# Patient Record
Sex: FEMALE | Race: WHITE
[De-identification: names, ages, dates, MRNs, and addresses within clinical notes are randomized per-mention and may not be internally consistent; named-entity substitution may affect disease eponyms.]

---

## 2017-01-01 ENCOUNTER — SURGERY (OUTPATIENT)
Age: 67
End: 2017-01-01

## 2017-01-01 ENCOUNTER — APPOINTMENT (OUTPATIENT)
Dept: GENERAL RADIOLOGY | Facility: CLINIC | Age: 67
DRG: 280 | End: 2017-01-01
Attending: SURGERY
Payer: COMMERCIAL

## 2017-01-01 ENCOUNTER — MYC MEDICAL ADVICE (OUTPATIENT)
Dept: FAMILY MEDICINE | Facility: OTHER | Age: 67
End: 2017-01-01

## 2017-01-01 ENCOUNTER — TELEPHONE (OUTPATIENT)
Dept: SURGERY | Facility: CLINIC | Age: 67
End: 2017-01-01

## 2017-01-01 ENCOUNTER — OFFICE VISIT (OUTPATIENT)
Dept: FAMILY MEDICINE | Facility: OTHER | Age: 67
End: 2017-01-01
Payer: COMMERCIAL

## 2017-01-01 ENCOUNTER — TELEPHONE (OUTPATIENT)
Dept: ONCOLOGY | Facility: CLINIC | Age: 67
End: 2017-01-01

## 2017-01-01 ENCOUNTER — DOCUMENTATION ONLY (OUTPATIENT)
Dept: SPIRITUAL SERVICES | Facility: CLINIC | Age: 67
End: 2017-01-01

## 2017-01-01 ENCOUNTER — NURSE TRIAGE (OUTPATIENT)
Dept: NURSING | Facility: CLINIC | Age: 67
End: 2017-01-01

## 2017-01-01 ENCOUNTER — INFUSION THERAPY VISIT (OUTPATIENT)
Dept: INFUSION THERAPY | Facility: CLINIC | Age: 67
End: 2017-01-01
Payer: COMMERCIAL

## 2017-01-01 ENCOUNTER — APPOINTMENT (OUTPATIENT)
Dept: PHYSICAL THERAPY | Facility: CLINIC | Age: 67
DRG: 280 | End: 2017-01-01
Attending: SURGERY
Payer: COMMERCIAL

## 2017-01-01 ENCOUNTER — APPOINTMENT (OUTPATIENT)
Dept: ULTRASOUND IMAGING | Facility: CLINIC | Age: 67
End: 2017-01-01
Attending: INTERNAL MEDICINE
Payer: COMMERCIAL

## 2017-01-01 ENCOUNTER — NURSING HOME VISIT (OUTPATIENT)
Dept: GERIATRICS | Facility: CLINIC | Age: 67
End: 2017-01-01
Payer: COMMERCIAL

## 2017-01-01 ENCOUNTER — TELEPHONE (OUTPATIENT)
Dept: FAMILY MEDICINE | Facility: OTHER | Age: 67
End: 2017-01-01

## 2017-01-01 ENCOUNTER — APPOINTMENT (OUTPATIENT)
Dept: SPEECH THERAPY | Facility: CLINIC | Age: 67
DRG: 280 | End: 2017-01-01
Attending: SURGERY
Payer: COMMERCIAL

## 2017-01-01 ENCOUNTER — HOSPITAL ENCOUNTER (OUTPATIENT)
Facility: CLINIC | Age: 67
Discharge: HOME | End: 2017-08-28
Attending: INTERNAL MEDICINE | Admitting: INTERNAL MEDICINE
Payer: COMMERCIAL

## 2017-01-01 ENCOUNTER — DOCUMENTATION ONLY (OUTPATIENT)
Dept: INFUSION THERAPY | Facility: CLINIC | Age: 67
End: 2017-01-01

## 2017-01-01 ENCOUNTER — ONCOLOGY VISIT (OUTPATIENT)
Dept: ONCOLOGY | Facility: CLINIC | Age: 67
End: 2017-01-01
Payer: COMMERCIAL

## 2017-01-01 ENCOUNTER — HOSPITAL ENCOUNTER (OUTPATIENT)
Dept: CT IMAGING | Facility: CLINIC | Age: 67
Discharge: HOME | End: 2017-12-07
Attending: INTERNAL MEDICINE | Admitting: INTERNAL MEDICINE
Payer: COMMERCIAL

## 2017-01-01 ENCOUNTER — ANESTHESIA EVENT (OUTPATIENT)
Dept: SURGERY | Facility: CLINIC | Age: 67
End: 2017-01-01
Payer: COMMERCIAL

## 2017-01-01 ENCOUNTER — APPOINTMENT (OUTPATIENT)
Dept: OCCUPATIONAL THERAPY | Facility: CLINIC | Age: 67
DRG: 280 | End: 2017-01-01
Attending: SURGERY
Payer: COMMERCIAL

## 2017-01-01 ENCOUNTER — ALLIED HEALTH/NURSE VISIT (OUTPATIENT)
Dept: FAMILY MEDICINE | Facility: CLINIC | Age: 67
End: 2017-01-01
Payer: COMMERCIAL

## 2017-01-01 ENCOUNTER — APPOINTMENT (OUTPATIENT)
Dept: GENERAL RADIOLOGY | Facility: CLINIC | Age: 67
End: 2017-01-01
Attending: SURGERY
Payer: COMMERCIAL

## 2017-01-01 ENCOUNTER — ONCOLOGY VISIT (OUTPATIENT)
Dept: ONCOLOGY | Facility: CLINIC | Age: 67
End: 2017-01-01
Attending: INTERNAL MEDICINE
Payer: COMMERCIAL

## 2017-01-01 ENCOUNTER — RADIANT APPOINTMENT (OUTPATIENT)
Dept: GENERAL RADIOLOGY | Facility: OTHER | Age: 67
End: 2017-01-01
Attending: PHYSICIAN ASSISTANT
Payer: COMMERCIAL

## 2017-01-01 ENCOUNTER — HOSPITAL ENCOUNTER (OUTPATIENT)
Facility: AMBULATORY SURGERY CENTER | Age: 67
Discharge: HOME | End: 2017-08-24
Attending: INTERNAL MEDICINE | Admitting: INTERNAL MEDICINE
Payer: COMMERCIAL

## 2017-01-01 ENCOUNTER — TELEPHONE (OUTPATIENT)
Dept: GENERAL RADIOLOGY | Facility: CLINIC | Age: 67
End: 2017-01-01

## 2017-01-01 ENCOUNTER — TRANSFERRED RECORDS (OUTPATIENT)
Dept: HEALTH INFORMATION MANAGEMENT | Facility: CLINIC | Age: 67
End: 2017-01-01

## 2017-01-01 ENCOUNTER — APPOINTMENT (OUTPATIENT)
Dept: CT IMAGING | Facility: CLINIC | Age: 67
End: 2017-01-01
Attending: EMERGENCY MEDICINE
Payer: COMMERCIAL

## 2017-01-01 ENCOUNTER — CARE COORDINATION (OUTPATIENT)
Dept: CARE COORDINATION | Facility: CLINIC | Age: 67
End: 2017-01-01

## 2017-01-01 ENCOUNTER — TELEPHONE (OUTPATIENT)
Dept: GERIATRICS | Facility: CLINIC | Age: 67
End: 2017-01-01

## 2017-01-01 ENCOUNTER — HOSPITAL ENCOUNTER (OUTPATIENT)
Dept: CARDIOLOGY | Facility: CLINIC | Age: 67
Discharge: HOME | End: 2017-08-09
Attending: PHYSICIAN ASSISTANT | Admitting: PHYSICIAN ASSISTANT
Payer: COMMERCIAL

## 2017-01-01 ENCOUNTER — HOSPITAL ENCOUNTER (OUTPATIENT)
Dept: CT IMAGING | Facility: CLINIC | Age: 67
Discharge: HOME | End: 2017-08-18
Attending: PHYSICIAN ASSISTANT | Admitting: PHYSICIAN ASSISTANT
Payer: COMMERCIAL

## 2017-01-01 ENCOUNTER — INFUSION THERAPY VISIT (OUTPATIENT)
Dept: INFUSION THERAPY | Facility: CLINIC | Age: 67
End: 2017-01-01
Attending: INTERNAL MEDICINE
Payer: COMMERCIAL

## 2017-01-01 ENCOUNTER — ALLIED HEALTH/NURSE VISIT (OUTPATIENT)
Dept: FAMILY MEDICINE | Facility: OTHER | Age: 67
End: 2017-01-01
Payer: COMMERCIAL

## 2017-01-01 ENCOUNTER — APPOINTMENT (OUTPATIENT)
Dept: GENERAL RADIOLOGY | Facility: CLINIC | Age: 67
DRG: 280 | End: 2017-01-01
Attending: INTERNAL MEDICINE
Payer: COMMERCIAL

## 2017-01-01 ENCOUNTER — OFFICE VISIT (OUTPATIENT)
Dept: SURGERY | Facility: CLINIC | Age: 67
End: 2017-01-01
Payer: COMMERCIAL

## 2017-01-01 ENCOUNTER — HOSPITAL ENCOUNTER (OUTPATIENT)
Dept: CARDIOLOGY | Facility: CLINIC | Age: 67
Discharge: HOME | End: 2017-09-27
Attending: INTERNAL MEDICINE | Admitting: INTERNAL MEDICINE
Payer: COMMERCIAL

## 2017-01-01 ENCOUNTER — APPOINTMENT (OUTPATIENT)
Dept: ULTRASOUND IMAGING | Facility: CLINIC | Age: 67
DRG: 280 | End: 2017-01-01
Attending: SURGERY
Payer: COMMERCIAL

## 2017-01-01 ENCOUNTER — APPOINTMENT (OUTPATIENT)
Dept: CARDIOLOGY | Facility: CLINIC | Age: 67
DRG: 280 | End: 2017-01-01
Attending: SURGERY
Payer: COMMERCIAL

## 2017-01-01 ENCOUNTER — APPOINTMENT (OUTPATIENT)
Dept: GENERAL RADIOLOGY | Facility: CLINIC | Age: 67
DRG: 280 | End: 2017-01-01
Attending: PHYSICIAN ASSISTANT
Payer: COMMERCIAL

## 2017-01-01 ENCOUNTER — OFFICE VISIT (OUTPATIENT)
Dept: CARDIOLOGY | Facility: CLINIC | Age: 67
End: 2017-01-01
Payer: COMMERCIAL

## 2017-01-01 ENCOUNTER — HOSPITAL ENCOUNTER (EMERGENCY)
Facility: CLINIC | Age: 67
Discharge: HOME | End: 2017-07-14
Attending: EMERGENCY MEDICINE | Admitting: EMERGENCY MEDICINE
Payer: COMMERCIAL

## 2017-01-01 ENCOUNTER — HOSPITAL ENCOUNTER (EMERGENCY)
Facility: CLINIC | Age: 67
Discharge: HOME | End: 2017-10-25
Attending: FAMILY MEDICINE | Admitting: FAMILY MEDICINE
Payer: COMMERCIAL

## 2017-01-01 ENCOUNTER — APPOINTMENT (OUTPATIENT)
Dept: GENERAL RADIOLOGY | Facility: CLINIC | Age: 67
End: 2017-01-01
Attending: EMERGENCY MEDICINE
Payer: COMMERCIAL

## 2017-01-01 ENCOUNTER — HOSPITAL ENCOUNTER (EMERGENCY)
Facility: CLINIC | Age: 67
Discharge: ANOTHER HOSPITAL | End: 2017-09-09
Attending: EMERGENCY MEDICINE | Admitting: EMERGENCY MEDICINE
Payer: COMMERCIAL

## 2017-01-01 ENCOUNTER — HOSPITAL ENCOUNTER (OUTPATIENT)
Dept: CARDIOLOGY | Facility: CLINIC | Age: 67
Discharge: HOME | End: 2017-10-13
Attending: INTERNAL MEDICINE | Admitting: INTERNAL MEDICINE
Payer: COMMERCIAL

## 2017-01-01 ENCOUNTER — HOME INFUSION (PRE-WILLOW HOME INFUSION) (OUTPATIENT)
Dept: PHARMACY | Facility: CLINIC | Age: 67
End: 2017-01-01

## 2017-01-01 ENCOUNTER — TELEPHONE (OUTPATIENT)
Dept: FAMILY MEDICINE | Facility: CLINIC | Age: 67
End: 2017-01-01

## 2017-01-01 ENCOUNTER — ANESTHESIA (OUTPATIENT)
Dept: SURGERY | Facility: CLINIC | Age: 67
End: 2017-01-01
Payer: COMMERCIAL

## 2017-01-01 ENCOUNTER — HOSPITAL ENCOUNTER (EMERGENCY)
Facility: CLINIC | Age: 67
Discharge: HOME | End: 2017-10-07
Attending: FAMILY MEDICINE | Admitting: FAMILY MEDICINE
Payer: COMMERCIAL

## 2017-01-01 ENCOUNTER — APPOINTMENT (OUTPATIENT)
Dept: CT IMAGING | Facility: CLINIC | Age: 67
DRG: 280 | End: 2017-01-01
Attending: INTERNAL MEDICINE
Payer: COMMERCIAL

## 2017-01-01 ENCOUNTER — NURSE TRIAGE (OUTPATIENT)
Dept: CARE COORDINATION | Facility: CLINIC | Age: 67
End: 2017-01-01

## 2017-01-01 ENCOUNTER — APPOINTMENT (OUTPATIENT)
Dept: GENERAL RADIOLOGY | Facility: CLINIC | Age: 67
End: 2017-01-01
Attending: FAMILY MEDICINE
Payer: COMMERCIAL

## 2017-01-01 ENCOUNTER — HOSPITAL ENCOUNTER (INPATIENT)
Facility: CLINIC | Age: 67
LOS: 10 days | Discharge: NURSING, REHAB OR LONG-TERM CARE | DRG: 280 | End: 2017-09-19
Attending: SURGERY | Admitting: INTERNAL MEDICINE
Payer: COMMERCIAL

## 2017-01-01 ENCOUNTER — HOSPITAL ENCOUNTER (OUTPATIENT)
Dept: PET IMAGING | Facility: CLINIC | Age: 67
Discharge: HOME | End: 2017-08-25
Attending: INTERNAL MEDICINE | Admitting: INTERNAL MEDICINE
Payer: COMMERCIAL

## 2017-01-01 ENCOUNTER — TELEPHONE (OUTPATIENT)
Dept: CARDIOLOGY | Facility: CLINIC | Age: 67
End: 2017-01-01

## 2017-01-01 ENCOUNTER — APPOINTMENT (OUTPATIENT)
Dept: CT IMAGING | Facility: CLINIC | Age: 67
End: 2017-01-01
Attending: FAMILY MEDICINE
Payer: COMMERCIAL

## 2017-01-01 ENCOUNTER — RADIANT APPOINTMENT (OUTPATIENT)
Dept: ULTRASOUND IMAGING | Facility: OTHER | Age: 67
End: 2017-01-01
Attending: PHYSICIAN ASSISTANT
Payer: COMMERCIAL

## 2017-01-01 ENCOUNTER — HOSPITAL ENCOUNTER (OUTPATIENT)
Facility: CLINIC | Age: 67
Discharge: HOME | End: 2017-09-01
Attending: SURGERY | Admitting: SURGERY
Payer: COMMERCIAL

## 2017-01-01 VITALS
SYSTOLIC BLOOD PRESSURE: 151 MMHG | HEART RATE: 97 BPM | BODY MASS INDEX: 20.07 KG/M2 | RESPIRATION RATE: 16 BRPM | WEIGHT: 127.9 LBS | HEIGHT: 67 IN | DIASTOLIC BLOOD PRESSURE: 77 MMHG | OXYGEN SATURATION: 100 % | TEMPERATURE: 97.3 F

## 2017-01-01 VITALS
SYSTOLIC BLOOD PRESSURE: 129 MMHG | BODY MASS INDEX: 23.58 KG/M2 | WEIGHT: 148.3 LBS | HEART RATE: 85 BPM | DIASTOLIC BLOOD PRESSURE: 57 MMHG | RESPIRATION RATE: 18 BRPM | OXYGEN SATURATION: 100 % | TEMPERATURE: 98.5 F

## 2017-01-01 VITALS
DIASTOLIC BLOOD PRESSURE: 80 MMHG | HEIGHT: 66 IN | HEART RATE: 136 BPM | TEMPERATURE: 98 F | OXYGEN SATURATION: 98 % | WEIGHT: 150 LBS | SYSTOLIC BLOOD PRESSURE: 172 MMHG | BODY MASS INDEX: 24.11 KG/M2 | RESPIRATION RATE: 16 BRPM

## 2017-01-01 VITALS
HEIGHT: 67 IN | WEIGHT: 127.9 LBS | TEMPERATURE: 98.4 F | DIASTOLIC BLOOD PRESSURE: 80 MMHG | HEART RATE: 92 BPM | OXYGEN SATURATION: 100 % | BODY MASS INDEX: 20.07 KG/M2 | RESPIRATION RATE: 16 BRPM | SYSTOLIC BLOOD PRESSURE: 158 MMHG

## 2017-01-01 VITALS
OXYGEN SATURATION: 97 % | RESPIRATION RATE: 16 BRPM | HEIGHT: 67 IN | WEIGHT: 139.6 LBS | DIASTOLIC BLOOD PRESSURE: 86 MMHG | SYSTOLIC BLOOD PRESSURE: 153 MMHG | HEART RATE: 97 BPM | BODY MASS INDEX: 21.91 KG/M2 | TEMPERATURE: 98.4 F

## 2017-01-01 VITALS
TEMPERATURE: 98.8 F | SYSTOLIC BLOOD PRESSURE: 118 MMHG | OXYGEN SATURATION: 99 % | BODY MASS INDEX: 22.05 KG/M2 | HEART RATE: 110 BPM | DIASTOLIC BLOOD PRESSURE: 67 MMHG | WEIGHT: 140.5 LBS | HEIGHT: 67 IN | RESPIRATION RATE: 16 BRPM

## 2017-01-01 VITALS
TEMPERATURE: 98.6 F | HEIGHT: 67 IN | SYSTOLIC BLOOD PRESSURE: 136 MMHG | RESPIRATION RATE: 18 BRPM | DIASTOLIC BLOOD PRESSURE: 65 MMHG | WEIGHT: 146 LBS | OXYGEN SATURATION: 99 % | BODY MASS INDEX: 22.91 KG/M2 | HEART RATE: 79 BPM

## 2017-01-01 VITALS
SYSTOLIC BLOOD PRESSURE: 132 MMHG | OXYGEN SATURATION: 97 % | DIASTOLIC BLOOD PRESSURE: 66 MMHG | TEMPERATURE: 98.6 F | RESPIRATION RATE: 16 BRPM | HEART RATE: 96 BPM

## 2017-01-01 VITALS
WEIGHT: 158 LBS | OXYGEN SATURATION: 96 % | SYSTOLIC BLOOD PRESSURE: 173 MMHG | BODY MASS INDEX: 25.39 KG/M2 | DIASTOLIC BLOOD PRESSURE: 96 MMHG | RESPIRATION RATE: 16 BRPM | HEART RATE: 120 BPM | TEMPERATURE: 97.5 F | HEIGHT: 66 IN

## 2017-01-01 VITALS
HEART RATE: 132 BPM | DIASTOLIC BLOOD PRESSURE: 66 MMHG | BODY MASS INDEX: 25.23 KG/M2 | SYSTOLIC BLOOD PRESSURE: 180 MMHG | RESPIRATION RATE: 12 BRPM | HEIGHT: 66 IN | WEIGHT: 157 LBS | OXYGEN SATURATION: 99 % | TEMPERATURE: 98.5 F

## 2017-01-01 VITALS
HEIGHT: 67 IN | SYSTOLIC BLOOD PRESSURE: 170 MMHG | HEART RATE: 110 BPM | DIASTOLIC BLOOD PRESSURE: 70 MMHG | BODY MASS INDEX: 21.88 KG/M2 | WEIGHT: 139.4 LBS | OXYGEN SATURATION: 98 % | TEMPERATURE: 98.4 F

## 2017-01-01 VITALS
WEIGHT: 148 LBS | SYSTOLIC BLOOD PRESSURE: 142 MMHG | DIASTOLIC BLOOD PRESSURE: 86 MMHG | TEMPERATURE: 99.5 F | RESPIRATION RATE: 39 BRPM | BODY MASS INDEX: 23.5 KG/M2 | OXYGEN SATURATION: 92 % | HEART RATE: 147 BPM

## 2017-01-01 VITALS
HEART RATE: 95 BPM | TEMPERATURE: 97.8 F | OXYGEN SATURATION: 100 % | SYSTOLIC BLOOD PRESSURE: 163 MMHG | BODY MASS INDEX: 20.78 KG/M2 | RESPIRATION RATE: 16 BRPM | DIASTOLIC BLOOD PRESSURE: 81 MMHG | HEIGHT: 67 IN | WEIGHT: 132.4 LBS

## 2017-01-01 VITALS
SYSTOLIC BLOOD PRESSURE: 119 MMHG | OXYGEN SATURATION: 98 % | DIASTOLIC BLOOD PRESSURE: 58 MMHG | TEMPERATURE: 98.1 F | HEART RATE: 90 BPM | RESPIRATION RATE: 16 BRPM

## 2017-01-01 VITALS
BODY MASS INDEX: 23.59 KG/M2 | RESPIRATION RATE: 18 BRPM | BODY MASS INDEX: 22.88 KG/M2 | HEART RATE: 72 BPM | DIASTOLIC BLOOD PRESSURE: 72 MMHG | TEMPERATURE: 99.7 F | WEIGHT: 144.1 LBS | SYSTOLIC BLOOD PRESSURE: 156 MMHG | WEIGHT: 150.3 LBS | HEIGHT: 67 IN | OXYGEN SATURATION: 97 % | TEMPERATURE: 98.6 F | RESPIRATION RATE: 16 BRPM | HEART RATE: 93 BPM | SYSTOLIC BLOOD PRESSURE: 130 MMHG | DIASTOLIC BLOOD PRESSURE: 63 MMHG

## 2017-01-01 VITALS
RESPIRATION RATE: 18 BRPM | BODY MASS INDEX: 22.1 KG/M2 | DIASTOLIC BLOOD PRESSURE: 85 MMHG | SYSTOLIC BLOOD PRESSURE: 163 MMHG | TEMPERATURE: 101.7 F | HEART RATE: 101 BPM | WEIGHT: 139 LBS | OXYGEN SATURATION: 93 %

## 2017-01-01 VITALS
BODY MASS INDEX: 23.53 KG/M2 | DIASTOLIC BLOOD PRESSURE: 84 MMHG | HEART RATE: 86 BPM | TEMPERATURE: 97.5 F | OXYGEN SATURATION: 98 % | RESPIRATION RATE: 16 BRPM | SYSTOLIC BLOOD PRESSURE: 160 MMHG | WEIGHT: 148.2 LBS

## 2017-01-01 VITALS
HEART RATE: 103 BPM | HEIGHT: 67 IN | SYSTOLIC BLOOD PRESSURE: 121 MMHG | DIASTOLIC BLOOD PRESSURE: 65 MMHG | TEMPERATURE: 99 F | OXYGEN SATURATION: 98 % | RESPIRATION RATE: 16 BRPM | BODY MASS INDEX: 22.65 KG/M2 | WEIGHT: 144.3 LBS

## 2017-01-01 VITALS
DIASTOLIC BLOOD PRESSURE: 58 MMHG | SYSTOLIC BLOOD PRESSURE: 124 MMHG | BODY MASS INDEX: 22.32 KG/M2 | WEIGHT: 142.2 LBS | OXYGEN SATURATION: 97 % | HEIGHT: 67 IN | TEMPERATURE: 97.5 F | HEART RATE: 90 BPM | RESPIRATION RATE: 16 BRPM

## 2017-01-01 VITALS
TEMPERATURE: 97.7 F | WEIGHT: 137.3 LBS | DIASTOLIC BLOOD PRESSURE: 73 MMHG | HEIGHT: 67 IN | BODY MASS INDEX: 21.55 KG/M2 | SYSTOLIC BLOOD PRESSURE: 140 MMHG | HEART RATE: 94 BPM | OXYGEN SATURATION: 99 % | RESPIRATION RATE: 16 BRPM

## 2017-01-01 VITALS
BODY MASS INDEX: 24.32 KG/M2 | SYSTOLIC BLOOD PRESSURE: 178 MMHG | WEIGHT: 151.6 LBS | HEART RATE: 120 BPM | DIASTOLIC BLOOD PRESSURE: 70 MMHG | TEMPERATURE: 98.6 F

## 2017-01-01 VITALS
OXYGEN SATURATION: 97 % | HEART RATE: 125 BPM | BODY MASS INDEX: 25.41 KG/M2 | WEIGHT: 158.1 LBS | DIASTOLIC BLOOD PRESSURE: 78 MMHG | SYSTOLIC BLOOD PRESSURE: 162 MMHG | HEIGHT: 66 IN | TEMPERATURE: 98.7 F | RESPIRATION RATE: 16 BRPM

## 2017-01-01 VITALS
TEMPERATURE: 97 F | HEART RATE: 77 BPM | HEIGHT: 67 IN | DIASTOLIC BLOOD PRESSURE: 62 MMHG | OXYGEN SATURATION: 97 % | SYSTOLIC BLOOD PRESSURE: 122 MMHG | BODY MASS INDEX: 22.34 KG/M2 | WEIGHT: 142.3 LBS | RESPIRATION RATE: 16 BRPM

## 2017-01-01 VITALS
DIASTOLIC BLOOD PRESSURE: 79 MMHG | OXYGEN SATURATION: 97 % | TEMPERATURE: 97.7 F | HEIGHT: 67 IN | BODY MASS INDEX: 21.56 KG/M2 | RESPIRATION RATE: 16 BRPM | WEIGHT: 137.4 LBS | HEART RATE: 85 BPM | SYSTOLIC BLOOD PRESSURE: 157 MMHG

## 2017-01-01 VITALS
OXYGEN SATURATION: 98 % | DIASTOLIC BLOOD PRESSURE: 79 MMHG | HEART RATE: 134 BPM | BODY MASS INDEX: 23.25 KG/M2 | HEIGHT: 67 IN | WEIGHT: 148.1 LBS | SYSTOLIC BLOOD PRESSURE: 154 MMHG | TEMPERATURE: 100.7 F | RESPIRATION RATE: 16 BRPM

## 2017-01-01 VITALS
TEMPERATURE: 97.3 F | OXYGEN SATURATION: 99 % | SYSTOLIC BLOOD PRESSURE: 160 MMHG | DIASTOLIC BLOOD PRESSURE: 76 MMHG | HEART RATE: 94 BPM | RESPIRATION RATE: 18 BRPM

## 2017-01-01 VITALS
RESPIRATION RATE: 16 BRPM | OXYGEN SATURATION: 93 % | DIASTOLIC BLOOD PRESSURE: 86 MMHG | HEART RATE: 92 BPM | SYSTOLIC BLOOD PRESSURE: 181 MMHG | BODY MASS INDEX: 21.92 KG/M2 | TEMPERATURE: 101.9 F | WEIGHT: 138 LBS

## 2017-01-01 VITALS
SYSTOLIC BLOOD PRESSURE: 146 MMHG | BODY MASS INDEX: 20.04 KG/M2 | HEIGHT: 67 IN | RESPIRATION RATE: 18 BRPM | OXYGEN SATURATION: 99 % | TEMPERATURE: 97.6 F | DIASTOLIC BLOOD PRESSURE: 72 MMHG | WEIGHT: 127.7 LBS | HEART RATE: 85 BPM

## 2017-01-01 VITALS
RESPIRATION RATE: 18 BRPM | HEART RATE: 86 BPM | SYSTOLIC BLOOD PRESSURE: 169 MMHG | DIASTOLIC BLOOD PRESSURE: 81 MMHG | TEMPERATURE: 98.7 F

## 2017-01-01 VITALS
BODY MASS INDEX: 21.62 KG/M2 | OXYGEN SATURATION: 96 % | WEIGHT: 136 LBS | RESPIRATION RATE: 16 BRPM | SYSTOLIC BLOOD PRESSURE: 166 MMHG | DIASTOLIC BLOOD PRESSURE: 77 MMHG | TEMPERATURE: 100 F | HEART RATE: 102 BPM

## 2017-01-01 VITALS
HEIGHT: 67 IN | RESPIRATION RATE: 14 BRPM | WEIGHT: 148.1 LBS | TEMPERATURE: 100.7 F | OXYGEN SATURATION: 95 % | SYSTOLIC BLOOD PRESSURE: 157 MMHG | BODY MASS INDEX: 23.25 KG/M2 | DIASTOLIC BLOOD PRESSURE: 73 MMHG

## 2017-01-01 VITALS
OXYGEN SATURATION: 98 % | RESPIRATION RATE: 18 BRPM | TEMPERATURE: 98.2 F | WEIGHT: 144 LBS | HEART RATE: 103 BPM | SYSTOLIC BLOOD PRESSURE: 167 MMHG | DIASTOLIC BLOOD PRESSURE: 82 MMHG | BODY MASS INDEX: 22.89 KG/M2

## 2017-01-01 VITALS
HEART RATE: 64 BPM | TEMPERATURE: 97.8 F | SYSTOLIC BLOOD PRESSURE: 161 MMHG | OXYGEN SATURATION: 100 % | DIASTOLIC BLOOD PRESSURE: 67 MMHG | RESPIRATION RATE: 16 BRPM

## 2017-01-01 VITALS
DIASTOLIC BLOOD PRESSURE: 49 MMHG | WEIGHT: 144.4 LBS | OXYGEN SATURATION: 98 % | HEART RATE: 92 BPM | RESPIRATION RATE: 16 BRPM | SYSTOLIC BLOOD PRESSURE: 122 MMHG | BODY MASS INDEX: 22.66 KG/M2 | HEIGHT: 67 IN | TEMPERATURE: 99.5 F

## 2017-01-01 VITALS
SYSTOLIC BLOOD PRESSURE: 161 MMHG | OXYGEN SATURATION: 97 % | WEIGHT: 153.9 LBS | RESPIRATION RATE: 16 BRPM | TEMPERATURE: 104.3 F | HEART RATE: 127 BPM | DIASTOLIC BLOOD PRESSURE: 83 MMHG | BODY MASS INDEX: 24.73 KG/M2 | HEIGHT: 66 IN

## 2017-01-01 VITALS
TEMPERATURE: 98 F | SYSTOLIC BLOOD PRESSURE: 172 MMHG | DIASTOLIC BLOOD PRESSURE: 80 MMHG | OXYGEN SATURATION: 98 % | WEIGHT: 150 LBS | HEART RATE: 136 BPM | BODY MASS INDEX: 24.06 KG/M2 | RESPIRATION RATE: 16 BRPM

## 2017-01-01 VITALS
WEIGHT: 129.1 LBS | SYSTOLIC BLOOD PRESSURE: 131 MMHG | TEMPERATURE: 96.7 F | BODY MASS INDEX: 20.26 KG/M2 | HEIGHT: 67 IN | DIASTOLIC BLOOD PRESSURE: 76 MMHG | RESPIRATION RATE: 16 BRPM | OXYGEN SATURATION: 100 % | HEART RATE: 98 BPM

## 2017-01-01 VITALS
HEIGHT: 66 IN | RESPIRATION RATE: 99 BRPM | WEIGHT: 142.1 LBS | SYSTOLIC BLOOD PRESSURE: 124 MMHG | DIASTOLIC BLOOD PRESSURE: 58 MMHG | BODY MASS INDEX: 22.84 KG/M2 | HEART RATE: 90 BPM

## 2017-01-01 VITALS
OXYGEN SATURATION: 100 % | WEIGHT: 129.19 LBS | HEIGHT: 67 IN | SYSTOLIC BLOOD PRESSURE: 140 MMHG | DIASTOLIC BLOOD PRESSURE: 61 MMHG | BODY MASS INDEX: 20.28 KG/M2 | TEMPERATURE: 98.5 F | RESPIRATION RATE: 16 BRPM | HEART RATE: 94 BPM

## 2017-01-01 VITALS
BODY MASS INDEX: 23.06 KG/M2 | RESPIRATION RATE: 22 BRPM | WEIGHT: 145.2 LBS | HEART RATE: 78 BPM | DIASTOLIC BLOOD PRESSURE: 72 MMHG | TEMPERATURE: 99.5 F | SYSTOLIC BLOOD PRESSURE: 158 MMHG

## 2017-01-01 VITALS
RESPIRATION RATE: 16 BRPM | HEART RATE: 120 BPM | OXYGEN SATURATION: 98 % | TEMPERATURE: 98.3 F | SYSTOLIC BLOOD PRESSURE: 172 MMHG | DIASTOLIC BLOOD PRESSURE: 81 MMHG

## 2017-01-01 VITALS
HEIGHT: 66 IN | WEIGHT: 153 LBS | TEMPERATURE: 98.4 F | DIASTOLIC BLOOD PRESSURE: 75 MMHG | RESPIRATION RATE: 18 BRPM | BODY MASS INDEX: 24.59 KG/M2 | SYSTOLIC BLOOD PRESSURE: 164 MMHG | OXYGEN SATURATION: 97 %

## 2017-01-01 VITALS — DIASTOLIC BLOOD PRESSURE: 80 MMHG | SYSTOLIC BLOOD PRESSURE: 154 MMHG

## 2017-01-01 DIAGNOSIS — C18.9 METASTATIC COLON CANCER TO LIVER (H): Primary | ICD-10-CM

## 2017-01-01 DIAGNOSIS — R68.83 CHILLS: ICD-10-CM

## 2017-01-01 DIAGNOSIS — D69.6 THROMBOCYTOPENIA (H): ICD-10-CM

## 2017-01-01 DIAGNOSIS — D72.829 LEUKOCYTOSIS, UNSPECIFIED TYPE: Primary | ICD-10-CM

## 2017-01-01 DIAGNOSIS — C78.7 LIVER METASTASIS: ICD-10-CM

## 2017-01-01 DIAGNOSIS — C78.7 METASTATIC COLON CANCER TO LIVER (H): ICD-10-CM

## 2017-01-01 DIAGNOSIS — Z12.31 ENCOUNTER FOR SCREENING MAMMOGRAM FOR BREAST CANCER: ICD-10-CM

## 2017-01-01 DIAGNOSIS — T45.1X5A CHEMOTHERAPY INDUCED DIARRHEA: ICD-10-CM

## 2017-01-01 DIAGNOSIS — R05.9 COUGH: ICD-10-CM

## 2017-01-01 DIAGNOSIS — I10 BENIGN ESSENTIAL HYPERTENSION: ICD-10-CM

## 2017-01-01 DIAGNOSIS — D64.9 ANEMIA, UNSPECIFIED TYPE: ICD-10-CM

## 2017-01-01 DIAGNOSIS — R11.2 CHEMOTHERAPY INDUCED NAUSEA AND VOMITING: ICD-10-CM

## 2017-01-01 DIAGNOSIS — R50.9 FEVER, UNSPECIFIED: ICD-10-CM

## 2017-01-01 DIAGNOSIS — E87.6 HYPOKALEMIA: ICD-10-CM

## 2017-01-01 DIAGNOSIS — G89.3 CANCER ASSOCIATED PAIN: ICD-10-CM

## 2017-01-01 DIAGNOSIS — R03.0 ELEVATED BLOOD PRESSURE READING WITHOUT DIAGNOSIS OF HYPERTENSION: ICD-10-CM

## 2017-01-01 DIAGNOSIS — C78.7 METASTATIC COLON CANCER TO LIVER (H): Primary | ICD-10-CM

## 2017-01-01 DIAGNOSIS — T45.1X5A CHEMOTHERAPY INDUCED NAUSEA AND VOMITING: ICD-10-CM

## 2017-01-01 DIAGNOSIS — K76.9 HEPATIC LESION: ICD-10-CM

## 2017-01-01 DIAGNOSIS — I50.22 CHRONIC SYSTOLIC CONGESTIVE HEART FAILURE (H): ICD-10-CM

## 2017-01-01 DIAGNOSIS — C18.9 METASTATIC COLON CANCER TO LIVER (H): ICD-10-CM

## 2017-01-01 DIAGNOSIS — Z13.6 CARDIOVASCULAR SCREENING; LDL GOAL LESS THAN 160: ICD-10-CM

## 2017-01-01 DIAGNOSIS — Z12.11 SPECIAL SCREENING FOR MALIGNANT NEOPLASMS, COLON: ICD-10-CM

## 2017-01-01 DIAGNOSIS — Z12.11 SCREEN FOR COLON CANCER: ICD-10-CM

## 2017-01-01 DIAGNOSIS — R93.5 ABNORMAL ULTRASOUND OF ABDOMEN: Primary | ICD-10-CM

## 2017-01-01 DIAGNOSIS — I42.9 SECONDARY CARDIOMYOPATHY (H): ICD-10-CM

## 2017-01-01 DIAGNOSIS — R05.3 PERSISTENT COUGH: ICD-10-CM

## 2017-01-01 DIAGNOSIS — C19 COLORECTAL CANCER (H): ICD-10-CM

## 2017-01-01 DIAGNOSIS — R93.5 ABNORMAL ULTRASOUND OF ABDOMEN: ICD-10-CM

## 2017-01-01 DIAGNOSIS — Z23 NEED FOR VACCINATION: ICD-10-CM

## 2017-01-01 DIAGNOSIS — C18.9 METASTATIC COLON CANCER IN FEMALE: ICD-10-CM

## 2017-01-01 DIAGNOSIS — D70.1 CHEMOTHERAPY-INDUCED NEUTROPENIA (H): ICD-10-CM

## 2017-01-01 DIAGNOSIS — R00.0 TACHYCARDIA: ICD-10-CM

## 2017-01-01 DIAGNOSIS — D70.9 NEUTROPENIC FEVER (H): Primary | ICD-10-CM

## 2017-01-01 DIAGNOSIS — R63.4 LOSS OF WEIGHT: ICD-10-CM

## 2017-01-01 DIAGNOSIS — R91.1 LUNG NODULE: ICD-10-CM

## 2017-01-01 DIAGNOSIS — R19.5 POSITIVE FIT (FECAL IMMUNOCHEMICAL TEST): Primary | ICD-10-CM

## 2017-01-01 DIAGNOSIS — K59.01 SLOW TRANSIT CONSTIPATION: ICD-10-CM

## 2017-01-01 DIAGNOSIS — D70.9 NEUTROPENIC FEVER (H): ICD-10-CM

## 2017-01-01 DIAGNOSIS — C78.7 LIVER METASTASIS: Primary | ICD-10-CM

## 2017-01-01 DIAGNOSIS — Z11.59 NEED FOR HEPATITIS C SCREENING TEST: ICD-10-CM

## 2017-01-01 DIAGNOSIS — R79.89 ELEVATED LFTS: ICD-10-CM

## 2017-01-01 DIAGNOSIS — R50.81 NEUTROPENIC FEVER (H): ICD-10-CM

## 2017-01-01 DIAGNOSIS — Z13.1 SCREENING FOR DIABETES MELLITUS: Primary | ICD-10-CM

## 2017-01-01 DIAGNOSIS — R06.02 SHORTNESS OF BREATH: ICD-10-CM

## 2017-01-01 DIAGNOSIS — C78.7 SECONDARY MALIGNANT NEOPLASM OF LIVER (H): ICD-10-CM

## 2017-01-01 DIAGNOSIS — Z13.1 SCREENING FOR DIABETES MELLITUS: ICD-10-CM

## 2017-01-01 DIAGNOSIS — R05.9 COUGH: Primary | ICD-10-CM

## 2017-01-01 DIAGNOSIS — R50.81 NEUTROPENIC FEVER (H): Primary | ICD-10-CM

## 2017-01-01 DIAGNOSIS — Z01.818 PREOP GENERAL PHYSICAL EXAM: ICD-10-CM

## 2017-01-01 DIAGNOSIS — J96.01 ACUTE RESPIRATORY FAILURE WITH HYPOXIA (H): ICD-10-CM

## 2017-01-01 DIAGNOSIS — I50.21 ACUTE SYSTOLIC CONGESTIVE HEART FAILURE (H): ICD-10-CM

## 2017-01-01 DIAGNOSIS — D72.829 LEUKOCYTOSIS, UNSPECIFIED TYPE: ICD-10-CM

## 2017-01-01 DIAGNOSIS — R53.81 PHYSICAL DECONDITIONING: ICD-10-CM

## 2017-01-01 DIAGNOSIS — I10 HYPERTENSION, GOAL BELOW 140/90: ICD-10-CM

## 2017-01-01 DIAGNOSIS — R05.3 PERSISTENT COUGH: Primary | ICD-10-CM

## 2017-01-01 DIAGNOSIS — K63.89 MASS OF COLON: Primary | ICD-10-CM

## 2017-01-01 DIAGNOSIS — K52.1 CHEMOTHERAPY INDUCED DIARRHEA: ICD-10-CM

## 2017-01-01 DIAGNOSIS — D64.9 ANEMIA: Primary | ICD-10-CM

## 2017-01-01 DIAGNOSIS — Z23 NEED FOR PROPHYLACTIC VACCINATION WITH TETANUS-DIPHTHERIA (TD): ICD-10-CM

## 2017-01-01 DIAGNOSIS — R07.89 ATYPICAL CHEST PAIN: ICD-10-CM

## 2017-01-01 DIAGNOSIS — R94.31 NONSPECIFIC ABNORMAL ELECTROCARDIOGRAM (ECG) (EKG): ICD-10-CM

## 2017-01-01 DIAGNOSIS — K63.89 COLONIC MASS: Primary | ICD-10-CM

## 2017-01-01 DIAGNOSIS — Z00.01 ENCOUNTER FOR ROUTINE ADULT HEALTH EXAMINATION WITH ABNORMAL FINDINGS: ICD-10-CM

## 2017-01-01 DIAGNOSIS — C18.2 MALIGNANT NEOPLASM OF ASCENDING COLON (H): ICD-10-CM

## 2017-01-01 DIAGNOSIS — R10.10 UPPER ABDOMINAL PAIN: ICD-10-CM

## 2017-01-01 DIAGNOSIS — R11.2 NAUSEA AND VOMITING, INTRACTABILITY OF VOMITING NOT SPECIFIED, UNSPECIFIED VOMITING TYPE: ICD-10-CM

## 2017-01-01 DIAGNOSIS — R00.2 PALPITATIONS: ICD-10-CM

## 2017-01-01 DIAGNOSIS — Z23 NEED FOR PROPHYLACTIC VACCINATION AGAINST STREPTOCOCCUS PNEUMONIAE (PNEUMOCOCCUS): ICD-10-CM

## 2017-01-01 DIAGNOSIS — R79.82 ELEVATED C-REACTIVE PROTEIN (CRP): ICD-10-CM

## 2017-01-01 DIAGNOSIS — C78.7 LIVER METASTASES: ICD-10-CM

## 2017-01-01 DIAGNOSIS — R03.0 ELEVATED BLOOD PRESSURE READING WITHOUT DIAGNOSIS OF HYPERTENSION: Primary | ICD-10-CM

## 2017-01-01 DIAGNOSIS — J45.30 ASTHMA IN ADULT, MILD PERSISTENT, UNCOMPLICATED: ICD-10-CM

## 2017-01-01 DIAGNOSIS — R50.9 FEVER, UNSPECIFIED: Primary | ICD-10-CM

## 2017-01-01 DIAGNOSIS — R79.89 ELEVATED D-DIMER: ICD-10-CM

## 2017-01-01 DIAGNOSIS — R10.10 UPPER ABDOMINAL PAIN: Primary | ICD-10-CM

## 2017-01-01 DIAGNOSIS — R10.12 ABDOMINAL PAIN, LEFT UPPER QUADRANT: ICD-10-CM

## 2017-01-01 DIAGNOSIS — D64.9 ANEMIA: ICD-10-CM

## 2017-01-01 DIAGNOSIS — I50.9 ACUTE CONGESTIVE HEART FAILURE, UNSPECIFIED CONGESTIVE HEART FAILURE TYPE: ICD-10-CM

## 2017-01-01 DIAGNOSIS — T45.1X5A CHEMOTHERAPY-INDUCED NEUTROPENIA (H): ICD-10-CM

## 2017-01-01 DIAGNOSIS — Z71.81 SPIRITUAL OR RELIGIOUS COUNSELING: Primary | ICD-10-CM

## 2017-01-01 DIAGNOSIS — D64.9 ANEMIA, UNSPECIFIED TYPE: Primary | ICD-10-CM

## 2017-01-01 DIAGNOSIS — R11.2 NAUSEA AND VOMITING, INTRACTABILITY OF VOMITING NOT SPECIFIED, UNSPECIFIED VOMITING TYPE: Primary | ICD-10-CM

## 2017-01-01 DIAGNOSIS — Z01.818 PREOP GENERAL PHYSICAL EXAM: Primary | ICD-10-CM

## 2017-01-01 DIAGNOSIS — R00.0 TACHYCARDIA: Primary | ICD-10-CM

## 2017-01-01 DIAGNOSIS — Z78.0 ASYMPTOMATIC POSTMENOPAUSAL STATUS: ICD-10-CM

## 2017-01-01 LAB
ABO + RH BLD: NORMAL
ALBUMIN SERPL-MCNC: 1.3 G/DL (ref 3.4–5)
ALBUMIN SERPL-MCNC: 1.3 G/DL (ref 3.4–5)
ALBUMIN SERPL-MCNC: 1.4 G/DL (ref 3.4–5)
ALBUMIN SERPL-MCNC: 1.5 G/DL (ref 3.4–5)
ALBUMIN SERPL-MCNC: 1.6 G/DL (ref 3.4–5)
ALBUMIN SERPL-MCNC: 1.7 G/DL (ref 3.4–5)
ALBUMIN SERPL-MCNC: 1.7 G/DL (ref 3.4–5)
ALBUMIN SERPL-MCNC: 1.8 G/DL (ref 3.4–5)
ALBUMIN SERPL-MCNC: 1.8 G/DL (ref 3.4–5)
ALBUMIN SERPL-MCNC: 1.9 G/DL (ref 3.4–5)
ALBUMIN SERPL-MCNC: 2.1 G/DL (ref 3.4–5)
ALBUMIN SERPL-MCNC: 2.4 G/DL (ref 3.4–5)
ALBUMIN SERPL-MCNC: 2.5 G/DL (ref 3.4–5)
ALBUMIN SERPL-MCNC: 3.1 G/DL (ref 3.4–5)
ALBUMIN UR-MCNC: 100 MG/DL
ALBUMIN UR-MCNC: 30 MG/DL
ALBUMIN UR-MCNC: NEGATIVE MG/DL
ALP SERPL-CCNC: 163 U/L (ref 40–150)
ALP SERPL-CCNC: 221 U/L (ref 40–150)
ALP SERPL-CCNC: 245 U/L (ref 40–150)
ALP SERPL-CCNC: 256 U/L (ref 40–150)
ALP SERPL-CCNC: 265 U/L (ref 40–150)
ALP SERPL-CCNC: 273 U/L (ref 40–150)
ALP SERPL-CCNC: 291 U/L (ref 40–150)
ALP SERPL-CCNC: 293 U/L (ref 40–150)
ALP SERPL-CCNC: 306 U/L (ref 40–150)
ALP SERPL-CCNC: 315 U/L (ref 40–150)
ALP SERPL-CCNC: 318 U/L (ref 40–150)
ALP SERPL-CCNC: 358 U/L (ref 40–150)
ALP SERPL-CCNC: 421 U/L (ref 40–150)
ALP SERPL-CCNC: 423 U/L (ref 40–150)
ALP SERPL-CCNC: 432 U/L (ref 40–150)
ALP SERPL-CCNC: 433 U/L (ref 40–150)
ALP SERPL-CCNC: 440 U/L (ref 40–150)
ALP SERPL-CCNC: 477 U/L (ref 40–150)
ALP SERPL-CCNC: 492 U/L (ref 40–150)
ALP SERPL-CCNC: 539 U/L (ref 40–150)
ALP SERPL-CCNC: 548 U/L (ref 40–150)
ALP SERPL-CCNC: 550 U/L (ref 40–150)
ALP SERPL-CCNC: 563 U/L (ref 40–150)
ALP SERPL-CCNC: 587 U/L (ref 40–150)
ALP SERPL-CCNC: 598 U/L (ref 40–150)
ALP SERPL-CCNC: 705 U/L (ref 40–150)
ALP SERPL-CCNC: 708 U/L (ref 40–150)
ALP SERPL-CCNC: 900 U/L (ref 40–150)
ALT SERPL W P-5'-P-CCNC: 12 U/L (ref 0–50)
ALT SERPL W P-5'-P-CCNC: 15 U/L (ref 0–50)
ALT SERPL W P-5'-P-CCNC: 17 U/L (ref 0–50)
ALT SERPL W P-5'-P-CCNC: 18 U/L (ref 0–50)
ALT SERPL W P-5'-P-CCNC: 20 U/L (ref 0–50)
ALT SERPL W P-5'-P-CCNC: 23 U/L (ref 0–50)
ALT SERPL W P-5'-P-CCNC: 24 U/L (ref 0–50)
ALT SERPL W P-5'-P-CCNC: 25 U/L (ref 0–50)
ALT SERPL W P-5'-P-CCNC: 26 U/L (ref 0–50)
ALT SERPL W P-5'-P-CCNC: 27 U/L (ref 0–50)
ALT SERPL W P-5'-P-CCNC: 29 U/L (ref 0–50)
ALT SERPL W P-5'-P-CCNC: 33 U/L (ref 0–50)
ALT SERPL W P-5'-P-CCNC: 39 U/L (ref 0–50)
ALT SERPL W P-5'-P-CCNC: 40 U/L (ref 0–50)
ALT SERPL W P-5'-P-CCNC: 40 U/L (ref 0–50)
ALT SERPL W P-5'-P-CCNC: 43 U/L (ref 0–50)
ALT SERPL W P-5'-P-CCNC: 45 U/L (ref 0–50)
ALT SERPL W P-5'-P-CCNC: 46 U/L (ref 0–50)
ALT SERPL W P-5'-P-CCNC: 51 U/L (ref 0–50)
ALT SERPL W P-5'-P-CCNC: 51 U/L (ref 0–50)
ALT SERPL W P-5'-P-CCNC: 53 U/L (ref 0–50)
ALT SERPL W P-5'-P-CCNC: 56 U/L (ref 0–50)
ALT SERPL W P-5'-P-CCNC: 58 U/L (ref 0–50)
AMMONIA PLAS-SCNC: 35 UMOL/L (ref 10–50)
ANION GAP SERPL CALCULATED.3IONS-SCNC: 10 MMOL/L (ref 3–14)
ANION GAP SERPL CALCULATED.3IONS-SCNC: 10 MMOL/L (ref 5–18)
ANION GAP SERPL CALCULATED.3IONS-SCNC: 11 MMOL/L (ref 3–14)
ANION GAP SERPL CALCULATED.3IONS-SCNC: 13 MMOL/L (ref 3–14)
ANION GAP SERPL CALCULATED.3IONS-SCNC: 15 MMOL/L (ref 3–14)
ANION GAP SERPL CALCULATED.3IONS-SCNC: 16 MMOL/L (ref 3–14)
ANION GAP SERPL CALCULATED.3IONS-SCNC: 21 MMOL/L (ref 3–14)
ANION GAP SERPL CALCULATED.3IONS-SCNC: 7 MMOL/L (ref 3–14)
ANION GAP SERPL CALCULATED.3IONS-SCNC: 7 MMOL/L (ref 3–14)
ANION GAP SERPL CALCULATED.3IONS-SCNC: 8 MMOL/L (ref 3–14)
ANION GAP SERPL CALCULATED.3IONS-SCNC: 9 MMOL/L (ref 3–14)
ANISOCYTOSIS BLD QL SMEAR: SLIGHT
ANISOCYTOSIS BLD QL SMEAR: SLIGHT
APPEARANCE UR: ABNORMAL
APPEARANCE UR: CLEAR
APTT PPP: 31 SEC (ref 22–37)
APTT PPP: 39 SEC (ref 22–37)
APTT PPP: 43 SEC (ref 22–37)
APTT PPP: 45 SEC (ref 22–37)
AST SERPL W P-5'-P-CCNC: 107 U/L (ref 0–45)
AST SERPL W P-5'-P-CCNC: 169 U/L (ref 0–45)
AST SERPL W P-5'-P-CCNC: 26 U/L (ref 0–45)
AST SERPL W P-5'-P-CCNC: 33 U/L (ref 0–45)
AST SERPL W P-5'-P-CCNC: 35 U/L (ref 0–45)
AST SERPL W P-5'-P-CCNC: 36 U/L (ref 0–45)
AST SERPL W P-5'-P-CCNC: 37 U/L (ref 0–45)
AST SERPL W P-5'-P-CCNC: 38 U/L (ref 0–45)
AST SERPL W P-5'-P-CCNC: 38 U/L (ref 0–45)
AST SERPL W P-5'-P-CCNC: 41 U/L (ref 0–45)
AST SERPL W P-5'-P-CCNC: 42 U/L (ref 0–45)
AST SERPL W P-5'-P-CCNC: 47 U/L (ref 0–45)
AST SERPL W P-5'-P-CCNC: 50 U/L (ref 0–45)
AST SERPL W P-5'-P-CCNC: 50 U/L (ref 0–45)
AST SERPL W P-5'-P-CCNC: 52 U/L (ref 0–45)
AST SERPL W P-5'-P-CCNC: 56 U/L (ref 0–45)
AST SERPL W P-5'-P-CCNC: 57 U/L (ref 0–45)
AST SERPL W P-5'-P-CCNC: 60 U/L (ref 0–45)
AST SERPL W P-5'-P-CCNC: 62 U/L (ref 0–45)
AST SERPL W P-5'-P-CCNC: 64 U/L (ref 0–45)
AST SERPL W P-5'-P-CCNC: 67 U/L (ref 0–45)
AST SERPL W P-5'-P-CCNC: 68 U/L (ref 0–45)
AST SERPL W P-5'-P-CCNC: 70 U/L (ref 0–45)
AST SERPL W P-5'-P-CCNC: 72 U/L (ref 0–45)
AST SERPL W P-5'-P-CCNC: 74 U/L (ref 0–45)
AST SERPL W P-5'-P-CCNC: 77 U/L (ref 0–45)
AST SERPL W P-5'-P-CCNC: 78 U/L (ref 0–45)
AST SERPL W P-5'-P-CCNC: 97 U/L (ref 0–45)
B BURGDOR IGG+IGM SER QL: 0.03 (ref 0–0.89)
B MICROTI IGG TITR SER: NORMAL {TITER}
B MICROTI IGM TITR SER: NORMAL {TITER}
BACTERIA #/AREA URNS HPF: ABNORMAL /HPF
BACTERIA SPEC CULT: NO GROWTH
BACTERIA SPEC CULT: NORMAL
BASE DEFICIT BLDA-SCNC: 2 MMOL/L
BASE DEFICIT BLDA-SCNC: 6.6 MMOL/L
BASE DEFICIT BLDV-SCNC: 0.1 MMOL/L
BASE DEFICIT BLDV-SCNC: 0.2 MMOL/L
BASE DEFICIT BLDV-SCNC: 0.5 MMOL/L
BASE DEFICIT BLDV-SCNC: 0.7 MMOL/L
BASE DEFICIT BLDV-SCNC: 1.6 MMOL/L
BASE DEFICIT BLDV-SCNC: 1.6 MMOL/L
BASE DEFICIT BLDV-SCNC: 4.4 MMOL/L
BASE DEFICIT BLDV-SCNC: 5.6 MMOL/L
BASE DEFICIT BLDV-SCNC: 6.2 MMOL/L
BASE EXCESS BLDA CALC-SCNC: 0.4 MMOL/L
BASE EXCESS BLDA CALC-SCNC: 1.9 MMOL/L
BASE EXCESS BLDA CALC-SCNC: 6.3 MMOL/L
BASE EXCESS BLDV CALC-SCNC: 0.3 MMOL/L
BASE EXCESS BLDV CALC-SCNC: 0.4 MMOL/L
BASE EXCESS BLDV CALC-SCNC: 0.8 MMOL/L
BASE EXCESS BLDV CALC-SCNC: 1.1 MMOL/L
BASE EXCESS BLDV CALC-SCNC: 1.2 MMOL/L
BASE EXCESS BLDV CALC-SCNC: 1.4 MMOL/L
BASE EXCESS BLDV CALC-SCNC: 2.8 MMOL/L
BASE EXCESS BLDV CALC-SCNC: 3.4 MMOL/L
BASE EXCESS BLDV CALC-SCNC: 3.5 MMOL/L
BASE EXCESS BLDV CALC-SCNC: 3.5 MMOL/L
BASOPHILS # BLD AUTO: 0 10E9/L (ref 0–0.2)
BASOPHILS NFR BLD AUTO: 0 %
BASOPHILS NFR BLD AUTO: 0.1 %
BASOPHILS NFR BLD AUTO: 0.2 %
BASOPHILS NFR BLD AUTO: 0.3 %
BASOPHILS NFR BLD AUTO: 0.4 %
BASOPHILS NFR BLD AUTO: 0.4 %
BILIRUB DIRECT SERPL-MCNC: 0.2 MG/DL (ref 0–0.2)
BILIRUB DIRECT SERPL-MCNC: 0.3 MG/DL (ref 0–0.2)
BILIRUB DIRECT SERPL-MCNC: 0.5 MG/DL (ref 0–0.2)
BILIRUB DIRECT SERPL-MCNC: 0.6 MG/DL (ref 0–0.2)
BILIRUB DIRECT SERPL-MCNC: 0.8 MG/DL (ref 0–0.2)
BILIRUB DIRECT SERPL-MCNC: 0.8 MG/DL (ref 0–0.2)
BILIRUB SERPL-MCNC: 0.3 MG/DL (ref 0.2–1.3)
BILIRUB SERPL-MCNC: 0.4 MG/DL (ref 0.2–1.3)
BILIRUB SERPL-MCNC: 0.5 MG/DL (ref 0.2–1.3)
BILIRUB SERPL-MCNC: 0.5 MG/DL (ref 0.2–1.3)
BILIRUB SERPL-MCNC: 0.6 MG/DL (ref 0.2–1.3)
BILIRUB SERPL-MCNC: 0.8 MG/DL (ref 0.2–1.3)
BILIRUB SERPL-MCNC: 0.8 MG/DL (ref 0.2–1.3)
BILIRUB SERPL-MCNC: 0.9 MG/DL (ref 0.2–1.3)
BILIRUB SERPL-MCNC: 1 MG/DL (ref 0.2–1.3)
BILIRUB SERPL-MCNC: 1.1 MG/DL (ref 0.2–1.3)
BILIRUB SERPL-MCNC: 1.1 MG/DL (ref 0.2–1.3)
BILIRUB SERPL-MCNC: 1.2 MG/DL (ref 0.2–1.3)
BILIRUB SERPL-MCNC: 1.2 MG/DL (ref 0.2–1.3)
BILIRUB SERPL-MCNC: 1.4 MG/DL (ref 0.2–1.3)
BILIRUB SERPL-MCNC: 1.4 MG/DL (ref 0.2–1.3)
BILIRUB UR QL STRIP: NEGATIVE
BLD GP AB SCN SERPL QL: NORMAL
BLD PROD TYP BPU: NORMAL
BLD UNIT ID BPU: 0
BLOOD BANK CMNT PATIENT-IMP: NORMAL
BLOOD PRODUCT CODE: NORMAL
BPU ID: NORMAL
BUN SERPL-MCNC: 10 MG/DL (ref 7–30)
BUN SERPL-MCNC: 11 MG/DL (ref 8–22)
BUN SERPL-MCNC: 14 MG/DL (ref 7–30)
BUN SERPL-MCNC: 17 MG/DL (ref 7–30)
BUN SERPL-MCNC: 17 MG/DL (ref 7–30)
BUN SERPL-MCNC: 20 MG/DL (ref 7–30)
BUN SERPL-MCNC: 22 MG/DL (ref 7–30)
BUN SERPL-MCNC: 23 MG/DL (ref 7–30)
BUN SERPL-MCNC: 28 MG/DL (ref 7–30)
BUN SERPL-MCNC: 31 MG/DL (ref 7–30)
BUN SERPL-MCNC: 31 MG/DL (ref 7–30)
BUN SERPL-MCNC: 33 MG/DL (ref 7–30)
BUN SERPL-MCNC: 36 MG/DL (ref 7–30)
BUN SERPL-MCNC: 40 MG/DL (ref 7–30)
BUN SERPL-MCNC: 40 MG/DL (ref 7–30)
BUN SERPL-MCNC: 42 MG/DL (ref 7–30)
BUN SERPL-MCNC: 43 MG/DL (ref 7–30)
BUN SERPL-MCNC: 44 MG/DL (ref 7–30)
BUN SERPL-MCNC: 44 MG/DL (ref 7–30)
BUN SERPL-MCNC: 45 MG/DL (ref 7–30)
BUN SERPL-MCNC: 46 MG/DL (ref 7–30)
BUN SERPL-MCNC: 47 MG/DL (ref 7–30)
BUN SERPL-MCNC: 48 MG/DL (ref 7–30)
BUN SERPL-MCNC: 48 MG/DL (ref 7–30)
BUN SERPL-MCNC: 51 MG/DL (ref 7–30)
BUN SERPL-MCNC: 52 MG/DL (ref 7–30)
BUN SERPL-MCNC: 55 MG/DL (ref 7–30)
BUN SERPL-MCNC: 6 MG/DL (ref 7–30)
BUN SERPL-MCNC: 7 MG/DL (ref 7–30)
BUN SERPL-MCNC: 7 MG/DL (ref 7–30)
BUN SERPL-MCNC: 8 MG/DL (ref 7–30)
BUN SERPL-MCNC: 9 MG/DL (ref 7–30)
C DIFF TOX B STL QL: NEGATIVE
CA-I BLD-MCNC: 4.4 MG/DL (ref 4.4–5.2)
CALCIUM SERPL-MCNC: 7.6 MG/DL (ref 8.5–10.1)
CALCIUM SERPL-MCNC: 7.7 MG/DL (ref 8.5–10.1)
CALCIUM SERPL-MCNC: 7.8 MG/DL (ref 8.5–10.1)
CALCIUM SERPL-MCNC: 7.8 MG/DL (ref 8.5–10.1)
CALCIUM SERPL-MCNC: 7.9 MG/DL (ref 8.5–10.1)
CALCIUM SERPL-MCNC: 8 MG/DL (ref 8.5–10.1)
CALCIUM SERPL-MCNC: 8.1 MG/DL (ref 8.5–10.1)
CALCIUM SERPL-MCNC: 8.1 MG/DL (ref 8.5–10.1)
CALCIUM SERPL-MCNC: 8.2 MG/DL (ref 8.5–10.1)
CALCIUM SERPL-MCNC: 8.4 MG/DL (ref 8.5–10.1)
CALCIUM SERPL-MCNC: 8.5 MG/DL (ref 8.5–10.1)
CALCIUM SERPL-MCNC: 8.5 MG/DL (ref 8.5–10.1)
CALCIUM SERPL-MCNC: 8.6 MG/DL (ref 8.5–10.1)
CALCIUM SERPL-MCNC: 8.7 MG/DL (ref 8.5–10.1)
CALCIUM SERPL-MCNC: 8.7 MG/DL (ref 8.5–10.1)
CALCIUM SERPL-MCNC: 8.8 MG/DL (ref 8.5–10.1)
CALCIUM SERPL-MCNC: 8.8 MG/DL (ref 8.5–10.1)
CALCIUM SERPL-MCNC: 8.9 MG/DL (ref 8.5–10.1)
CALCIUM SERPL-MCNC: 8.9 MG/DL (ref 8.5–10.1)
CALCIUM SERPL-MCNC: 9 MG/DL (ref 8.5–10.5)
CEA SERPL-MCNC: 255 UG/L (ref 0–2.5)
CEA SERPL-MCNC: 299.9 UG/L (ref 0–2.5)
CEA SERPL-MCNC: 306.7 UG/L (ref 0–2.5)
CEA SERPL-MCNC: 451.9 UG/L (ref 0–2.5)
CEA SERPL-MCNC: 703.1 UG/L (ref 0–2.5)
CEA SERPL-MCNC: 845.3 UG/L (ref 0–2.5)
CHLORIDE SERPL-SCNC: 100 MMOL/L (ref 94–109)
CHLORIDE SERPL-SCNC: 100 MMOL/L (ref 94–109)
CHLORIDE SERPL-SCNC: 101 MMOL/L (ref 94–109)
CHLORIDE SERPL-SCNC: 102 MMOL/L (ref 94–109)
CHLORIDE SERPL-SCNC: 103 MMOL/L (ref 94–109)
CHLORIDE SERPL-SCNC: 104 MMOL/L (ref 94–109)
CHLORIDE SERPL-SCNC: 105 MMOL/L (ref 94–109)
CHLORIDE SERPL-SCNC: 106 MMOL/L (ref 94–109)
CHLORIDE SERPL-SCNC: 108 MMOL/L (ref 94–109)
CHLORIDE SERPL-SCNC: 93 MMOL/L (ref 94–109)
CHLORIDE SERPL-SCNC: 94 MMOL/L (ref 94–109)
CHLORIDE SERPL-SCNC: 95 MMOL/L (ref 94–109)
CHLORIDE SERPL-SCNC: 96 MMOL/L (ref 94–109)
CHLORIDE SERPL-SCNC: 98 MMOL/L (ref 94–109)
CHLORIDE SERPL-SCNC: 99 MMOL/L (ref 94–109)
CHLORIDE SERPLBLD-SCNC: 100 MMOL/L (ref 98–107)
CHOLEST SERPL-MCNC: 153 MG/DL
CO2 SERPL-SCNC: 21 MMOL/L (ref 20–32)
CO2 SERPL-SCNC: 22 MMOL/L (ref 20–32)
CO2 SERPL-SCNC: 23 MMOL/L (ref 20–32)
CO2 SERPL-SCNC: 24 MMOL/L (ref 20–32)
CO2 SERPL-SCNC: 25 MMOL/L (ref 20–32)
CO2 SERPL-SCNC: 26 MMOL/L (ref 20–32)
CO2 SERPL-SCNC: 27 MMOL/L (ref 20–32)
CO2 SERPL-SCNC: 28 MMOL/L (ref 20–32)
CO2 SERPL-SCNC: 28 MMOL/L (ref 20–32)
CO2 SERPL-SCNC: 29 MMOL/L (ref 22–31)
CO2 SERPL-SCNC: 30 MMOL/L (ref 20–32)
COLLECT DURATION TIME UR: 24 H
COLONOSCOPY: NORMAL
COLOR UR AUTO: CLEAR
COLOR UR AUTO: YELLOW
COPATH REPORT: NORMAL
CREAT 24H UR-MRATE: 0.55 G/(24.H) (ref 0.8–1.8)
CREAT SERPL-MCNC: 0.54 MG/DL (ref 0.52–1.04)
CREAT SERPL-MCNC: 0.6 MG/DL (ref 0.52–1.04)
CREAT SERPL-MCNC: 0.61 MG/DL (ref 0.52–1.04)
CREAT SERPL-MCNC: 0.61 MG/DL (ref 0.52–1.04)
CREAT SERPL-MCNC: 0.62 MG/DL (ref 0.52–1.04)
CREAT SERPL-MCNC: 0.62 MG/DL (ref 0.52–1.04)
CREAT SERPL-MCNC: 0.63 MG/DL (ref 0.52–1.04)
CREAT SERPL-MCNC: 0.64 MG/DL (ref 0.52–1.04)
CREAT SERPL-MCNC: 0.67 MG/DL (ref 0.52–1.04)
CREAT SERPL-MCNC: 0.7 MG/DL (ref 0.52–1.04)
CREAT SERPL-MCNC: 0.72 MG/DL (ref 0.52–1.04)
CREAT SERPL-MCNC: 0.75 MG/DL (ref 0.6–1.1)
CREAT SERPL-MCNC: 0.76 MG/DL (ref 0.52–1.04)
CREAT SERPL-MCNC: 0.85 MG/DL (ref 0.52–1.04)
CREAT SERPL-MCNC: 0.85 MG/DL (ref 0.52–1.04)
CREAT SERPL-MCNC: 0.86 MG/DL (ref 0.52–1.04)
CREAT SERPL-MCNC: 0.86 MG/DL (ref 0.52–1.04)
CREAT SERPL-MCNC: 0.9 MG/DL (ref 0.52–1.04)
CREAT SERPL-MCNC: 0.9 MG/DL (ref 0.52–1.04)
CREAT SERPL-MCNC: 0.91 MG/DL (ref 0.52–1.04)
CREAT SERPL-MCNC: 0.95 MG/DL (ref 0.52–1.04)
CREAT SERPL-MCNC: 0.96 MG/DL (ref 0.52–1.04)
CREAT SERPL-MCNC: 1.07 MG/DL (ref 0.52–1.04)
CREAT SERPL-MCNC: 1.22 MG/DL (ref 0.52–1.04)
CREAT SERPL-MCNC: 1.27 MG/DL (ref 0.52–1.04)
CREAT SERPL-MCNC: 1.28 MG/DL (ref 0.52–1.04)
CREAT SERPL-MCNC: 1.31 MG/DL (ref 0.52–1.04)
CREAT SERPL-MCNC: 1.35 MG/DL (ref 0.52–1.04)
CREAT SERPL-MCNC: 1.38 MG/DL (ref 0.52–1.04)
CREAT SERPL-MCNC: 1.45 MG/DL (ref 0.52–1.04)
CREAT SERPL-MCNC: 1.5 MG/DL (ref 0.52–1.04)
CREAT SERPL-MCNC: 1.51 MG/DL (ref 0.52–1.04)
CREAT SERPL-MCNC: 1.56 MG/DL (ref 0.52–1.04)
CREAT SERPL-MCNC: 1.63 MG/DL (ref 0.52–1.04)
CREAT SERPL-MCNC: 1.63 MG/DL (ref 0.52–1.04)
CREAT SERPL-MCNC: 1.72 MG/DL (ref 0.52–1.04)
CREAT SERPL-MCNC: 1.72 MG/DL (ref 0.52–1.04)
CREAT UR-MCNC: 40 MG/DL
CRP SERPL-MCNC: 159 MG/L (ref 0–8)
CRP SERPL-MCNC: 227 MG/L (ref 0–8)
CRP SERPL-MCNC: 260 MG/L (ref 0–8)
D DIMER PPP FEU-MCNC: 1.7 UG/ML FEU (ref 0–0.5)
DIFFERENTIAL METHOD BLD: ABNORMAL
DIFFERENTIAL METHOD BLD: NORMAL
E CHAFFEENSIS IGG TITR SER: NORMAL {TITER}
E CHAFFEENSIS IGM TITR SER: NORMAL {TITER}
EOSINOPHIL # BLD AUTO: 0 10E9/L (ref 0–0.7)
EOSINOPHIL # BLD AUTO: 0.1 10E9/L (ref 0–0.7)
EOSINOPHIL # BLD AUTO: 0.2 10E9/L (ref 0–0.7)
EOSINOPHIL # BLD AUTO: 0.3 10E9/L (ref 0–0.7)
EOSINOPHIL NFR BLD AUTO: 0 %
EOSINOPHIL NFR BLD AUTO: 0.1 %
EOSINOPHIL NFR BLD AUTO: 0.2 %
EOSINOPHIL NFR BLD AUTO: 0.2 %
EOSINOPHIL NFR BLD AUTO: 0.3 %
EOSINOPHIL NFR BLD AUTO: 0.4 %
EOSINOPHIL NFR BLD AUTO: 0.4 %
EOSINOPHIL NFR BLD AUTO: 0.5 %
EOSINOPHIL NFR BLD AUTO: 0.6 %
EOSINOPHIL NFR BLD AUTO: 1 %
EOSINOPHIL NFR BLD AUTO: 1 %
EOSINOPHIL NFR BLD AUTO: 1.7 %
EOSINOPHIL NFR BLD AUTO: 2.4 %
ERYTHROCYTE [DISTWIDTH] IN BLOOD BY AUTOMATED COUNT: 13.3 % (ref 10–15)
ERYTHROCYTE [DISTWIDTH] IN BLOOD BY AUTOMATED COUNT: 14.6 % (ref 10–15)
ERYTHROCYTE [DISTWIDTH] IN BLOOD BY AUTOMATED COUNT: 15.1 % (ref 10–15)
ERYTHROCYTE [DISTWIDTH] IN BLOOD BY AUTOMATED COUNT: 15.2 % (ref 10–15)
ERYTHROCYTE [DISTWIDTH] IN BLOOD BY AUTOMATED COUNT: 15.9 % (ref 10–15)
ERYTHROCYTE [DISTWIDTH] IN BLOOD BY AUTOMATED COUNT: 16 % (ref 10–15)
ERYTHROCYTE [DISTWIDTH] IN BLOOD BY AUTOMATED COUNT: 16 % (ref 10–15)
ERYTHROCYTE [DISTWIDTH] IN BLOOD BY AUTOMATED COUNT: 16.5 % (ref 10–15)
ERYTHROCYTE [DISTWIDTH] IN BLOOD BY AUTOMATED COUNT: 16.6 % (ref 10–15)
ERYTHROCYTE [DISTWIDTH] IN BLOOD BY AUTOMATED COUNT: 16.7 % (ref 10–15)
ERYTHROCYTE [DISTWIDTH] IN BLOOD BY AUTOMATED COUNT: 16.7 % (ref 10–15)
ERYTHROCYTE [DISTWIDTH] IN BLOOD BY AUTOMATED COUNT: 17 % (ref 10–15)
ERYTHROCYTE [DISTWIDTH] IN BLOOD BY AUTOMATED COUNT: 17.2 % (ref 10–15)
ERYTHROCYTE [DISTWIDTH] IN BLOOD BY AUTOMATED COUNT: 17.5 % (ref 10–15)
ERYTHROCYTE [DISTWIDTH] IN BLOOD BY AUTOMATED COUNT: 17.5 % (ref 10–15)
ERYTHROCYTE [DISTWIDTH] IN BLOOD BY AUTOMATED COUNT: 17.7 % (ref 10–15)
ERYTHROCYTE [DISTWIDTH] IN BLOOD BY AUTOMATED COUNT: 17.8 % (ref 10–15)
ERYTHROCYTE [DISTWIDTH] IN BLOOD BY AUTOMATED COUNT: 18 % (ref 10–15)
ERYTHROCYTE [DISTWIDTH] IN BLOOD BY AUTOMATED COUNT: 18 % (ref 10–15)
ERYTHROCYTE [DISTWIDTH] IN BLOOD BY AUTOMATED COUNT: 18.1 % (ref 10–15)
ERYTHROCYTE [DISTWIDTH] IN BLOOD BY AUTOMATED COUNT: 18.2 % (ref 10–15)
ERYTHROCYTE [DISTWIDTH] IN BLOOD BY AUTOMATED COUNT: 18.2 % (ref 10–15)
ERYTHROCYTE [DISTWIDTH] IN BLOOD BY AUTOMATED COUNT: 18.5 % (ref 10–15)
ERYTHROCYTE [DISTWIDTH] IN BLOOD BY AUTOMATED COUNT: 18.6 % (ref 10–15)
ERYTHROCYTE [DISTWIDTH] IN BLOOD BY AUTOMATED COUNT: 18.7 % (ref 10–15)
ERYTHROCYTE [DISTWIDTH] IN BLOOD BY AUTOMATED COUNT: 18.8 % (ref 10–15)
ERYTHROCYTE [DISTWIDTH] IN BLOOD BY AUTOMATED COUNT: 18.9 % (ref 10–15)
ERYTHROCYTE [DISTWIDTH] IN BLOOD BY AUTOMATED COUNT: 20.2 % (ref 10–15)
ERYTHROCYTE [DISTWIDTH] IN BLOOD BY AUTOMATED COUNT: 20.8 % (ref 10–15)
ERYTHROCYTE [DISTWIDTH] IN BLOOD BY AUTOMATED COUNT: 20.9 % (ref 10–15)
ERYTHROCYTE [DISTWIDTH] IN BLOOD BY AUTOMATED COUNT: 21 % (ref 10–15)
FEF 25/75: NORMAL
FERRITIN SERPL-MCNC: 162 NG/ML (ref 8–252)
FEV-1: NORMAL
FEV1/FVC: NORMAL
FLUAV H1 2009 PAND RNA SPEC QL NAA+PROBE: NEGATIVE
FLUAV H1 RNA SPEC QL NAA+PROBE: NEGATIVE
FLUAV H3 RNA SPEC QL NAA+PROBE: NEGATIVE
FLUAV RNA SPEC QL NAA+PROBE: NEGATIVE
FLUAV+FLUBV RNA SPEC QL NAA+PROBE: NEGATIVE
FLUAV+FLUBV RNA SPEC QL NAA+PROBE: NEGATIVE
FLUBV RNA SPEC QL NAA+PROBE: NEGATIVE
FVC: NORMAL
GFR SERPL CREATININE-BSD FRML MDRD: 30 ML/MIN/1.7M2
GFR SERPL CREATININE-BSD FRML MDRD: 30 ML/MIN/1.7M2
GFR SERPL CREATININE-BSD FRML MDRD: 32 ML/MIN/1.7M2
GFR SERPL CREATININE-BSD FRML MDRD: 32 ML/MIN/1.7M2
GFR SERPL CREATININE-BSD FRML MDRD: 33 ML/MIN/1.7M2
GFR SERPL CREATININE-BSD FRML MDRD: 35 ML/MIN/1.7M2
GFR SERPL CREATININE-BSD FRML MDRD: 35 ML/MIN/1.7M2
GFR SERPL CREATININE-BSD FRML MDRD: 36 ML/MIN/1.7M2
GFR SERPL CREATININE-BSD FRML MDRD: 38 ML/MIN/1.7M2
GFR SERPL CREATININE-BSD FRML MDRD: 39 ML/MIN/1.7M2
GFR SERPL CREATININE-BSD FRML MDRD: 41 ML/MIN/1.7M2
GFR SERPL CREATININE-BSD FRML MDRD: 42 ML/MIN/1.7M2
GFR SERPL CREATININE-BSD FRML MDRD: 42 ML/MIN/1.7M2
GFR SERPL CREATININE-BSD FRML MDRD: 44 ML/MIN/1.7M2
GFR SERPL CREATININE-BSD FRML MDRD: 51 ML/MIN/1.7M2
GFR SERPL CREATININE-BSD FRML MDRD: 58 ML/MIN/1.7M2
GFR SERPL CREATININE-BSD FRML MDRD: 59 ML/MIN/1.7M2
GFR SERPL CREATININE-BSD FRML MDRD: 62 ML/MIN/1.7M2
GFR SERPL CREATININE-BSD FRML MDRD: 63 ML/MIN/1.7M2
GFR SERPL CREATININE-BSD FRML MDRD: 63 ML/MIN/1.7M2
GFR SERPL CREATININE-BSD FRML MDRD: 66 ML/MIN/1.7M2
GFR SERPL CREATININE-BSD FRML MDRD: 66 ML/MIN/1.7M2
GFR SERPL CREATININE-BSD FRML MDRD: 67 ML/MIN/1.7M2
GFR SERPL CREATININE-BSD FRML MDRD: 67 ML/MIN/1.7M2
GFR SERPL CREATININE-BSD FRML MDRD: 76 ML/MIN/1.7M2
GFR SERPL CREATININE-BSD FRML MDRD: 81 ML/MIN/1.7M2
GFR SERPL CREATININE-BSD FRML MDRD: 84 ML/MIN/1.7M2
GFR SERPL CREATININE-BSD FRML MDRD: 88 ML/MIN/1.7M2
GFR SERPL CREATININE-BSD FRML MDRD: >60 ML/MIN/1.73M2
GFR SERPL CREATININE-BSD FRML MDRD: >90 ML/MIN/1.7M2
GLUCOSE BLDC GLUCOMTR-MCNC: 100 MG/DL (ref 70–99)
GLUCOSE BLDC GLUCOMTR-MCNC: 108 MG/DL (ref 70–99)
GLUCOSE BLDC GLUCOMTR-MCNC: 109 MG/DL (ref 70–99)
GLUCOSE BLDC GLUCOMTR-MCNC: 148 MG/DL (ref 70–99)
GLUCOSE SERPL-MCNC: 100 MG/DL (ref 70–99)
GLUCOSE SERPL-MCNC: 102 MG/DL (ref 70–99)
GLUCOSE SERPL-MCNC: 104 MG/DL (ref 70–99)
GLUCOSE SERPL-MCNC: 104 MG/DL (ref 70–99)
GLUCOSE SERPL-MCNC: 108 MG/DL (ref 70–99)
GLUCOSE SERPL-MCNC: 108 MG/DL (ref 70–99)
GLUCOSE SERPL-MCNC: 109 MG/DL (ref 70–125)
GLUCOSE SERPL-MCNC: 109 MG/DL (ref 70–99)
GLUCOSE SERPL-MCNC: 110 MG/DL (ref 70–99)
GLUCOSE SERPL-MCNC: 111 MG/DL (ref 70–99)
GLUCOSE SERPL-MCNC: 115 MG/DL (ref 70–99)
GLUCOSE SERPL-MCNC: 117 MG/DL (ref 70–99)
GLUCOSE SERPL-MCNC: 119 MG/DL (ref 70–99)
GLUCOSE SERPL-MCNC: 120 MG/DL (ref 70–99)
GLUCOSE SERPL-MCNC: 122 MG/DL (ref 70–99)
GLUCOSE SERPL-MCNC: 124 MG/DL (ref 70–99)
GLUCOSE SERPL-MCNC: 127 MG/DL (ref 70–99)
GLUCOSE SERPL-MCNC: 128 MG/DL (ref 70–99)
GLUCOSE SERPL-MCNC: 128 MG/DL (ref 70–99)
GLUCOSE SERPL-MCNC: 134 MG/DL (ref 70–99)
GLUCOSE SERPL-MCNC: 138 MG/DL (ref 70–99)
GLUCOSE SERPL-MCNC: 138 MG/DL (ref 70–99)
GLUCOSE SERPL-MCNC: 139 MG/DL (ref 70–99)
GLUCOSE SERPL-MCNC: 139 MG/DL (ref 70–99)
GLUCOSE SERPL-MCNC: 140 MG/DL (ref 70–99)
GLUCOSE SERPL-MCNC: 141 MG/DL (ref 70–99)
GLUCOSE SERPL-MCNC: 144 MG/DL (ref 70–99)
GLUCOSE SERPL-MCNC: 146 MG/DL (ref 70–99)
GLUCOSE SERPL-MCNC: 89 MG/DL (ref 70–99)
GLUCOSE SERPL-MCNC: 91 MG/DL (ref 70–99)
GLUCOSE SERPL-MCNC: 94 MG/DL (ref 70–99)
GLUCOSE SERPL-MCNC: 97 MG/DL (ref 70–99)
GLUCOSE SERPL-MCNC: 97 MG/DL (ref 70–99)
GLUCOSE SERPL-MCNC: 98 MG/DL (ref 70–99)
GLUCOSE UR STRIP-MCNC: NEGATIVE MG/DL
HADV DNA SPEC QL NAA+PROBE: NEGATIVE
HADV DNA SPEC QL NAA+PROBE: NEGATIVE
HCO3 BLD-SCNC: 20 MMOL/L (ref 21–28)
HCO3 BLD-SCNC: 22 MMOL/L (ref 21–28)
HCO3 BLD-SCNC: 23 MMOL/L (ref 21–28)
HCO3 BLD-SCNC: 24 MMOL/L (ref 21–28)
HCO3 BLD-SCNC: 29 MMOL/L (ref 21–28)
HCO3 BLDV-SCNC: 20 MMOL/L (ref 21–28)
HCO3 BLDV-SCNC: 21 MMOL/L (ref 21–28)
HCO3 BLDV-SCNC: 22 MMOL/L (ref 21–28)
HCO3 BLDV-SCNC: 23 MMOL/L (ref 21–28)
HCO3 BLDV-SCNC: 23 MMOL/L (ref 21–28)
HCO3 BLDV-SCNC: 24 MMOL/L (ref 21–28)
HCO3 BLDV-SCNC: 24 MMOL/L (ref 21–28)
HCO3 BLDV-SCNC: 25 MMOL/L (ref 21–28)
HCO3 BLDV-SCNC: 26 MMOL/L (ref 21–28)
HCO3 BLDV-SCNC: 27 MMOL/L (ref 21–28)
HCO3 BLDV-SCNC: 28 MMOL/L (ref 21–28)
HCO3 BLDV-SCNC: 28 MMOL/L (ref 21–28)
HCT VFR BLD AUTO: 21.4 % (ref 35–47)
HCT VFR BLD AUTO: 21.9 % (ref 35–47)
HCT VFR BLD AUTO: 22.2 % (ref 35–47)
HCT VFR BLD AUTO: 22.4 % (ref 35–47)
HCT VFR BLD AUTO: 22.7 % (ref 35–47)
HCT VFR BLD AUTO: 22.9 % (ref 35–47)
HCT VFR BLD AUTO: 23.2 % (ref 35–47)
HCT VFR BLD AUTO: 23.9 % (ref 35–47)
HCT VFR BLD AUTO: 24.4 % (ref 35–47)
HCT VFR BLD AUTO: 24.5 % (ref 35–47)
HCT VFR BLD AUTO: 24.9 % (ref 35–47)
HCT VFR BLD AUTO: 25 % (ref 35–47)
HCT VFR BLD AUTO: 25.3 % (ref 35–47)
HCT VFR BLD AUTO: 25.5 % (ref 35–47)
HCT VFR BLD AUTO: 25.5 % (ref 35–47)
HCT VFR BLD AUTO: 26 % (ref 35–47)
HCT VFR BLD AUTO: 26.1 % (ref 35–47)
HCT VFR BLD AUTO: 26.2 % (ref 35–47)
HCT VFR BLD AUTO: 26.4 % (ref 35–47)
HCT VFR BLD AUTO: 26.6 % (ref 35–47)
HCT VFR BLD AUTO: 26.8 % (ref 35–47)
HCT VFR BLD AUTO: 27.2 % (ref 35–47)
HCT VFR BLD AUTO: 27.4 % (ref 35–47)
HCT VFR BLD AUTO: 27.6 % (ref 35–47)
HCT VFR BLD AUTO: 27.8 % (ref 35–47)
HCT VFR BLD AUTO: 29 % (ref 35–47)
HCT VFR BLD AUTO: 29.1 % (ref 35–47)
HCT VFR BLD AUTO: 29.7 % (ref 35–47)
HCT VFR BLD AUTO: 31.2 % (ref 35–47)
HCT VFR BLD AUTO: 31.2 % (ref 35–47)
HCT VFR BLD AUTO: 32 % (ref 35–47)
HCT VFR BLD AUTO: 32.5 % (ref 35–47)
HCT VFR BLD AUTO: 36 % (ref 35–47)
HCV AB SERPL QL IA: NORMAL
HDLC SERPL-MCNC: 43 MG/DL
HEMOCCULT STL QL IA: POSITIVE
HEMOGLOBIN: 8.9 G/DL (ref 12–16)
HGB BLD-MCNC: 10.1 G/DL (ref 11.7–15.7)
HGB BLD-MCNC: 10.2 G/DL (ref 11.7–15.7)
HGB BLD-MCNC: 11.4 G/DL (ref 11.7–15.7)
HGB BLD-MCNC: 6.6 G/DL (ref 11.7–15.7)
HGB BLD-MCNC: 6.7 G/DL (ref 11.7–15.7)
HGB BLD-MCNC: 6.7 G/DL (ref 11.7–15.7)
HGB BLD-MCNC: 7.1 G/DL (ref 11.7–15.7)
HGB BLD-MCNC: 7.3 G/DL (ref 11.7–15.7)
HGB BLD-MCNC: 7.3 G/DL (ref 11.7–15.7)
HGB BLD-MCNC: 7.5 G/DL (ref 11.7–15.7)
HGB BLD-MCNC: 7.6 G/DL (ref 11.7–15.7)
HGB BLD-MCNC: 7.7 G/DL (ref 11.7–15.7)
HGB BLD-MCNC: 7.7 G/DL (ref 11.7–15.7)
HGB BLD-MCNC: 7.9 G/DL (ref 11.7–15.7)
HGB BLD-MCNC: 8.1 G/DL (ref 11.7–15.7)
HGB BLD-MCNC: 8.2 G/DL (ref 11.7–15.7)
HGB BLD-MCNC: 8.3 G/DL (ref 11.7–15.7)
HGB BLD-MCNC: 8.4 G/DL (ref 11.7–15.7)
HGB BLD-MCNC: 8.4 G/DL (ref 11.7–15.7)
HGB BLD-MCNC: 8.5 G/DL (ref 11.7–15.7)
HGB BLD-MCNC: 8.6 G/DL (ref 11.7–15.7)
HGB BLD-MCNC: 8.9 G/DL (ref 11.7–15.7)
HGB BLD-MCNC: 8.9 G/DL (ref 11.7–15.7)
HGB BLD-MCNC: 9 G/DL (ref 11.7–15.7)
HGB BLD-MCNC: 9.3 G/DL (ref 11.7–15.7)
HGB BLD-MCNC: 9.7 G/DL (ref 11.7–15.7)
HGB BLD-MCNC: 9.7 G/DL (ref 11.7–15.7)
HGB UR QL STRIP: ABNORMAL
HGB UR QL STRIP: NEGATIVE
HGB UR QL STRIP: NEGATIVE
HMPV RNA SPEC QL NAA+PROBE: NEGATIVE
HPIV1 RNA SPEC QL NAA+PROBE: NEGATIVE
HPIV2 RNA SPEC QL NAA+PROBE: NEGATIVE
HPIV3 RNA SPEC QL NAA+PROBE: NEGATIVE
HYPOCHROMIA BLD QL: ABNORMAL
HYPOCHROMIA BLD QL: PRESENT
IMM GRANULOCYTES # BLD: 0 10E9/L (ref 0–0.4)
IMM GRANULOCYTES # BLD: 0.1 10E9/L (ref 0–0.4)
IMM GRANULOCYTES # BLD: 0.2 10E9/L (ref 0–0.4)
IMM GRANULOCYTES # BLD: 0.2 10E9/L (ref 0–0.4)
IMM GRANULOCYTES NFR BLD: 0 %
IMM GRANULOCYTES NFR BLD: 0.1 %
IMM GRANULOCYTES NFR BLD: 0.1 %
IMM GRANULOCYTES NFR BLD: 0.2 %
IMM GRANULOCYTES NFR BLD: 0.3 %
IMM GRANULOCYTES NFR BLD: 0.4 %
IMM GRANULOCYTES NFR BLD: 0.6 %
IMM GRANULOCYTES NFR BLD: 0.7 %
IMM GRANULOCYTES NFR BLD: 0.9 %
IMM GRANULOCYTES NFR BLD: 1.3 %
IMM GRANULOCYTES NFR BLD: 1.5 %
INR PPP: 1.03 (ref 0.86–1.14)
INR PPP: 1.04 (ref 0.86–1.14)
INR PPP: 1.1 (ref 0.86–1.14)
INR PPP: 1.1 (ref 0.86–1.14)
INR PPP: 1.28 (ref 0.86–1.14)
INTERPRETATION ECG - MUSE: NORMAL
IRON SATN MFR SERPL: 11 % (ref 15–46)
IRON SERPL-MCNC: 18 UG/DL (ref 35–180)
KETONES UR STRIP-MCNC: NEGATIVE MG/DL
LACTATE BLD-SCNC: 1.7 MMOL/L (ref 0.7–2)
LACTATE BLD-SCNC: 1.8 MMOL/L (ref 0.7–2)
LACTATE BLD-SCNC: 1.9 MMOL/L (ref 0.7–2)
LACTATE BLD-SCNC: 1.9 MMOL/L (ref 0.7–2)
LACTATE BLD-SCNC: 2.1 MMOL/L (ref 0.7–2)
LACTATE BLD-SCNC: 4.1 MMOL/L (ref 0.7–2)
LACTATE BLD-SCNC: 5 MMOL/L (ref 0.7–2)
LACTATE BLD-SCNC: 5.6 MMOL/L (ref 0.7–2)
LACTATE BLD-SCNC: 6 MMOL/L (ref 0.7–2)
LDLC SERPL CALC-MCNC: 92 MG/DL
LEUKOCYTE ESTERASE UR QL STRIP: NEGATIVE
LIPASE SERPL-CCNC: 108 U/L (ref 73–393)
LIPASE SERPL-CCNC: 374 U/L (ref 73–393)
LYMPHOCYTES # BLD AUTO: 1 10E9/L (ref 0.8–5.3)
LYMPHOCYTES # BLD AUTO: 1 10E9/L (ref 0.8–5.3)
LYMPHOCYTES # BLD AUTO: 1.1 10E9/L (ref 0.8–5.3)
LYMPHOCYTES # BLD AUTO: 1.1 10E9/L (ref 0.8–5.3)
LYMPHOCYTES # BLD AUTO: 1.2 10E9/L (ref 0.8–5.3)
LYMPHOCYTES # BLD AUTO: 1.3 10E9/L (ref 0.8–5.3)
LYMPHOCYTES # BLD AUTO: 1.4 10E9/L (ref 0.8–5.3)
LYMPHOCYTES # BLD AUTO: 1.4 10E9/L (ref 0.8–5.3)
LYMPHOCYTES # BLD AUTO: 1.5 10E9/L (ref 0.8–5.3)
LYMPHOCYTES # BLD AUTO: 1.5 10E9/L (ref 0.8–5.3)
LYMPHOCYTES # BLD AUTO: 1.6 10E9/L (ref 0.8–5.3)
LYMPHOCYTES # BLD AUTO: 1.7 10E9/L (ref 0.8–5.3)
LYMPHOCYTES # BLD AUTO: 1.8 10E9/L (ref 0.8–5.3)
LYMPHOCYTES # BLD AUTO: 1.9 10E9/L (ref 0.8–5.3)
LYMPHOCYTES # BLD AUTO: 2 10E9/L (ref 0.8–5.3)
LYMPHOCYTES # BLD AUTO: 2.2 10E9/L (ref 0.8–5.3)
LYMPHOCYTES # BLD AUTO: 2.6 10E9/L (ref 0.8–5.3)
LYMPHOCYTES # BLD AUTO: 3 10E9/L (ref 0.8–5.3)
LYMPHOCYTES # BLD AUTO: 3.3 10E9/L (ref 0.8–5.3)
LYMPHOCYTES # BLD AUTO: 5.3 10E9/L (ref 0.8–5.3)
LYMPHOCYTES NFR BLD AUTO: 11.3 %
LYMPHOCYTES NFR BLD AUTO: 12.5 %
LYMPHOCYTES NFR BLD AUTO: 13 %
LYMPHOCYTES NFR BLD AUTO: 14.6 %
LYMPHOCYTES NFR BLD AUTO: 15 %
LYMPHOCYTES NFR BLD AUTO: 15.3 %
LYMPHOCYTES NFR BLD AUTO: 15.8 %
LYMPHOCYTES NFR BLD AUTO: 15.8 %
LYMPHOCYTES NFR BLD AUTO: 16.2 %
LYMPHOCYTES NFR BLD AUTO: 18 %
LYMPHOCYTES NFR BLD AUTO: 18.2 %
LYMPHOCYTES NFR BLD AUTO: 24.1 %
LYMPHOCYTES NFR BLD AUTO: 24.8 %
LYMPHOCYTES NFR BLD AUTO: 26.9 %
LYMPHOCYTES NFR BLD AUTO: 35 %
LYMPHOCYTES NFR BLD AUTO: 36.6 %
LYMPHOCYTES NFR BLD AUTO: 37.4 %
LYMPHOCYTES NFR BLD AUTO: 37.9 %
LYMPHOCYTES NFR BLD AUTO: 40.9 %
LYMPHOCYTES NFR BLD AUTO: 41.5 %
LYMPHOCYTES NFR BLD AUTO: 42 %
LYMPHOCYTES NFR BLD AUTO: 43.3 %
LYMPHOCYTES NFR BLD AUTO: 46 %
LYMPHOCYTES NFR BLD AUTO: 61 %
LYMPHOCYTES NFR BLD AUTO: 8.7 %
LYMPHOCYTES NFR BLD AUTO: 9 %
Lab: NORMAL
Lab: NORMAL
M TB TUBERC IFN-G BLD QL: NEGATIVE
M TB TUBERC IFN-G/MITOGEN IGNF BLD: 0.03 IU/ML
MACROCYTES BLD QL SMEAR: PRESENT
MAGNESIUM SERPL-MCNC: 1.8 MG/DL (ref 1.6–2.3)
MAGNESIUM SERPL-MCNC: 1.9 MG/DL (ref 1.6–2.3)
MAGNESIUM SERPL-MCNC: 2 MG/DL (ref 1.6–2.3)
MAGNESIUM SERPL-MCNC: 2.3 MG/DL (ref 1.6–2.3)
MAGNESIUM SERPL-MCNC: 2.4 MG/DL (ref 1.6–2.3)
MAGNESIUM SERPL-MCNC: 2.5 MG/DL (ref 1.6–2.3)
MAGNESIUM SERPL-MCNC: 2.6 MG/DL (ref 1.6–2.3)
MAGNESIUM SERPL-MCNC: 2.6 MG/DL (ref 1.6–2.3)
MCH RBC QN AUTO: 24 PG (ref 26.5–33)
MCH RBC QN AUTO: 24.8 PG (ref 26.5–33)
MCH RBC QN AUTO: 24.9 PG (ref 26.5–33)
MCH RBC QN AUTO: 25.2 PG (ref 26.5–33)
MCH RBC QN AUTO: 25.3 PG (ref 26.5–33)
MCH RBC QN AUTO: 25.4 PG (ref 26.5–33)
MCH RBC QN AUTO: 25.5 PG (ref 26.5–33)
MCH RBC QN AUTO: 25.5 PG (ref 26.5–33)
MCH RBC QN AUTO: 25.6 PG (ref 26.5–33)
MCH RBC QN AUTO: 25.8 PG (ref 26.5–33)
MCH RBC QN AUTO: 25.9 PG (ref 26.5–33)
MCH RBC QN AUTO: 26 PG (ref 26.5–33)
MCH RBC QN AUTO: 26.2 PG (ref 26.5–33)
MCH RBC QN AUTO: 26.3 PG (ref 26.5–33)
MCH RBC QN AUTO: 26.3 PG (ref 26.5–33)
MCH RBC QN AUTO: 26.5 PG (ref 26.5–33)
MCH RBC QN AUTO: 27 PG (ref 26.5–33)
MCH RBC QN AUTO: 27 PG (ref 26.5–33)
MCH RBC QN AUTO: 27.1 PG (ref 26.5–33)
MCH RBC QN AUTO: 27.3 PG (ref 26.5–33)
MCH RBC QN AUTO: 27.4 PG (ref 26.5–33)
MCH RBC QN AUTO: 27.8 PG (ref 26.5–33)
MCH RBC QN AUTO: 27.9 PG (ref 26.5–33)
MCH RBC QN AUTO: 28 PG (ref 26.5–33)
MCH RBC QN AUTO: 28.2 PG (ref 26.5–33)
MCH RBC QN AUTO: 28.2 PG (ref 26.5–33)
MCH RBC QN AUTO: 28.3 PG (ref 26.5–33)
MCH RBC QN AUTO: 28.4 PG (ref 26.5–33)
MCH RBC QN AUTO: 30.2 PG (ref 26.5–33)
MCH RBC QN AUTO: 30.5 PG (ref 26.5–33)
MCH RBC QN AUTO: 30.5 PG (ref 26.5–33)
MCHC RBC AUTO-ENTMCNC: 29.9 G/DL (ref 31.5–36.5)
MCHC RBC AUTO-ENTMCNC: 30.4 G/DL (ref 31.5–36.5)
MCHC RBC AUTO-ENTMCNC: 30.5 G/DL (ref 31.5–36.5)
MCHC RBC AUTO-ENTMCNC: 30.6 G/DL (ref 31.5–36.5)
MCHC RBC AUTO-ENTMCNC: 30.6 G/DL (ref 31.5–36.5)
MCHC RBC AUTO-ENTMCNC: 30.7 G/DL (ref 31.5–36.5)
MCHC RBC AUTO-ENTMCNC: 30.7 G/DL (ref 31.5–36.5)
MCHC RBC AUTO-ENTMCNC: 30.8 G/DL (ref 31.5–36.5)
MCHC RBC AUTO-ENTMCNC: 30.9 G/DL (ref 31.5–36.5)
MCHC RBC AUTO-ENTMCNC: 31 G/DL (ref 31.5–36.5)
MCHC RBC AUTO-ENTMCNC: 31.1 G/DL (ref 31.5–36.5)
MCHC RBC AUTO-ENTMCNC: 31.1 G/DL (ref 31.5–36.5)
MCHC RBC AUTO-ENTMCNC: 31.2 G/DL (ref 31.5–36.5)
MCHC RBC AUTO-ENTMCNC: 31.3 G/DL (ref 31.5–36.5)
MCHC RBC AUTO-ENTMCNC: 31.4 G/DL (ref 31.5–36.5)
MCHC RBC AUTO-ENTMCNC: 31.4 G/DL (ref 31.5–36.5)
MCHC RBC AUTO-ENTMCNC: 31.6 G/DL (ref 31.5–36.5)
MCHC RBC AUTO-ENTMCNC: 31.6 G/DL (ref 31.5–36.5)
MCHC RBC AUTO-ENTMCNC: 31.7 G/DL (ref 31.5–36.5)
MCHC RBC AUTO-ENTMCNC: 31.7 G/DL (ref 31.5–36.5)
MCHC RBC AUTO-ENTMCNC: 31.8 G/DL (ref 31.5–36.5)
MCHC RBC AUTO-ENTMCNC: 32 G/DL (ref 31.5–36.5)
MCHC RBC AUTO-ENTMCNC: 32.2 G/DL (ref 31.5–36.5)
MCHC RBC AUTO-ENTMCNC: 32.5 G/DL (ref 31.5–36.5)
MCHC RBC AUTO-ENTMCNC: 32.9 G/DL (ref 31.5–36.5)
MCHC RBC AUTO-ENTMCNC: 33.1 G/DL (ref 31.5–36.5)
MCHC RBC AUTO-ENTMCNC: 34.1 G/DL (ref 31.5–36.5)
MCV RBC AUTO: 80 FL (ref 78–100)
MCV RBC AUTO: 80 FL (ref 78–100)
MCV RBC AUTO: 81 FL (ref 78–100)
MCV RBC AUTO: 82 FL (ref 78–100)
MCV RBC AUTO: 83 FL (ref 78–100)
MCV RBC AUTO: 84 FL (ref 78–100)
MCV RBC AUTO: 85 FL (ref 78–100)
MCV RBC AUTO: 85 FL (ref 78–100)
MCV RBC AUTO: 86 FL (ref 78–100)
MCV RBC AUTO: 86 FL (ref 78–100)
MCV RBC AUTO: 87 FL (ref 78–100)
MCV RBC AUTO: 88 FL (ref 78–100)
MCV RBC AUTO: 89 FL (ref 78–100)
MCV RBC AUTO: 89 FL (ref 78–100)
MCV RBC AUTO: 90 FL (ref 78–100)
MCV RBC AUTO: 90 FL (ref 78–100)
MCV RBC AUTO: 92 FL (ref 78–100)
MCV RBC AUTO: 93 FL (ref 78–100)
METAMYELOCYTES # BLD: 0.1 10E9/L
METAMYELOCYTES # BLD: 0.3 10E9/L
METAMYELOCYTES # BLD: 0.4 10E9/L
METAMYELOCYTES NFR BLD MANUAL: 1 %
METAMYELOCYTES NFR BLD MANUAL: 2 %
METAMYELOCYTES NFR BLD MANUAL: 3 %
MICRO REPORT STATUS: NORMAL
MICROBIOLOGIST REVIEW: NORMAL
MONOCYTES # BLD AUTO: 0 10E9/L (ref 0–1.3)
MONOCYTES # BLD AUTO: 0.2 10E9/L (ref 0–1.3)
MONOCYTES # BLD AUTO: 0.3 10E9/L (ref 0–1.3)
MONOCYTES # BLD AUTO: 0.3 10E9/L (ref 0–1.3)
MONOCYTES # BLD AUTO: 0.4 10E9/L (ref 0–1.3)
MONOCYTES # BLD AUTO: 0.4 10E9/L (ref 0–1.3)
MONOCYTES # BLD AUTO: 0.5 10E9/L (ref 0–1.3)
MONOCYTES # BLD AUTO: 0.6 10E9/L (ref 0–1.3)
MONOCYTES # BLD AUTO: 0.9 10E9/L (ref 0–1.3)
MONOCYTES # BLD AUTO: 0.9 10E9/L (ref 0–1.3)
MONOCYTES # BLD AUTO: 1 10E9/L (ref 0–1.3)
MONOCYTES # BLD AUTO: 1 10E9/L (ref 0–1.3)
MONOCYTES # BLD AUTO: 1.1 10E9/L (ref 0–1.3)
MONOCYTES # BLD AUTO: 1.2 10E9/L (ref 0–1.3)
MONOCYTES # BLD AUTO: 1.3 10E9/L (ref 0–1.3)
MONOCYTES # BLD AUTO: 1.3 10E9/L (ref 0–1.3)
MONOCYTES # BLD AUTO: 2.1 10E9/L (ref 0–1.3)
MONOCYTES # BLD AUTO: 2.3 10E9/L (ref 0–1.3)
MONOCYTES # BLD AUTO: 2.4 10E9/L (ref 0–1.3)
MONOCYTES # BLD AUTO: 2.5 10E9/L (ref 0–1.3)
MONOCYTES NFR BLD AUTO: 0.4 %
MONOCYTES NFR BLD AUTO: 1.6 %
MONOCYTES NFR BLD AUTO: 10 %
MONOCYTES NFR BLD AUTO: 10.3 %
MONOCYTES NFR BLD AUTO: 10.9 %
MONOCYTES NFR BLD AUTO: 14.2 %
MONOCYTES NFR BLD AUTO: 15 %
MONOCYTES NFR BLD AUTO: 15 %
MONOCYTES NFR BLD AUTO: 15.5 %
MONOCYTES NFR BLD AUTO: 15.9 %
MONOCYTES NFR BLD AUTO: 18.6 %
MONOCYTES NFR BLD AUTO: 20.6 %
MONOCYTES NFR BLD AUTO: 23.3 %
MONOCYTES NFR BLD AUTO: 23.8 %
MONOCYTES NFR BLD AUTO: 25.1 %
MONOCYTES NFR BLD AUTO: 3 %
MONOCYTES NFR BLD AUTO: 32 %
MONOCYTES NFR BLD AUTO: 35 %
MONOCYTES NFR BLD AUTO: 4 %
MONOCYTES NFR BLD AUTO: 4.9 %
MONOCYTES NFR BLD AUTO: 8 %
MONOCYTES NFR BLD AUTO: 8.5 %
MONOCYTES NFR BLD AUTO: 9.5 %
MONOCYTES NFR BLD AUTO: 9.6 %
MONOCYTES NFR BLD AUTO: 9.7 %
MONOCYTES NFR BLD AUTO: 9.9 %
MRSA DNA SPEC QL NAA+PROBE: NEGATIVE
MUCOUS THREADS #/AREA URNS LPF: PRESENT /LPF
MUCOUS THREADS #/AREA URNS LPF: PRESENT /LPF
MYELOCYTES # BLD: 0.1 10E9/L
MYELOCYTES # BLD: 0.3 10E9/L
MYELOCYTES NFR BLD MANUAL: 1 %
MYELOCYTES NFR BLD MANUAL: 2 %
NEUTROPHILS # BLD AUTO: 0.1 10E9/L (ref 1.6–8.3)
NEUTROPHILS # BLD AUTO: 1.1 10E9/L (ref 1.6–8.3)
NEUTROPHILS # BLD AUTO: 1.3 10E9/L (ref 1.6–8.3)
NEUTROPHILS # BLD AUTO: 1.4 10E9/L (ref 1.6–8.3)
NEUTROPHILS # BLD AUTO: 1.6 10E9/L (ref 1.6–8.3)
NEUTROPHILS # BLD AUTO: 1.7 10E9/L (ref 1.6–8.3)
NEUTROPHILS # BLD AUTO: 1.9 10E9/L (ref 1.6–8.3)
NEUTROPHILS # BLD AUTO: 10.3 10E9/L (ref 1.6–8.3)
NEUTROPHILS # BLD AUTO: 10.7 10E9/L (ref 1.6–8.3)
NEUTROPHILS # BLD AUTO: 10.8 10E9/L (ref 1.6–8.3)
NEUTROPHILS # BLD AUTO: 15.7 10E9/L (ref 1.6–8.3)
NEUTROPHILS # BLD AUTO: 16.6 10E9/L (ref 1.6–8.3)
NEUTROPHILS # BLD AUTO: 19.4 10E9/L (ref 1.6–8.3)
NEUTROPHILS # BLD AUTO: 2 10E9/L (ref 1.6–8.3)
NEUTROPHILS # BLD AUTO: 2.5 10E9/L (ref 1.6–8.3)
NEUTROPHILS # BLD AUTO: 2.6 10E9/L (ref 1.6–8.3)
NEUTROPHILS # BLD AUTO: 3.5 10E9/L (ref 1.6–8.3)
NEUTROPHILS # BLD AUTO: 4 10E9/L (ref 1.6–8.3)
NEUTROPHILS # BLD AUTO: 5.4 10E9/L (ref 1.6–8.3)
NEUTROPHILS # BLD AUTO: 5.8 10E9/L (ref 1.6–8.3)
NEUTROPHILS # BLD AUTO: 6.5 10E9/L (ref 1.6–8.3)
NEUTROPHILS # BLD AUTO: 8 10E9/L (ref 1.6–8.3)
NEUTROPHILS # BLD AUTO: 8.3 10E9/L (ref 1.6–8.3)
NEUTROPHILS # BLD AUTO: 9.4 10E9/L (ref 1.6–8.3)
NEUTROPHILS NFR BLD AUTO: 29 %
NEUTROPHILS NFR BLD AUTO: 32.1 %
NEUTROPHILS NFR BLD AUTO: 36 %
NEUTROPHILS NFR BLD AUTO: 36.4 %
NEUTROPHILS NFR BLD AUTO: 36.5 %
NEUTROPHILS NFR BLD AUTO: 39.3 %
NEUTROPHILS NFR BLD AUTO: 47 %
NEUTROPHILS NFR BLD AUTO: 50.4 %
NEUTROPHILS NFR BLD AUTO: 52 %
NEUTROPHILS NFR BLD AUTO: 52.1 %
NEUTROPHILS NFR BLD AUTO: 58.2 %
NEUTROPHILS NFR BLD AUTO: 59.3 %
NEUTROPHILS NFR BLD AUTO: 63 %
NEUTROPHILS NFR BLD AUTO: 7 %
NEUTROPHILS NFR BLD AUTO: 71.2 %
NEUTROPHILS NFR BLD AUTO: 73.2 %
NEUTROPHILS NFR BLD AUTO: 75.1 %
NEUTROPHILS NFR BLD AUTO: 75.5 %
NEUTROPHILS NFR BLD AUTO: 76.4 %
NEUTROPHILS NFR BLD AUTO: 78.4 %
NEUTROPHILS NFR BLD AUTO: 79.7 %
NEUTROPHILS NFR BLD AUTO: 80 %
NEUTROPHILS NFR BLD AUTO: 80.5 %
NEUTROPHILS NFR BLD AUTO: 80.6 %
NEUTROPHILS NFR BLD AUTO: 81 %
NEUTROPHILS NFR BLD AUTO: 83.2 %
NITRATE UR QL: NEGATIVE
NON-SQ EPI CELLS #/AREA URNS LPF: NORMAL /LPF
NONHDLC SERPL-MCNC: 110 MG/DL
NRBC # BLD AUTO: 0 10*3/UL
NRBC BLD AUTO-RTO: 0 /100
NT-PROBNP SERPL-MCNC: 5191 PG/ML (ref 0–900)
NT-PROBNP SERPL-MCNC: 6725 PG/ML (ref 0–900)
NT-PROBNP SERPL-MCNC: ABNORMAL PG/ML (ref 0–900)
NUM BPU REQUESTED: 1
NUM BPU REQUESTED: 2
O2/TOTAL GAS SETTING VFR VENT: 21 %
O2/TOTAL GAS SETTING VFR VENT: 35 %
O2/TOTAL GAS SETTING VFR VENT: 40 %
O2/TOTAL GAS SETTING VFR VENT: 50 %
O2/TOTAL GAS SETTING VFR VENT: 50 %
O2/TOTAL GAS SETTING VFR VENT: 60 %
O2/TOTAL GAS SETTING VFR VENT: 80 %
O2/TOTAL GAS SETTING VFR VENT: 80 %
O2/TOTAL GAS SETTING VFR VENT: ABNORMAL %
OXYHGB MFR BLD: 96 % (ref 92–100)
OXYHGB MFR BLDV: 49 %
OXYHGB MFR BLDV: 51 %
OXYHGB MFR BLDV: 53 %
OXYHGB MFR BLDV: 55 %
OXYHGB MFR BLDV: 56 %
OXYHGB MFR BLDV: 57 %
OXYHGB MFR BLDV: 61 %
OXYHGB MFR BLDV: 62 %
OXYHGB MFR BLDV: 63 %
OXYHGB MFR BLDV: 63 %
OXYHGB MFR BLDV: 67 %
OXYHGB MFR BLDV: 67 %
OXYHGB MFR BLDV: 68 %
OXYHGB MFR BLDV: 69 %
OXYHGB MFR BLDV: 70 %
OXYHGB MFR BLDV: 70 %
OXYHGB MFR BLDV: 73 %
OXYHGB MFR BLDV: 74 %
PCO2 BLD: 26 MM HG (ref 35–45)
PCO2 BLD: 34 MM HG (ref 35–45)
PCO2 BLD: 34 MM HG (ref 35–45)
PCO2 BLD: 35 MM HG (ref 35–45)
PCO2 BLD: 43 MM HG (ref 35–45)
PCO2 BLDV: 37 MM HG (ref 40–50)
PCO2 BLDV: 38 MM HG (ref 40–50)
PCO2 BLDV: 39 MM HG (ref 40–50)
PCO2 BLDV: 40 MM HG (ref 40–50)
PCO2 BLDV: 41 MM HG (ref 40–50)
PCO2 BLDV: 42 MM HG (ref 40–50)
PCO2 BLDV: 43 MM HG (ref 40–50)
PCO2 BLDV: 45 MM HG (ref 40–50)
PCO2 BLDV: 45 MM HG (ref 40–50)
PCO2 BLDV: 55 MM HG (ref 40–50)
PH BLD: 7.28 PH (ref 7.35–7.45)
PH BLD: 7.42 PH (ref 7.35–7.45)
PH BLD: 7.45 PH (ref 7.35–7.45)
PH BLD: 7.54 PH (ref 7.35–7.45)
PH BLD: 7.57 PH (ref 7.35–7.45)
PH BLDV: 7.21 PH (ref 7.32–7.43)
PH BLDV: 7.31 PH (ref 7.32–7.43)
PH BLDV: 7.32 PH (ref 7.32–7.43)
PH BLDV: 7.38 PH (ref 7.32–7.43)
PH BLDV: 7.39 PH (ref 7.32–7.43)
PH BLDV: 7.39 PH (ref 7.32–7.43)
PH BLDV: 7.4 PH (ref 7.32–7.43)
PH BLDV: 7.41 PH (ref 7.32–7.43)
PH BLDV: 7.41 PH (ref 7.32–7.43)
PH BLDV: 7.42 PH (ref 7.32–7.43)
PH BLDV: 7.42 PH (ref 7.32–7.43)
PH BLDV: 7.47 PH (ref 7.32–7.43)
PH BLDV: 7.47 PH (ref 7.32–7.43)
PH UR STRIP: 6 PH (ref 5–7)
PH UR STRIP: 7 PH (ref 5–7)
PH UR STRIP: 7 PH (ref 5–7)
PHOSPHATE SERPL-MCNC: 1.5 MG/DL (ref 2.5–4.5)
PHOSPHATE SERPL-MCNC: 1.6 MG/DL (ref 2.5–4.5)
PHOSPHATE SERPL-MCNC: 2 MG/DL (ref 2.5–4.5)
PHOSPHATE SERPL-MCNC: 2.2 MG/DL (ref 2.5–4.5)
PHOSPHATE SERPL-MCNC: 2.4 MG/DL (ref 2.5–4.5)
PHOSPHATE SERPL-MCNC: 2.4 MG/DL (ref 2.5–4.5)
PHOSPHATE SERPL-MCNC: 2.5 MG/DL (ref 2.5–4.5)
PLATELET # BLD AUTO: 110 10E9/L (ref 150–450)
PLATELET # BLD AUTO: 129 10E9/L (ref 150–450)
PLATELET # BLD AUTO: 133 10E9/L (ref 150–450)
PLATELET # BLD AUTO: 137 10E9/L (ref 150–450)
PLATELET # BLD AUTO: 151 10E9/L (ref 150–450)
PLATELET # BLD AUTO: 162 10E9/L (ref 150–450)
PLATELET # BLD AUTO: 169 10E9/L (ref 150–450)
PLATELET # BLD AUTO: 171 10E9/L (ref 150–450)
PLATELET # BLD AUTO: 173 10E9/L (ref 150–450)
PLATELET # BLD AUTO: 183 10E9/L (ref 150–450)
PLATELET # BLD AUTO: 185 10E9/L (ref 150–450)
PLATELET # BLD AUTO: 187 10E9/L (ref 150–450)
PLATELET # BLD AUTO: 193 10E9/L (ref 150–450)
PLATELET # BLD AUTO: 200 10E9/L (ref 150–450)
PLATELET # BLD AUTO: 226 10E9/L (ref 150–450)
PLATELET # BLD AUTO: 227 10E9/L (ref 150–450)
PLATELET # BLD AUTO: 228 10E9/L (ref 150–450)
PLATELET # BLD AUTO: 231 10E9/L (ref 150–450)
PLATELET # BLD AUTO: 377 10E9/L (ref 150–450)
PLATELET # BLD AUTO: 383 10E9/L (ref 150–450)
PLATELET # BLD AUTO: 396 10E9/L (ref 150–450)
PLATELET # BLD AUTO: 405 10E9/L (ref 150–450)
PLATELET # BLD AUTO: 419 10E9/L (ref 150–450)
PLATELET # BLD AUTO: 424 10E9/L (ref 150–450)
PLATELET # BLD AUTO: 448 10E9/L (ref 150–450)
PLATELET # BLD AUTO: 479 10E9/L (ref 150–450)
PLATELET # BLD AUTO: 491 10E9/L (ref 150–450)
PLATELET # BLD AUTO: 498 10E9/L (ref 150–450)
PLATELET # BLD AUTO: 527 10E9/L (ref 150–450)
PLATELET # BLD AUTO: 565 10E9/L (ref 150–450)
PLATELET # BLD AUTO: 566 10E9/L (ref 150–450)
PLATELET # BLD AUTO: 575 10E9/L (ref 150–450)
PLATELET # BLD AUTO: 584 10E9/L (ref 150–450)
PLATELET # BLD AUTO: 76 10E9/L (ref 150–450)
PLATELET # BLD EST: ABNORMAL 10*3/UL
PLATELET # BLD EST: NORMAL 10*3/UL
PLATELET # BLD EST: NORMAL 10*3/UL
PO2 BLD: 118 MM HG (ref 80–105)
PO2 BLD: 125 MM HG (ref 80–105)
PO2 BLD: 135 MM HG (ref 80–105)
PO2 BLD: 136 MM HG (ref 80–105)
PO2 BLD: 78 MM HG (ref 80–105)
PO2 BLDV: 29 MM HG (ref 25–47)
PO2 BLDV: 31 MM HG (ref 25–47)
PO2 BLDV: 31 MM HG (ref 25–47)
PO2 BLDV: 32 MM HG (ref 25–47)
PO2 BLDV: 32 MM HG (ref 25–47)
PO2 BLDV: 34 MM HG (ref 25–47)
PO2 BLDV: 35 MM HG (ref 25–47)
PO2 BLDV: 35 MM HG (ref 25–47)
PO2 BLDV: 36 MM HG (ref 25–47)
PO2 BLDV: 36 MM HG (ref 25–47)
PO2 BLDV: 38 MM HG (ref 25–47)
PO2 BLDV: 40 MM HG (ref 25–47)
PO2 BLDV: 40 MM HG (ref 25–47)
PO2 BLDV: 42 MM HG (ref 25–47)
PO2 BLDV: 43 MM HG (ref 25–47)
PO2 BLDV: 46 MM HG (ref 25–47)
PO2 BLDV: 70 MM HG (ref 25–47)
POLYCHROMASIA BLD QL SMEAR: ABNORMAL
POLYCHROMASIA BLD QL SMEAR: SLIGHT
POTASSIUM SERPL-SCNC: 2.6 MMOL/L (ref 3.4–5.3)
POTASSIUM SERPL-SCNC: 3.1 MMOL/L (ref 3.4–5.3)
POTASSIUM SERPL-SCNC: 3.2 MMOL/L (ref 3.4–5.3)
POTASSIUM SERPL-SCNC: 3.3 MMOL/L (ref 3.4–5.3)
POTASSIUM SERPL-SCNC: 3.3 MMOL/L (ref 3.5–5)
POTASSIUM SERPL-SCNC: 3.4 MMOL/L (ref 3.4–5.3)
POTASSIUM SERPL-SCNC: 3.4 MMOL/L (ref 3.5–5)
POTASSIUM SERPL-SCNC: 3.5 MMOL/L (ref 3.4–5.3)
POTASSIUM SERPL-SCNC: 3.6 MMOL/L (ref 3.4–5.3)
POTASSIUM SERPL-SCNC: 3.7 MMOL/L (ref 3.4–5.3)
POTASSIUM SERPL-SCNC: 3.8 MMOL/L (ref 3.4–5.3)
POTASSIUM SERPL-SCNC: 3.9 MMOL/L (ref 3.4–5.3)
POTASSIUM SERPL-SCNC: 4 MMOL/L (ref 3.4–5.3)
POTASSIUM SERPL-SCNC: 4 MMOL/L (ref 3.4–5.3)
POTASSIUM SERPL-SCNC: 4.1 MMOL/L (ref 3.4–5.3)
POTASSIUM SERPL-SCNC: 4.3 MMOL/L (ref 3.4–5.3)
POTASSIUM SERPL-SCNC: 4.4 MMOL/L (ref 3.4–5.3)
POTASSIUM SERPL-SCNC: 4.5 MMOL/L (ref 3.4–5.3)
POTASSIUM SERPL-SCNC: 4.6 MMOL/L (ref 3.4–5.3)
POTASSIUM SERPL-SCNC: 4.6 MMOL/L (ref 3.4–5.3)
POTASSIUM SERPL-SCNC: 5.2 MMOL/L (ref 3.4–5.3)
PROCALCITONIN SERPL-MCNC: 9.92 NG/ML
PROT 24H UR-MRATE: 0.19 G/(24.H) (ref 0.04–0.23)
PROT SERPL-MCNC: 5.6 G/DL (ref 6.8–8.8)
PROT SERPL-MCNC: 5.7 G/DL (ref 6.8–8.8)
PROT SERPL-MCNC: 5.8 G/DL (ref 6.8–8.8)
PROT SERPL-MCNC: 5.9 G/DL (ref 6.8–8.8)
PROT SERPL-MCNC: 6 G/DL (ref 6.8–8.8)
PROT SERPL-MCNC: 6.2 G/DL (ref 6.8–8.8)
PROT SERPL-MCNC: 6.3 G/DL (ref 6.8–8.8)
PROT SERPL-MCNC: 6.5 G/DL (ref 6.8–8.8)
PROT SERPL-MCNC: 6.6 G/DL (ref 6.8–8.8)
PROT SERPL-MCNC: 6.6 G/DL (ref 6.8–8.8)
PROT SERPL-MCNC: 6.7 G/DL (ref 6.8–8.8)
PROT SERPL-MCNC: 6.8 G/DL (ref 6.8–8.8)
PROT SERPL-MCNC: 6.9 G/DL (ref 6.8–8.8)
PROT SERPL-MCNC: 7 G/DL (ref 6.8–8.8)
PROT SERPL-MCNC: 7.8 G/DL (ref 6.8–8.8)
PROT SERPL-MCNC: 8 G/DL (ref 6.8–8.8)
PROT SERPL-MCNC: 8 G/DL (ref 6.8–8.8)
PROT UR-MCNC: 0.13 G/L
PROT/CREAT 24H UR: 0.34 G/G CR (ref 0–0.2)
RBC # BLD AUTO: 2.39 10E12/L (ref 3.8–5.2)
RBC # BLD AUTO: 2.45 10E12/L (ref 3.8–5.2)
RBC # BLD AUTO: 2.51 10E12/L (ref 3.8–5.2)
RBC # BLD AUTO: 2.57 10E12/L (ref 3.8–5.2)
RBC # BLD AUTO: 2.59 10E12/L (ref 3.8–5.2)
RBC # BLD AUTO: 2.62 10E12/L (ref 3.8–5.2)
RBC # BLD AUTO: 2.63 10E12/L (ref 3.8–5.2)
RBC # BLD AUTO: 2.73 10E12/L (ref 3.8–5.2)
RBC # BLD AUTO: 2.79 10E12/L (ref 3.8–5.2)
RBC # BLD AUTO: 2.83 10E12/L (ref 3.8–5.2)
RBC # BLD AUTO: 2.93 10E12/L (ref 3.8–5.2)
RBC # BLD AUTO: 2.96 10E12/L (ref 3.8–5.2)
RBC # BLD AUTO: 3.01 10E12/L (ref 3.8–5.2)
RBC # BLD AUTO: 3.01 10E12/L (ref 3.8–5.2)
RBC # BLD AUTO: 3.02 10E12/L (ref 3.8–5.2)
RBC # BLD AUTO: 3.09 10E12/L (ref 3.8–5.2)
RBC # BLD AUTO: 3.09 10E12/L (ref 3.8–5.2)
RBC # BLD AUTO: 3.11 10E12/L (ref 3.8–5.2)
RBC # BLD AUTO: 3.15 10E12/L (ref 3.8–5.2)
RBC # BLD AUTO: 3.22 10E12/L (ref 3.8–5.2)
RBC # BLD AUTO: 3.23 10E12/L (ref 3.8–5.2)
RBC # BLD AUTO: 3.26 10E12/L (ref 3.8–5.2)
RBC # BLD AUTO: 3.31 10E12/L (ref 3.8–5.2)
RBC # BLD AUTO: 3.32 10E12/L (ref 3.8–5.2)
RBC # BLD AUTO: 3.34 10E12/L (ref 3.8–5.2)
RBC # BLD AUTO: 3.43 10E12/L (ref 3.8–5.2)
RBC # BLD AUTO: 3.43 10E12/L (ref 3.8–5.2)
RBC # BLD AUTO: 3.51 10E12/L (ref 3.8–5.2)
RBC # BLD AUTO: 3.68 10E12/L (ref 3.8–5.2)
RBC # BLD AUTO: 3.69 10E12/L (ref 3.8–5.2)
RBC # BLD AUTO: 3.78 10E12/L (ref 3.8–5.2)
RBC # BLD AUTO: 3.8 10E12/L (ref 3.8–5.2)
RBC # BLD AUTO: 4.35 10E12/L (ref 3.8–5.2)
RBC #/AREA URNS AUTO: 0 /HPF (ref 0–2)
RBC #/AREA URNS AUTO: 1 /HPF (ref 0–2)
RBC #/AREA URNS AUTO: <1 /HPF (ref 0–2)
RBC #/AREA URNS AUTO: <1 /HPF (ref 0–2)
RBC #/AREA URNS AUTO: NORMAL /HPF (ref 0–2)
RHINOVIRUS RNA SPEC QL NAA+PROBE: NEGATIVE
RSV RNA SPEC NAA+PROBE: NEGATIVE
RSV RNA SPEC QL NAA+PROBE: NEGATIVE
RSV RNA SPEC QL NAA+PROBE: NEGATIVE
SODIUM SERPL-SCNC: 128 MMOL/L (ref 133–144)
SODIUM SERPL-SCNC: 131 MMOL/L (ref 133–144)
SODIUM SERPL-SCNC: 132 MMOL/L (ref 133–144)
SODIUM SERPL-SCNC: 133 MMOL/L (ref 133–144)
SODIUM SERPL-SCNC: 134 MMOL/L (ref 133–144)
SODIUM SERPL-SCNC: 135 MMOL/L (ref 133–144)
SODIUM SERPL-SCNC: 136 MMOL/L (ref 133–144)
SODIUM SERPL-SCNC: 137 MMOL/L (ref 133–144)
SODIUM SERPL-SCNC: 138 MMOL/L (ref 133–144)
SODIUM SERPL-SCNC: 139 MMOL/L (ref 133–144)
SODIUM SERPL-SCNC: 139 MMOL/L (ref 133–144)
SODIUM SERPL-SCNC: 139 MMOL/L (ref 136–145)
SODIUM SERPL-SCNC: 140 MMOL/L (ref 133–144)
SODIUM SERPL-SCNC: 142 MMOL/L (ref 133–144)
SODIUM SERPL-SCNC: 146 MMOL/L (ref 133–144)
SOURCE: ABNORMAL
SOURCE: NORMAL
SP GR UR STRIP: 1.01 (ref 1–1.03)
SP GR UR STRIP: 1.02 (ref 1–1.03)
SPECIMEN EXP DATE BLD: NORMAL
SPECIMEN SOURCE: NORMAL
SPECIMEN VOL UR: 1400 ML
SQUAMOUS #/AREA URNS AUTO: 1 /HPF (ref 0–1)
SQUAMOUS #/AREA URNS AUTO: 1 /HPF (ref 0–1)
SQUAMOUS #/AREA URNS AUTO: <1 /HPF (ref 0–1)
SQUAMOUS #/AREA URNS AUTO: <1 /HPF (ref 0–1)
TIBC SERPL-MCNC: 170 UG/DL (ref 240–430)
TRANSFUSION STATUS PATIENT QL: NORMAL
TRIGL SERPL-MCNC: 90 MG/DL
TROPONIN I SERPL-MCNC: 2.99 UG/L (ref 0–0.04)
TROPONIN I SERPL-MCNC: 3.5 UG/L (ref 0–0.04)
TROPONIN I SERPL-MCNC: 3.59 UG/L (ref 0–0.04)
TROPONIN I SERPL-MCNC: <0.015 UG/L (ref 0–0.04)
TROPONIN I SERPL-MCNC: NORMAL UG/L (ref 0–0.04)
TSH SERPL DL<=0.005 MIU/L-ACNC: 1.92 MU/L (ref 0.4–4)
TSH SERPL DL<=0.005 MIU/L-ACNC: 1.94 MU/L (ref 0.4–4)
URN SPEC COLLECT METH UR: ABNORMAL
UROBILINOGEN UR STRIP-ACNC: 0.2 EU/DL (ref 0.2–1)
UROBILINOGEN UR STRIP-MCNC: 0 MG/DL (ref 0–2)
UROBILINOGEN UR STRIP-MCNC: 0.2 MG/DL (ref 0–2)
VANCOMYCIN SERPL-MCNC: 14.1 MG/L
WBC # BLD AUTO: 1.6 10E9/L (ref 4–11)
WBC # BLD AUTO: 10 10E9/L (ref 4–11)
WBC # BLD AUTO: 11.2 10E9/L (ref 4–11)
WBC # BLD AUTO: 12.5 10E9/L (ref 4–11)
WBC # BLD AUTO: 12.5 10E9/L (ref 4–11)
WBC # BLD AUTO: 12.8 10E9/L (ref 4–11)
WBC # BLD AUTO: 13.8 10E9/L (ref 4–11)
WBC # BLD AUTO: 14 10E9/L (ref 4–11)
WBC # BLD AUTO: 14 10E9/L (ref 4–11)
WBC # BLD AUTO: 16.4 10E9/L (ref 4–11)
WBC # BLD AUTO: 19.1 10E9/L (ref 4–11)
WBC # BLD AUTO: 19.9 10E9/L (ref 4–11)
WBC # BLD AUTO: 20.6 10E9/L (ref 4–11)
WBC # BLD AUTO: 20.8 10E9/L (ref 4–11)
WBC # BLD AUTO: 24 10E9/L (ref 4–11)
WBC # BLD AUTO: 3.2 10E9/L (ref 4–11)
WBC # BLD AUTO: 3.7 10E9/L (ref 4–11)
WBC # BLD AUTO: 3.9 10E9/L (ref 4–11)
WBC # BLD AUTO: 3.9 10E9/L (ref 4–11)
WBC # BLD AUTO: 4.4 10E9/L (ref 4–11)
WBC # BLD AUTO: 4.5 10E9/L (ref 4–11)
WBC # BLD AUTO: 4.6 10E9/L (ref 4–11)
WBC # BLD AUTO: 4.9 10E9/L (ref 4–11)
WBC # BLD AUTO: 5.9 10E9/L (ref 4–11)
WBC # BLD AUTO: 6.7 10E9/L (ref 4–11)
WBC # BLD AUTO: 6.8 10E9/L (ref 4–11)
WBC # BLD AUTO: 6.9 10E9/L (ref 4–11)
WBC # BLD AUTO: 7.2 10E9/L (ref 4–11)
WBC # BLD AUTO: 7.2 10E9/L (ref 4–11)
WBC # BLD AUTO: 7.3 10E9/L (ref 4–11)
WBC # BLD AUTO: 8.6 10E9/L (ref 4–11)
WBC # BLD AUTO: 8.9 10E9/L (ref 4–11)
WBC # BLD AUTO: 9.8 10E9/L (ref 4–11)
WBC #/AREA URNS AUTO: 1 /HPF (ref 0–2)
WBC #/AREA URNS AUTO: 1 /HPF (ref 0–2)
WBC #/AREA URNS AUTO: 2 /HPF (ref 0–2)
WBC #/AREA URNS AUTO: <1 /HPF (ref 0–2)
WBC #/AREA URNS AUTO: NORMAL /HPF (ref 0–2)

## 2017-01-01 PROCEDURE — 93325 DOPPLER ECHO COLOR FLOW MAPG: CPT | Mod: 26 | Performed by: INTERNAL MEDICINE

## 2017-01-01 PROCEDURE — G8907 PT DOC NO EVENTS ON DISCHARG: HCPCS

## 2017-01-01 PROCEDURE — 94003 VENT MGMT INPAT SUBQ DAY: CPT

## 2017-01-01 PROCEDURE — 25000128 H RX IP 250 OP 636

## 2017-01-01 PROCEDURE — 25000132 ZZH RX MED GY IP 250 OP 250 PS 637: Performed by: FAMILY MEDICINE

## 2017-01-01 PROCEDURE — 71010 XR CHEST PORT 1 VW: CPT

## 2017-01-01 PROCEDURE — 99291 CRITICAL CARE FIRST HOUR: CPT | Mod: 25 | Performed by: EMERGENCY MEDICINE

## 2017-01-01 PROCEDURE — 96367 TX/PROPH/DG ADDL SEQ IV INF: CPT

## 2017-01-01 PROCEDURE — 40000986 XR CHEST PORT 1 VW

## 2017-01-01 PROCEDURE — 71020 XR CHEST 2 VW: CPT

## 2017-01-01 PROCEDURE — 88341 IMHCHEM/IMCYTCHM EA ADD ANTB: CPT | Performed by: INTERNAL MEDICINE

## 2017-01-01 PROCEDURE — 40000305 ZZH STATISTIC PRE PROC ASSESS I

## 2017-01-01 PROCEDURE — 87631 RESP VIRUS 3-5 TARGETS: CPT | Performed by: INTERNAL MEDICINE

## 2017-01-01 PROCEDURE — 82803 BLOOD GASES ANY COMBINATION: CPT | Performed by: HOSPITALIST

## 2017-01-01 PROCEDURE — 86666 EHRLICHIA ANTIBODY: CPT | Mod: 90 | Performed by: PHYSICIAN ASSISTANT

## 2017-01-01 PROCEDURE — 85027 COMPLETE CBC AUTOMATED: CPT | Performed by: SURGERY

## 2017-01-01 PROCEDURE — 84100 ASSAY OF PHOSPHORUS: CPT | Performed by: PHYSICIAN ASSISTANT

## 2017-01-01 PROCEDURE — 36592 COLLECT BLOOD FROM PICC: CPT | Performed by: PHYSICIAN ASSISTANT

## 2017-01-01 PROCEDURE — 25000132 ZZH RX MED GY IP 250 OP 250 PS 637: Performed by: INTERNAL MEDICINE

## 2017-01-01 PROCEDURE — 36430 TRANSFUSION BLD/BLD COMPNT: CPT

## 2017-01-01 PROCEDURE — 86923 COMPATIBILITY TEST ELECTRIC: CPT | Performed by: INTERNAL MEDICINE

## 2017-01-01 PROCEDURE — 88305 TISSUE EXAM BY PATHOLOGIST: CPT | Performed by: INTERNAL MEDICINE

## 2017-01-01 PROCEDURE — 86900 BLOOD TYPING SEROLOGIC ABO: CPT | Performed by: INTERNAL MEDICINE

## 2017-01-01 PROCEDURE — 25000128 H RX IP 250 OP 636: Performed by: SURGERY

## 2017-01-01 PROCEDURE — 93000 ELECTROCARDIOGRAM COMPLETE: CPT | Performed by: PHYSICIAN ASSISTANT

## 2017-01-01 PROCEDURE — 96366 THER/PROPH/DIAG IV INF ADDON: CPT

## 2017-01-01 PROCEDURE — 25000128 H RX IP 250 OP 636: Performed by: EMERGENCY MEDICINE

## 2017-01-01 PROCEDURE — P9016 RBC LEUKOCYTES REDUCED: HCPCS | Performed by: INTERNAL MEDICINE

## 2017-01-01 PROCEDURE — 83550 IRON BINDING TEST: CPT | Performed by: PHYSICIAN ASSISTANT

## 2017-01-01 PROCEDURE — 86850 RBC ANTIBODY SCREEN: CPT | Performed by: INTERNAL MEDICINE

## 2017-01-01 PROCEDURE — 82805 BLOOD GASES W/O2 SATURATION: CPT | Performed by: SURGERY

## 2017-01-01 PROCEDURE — 99291 CRITICAL CARE FIRST HOUR: CPT | Mod: GC | Performed by: INTERNAL MEDICINE

## 2017-01-01 PROCEDURE — 92526 ORAL FUNCTION THERAPY: CPT | Mod: GN | Performed by: SPEECH-LANGUAGE PATHOLOGIST

## 2017-01-01 PROCEDURE — S0028 INJECTION, FAMOTIDINE, 20 MG: HCPCS | Performed by: INTERNAL MEDICINE

## 2017-01-01 PROCEDURE — 74000 XR ABDOMEN 1 VW: CPT

## 2017-01-01 PROCEDURE — 96549 UNLISTED CHEMOTHERAPY PX: CPT

## 2017-01-01 PROCEDURE — 87040 BLOOD CULTURE FOR BACTERIA: CPT | Performed by: EMERGENCY MEDICINE

## 2017-01-01 PROCEDURE — 86901 BLOOD TYPING SEROLOGIC RH(D): CPT | Performed by: INTERNAL MEDICINE

## 2017-01-01 PROCEDURE — 40000193 ZZH STATISTIC PT WARD VISIT: Performed by: REHABILITATION PRACTITIONER

## 2017-01-01 PROCEDURE — 37000009 ZZH ANESTHESIA TECHNICAL FEE, EACH ADDTL 15 MIN: Performed by: SURGERY

## 2017-01-01 PROCEDURE — 25000125 ZZHC RX 250: Performed by: PHYSICIAN ASSISTANT

## 2017-01-01 PROCEDURE — 82274 ASSAY TEST FOR BLOOD FECAL: CPT | Performed by: PHYSICIAN ASSISTANT

## 2017-01-01 PROCEDURE — 25000125 ZZHC RX 250: Performed by: HOSPITALIST

## 2017-01-01 PROCEDURE — 85025 COMPLETE CBC W/AUTO DIFF WBC: CPT | Performed by: INTERNAL MEDICINE

## 2017-01-01 PROCEDURE — 25000132 ZZH RX MED GY IP 250 OP 250 PS 637: Performed by: PHYSICIAN ASSISTANT

## 2017-01-01 PROCEDURE — 82565 ASSAY OF CREATININE: CPT | Performed by: SURGERY

## 2017-01-01 PROCEDURE — 93503 INSERT/PLACE HEART CATHETER: CPT

## 2017-01-01 PROCEDURE — 97116 GAIT TRAINING THERAPY: CPT | Mod: GP

## 2017-01-01 PROCEDURE — 25000128 H RX IP 250 OP 636: Performed by: NURSE ANESTHETIST, CERTIFIED REGISTERED

## 2017-01-01 PROCEDURE — 96372 THER/PROPH/DIAG INJ SC/IM: CPT

## 2017-01-01 PROCEDURE — 85610 PROTHROMBIN TIME: CPT | Performed by: INTERNAL MEDICINE

## 2017-01-01 PROCEDURE — 31500 INSERT EMERGENCY AIRWAY: CPT | Mod: 59 | Performed by: EMERGENCY MEDICINE

## 2017-01-01 PROCEDURE — 36415 COLL VENOUS BLD VENIPUNCTURE: CPT | Performed by: INTERNAL MEDICINE

## 2017-01-01 PROCEDURE — 37000009 ZZH ANESTHESIA TECHNICAL FEE, EACH ADDTL 15 MIN

## 2017-01-01 PROCEDURE — 99285 EMERGENCY DEPT VISIT HI MDM: CPT | Mod: 25 | Performed by: FAMILY MEDICINE

## 2017-01-01 PROCEDURE — 80076 HEPATIC FUNCTION PANEL: CPT | Performed by: HOSPITALIST

## 2017-01-01 PROCEDURE — 97110 THERAPEUTIC EXERCISES: CPT | Mod: GO

## 2017-01-01 PROCEDURE — 93306 TTE W/DOPPLER COMPLETE: CPT | Mod: 26 | Performed by: INTERNAL MEDICINE

## 2017-01-01 PROCEDURE — 80053 COMPREHEN METABOLIC PANEL: CPT | Performed by: PHYSICIAN ASSISTANT

## 2017-01-01 PROCEDURE — 81001 URINALYSIS AUTO W/SCOPE: CPT | Performed by: EMERGENCY MEDICINE

## 2017-01-01 PROCEDURE — 96417 CHEMO IV INFUS EACH ADDL SEQ: CPT

## 2017-01-01 PROCEDURE — 25000128 H RX IP 250 OP 636: Performed by: HOSPITALIST

## 2017-01-01 PROCEDURE — 36415 COLL VENOUS BLD VENIPUNCTURE: CPT | Performed by: SURGERY

## 2017-01-01 PROCEDURE — 74177 CT ABD & PELVIS W/CONTRAST: CPT

## 2017-01-01 PROCEDURE — 99292 CRITICAL CARE ADDL 30 MIN: CPT | Mod: 25 | Performed by: EMERGENCY MEDICINE

## 2017-01-01 PROCEDURE — 85610 PROTHROMBIN TIME: CPT | Performed by: RADIOLOGY

## 2017-01-01 PROCEDURE — 97110 THERAPEUTIC EXERCISES: CPT | Mod: GP

## 2017-01-01 PROCEDURE — 99214 OFFICE O/P EST MOD 30 MIN: CPT | Performed by: INTERNAL MEDICINE

## 2017-01-01 PROCEDURE — 80053 COMPREHEN METABOLIC PANEL: CPT | Performed by: EMERGENCY MEDICINE

## 2017-01-01 PROCEDURE — 99310 SBSQ NF CARE HIGH MDM 45: CPT | Performed by: NURSE PRACTITIONER

## 2017-01-01 PROCEDURE — 96413 CHEMO IV INFUSION 1 HR: CPT

## 2017-01-01 PROCEDURE — 81003 URINALYSIS AUTO W/O SCOPE: CPT | Performed by: INTERNAL MEDICINE

## 2017-01-01 PROCEDURE — 93010 ELECTROCARDIOGRAM REPORT: CPT | Performed by: INTERNAL MEDICINE

## 2017-01-01 PROCEDURE — 87641 MR-STAPH DNA AMP PROBE: CPT | Performed by: SURGERY

## 2017-01-01 PROCEDURE — 25000128 H RX IP 250 OP 636: Performed by: INTERNAL MEDICINE

## 2017-01-01 PROCEDURE — 25500064 ZZH RX 255 OP 636: Performed by: INTERNAL MEDICINE

## 2017-01-01 PROCEDURE — 40000133 ZZH STATISTIC OT WARD VISIT: Performed by: OCCUPATIONAL THERAPIST

## 2017-01-01 PROCEDURE — 83605 ASSAY OF LACTIC ACID: CPT | Performed by: HOSPITALIST

## 2017-01-01 PROCEDURE — 85025 COMPLETE CBC W/AUTO DIFF WBC: CPT | Performed by: EMERGENCY MEDICINE

## 2017-01-01 PROCEDURE — 99291 CRITICAL CARE FIRST HOUR: CPT | Mod: 25 | Performed by: INTERNAL MEDICINE

## 2017-01-01 PROCEDURE — 82248 BILIRUBIN DIRECT: CPT | Performed by: PHYSICIAN ASSISTANT

## 2017-01-01 PROCEDURE — C1788 PORT, INDWELLING, IMP: HCPCS | Performed by: SURGERY

## 2017-01-01 PROCEDURE — 96365 THER/PROPH/DIAG IV INF INIT: CPT | Mod: 59 | Performed by: EMERGENCY MEDICINE

## 2017-01-01 PROCEDURE — 76705 ECHO EXAM OF ABDOMEN: CPT

## 2017-01-01 PROCEDURE — 40000264 ECHO LIMITED WITH OPTISON

## 2017-01-01 PROCEDURE — 70450 CT HEAD/BRAIN W/O DYE: CPT

## 2017-01-01 PROCEDURE — 97535 SELF CARE MNGMENT TRAINING: CPT | Mod: GO

## 2017-01-01 PROCEDURE — 92610 EVALUATE SWALLOWING FUNCTION: CPT | Mod: GN | Performed by: SPEECH-LANGUAGE PATHOLOGIST

## 2017-01-01 PROCEDURE — 40000048 ZZH STATISTIC DAILY SWAN MONITORING

## 2017-01-01 PROCEDURE — 97530 THERAPEUTIC ACTIVITIES: CPT | Mod: GO

## 2017-01-01 PROCEDURE — 85730 THROMBOPLASTIN TIME PARTIAL: CPT | Performed by: RADIOLOGY

## 2017-01-01 PROCEDURE — 40000193 ZZH STATISTIC PT WARD VISIT

## 2017-01-01 PROCEDURE — 12000008 ZZH R&B INTERMEDIATE UMMC

## 2017-01-01 PROCEDURE — 97530 THERAPEUTIC ACTIVITIES: CPT | Mod: GP

## 2017-01-01 PROCEDURE — 36600 WITHDRAWAL OF ARTERIAL BLOOD: CPT

## 2017-01-01 PROCEDURE — 86140 C-REACTIVE PROTEIN: CPT | Performed by: PHYSICIAN ASSISTANT

## 2017-01-01 PROCEDURE — 25000128 H RX IP 250 OP 636: Performed by: PHYSICIAN ASSISTANT

## 2017-01-01 PROCEDURE — 93005 ELECTROCARDIOGRAM TRACING: CPT | Performed by: EMERGENCY MEDICINE

## 2017-01-01 PROCEDURE — 40000275 ZZH STATISTIC RCP TIME EA 10 MIN

## 2017-01-01 PROCEDURE — 99215 OFFICE O/P EST HI 40 MIN: CPT | Performed by: INTERNAL MEDICINE

## 2017-01-01 PROCEDURE — 99291 CRITICAL CARE FIRST HOUR: CPT | Mod: GC | Performed by: SURGERY

## 2017-01-01 PROCEDURE — 82728 ASSAY OF FERRITIN: CPT | Performed by: PHYSICIAN ASSISTANT

## 2017-01-01 PROCEDURE — 80053 COMPREHEN METABOLIC PANEL: CPT | Performed by: FAMILY MEDICINE

## 2017-01-01 PROCEDURE — 25000125 ZZHC RX 250: Performed by: EMERGENCY MEDICINE

## 2017-01-01 PROCEDURE — 71010 XR CHEST PORT 1 VW: CPT | Mod: TC

## 2017-01-01 PROCEDURE — 82330 ASSAY OF CALCIUM: CPT | Performed by: PHYSICIAN ASSISTANT

## 2017-01-01 PROCEDURE — 99233 SBSQ HOSP IP/OBS HIGH 50: CPT | Mod: GC | Performed by: INTERNAL MEDICINE

## 2017-01-01 PROCEDURE — 5A1945Z RESPIRATORY VENTILATION, 24-96 CONSECUTIVE HOURS: ICD-10-PCS | Performed by: INTERNAL MEDICINE

## 2017-01-01 PROCEDURE — 85004 AUTOMATED DIFF WBC COUNT: CPT | Performed by: SURGERY

## 2017-01-01 PROCEDURE — 82803 BLOOD GASES ANY COMBINATION: CPT | Performed by: INTERNAL MEDICINE

## 2017-01-01 PROCEDURE — 84484 ASSAY OF TROPONIN QUANT: CPT | Performed by: HOSPITALIST

## 2017-01-01 PROCEDURE — 85027 COMPLETE CBC AUTOMATED: CPT | Performed by: INTERNAL MEDICINE

## 2017-01-01 PROCEDURE — 25000125 ZZHC RX 250: Performed by: NURSE ANESTHETIST, CERTIFIED REGISTERED

## 2017-01-01 PROCEDURE — 25000125 ZZHC RX 250: Performed by: RADIOLOGY

## 2017-01-01 PROCEDURE — 40000014 ZZH STATISTIC ARTERIAL MONITORING DAILY

## 2017-01-01 PROCEDURE — 87040 BLOOD CULTURE FOR BACTERIA: CPT | Performed by: SURGERY

## 2017-01-01 PROCEDURE — 96375 TX/PRO/DX INJ NEW DRUG ADDON: CPT

## 2017-01-01 PROCEDURE — 99213 OFFICE O/P EST LOW 20 MIN: CPT | Performed by: INTERNAL MEDICINE

## 2017-01-01 PROCEDURE — 00000146 ZZHCL STATISTIC GLUCOSE BY METER IP

## 2017-01-01 PROCEDURE — 25000128 H RX IP 250 OP 636: Performed by: FAMILY MEDICINE

## 2017-01-01 PROCEDURE — 84145 PROCALCITONIN (PCT): CPT | Performed by: SURGERY

## 2017-01-01 PROCEDURE — 12000003 ZZH R&B CRITICAL UMMC

## 2017-01-01 PROCEDURE — 88307 TISSUE EXAM BY PATHOLOGIST: CPT | Performed by: INTERNAL MEDICINE

## 2017-01-01 PROCEDURE — 34300033 ZZH RX 343: Performed by: INTERNAL MEDICINE

## 2017-01-01 PROCEDURE — 80061 LIPID PANEL: CPT | Performed by: PHYSICIAN ASSISTANT

## 2017-01-01 PROCEDURE — 93010 ELECTROCARDIOGRAM REPORT: CPT | Mod: Z6 | Performed by: FAMILY MEDICINE

## 2017-01-01 PROCEDURE — 93308 TTE F-UP OR LMTD: CPT

## 2017-01-01 PROCEDURE — 80053 COMPREHEN METABOLIC PANEL: CPT | Performed by: INTERNAL MEDICINE

## 2017-01-01 PROCEDURE — 25000128 H RX IP 250 OP 636: Mod: JW | Performed by: INTERNAL MEDICINE

## 2017-01-01 PROCEDURE — 84484 ASSAY OF TROPONIN QUANT: CPT | Performed by: EMERGENCY MEDICINE

## 2017-01-01 PROCEDURE — 83605 ASSAY OF LACTIC ACID: CPT | Performed by: SURGERY

## 2017-01-01 PROCEDURE — 40000225 ZZH STATISTIC SLP WARD VISIT: Performed by: SPEECH-LANGUAGE PATHOLOGIST

## 2017-01-01 PROCEDURE — 80048 BASIC METABOLIC PNL TOTAL CA: CPT | Performed by: HOSPITALIST

## 2017-01-01 PROCEDURE — 36415 COLL VENOUS BLD VENIPUNCTURE: CPT | Performed by: PHYSICIAN ASSISTANT

## 2017-01-01 PROCEDURE — 86140 C-REACTIVE PROTEIN: CPT | Performed by: SURGERY

## 2017-01-01 PROCEDURE — 99238 HOSP IP/OBS DSCHRG MGMT 30/<: CPT | Performed by: INTERNAL MEDICINE

## 2017-01-01 PROCEDURE — 96368 THER/DIAG CONCURRENT INF: CPT

## 2017-01-01 PROCEDURE — 45380 COLONOSCOPY AND BIOPSY: CPT | Mod: XS

## 2017-01-01 PROCEDURE — 99207 ZZC NO CHARGE NURSE ONLY: CPT

## 2017-01-01 PROCEDURE — 99292 CRITICAL CARE ADDL 30 MIN: CPT | Mod: GC | Performed by: INTERNAL MEDICINE

## 2017-01-01 PROCEDURE — 85610 PROTHROMBIN TIME: CPT | Performed by: FAMILY MEDICINE

## 2017-01-01 PROCEDURE — 84100 ASSAY OF PHOSPHORUS: CPT | Performed by: SURGERY

## 2017-01-01 PROCEDURE — 93010 ELECTROCARDIOGRAM REPORT: CPT | Mod: Z6 | Performed by: EMERGENCY MEDICINE

## 2017-01-01 PROCEDURE — 25000125 ZZHC RX 250: Performed by: INTERNAL MEDICINE

## 2017-01-01 PROCEDURE — 96374 THER/PROPH/DIAG INJ IV PUSH: CPT

## 2017-01-01 PROCEDURE — 25500064 ZZH RX 255 OP 636: Performed by: SURGERY

## 2017-01-01 PROCEDURE — 71000027 ZZH RECOVERY PHASE 2 EACH 15 MINS: Performed by: SURGERY

## 2017-01-01 PROCEDURE — 99215 OFFICE O/P EST HI 40 MIN: CPT | Mod: 25 | Performed by: PHYSICIAN ASSISTANT

## 2017-01-01 PROCEDURE — 99285 EMERGENCY DEPT VISIT HI MDM: CPT | Mod: Z6 | Performed by: FAMILY MEDICINE

## 2017-01-01 PROCEDURE — 96415 CHEMO IV INFUSION ADDL HR: CPT

## 2017-01-01 PROCEDURE — 83735 ASSAY OF MAGNESIUM: CPT | Performed by: PHYSICIAN ASSISTANT

## 2017-01-01 PROCEDURE — 25000125 ZZHC RX 250

## 2017-01-01 PROCEDURE — 27210794 ZZH OR GENERAL SUPPLY STERILE: Performed by: SURGERY

## 2017-01-01 PROCEDURE — 81001 URINALYSIS AUTO W/SCOPE: CPT | Performed by: INTERNAL MEDICINE

## 2017-01-01 PROCEDURE — 99223 1ST HOSP IP/OBS HIGH 75: CPT | Mod: GC | Performed by: INTERNAL MEDICINE

## 2017-01-01 PROCEDURE — G8918 PT W/O PREOP ORDER IV AB PRO: HCPCS

## 2017-01-01 PROCEDURE — 97535 SELF CARE MNGMENT TRAINING: CPT | Mod: GO | Performed by: OCCUPATIONAL THERAPIST

## 2017-01-01 PROCEDURE — 85379 FIBRIN DEGRADATION QUANT: CPT | Performed by: EMERGENCY MEDICINE

## 2017-01-01 PROCEDURE — 94002 VENT MGMT INPAT INIT DAY: CPT

## 2017-01-01 PROCEDURE — 81050 URINALYSIS VOLUME MEASURE: CPT | Performed by: INTERNAL MEDICINE

## 2017-01-01 PROCEDURE — 93308 TTE F-UP OR LMTD: CPT | Mod: 26 | Performed by: INTERNAL MEDICINE

## 2017-01-01 PROCEDURE — 27110028 ZZH OR GENERAL SUPPLY NON-STERILE: Performed by: SURGERY

## 2017-01-01 PROCEDURE — 99244 OFF/OP CNSLTJ NEW/EST MOD 40: CPT | Performed by: SURGERY

## 2017-01-01 PROCEDURE — 40000277 XR SURGERY CARM FLUORO LESS THAN 5 MIN W STILLS

## 2017-01-01 PROCEDURE — 93321 DOPPLER ECHO F-UP/LMTD STD: CPT | Mod: 26 | Performed by: INTERNAL MEDICINE

## 2017-01-01 PROCEDURE — 93010 ELECTROCARDIOGRAM REPORT: CPT | Mod: 59 | Performed by: EMERGENCY MEDICINE

## 2017-01-01 PROCEDURE — 88342 IMHCHEM/IMCYTCHM 1ST ANTB: CPT | Mod: 59 | Performed by: INTERNAL MEDICINE

## 2017-01-01 PROCEDURE — 80076 HEPATIC FUNCTION PANEL: CPT | Performed by: INTERNAL MEDICINE

## 2017-01-01 PROCEDURE — 83735 ASSAY OF MAGNESIUM: CPT | Performed by: HOSPITALIST

## 2017-01-01 PROCEDURE — 00000159 ZZHCL STATISTIC H-SEND OUTS PREP: Performed by: INTERNAL MEDICINE

## 2017-01-01 PROCEDURE — 99232 SBSQ HOSP IP/OBS MODERATE 35: CPT | Mod: GC | Performed by: INTERNAL MEDICINE

## 2017-01-01 PROCEDURE — 20000004 ZZH R&B ICU UMMC

## 2017-01-01 PROCEDURE — 86753 PROTOZOA ANTIBODY NOS: CPT | Mod: 90 | Performed by: PHYSICIAN ASSISTANT

## 2017-01-01 PROCEDURE — 81001 URINALYSIS AUTO W/SCOPE: CPT | Performed by: FAMILY MEDICINE

## 2017-01-01 PROCEDURE — 84156 ASSAY OF PROTEIN URINE: CPT | Performed by: INTERNAL MEDICINE

## 2017-01-01 PROCEDURE — 88342 IMHCHEM/IMCYTCHM 1ST ANTB: CPT | Performed by: INTERNAL MEDICINE

## 2017-01-01 PROCEDURE — 25000125 ZZHC RX 250: Performed by: SURGERY

## 2017-01-01 PROCEDURE — 83605 ASSAY OF LACTIC ACID: CPT | Performed by: FAMILY MEDICINE

## 2017-01-01 PROCEDURE — 93005 ELECTROCARDIOGRAM TRACING: CPT | Performed by: FAMILY MEDICINE

## 2017-01-01 PROCEDURE — 93005 ELECTROCARDIOGRAM TRACING: CPT

## 2017-01-01 PROCEDURE — 82803 BLOOD GASES ANY COMBINATION: CPT | Performed by: SURGERY

## 2017-01-01 PROCEDURE — 90471 IMMUNIZATION ADMIN: CPT | Performed by: PHYSICIAN ASSISTANT

## 2017-01-01 PROCEDURE — 40000196 ZZH STATISTIC RAPCV CVP MONITORING

## 2017-01-01 PROCEDURE — 88341 IMHCHEM/IMCYTCHM EA ADD ANTB: CPT | Mod: 26 | Performed by: INTERNAL MEDICINE

## 2017-01-01 PROCEDURE — 37000008 ZZH ANESTHESIA TECHNICAL FEE, 1ST 30 MIN: Performed by: SURGERY

## 2017-01-01 PROCEDURE — 93503 INSERT/PLACE HEART CATHETER: CPT | Performed by: INTERNAL MEDICINE

## 2017-01-01 PROCEDURE — 80050 GENERAL HEALTH PANEL: CPT | Performed by: PHYSICIAN ASSISTANT

## 2017-01-01 PROCEDURE — 83690 ASSAY OF LIPASE: CPT | Performed by: PHYSICIAN ASSISTANT

## 2017-01-01 PROCEDURE — 99214 OFFICE O/P EST MOD 30 MIN: CPT | Performed by: PHYSICIAN ASSISTANT

## 2017-01-01 PROCEDURE — 36415 COLL VENOUS BLD VENIPUNCTURE: CPT | Performed by: RADIOLOGY

## 2017-01-01 PROCEDURE — 87640 STAPH A DNA AMP PROBE: CPT | Performed by: SURGERY

## 2017-01-01 PROCEDURE — 83605 ASSAY OF LACTIC ACID: CPT | Performed by: EMERGENCY MEDICINE

## 2017-01-01 PROCEDURE — 85730 THROMBOPLASTIN TIME PARTIAL: CPT | Performed by: EMERGENCY MEDICINE

## 2017-01-01 PROCEDURE — 85027 COMPLETE CBC AUTOMATED: CPT | Performed by: PHYSICIAN ASSISTANT

## 2017-01-01 PROCEDURE — 85049 AUTOMATED PLATELET COUNT: CPT | Performed by: RADIOLOGY

## 2017-01-01 PROCEDURE — 99215 OFFICE O/P EST HI 40 MIN: CPT | Performed by: NURSE PRACTITIONER

## 2017-01-01 PROCEDURE — 25000128 H RX IP 250 OP 636: Performed by: RADIOLOGY

## 2017-01-01 PROCEDURE — 71260 CT THORAX DX C+: CPT

## 2017-01-01 PROCEDURE — 85027 COMPLETE CBC AUTOMATED: CPT | Performed by: HOSPITALIST

## 2017-01-01 PROCEDURE — 45385 COLONOSCOPY W/LESION REMOVAL: CPT

## 2017-01-01 PROCEDURE — 83880 ASSAY OF NATRIURETIC PEPTIDE: CPT | Performed by: EMERGENCY MEDICINE

## 2017-01-01 PROCEDURE — 36620 INSERTION CATHETER ARTERY: CPT

## 2017-01-01 PROCEDURE — 97165 OT EVAL LOW COMPLEX 30 MIN: CPT | Mod: GO

## 2017-01-01 PROCEDURE — 86618 LYME DISEASE ANTIBODY: CPT | Performed by: PHYSICIAN ASSISTANT

## 2017-01-01 PROCEDURE — A9552 F18 FDG: HCPCS | Performed by: INTERNAL MEDICINE

## 2017-01-01 PROCEDURE — 87493 C DIFF AMPLIFIED PROBE: CPT | Performed by: INTERNAL MEDICINE

## 2017-01-01 PROCEDURE — 36592 COLLECT BLOOD FROM PICC: CPT | Performed by: SURGERY

## 2017-01-01 PROCEDURE — 36000054 ZZH SURGERY LEVEL 2 W FLUORO 1ST 30 MIN: Performed by: SURGERY

## 2017-01-01 PROCEDURE — 85025 COMPLETE CBC W/AUTO DIFF WBC: CPT | Performed by: PHYSICIAN ASSISTANT

## 2017-01-01 PROCEDURE — 78815 PET IMAGE W/CT SKULL-THIGH: CPT | Mod: PI

## 2017-01-01 PROCEDURE — 90472 IMMUNIZATION ADMIN EACH ADD: CPT | Performed by: PHYSICIAN ASSISTANT

## 2017-01-01 PROCEDURE — 83880 ASSAY OF NATRIURETIC PEPTIDE: CPT | Performed by: FAMILY MEDICINE

## 2017-01-01 PROCEDURE — 82378 CARCINOEMBRYONIC ANTIGEN: CPT | Performed by: INTERNAL MEDICINE

## 2017-01-01 PROCEDURE — 85730 THROMBOPLASTIN TIME PARTIAL: CPT | Performed by: FAMILY MEDICINE

## 2017-01-01 PROCEDURE — 25000128 H RX IP 250 OP 636: Performed by: STUDENT IN AN ORGANIZED HEALTH CARE EDUCATION/TRAINING PROGRAM

## 2017-01-01 PROCEDURE — 25000132 ZZH RX MED GY IP 250 OP 250 PS 637: Performed by: HOSPITALIST

## 2017-01-01 PROCEDURE — 82805 BLOOD GASES W/O2 SATURATION: CPT | Performed by: HOSPITALIST

## 2017-01-01 PROCEDURE — 99397 PER PM REEVAL EST PAT 65+ YR: CPT | Mod: 25 | Performed by: PHYSICIAN ASSISTANT

## 2017-01-01 PROCEDURE — 40000264 ECHO COMPLETE WITH OPTISON

## 2017-01-01 PROCEDURE — 96416 CHEMO PROLONG INFUSE W/PUMP: CPT

## 2017-01-01 PROCEDURE — 94010 BREATHING CAPACITY TEST: CPT | Performed by: PHYSICIAN ASSISTANT

## 2017-01-01 PROCEDURE — 86480 TB TEST CELL IMMUN MEASURE: CPT | Performed by: PHYSICIAN ASSISTANT

## 2017-01-01 PROCEDURE — 85610 PROTHROMBIN TIME: CPT | Performed by: SURGERY

## 2017-01-01 PROCEDURE — 82947 ASSAY GLUCOSE BLOOD QUANT: CPT | Performed by: PHYSICIAN ASSISTANT

## 2017-01-01 PROCEDURE — 99233 SBSQ HOSP IP/OBS HIGH 50: CPT | Performed by: INTERNAL MEDICINE

## 2017-01-01 PROCEDURE — 71000027 ZZH RECOVERY PHASE 2 EACH 15 MINS

## 2017-01-01 PROCEDURE — 86140 C-REACTIVE PROTEIN: CPT | Performed by: EMERGENCY MEDICINE

## 2017-01-01 PROCEDURE — 97530 THERAPEUTIC ACTIVITIES: CPT | Mod: GP | Performed by: REHABILITATION PRACTITIONER

## 2017-01-01 PROCEDURE — 99205 OFFICE O/P NEW HI 60 MIN: CPT | Performed by: INTERNAL MEDICINE

## 2017-01-01 PROCEDURE — 96523 IRRIG DRUG DELIVERY DEVICE: CPT

## 2017-01-01 PROCEDURE — 97110 THERAPEUTIC EXERCISES: CPT | Mod: GP | Performed by: REHABILITATION PRACTITIONER

## 2017-01-01 PROCEDURE — 3E043XZ INTRODUCTION OF VASOPRESSOR INTO CENTRAL VEIN, PERCUTANEOUS APPROACH: ICD-10-PCS | Performed by: INTERNAL MEDICINE

## 2017-01-01 PROCEDURE — 90670 PCV13 VACCINE IM: CPT | Performed by: PHYSICIAN ASSISTANT

## 2017-01-01 PROCEDURE — 84443 ASSAY THYROID STIM HORMONE: CPT | Performed by: PHYSICIAN ASSISTANT

## 2017-01-01 PROCEDURE — 99211 OFF/OP EST MAY X REQ PHY/QHP: CPT

## 2017-01-01 PROCEDURE — 40000306 ZZH STATISTIC PRE PROC ASSESS II: Performed by: SURGERY

## 2017-01-01 PROCEDURE — 96361 HYDRATE IV INFUSION ADD-ON: CPT | Performed by: FAMILY MEDICINE

## 2017-01-01 PROCEDURE — 77001 FLUOROGUIDE FOR VEIN DEVICE: CPT | Mod: 26 | Performed by: SURGERY

## 2017-01-01 PROCEDURE — 81001 URINALYSIS AUTO W/SCOPE: CPT | Performed by: PHYSICIAN ASSISTANT

## 2017-01-01 PROCEDURE — 82803 BLOOD GASES ANY COMBINATION: CPT | Performed by: PHYSICIAN ASSISTANT

## 2017-01-01 PROCEDURE — 40000133 ZZH STATISTIC OT WARD VISIT

## 2017-01-01 PROCEDURE — 87040 BLOOD CULTURE FOR BACTERIA: CPT | Performed by: PHYSICIAN ASSISTANT

## 2017-01-01 PROCEDURE — 99000 SPECIMEN HANDLING OFFICE-LAB: CPT | Performed by: PHYSICIAN ASSISTANT

## 2017-01-01 PROCEDURE — 36000052 ZZH SURGERY LEVEL 2 EA 15 ADDTL MIN: Performed by: SURGERY

## 2017-01-01 PROCEDURE — 81445 SO NEO GSAP 5-50DNA/DNA&RNA: CPT | Performed by: INTERNAL MEDICINE

## 2017-01-01 PROCEDURE — 88342 IMHCHEM/IMCYTCHM 1ST ANTB: CPT | Mod: 26 | Performed by: INTERNAL MEDICINE

## 2017-01-01 PROCEDURE — 87040 BLOOD CULTURE FOR BACTERIA: CPT | Mod: 91 | Performed by: INTERNAL MEDICINE

## 2017-01-01 PROCEDURE — 90715 TDAP VACCINE 7 YRS/> IM: CPT | Performed by: PHYSICIAN ASSISTANT

## 2017-01-01 PROCEDURE — 80202 ASSAY OF VANCOMYCIN: CPT | Performed by: HOSPITALIST

## 2017-01-01 PROCEDURE — 96374 THER/PROPH/DIAG INJ IV PUSH: CPT | Mod: 59 | Performed by: FAMILY MEDICINE

## 2017-01-01 PROCEDURE — 85025 COMPLETE CBC W/AUTO DIFF WBC: CPT | Performed by: FAMILY MEDICINE

## 2017-01-01 PROCEDURE — 80076 HEPATIC FUNCTION PANEL: CPT | Performed by: PHYSICIAN ASSISTANT

## 2017-01-01 PROCEDURE — 84484 ASSAY OF TROPONIN QUANT: CPT | Performed by: FAMILY MEDICINE

## 2017-01-01 PROCEDURE — 80048 BASIC METABOLIC PNL TOTAL CA: CPT | Performed by: SURGERY

## 2017-01-01 PROCEDURE — 25000132 ZZH RX MED GY IP 250 OP 250 PS 637: Performed by: EMERGENCY MEDICINE

## 2017-01-01 PROCEDURE — 83880 ASSAY OF NATRIURETIC PEPTIDE: CPT | Performed by: PHYSICIAN ASSISTANT

## 2017-01-01 PROCEDURE — 97530 THERAPEUTIC ACTIVITIES: CPT | Mod: GO | Performed by: OCCUPATIONAL THERAPIST

## 2017-01-01 PROCEDURE — 76942 ECHO GUIDE FOR BIOPSY: CPT

## 2017-01-01 PROCEDURE — 83690 ASSAY OF LIPASE: CPT | Performed by: FAMILY MEDICINE

## 2017-01-01 PROCEDURE — 96376 TX/PRO/DX INJ SAME DRUG ADON: CPT | Performed by: FAMILY MEDICINE

## 2017-01-01 PROCEDURE — 85610 PROTHROMBIN TIME: CPT | Performed by: EMERGENCY MEDICINE

## 2017-01-01 PROCEDURE — 97162 PT EVAL MOD COMPLEX 30 MIN: CPT | Mod: GP | Performed by: REHABILITATION PRACTITIONER

## 2017-01-01 PROCEDURE — 27210140 ZZH KIT CATH SWAN VIP SUPPLY

## 2017-01-01 PROCEDURE — 96366 THER/PROPH/DIAG IV INF ADDON: CPT | Performed by: EMERGENCY MEDICINE

## 2017-01-01 PROCEDURE — 84484 ASSAY OF TROPONIN QUANT: CPT | Performed by: SURGERY

## 2017-01-01 PROCEDURE — 99285 EMERGENCY DEPT VISIT HI MDM: CPT | Mod: Z6 | Performed by: EMERGENCY MEDICINE

## 2017-01-01 PROCEDURE — 36415 COLL VENOUS BLD VENIPUNCTURE: CPT | Performed by: EMERGENCY MEDICINE

## 2017-01-01 PROCEDURE — 83540 ASSAY OF IRON: CPT | Performed by: PHYSICIAN ASSISTANT

## 2017-01-01 PROCEDURE — 37000008 ZZH ANESTHESIA TECHNICAL FEE, 1ST 30 MIN

## 2017-01-01 PROCEDURE — 40000556 ZZH STATISTIC PERIPHERAL IV START W US GUIDANCE

## 2017-01-01 PROCEDURE — 85730 THROMBOPLASTIN TIME PARTIAL: CPT | Performed by: INTERNAL MEDICINE

## 2017-01-01 PROCEDURE — 36561 INSERT TUNNELED CV CATH: CPT | Performed by: SURGERY

## 2017-01-01 PROCEDURE — 99285 EMERGENCY DEPT VISIT HI MDM: CPT | Mod: 25 | Performed by: EMERGENCY MEDICINE

## 2017-01-01 PROCEDURE — 87633 RESP VIRUS 12-25 TARGETS: CPT | Performed by: INTERNAL MEDICINE

## 2017-01-01 PROCEDURE — 76700 US EXAM ABDOM COMPLETE: CPT

## 2017-01-01 PROCEDURE — 25000125 ZZHC RX 250: Performed by: FAMILY MEDICINE

## 2017-01-01 PROCEDURE — 70490 CT SOFT TISSUE NECK W/O DYE: CPT

## 2017-01-01 PROCEDURE — 96375 TX/PRO/DX INJ NEW DRUG ADDON: CPT | Performed by: EMERGENCY MEDICINE

## 2017-01-01 PROCEDURE — 82140 ASSAY OF AMMONIA: CPT | Performed by: PHYSICIAN ASSISTANT

## 2017-01-01 PROCEDURE — 99214 OFFICE O/P EST MOD 30 MIN: CPT | Performed by: NURSE PRACTITIONER

## 2017-01-01 PROCEDURE — 88307 TISSUE EXAM BY PATHOLOGIST: CPT | Mod: 26 | Performed by: INTERNAL MEDICINE

## 2017-01-01 PROCEDURE — 99204 OFFICE O/P NEW MOD 45 MIN: CPT | Performed by: INTERNAL MEDICINE

## 2017-01-01 PROCEDURE — 86803 HEPATITIS C AB TEST: CPT | Performed by: PHYSICIAN ASSISTANT

## 2017-01-01 DEVICE — CATH PORT POWERPORT 8FR SL W/SUTURE PLUGS 1708000: Type: IMPLANTABLE DEVICE | Site: CHEST  WALL | Status: FUNCTIONAL

## 2017-01-01 RX ORDER — POTASSIUM CHLORIDE 1.5 G/1.58G
20 POWDER, FOR SOLUTION ORAL ONCE
Status: COMPLETED | OUTPATIENT
Start: 2017-01-01 | End: 2017-01-01

## 2017-01-01 RX ORDER — ALBUTEROL SULFATE 90 UG/1
1-2 AEROSOL, METERED RESPIRATORY (INHALATION)
Status: CANCELLED
Start: 2017-01-01

## 2017-01-01 RX ORDER — NITROGLYCERIN 0.4 MG/1
TABLET SUBLINGUAL
Status: DISCONTINUED
Start: 2017-01-01 | End: 2017-01-01 | Stop reason: HOSPADM

## 2017-01-01 RX ORDER — NALOXONE HYDROCHLORIDE 0.4 MG/ML
.1-.4 INJECTION, SOLUTION INTRAMUSCULAR; INTRAVENOUS; SUBCUTANEOUS
Status: DISCONTINUED | OUTPATIENT
Start: 2017-01-01 | End: 2017-01-01 | Stop reason: HOSPADM

## 2017-01-01 RX ORDER — HYDRALAZINE HYDROCHLORIDE 20 MG/ML
10 INJECTION INTRAMUSCULAR; INTRAVENOUS EVERY 6 HOURS PRN
Status: DISCONTINUED | OUTPATIENT
Start: 2017-01-01 | End: 2017-01-01 | Stop reason: HOSPADM

## 2017-01-01 RX ORDER — AZITHROMYCIN 250 MG/1
TABLET, FILM COATED ORAL
Qty: 6 TABLET | Refills: 0 | Status: SHIPPED | OUTPATIENT
Start: 2017-01-01 | End: 2017-01-01

## 2017-01-01 RX ORDER — LOSARTAN POTASSIUM 50 MG/1
50 TABLET ORAL DAILY
Qty: 30 TABLET | DISCHARGE
Start: 2017-01-01 | End: 2017-01-01

## 2017-01-01 RX ORDER — HEPARIN SODIUM (PORCINE) LOCK FLUSH IV SOLN 100 UNIT/ML 100 UNIT/ML
500 SOLUTION INTRAVENOUS EVERY 8 HOURS
Status: DISCONTINUED | OUTPATIENT
Start: 2017-01-01 | End: 2017-01-01 | Stop reason: HOSPADM

## 2017-01-01 RX ORDER — OMEGA-3 FATTY ACIDS/FISH OIL 300-1000MG
400 CAPSULE ORAL ONCE
Qty: 2 CAPSULE | Refills: 0
Start: 2017-01-01 | End: 2017-01-01

## 2017-01-01 RX ORDER — DEXAMETHASONE SODIUM PHOSPHATE 10 MG/ML
12 INJECTION INTRAMUSCULAR; INTRAVENOUS ONCE
Status: COMPLETED | OUTPATIENT
Start: 2017-01-01 | End: 2017-01-01

## 2017-01-01 RX ORDER — PROPOFOL 10 MG/ML
10-20 INJECTION, EMULSION INTRAVENOUS EVERY 30 MIN PRN
Status: DISCONTINUED | OUTPATIENT
Start: 2017-01-01 | End: 2017-01-01

## 2017-01-01 RX ORDER — EPINEPHRINE 1 MG/ML
0.3 INJECTION INTRAMUSCULAR; INTRAVENOUS; SUBCUTANEOUS EVERY 5 MIN PRN
Status: CANCELLED | OUTPATIENT
Start: 2017-01-01

## 2017-01-01 RX ORDER — LORAZEPAM 2 MG/ML
0.5 INJECTION INTRAMUSCULAR EVERY 4 HOURS PRN
Status: CANCELLED
Start: 2017-01-01

## 2017-01-01 RX ORDER — METHYLPREDNISOLONE SODIUM SUCCINATE 125 MG/2ML
125 INJECTION, POWDER, LYOPHILIZED, FOR SOLUTION INTRAMUSCULAR; INTRAVENOUS
Status: CANCELLED
Start: 2017-01-01

## 2017-01-01 RX ORDER — MEPERIDINE HYDROCHLORIDE 25 MG/ML
25 INJECTION INTRAMUSCULAR; INTRAVENOUS; SUBCUTANEOUS EVERY 30 MIN PRN
Status: CANCELLED | OUTPATIENT
Start: 2017-01-01

## 2017-01-01 RX ORDER — OMEGA-3 FATTY ACIDS/FISH OIL 300-1000MG
600 CAPSULE ORAL ONCE
Qty: 3 CAPSULE | Refills: 0 | Status: SHIPPED | OUTPATIENT
Start: 2017-01-01 | End: 2017-01-01

## 2017-01-01 RX ORDER — DEXAMETHASONE SODIUM PHOSPHATE 10 MG/ML
12 INJECTION INTRAMUSCULAR; INTRAVENOUS ONCE
Status: CANCELLED
Start: 2017-01-01 | End: 2017-01-01

## 2017-01-01 RX ORDER — FUROSEMIDE 10 MG/ML
40 INJECTION INTRAMUSCULAR; INTRAVENOUS ONCE
Status: COMPLETED | OUTPATIENT
Start: 2017-01-01 | End: 2017-01-01

## 2017-01-01 RX ORDER — POTASSIUM CL/LIDO/0.9 % NACL 10MEQ/0.1L
10 INTRAVENOUS SOLUTION, PIGGYBACK (ML) INTRAVENOUS
Status: DISCONTINUED | OUTPATIENT
Start: 2017-01-01 | End: 2017-01-01 | Stop reason: HOSPADM

## 2017-01-01 RX ORDER — LOSARTAN POTASSIUM 25 MG/1
25 TABLET ORAL DAILY
Qty: 30 TABLET | Refills: 11 | Status: SHIPPED | OUTPATIENT
Start: 2017-01-01

## 2017-01-01 RX ORDER — IOPAMIDOL 755 MG/ML
100 INJECTION, SOLUTION INTRAVASCULAR ONCE
Status: COMPLETED | OUTPATIENT
Start: 2017-01-01 | End: 2017-01-01

## 2017-01-01 RX ORDER — EPINEPHRINE 1 MG/ML
0.3 INJECTION, SOLUTION, CONCENTRATE INTRAVENOUS EVERY 5 MIN PRN
Status: CANCELLED | OUTPATIENT
Start: 2017-01-01

## 2017-01-01 RX ORDER — ONDANSETRON 2 MG/ML
4 INJECTION INTRAMUSCULAR; INTRAVENOUS EVERY 30 MIN PRN
Status: DISCONTINUED | OUTPATIENT
Start: 2017-01-01 | End: 2017-01-01 | Stop reason: HOSPADM

## 2017-01-01 RX ORDER — METOPROLOL SUCCINATE 25 MG/1
25 TABLET, EXTENDED RELEASE ORAL DAILY
Qty: 30 TABLET | Refills: 0 | Status: SHIPPED | OUTPATIENT
Start: 2017-01-01 | End: 2017-01-01

## 2017-01-01 RX ORDER — CODEINE PHOSPHATE AND GUAIFENESIN 10; 100 MG/5ML; MG/5ML
1 SOLUTION ORAL EVERY 4 HOURS PRN
Qty: 120 ML | Refills: 0 | Status: SHIPPED | OUTPATIENT
Start: 2017-01-01

## 2017-01-01 RX ORDER — CARVEDILOL 12.5 MG/1
18.75 TABLET ORAL 2 TIMES DAILY WITH MEALS
Qty: 90 TABLET | Refills: 0 | OUTPATIENT
Start: 2017-01-01

## 2017-01-01 RX ORDER — PALONOSETRON 0.05 MG/ML
0.25 INJECTION, SOLUTION INTRAVENOUS ONCE
Status: COMPLETED | OUTPATIENT
Start: 2017-01-01 | End: 2017-01-01

## 2017-01-01 RX ORDER — CARVEDILOL 6.25 MG/1
6.25 TABLET ORAL 2 TIMES DAILY WITH MEALS
Status: DISCONTINUED | OUTPATIENT
Start: 2017-01-01 | End: 2017-01-01

## 2017-01-01 RX ORDER — IOPAMIDOL 755 MG/ML
500 INJECTION, SOLUTION INTRAVASCULAR ONCE
Status: COMPLETED | OUTPATIENT
Start: 2017-01-01 | End: 2017-01-01

## 2017-01-01 RX ORDER — FENTANYL CITRATE 50 UG/ML
50-100 INJECTION, SOLUTION INTRAMUSCULAR; INTRAVENOUS
Status: DISCONTINUED | OUTPATIENT
Start: 2017-01-01 | End: 2017-01-01

## 2017-01-01 RX ORDER — POTASSIUM CHLORIDE 14.9 MG/ML
20 INJECTION INTRAVENOUS
Status: COMPLETED | OUTPATIENT
Start: 2017-01-01 | End: 2017-01-01

## 2017-01-01 RX ORDER — HEPARIN SODIUM (PORCINE) LOCK FLUSH IV SOLN 100 UNIT/ML 100 UNIT/ML
100 SOLUTION INTRAVENOUS EVERY 8 HOURS
Status: DISCONTINUED | OUTPATIENT
Start: 2017-01-01 | End: 2017-01-01 | Stop reason: HOSPADM

## 2017-01-01 RX ORDER — ONDANSETRON 4 MG/1
4 TABLET, ORALLY DISINTEGRATING ORAL EVERY 30 MIN PRN
Status: DISCONTINUED | OUTPATIENT
Start: 2017-01-01 | End: 2017-01-01 | Stop reason: HOSPADM

## 2017-01-01 RX ORDER — SENNOSIDES 8.6 MG
8.6 TABLET ORAL 2 TIMES DAILY PRN
Status: DISCONTINUED | OUTPATIENT
Start: 2017-01-01 | End: 2017-01-01 | Stop reason: HOSPADM

## 2017-01-01 RX ORDER — SODIUM CHLORIDE 9 MG/ML
INJECTION, SOLUTION INTRAVENOUS CONTINUOUS
Status: DISCONTINUED | OUTPATIENT
Start: 2017-01-01 | End: 2017-01-01 | Stop reason: HOSPADM

## 2017-01-01 RX ORDER — FUROSEMIDE 10 MG/ML
20 INJECTION INTRAMUSCULAR; INTRAVENOUS ONCE
Status: COMPLETED | OUTPATIENT
Start: 2017-01-01 | End: 2017-01-01

## 2017-01-01 RX ORDER — CODEINE PHOSPHATE AND GUAIFENESIN 10; 100 MG/5ML; MG/5ML
5 SOLUTION ORAL EVERY 4 HOURS PRN
Status: DISCONTINUED | OUTPATIENT
Start: 2017-01-01 | End: 2017-01-01 | Stop reason: HOSPADM

## 2017-01-01 RX ORDER — SODIUM CHLORIDE 9 MG/ML
1000 INJECTION, SOLUTION INTRAVENOUS CONTINUOUS PRN
Status: CANCELLED
Start: 2017-01-01

## 2017-01-01 RX ORDER — CEFAZOLIN SODIUM 2 G/100ML
2 INJECTION, SOLUTION INTRAVENOUS
Status: CANCELLED | OUTPATIENT
Start: 2017-01-01

## 2017-01-01 RX ORDER — CARVEDILOL 6.25 MG/1
6.25 TABLET ORAL 2 TIMES DAILY WITH MEALS
Qty: 60 TABLET | Refills: 3 | DISCHARGE
Start: 2017-01-01 | End: 2017-01-01

## 2017-01-01 RX ORDER — POTASSIUM CHLORIDE 1.5 G/1.58G
40 POWDER, FOR SOLUTION ORAL ONCE
Status: DISCONTINUED | OUTPATIENT
Start: 2017-01-01 | End: 2017-01-01

## 2017-01-01 RX ORDER — POTASSIUM CHLORIDE 7.45 MG/ML
10 INJECTION INTRAVENOUS
Status: DISCONTINUED | OUTPATIENT
Start: 2017-01-01 | End: 2017-01-01 | Stop reason: HOSPADM

## 2017-01-01 RX ORDER — SODIUM CHLORIDE, SODIUM LACTATE, POTASSIUM CHLORIDE, CALCIUM CHLORIDE 600; 310; 30; 20 MG/100ML; MG/100ML; MG/100ML; MG/100ML
INJECTION, SOLUTION INTRAVENOUS CONTINUOUS
Status: DISCONTINUED | OUTPATIENT
Start: 2017-01-01 | End: 2017-01-01 | Stop reason: HOSPADM

## 2017-01-01 RX ORDER — BENZONATATE 100 MG/1
200 CAPSULE ORAL 3 TIMES DAILY PRN
Qty: 42 CAPSULE | Refills: 1 | Status: SHIPPED | OUTPATIENT
Start: 2017-01-01 | End: 2017-01-01

## 2017-01-01 RX ORDER — LIDOCAINE HYDROCHLORIDE 10 MG/ML
5 INJECTION, SOLUTION EPIDURAL; INFILTRATION; INTRACAUDAL; PERINEURAL ONCE
Status: DISCONTINUED | OUTPATIENT
Start: 2017-01-01 | End: 2017-01-01 | Stop reason: HOSPADM

## 2017-01-01 RX ORDER — CLONIDINE HYDROCHLORIDE 0.1 MG/1
0.1 TABLET ORAL ONCE
Status: DISCONTINUED | OUTPATIENT
Start: 2017-01-01 | End: 2017-01-01

## 2017-01-01 RX ORDER — POTASSIUM CHLORIDE 14.9 MG/ML
20 INJECTION INTRAVENOUS
Status: DISCONTINUED | OUTPATIENT
Start: 2017-01-01 | End: 2017-01-01 | Stop reason: HOSPADM

## 2017-01-01 RX ORDER — METOPROLOL SUCCINATE 25 MG/1
25 TABLET, EXTENDED RELEASE ORAL DAILY
Qty: 30 TABLET | Refills: 0 | Status: ON HOLD | OUTPATIENT
Start: 2017-01-01 | End: 2017-01-01

## 2017-01-01 RX ORDER — ALBUTEROL SULFATE 90 UG/1
2 AEROSOL, METERED RESPIRATORY (INHALATION) EVERY 4 HOURS PRN
Qty: 1 INHALER | Refills: 0 | Status: SHIPPED | OUTPATIENT
Start: 2017-01-01 | End: 2017-01-01

## 2017-01-01 RX ORDER — POTASSIUM CHLORIDE 1500 MG/1
20 TABLET, EXTENDED RELEASE ORAL ONCE
Status: COMPLETED | OUTPATIENT
Start: 2017-01-01 | End: 2017-01-01

## 2017-01-01 RX ORDER — DEXAMETHASONE 4 MG/1
8 TABLET ORAL
Qty: 6 TABLET | Refills: 11 | Status: SHIPPED | OUTPATIENT
Start: 2017-01-01

## 2017-01-01 RX ORDER — HEPARIN SODIUM (PORCINE) LOCK FLUSH IV SOLN 100 UNIT/ML 100 UNIT/ML
500 SOLUTION INTRAVENOUS EVERY 8 HOURS
Status: CANCELLED
Start: 2017-01-01

## 2017-01-01 RX ORDER — LORAZEPAM 2 MG/ML
0.5 INJECTION INTRAMUSCULAR ONCE
Status: COMPLETED | OUTPATIENT
Start: 2017-01-01 | End: 2017-01-01

## 2017-01-01 RX ORDER — MAGNESIUM SULFATE HEPTAHYDRATE 40 MG/ML
4 INJECTION, SOLUTION INTRAVENOUS EVERY 4 HOURS PRN
Status: DISCONTINUED | OUTPATIENT
Start: 2017-01-01 | End: 2017-01-01 | Stop reason: HOSPADM

## 2017-01-01 RX ORDER — NITROGLYCERIN 0.4 MG/1
0.8 TABLET SUBLINGUAL ONCE
Status: COMPLETED | OUTPATIENT
Start: 2017-01-01 | End: 2017-01-01

## 2017-01-01 RX ORDER — POTASSIUM CHLORIDE 29.8 MG/ML
20 INJECTION INTRAVENOUS ONCE
Status: DISCONTINUED | OUTPATIENT
Start: 2017-01-01 | End: 2017-01-01

## 2017-01-01 RX ORDER — DEXAMETHASONE 4 MG/1
8 TABLET ORAL
Qty: 6 TABLET | Refills: 11 | Status: SHIPPED | OUTPATIENT
Start: 2017-01-01 | End: 2017-01-01

## 2017-01-01 RX ORDER — CODEINE PHOSPHATE AND GUAIFENESIN 10; 100 MG/5ML; MG/5ML
1 SOLUTION ORAL EVERY 4 HOURS PRN
Qty: 120 ML | Refills: 0 | Status: SHIPPED | OUTPATIENT
Start: 2017-01-01 | End: 2017-01-01

## 2017-01-01 RX ORDER — POTASSIUM CHLORIDE 750 MG/1
20-40 TABLET, EXTENDED RELEASE ORAL
Status: DISCONTINUED | OUTPATIENT
Start: 2017-01-01 | End: 2017-01-01 | Stop reason: HOSPADM

## 2017-01-01 RX ORDER — DOPAMINE HYDROCHLORIDE 160 MG/100ML
INJECTION, SOLUTION INTRAVENOUS
Status: DISCONTINUED
Start: 2017-01-01 | End: 2017-01-01 | Stop reason: HOSPADM

## 2017-01-01 RX ORDER — LORAZEPAM 2 MG/ML
INJECTION INTRAMUSCULAR
Status: DISCONTINUED
Start: 2017-01-01 | End: 2017-01-01 | Stop reason: HOSPADM

## 2017-01-01 RX ORDER — PROPOFOL 10 MG/ML
INJECTION, EMULSION INTRAVENOUS
Status: COMPLETED
Start: 2017-01-01 | End: 2017-01-01

## 2017-01-01 RX ORDER — ONDANSETRON 2 MG/ML
INJECTION INTRAMUSCULAR; INTRAVENOUS PRN
Status: DISCONTINUED | OUTPATIENT
Start: 2017-01-01 | End: 2017-01-01

## 2017-01-01 RX ORDER — HEPARIN SODIUM (PORCINE) LOCK FLUSH IV SOLN 100 UNIT/ML 100 UNIT/ML
5 SOLUTION INTRAVENOUS
Status: DISCONTINUED | OUTPATIENT
Start: 2017-01-01 | End: 2017-01-01 | Stop reason: HOSPADM

## 2017-01-01 RX ORDER — ALBUTEROL SULFATE 0.83 MG/ML
2.5 SOLUTION RESPIRATORY (INHALATION)
Status: CANCELLED | OUTPATIENT
Start: 2017-01-01

## 2017-01-01 RX ORDER — BENZONATATE 200 MG/1
CAPSULE ORAL
Status: ON HOLD | COMMUNITY
Start: 2017-01-01 | End: 2017-01-01

## 2017-01-01 RX ORDER — FUROSEMIDE 10 MG/ML
20 INJECTION INTRAMUSCULAR; INTRAVENOUS ONCE
Status: CANCELLED
Start: 2017-01-01 | End: 2017-01-01

## 2017-01-01 RX ORDER — SENNOSIDES 8.6 MG
1-2 TABLET ORAL 2 TIMES DAILY PRN
Qty: 120 EACH | DISCHARGE
Start: 2017-01-01

## 2017-01-01 RX ORDER — HYDROMORPHONE HYDROCHLORIDE 1 MG/ML
0.5 INJECTION, SOLUTION INTRAMUSCULAR; INTRAVENOUS; SUBCUTANEOUS
Status: DISCONTINUED | OUTPATIENT
Start: 2017-01-01 | End: 2017-01-01 | Stop reason: HOSPADM

## 2017-01-01 RX ORDER — POTASSIUM CHLORIDE 1.5 G/1.58G
20-40 POWDER, FOR SOLUTION ORAL
Status: DISCONTINUED | OUTPATIENT
Start: 2017-01-01 | End: 2017-01-01 | Stop reason: HOSPADM

## 2017-01-01 RX ORDER — LIDOCAINE 50 MG/G
1-3 PATCH TOPICAL EVERY 24 HOURS
Qty: 30 PATCH | Refills: 1 | Status: SHIPPED | OUTPATIENT
Start: 2017-01-01

## 2017-01-01 RX ORDER — SODIUM CHLORIDE 9 MG/ML
INJECTION, SOLUTION INTRAVENOUS
Status: COMPLETED
Start: 2017-01-01 | End: 2017-01-01

## 2017-01-01 RX ORDER — ACETAMINOPHEN 325 MG/1
650 TABLET ORAL ONCE
Status: COMPLETED | OUTPATIENT
Start: 2017-01-01 | End: 2017-01-01

## 2017-01-01 RX ORDER — LOSARTAN POTASSIUM 25 MG/1
50 TABLET ORAL DAILY
Status: DISCONTINUED | OUTPATIENT
Start: 2017-01-01 | End: 2017-01-01 | Stop reason: HOSPADM

## 2017-01-01 RX ORDER — DOPAMINE HYDROCHLORIDE 160 MG/100ML
0-20 INJECTION, SOLUTION INTRAVENOUS CONTINUOUS
Status: DISCONTINUED | OUTPATIENT
Start: 2017-01-01 | End: 2017-01-01

## 2017-01-01 RX ORDER — DIPHENHYDRAMINE HYDROCHLORIDE 50 MG/ML
50 INJECTION INTRAMUSCULAR; INTRAVENOUS
Status: CANCELLED
Start: 2017-01-01

## 2017-01-01 RX ORDER — OXYCODONE HYDROCHLORIDE 5 MG/1
5 TABLET ORAL EVERY 8 HOURS PRN
Qty: 20 TABLET | Refills: 0 | Status: SHIPPED | OUTPATIENT
Start: 2017-01-01 | End: 2017-01-01

## 2017-01-01 RX ORDER — PROCHLORPERAZINE MALEATE 10 MG
10 TABLET ORAL EVERY 6 HOURS PRN
Qty: 30 TABLET | Refills: 2 | Status: SHIPPED | OUTPATIENT
Start: 2017-01-01 | End: 2017-01-01

## 2017-01-01 RX ORDER — LABETALOL HYDROCHLORIDE 5 MG/ML
10 INJECTION, SOLUTION INTRAVENOUS EVERY 6 HOURS PRN
Status: DISCONTINUED | OUTPATIENT
Start: 2017-01-01 | End: 2017-01-01

## 2017-01-01 RX ORDER — LIDOCAINE/PRILOCAINE 2.5 %-2.5%
CREAM (GRAM) TOPICAL
Qty: 30 G | Refills: 3 | Status: SHIPPED | OUTPATIENT
Start: 2017-01-01 | End: 2017-01-01

## 2017-01-01 RX ORDER — ALBUTEROL SULFATE 90 UG/1
AEROSOL, METERED RESPIRATORY (INHALATION)
Status: ON HOLD | COMMUNITY
Start: 2017-01-01 | End: 2017-01-01

## 2017-01-01 RX ORDER — POTASSIUM CHLORIDE 29.8 MG/ML
20 INJECTION INTRAVENOUS
Status: COMPLETED | OUTPATIENT
Start: 2017-01-01 | End: 2017-01-01

## 2017-01-01 RX ORDER — LOPERAMIDE HCL 2 MG
CAPSULE ORAL
Qty: 30 CAPSULE | Refills: 0 | Status: SHIPPED | OUTPATIENT
Start: 2017-01-01 | End: 2017-01-01

## 2017-01-01 RX ORDER — FENTANYL CITRATE 50 UG/ML
25-50 INJECTION, SOLUTION INTRAMUSCULAR; INTRAVENOUS
Status: DISCONTINUED | OUTPATIENT
Start: 2017-01-01 | End: 2017-01-01 | Stop reason: HOSPADM

## 2017-01-01 RX ORDER — POTASSIUM CHLORIDE 29.8 MG/ML
20 INJECTION INTRAVENOUS
Status: DISCONTINUED | OUTPATIENT
Start: 2017-01-01 | End: 2017-01-01 | Stop reason: RX

## 2017-01-01 RX ORDER — HEPARIN SODIUM,PORCINE 10 UNIT/ML
5-10 VIAL (ML) INTRAVENOUS EVERY 24 HOURS
Status: DISCONTINUED | OUTPATIENT
Start: 2017-01-01 | End: 2017-01-01 | Stop reason: HOSPADM

## 2017-01-01 RX ORDER — FERROUS SULFATE 325(65) MG
325 TABLET ORAL 2 TIMES DAILY
COMMUNITY
End: 2018-01-01

## 2017-01-01 RX ORDER — METOPROLOL TARTRATE 1 MG/ML
7.5 INJECTION, SOLUTION INTRAVENOUS EVERY 6 HOURS
Status: DISCONTINUED | OUTPATIENT
Start: 2017-01-01 | End: 2017-01-01

## 2017-01-01 RX ORDER — FLUOROURACIL 50 MG/ML
400 INJECTION, SOLUTION INTRAVENOUS ONCE
Status: COMPLETED | OUTPATIENT
Start: 2017-01-01 | End: 2017-01-01

## 2017-01-01 RX ORDER — PROPOFOL 10 MG/ML
INJECTION, EMULSION INTRAVENOUS CONTINUOUS PRN
Status: DISCONTINUED | OUTPATIENT
Start: 2017-01-01 | End: 2017-01-01

## 2017-01-01 RX ORDER — HYDROMORPHONE HYDROCHLORIDE 1 MG/ML
0.5 INJECTION, SOLUTION INTRAMUSCULAR; INTRAVENOUS; SUBCUTANEOUS ONCE
Status: COMPLETED | OUTPATIENT
Start: 2017-01-01 | End: 2017-01-01

## 2017-01-01 RX ORDER — BENZONATATE 100 MG/1
200 CAPSULE ORAL 3 TIMES DAILY PRN
Qty: 42 CAPSULE | Refills: 1 | Status: SHIPPED | OUTPATIENT
Start: 2017-01-01

## 2017-01-01 RX ORDER — EPINEPHRINE 0.3 MG/.3ML
0.3 INJECTION SUBCUTANEOUS EVERY 5 MIN PRN
Status: CANCELLED | OUTPATIENT
Start: 2017-01-01

## 2017-01-01 RX ORDER — LIDOCAINE 40 MG/G
CREAM TOPICAL
Status: DISCONTINUED | OUTPATIENT
Start: 2017-01-01 | End: 2017-01-01 | Stop reason: HOSPADM

## 2017-01-01 RX ORDER — DOBUTAMINE HYDROCHLORIDE 200 MG/100ML
1 INJECTION INTRAVENOUS CONTINUOUS
Status: DISCONTINUED | OUTPATIENT
Start: 2017-01-01 | End: 2017-01-01

## 2017-01-01 RX ORDER — LORAZEPAM 2 MG/ML
0.5 INJECTION INTRAMUSCULAR EVERY 4 HOURS PRN
Status: DISCONTINUED | OUTPATIENT
Start: 2017-01-01 | End: 2017-01-01 | Stop reason: HOSPADM

## 2017-01-01 RX ORDER — CARVEDILOL 12.5 MG/1
18.75 TABLET ORAL 2 TIMES DAILY WITH MEALS
Qty: 90 TABLET | Refills: 1 | Status: CANCELLED | OUTPATIENT
Start: 2017-01-01

## 2017-01-01 RX ORDER — MIDAZOLAM (PF) 1 MG/ML IN 0.9 % SODIUM CHLORIDE INTRAVENOUS SOLUTION
1-8 CONTINUOUS
Status: DISCONTINUED | OUTPATIENT
Start: 2017-01-01 | End: 2017-01-01

## 2017-01-01 RX ORDER — HEPARIN SODIUM (PORCINE) LOCK FLUSH IV SOLN 100 UNIT/ML 100 UNIT/ML
SOLUTION INTRAVENOUS PRN
Status: DISCONTINUED | OUTPATIENT
Start: 2017-01-01 | End: 2017-01-01 | Stop reason: HOSPADM

## 2017-01-01 RX ORDER — OXYCODONE HYDROCHLORIDE 5 MG/1
5 TABLET ORAL EVERY 8 HOURS PRN
Qty: 20 TABLET | Refills: 0 | Status: SHIPPED | OUTPATIENT
Start: 2017-01-01

## 2017-01-01 RX ORDER — MEPERIDINE HYDROCHLORIDE 25 MG/ML
12.5 INJECTION INTRAMUSCULAR; INTRAVENOUS; SUBCUTANEOUS
Status: DISCONTINUED | OUTPATIENT
Start: 2017-01-01 | End: 2017-01-01 | Stop reason: HOSPADM

## 2017-01-01 RX ORDER — CEFAZOLIN SODIUM 2 G/100ML
2 INJECTION, SOLUTION INTRAVENOUS
Status: COMPLETED | OUTPATIENT
Start: 2017-01-01 | End: 2017-01-01

## 2017-01-01 RX ORDER — LIDOCAINE/PRILOCAINE 2.5 %-2.5%
CREAM (GRAM) TOPICAL PRN
Qty: 30 G | Refills: 3 | Status: SHIPPED | OUTPATIENT
Start: 2017-01-01 | End: 2017-01-01

## 2017-01-01 RX ORDER — METOPROLOL TARTRATE 1 MG/ML
5 INJECTION, SOLUTION INTRAVENOUS 3 TIMES DAILY PRN
Status: DISCONTINUED | OUTPATIENT
Start: 2017-01-01 | End: 2017-01-01 | Stop reason: HOSPADM

## 2017-01-01 RX ORDER — METRONIDAZOLE 500 MG/1
500 TABLET ORAL 3 TIMES DAILY
Qty: 21 TABLET | Refills: 0 | Status: SHIPPED | OUTPATIENT
Start: 2017-01-01 | End: 2017-01-01

## 2017-01-01 RX ORDER — ACETAMINOPHEN 325 MG/1
325 TABLET ORAL EVERY 4 HOURS PRN
Status: DISCONTINUED | OUTPATIENT
Start: 2017-01-01 | End: 2017-01-01 | Stop reason: HOSPADM

## 2017-01-01 RX ORDER — FENTANYL CITRATE 50 UG/ML
INJECTION, SOLUTION INTRAMUSCULAR; INTRAVENOUS PRN
Status: DISCONTINUED | OUTPATIENT
Start: 2017-01-01 | End: 2017-01-01 | Stop reason: HOSPADM

## 2017-01-01 RX ORDER — HEPARIN SODIUM 5000 [USP'U]/.5ML
5000 INJECTION, SOLUTION INTRAVENOUS; SUBCUTANEOUS EVERY 8 HOURS
Status: DISCONTINUED | OUTPATIENT
Start: 2017-01-01 | End: 2017-01-01

## 2017-01-01 RX ORDER — CARVEDILOL 6.25 MG/1
12.5 TABLET ORAL 2 TIMES DAILY WITH MEALS
Status: DISCONTINUED | OUTPATIENT
Start: 2017-01-01 | End: 2017-01-01

## 2017-01-01 RX ORDER — NITROGLYCERIN 20 MG/100ML
0.07-2 INJECTION INTRAVENOUS CONTINUOUS
Status: DISCONTINUED | OUTPATIENT
Start: 2017-01-01 | End: 2017-01-01

## 2017-01-01 RX ORDER — PROPOFOL 10 MG/ML
INJECTION, EMULSION INTRAVENOUS PRN
Status: DISCONTINUED | OUTPATIENT
Start: 2017-01-01 | End: 2017-01-01

## 2017-01-01 RX ORDER — POTASSIUM CHLORIDE 1500 MG/1
20 TABLET, EXTENDED RELEASE ORAL DAILY
Qty: 30 TABLET | Refills: 0 | Status: SHIPPED | OUTPATIENT
Start: 2017-01-01 | End: 2017-01-01

## 2017-01-01 RX ORDER — PROCHLORPERAZINE MALEATE 10 MG
10 TABLET ORAL EVERY 6 HOURS PRN
Qty: 30 TABLET | Refills: 5 | COMMUNITY
Start: 2017-01-01 | End: 2018-01-01

## 2017-01-01 RX ORDER — LORAZEPAM 0.5 MG/1
0.5 TABLET ORAL EVERY 4 HOURS PRN
Qty: 30 TABLET | Refills: 5 | COMMUNITY
Start: 2017-01-01 | End: 2018-01-01

## 2017-01-01 RX ORDER — ACETAMINOPHEN 325 MG/1
650 TABLET ORAL
Status: DISCONTINUED | OUTPATIENT
Start: 2017-01-01 | End: 2017-01-01 | Stop reason: HOSPADM

## 2017-01-01 RX ORDER — CARVEDILOL 12.5 MG/1
18.75 TABLET ORAL 2 TIMES DAILY WITH MEALS
Qty: 90 TABLET | Refills: 1 | Status: SHIPPED | OUTPATIENT
Start: 2017-01-01 | End: 2018-01-01

## 2017-01-01 RX ORDER — NITROGLYCERIN 0.4 MG/1
0.8 TABLET SUBLINGUAL EVERY 5 MIN PRN
Status: DISCONTINUED | OUTPATIENT
Start: 2017-01-01 | End: 2017-01-01 | Stop reason: HOSPADM

## 2017-01-01 RX ORDER — CEFAZOLIN SODIUM 1 G/3ML
1 INJECTION, POWDER, FOR SOLUTION INTRAMUSCULAR; INTRAVENOUS SEE ADMIN INSTRUCTIONS
Status: CANCELLED | OUTPATIENT
Start: 2017-01-01

## 2017-01-01 RX ORDER — NITROGLYCERIN 0.4 MG/1
0.4 TABLET SUBLINGUAL EVERY 5 MIN PRN
Status: DISCONTINUED | OUTPATIENT
Start: 2017-01-01 | End: 2017-01-01 | Stop reason: HOSPADM

## 2017-01-01 RX ORDER — PROPOFOL 10 MG/ML
5-75 INJECTION, EMULSION INTRAVENOUS CONTINUOUS
Status: DISCONTINUED | OUTPATIENT
Start: 2017-01-01 | End: 2017-01-01

## 2017-01-01 RX ORDER — BUPIVACAINE HYDROCHLORIDE AND EPINEPHRINE 5; 5 MG/ML; UG/ML
INJECTION, SOLUTION EPIDURAL; INTRACAUDAL; PERINEURAL PRN
Status: DISCONTINUED | OUTPATIENT
Start: 2017-01-01 | End: 2017-01-01 | Stop reason: HOSPADM

## 2017-01-01 RX ORDER — MONTELUKAST SODIUM 10 MG/1
10 TABLET ORAL AT BEDTIME
Qty: 30 TABLET | Refills: 0 | Status: SHIPPED | OUTPATIENT
Start: 2017-01-01 | End: 2017-01-01

## 2017-01-01 RX ORDER — AMLODIPINE BESYLATE 5 MG/1
5 TABLET ORAL DAILY
Qty: 30 TABLET | Refills: 0 | Status: SHIPPED | OUTPATIENT
Start: 2017-01-01 | End: 2017-01-01

## 2017-01-01 RX ORDER — CIPROFLOXACIN 500 MG/1
500 TABLET, FILM COATED ORAL 2 TIMES DAILY
Qty: 20 TABLET | Refills: 0 | Status: SHIPPED | OUTPATIENT
Start: 2017-01-01 | End: 2017-01-01

## 2017-01-01 RX ORDER — POTASSIUM CHLORIDE 1500 MG/1
40 TABLET, EXTENDED RELEASE ORAL DAILY
Qty: 30 TABLET | Refills: 0 | Status: SHIPPED | OUTPATIENT
Start: 2017-01-01 | End: 2018-01-01

## 2017-01-01 RX ORDER — CARVEDILOL 6.25 MG/1
6.25 TABLET ORAL 2 TIMES DAILY WITH MEALS
Status: DISCONTINUED | OUTPATIENT
Start: 2017-01-01 | End: 2017-01-01 | Stop reason: HOSPADM

## 2017-01-01 RX ORDER — METOPROLOL TARTRATE 1 MG/ML
10 INJECTION, SOLUTION INTRAVENOUS EVERY 6 HOURS
Status: DISCONTINUED | OUTPATIENT
Start: 2017-01-01 | End: 2017-01-01

## 2017-01-01 RX ORDER — PALONOSETRON 0.05 MG/ML
0.25 INJECTION, SOLUTION INTRAVENOUS ONCE
Status: CANCELLED
Start: 2017-01-01

## 2017-01-01 RX ORDER — METOPROLOL TARTRATE 1 MG/ML
2.5 INJECTION, SOLUTION INTRAVENOUS ONCE
Status: COMPLETED | OUTPATIENT
Start: 2017-01-01 | End: 2017-01-01

## 2017-01-01 RX ORDER — ONDANSETRON 8 MG/1
8 TABLET, FILM COATED ORAL EVERY 8 HOURS PRN
Qty: 10 TABLET | Refills: 2 | Status: SHIPPED | OUTPATIENT
Start: 2017-01-01 | End: 2017-01-01

## 2017-01-01 RX ORDER — LIDOCAINE 40 MG/G
CREAM TOPICAL
Status: CANCELLED | OUTPATIENT
Start: 2017-01-01

## 2017-01-01 RX ORDER — FLUOROURACIL 50 MG/ML
400 INJECTION, SOLUTION INTRAVENOUS ONCE
Status: CANCELLED | OUTPATIENT
Start: 2017-01-01

## 2017-01-01 RX ORDER — IOPAMIDOL 755 MG/ML
500 INJECTION, SOLUTION INTRAVASCULAR ONCE
Status: DISCONTINUED | OUTPATIENT
Start: 2017-01-01 | End: 2017-01-01 | Stop reason: HOSPADM

## 2017-01-01 RX ORDER — FUROSEMIDE 10 MG/ML
40 INJECTION INTRAMUSCULAR; INTRAVENOUS ONCE
Status: DISCONTINUED | OUTPATIENT
Start: 2017-01-01 | End: 2017-01-01

## 2017-01-01 RX ORDER — FUROSEMIDE 10 MG/ML
40 INJECTION INTRAMUSCULAR; INTRAVENOUS ONCE
Status: CANCELLED
Start: 2017-01-01 | End: 2017-01-01

## 2017-01-01 RX ORDER — CARVEDILOL 12.5 MG/1
18.75 TABLET ORAL 2 TIMES DAILY WITH MEALS
Qty: 90 TABLET | Refills: 0 | Status: SHIPPED | OUTPATIENT
Start: 2017-01-01 | End: 2017-01-01

## 2017-01-01 RX ORDER — LISINOPRIL 5 MG/1
5 TABLET ORAL DAILY
Status: DISCONTINUED | OUTPATIENT
Start: 2017-01-01 | End: 2017-01-01

## 2017-01-01 RX ORDER — ONDANSETRON 2 MG/ML
4 INJECTION INTRAMUSCULAR; INTRAVENOUS
Status: CANCELLED | OUTPATIENT
Start: 2017-01-01

## 2017-01-01 RX ORDER — LORAZEPAM 0.5 MG/1
0.5 TABLET ORAL EVERY 4 HOURS PRN
Qty: 30 TABLET | Refills: 2 | Status: SHIPPED | OUTPATIENT
Start: 2017-01-01 | End: 2017-01-01

## 2017-01-01 RX ORDER — LOPERAMIDE HCL 2 MG
CAPSULE ORAL
Qty: 30 CAPSULE | Refills: 0 | Status: SHIPPED | OUTPATIENT
Start: 2017-01-01 | End: 2018-01-01

## 2017-01-01 RX ORDER — LIDOCAINE HYDROCHLORIDE 20 MG/ML
INJECTION, SOLUTION INFILTRATION; PERINEURAL PRN
Status: DISCONTINUED | OUTPATIENT
Start: 2017-01-01 | End: 2017-01-01

## 2017-01-01 RX ORDER — CEFAZOLIN SODIUM 1 G/3ML
1 INJECTION, POWDER, FOR SOLUTION INTRAMUSCULAR; INTRAVENOUS SEE ADMIN INSTRUCTIONS
Status: DISCONTINUED | OUTPATIENT
Start: 2017-01-01 | End: 2017-01-01 | Stop reason: HOSPADM

## 2017-01-01 RX ORDER — FERROUS SULFATE 325(65) MG
325 TABLET ORAL
Qty: 90 TABLET | Refills: 1 | Status: ON HOLD | OUTPATIENT
Start: 2017-01-01 | End: 2017-01-01

## 2017-01-01 RX ORDER — ETOMIDATE 2 MG/ML
20 INJECTION INTRAVENOUS ONCE
Status: DISCONTINUED | OUTPATIENT
Start: 2017-01-01 | End: 2017-01-01 | Stop reason: HOSPADM

## 2017-01-01 RX ORDER — LIDOCAINE 50 MG/G
1-3 PATCH TOPICAL EVERY 24 HOURS
Qty: 30 PATCH | Status: SHIPPED | DISCHARGE
Start: 2017-01-01 | End: 2017-01-01

## 2017-01-01 RX ORDER — NALOXONE HYDROCHLORIDE 0.4 MG/ML
.1-.4 INJECTION, SOLUTION INTRAMUSCULAR; INTRAVENOUS; SUBCUTANEOUS
Status: DISCONTINUED | OUTPATIENT
Start: 2017-01-01 | End: 2017-01-01

## 2017-01-01 RX ORDER — CARVEDILOL 6.25 MG/1
6.25 TABLET ORAL ONCE
Status: COMPLETED | OUTPATIENT
Start: 2017-01-01 | End: 2017-01-01

## 2017-01-01 RX ORDER — POTASSIUM CHLORIDE 750 MG/1
10 TABLET, EXTENDED RELEASE ORAL DAILY
Qty: 30 TABLET | Refills: 1 | Status: ON HOLD | OUTPATIENT
Start: 2017-01-01 | End: 2017-01-01

## 2017-01-01 RX ORDER — LOPERAMIDE HCL 2 MG
CAPSULE ORAL
Qty: 30 CAPSULE | Refills: 0 | COMMUNITY
Start: 2017-01-01 | End: 2017-01-01

## 2017-01-01 RX ORDER — DIMENHYDRINATE 50 MG/ML
25 INJECTION, SOLUTION INTRAMUSCULAR; INTRAVENOUS
Status: DISCONTINUED | OUTPATIENT
Start: 2017-01-01 | End: 2017-01-01 | Stop reason: HOSPADM

## 2017-01-01 RX ORDER — ALBUTEROL SULFATE 0.83 MG/ML
2.5 SOLUTION RESPIRATORY (INHALATION) EVERY 4 HOURS PRN
Status: DISCONTINUED | OUTPATIENT
Start: 2017-01-01 | End: 2017-01-01 | Stop reason: HOSPADM

## 2017-01-01 RX ORDER — BENZONATATE 200 MG/1
200 CAPSULE ORAL 3 TIMES DAILY PRN
Qty: 60 CAPSULE | Refills: 0 | Status: SHIPPED | OUTPATIENT
Start: 2017-01-01 | End: 2017-01-01

## 2017-01-01 RX ORDER — NITROGLYCERIN 20 MG/100ML
0.15 INJECTION INTRAVENOUS CONTINUOUS
Status: DISCONTINUED | OUTPATIENT
Start: 2017-01-01 | End: 2017-01-01 | Stop reason: HOSPADM

## 2017-01-01 RX ORDER — LIDOCAINE 50 MG/G
1 PATCH TOPICAL
Status: DISCONTINUED | OUTPATIENT
Start: 2017-01-01 | End: 2017-01-01 | Stop reason: HOSPADM

## 2017-01-01 RX ORDER — BENZONATATE 100 MG/1
100 CAPSULE ORAL 3 TIMES DAILY PRN
Status: DISCONTINUED | OUTPATIENT
Start: 2017-01-01 | End: 2017-01-01 | Stop reason: HOSPADM

## 2017-01-01 RX ORDER — METOPROLOL TARTRATE 1 MG/ML
5 INJECTION, SOLUTION INTRAVENOUS EVERY 6 HOURS
Status: DISCONTINUED | OUTPATIENT
Start: 2017-01-01 | End: 2017-01-01

## 2017-01-01 RX ORDER — HEPARIN SODIUM,PORCINE 10 UNIT/ML
5-10 VIAL (ML) INTRAVENOUS
Status: DISCONTINUED | OUTPATIENT
Start: 2017-01-01 | End: 2017-01-01 | Stop reason: HOSPADM

## 2017-01-01 RX ADMIN — POTASSIUM PHOSPHATE, MONOBASIC AND POTASSIUM PHOSPHATE, DIBASIC 15 MMOL: 224; 236 INJECTION, SOLUTION INTRAVENOUS at 23:17

## 2017-01-01 RX ADMIN — FAMOTIDINE 20 MG: 10 INJECTION, SOLUTION INTRAVENOUS at 09:07

## 2017-01-01 RX ADMIN — PROPOFOL 20 MG: 10 INJECTION, EMULSION INTRAVENOUS at 10:10

## 2017-01-01 RX ADMIN — ACETAMINOPHEN 325 MG: 325 TABLET ORAL at 19:03

## 2017-01-01 RX ADMIN — BENZONATATE 100 MG: 100 CAPSULE, LIQUID FILLED ORAL at 09:55

## 2017-01-01 RX ADMIN — SODIUM CHLORIDE, PRESERVATIVE FREE 4 ML: 5 INJECTION INTRAVENOUS at 13:16

## 2017-01-01 RX ADMIN — GUAIFENESIN AND CODEINE PHOSPHATE 5 ML: 100; 10 SOLUTION ORAL at 08:22

## 2017-01-01 RX ADMIN — RANITIDINE HYDROCHLORIDE 50 MG: 25 INJECTION INTRAMUSCULAR; INTRAVENOUS at 08:23

## 2017-01-01 RX ADMIN — CEFEPIME HYDROCHLORIDE 2 G: 2 INJECTION, POWDER, FOR SOLUTION INTRAVENOUS at 03:25

## 2017-01-01 RX ADMIN — RANITIDINE HYDROCHLORIDE 50 MG: 25 INJECTION INTRAMUSCULAR; INTRAVENOUS at 00:19

## 2017-01-01 RX ADMIN — OXALIPLATIN 148 MG: 5 INJECTION, SOLUTION, CONCENTRATE INTRAVENOUS at 12:55

## 2017-01-01 RX ADMIN — POTASSIUM PHOSPHATE, MONOBASIC AND POTASSIUM PHOSPHATE, DIBASIC 15 MMOL: 224; 236 INJECTION, SOLUTION INTRAVENOUS at 13:48

## 2017-01-01 RX ADMIN — DICLOFENAC SODIUM 2 G: 10 GEL TOPICAL at 16:45

## 2017-01-01 RX ADMIN — SODIUM CHLORIDE 1000 ML: 9 INJECTION, SOLUTION INTRAVENOUS at 17:35

## 2017-01-01 RX ADMIN — BEVACIZUMAB 325 MG: 400 INJECTION, SOLUTION INTRAVENOUS at 10:42

## 2017-01-01 RX ADMIN — BEVACIZUMAB 325 MG: 400 INJECTION, SOLUTION INTRAVENOUS at 10:10

## 2017-01-01 RX ADMIN — METOPROLOL TARTRATE 10 MG: 5 INJECTION INTRAVENOUS at 16:17

## 2017-01-01 RX ADMIN — FUROSEMIDE 20 MG: 10 INJECTION, SOLUTION INTRAVENOUS at 15:03

## 2017-01-01 RX ADMIN — POTASSIUM CHLORIDE 10 MEQ: 7.46 INJECTION, SOLUTION INTRAVENOUS at 11:16

## 2017-01-01 RX ADMIN — ATROPINE SULFATE 0.4 MG: 0.4 INJECTION, SOLUTION INTRAMUSCULAR; INTRAVENOUS; SUBCUTANEOUS at 11:22

## 2017-01-01 RX ADMIN — CARVEDILOL 6.25 MG: 6.25 TABLET, FILM COATED ORAL at 08:22

## 2017-01-01 RX ADMIN — SODIUM CHLORIDE 1000 ML: 9 INJECTION, SOLUTION INTRAVENOUS at 04:19

## 2017-01-01 RX ADMIN — BENZONATATE 100 MG: 100 CAPSULE, LIQUID FILLED ORAL at 11:20

## 2017-01-01 RX ADMIN — BENZONATATE 100 MG: 100 CAPSULE, LIQUID FILLED ORAL at 06:09

## 2017-01-01 RX ADMIN — SODIUM CHLORIDE, PRESERVATIVE FREE 5 ML: 5 INJECTION INTRAVENOUS at 08:23

## 2017-01-01 RX ADMIN — Medication 10 MG: at 04:06

## 2017-01-01 RX ADMIN — FUROSEMIDE 40 MG: 10 INJECTION, SOLUTION INTRAVENOUS at 08:08

## 2017-01-01 RX ADMIN — GUAIFENESIN AND CODEINE PHOSPHATE 5 ML: 100; 10 SOLUTION ORAL at 17:00

## 2017-01-01 RX ADMIN — HEPARIN SODIUM 5000 UNITS: 5000 INJECTION, SOLUTION INTRAVENOUS; SUBCUTANEOUS at 04:52

## 2017-01-01 RX ADMIN — SODIUM CHLORIDE, PRESERVATIVE FREE 5 ML: 5 INJECTION INTRAVENOUS at 11:14

## 2017-01-01 RX ADMIN — Medication 8 MG/HR: at 14:00

## 2017-01-01 RX ADMIN — DEXAMETHASONE SODIUM PHOSPHATE: 10 INJECTION, SOLUTION INTRAMUSCULAR; INTRAVENOUS at 09:53

## 2017-01-01 RX ADMIN — SODIUM CHLORIDE 1000 ML: 9 INJECTION, SOLUTION INTRAVENOUS at 19:29

## 2017-01-01 RX ADMIN — RANITIDINE HYDROCHLORIDE 50 MG: 25 INJECTION INTRAMUSCULAR; INTRAVENOUS at 20:16

## 2017-01-01 RX ADMIN — MIDAZOLAM HYDROCHLORIDE 2 MG: 1 INJECTION, SOLUTION INTRAMUSCULAR; INTRAVENOUS at 05:11

## 2017-01-01 RX ADMIN — SODIUM CHLORIDE, PRESERVATIVE FREE 500 UNITS: 5 INJECTION INTRAVENOUS at 09:38

## 2017-01-01 RX ADMIN — PROPOFOL 20 MG: 10 INJECTION, EMULSION INTRAVENOUS at 10:05

## 2017-01-01 RX ADMIN — PROPOFOL 40 MG: 10 INJECTION, EMULSION INTRAVENOUS at 12:27

## 2017-01-01 RX ADMIN — SODIUM CHLORIDE, PRESERVATIVE FREE 500 UNITS: 5 INJECTION INTRAVENOUS at 12:33

## 2017-01-01 RX ADMIN — GUAIFENESIN AND CODEINE PHOSPHATE 5 ML: 100; 10 SOLUTION ORAL at 03:12

## 2017-01-01 RX ADMIN — ONDANSETRON 4 MG: 2 INJECTION INTRAMUSCULAR; INTRAVENOUS at 13:03

## 2017-01-01 RX ADMIN — FUROSEMIDE 40 MG: 10 INJECTION, SOLUTION INTRAVENOUS at 20:10

## 2017-01-01 RX ADMIN — SODIUM CHLORIDE 250 ML: 9 INJECTION, SOLUTION INTRAVENOUS at 09:32

## 2017-01-01 RX ADMIN — HYDRALAZINE HYDROCHLORIDE 10 MG: 20 INJECTION INTRAMUSCULAR; INTRAVENOUS at 06:54

## 2017-01-01 RX ADMIN — CARVEDILOL 12.5 MG: 6.25 TABLET, FILM COATED ORAL at 08:37

## 2017-01-01 RX ADMIN — FENTANYL CITRATE 100 MCG: 50 INJECTION, SOLUTION INTRAMUSCULAR; INTRAVENOUS at 07:45

## 2017-01-01 RX ADMIN — ATROPINE SULFATE 0.4 MG: 0.4 INJECTION, SOLUTION INTRAMUSCULAR; INTRAVENOUS; SUBCUTANEOUS at 11:36

## 2017-01-01 RX ADMIN — HEPARIN SODIUM 5000 UNITS: 5000 INJECTION, SOLUTION INTRAVENOUS; SUBCUTANEOUS at 12:14

## 2017-01-01 RX ADMIN — FENTANYL CITRATE 50 MCG: 50 INJECTION, SOLUTION INTRAMUSCULAR; INTRAVENOUS at 05:32

## 2017-01-01 RX ADMIN — RANITIDINE HYDROCHLORIDE 50 MG: 25 INJECTION INTRAMUSCULAR; INTRAVENOUS at 08:51

## 2017-01-01 RX ADMIN — PROPOFOL 10 MG: 10 INJECTION, EMULSION INTRAVENOUS at 12:45

## 2017-01-01 RX ADMIN — POTASSIUM CHLORIDE 10 MEQ: 7.46 INJECTION, SOLUTION INTRAVENOUS at 14:12

## 2017-01-01 RX ADMIN — SODIUM CHLORIDE 60 ML: 9 INJECTION, SOLUTION INTRAVENOUS at 18:54

## 2017-01-01 RX ADMIN — PROPOFOL 5 MCG/KG/MIN: 10 INJECTION, EMULSION INTRAVENOUS at 00:14

## 2017-01-01 RX ADMIN — FAMOTIDINE 20 MG: 10 INJECTION, SOLUTION INTRAVENOUS at 10:01

## 2017-01-01 RX ADMIN — PALONOSETRON HYDROCHLORIDE 0.25 MG: 0.25 INJECTION INTRAVENOUS at 11:36

## 2017-01-01 RX ADMIN — HEPARIN SODIUM 5000 UNITS: 5000 INJECTION, SOLUTION INTRAVENOUS; SUBCUTANEOUS at 04:05

## 2017-01-01 RX ADMIN — RANITIDINE HYDROCHLORIDE 50 MG: 25 INJECTION INTRAMUSCULAR; INTRAVENOUS at 16:14

## 2017-01-01 RX ADMIN — RANITIDINE HYDROCHLORIDE 50 MG: 25 INJECTION INTRAMUSCULAR; INTRAVENOUS at 08:08

## 2017-01-01 RX ADMIN — HEPARIN SODIUM 5000 UNITS: 5000 INJECTION, SOLUTION INTRAVENOUS; SUBCUTANEOUS at 14:07

## 2017-01-01 RX ADMIN — SODIUM CHLORIDE, PRESERVATIVE FREE 500 UNITS: 5 INJECTION INTRAVENOUS at 12:22

## 2017-01-01 RX ADMIN — SODIUM CHLORIDE, PRESERVATIVE FREE 500 UNITS: 5 INJECTION INTRAVENOUS at 13:07

## 2017-01-01 RX ADMIN — CEFAZOLIN SODIUM 2 G: 2 INJECTION, SOLUTION INTRAVENOUS at 12:27

## 2017-01-01 RX ADMIN — HEPARIN SODIUM 5000 UNITS: 5000 INJECTION, SOLUTION INTRAVENOUS; SUBCUTANEOUS at 13:21

## 2017-01-01 RX ADMIN — GUAIFENESIN AND CODEINE PHOSPHATE 5 ML: 100; 10 SOLUTION ORAL at 17:54

## 2017-01-01 RX ADMIN — SODIUM CHLORIDE 60 ML: 9 INJECTION, SOLUTION INTRAVENOUS at 09:24

## 2017-01-01 RX ADMIN — IOPAMIDOL 70 ML: 755 INJECTION, SOLUTION INTRAVENOUS at 18:54

## 2017-01-01 RX ADMIN — SODIUM CHLORIDE 150 MG: 9 INJECTION, SOLUTION INTRAVENOUS at 10:29

## 2017-01-01 RX ADMIN — LIDOCAINE 1 PATCH: 50 PATCH CUTANEOUS at 20:11

## 2017-01-01 RX ADMIN — POTASSIUM CHLORIDE 10 MEQ: 7.46 INJECTION, SOLUTION INTRAVENOUS at 16:54

## 2017-01-01 RX ADMIN — OXYBUTYNIN 1 PATCH: 3.9 PATCH TRANSDERMAL at 21:16

## 2017-01-01 RX ADMIN — HYDROMORPHONE HYDROCHLORIDE 0.5 MG: 1 INJECTION, SOLUTION INTRAMUSCULAR; INTRAVENOUS; SUBCUTANEOUS at 20:47

## 2017-01-01 RX ADMIN — Medication 8 MG/HR: at 01:40

## 2017-01-01 RX ADMIN — ENOXAPARIN SODIUM 40 MG: 40 INJECTION SUBCUTANEOUS at 01:18

## 2017-01-01 RX ADMIN — SODIUM CHLORIDE 250 ML: 9 INJECTION, SOLUTION INTRAVENOUS at 11:08

## 2017-01-01 RX ADMIN — GUAIFENESIN AND CODEINE PHOSPHATE 5 ML: 100; 10 SOLUTION ORAL at 09:27

## 2017-01-01 RX ADMIN — SODIUM CHLORIDE 250 ML: 9 INJECTION, SOLUTION INTRAVENOUS at 09:23

## 2017-01-01 RX ADMIN — LIDOCAINE HYDROCHLORIDE 0.8 ML: 10 INJECTION, SOLUTION INFILTRATION; PERINEURAL at 10:07

## 2017-01-01 RX ADMIN — DEXTROSE MONOHYDRATE 250 ML: 50 INJECTION, SOLUTION INTRAVENOUS at 13:06

## 2017-01-01 RX ADMIN — DEXTROSE MONOHYDRATE 285 MG: 50 INJECTION, SOLUTION INTRAVENOUS at 12:58

## 2017-01-01 RX ADMIN — SODIUM CHLORIDE 150 MG: 0.9 INJECTION, SOLUTION INTRAVENOUS at 10:00

## 2017-01-01 RX ADMIN — Medication 2 G: at 11:49

## 2017-01-01 RX ADMIN — MIDAZOLAM HYDROCHLORIDE 2 MG: 1 INJECTION, SOLUTION INTRAMUSCULAR; INTRAVENOUS at 05:32

## 2017-01-01 RX ADMIN — PROPOFOL 10 MG: 10 INJECTION, EMULSION INTRAVENOUS at 10:14

## 2017-01-01 RX ADMIN — DEXAMETHASONE SODIUM PHOSPHATE 12 MG: 10 INJECTION, SOLUTION INTRAMUSCULAR; INTRAVENOUS at 10:10

## 2017-01-01 RX ADMIN — IOPAMIDOL 75 ML: 755 INJECTION, SOLUTION INTRAVENOUS at 14:33

## 2017-01-01 RX ADMIN — HYDRALAZINE HYDROCHLORIDE 10 MG: 20 INJECTION INTRAMUSCULAR; INTRAVENOUS at 09:32

## 2017-01-01 RX ADMIN — LISINOPRIL 5 MG: 5 TABLET ORAL at 08:22

## 2017-01-01 RX ADMIN — FUROSEMIDE 20 MG: 10 INJECTION, SOLUTION INTRAVENOUS at 15:28

## 2017-01-01 RX ADMIN — SODIUM CHLORIDE 250 ML: 9 INJECTION, SOLUTION INTRAVENOUS at 08:58

## 2017-01-01 RX ADMIN — NITROGLYCERIN 0.8 MG: 0.4 TABLET SUBLINGUAL at 20:17

## 2017-01-01 RX ADMIN — METOPROLOL TARTRATE 2.5 MG: 5 INJECTION INTRAVENOUS at 18:42

## 2017-01-01 RX ADMIN — POTASSIUM CHLORIDE 10 MEQ: 7.46 INJECTION, SOLUTION INTRAVENOUS at 15:15

## 2017-01-01 RX ADMIN — CARVEDILOL 6.25 MG: 6.25 TABLET, FILM COATED ORAL at 17:49

## 2017-01-01 RX ADMIN — FUROSEMIDE 40 MG: 10 INJECTION, SOLUTION INTRAVENOUS at 16:05

## 2017-01-01 RX ADMIN — PROPOFOL 30 MG: 10 INJECTION, EMULSION INTRAVENOUS at 10:00

## 2017-01-01 RX ADMIN — LIDOCAINE HYDROCHLORIDE 1 ML: 10 INJECTION, SOLUTION EPIDURAL; INFILTRATION; INTRACAUDAL; PERINEURAL at 09:33

## 2017-01-01 RX ADMIN — PROPOFOL 150 MCG/KG/MIN: 10 INJECTION, EMULSION INTRAVENOUS at 12:27

## 2017-01-01 RX ADMIN — OXALIPLATIN 113 MG: 5 INJECTION, SOLUTION, CONCENTRATE INTRAVENOUS at 14:11

## 2017-01-01 RX ADMIN — BEVACIZUMAB 325 MG: 400 INJECTION, SOLUTION INTRAVENOUS at 10:55

## 2017-01-01 RX ADMIN — Medication 8 MG/HR: at 01:37

## 2017-01-01 RX ADMIN — SODIUM CHLORIDE, PRESERVATIVE FREE 500 UNITS: 5 INJECTION INTRAVENOUS at 10:43

## 2017-01-01 RX ADMIN — FAMOTIDINE 20 MG: 10 INJECTION, SOLUTION INTRAVENOUS at 10:17

## 2017-01-01 RX ADMIN — MIDAZOLAM HYDROCHLORIDE 1 MG: 1 INJECTION, SOLUTION INTRAMUSCULAR; INTRAVENOUS at 12:26

## 2017-01-01 RX ADMIN — HEPARIN SODIUM 5000 UNITS: 5000 INJECTION, SOLUTION INTRAVENOUS; SUBCUTANEOUS at 20:48

## 2017-01-01 RX ADMIN — SODIUM CHLORIDE 150 MG: 9 INJECTION, SOLUTION INTRAVENOUS at 10:43

## 2017-01-01 RX ADMIN — POTASSIUM CHLORIDE 10 MEQ: 7.46 INJECTION, SOLUTION INTRAVENOUS at 11:54

## 2017-01-01 RX ADMIN — FLUDEOXYGLUCOSE F-18 13.48 MCI.: 500 INJECTION, SOLUTION INTRAVENOUS at 14:41

## 2017-01-01 RX ADMIN — HUMAN ALBUMIN MICROSPHERES AND PERFLUTREN 3 ML: 10; .22 INJECTION, SOLUTION INTRAVENOUS at 08:45

## 2017-01-01 RX ADMIN — HEPARIN SODIUM 20 ML: 1000 INJECTION, SOLUTION INTRAVENOUS; SUBCUTANEOUS at 13:14

## 2017-01-01 RX ADMIN — Medication 1 MG/HR: at 02:43

## 2017-01-01 RX ADMIN — DEXTROSE MONOHYDRATE 250 ML: 50 INJECTION, SOLUTION INTRAVENOUS at 12:20

## 2017-01-01 RX ADMIN — SODIUM CHLORIDE, PRESERVATIVE FREE 5 ML: 5 INJECTION INTRAVENOUS at 08:21

## 2017-01-01 RX ADMIN — SODIUM CHLORIDE, POTASSIUM CHLORIDE, SODIUM LACTATE AND CALCIUM CHLORIDE: 600; 310; 30; 20 INJECTION, SOLUTION INTRAVENOUS at 12:09

## 2017-01-01 RX ADMIN — LORAZEPAM 0.5 MG: 2 INJECTION INTRAMUSCULAR; INTRAVENOUS at 09:40

## 2017-01-01 RX ADMIN — FENTANYL CITRATE 50 MCG: 50 INJECTION, SOLUTION INTRAMUSCULAR; INTRAVENOUS at 05:11

## 2017-01-01 RX ADMIN — FUROSEMIDE 40 MG: 10 INJECTION, SOLUTION INTRAVENOUS at 03:28

## 2017-01-01 RX ADMIN — CEFEPIME HYDROCHLORIDE 2 G: 2 INJECTION, POWDER, FOR SOLUTION INTRAVENOUS at 01:33

## 2017-01-01 RX ADMIN — RANITIDINE HYDROCHLORIDE 50 MG: 25 INJECTION INTRAMUSCULAR; INTRAVENOUS at 20:48

## 2017-01-01 RX ADMIN — POTASSIUM CHLORIDE 20 MEQ: 1.5 POWDER, FOR SOLUTION ORAL at 22:24

## 2017-01-01 RX ADMIN — HEPARIN SODIUM 5000 UNITS: 5000 INJECTION, SOLUTION INTRAVENOUS; SUBCUTANEOUS at 20:31

## 2017-01-01 RX ADMIN — GUAIFENESIN AND CODEINE PHOSPHATE 5 ML: 100; 10 SOLUTION ORAL at 22:02

## 2017-01-01 RX ADMIN — SODIUM CHLORIDE 1000 ML: 9 INJECTION, SOLUTION INTRAVENOUS at 19:00

## 2017-01-01 RX ADMIN — SODIUM CHLORIDE, PRESERVATIVE FREE 500 UNITS: 5 INJECTION INTRAVENOUS at 17:08

## 2017-01-01 RX ADMIN — SODIUM CHLORIDE, POTASSIUM CHLORIDE, SODIUM LACTATE AND CALCIUM CHLORIDE: 600; 310; 30; 20 INJECTION, SOLUTION INTRAVENOUS at 09:33

## 2017-01-01 RX ADMIN — RANITIDINE HYDROCHLORIDE 50 MG: 25 INJECTION INTRAMUSCULAR; INTRAVENOUS at 08:53

## 2017-01-01 RX ADMIN — SODIUM CHLORIDE 150 MG: 9 INJECTION, SOLUTION INTRAVENOUS at 09:38

## 2017-01-01 RX ADMIN — FENTANYL CITRATE 100 MCG: 50 INJECTION, SOLUTION INTRAMUSCULAR; INTRAVENOUS at 18:29

## 2017-01-01 RX ADMIN — DEXAMETHASONE SODIUM PHOSPHATE 12 MG: 10 INJECTION, SOLUTION INTRAMUSCULAR; INTRAVENOUS at 10:21

## 2017-01-01 RX ADMIN — HEPARIN SODIUM 5000 UNITS: 5000 INJECTION, SOLUTION INTRAVENOUS; SUBCUTANEOUS at 03:40

## 2017-01-01 RX ADMIN — DEXTROSE MONOHYDRATE 250 ML: 50 INJECTION, SOLUTION INTRAVENOUS at 14:11

## 2017-01-01 RX ADMIN — DEXTROSE MONOHYDRATE 285 MG: 50 INJECTION, SOLUTION INTRAVENOUS at 12:26

## 2017-01-01 RX ADMIN — IRINOTECAN HYDROCHLORIDE 285 MG: 20 INJECTION, SOLUTION INTRAVENOUS at 11:41

## 2017-01-01 RX ADMIN — CARVEDILOL 6.25 MG: 6.25 TABLET, FILM COATED ORAL at 11:04

## 2017-01-01 RX ADMIN — METOPROLOL TARTRATE 2.5 MG: 5 INJECTION INTRAVENOUS at 22:44

## 2017-01-01 RX ADMIN — GUAIFENESIN AND CODEINE PHOSPHATE 5 ML: 100; 10 SOLUTION ORAL at 02:56

## 2017-01-01 RX ADMIN — RANITIDINE HYDROCHLORIDE 50 MG: 25 INJECTION INTRAMUSCULAR; INTRAVENOUS at 00:04

## 2017-01-01 RX ADMIN — BEVACIZUMAB 325 MG: 400 INJECTION, SOLUTION INTRAVENOUS at 11:12

## 2017-01-01 RX ADMIN — BENZONATATE 100 MG: 100 CAPSULE, LIQUID FILLED ORAL at 13:47

## 2017-01-01 RX ADMIN — RANITIDINE HYDROCHLORIDE 50 MG: 25 INJECTION INTRAMUSCULAR; INTRAVENOUS at 16:31

## 2017-01-01 RX ADMIN — SENNOSIDES 8.6 MG: 8.6 TABLET, FILM COATED ORAL at 20:22

## 2017-01-01 RX ADMIN — HEPARIN SODIUM 5000 UNITS: 5000 INJECTION, SOLUTION INTRAVENOUS; SUBCUTANEOUS at 20:22

## 2017-01-01 RX ADMIN — HEPARIN SODIUM 5000 UNITS: 5000 INJECTION, SOLUTION INTRAVENOUS; SUBCUTANEOUS at 20:04

## 2017-01-01 RX ADMIN — RANITIDINE HYDROCHLORIDE 150 MG: 150 TABLET, FILM COATED ORAL at 09:50

## 2017-01-01 RX ADMIN — POTASSIUM CHLORIDE 20 MEQ: 29.8 INJECTION, SOLUTION INTRAVENOUS at 14:49

## 2017-01-01 RX ADMIN — CARVEDILOL 6.25 MG: 6.25 TABLET, FILM COATED ORAL at 09:28

## 2017-01-01 RX ADMIN — LISINOPRIL 5 MG: 5 TABLET ORAL at 09:50

## 2017-01-01 RX ADMIN — SODIUM CHLORIDE, PRESERVATIVE FREE 500 UNITS: 5 INJECTION INTRAVENOUS at 16:25

## 2017-01-01 RX ADMIN — PALONOSETRON HYDROCHLORIDE 0.25 MG: 0.25 INJECTION INTRAVENOUS at 10:25

## 2017-01-01 RX ADMIN — DEXAMETHASONE SODIUM PHOSPHATE 12 MG: 10 INJECTION, SOLUTION INTRAMUSCULAR; INTRAVENOUS at 09:42

## 2017-01-01 RX ADMIN — FILGRASTIM 480 MCG: 480 INJECTION, SOLUTION INTRAVENOUS; SUBCUTANEOUS at 11:13

## 2017-01-01 RX ADMIN — GUAIFENESIN AND CODEINE PHOSPHATE 5 ML: 100; 10 SOLUTION ORAL at 23:23

## 2017-01-01 RX ADMIN — Medication 6 MG/HR: at 16:42

## 2017-01-01 RX ADMIN — VANCOMYCIN HYDROCHLORIDE 1500 MG: 10 INJECTION, POWDER, LYOPHILIZED, FOR SOLUTION INTRAVENOUS at 04:27

## 2017-01-01 RX ADMIN — FUROSEMIDE 40 MG: 10 INJECTION, SOLUTION INTRAVENOUS at 17:03

## 2017-01-01 RX ADMIN — PALONOSETRON HYDROCHLORIDE 0.25 MG: 0.25 INJECTION INTRAVENOUS at 09:57

## 2017-01-01 RX ADMIN — FENTANYL CITRATE 50 MCG/HR: 50 INJECTION, SOLUTION INTRAMUSCULAR; INTRAVENOUS at 01:03

## 2017-01-01 RX ADMIN — SODIUM CHLORIDE, PRESERVATIVE FREE 5 ML: 5 INJECTION INTRAVENOUS at 22:13

## 2017-01-01 RX ADMIN — POTASSIUM CHLORIDE 10 MEQ: 7.46 INJECTION, SOLUTION INTRAVENOUS at 09:47

## 2017-01-01 RX ADMIN — SODIUM CHLORIDE, PRESERVATIVE FREE 500 UNITS: 5 INJECTION INTRAVENOUS at 15:39

## 2017-01-01 RX ADMIN — POTASSIUM CHLORIDE 20 MEQ: 1500 TABLET, EXTENDED RELEASE ORAL at 10:26

## 2017-01-01 RX ADMIN — BEVACIZUMAB 340 MG: 400 INJECTION, SOLUTION INTRAVENOUS at 09:48

## 2017-01-01 RX ADMIN — CEFEPIME HYDROCHLORIDE 2 G: 2 INJECTION, POWDER, FOR SOLUTION INTRAVENOUS at 13:45

## 2017-01-01 RX ADMIN — MIDAZOLAM HYDROCHLORIDE 3 MG: 1 INJECTION, SOLUTION INTRAMUSCULAR; INTRAVENOUS at 18:29

## 2017-01-01 RX ADMIN — ATROPINE SULFATE 0.4 MG: 0.4 INJECTION, SOLUTION INTRAMUSCULAR; INTRAVENOUS; SUBCUTANEOUS at 10:16

## 2017-01-01 RX ADMIN — LIDOCAINE HYDROCHLORIDE 40 MG: 20 INJECTION, SOLUTION INFILTRATION; PERINEURAL at 12:27

## 2017-01-01 RX ADMIN — BENZONATATE 100 MG: 100 CAPSULE, LIQUID FILLED ORAL at 05:39

## 2017-01-01 RX ADMIN — RANITIDINE HYDROCHLORIDE 150 MG: 150 TABLET, FILM COATED ORAL at 10:17

## 2017-01-01 RX ADMIN — SODIUM CHLORIDE, PRESERVATIVE FREE 500 UNITS: 5 INJECTION INTRAVENOUS at 15:07

## 2017-01-01 RX ADMIN — RANITIDINE HYDROCHLORIDE 50 MG: 25 INJECTION INTRAMUSCULAR; INTRAVENOUS at 20:21

## 2017-01-01 RX ADMIN — FENTANYL CITRATE 50 MCG: 50 INJECTION, SOLUTION INTRAMUSCULAR; INTRAVENOUS at 07:52

## 2017-01-01 RX ADMIN — OXALIPLATIN 113 MG: 5 INJECTION, SOLUTION, CONCENTRATE INTRAVENOUS at 13:08

## 2017-01-01 RX ADMIN — SODIUM CHLORIDE, PRESERVATIVE FREE 5 ML: 5 INJECTION INTRAVENOUS at 07:01

## 2017-01-01 RX ADMIN — METOPROLOL TARTRATE 10 MG: 5 INJECTION INTRAVENOUS at 04:44

## 2017-01-01 RX ADMIN — SODIUM CHLORIDE 250 ML: 9 INJECTION, SOLUTION INTRAVENOUS at 09:53

## 2017-01-01 RX ADMIN — FUROSEMIDE 40 MG: 10 INJECTION, SOLUTION INTRAVENOUS at 15:30

## 2017-01-01 RX ADMIN — DEXAMETHASONE SODIUM PHOSPHATE 12 MG: 10 INJECTION, SOLUTION INTRAMUSCULAR; INTRAVENOUS at 11:44

## 2017-01-01 RX ADMIN — SODIUM CHLORIDE, PRESERVATIVE FREE 500 UNITS: 5 INJECTION INTRAVENOUS at 18:44

## 2017-01-01 RX ADMIN — HEPARIN SODIUM 5000 UNITS: 5000 INJECTION, SOLUTION INTRAVENOUS; SUBCUTANEOUS at 03:22

## 2017-01-01 RX ADMIN — SODIUM CHLORIDE, PRESERVATIVE FREE 5 ML: 5 INJECTION INTRAVENOUS at 20:49

## 2017-01-01 RX ADMIN — DOPAMINE HYDROCHLORIDE IN DEXTROSE 5 MCG/KG/MIN: 1.6 INJECTION, SOLUTION INTRAVENOUS at 03:49

## 2017-01-01 RX ADMIN — HEPARIN SODIUM 5000 UNITS: 5000 INJECTION, SOLUTION INTRAVENOUS; SUBCUTANEOUS at 20:16

## 2017-01-01 RX ADMIN — CEFEPIME HYDROCHLORIDE 2 G: 2 INJECTION, POWDER, FOR SOLUTION INTRAVENOUS at 01:40

## 2017-01-01 RX ADMIN — SODIUM CHLORIDE 250 ML: 9 INJECTION, SOLUTION INTRAVENOUS at 09:52

## 2017-01-01 RX ADMIN — SODIUM CHLORIDE 1000 ML: 9 INJECTION, SOLUTION INTRAVENOUS at 18:17

## 2017-01-01 RX ADMIN — DEXTROSE MONOHYDRATE 285 MG: 50 INJECTION, SOLUTION INTRAVENOUS at 11:03

## 2017-01-01 RX ADMIN — METOPROLOL TARTRATE 10 MG: 5 INJECTION INTRAVENOUS at 11:16

## 2017-01-01 RX ADMIN — Medication 2 G: at 20:03

## 2017-01-01 RX ADMIN — HEPARIN SODIUM 5000 UNITS: 5000 INJECTION, SOLUTION INTRAVENOUS; SUBCUTANEOUS at 19:35

## 2017-01-01 RX ADMIN — TAZOBACTAM SODIUM AND PIPERACILLIN SODIUM 4.5 G: 500; 4 INJECTION, SOLUTION INTRAVENOUS at 21:08

## 2017-01-01 RX ADMIN — LISINOPRIL 5 MG: 5 TABLET ORAL at 09:29

## 2017-01-01 RX ADMIN — DOBUTAMINE HYDROCHLORIDE 5 MCG/KG/MIN: 200 INJECTION INTRAVENOUS at 16:34

## 2017-01-01 RX ADMIN — FAMOTIDINE 20 MG: 10 INJECTION, SOLUTION INTRAVENOUS at 11:42

## 2017-01-01 RX ADMIN — HYDROMORPHONE HYDROCHLORIDE 0.5 MG: 1 INJECTION, SOLUTION INTRAMUSCULAR; INTRAVENOUS; SUBCUTANEOUS at 18:20

## 2017-01-01 RX ADMIN — RANITIDINE HYDROCHLORIDE 150 MG: 150 TABLET, FILM COATED ORAL at 20:12

## 2017-01-01 RX ADMIN — CARVEDILOL 6.25 MG: 6.25 TABLET, FILM COATED ORAL at 09:51

## 2017-01-01 RX ADMIN — BUPIVACAINE HYDROCHLORIDE AND EPINEPHRINE 10 ML: 5; 5 INJECTION, SOLUTION EPIDURAL; INTRACAUDAL; PERINEURAL at 13:15

## 2017-01-01 RX ADMIN — SODIUM CHLORIDE, PRESERVATIVE FREE 5 ML: 5 INJECTION INTRAVENOUS at 09:48

## 2017-01-01 RX ADMIN — SODIUM CHLORIDE 250 ML: 9 INJECTION, SOLUTION INTRAVENOUS at 10:23

## 2017-01-01 RX ADMIN — SODIUM CHLORIDE 60 ML: 9 INJECTION, SOLUTION INTRAVENOUS at 14:39

## 2017-01-01 RX ADMIN — POTASSIUM PHOSPHATE, MONOBASIC AND POTASSIUM PHOSPHATE, DIBASIC 20 MMOL: 224; 236 INJECTION, SOLUTION INTRAVENOUS at 21:27

## 2017-01-01 RX ADMIN — HEPARIN SODIUM 5000 UNITS: 5000 INJECTION, SOLUTION INTRAVENOUS; SUBCUTANEOUS at 04:16

## 2017-01-01 RX ADMIN — FENTANYL CITRATE 100 MCG/HR: 50 INJECTION, SOLUTION INTRAMUSCULAR; INTRAVENOUS at 14:51

## 2017-01-01 RX ADMIN — FENTANYL CITRATE 25 MCG: 50 INJECTION, SOLUTION INTRAMUSCULAR; INTRAVENOUS at 04:57

## 2017-01-01 RX ADMIN — SODIUM CHLORIDE 250 ML: 9 INJECTION, SOLUTION INTRAVENOUS at 08:54

## 2017-01-01 RX ADMIN — OMEPRAZOLE 20 MG: 20 CAPSULE, DELAYED RELEASE ORAL at 11:49

## 2017-01-01 RX ADMIN — BENZONATATE 100 MG: 100 CAPSULE, LIQUID FILLED ORAL at 19:35

## 2017-01-01 RX ADMIN — SODIUM CHLORIDE, POTASSIUM CHLORIDE, SODIUM LACTATE AND CALCIUM CHLORIDE: 600; 310; 30; 20 INJECTION, SOLUTION INTRAVENOUS at 09:39

## 2017-01-01 RX ADMIN — DEXTROSE MONOHYDRATE 250 ML: 50 INJECTION, SOLUTION INTRAVENOUS at 13:46

## 2017-01-01 RX ADMIN — MIDAZOLAM HYDROCHLORIDE 2 MG: 1 INJECTION, SOLUTION INTRAMUSCULAR; INTRAVENOUS at 14:50

## 2017-01-01 RX ADMIN — ACETAMINOPHEN 325 MG: 325 TABLET ORAL at 06:09

## 2017-01-01 RX ADMIN — HEPARIN SODIUM 5000 UNITS: 5000 INJECTION, SOLUTION INTRAVENOUS; SUBCUTANEOUS at 12:01

## 2017-01-01 RX ADMIN — HEPARIN SODIUM 5000 UNITS: 5000 INJECTION, SOLUTION INTRAVENOUS; SUBCUTANEOUS at 12:16

## 2017-01-01 RX ADMIN — VANCOMYCIN HYDROCHLORIDE 1500 MG: 10 INJECTION, POWDER, LYOPHILIZED, FOR SOLUTION INTRAVENOUS at 04:19

## 2017-01-01 RX ADMIN — Medication 2 G: at 10:17

## 2017-01-01 RX ADMIN — METOPROLOL TARTRATE 7.5 MG: 5 INJECTION INTRAVENOUS at 21:08

## 2017-01-01 RX ADMIN — VANCOMYCIN HYDROCHLORIDE 1500 MG: 10 INJECTION, POWDER, LYOPHILIZED, FOR SOLUTION INTRAVENOUS at 10:17

## 2017-01-01 RX ADMIN — GUAIFENESIN AND CODEINE PHOSPHATE 5 ML: 100; 10 SOLUTION ORAL at 18:05

## 2017-01-01 RX ADMIN — CEFEPIME HYDROCHLORIDE 2 G: 2 INJECTION, POWDER, FOR SOLUTION INTRAVENOUS at 16:44

## 2017-01-01 RX ADMIN — HYDRALAZINE HYDROCHLORIDE 10 MG: 20 INJECTION INTRAMUSCULAR; INTRAVENOUS at 17:13

## 2017-01-01 RX ADMIN — FILGRASTIM 480 MCG: 480 INJECTION, SOLUTION INTRAVENOUS; SUBCUTANEOUS at 11:48

## 2017-01-01 RX ADMIN — PROPOFOL 20 MG: 10 INJECTION, EMULSION INTRAVENOUS at 12:50

## 2017-01-01 RX ADMIN — GUAIFENESIN AND CODEINE PHOSPHATE 5 ML: 100; 10 SOLUTION ORAL at 23:58

## 2017-01-01 RX ADMIN — POTASSIUM CHLORIDE 10 MEQ: 7.46 INJECTION, SOLUTION INTRAVENOUS at 16:15

## 2017-01-01 RX ADMIN — FENTANYL CITRATE 100 MCG/HR: 50 INJECTION, SOLUTION INTRAMUSCULAR; INTRAVENOUS at 01:02

## 2017-01-01 RX ADMIN — HEPARIN SODIUM 5000 UNITS: 5000 INJECTION, SOLUTION INTRAVENOUS; SUBCUTANEOUS at 03:57

## 2017-01-01 RX ADMIN — LOSARTAN POTASSIUM 50 MG: 25 TABLET ORAL at 08:23

## 2017-01-01 RX ADMIN — DEXAMETHASONE SODIUM PHOSPHATE: 10 INJECTION, SOLUTION INTRAMUSCULAR; INTRAVENOUS at 09:10

## 2017-01-01 RX ADMIN — POTASSIUM CHLORIDE 10 MEQ: 7.46 INJECTION, SOLUTION INTRAVENOUS at 14:07

## 2017-01-01 RX ADMIN — POTASSIUM CHLORIDE 20 MEQ: 200 INJECTION, SOLUTION INTRAVENOUS at 17:15

## 2017-01-01 RX ADMIN — SODIUM CHLORIDE, PRESERVATIVE FREE 5 ML: 5 INJECTION INTRAVENOUS at 08:26

## 2017-01-01 RX ADMIN — SODIUM CHLORIDE, PRESERVATIVE FREE 500 UNITS: 5 INJECTION INTRAVENOUS at 15:06

## 2017-01-01 RX ADMIN — SODIUM CHLORIDE 150 MG: 9 INJECTION, SOLUTION INTRAVENOUS at 11:11

## 2017-01-01 RX ADMIN — HYDROMORPHONE HYDROCHLORIDE 0.3 MG: 1 INJECTION, SOLUTION INTRAMUSCULAR; INTRAVENOUS; SUBCUTANEOUS at 19:21

## 2017-01-01 RX ADMIN — SODIUM CHLORIDE, PRESERVATIVE FREE 500 UNITS: 5 INJECTION INTRAVENOUS at 15:40

## 2017-01-01 RX ADMIN — NITROGLYCERIN 0.15 MCG/KG/MIN: 20 INJECTION INTRAVENOUS at 20:19

## 2017-01-01 RX ADMIN — HUMAN ALBUMIN MICROSPHERES AND PERFLUTREN 2 ML: 10; .22 INJECTION, SOLUTION INTRAVENOUS at 13:44

## 2017-01-01 RX ADMIN — FUROSEMIDE 20 MG: 10 INJECTION, SOLUTION INTRAVENOUS at 15:16

## 2017-01-01 RX ADMIN — FUROSEMIDE 40 MG: 10 INJECTION, SOLUTION INTRAVENOUS at 15:02

## 2017-01-01 RX ADMIN — FENTANYL CITRATE 50 MCG: 50 INJECTION, SOLUTION INTRAMUSCULAR; INTRAVENOUS at 20:44

## 2017-01-01 RX ADMIN — LIDOCAINE HYDROCHLORIDE 1 ML: 10 INJECTION, SOLUTION EPIDURAL; INFILTRATION; INTRACAUDAL; PERINEURAL at 12:09

## 2017-01-01 RX ADMIN — HEPARIN SODIUM 5000 UNITS: 5000 INJECTION, SOLUTION INTRAVENOUS; SUBCUTANEOUS at 13:51

## 2017-01-01 RX ADMIN — OXALIPLATIN 145 MG: 5 INJECTION, SOLUTION, CONCENTRATE INTRAVENOUS at 13:57

## 2017-01-01 RX ADMIN — ATROPINE SULFATE 0.4 MG: 0.4 INJECTION, SOLUTION INTRAMUSCULAR; INTRAVENOUS; SUBCUTANEOUS at 10:57

## 2017-01-01 RX ADMIN — LIDOCAINE 1 PATCH: 50 PATCH CUTANEOUS at 20:12

## 2017-01-01 RX ADMIN — METOPROLOL TARTRATE 5 MG: 5 INJECTION INTRAVENOUS at 16:22

## 2017-01-01 RX ADMIN — PROPOFOL 10 MG: 10 INJECTION, EMULSION INTRAVENOUS at 13:14

## 2017-01-01 RX ADMIN — NITROGLYCERIN 0.8 MG: 0.4 TABLET SUBLINGUAL at 20:06

## 2017-01-01 RX ADMIN — POTASSIUM CHLORIDE 20 MEQ: 14.9 INJECTION, SOLUTION INTRAVENOUS at 09:10

## 2017-01-01 RX ADMIN — POTASSIUM CHLORIDE 20 MEQ: 14.9 INJECTION, SOLUTION INTRAVENOUS at 10:14

## 2017-01-01 RX ADMIN — IOPAMIDOL 75 ML: 755 INJECTION, SOLUTION INTRAVENOUS at 16:43

## 2017-01-01 RX ADMIN — SODIUM CHLORIDE, PRESERVATIVE FREE 500 UNITS: 5 INJECTION INTRAVENOUS at 16:11

## 2017-01-01 RX ADMIN — BENZONATATE 100 MG: 100 CAPSULE, LIQUID FILLED ORAL at 00:27

## 2017-01-01 RX ADMIN — FUROSEMIDE 20 MG: 10 INJECTION, SOLUTION INTRAVENOUS at 10:01

## 2017-01-01 RX ADMIN — FLUOROURACIL 710 MG: 50 INJECTION, SOLUTION INTRAVENOUS at 13:19

## 2017-01-01 RX ADMIN — METOPROLOL TARTRATE 5 MG: 5 INJECTION INTRAVENOUS at 09:44

## 2017-01-01 RX ADMIN — HYDRALAZINE HYDROCHLORIDE 10 MG: 20 INJECTION INTRAMUSCULAR; INTRAVENOUS at 03:53

## 2017-01-01 RX ADMIN — ACETAMINOPHEN 650 MG: 325 TABLET ORAL at 14:22

## 2017-01-01 RX ADMIN — Medication 10 MG: at 20:58

## 2017-01-01 RX ADMIN — IRINOTECAN HYDROCHLORIDE 285 MG: 20 INJECTION, SOLUTION INTRAVENOUS at 11:25

## 2017-01-01 RX ADMIN — RANITIDINE HYDROCHLORIDE 50 MG: 25 INJECTION INTRAMUSCULAR; INTRAVENOUS at 20:46

## 2017-01-01 RX ADMIN — LIDOCAINE 1 PATCH: 50 PATCH CUTANEOUS at 19:35

## 2017-01-01 RX ADMIN — FENTANYL CITRATE 100 MCG/HR: 50 INJECTION, SOLUTION INTRAMUSCULAR; INTRAVENOUS at 05:10

## 2017-01-01 RX ADMIN — FAMOTIDINE 20 MG: 10 INJECTION, SOLUTION INTRAVENOUS at 09:40

## 2017-01-01 RX ADMIN — HYDRALAZINE HYDROCHLORIDE 10 MG: 20 INJECTION INTRAMUSCULAR; INTRAVENOUS at 08:21

## 2017-01-01 RX ADMIN — DEXTROSE MONOHYDRATE 600 MG: 50 INJECTION, SOLUTION INTRAVENOUS at 10:22

## 2017-01-01 RX ADMIN — SODIUM CHLORIDE 250 ML: 9 INJECTION, SOLUTION INTRAVENOUS at 10:10

## 2017-01-01 RX ADMIN — RANITIDINE HYDROCHLORIDE 50 MG: 25 INJECTION INTRAMUSCULAR; INTRAVENOUS at 07:58

## 2017-01-01 RX ADMIN — SODIUM CHLORIDE, PRESERVATIVE FREE 5 ML: 5 INJECTION INTRAVENOUS at 05:58

## 2017-01-01 RX ADMIN — MIDAZOLAM HYDROCHLORIDE 1 MG: 1 INJECTION, SOLUTION INTRAMUSCULAR; INTRAVENOUS at 12:20

## 2017-01-01 RX ADMIN — BEVACIZUMAB 325 MG: 400 INJECTION, SOLUTION INTRAVENOUS at 12:20

## 2017-01-01 RX ADMIN — ATROPINE SULFATE 0.4 MG: 0.4 INJECTION, SOLUTION INTRAMUSCULAR; INTRAVENOUS; SUBCUTANEOUS at 12:56

## 2017-01-01 RX ADMIN — Medication 2 G: at 11:18

## 2017-01-01 RX ADMIN — GUAIFENESIN AND CODEINE PHOSPHATE 5 ML: 100; 10 SOLUTION ORAL at 11:04

## 2017-01-01 RX ADMIN — POTASSIUM PHOSPHATE, MONOBASIC AND POTASSIUM PHOSPHATE, DIBASIC 20 MMOL: 224; 236 INJECTION, SOLUTION INTRAVENOUS at 12:08

## 2017-01-01 RX ADMIN — DEXTROSE MONOHYDRATE 250 ML: 50 INJECTION, SOLUTION INTRAVENOUS at 10:46

## 2017-01-01 RX ADMIN — SODIUM CHLORIDE, PRESERVATIVE FREE 5 ML: 5 INJECTION INTRAVENOUS at 13:53

## 2017-01-01 RX ADMIN — HYDRALAZINE HYDROCHLORIDE 10 MG: 20 INJECTION INTRAMUSCULAR; INTRAVENOUS at 11:10

## 2017-01-01 RX ADMIN — GUAIFENESIN AND CODEINE PHOSPHATE 5 ML: 100; 10 SOLUTION ORAL at 13:52

## 2017-01-01 RX ADMIN — HUMAN ALBUMIN MICROSPHERES AND PERFLUTREN 4 ML: 10; .22 INJECTION, SOLUTION INTRAVENOUS at 07:30

## 2017-01-01 RX ADMIN — OXALIPLATIN 148 MG: 5 INJECTION, SOLUTION, CONCENTRATE INTRAVENOUS at 12:25

## 2017-01-01 RX ADMIN — RANITIDINE HYDROCHLORIDE 50 MG: 25 INJECTION INTRAMUSCULAR; INTRAVENOUS at 08:21

## 2017-01-01 RX ADMIN — POTASSIUM PHOSPHATE, MONOBASIC AND POTASSIUM PHOSPHATE, DIBASIC 15 MMOL: 224; 236 INJECTION, SOLUTION INTRAVENOUS at 16:19

## 2017-01-01 RX ADMIN — PALONOSETRON HYDROCHLORIDE 0.25 MG: 0.25 INJECTION INTRAVENOUS at 09:35

## 2017-01-01 RX ADMIN — ATROPINE SULFATE 0.4 MG: 0.4 INJECTION, SOLUTION INTRAMUSCULAR; INTRAVENOUS; SUBCUTANEOUS at 10:29

## 2017-01-01 RX ADMIN — LORAZEPAM 0.5 MG: 2 INJECTION INTRAMUSCULAR; INTRAVENOUS at 19:49

## 2017-01-01 RX ADMIN — LEUCOVORIN CALCIUM 623 MG: 350 INJECTION, POWDER, LYOPHILIZED, FOR SOLUTION INTRAMUSCULAR; INTRAVENOUS at 10:44

## 2017-01-01 RX ADMIN — NITROGLYCERIN 0.25 MCG/KG/MIN: 20 INJECTION INTRAVENOUS at 20:43

## 2017-01-01 RX ADMIN — SODIUM CHLORIDE, PRESERVATIVE FREE 500 UNITS: 5 INJECTION INTRAVENOUS at 17:25

## 2017-01-01 RX ADMIN — SODIUM CHLORIDE 70 ML: 9 INJECTION, SOLUTION INTRAVENOUS at 16:42

## 2017-01-01 RX ADMIN — CARVEDILOL 6.25 MG: 6.25 TABLET, FILM COATED ORAL at 18:50

## 2017-01-01 RX ADMIN — PALONOSETRON HYDROCHLORIDE 0.25 MG: 0.25 INJECTION INTRAVENOUS at 10:06

## 2017-01-01 RX ADMIN — DEXAMETHASONE SODIUM PHOSPHATE 12 MG: 10 INJECTION, SOLUTION INTRAMUSCULAR; INTRAVENOUS at 09:12

## 2017-01-01 RX ADMIN — OMEPRAZOLE 20 MG: 20 CAPSULE, DELAYED RELEASE ORAL at 08:22

## 2017-01-01 RX ADMIN — HEPARIN SODIUM 5000 UNITS: 5000 INJECTION, SOLUTION INTRAVENOUS; SUBCUTANEOUS at 04:28

## 2017-01-01 RX ADMIN — OXALIPLATIN 150 MG: 5 INJECTION, SOLUTION, CONCENTRATE INTRAVENOUS at 10:45

## 2017-01-01 RX ADMIN — BENZONATATE 100 MG: 100 CAPSULE, LIQUID FILLED ORAL at 20:12

## 2017-01-01 RX ADMIN — POTASSIUM CHLORIDE 20 MEQ: 29.8 INJECTION, SOLUTION INTRAVENOUS at 14:01

## 2017-01-01 RX ADMIN — DEXTROSE MONOHYDRATE 250 ML: 50 INJECTION, SOLUTION INTRAVENOUS at 12:54

## 2017-01-01 RX ADMIN — IOPAMIDOL 65 ML: 755 INJECTION, SOLUTION INTRAVENOUS at 09:23

## 2017-01-01 ASSESSMENT — PAIN DESCRIPTION - DESCRIPTORS
DESCRIPTORS: DISCOMFORT
DESCRIPTORS: ACHING
DESCRIPTORS: BURNING;ACHING
DESCRIPTORS: ACHING
DESCRIPTORS: PATIENT UNABLE TO DESCRIBE
DESCRIPTORS: ACHING;SHARP

## 2017-01-01 ASSESSMENT — PAIN SCALES - GENERAL
PAINLEVEL: NO PAIN (0)
PAINLEVEL: MODERATE PAIN (5)
PAINLEVEL: NO PAIN (0)
PAINLEVEL: EXTREME PAIN (8)
PAINLEVEL: NO PAIN (0)
PAINLEVEL: NO PAIN (0)
PAINLEVEL: MODERATE PAIN (5)
PAINLEVEL: MILD PAIN (2)
PAINLEVEL: EXTREME PAIN (8)
PAINLEVEL: NO PAIN (0)
PAINLEVEL: EXTREME PAIN (8)
PAINLEVEL: SEVERE PAIN (6)
PAINLEVEL: NO PAIN (0)
PAINLEVEL: NO PAIN (0)

## 2017-01-01 ASSESSMENT — ACTIVITIES OF DAILY LIVING (ADL)
TOILETING: 0-->INDEPENDENT
DRESS: 0-->INDEPENDENT
AMBULATION: 0-->INDEPENDENT
TRANSFERRING: 0-->INDEPENDENT
SWALLOWING: 0-->SWALLOWS FOODS/LIQUIDS WITHOUT DIFFICULTY
PREVIOUS_RESPONSIBILITIES: MEAL PREP;HOUSEKEEPING;LAUNDRY;SHOPPING;MEDICATION MANAGEMENT;DRIVING
RETIRED_COMMUNICATION: 0-->UNDERSTANDS/COMMUNICATES WITHOUT DIFFICULTY
RETIRED_EATING: 0-->INDEPENDENT
BATHING: 0-->INDEPENDENT
COGNITION: 0 - NO COGNITION ISSUES REPORTED
FALL_HISTORY_WITHIN_LAST_SIX_MONTHS: NO

## 2017-01-01 ASSESSMENT — ENCOUNTER SYMPTOMS
FATIGUE: 1
VOMITING: 0
ABDOMINAL PAIN: 0
SHORTNESS OF BREATH: 1
VOMITING: 0
FEVER: 1
UNEXPECTED WEIGHT CHANGE: 0
APPETITE CHANGE: 1
COUGH: 1
NAUSEA: 0
NAUSEA: 0
DYSURIA: 0
SLEEP DISTURBANCE: 0
HEADACHES: 0
FATIGUE: 1
ABDOMINAL PAIN: 1
CHILLS: 0
SHORTNESS OF BREATH: 1
SORE THROAT: 0
FEVER: 0
COUGH: 1
ACTIVITY CHANGE: 1
RHINORRHEA: 0
VOMITING: 0
DIZZINESS: 1
NAUSEA: 1
CHILLS: 1
APPETITE CHANGE: 1
WEAKNESS: 1
FEVER: 0

## 2017-07-14 NOTE — ED AVS SNAPSHOT
[facility]    [address] [place] [address]    Phone:  [phone]    Fax:  [phone]                                       Charlene Douglas   MRN: [id number]    Department:  [facility]   Date of Visit:  7/14/2017           Patient Information     Date Of Birth          1950        Your diagnoses for this visit were:     Cough     Asthma in adult, mild persistent, uncomplicated        You were seen by Italo Dutton MD.      Follow-up Information     Follow up with Donna Shirley PA-C In 2 weeks.    Specialty:  Family Practice    Contact information:    [facility]  [address] [place] [id number] [phone]          Discharge Instructions         Asthma Trigger Checklist  Allergens, irritants, and other things may trigger your asthma. Check the box next to each of your triggers. After each trigger is a list of ways to avoid it.     Dust mites. Dust mites live in mattresses, bedding, carpets, curtains, and indoor dust.    To kill dust mites, wash bedding in hot water (130 F) each week.    Cover mattress and pillows with special dust-mite-proof cases.    Don t use upholstered furniture like sofas or chairs in the bedroom.    Use allergy-proof filters for air conditioners and furnaces. Replace or clean them as instructed.    If you can, replace carpeting with wood or tile jaki, especially in the bedroom.    Animals. Animals with fur or feathers shed dander (allergens).    It s best to choose a pet that doesn t have fur or feathers, such as a fish or a reptile.    If you have pets, keep them off of your bed and out of your bedroom.    Wash your hands and clothes after handling pets.      Mold. Mold grows in damp places, such as bathrooms, basements, and closets.    Ask someone to clean damp areas in your home every week. Or try wearing a face mask while you clean.    Run an exhaust fan while bathing.  Or leave a window open in the bathroom.    Repair water leaks in or around your home.    Have someone else cut grass or rake leaves, if possible.    Don t use vaporizers or humidifiers. They encourage mold growth.      Pollen. Pollen from trees, grasses, and weeds is a common allergen. (Flower pollens are generally not a problem).    Try to learn what types of pollen affect you most. Pollen levels vary depending on the plant, the season, and the time of day.    If possible, use air conditioning instead of opening the windows in your home or car.    Have someone else do yard work, if possible.      Cockroaches. Roaches are found in many homes and produce allergens.    Keep your kitchen clean and dry. A leaky faucet or drain can attract roaches.    Remove garbage from your home daily.    Store food in tightly sealed containers. Wash dishes as soon as they are used.    Use bait stations or traps to control roaches. Avoid using chemical sprays.      Smoke. Smoke may be from cigarettes, cigars, pipes, incense or candles, barbecues or grills, and fireplaces.    Don t smoke. And don t let people smoke in your home or car.    When you travel, ask for nonsmoking rental cars and hotel rooms.    Avoid fireplaces and wood stoves. If you can t, sit away from them. Make sure the smoke is directed outside.    Don t burn incense or use candles.    Move away from smoky outdoor cooking grills.      Smog.  Smog is from car exhaust and other pollution.    Read or listen to local air-quality reports. These let you know when air quality is poor.    Stay indoors as much as you can on smoggy days. If possible, use air conditioning instead of opening the windows.    In your car, set air conditioning to recirculate air, so less pollution gets in.      Strong odors. These include air fresheners, deodorizers, and cleaning products; perfume, deodorant, and other beauty products; incense and candles; and insect sprays and other sprays.    Use  scent-free products like deodorant or body lotion.    Avoid using cleaning products with bleach and ammonia. Make your own cleaning solution with white vinegar, baking soda, or mild dish soap.    Use exhaust fans while cooking. Or open a window, if possible.     Avoid perfumes, air fresheners, potpourri, and other scented products.      Other irritants. These include dust, aerosol sprays, and powders.    Wear a face mask while doing tasks like sanding, dusting, sweeping, and yard work. Open doors and windows if working indoors.    Use pump spray bottles instead of aerosols.    Pour liquid  onto a rag or cloth instead of spraying them.      Weather. Weather conditions can trigger symptoms or make them worse.    Watch for very high or low temperatures, very humid conditions, or a lot of wind, as these conditions can make symptoms worse.    Limit outdoor activity during the type of weather that affects you.    Wear a scarf over your mouth and nose in cold weather.      Colds, flu, and sinus infections. Upper respiratory infections can trigger asthma.    Wash your hands often with soap and warm water or use a hand  containing alcohol.    Get a yearly flu shot. And ask if you should get a pneumonia vaccine.    Take care of your general health. Get plenty of sleep. And eat a variety of healthy foods.      Food additives. Food additives can trigger asthma flare-ups in some people.    Check food labels for sulfites or other similar ingredients. These are often found in foods such as wine, beer, and dried fruits.    Avoid foods that contain these additives.      Medicine. Aspirin, NSAIDS like ibuprofen and naproxen, and heart medicines like beta-blockers may be triggers.    Tell your health care provider if you think certain medicines trigger symptoms.     Be sure to read the labels on over-the-counter medicines. They may have ingredients that cause symptoms for you.     , Emotions. Laughing, crying, or  feeling excited are triggers for some people.     To help you stay calm: Try breathing in slowly through your nose for a count of 2 seconds. Close your lips and breathe out for 4 seconds. Repeat.    Try to focus on a soothing image in your mind. This will help relax you and calm your breathing.    Remember to take your daily controller medicines. When you re upset or under stress, it s easy to forget.      Exercise. For some people, exercise can trigger symptoms. Don t let this keep you from being active.     If you have not been exercising regularly, start slow and work up gradually.    Take all of your medicines as prescribed.    If you use quick-relief medicine, make sure you have it with you when you exercise.    Stop if you have any symptoms. Make sure you talk with your provider about these symptoms.  Date Last Reviewed: 1/1/2017 2000-2017 [employer]. [address] [place] [address]. All rights reserved. This information is not intended as a substitute for professional medical care. Always follow your healthcare professional's instructions.          Discharge References/Attachments     ASTHMA, UNDERSTANDING (ENGLISH)    ASTHMA: INHALER, CARING FOR (ENGLISH)      Future Appointments        Provider Department Dept Phone Center    7/19/2017 10:30 AM Donna Shirley PA-C [facility] [phone] [facility]      24 Hour Appointment Hotline       To make an appointment at any [facility], call [phone] ([phone]). If you don't have a family doctor or clinic, we will help you find one. [facility] are conveniently located to serve the needs of you and your family.             Review of your medicines      START taking        Dose / Directions Last dose taken    albuterol 108 (90 BASE) MCG/ACT Inhaler   Commonly known as:  albuterol   Dose:  2 puff   Quantity:  1 Inhaler        Inhale 2 puffs into the lungs every 4 hours as needed for shortness of  breath / dyspnea   Refills:  0          Our records show that you are taking the medicines listed below. If these are incorrect, please call your family doctor or clinic.        Dose / Directions Last dose taken    ADVIL 200 MG capsule   Quantity:  120   Generic drug:  ibuprofen        1 CAPSULE EVERY 4 TO 6 HOURS AS NEEDED   Refills:  0        ANTI-OXIDANT PO        Take  by mouth. Take one tablet by mouth daily   Refills:  0        CALCIUM + D PO        1 TABLET DAILY   Refills:  0        NEW MED        Phytomega takes 2 soft gel caps daily   Refills:  0                Prescriptions were sent or printed at these locations (1 Prescription)                   [facility] [place], [place] - [address]    [address] , [facility] [address]    Telephone:  [phone]   Fax:  [phone]   Hours:                  E-Prescribed (1 of 1)         albuterol (ALBUTEROL) 108 (90 BASE) MCG/ACT Inhaler                Procedures and tests performed during your visit     CBC with platelets differential    CRP inflammation    CT Chest Pulmonary Embolism w Contrast    Cardiac Continuous Monitoring    Comprehensive metabolic panel    D dimer quantitative    EKG 12-lead, tracing only    INR    Partial thromboplastin time    Peripheral IV catheter    Pulse oximetry nursing    Troponin I      Orders Needing Specimen Collection     None      Pending Results     Date and Time Order Name Status Description    7/14/2017 1210 EKG 12-LEAD COMPLETE W/READ - CLINICS Preliminary             Pending Culture Results     No orders found from 7/12/2017 to 7/15/2017.            Pending Results Instructions     If you had any lab results that were not finalized at the time of your Discharge, you can call the ED Lab Result RN at [phone]. You will be contacted by this team for any positive Lab results or changes in treatment. The nurses are available 7 days a week from 10A to 6:30P.  You can leave a message 24 hours per day  "and they will return your call.        Thank you for choosing [facility]       Thank you for choosing [facility] for your care. Our goal is always to provide you with excellent care. Hearing back from our patients is one way we can continue to improve our services. Please take a few minutes to complete the written survey that you may receive in the mail after you visit with us. Thank you!        [employer] Information     [employer] lets you send messages to your doctor, view your test results, renew your prescriptions, schedule appointments and more. To sign up, go to [email or web address] . Click on \"Log in\" on the left side of the screen, which will take you to the Welcome page. Then click on \"Sign up Now\" on the right side of the page.     You will be asked to enter the access code listed below, as well as some personal information. Please follow the directions to create your username and password.     Your access code is: [email or web address]  Expires: 10/12/2017 12:27 PM     Your access code will  in 90 days. If you need help or a new code, please call your [facility] clinic or [phone].        Care EveryWhere ID     This is your Care EveryWhere ID. This could be used by other organizations to access your [facility] medical records  [id number]        Equal Access to Services     RON PERALTA : Nic kimo Socara, waaxda luqadaha, qaybta kaalmada adeegyada, debra reese. So [facility] [phone].    ATENCIÓN: Si habla español, tiene a hunt disposición servicios gratuitos de asistencia lingüística. Llame al [phone].    We comply with applicable federal civil rights laws and [place] laws. We do not discriminate on the basis of race, color, national origin, age, disability sex, sexual orientation or gender identity.            After Visit Summary       This is your record. Keep this with you and show to your community pharmacist(s) and doctor(s) at your next visit.                "

## 2017-07-14 NOTE — PROGRESS NOTES
SUBJECTIVE:                                                    Charlene Douglas is a 67 year old female who presents to clinic today for the following health issues:    Acute Illness   Acute illness concerns: Cough, not during the night but in the evenings. When her heart rate goes up she'll get a tickle in her throat and begin coughing.  Onset: x a little over 1 month    Fever: no    Chills/Sweats: YES- sweating this morning    Headache (location?): no    Sinus Pressure:no    Conjunctivitis:  no    Ear Pain: no    Rhinorrhea: no    Congestion: no    Sore Throat: no     Cough: YES- tickle that won't go away.    Wheeze: no    Decreased Appetite: YES    Nausea: no    Vomiting: no    Diarrhea:  no    Dysuria/Freq.: no    Fatigue/Achiness: YES- abdominal aches from coughing and fatigue.    Sick/Strep Exposure: no     Therapies Tried and outcome: mucinex, ibuprofen      Patient is here in clinic today with her  with concern about multiple symptoms. For about 1 month she reports that in the evenings she has been having a non productive cough that seems to come in fits. She states that she often has a fluttering feeling to her chest and then racing heart and notes that the cough seems to come after that. She has had some nausea and some abdominal aching which she contributes to coughing fits. She has not had any headaches or vision changes. She denies any chest pain, wheezing or SOB but does not that symptoms have been worse with exertion. She has not had fevers but did have sweating this morning.     -------------------------------------    Problem list and histories reviewed & adjusted, as indicated.  Additional history: as documented    BP Readings from Last 3 Encounters:   07/14/17 (!) 197/115   07/14/17 162/78   06/18/13 136/78    Wt Readings from Last 3 Encounters:   07/14/17 158 lb (71.7 kg)   07/14/17 158 lb 1.6 oz (71.7 kg)   06/18/13 158 lb (71.7 kg)           Reviewed and updated as needed this visit by  "clinical staff       Reviewed and updated as needed this visit by Provider         ROS:  Constitutional, HEENT, cardiovascular, pulmonary, gi and gu systems are negative, except as otherwise noted.    OBJECTIVE:     /78  Pulse 125  Temp 98.7  F (37.1  C) (Temporal)  Resp 16  Ht 5' 6.25\" (1.683 m)  Wt 158 lb 1.6 oz (71.7 kg)  LMP 06/07/2007  SpO2 97%  Breastfeeding? No  BMI 25.33 kg/m2  Body mass index is 25.33 kg/(m^2).  GENERAL: alert and no distress  NECK: no adenopathy, no asymmetry, masses, or scars and thyroid normal to palpation  RESP: lungs clear to auscultation - no rales, rhonchi or wheezes  CV: tachycardia, peripheral pulses strong and no peripheral edema  MS: no gross musculoskeletal defects noted, no edema  SKIN: no suspicious lesions or rashes  LYMPH: normal ant/post cervical, supraclavicular nodes    Diagnostic Test Results:  EKG: abnormal  CXR: pending    ASSESSMENT/PLAN:       ICD-10-CM    1. Persistent cough R05 XR Chest 2 Views     EKG 12-lead complete w/read - Clinics     CANCELED: CBC with platelets and differential     CANCELED: TSH with free T4 reflex   2. Palpitations R00.2 XR Chest 2 Views     EKG 12-lead complete w/read - Clinics     CANCELED: CBC with platelets and differential     CANCELED: TSH with free T4 reflex       I reviewed EKG results with ER MD and based on symptoms, blood pressure and EKG findings I will have her go to the ER for further cardiopulmonary evaluation.   See Patient Instructions    Vael Yu PA-C  [facility]    "

## 2017-07-14 NOTE — NURSING NOTE
"Chief Complaint   Patient presents with     Cough       Initial /76  Pulse 125  Temp 98.7  F (37.1  C) (Temporal)  Resp 16  Ht 5' 6.25\" (1.683 m)  Wt 158 lb 1.6 oz (71.7 kg)  LMP 06/07/2007  SpO2 97%  Breastfeeding? No  BMI 25.33 kg/m2 Estimated body mass index is 25.33 kg/(m^2) as calculated from the following:    Height as of this encounter: 5' 6.25\" (1.683 m).    Weight as of this encounter: 158 lb 1.6 oz (71.7 kg).  Medication Reconciliation: complete    "

## 2017-07-14 NOTE — MR AVS SNAPSHOT
"              After Visit Summary   7/14/2017    Charlene Douglas    MRN: [id number]           Patient Information     Date Of Birth          1950        Visit Information        Provider Department      7/14/2017 11:40 AM Vale Yu PA-C [facility]        Today's Diagnoses     Persistent cough    -  1    Palpitations          Care Instructions    Your EKG is abnormal as well as your pulse and blood pressure. I would like you to go to the ER in [place] for further evaluation of the heart and lungs.           Follow-ups after your visit        Your next 10 appointments already scheduled     Jul 19, 2017 10:30 AM CDT   PHYSICAL with Donna Shirley PA-C   [facility] ([facility]    [address]  [facility] [address] [phone]              Who to contact     If you have questions or need follow up information about today's clinic visit or your schedule please contact [facility] directly at [phone].  Normal or non-critical lab and imaging results will be communicated to you by [employer], letter or phone within 4 business days after the clinic has received the results. If you do not hear from us within 7 days, please contact the clinic through [employer] or phone. If you have a critical or abnormal lab result, we will notify you by phone as soon as possible.  Submit refill requests through [employer] or call your pharmacy and they will forward the refill request to us. Please allow 3 business days for your refill to be completed.          Additional Information About Your Visit        [employer] Information     [employer] lets you send messages to your doctor, view your test results, renew your prescriptions, schedule appointments and more. To sign up, go to [email or web address] . Click on \"Log in\" on the left side of the screen, which will take you to the Welcome page. Then click on \"Sign up Now\" on the right side of the page. " "    You will be asked to enter the access code listed below, as well as some personal information. Please follow the directions to create your username and password.     Your access code is: [email or web address]  Expires: 10/12/2017 12:27 PM     Your access code will  in 90 days. If you need help or a new code, please call your [facility] or [phone].        Care EveryWhere ID     This is your Care EveryWhere ID. This could be used by other organizations to access your [facility] medical records  [id number]        Your Vitals Were     Pulse Temperature Respirations Height Last Period Pulse Oximetry    125 98.7  F (37.1  C) (Temporal) 16 5' 6.25\" (1.683 m) 2007 97%    Breastfeeding? BMI (Body Mass Index)                No 25.33 kg/m2           Blood Pressure from Last 3 Encounters:   17 162/78   13 136/78   06/21/10 138/78    Weight from Last 3 Encounters:   17 158 lb 1.6 oz (71.7 kg)   13 158 lb (71.7 kg)   06/21/10 163 lb (73.9 kg)              We Performed the Following     EKG 12-lead complete w/read - Clinics        Primary Care Provider Office Phone # Fax #    Donna Shirley PA-C [phone]       [facility] [address] [place] [address]        Equal Access to Services     [facility]: Hadii aad ku hadasho Soomaali, waaxda luqadaha, qaybta kaalmada adeegyada, debra rowe . So [facility] [phone].    ATENCIÓN: Si habla español, tiene a hunt disposición servicios gratuitos de asistencia lingüística. Llame al [phone].    We comply with applicable federal civil rights laws and [place] laws. We do not discriminate on the basis of race, color, national origin, age, disability sex, sexual orientation or gender identity.            Thank you!     Thank you for choosing [facility]  for your care. Our goal is always to provide you with excellent care. Hearing back from our patients is one way we can " continue to improve our services. Please take a few minutes to complete the written survey that you may receive in the mail after your visit with us. Thank you!             Your Updated Medication List - Protect others around you: Learn how to safely use, store and throw away your medicines at www.disposemymeds.org.          This list is accurate as of: 7/14/17 12:27 PM.  Always use your most recent med list.                   Brand Name Dispense Instructions for use Diagnosis    ADVIL 200 MG capsule   Generic drug:  ibuprofen     120    1 CAPSULE EVERY 4 TO 6 HOURS AS NEEDED        ANTI-OXIDANT PO      Take  by mouth. Take one tablet by mouth daily        CALCIUM + D PO      1 TABLET DAILY        NEW MED      Phytomega takes 2 soft gel caps daily

## 2017-07-14 NOTE — ED NOTES
"Has had a cough for 5 weeks. Gradually getting worse. Went to [facility] and had EKG and CXR. Sent here as EKG was abnormal ? Blood clot, acid reflux, heart problem. States after eating, walking upstairs, talking she can feel a tickle in her throat and then she coughs to the point where \" I am going to turn inside out\"  "

## 2017-07-14 NOTE — ED PROVIDER NOTES
"  History     Chief Complaint   Patient presents with     Cough     The history is provided by the patient and the spouse.     Charlene Douglas is a 67 year old female who presents to the ED with a cough. Patients arrives from the [facility] and had EKG and CXR. Patient was sent here as the provider in [place] thought her EKG was abnormal. Patient has been experiencing this dry cough for the past month and it has been getting worse. She also has a \"tickle\" that goes through her chest. Patient states that the cough is nonexistent at nights, while lying down, and in the mornings but it starts in the evenings. She will go on a walk without coughing but will soon afterwards start coughing when she gets back to the house. Last night was having severe coughing but on Wednesday she felt fine. Her cough gets so severe that she starts to gag. She experiences shortness of breath when she is coughing. Charlene states she has experienced increased fatigue the last few weeks, coughing has \"worn her out\". Patient's  states she sometimes has intermittent noisy breathing at night when sleeping. Her mouth is dry typically at night. Patient hasn't noticed any symptoms of acid reflux. Patient thought the cough was coming from pollen. Mucinex hasn't helped her cough. Patient hasn't coughed since being in the ED. Patient denies fever, chills, chest pain, abdominal pain, history of tobacco use, cold symptoms, bitter taste in her mouth in the mornings, and recent weight change. She has monitored an increase in her heart rate recently. 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 Patient has otherwise been healthy.    I have reviewed the Medications, Allergies, Past Medical and Surgical History, and Social History in the Epic system.    Allergies:   Allergies   Allergen Reactions     No Known Allergies          No current facility-administered medications on file prior to encounter.   Current Outpatient " "Prescriptions on File Prior to Encounter:  NEW MED Phytomega takes 2 soft gel caps daily   Multiple Vitamin (ANTI-OXIDANT PO) Take  by mouth. Take one tablet by mouth daily   CALCIUM + D OR 1 TABLET DAILY   ADVIL 200 MG OR CAPS 1 CAPSULE EVERY 4 TO 6 HOURS AS NEEDED (Patient not taking: Reported on 2017)       Patient Active Problem List   Diagnosis     UTI (urinary tract infection)     Keloid scar     Family history of other cardiovascular diseases     Advanced directives, counseling/discussion     Congenital hip dysplasia     CARDIOVASCULAR SCREENING; LDL GOAL LESS THAN 160       Past Surgical History:   Procedure Laterality Date     C APPENDECTOMY       C  DELIVERY ONLY       C NONSPECIFIC PROCEDURE  1964    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       Social History   Substance Use Topics     Smoking status: Never Smoker     Smokeless tobacco: Never Used     Alcohol use 1.0 oz/week      Comment: 1 per week       Most Recent Immunizations   Administered Date(s) Administered     Influenza (IIV3) 01/10/2013     TD (ADULT, 7+) 1997     TDAP Vaccine (Adacel) 2007       BMI: Estimated body mass index is 25.5 kg/(m^2) as calculated from the following:    Height as of this encounter: 1.676 m (5' 6\").    Weight as of this encounter: 71.7 kg (158 lb).      Review of Systems   Constitutional: Positive for appetite change (decreased) and fatigue (2 weeks). Negative for chills, fever and unexpected weight change.        No body aches.   HENT: Negative for congestion, rhinorrhea and sore throat.    Respiratory: Positive for cough (dry) and shortness of breath (when coughing).         \"Tickle\" sensations in her chest.   Cardiovascular: Negative for leg swelling.   Gastrointestinal: Negative for abdominal pain.   Musculoskeletal:        No leg pain.   Neurological: Negative for headaches.   Psychiatric/Behavioral: Negative for sleep disturbance.   All other systems reviewed and " "are negative.      Physical Exam   BP: (!) 197/115  Pulse: 129  Temp: 98.9  F (37.2  C)  Resp: 16  Height: 167.6 cm (5' 6\")  Weight: 71.7 kg (158 lb)  SpO2: 97 %  Physical Exam   Constitutional: No distress.   HENT:   Head: Atraumatic.   Mouth/Throat: Oropharynx is clear and moist. No oropharyngeal exudate.   Eyes: Pupils are equal, round, and reactive to light. No scleral icterus.   Cardiovascular: Normal heart sounds and intact distal pulses.    Pulmonary/Chest: Breath sounds normal. No respiratory distress.   Abdominal: Soft. Bowel sounds are normal. There is no tenderness.   Musculoskeletal: She exhibits no edema or tenderness.   Skin: Skin is warm. No rash noted. She is not diaphoretic.       ED Course     ED Course     Procedures        EK2017 1218 This EKG was reviewed and interpreted by Italo Dutton M.D.  Sinus tachycardia with a rate of 119 beats per.  Normal-appearing liver.  Normal QRS morphology.  Q waves are seen in leads III and aVF.  Diffuse mild ST depression consistent with LV strain. LVH by voltage criteria.          Results for orders placed or performed during the hospital encounter of 17 (from the past 24 hour(s))   CBC with platelets differential   Result Value Ref Range    WBC 11.2 (H) 4.0 - 11.0 10e9/L    RBC Count 3.78 (L) 3.8 - 5.2 10e12/L    Hemoglobin 10.2 (L) 11.7 - 15.7 g/dL    Hematocrit 32.5 (L) 35.0 - 47.0 %    MCV 86 78 - 100 fl    MCH 27.0 26.5 - 33.0 pg    MCHC 31.4 (L) 31.5 - 36.5 g/dL    RDW 13.3 10.0 - 15.0 %    Platelet Count 424 150 - 450 10e9/L    Diff Method Automated Method     % Neutrophils 71.2 %    % Lymphocytes 16.2 %    % Monocytes 10.3 %    % Eosinophils 1.7 %    % Basophils 0.3 %    % Immature Granulocytes 0.3 %    Absolute Neutrophil 8.0 1.6 - 8.3 10e9/L    Absolute Lymphocytes 1.8 0.8 - 5.3 10e9/L    Absolute Monocytes 1.2 0.0 - 1.3 10e9/L    Absolute Eosinophils 0.2 0.0 - 0.7 10e9/L    Absolute Basophils 0.0 0.0 - 0.2 10e9/L    Abs " Immature Granulocytes 0.0 0 - 0.4 10e9/L   D dimer quantitative   Result Value Ref Range    D Dimer 1.7 (H) 0.0 - 0.50 ug/ml FEU   INR   Result Value Ref Range    INR 1.04 0.86 - 1.14   Partial thromboplastin time   Result Value Ref Range    PTT 31 22 - 37 sec   Comprehensive metabolic panel   Result Value Ref Range    Sodium 139 133 - 144 mmol/L    Potassium 3.9 3.4 - 5.3 mmol/L    Chloride 104 94 - 109 mmol/L    Carbon Dioxide 27 20 - 32 mmol/L    Anion Gap 8 3 - 14 mmol/L    Glucose 108 (H) 70 - 99 mg/dL    Urea Nitrogen 14 7 - 30 mg/dL    Creatinine 0.86 0.52 - 1.04 mg/dL    GFR Estimate 66 >60 mL/min/1.7m2    GFR Estimate If Black 80 >60 mL/min/1.7m2    Calcium 8.5 8.5 - 10.1 mg/dL    Bilirubin Total 0.3 0.2 - 1.3 mg/dL    Albumin 3.1 (L) 3.4 - 5.0 g/dL    Protein Total 8.0 6.8 - 8.8 g/dL    Alkaline Phosphatase 163 (H) 40 - 150 U/L    ALT 39 0 - 50 U/L    AST 36 0 - 45 U/L   Troponin I   Result Value Ref Range    Troponin I ES  0.000 - 0.045 ug/L     <0.015  The 99th percentile for upper reference range is 0.045 ug/L.  Troponin values in   the range of 0.045 - 0.120 ug/L may be associated with risks of adverse   clinical events.     CRP inflammation   Result Value Ref Range    CRP Inflammation 159.0 (H) 0.0 - 8.0 mg/L   CT Chest Pulmonary Embolism w Contrast    Narrative    CT CHEST WITH CONTRAST  7/14/2017 4:54 PM    HISTORY: Cough and elevated D-dimer. Evaluate for pulmonary embolism.    COMPARISON: Radiographs earlier today.    TECHNIQUE: Routine transverse CT imaging of the chest was performed  following the uneventful intravenous administration of 75 mL,  Isovue-370 contrast. A pulmonary embolism protocol was utilized.  Radiation dose for this scan was reduced using automated exposure  control, adjustment of the mA and/or kV according to patient size, or  iterative reconstruction technique.    FINDINGS: The heart size is normal. No enlarged lymph node or other  abnormal mediastinal mass is seen. The  thoracic aorta is unremarkable.  There is very good opacification of the pulmonary arteries with  contrast. No pulmonary embolism is seen. There is a 0.3 cm  noncalcified nodular density in the right middle lobe on series 7  image 30. The lungs are otherwise clear. No pneumothorax is  demonstrated. No pleural effusion is identified. The osseous  structures are unremarkable. No chest wall pathology is seen. The  visualized upper abdomen is unremarkable.      Impression    IMPRESSION:   1. No acute abnormality is seen. Specifically, no pulmonary embolism  is identified.  2. There is a 0.3 cm noncalcified nodule in the right middle lobe.     Recommendations for one or multiple incidental lung nodules < 6mm :    Low risk patients: No routine follow-up.    High risk patients: Optional follow-up CT at 12 months; if  unchanged, no further follow-up.    *Low Risk: Minimal or absent history of smoking or other known risk  factors.  *Nonsolid (ground glass) or partly solid nodules may require longer  follow-up to exclude indolent adenocarcinoma.  *Recommendations based on Guidelines for the Management of Incidental  Pulmonary Nodules Detected at CT: From the Fleischner Society 2017,  Radiology 2017.    REGINO CANTU MD       Medications   lidocaine 1 % 1 mL (not administered)   lidocaine (LMX4) kit (not administered)   sodium chloride (PF) 0.9% PF flush 3 mL (not administered)   sodium chloride (PF) 0.9% PF flush 3 mL (3 mLs Intracatheter Not Given 7/14/17 1445)   sodium chloride (PF) 0.9% PF flush 3 mL (3 mLs Intravenous Given 7/14/17 1642)   iopamidol (ISOVUE-370) solution 500 mL (75 mLs Intravenous Given 7/14/17 1643)   sodium chloride 0.9 % for CT scan flush dose 1,000 mL (70 mLs As instructed Given 7/14/17 1642)       Assessments & Plan (with Medical Decision Making)  Charlene is a 67-year-old female presents to the ED from the [facility] for evaluation of a persistent cough that she's had over the last 5  "weeks.  Patient reports that the cough is more active when she is up and active than when she is lying down at night.  She also reports that just prior to having the cough she gets a \"tickling sensation in her chest\" and then begins to cough incessantly.  She reports that the cough is been nonproductive and is associated again with activity but not shortness of breath, fever, chills, rhinorrhea or nasal congestion, postnasal drip, or stridor.  She denies any reflux symptoms and reports that she has no coughing when she is lying supine at night.  She denies any allergy type symptoms like watery eyes, burning or itching, or wheezing.  Her exam is unremarkable for any abnormalities including wheezing, stridor, poor air entry, or egophony.  There is no evidence for congestive heart failure.  Labs were obtained and show a slight leukocytosis at 12.2 with a normal differential except that her eosinophils are mildly elevated at 1.7.  Her d-dimer is elevated at 1.7 and her C-reactive protein is markedly elevated at 159.  Her comprehensive metabolic panel, INR, PTT, and troponin are all negative.  Because of the elevated d-dimer and the deep Q waves in her EKG, we elected to do a CT of the chest for PE protocol which showed a 0.3 cm nodule in the right middle lobe but otherwise was completely unremarkable for any pulmonary abnormalities including PE, pneumonia, pleural effusions, or other masses.  Given the patient's history and her exam, that this is gastroesophageal reflux, pneumonitis, or chronic bronchitis.  There does seem to be an aspect of the cough consistent with asthma.  Because of this and the lack of any other plausible reasons for her cough, we will do a trial of an albuterol inhaler to see if this resolves the cough.  Patient is in agreement with this plan and is suitable for discharge in satisfactory condition A did review with her and her  indications return immediately for reevaluation, otherwise she " is scheduled to see her primary care provider next week for her yearly physical.      The patient will need a follow-up CT in 6 months to reimage this right middle lobe pulmonary nodule based on radiology's recommendation.       I have reviewed the nursing notes.    I have reviewed the findings, diagnosis, plan and need for follow up with the patient.       Discharge Medication List as of 7/14/2017  5:51 PM      START taking these medications    Details   albuterol (ALBUTEROL) 108 (90 BASE) MCG/ACT Inhaler Inhale 2 puffs into the lungs every 4 hours as needed for shortness of breath / dyspnea, Disp-1 Inhaler, R-0, E-Prescribe             Final diagnoses:   Cough   Asthma in adult, mild persistent, uncomplicated     This document serves as a record of services personally performed by Italo Dutton MD. It was created on their behalf by Renan Lopez, a trained medical scribe. The creation of this record is based on the provider's personal observations and the statements of the patient. This document has been checked and approved by the attending provider.    Note: Chart documentation done in part with Dragon Voice Recognition software. Although reviewed after completion, some word and grammatical errors may remain.    7/14/2017   [facility] EMERGENCY DEPARTMENT     Italo Dutton MD  07/14/17 1902       Italo Dutton MD  07/14/17 7098

## 2017-07-14 NOTE — PATIENT INSTRUCTIONS
Your EKG is abnormal as well as your pulse and blood pressure. I would like you to go to the ER in [place] for further evaluation of the heart and lungs.

## 2017-07-14 NOTE — DISCHARGE INSTRUCTIONS
Asthma Trigger Checklist  Allergens, irritants, and other things may trigger your asthma. Check the box next to each of your triggers. After each trigger is a list of ways to avoid it.     Dust mites. Dust mites live in mattresses, bedding, carpets, curtains, and indoor dust.    To kill dust mites, wash bedding in hot water (130 F) each week.    Cover mattress and pillows with special dust-mite-proof cases.    Don t use upholstered furniture like sofas or chairs in the bedroom.    Use allergy-proof filters for air conditioners and furnaces. Replace or clean them as instructed.    If you can, replace carpeting with wood or tile jaki, especially in the bedroom.    Animals. Animals with fur or feathers shed dander (allergens).    It s best to choose a pet that doesn t have fur or feathers, such as a fish or a reptile.    If you have pets, keep them off of your bed and out of your bedroom.    Wash your hands and clothes after handling pets.      Mold. Mold grows in damp places, such as bathrooms, basements, and closets.    Ask someone to clean damp areas in your home every week. Or try wearing a face mask while you clean.    Run an exhaust fan while bathing. Or leave a window open in the bathroom.    Repair water leaks in or around your home.    Have someone else cut grass or rake leaves, if possible.    Don t use vaporizers or humidifiers. They encourage mold growth.      Pollen. Pollen from trees, grasses, and weeds is a common allergen. (Flower pollens are generally not a problem).    Try to learn what types of pollen affect you most. Pollen levels vary depending on the plant, the season, and the time of day.    If possible, use air conditioning instead of opening the windows in your home or car.    Have someone else do yard work, if possible.      Cockroaches. Roaches are found in many homes and produce allergens.    Keep your kitchen clean and dry. A leaky faucet or drain can attract roaches.    Remove  garbage from your home daily.    Store food in tightly sealed containers. Wash dishes as soon as they are used.    Use bait stations or traps to control roaches. Avoid using chemical sprays.      Smoke. Smoke may be from cigarettes, cigars, pipes, incense or candles, barbecues or grills, and fireplaces.    Don t smoke. And don t let people smoke in your home or car.    When you travel, ask for nonsmoking rental cars and hotel rooms.    Avoid fireplaces and wood stoves. If you can t, sit away from them. Make sure the smoke is directed outside.    Don t burn incense or use candles.    Move away from smoky outdoor cooking grills.      Smog.  Smog is from car exhaust and other pollution.    Read or listen to local air-quality reports. These let you know when air quality is poor.    Stay indoors as much as you can on smoggy days. If possible, use air conditioning instead of opening the windows.    In your car, set air conditioning to recirculate air, so less pollution gets in.      Strong odors. These include air fresheners, deodorizers, and cleaning products; perfume, deodorant, and other beauty products; incense and candles; and insect sprays and other sprays.    Use scent-free products like deodorant or body lotion.    Avoid using cleaning products with bleach and ammonia. Make your own cleaning solution with white vinegar, baking soda, or mild dish soap.    Use exhaust fans while cooking. Or open a window, if possible.     Avoid perfumes, air fresheners, potpourri, and other scented products.      Other irritants. These include dust, aerosol sprays, and powders.    Wear a face mask while doing tasks like sanding, dusting, sweeping, and yard work. Open doors and windows if working indoors.    Use pump spray bottles instead of aerosols.    Pour liquid  onto a rag or cloth instead of spraying them.      Weather. Weather conditions can trigger symptoms or make them worse.    Watch for very high or low  temperatures, very humid conditions, or a lot of wind, as these conditions can make symptoms worse.    Limit outdoor activity during the type of weather that affects you.    Wear a scarf over your mouth and nose in cold weather.      Colds, flu, and sinus infections. Upper respiratory infections can trigger asthma.    Wash your hands often with soap and warm water or use a hand  containing alcohol.    Get a yearly flu shot. And ask if you should get a pneumonia vaccine.    Take care of your general health. Get plenty of sleep. And eat a variety of healthy foods.      Food additives. Food additives can trigger asthma flare-ups in some people.    Check food labels for sulfites or other similar ingredients. These are often found in foods such as wine, beer, and dried fruits.    Avoid foods that contain these additives.      Medicine. Aspirin, NSAIDS like ibuprofen and naproxen, and heart medicines like beta-blockers may be triggers.    Tell your health care provider if you think certain medicines trigger symptoms.     Be sure to read the labels on over-the-counter medicines. They may have ingredients that cause symptoms for you.     , Emotions. Laughing, crying, or feeling excited are triggers for some people.     To help you stay calm: Try breathing in slowly through your nose for a count of 2 seconds. Close your lips and breathe out for 4 seconds. Repeat.    Try to focus on a soothing image in your mind. This will help relax you and calm your breathing.    Remember to take your daily controller medicines. When you re upset or under stress, it s easy to forget.      Exercise. For some people, exercise can trigger symptoms. Don t let this keep you from being active.     If you have not been exercising regularly, start slow and work up gradually.    Take all of your medicines as prescribed.    If you use quick-relief medicine, make sure you have it with you when you exercise.    Stop if you have any  symptoms. Make sure you talk with your provider about these symptoms.  Date Last Reviewed: 1/1/2017 2000-2017 [employer]. [address] [place] [address], [place] [address]. All rights reserved. This information is not intended as a substitute for professional medical care. Always follow your healthcare professional's instructions.

## 2017-07-14 NOTE — ED AVS SNAPSHOT
[facility] Emergency Department    [address] [place] [address]    Phone:  [phone]    Fax:  [phone]                                       Charlene Douglas   MRN: [id number]    Department:  [facility] Emergency Department   Date of Visit:  7/14/2017           After Visit Summary Signature Page     I have received my discharge instructions, and my questions have been answered. I have discussed any challenges I see with this plan with the nurse or doctor.    ..........................................................................................................................................  Patient/Patient Representative Signature      ..........................................................................................................................................  Patient Representative Print Name and Relationship to Patient    ..................................................               ................................................  Date                                            Time    ..........................................................................................................................................  Reviewed by Signature/Title    ...................................................              ..............................................  Date                                                            Time

## 2017-07-19 PROBLEM — R91.1 LUNG NODULE: Status: ACTIVE | Noted: 2017-01-01

## 2017-07-19 PROBLEM — R03.0 ELEVATED BLOOD PRESSURE READING WITHOUT DIAGNOSIS OF HYPERTENSION: Status: ACTIVE | Noted: 2017-01-01

## 2017-07-19 NOTE — PROGRESS NOTES
SUBJECTIVE:   Charlene Douglas is a 67 year old female who presents for Preventive Visit.    Are you in the first 12 months of your Medicare Part B coverage?  Does not have medicare right now just medica    Healthy Habits:    Do you get at least three servings of calcium containing foods daily (dairy, green leafy vegetables, etc.)? Yes plus a calcium supplement    Amount of exercise or daily activities, outside of work: tries to go for walk every day but has done less with her cough    Problems taking medications regularly not applicable    Medication side effects: No    Have you had an eye exam in the past two years? yes    Do you see a dentist twice per year? yes    Do you have sleep apnea, excessive snoring or daytime drowsiness?no    COGNITIVE SCREEN  1) Repeat 3 items (Banana, Sunrise, Chair)    2) Clock draw: NORMAL  3) 3 item recall: Recalls 3 objects  Results: NORMAL clock, 3 items recalled: COGNITIVE IMPAIRMENT LESS LIKELY    Mini-CogTM Copyright S Jin. Licensed by the author for use in [facility]; reprinted with permission (bhavya@[facility]). All rights reserved.      Bowel movements have changed-has been using a generic mucinex prn and wondering if this could be a side effect.  There are soft or liquid and occur 2-3 times in the morning. Typically she has one formed stool in the morning she denies any blood in her stool. She is never allowed colonoscopy though she is willing to do a fit card her.   She is very anxious and does not like  To come to the doctor's office    Reviewed and updated as needed this visit by clinical staffTobacco  Allergies  Med Hx  Surg Hx  Fam Hx  Soc Hx        Reviewed and updated as needed this visit by Provider        Social History   Substance Use Topics     Smoking status: Never Smoker     Smokeless tobacco: Never Used     Alcohol use 1.0 oz/week      Comment: 1 per week       The patient does not drink >3 drinks per day nor >7 drinks per week.    Today's  PHQ-2 Score:   PHQ-2 ( 1999 Pfizer) 7/19/2017 7/14/2017   Q1: Little interest or pleasure in doing things 0 0   Q2: Feeling down, depressed or hopeless 0 0   PHQ-2 Score 0 0       Do you feel safe in your environment - Yes    Do you have a Health Care Directive?: Yes: Patient states has Advance Directive and will bring in a copy to clinic.    Current providers sharing in care for this patient include:   Patient Care Team:  Donna Shirley PA-C as PCP - General      Hearing impairment: No    Ability to successfully perform activities of daily living: Yes, no assistance needed     Fall risk:  Fallen 2 or more times in the past year?: No  Any fall with injury in the past year?: No      Home safety:  none identified      The following health maintenance items are reviewed in Epic and correct as of today:Health Maintenance   Topic Date Due     HEPATITIS C SCREENING  05/06/1970     FIT Q1 YR  06/24/2014     PAP Q3 YR  06/18/2016     FALL RISK ASSESSMENT  05/06/2017     DEXA SCAN SCREENING (SYSTEM ASSIGNED)  05/06/2017     PNEUMOCOCCAL (1 of 2 - PCV13) 05/06/2017     TETANUS IMMUNIZATION (SYSTEM ASSIGNED)  07/02/2017     INFLUENZA VACCINE (SYSTEM ASSIGNED)  09/01/2017     LIPID SCREEN Q5 YR FEMALE (SYSTEM ASSIGNED)  06/18/2018     ADVANCE DIRECTIVE PLANNING Q5 YRS  06/18/2018     MAMMO SCREEN Q2 YR (SYSTEM ASSIGNED)  10/17/2018     Labs reviewed in EPIC  BP Readings from Last 3 Encounters:   07/19/17 180/66   07/14/17 (!) 173/96   07/14/17 162/78    Wt Readings from Last 3 Encounters:   07/19/17 157 lb (71.2 kg)   07/14/17 158 lb (71.7 kg)   07/14/17 158 lb 1.6 oz (71.7 kg)                  Patient Active Problem List   Diagnosis     UTI (urinary tract infection)     Keloid scar     Family history of other cardiovascular diseases     Advanced directives, counseling/discussion     Congenital hip dysplasia     CARDIOVASCULAR SCREENING; LDL GOAL LESS THAN 160     Elevated blood pressure reading without diagnosis of  hypertension     Past Surgical History:   Procedure Laterality Date     C APPENDECTOMY       C  DELIVERY ONLY       C NONSPECIFIC PROCEDURE  1964    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       Social History   Substance Use Topics     Smoking status: Never Smoker     Smokeless tobacco: Never Used     Alcohol use 1.0 oz/week      Comment: 1 per week     Family History   Problem Relation Age of Onset     C.A.D. Mother      quad- bypass at age 78     CEREBROVASCULAR DISEASE Mother      in her 70's     Lipids Mother      Hypertension Mother      Other - See Comments Mother      Triple Bi-pass     Circulatory Father      abd aneurysm     GASTROINTESTINAL DISEASE Father      diverticulitis     Hypertension Father                Pneumonia Vaccine:Adults age 65+ who have not received previous Pneumovax (PPSV23) or PCV13 as an adult: Should first be given PCV13 AND then should be given PPSV23 6-12 months after PCV13  Mammogram Screening: Patient over age 50, mutual decision to screen reflected in health maintenance.    History of abnormal Pap smear:   Last 3 Pap Results:   PAP (no units)   Date Value   2013 NIL   2010 NIL   2007 NIL    Status post benign hysterectomy. Health Maintenance and Surgical History updated.  ROS:C: NEGATIVE for fever, chills, change in weight  INTEGUMENTARY/SKIN: NEGATIVE for worrisome rashes, moles or lesions  E: NEGATIVE for vision changes or irritation  ENT/MOUTH: as above  RESP:cough as above some mild SOB at times related to cough and assoc chest wall discomfort with coughing  B: NEGATIVE for masses, tenderness or discharge  CV: NEGATIVE for chest pain, palpitations or peripheral edema  GI: bowel changes as prescribed above perhaps related to Mucinex perhaps not  : NEGATIVE for frequency, dysuria, or hematuria  M: NEGATIVE for significant arthralgias or myalgia  N: NEGATIVE for weakness, dizziness or paresthesias  P: NEGATIVE for changes in  "mood or affect    OBJECTIVE:   /70  Pulse 132  Temp 98.5  F (36.9  C) (Oral)  Resp 12  Ht 5' 6.25\" (1.683 m)  Wt 157 lb (71.2 kg)  LMP 06/07/2007  SpO2 99%  BMI 25.15 kg/m2 Estimated body mass index is 25.15 kg/(m^2) as calculated from the following:    Height as of this encounter: 5' 6.25\" (1.683 m).    Weight as of this encounter: 157 lb (71.2 kg).  EXAM:   GENERAL: healthy, alert and no distress  EYES: Eyes grossly normal to inspection, PERRL and conjunctivae and sclerae normal  HENT: ear canals and TM's normal, nose and mouth without ulcers or lesions  NECK: no adenopathy, no asymmetry, masses, or scars and thyroid normal to palpation  RESP: lungs clear to auscultation - no rales, rhonchi or wheezes  BREAST: normal without masses, tenderness or nipple discharge and no palpable axillary masses or adenopathy  CV: regular rate and rhythm, normal S1 S2, no S3 or S4, no murmur, click or rub, no peripheral edema and peripheral pulses strong  CV: occasional premature beats, normal S1 S2, no S3 or S4, no murmur, click or rub, peripheral pulses strong and no peripheral edema  ABDOMEN: soft, nontender, no hepatosplenomegaly, no masses and bowel sounds normal  MS: no gross musculoskeletal defects noted, no edema  SKIN: no suspicious lesions or rashes  NEURO: Normal strength and tone, mentation intact and speech normal  PSYCH: mentation appears normal, affect normal/bright    ASSESSMENT / PLAN:   1. Encounter for routine adult health examination with abnormal findings      2. Screen for colon cancer  Would consider colonoscopy in the future if her bowel changes do not improve when she has stopped Mucinex  - Fecal colorectal cancer screen (FIT); Future    3. Asymptomatic postmenopausal status  Had a normal DEXA scan several years ago does not want to repeat this year    4. Need for hepatitis C screening test    - Hepatitis C Screen Reflex to HCV RNA Quant and Genotype; Future    5. Need for prophylactic " "vaccination against Streptococcus pneumoniae (pneumococcus)  given    6. Need for prophylactic vaccination with tetanus-diphtheria (TD)  given    7. Screening for diabetes mellitus    - Glucose; Future      9. CARDIOVASCULAR SCREENING; LDL GOAL LESS THAN 160    - **Lipid panel reflex to direct LDL FUTURE anytime; Future    10. Encounter for screening mammogram for breast cancer    - *MA Screening Digital Bilateral; Future    11. Need for vaccination    - TDAP VACCINE (ADACEL) [48314.002]  - Pneumococcal vaccine 13 valent PCV13 IM (Prevnar) [99925]  - ADMIN: Vaccine, Initial (92614)    12. Elevated blood pressure reading without diagnosis of hypertension  Patient states it is because she is very nervous, her heart rate is also elevated, she will see the nurse for a recheck of blood pressure and heart rate we could consider Holter to look at her heart rate  - **TSH with free T4 reflex FUTURE anytime; Future    13. Lung nodule  She will need repeated chest CT in 6-12 months      End of Life Planning:  Patient currently has an advanced directive: Yes.  Practitioner is supportive of decision.  She will bring a copy    COUNSELING:  Reviewed preventive health counseling, as reflected in patient instructions    BP Screening:   Last 3 BP Readings:    BP Readings from Last 3 Encounters:   07/19/17 180/66   07/14/17 (!) 173/96   07/14/17 162/78       The following was recommended to the patient:  Recommend lifestyle modifications  Flow nurse blood pressure recheck in 1-2 weeks    Estimated body mass index is 25.15 kg/(m^2) as calculated from the following:    Height as of this encounter: 5' 6.25\" (1.683 m).    Weight as of this encounter: 157 lb (71.2 kg).     reports that she has never smoked. She has never used smokeless tobacco.        Appropriate preventive services were discussed with this patient, including applicable screening as appropriate for cardiovascular disease, diabetes, osteopenia/osteoporosis, and glaucoma.  " As appropriate for age/gender, discussed screening for colorectal cancer, prostate cancer, breast cancer, and cervical cancer. Checklist reviewing preventive services available has been given to the patient.    Reviewed patients plan of care and provided an AVS. The Basic Care Plan (routine screening as documented in Health Maintenance) for Charlene meets the Care Plan requirement. This Care Plan has been established and reviewed with the Patient.    Counseling Resources:  ATP IV Guidelines  Pooled Cohorts Equation Calculator  Breast Cancer Risk Calculator  FRAX Risk Assessment  ICSI Preventive Guidelines  Dietary Guidelines for Americans, 2010  USDA's MyPlate  ASA Prophylaxis  Lung CA ScreeningSubjective-   Recheck cough from when she was in the ED on 7/14/17-not better and inhaler did not go well.  She did not likely made her feel her feel awful, her stomach was upset she was lightheaded and she had diarrhea. It did not seem to help her cough anyway. She is hopeful that her cough is resolving as has not been as bothersome over the last couple days. It is a nonproductive cough that she has had since June. Initially she thought it began because of Roberto and she was exposed to over-the-counter allergy meds did not help. She does not feel any postnasal drip congestion rhinorrhea or reflux type symptoms. Her cough does not bother her at all at night as she is sleeping.  Seems to get worse if her heart rate increases sometimes when she is speaking. She is able to speak well today without triggering a cough.    Objective- see above-     Assessment/plan    8. Cough  Unknown Etiology will do a trial of Singulair as albuterol was not helpful, consider omeprazole trial as well. Not improving we could have her see pulmonology  - montelukast (SINGULAIR) 10 MG tablet; Take 1 tablet (10 mg) by mouth At Bedtime  Dispense: 30 tablet; Refill: 0          Donna Shirley PA-C  [facility]  Electronically signed by Donna  Casper PETERSEN

## 2017-07-19 NOTE — MR AVS SNAPSHOT
After Visit Summary   7/19/2017    Charlene Douglas    MRN: [id number]           Patient Information     Date Of Birth          1950        Visit Information        Provider Department      7/19/2017 10:30 AM Donna Shirley PA-C [facility]        Today's Diagnoses     Screening for diabetes mellitus    -  1    Encounter for routine adult health examination with abnormal findings        Screen for colon cancer        Asymptomatic postmenopausal status        Need for hepatitis C screening test        Need for prophylactic vaccination against Streptococcus pneumoniae (pneumococcus)        Need for prophylactic vaccination with tetanus-diphtheria (TD)        Cough        CARDIOVASCULAR SCREENING; LDL GOAL LESS THAN 160        Encounter for screening mammogram for breast cancer          Care Instructions      Preventive Health Recommendations  Female Ages 65 +    Yearly exam:     See your health care provider every year in order to  o Review health changes.   o Discuss preventive care.    o Review your medicines if your doctor has prescribed any.      You no longer need a yearly Pap test unless you've had an abnormal Pap test in the past 10 years. If you have vaginal symptoms, such as bleeding or discharge, be sure to talk with your provider about a Pap test.      Every 1 to 2 years, have a mammogram.  If you are over 69, talk with your health care provider about whether or not you want to continue having screening mammograms.      Every 10 years, have a colonoscopy. Or, have a yearly FIT test (stool test). These exams will check for colon cancer.       Have a cholesterol test every 5 years, or more often if your doctor advises it.       Have a diabetes test (fasting glucose) every three years. If you are at risk for diabetes, you should have this test more often.       At age 65, have a bone density scan (DEXA) to check for osteoporosis (brittle bone disease).    Shots:    Get a flu  shot each year.    Get a tetanus shot every 10 years.    Talk to your doctor about your pneumonia vaccines. There are now two you should receive - Pneumovax (PPSV 23) and Prevnar (PCV 13).    Talk to your doctor about the shingles vaccine.    Talk to your doctor about the hepatitis B vaccine.    Nutrition:     Eat at least 5 servings of fruits and vegetables each day.      Eat whole-grain bread, whole-wheat pasta and brown rice instead of white grains and rice.      Talk to your provider about Calcium and Vitamin D.     Lifestyle    Exercise at least 150 minutes a week (30 minutes a day, 5 days a week). This will help you control your weight and prevent disease.      Limit alcohol to one drink per day.      No smoking.       Wear sunscreen to prevent skin cancer.       See your dentist twice a year for an exam and cleaning.      See your eye doctor every 1 to 2 years to screen for conditions such as glaucoma, macular degeneration, cataracts, etc           Follow-ups after your visit        Future tests that were ordered for you today     Open Future Orders        Priority Expected Expires Ordered    *MA Screening Digital Bilateral Routine  7/19/2018 7/19/2017    Hepatitis C Screen Reflex to HCV RNA Quant and Genotype Routine  7/19/2018 7/19/2017    Fecal colorectal cancer screen (FIT) Routine 8/9/2017 10/11/2017 7/19/2017    **Lipid panel reflex to direct LDL FUTURE anytime Routine 7/19/2017 7/19/2018 7/19/2017    Glucose Routine  7/19/2018 7/19/2017            Who to contact     If you have questions or need follow up information about today's clinic visit or your schedule please contact [facility] directly at [phone].  Normal or non-critical lab and imaging results will be communicated to you by MyChart, letter or phone within 4 business days after the clinic has received the results. If you do not hear from us within 7 days, please contact the clinic through MyChart or phone. If you have a  "critical or abnormal lab result, we will notify you by phone as soon as possible.  Submit refill requests through [employer] or call your pharmacy and they will forward the refill request to us. Please allow 3 business days for your refill to be completed.          Additional Information About Your Visit        [employer] Information     [employer] lets you send messages to your doctor, view your test results, renew your prescriptions, schedule appointments and more. To sign up, go to [email or web address] . Click on \"Log in\" on the left side of the screen, which will take you to the Welcome page. Then click on \"Sign up Now\" on the right side of the page.     You will be asked to enter the access code listed below, as well as some personal information. Please follow the directions to create your username and password.     Your access code is: [email or web address]  Expires: 10/12/2017 12:27 PM     Your access code will  in 90 days. If you need help or a new code, please call your [facility] clinic or [phone].        Care EveryWhere ID     This is your Care EveryWhere ID. This could be used by other organizations to access your [facility] medical records  [id number]        Your Vitals Were     Pulse Temperature Respirations Height Last Period Pulse Oximetry    132 98.5  F (36.9  C) (Oral) 12 5' 6.25\" (1.683 m) 2007 99%    BMI (Body Mass Index)                   25.15 kg/m2            Blood Pressure from Last 3 Encounters:   17 176/70   17 (!) 173/96   17 162/78    Weight from Last 3 Encounters:   17 157 lb (71.2 kg)   17 158 lb (71.7 kg)   17 158 lb 1.6 oz (71.7 kg)                 Today's Medication Changes          These changes are accurate as of: 17 11:18 AM.  If you have any questions, ask your nurse or doctor.               Start taking these medicines.        Dose/Directions    montelukast 10 MG tablet   Commonly known as:  SINGULAIR   Used for:  Cough   Started by:  " Donna Shirley PA-C        Dose:  10 mg   Take 1 tablet (10 mg) by mouth At Bedtime   Quantity:  30 tablet   Refills:  0            Where to get your medicines      These medications were sent to [facility] - [address]   [address] [place] [address]     Phone:  [phone]     montelukast 10 MG tablet                Primary Care Provider Office Phone # Fax #    Donna Shirley PA-C [phone]       [facility] [address] [place] [address]        Equal Access to Services     [facility]: Hadii aad ku hadasho Soomaali, waaxda luqadaha, qaybta kaalmada adeegyada, waxay idiin hayaan adeeg khcece rowe . So [facility] [phone].    ATENCIÓN: Si habla español, tiene a hunt disposición servicios gratuitos de asistencia lingüística. Llame al [phone].    We comply with applicable federal civil rights laws and [place] laws. We do not discriminate on the basis of race, color, national origin, age, disability sex, sexual orientation or gender identity.            Thank you!     Thank you for choosing [facility]  for your care. Our goal is always to provide you with excellent care. Hearing back from our patients is one way we can continue to improve our services. Please take a few minutes to complete the written survey that you may receive in the mail after your visit with us. Thank you!             Your Updated Medication List - Protect others around you: Learn how to safely use, store and throw away your medicines at www.disposemymeds.org.          This list is accurate as of: 7/19/17 11:18 AM.  Always use your most recent med list.                   Brand Name Dispense Instructions for use Diagnosis    ADVIL 200 MG capsule   Generic drug:  ibuprofen     120    1 CAPSULE EVERY 4 TO 6 HOURS AS NEEDED        albuterol 108 (90 BASE) MCG/ACT Inhaler    albuterol    1 Inhaler    Inhale 2 puffs into the lungs every 4  hours as needed for shortness of breath / dyspnea        ANTI-OXIDANT PO      Take  by mouth. Take one tablet by mouth daily        CALCIUM + D PO      1 TABLET DAILY        montelukast 10 MG tablet    SINGULAIR    30 tablet    Take 1 tablet (10 mg) by mouth At Bedtime    Cough       NEW MED      Phytomega takes 2 soft gel caps daily

## 2017-07-19 NOTE — NURSING NOTE
"Chief Complaint   Patient presents with     Wellness Visit     URI       Initial /70  Pulse 132  Temp 98.5  F (36.9  C) (Oral)  Resp 12  Ht 5' 6.25\" (1.683 m)  Wt 157 lb (71.2 kg)  LMP 06/07/2007  SpO2 99%  BMI 25.15 kg/m2 Estimated body mass index is 25.15 kg/(m^2) as calculated from the following:    Height as of this encounter: 5' 6.25\" (1.683 m).    Weight as of this encounter: 157 lb (71.2 kg).  Medication Reconciliation: complete     Violeta Kramer CMA (AAMA)      "

## 2017-07-19 NOTE — TELEPHONE ENCOUNTER
Please call pt- she is due for her 6 month ct scan recheck of her pulmonary nodule that is non calcified.  NP place orders and then help pt set up appt   Donna Shirley PA-C

## 2017-07-19 NOTE — NURSING NOTE
Prior to injection verified patient identity using patient's name and date of birth.  Screening Questionnaire for Adult Immunization    Are you sick today?   No   Do you have allergies to medications, food, a vaccine component or latex?   No   Have you ever had a serious reaction after receiving a vaccination?   No   Do you have a long-term health problem with heart disease, lung disease, asthma, kidney disease, metabolic disease (e.g. diabetes), anemia, or other blood disorder?   No   Do you have cancer, leukemia, HIV/AIDS, or any other immune system problem?   No   In the past 3 months, have you taken medications that affect  your immune system, such as prednisone, other steroids, or anticancer drugs; drugs for the treatment of rheumatoid arthritis, Crohn s disease, or psoriasis; or have you had radiation treatments?   No   Have you had a seizure, or a brain or other nervous system problem?   No   During the past year, have you received a transfusion of blood or blood     products, or been given immune (gamma) globulin or antiviral drug?   No   For women: Are you pregnant or is there a chance you could become        pregnant during the next month?   No   Have you received any vaccinations in the past 4 weeks?   No     Immunization questionnaire answers were all negative.      MNVFC doesn't apply on this patient    Per orders of Donna Shirley, injection of tdap and prevnar given by Violeta Kramer. Patient instructed to remain in clinic for 15 minutes afterwards, and to report any adverse reaction to me immediately.       Screening performed by Violeta Kramer on 7/19/2017 at 12:08 PM.

## 2017-07-27 NOTE — TELEPHONE ENCOUNTER
Spoke with pt and gave information below. Pt agrees to do colonoscopy. Order placed and pt informed they will call her to schedule.    Violeta Kramer CMA (AAMA)

## 2017-07-27 NOTE — TELEPHONE ENCOUNTER
Please call pt- her screening test for colon cancer is showing blood in her stool.  I highly recommend that we do a colonoscopy.  Donna Shirley PA-C

## 2017-07-28 NOTE — PROGRESS NOTES
Charlene Douglas is a 67 year old female who comes in today for a Blood Pressure check because of ongoing blood pressure monitoring.    *Document pulse and BP  *Use new set of vitals button for multiple readings.  *Use extended vitals for orthostatic    Vitals as recorded, a regular cuff was used.    Patient is not taking medication as prescribed  Patient is not tolerating medications well.  Patient is not monitoring Blood Pressure at home.  Average readings if yes are n/a    Current complaints: none    Disposition: advised patient to schedule appointment with Donna Shirley. Patient states her BP is elevated due to recent FIT testing results. She is very anxious about having to do COlonoscopy. Have Colonoscopy is scheduled she will schedule appointment to follow up about BP.    Julianne Terrell MA

## 2017-07-28 NOTE — MR AVS SNAPSHOT
After Visit Summary   7/28/2017    Charlene Douglas    MRN: [id number]           Patient Information     Date Of Birth          1950        Visit Information        Provider Department      7/28/2017 11:00 AM NL FLOAT NURSE [facility]        Today's Diagnoses     Elevated blood pressure reading without diagnosis of hypertension    -  1       Follow-ups after your visit        Your next 10 appointments already scheduled     Aug 24, 2017   Procedure with William Charles Duane, MD   [facility] (--)    [address] [place] [address] [phone]              Who to contact     If you have questions or need follow up information about today's clinic visit or your schedule please contact [facility] directly at [phone].  Normal or non-critical lab and imaging results will be communicated to you by MyChart, letter or phone within 4 business days after the clinic has received the results. If you do not hear from us within 7 days, please contact the clinic through MyChart or phone. If you have a critical or abnormal lab result, we will notify you by phone as soon as possible.  Submit refill requests through [email or web address] or call your pharmacy and they will forward the refill request to us. Please allow 3 business days for your refill to be completed.          Additional Information About Your Visit        MyChart Information     [email or web address] gives you secure access to your electronic health record. If you see a primary care provider, you can also send messages to your care team and make appointments. If you have questions, please call your primary care clinic.  If you do not have a primary care provider, please call [phone] and they will assist you.        Care EveryWhere ID     This is your Care EveryWhere ID. This could be used by other organizations to access your [place] medical records  [id number]        Your Vitals Were     Last  Period Breastfeeding?                06/07/2007 No           Blood Pressure from Last 3 Encounters:   07/28/17 154/80   07/19/17 180/66   07/14/17 (!) 173/96    Weight from Last 3 Encounters:   07/19/17 157 lb (71.2 kg)   07/14/17 158 lb (71.7 kg)   07/14/17 158 lb 1.6 oz (71.7 kg)              Today, you had the following     No orders found for display       Primary Care Provider Office Phone # Fax #    Donna Shirley PA-C [phone]       [facility] [address] [place] [address]        Equal Access to Services     [facility] HospitalLYNNETTE : Hadii nat solorzano Socara, waaxda luqadaha, qaybta kaalmada adelisandrayada, debra rowe . So [facility] [phone].    ATENCIÓN: Si habla español, tiene a hunt disposición servicios gratuitos de asistencia lingüística. Llame al [phone].    We comply with applicable federal civil rights laws and [place] laws. We do not discriminate on the basis of race, color, national origin, age, disability sex, sexual orientation or gender identity.            Thank you!     Thank you for choosing [facility]  for your care. Our goal is always to provide you with excellent care. Hearing back from our patients is one way we can continue to improve our services. Please take a few minutes to complete the written survey that you may receive in the mail after your visit with us. Thank you!             Your Updated Medication List - Protect others around you: Learn how to safely use, store and throw away your medicines at www.disposemymeds.org.          This list is accurate as of: 7/28/17  4:19 PM.  Always use your most recent med list.                   Brand Name Dispense Instructions for use Diagnosis    ADVIL 200 MG capsule   Generic drug:  ibuprofen     120    1 CAPSULE EVERY 4 TO 6 HOURS AS NEEDED        albuterol 108 (90 BASE) MCG/ACT Inhaler    albuterol    1 Inhaler    Inhale 2 puffs into the lungs every 4 hours as  needed for shortness of breath / dyspnea        ANTI-OXIDANT PO      Take  by mouth. Take one tablet by mouth daily        CALCIUM + D PO      1 TABLET DAILY        montelukast 10 MG tablet    SINGULAIR    30 tablet    Take 1 tablet (10 mg) by mouth At Bedtime    Cough       NEW MED      Phytomega takes 2 soft gel caps daily

## 2017-07-28 NOTE — TELEPHONE ENCOUNTER
Called patient to schedule colonoscopy. She would like [place] due to insurance and this being diagnostic.  Call transferred to  .

## 2017-08-07 PROBLEM — D64.9 ANEMIA, UNSPECIFIED TYPE: Status: ACTIVE | Noted: 2017-01-01

## 2017-08-07 PROBLEM — R50.9 FEVER, UNSPECIFIED: Status: ACTIVE | Noted: 2017-01-01

## 2017-08-07 NOTE — TELEPHONE ENCOUNTER
Charlene Douglas is a 67 year old female who calls with cough and new fever.    NURSING ASSESSMENT:  Description:  Has been having an ongoing cough for over a month. Fever since Thursday, and on Saturday and Sunday highest was 102.5F and has been taking ibuprofen. No fever on Friday and no fever today. Having shortness of breath, increased heart rate with activity. Feeling more weak to do activities. At her last blood pressure check was advised follow up with PCP. Has been checking her BP at home stated it has been around 170-180s over 80-90s. Denies chest pain, severe shortness of breath/difficulty breathing.  Onset/duration:  Over a month, new symptoms of fever since Thursday   Last exam/Treatment:  07/19/2017  Allergies:   Allergies   Allergen Reactions     No Known Allergies      NURSING PLAN: Huddle with provider, plan includes and okay to schedule today at 2pm    RECOMMENDED DISPOSITION:  See in 24 hours - for ongoing cough with new fever  Will comply with recommendation: Yes  If further questions/concerns or if symptoms do not improve, worsen or new symptoms develop, call your PCP or [facility] as soon as possible.    NOTES:  Disposition was determined by the first positive assessment question, therefore all previous assessment questions were negative    Guideline used:  Telephone Triage Protocols for Nurses, Fifth Edition, Elma Saunders  Cough  Electronic communication with NP - OKAY to schedule at 2pm today  Nursing Judgment    Milka Ch RN, BSN

## 2017-08-07 NOTE — NURSING NOTE
"Chief Complaint   Patient presents with     Cough     Hypertension       Initial /84  Pulse 144  Temp 104.3  F (40.2  C) (Temporal)  Resp 16  Ht 5' 6.25\" (1.683 m)  Wt 153 lb 14.4 oz (69.8 kg)  LMP 06/07/2007  SpO2 99%  Breastfeeding? No  BMI 24.65 kg/m2 Estimated body mass index is 24.65 kg/(m^2) as calculated from the following:    Height as of this encounter: 5' 6.25\" (1.683 m).    Weight as of this encounter: 153 lb 14.4 oz (69.8 kg).  Medication Reconciliation: complete    "

## 2017-08-07 NOTE — MR AVS SNAPSHOT
After Visit Summary   8/7/2017    Charlene Douglas    MRN: [id number]           Patient Information     Date Of Birth          1950        Visit Information        Provider Department      8/7/2017 1:45 PM Donna Shirley PA-C [facility]        Today's Diagnoses     Cough    -  1    Tachycardia        Hypertension, goal below 140/90        Fever, unspecified        Anemia, unspecified type           Follow-ups after your visit        Your next 10 appointments already scheduled     Aug 09, 2017  1:00 PM CDT   [facility]   [facility] ([facility])    [address] [place] [address] Dr Rosa [place] [id number] [phone]           1. Please bring or wear a comfortable two-piece outfit. 2. You may eat, drink and take your normal medicines. 3. For any questions that cannot be answered, please contact the ordering physician            Aug 21, 2017 10:30 AM CDT   Office Visit with Donna Shirley PA-C   [facility] ([facility])    [address]  [facility] [address] [phone]           Bring a current list of meds and any records pertaining to this visit. For Physicals, please bring immunization records and any forms needing to be filled out. Please arrive 10 minutes early to complete paperwork.            Aug 24, 2017   Procedure with William Charles Duane, MD   [facility]    [address] [place] [address] [phone]              Future tests that were ordered for you today     Open Future Orders        Priority Expected Expires Ordered    Echocardiogram Complete Routine  8/7/2018 8/7/2017            Who to contact     If you have questions or need follow up information about today's clinic visit or your schedule please contact [facility] directly at [phone].  Normal or non-critical lab and imaging results will be communicated to you by  "MyChart, letter or phone within 4 business days after the clinic has received the results. If you do not hear from us within 7 days, please contact the clinic through Intent Mediahart or phone. If you have a critical or abnormal lab result, we will notify you by phone as soon as possible.  Submit refill requests through MomentFeed or call your pharmacy and they will forward the refill request to us. Please allow 3 business days for your refill to be completed.          Additional Information About Your Visit        Intent Mediahart Information     MomentFeed gives you secure access to your electronic health record. If you see a primary care provider, you can also send messages to your care team and make appointments. If you have questions, please call your primary care clinic.  If you do not have a primary care provider, please call [phone] and they will assist you.        Care EveryWhere ID     This is your Care EveryWhere ID. This could be used by other organizations to access your [place] medical records  [id number]        Your Vitals Were     Pulse Temperature Respirations Height Last Period Pulse Oximetry    127 104.3  F (40.2  C) (Temporal) 16 5' 6.25\" (1.683 m) 06/07/2007 97%    Breastfeeding? BMI (Body Mass Index)                No 24.65 kg/m2           Blood Pressure from Last 3 Encounters:   08/07/17 161/83   07/28/17 154/80   07/19/17 180/66    Weight from Last 3 Encounters:   08/07/17 153 lb 14.4 oz (69.8 kg)   07/19/17 157 lb (71.2 kg)   07/14/17 158 lb (71.7 kg)              We Performed the Following     *UA reflex to Microscopic and Culture ([place] [facility] [place] (except [place] and [place])     Babesia antibody IgG IgM     Blood culture     CBC with platelets and differential     Comprehensive metabolic panel (BMP + Alb, Alk Phos, ALT, AST, Total. Bili, TP)     CRP, inflammation     Ehrlichia chaffeenis Abys IgG and IgM     EKG 12-lead complete w/read - Clinics     Ferritin     Iron and iron binding " capacity     Lyme Disease Julia with reflex to WB Serum     Spirometry, Breathing Capacity: Normal Order, Clinic Performed     TSH with free T4 reflex     Urine Microscopic          Today's Medication Changes          These changes are accurate as of: 8/7/17  3:58 PM.  If you have any questions, ask your nurse or doctor.               Start taking these medicines.        Dose/Directions    azithromycin 250 MG tablet   Commonly known as:  ZITHROMAX   Used for:  Cough, Fever, unspecified   Started by:  Donna Shirley PA-C        Two tablets first day, then one tablet daily for four days.   Quantity:  6 tablet   Refills:  0       ferrous sulfate 325 (65 FE) MG tablet   Commonly known as:  IRON   Used for:  Anemia, unspecified type   Started by:  Donna Shirley PA-C        Dose:  325 mg   Take 1 tablet (325 mg) by mouth 3 times daily (with meals)   Quantity:  90 tablet   Refills:  1       metoprolol 25 MG 24 hr tablet   Commonly known as:  TOPROL-XL   Used for:  Hypertension, goal below 140/90, Tachycardia   Started by:  Donna Shirley PA-C        Dose:  25 mg   Take 1 tablet (25 mg) by mouth daily   Quantity:  30 tablet   Refills:  0         These medicines have changed or have updated prescriptions.        Dose/Directions    * ADVIL 200 MG capsule   This may have changed:  Another medication with the same name was added. Make sure you understand how and when to take each.   Generic drug:  ibuprofen   Changed by:  Nithin Ureña MD        1 CAPSULE EVERY 4 TO 6 HOURS AS NEEDED   Quantity:  120   Refills:  0       * ibuprofen 200 MG capsule   This may have changed:  You were already taking a medication with the same name, and this prescription was added. Make sure you understand how and when to take each.   Used for:  Fever, unspecified   Changed by:  Donna Shirley PA-C        Dose:  400 mg   Take 400 mg by mouth once for 1 dose   Quantity:  2 capsule   Refills:  0       * Notice:  This list has 2 medication(s)  that are the same as other medications prescribed for you. Read the directions carefully, and ask your doctor or other care provider to review them with you.         Where to get your medicines      These medications were sent to [facility] - [address]  [address] [place] [address]     Phone:  [phone]     azithromycin 250 MG tablet    ferrous sulfate 325 (65 FE) MG tablet    metoprolol 25 MG 24 hr tablet         Some of these will need a paper prescription and others can be bought over the counter.  Ask your nurse if you have questions.     You don't need a prescription for these medications     ibuprofen 200 MG capsule                Primary Care Provider Office Phone # Fax #    Donna Shirley PA-C [phone]       [facility] [address] [place] [address]        Equal Access to Services     RON PERALTA : Hadii aad ku hadasho Socara, waaxda luqadaha, qaybta kaalmada shahla, debra rowe . So [facility] [phone].    ATENCIÓN: Si habla español, tiene a hunt disposición servicios gratuitos de asistencia lingüística. Tameka al [phone].    We comply with applicable federal civil rights laws and [place] laws. We do not discriminate on the basis of race, color, national origin, age, disability sex, sexual orientation or gender identity.            Thank you!     Thank you for choosing [facility]  for your care. Our goal is always to provide you with excellent care. Hearing back from our patients is one way we can continue to improve our services. Please take a few minutes to complete the written survey that you may receive in the mail after your visit with us. Thank you!             Your Updated Medication List - Protect others around you: Learn how to safely use, store and throw away your medicines at www.disposemymeds.org.          This list is accurate as of: 8/7/17  3:58  PM.  Always use your most recent med list.                   Brand Name Dispense Instructions for use Diagnosis    * ADVIL 200 MG capsule   Generic drug:  ibuprofen     120    1 CAPSULE EVERY 4 TO 6 HOURS AS NEEDED        * ibuprofen 200 MG capsule     2 capsule    Take 400 mg by mouth once for 1 dose    Fever, unspecified       albuterol 108 (90 BASE) MCG/ACT Inhaler    albuterol    1 Inhaler    Inhale 2 puffs into the lungs every 4 hours as needed for shortness of breath / dyspnea        ANTI-OXIDANT PO      Take  by mouth. Take one tablet by mouth daily        azithromycin 250 MG tablet    ZITHROMAX    6 tablet    Two tablets first day, then one tablet daily for four days.    Cough, Fever, unspecified       CALCIUM + D PO      1 TABLET DAILY        ferrous sulfate 325 (65 FE) MG tablet    IRON    90 tablet    Take 1 tablet (325 mg) by mouth 3 times daily (with meals)    Anemia, unspecified type       metoprolol 25 MG 24 hr tablet    TOPROL-XL    30 tablet    Take 1 tablet (25 mg) by mouth daily    Hypertension, goal below 140/90, Tachycardia       montelukast 10 MG tablet    SINGULAIR    30 tablet    Take 1 tablet (10 mg) by mouth At Bedtime    Cough       NEW MED      Phytomega takes 2 soft gel caps daily        * Notice:  This list has 2 medication(s) that are the same as other medications prescribed for you. Read the directions carefully, and ask your doctor or other care provider to review them with you.

## 2017-08-07 NOTE — PROGRESS NOTES
SUBJECTIVE:                                                    Charlene Douglas is a 67 year old female who presents to clinic today for the following health issues:    Acute Illness   Acute illness concerns: Cough, fatigue, and fever. Patient was in for this about 3 weeks ago she went to ER. She had a significant cough that worsened with activity. She was seen in the clinic by Vale initially though was sent to ED because her EKG was abnormal.  Her EKG was ok per ER doctor noting it was LV strain.  Her troponin was normal.  Her CT scan of chest was normal except she did have a few nodules that will be re-evaluated with repeat ct scan in 6 months.   Please review er notes for full documentation.  .  They gave her albuterol inhaler in the ER to try but it was not effective at all and she stopped using it.  I saw her for her wellness exam and her BP and heart rate were elevated, she said she was nervous this is not unusual for at the doctors office.  She was to follow up for BP and pulse rechecks with float nurse, she did come for float nurse rechecks though her pulse was not checked. When I saw her for wellness exam, We did a trial of singulair as well though that was not effective for her cough.  Most recently, we stopped singular as it was not helpful and started omeprazole for cough thinking it may be gerd related, this has not helped either.  Anything that makes her heart rate rise makes her cough.  She has stopped exercising as it seems to make her cough.  She has felt her heart beating fast for the past several months per pt.  No chest pains.  In the past 4 days she has started with a fever.  This is new.  She denies any recent travel with the exception of traveling to [facility].  No hemoptysis.  They did purchase a blood pressure cuff after her  had a stroke last week they've been monitoring her heart rate and blood pressure at home both have been elevated. Blood pressures range from 150-170  "over 70s to 90s pulse is between 120 and 130  Onset: x 2 months    Fever: YES- off and on since Thursday, 4 days, was 100-102    Chills/Sweats: YES    Headache (location?): YES- When she starts coughing hard, only hurts with cough    Sinus Pressure:no    Conjunctivitis:  no    Ear Pain: no    Rhinorrhea: no    Congestion: no    Sore Throat: no     Cough: YES- \"very violent cough\" non productive in general   Eating and walking trigger her cough    Wheeze: no    Decreased Appetite: YES, has lost weight , she has lost 4 pounds in approximately 2 weeks    Nausea: no    Vomiting: no    Diarrhea:  Yes- sometimes but when happens it's very bad, she has had positive fit test, imodium is helpful lasts 2 days has not taken the mucinex that she though may be causing this and diarrhea has not helped      Dysuria/Freq.: no    Fatigue/Achiness: YES- both, much ache just under ribs from coughing , fatigue has gradually increased all summer     Sick/Strep Exposure: no     Therapies Tried and outcome: Inhaler, Singulair, Prilosec- nothing seems to work. Hard to tell.    Concern - Blood pressure has been high  Onset: x 3 weeks ago    Description:   Patient was in before for this. Blood pressure has still been pretty high. She has thought is was related to being nervous. Unlikely now that  The pressures at home have been consistently elevated    Progression of Symptoms:  worsening    Accompanying Signs & Symptoms:  High heart rate, cough, fatigue, fever    Previous history of similar problem:   yes    Precipitating factors:   Worsened by: exercise    Alleviating factors:  Improved by: nothing    Therapies Tried and outcome: nothing      Problem list and histories reviewed & adjusted, as indicated.  Additional history: as documented    BP Readings from Last 3 Encounters:   08/07/17 161/83   07/28/17 154/80   07/19/17 180/66    Wt Readings from Last 3 Encounters:   08/07/17 153 lb 14.4 oz (69.8 kg)   07/19/17 157 lb (71.2 kg)   07/14/17 " "158 lb (71.7 kg)                  Labs reviewed in EPIC      Reviewed and updated as needed this visit by clinical staff     Reviewed and updated as needed this visit by Provider         ROS:  CONSTITUTIONAL:positive for fevers, chills, and weight loss  INTEGUMENTARY/SKIN: no rashes  E/M: NEGATIVE for ear, mouth and throat problems  RESP: cough, nonproductive  CV: tachycardia no chest pain  GI: positive fit test scheduled for colonoscopy  MUSCULOSKELETAL: body aches  NEURO: feels generally weak    OBJECTIVE:     /83  Pulse 127  Temp 104.3  F (40.2  C) (Temporal)  Resp 16  Ht 5' 6.25\" (1.683 m)  Wt 153 lb 14.4 oz (69.8 kg)  LMP 06/07/2007  SpO2 97%  Breastfeeding? No  BMI 24.65 kg/m2  Body mass index is 24.65 kg/(m^2).  GENERAL: fatigued, alert and no distress  HENT: ear canals and TM's normal, nose and mouth without ulcers or lesions  NECK: no adenopathy, no asymmetry, masses, or scars and thyroid normal to palpation  RESP: lungs clear to auscultation - no rales, rhonchi or wheezes  CV: regular rate and rhythm, normal S1 S2, no S3 or S4, no murmur, click or rub, no peripheral edema and peripheral pulses strong  ABDOMEN: soft,mildly tender epigastrically otherwise nontender, no hepatosplenomegaly, no masses and bowel sounds normal  MS: no gross musculoskeletal defects noted, no edema  SKIN: no suspicious lesions or rashes  NEURO: Normal tone, mentation intact and speech normal  PSYCH: mentation appears normal, affect normal    Diagnostic Test Results: spirometry is normal  Results for orders placed or performed in visit on 08/07/17 (from the past 24 hour(s))   Spirometry, Breathing Capacity: Normal Order, Clinic Performed   Result Value Ref Range    FEV-1      FVC      FEV1/FVC      FEF 25/75     CBC with platelets and differential   Result Value Ref Range    WBC 13.8 (H) 4.0 - 11.0 10e9/L    RBC Count 3.43 (L) 3.8 - 5.2 10e12/L    Hemoglobin 8.9 (L) 11.7 - 15.7 g/dL    Hematocrit 27.8 (L) 35.0 - 47.0 " %    MCV 81 78 - 100 fl    MCH 25.9 (L) 26.5 - 33.0 pg    MCHC 32.0 31.5 - 36.5 g/dL    RDW 14.6 10.0 - 15.0 %    Platelet Count 479 (H) 150 - 450 10e9/L    Diff Method Automated Method     % Neutrophils 78.4 %    % Lymphocytes 11.3 %    % Monocytes 9.6 %    % Eosinophils 0.5 %    % Basophils 0.2 %    Absolute Neutrophil 10.8 (H) 1.6 - 8.3 10e9/L    Absolute Lymphocytes 1.6 0.8 - 5.3 10e9/L    Absolute Monocytes 1.3 0.0 - 1.3 10e9/L    Absolute Eosinophils 0.1 0.0 - 0.7 10e9/L    Absolute Basophils 0.0 0.0 - 0.2 10e9/L   TSH with free T4 reflex   Result Value Ref Range    TSH 1.92 0.40 - 4.00 mU/L   CRP, inflammation   Result Value Ref Range    CRP Inflammation 227.0 (H) 0.0 - 8.0 mg/L   Comprehensive metabolic panel (BMP + Alb, Alk Phos, ALT, AST, Total. Bili, TP)   Result Value Ref Range    Sodium 135 133 - 144 mmol/L    Potassium 4.4 3.4 - 5.3 mmol/L    Chloride 99 94 - 109 mmol/L    Carbon Dioxide 28 20 - 32 mmol/L    Anion Gap 8 3 - 14 mmol/L    Glucose 128 (H) 70 - 99 mg/dL    Urea Nitrogen 14 7 - 30 mg/dL    Creatinine 0.90 0.52 - 1.04 mg/dL    GFR Estimate 63 >60 mL/min/1.7m2    GFR Estimate If Black 76 >60 mL/min/1.7m2    Calcium 8.7 8.5 - 10.1 mg/dL    Bilirubin Total 0.4 0.2 - 1.3 mg/dL    Albumin 2.5 (L) 3.4 - 5.0 g/dL    Protein Total 8.0 6.8 - 8.8 g/dL    Alkaline Phosphatase 273 (H) 40 - 150 U/L    ALT 58 (H) 0 - 50 U/L    AST 74 (H) 0 - 45 U/L   *UA reflex to Microscopic and Culture ([place] [facility] (except [place] and [place])   Result Value Ref Range    Color Urine Yellow     Appearance Urine Clear     Glucose Urine Negative NEG mg/dL    Bilirubin Urine Negative NEG    Ketones Urine Negative NEG mg/dL    Specific Gravity Urine 1.015 1.003 - 1.035    Blood Urine Trace (A) NEG    pH Urine 7.0 5.0 - 7.0 pH    Protein Albumin Urine 100 (A) NEG mg/dL    Urobilinogen Urine 0.2 0.2 - 1.0 EU/dL    Nitrite Urine Negative NEG    Leukocyte Esterase Urine Negative NEG    Source Unspecified Urine     Urine Microscopic   Result Value Ref Range    WBC Urine O - 2 0 - 2 /HPF    RBC Urine O - 2 0 - 2 /HPF    Squamous Epithelial /LPF Urine Few FEW /LPF   many other labs pending    ASSESSMENT/PLAN:   I called to speak to Dr. Sims regarding this patient we developed the following plan together      1. Tachycardia  As she is symptomatic, we will start her on low-dose metoprolol, tachycardia could be explained by anemia and fever  Echocardiogram was scheduled later this week  - TSH with free T4 reflex  - EKG 12-lead complete w/read - Clinics  - XR Chest 2 Views; Future  - Echocardiogram Complete; Future  - metoprolol (TOPROL-XL) 25 MG 24 hr tablet; Take 1 tablet (25 mg) by mouth daily  Dispense: 30 tablet; Refill: 0    2. Cough  Unclear etiology at this point, we will treat empirically  With Zithromax.. She will continue her omeprazole  - CRP, inflammation  - XR Chest 2 Views; Future  - Urine Microscopic  - azithromycin (ZITHROMAX) 250 MG tablet; Two tablets first day, then one tablet daily for four days.  Dispense: 6 tablet; Refill: 0  - Spirometry, Breathing Capacity: Normal Order, Clinic Performed    3. Hypertension, goal below 140/90  We'll start on metoprolol due to being symptomatic of elevated blood pressure could be related to anemia  - Comprehensive metabolic panel (BMP + Alb, Alk Phos, ALT, AST, Total. Bili, TP)  - Echocardiogram Complete; Future  - metoprolol (TOPROL-XL) 25 MG 24 hr tablet; Take 1 tablet (25 mg) by mouth daily  Dispense: 30 tablet; Refill: 0    4. Fever, unspecified  We'll treat empirically with Zithromax though initially we will do blood culture awaiting tick borne illness labs CRP has further increased  - CBC with platelets and differential  - CRP, inflammation  - *UA reflex to Microscopic and Culture ([place] [facility] [place] (except [place] and [place])  - ibuprofen 200 MG capsule; Take 400 mg by mouth once for 1 dose  Dispense: 2 capsule; Refill: 0  - Lyme Disease Julia with  reflex to WB Serum  - Ehrlichia chaffeenis Abys IgG and IgM  - Babesia antibody IgG IgM  - Blood culture  - azithromycin (ZITHROMAX) 250 MG tablet; Two tablets first day, then one tablet daily for four days.  Dispense: 6 tablet; Refill: 0    5. Anemia, unspecified type  She will start ferrous sulfate to try to improve her anemia, this could be related to her positive fit test certainly. She will take her ferrous sulfate with vitamin C  - Ferritin  - Iron and iron binding capacity  - ferrous sulfate (IRON) 325 (65 FE) MG tablet; Take 1 tablet (325 mg) by mouth 3 times daily (with meals)  Dispense: 90 tablet; Refill: 1    1.5 hours were spent with patient, 50 % directly with patient and her  in the room the remainder of the time was with coordination of care    Donna Shirley PA-C  [facility]  This chart documentation was completed in part with Dragon voice recognition software.  Documentation is reviewed after completion, however, some words and grammatical errors may remain.  Donna Shirley PA-C    Electronically signed by Donna Shirley PA-C

## 2017-08-07 NOTE — NURSING NOTE
Prior to injection verified patient identity using patient's name and date of birth.  The following medication was given:     MEDICATION: Ibuprofen 200mg  ROUTE: PO  SITE: mouth  DOSE: 400mg  LOT #: [id number]  :  [employer]  EXPIRATION DATE:  8/2018  NDC#: 1300-9599-17  Zaina Askew MA

## 2017-08-10 NOTE — TELEPHONE ENCOUNTER
Please call Charlene and triage her symptoms, specifically see if she is still having fevers.  If she continues to have fevers we should check her wbc again.    Her echocardiogram showed normal motion of her heart and good function.    Donna Shirley PA-C

## 2017-08-10 NOTE — TELEPHONE ENCOUNTER
Pt states she has had no fever today.  She said if she had a fever yesterday, it wasn't bad.  She never checked yesterday because she was feeling better.  She continues to have a non-productive cough only with activity.  Also, continues to be weak.  Her BP last night at 5 pm was 158/79 and 162/82 this morning.  Pt has one more antibiotic pill to take.  Advised pt to call back if her fever returns or if she has any other questions or concerns.    Mamta Garduno RN

## 2017-08-14 NOTE — TELEPHONE ENCOUNTER
Routing patient update to NP to review. Symptoms are the same as OV 08/07/2017, has had a low grade fever of 100.5F for the past 3 days. Please review and advise if there is anything different you would like her to do at this time.     Charlene Douglas is a 67 year old female who calls with fever of 100.5F.    NURSING ASSESSMENT:  Description:  Patient has finished her antibiotic Azithromycin. Patient had a low grade fever of 100.5F for 3 days. Stated her other symptoms are the same for weakness, cough, and has only coughed up mucous 3 times. Still taking her iron pills and she is still waiting for the colonoscopy results. Has had diarrhea several times that is very black from her iron pills. Back is feeling tired. Denies shortness of breath, worsening symptoms. Has been taking ibuprofen as needed and resting.   Onset/duration:  Ongoing since June 2017  Pain scale (0-10)   Back is feeling tired  LMP/preg/breast feeding:  Patient's last menstrual period was 06/07/2007.  Last exam/Treatment:  08/07/2017  Allergies:   Allergies   Allergen Reactions     No Known Allergies      NURSING PLAN: Routed to provider Yes    RECOMMENDED DISPOSITION:  TBD  Will comply with recommendation: Yes  If further questions/concerns or if symptoms do not improve, worsen or new symptoms develop, call your PCP or [facility] as soon as possible.    NOTES:  Disposition was determined by the first positive assessment question, therefore all previous assessment questions were negative    Guideline used:  Telephone Triage Protocols for Nurses, Fifth Edition, Elma Saunders  Cough  Fever, Adult  Nursing Judgment   Routing to NP to review     Milka Ch RN, BSN

## 2017-08-14 NOTE — TELEPHONE ENCOUNTER
Please call pt- lets have her come for a lab recheck, I would like to repeat her cbc with diff, liver tests and also add a tuberculosis test.  Orders in  Donna Shirley PA-C

## 2017-08-14 NOTE — TELEPHONE ENCOUNTER
Spoke to patient informed of message below and scheduled tomorrow with lab.  Ciera Butler CMA ([facility])

## 2017-08-15 NOTE — TELEPHONE ENCOUNTER
Called patient to obtain update since LOV 08/07/2017. Patient still has the cough and it is a little less. Temperature is still around 100F, taking ibuprofen for it. Taking the iron pills. Blood pressure this morning was 153/90 with a pulse of 113. Has an appointment with NP on Monday. Denies wheezing, lightheaded, dizziness, weakness, shortness of breath, worsening symptoms.   Allergies:   Allergies   Allergen Reactions     No Known Allergies      NURSING PLAN: Routed to provider Yes    Milka Ch, RN, BSN

## 2017-08-15 NOTE — TELEPHONE ENCOUNTER
Please call and triage her symptoms- is she still coughing the same?  Still having fevers?  Any improvements with BP or pulse?    Her white blood count is still elevated, though stable.  Her hemoglobin is a bit lower yet, it is now 8.3.    I have sent a message to Dr. Sims regarding what our next step will be, will get back to her when I hear from her.    If she is feeling acutely worse , I would recommend going to ED  Donna Shirley PA-C

## 2017-08-16 NOTE — PROGRESS NOTES
SUBJECTIVE:                                                    Charlene Douglas is a 67 year old female who presents to clinic today for the following health issues:  Not currently taking any supplements     HPI    Hypertension Follow-up      Outpatient blood pressures are being checked at home.  Results are readings have been running high. Her pulse has been elevated as well, this has been somewhat improved by starting the metoprolol    Low Salt Diet: no added salt    Patient presented to the emergency room on July 14 for a cough she had a complete workup, please see documentation.. I saw her July 19 for her physical. We tried Singulair which was not helpful and also added omeprazole at a later date which was also not helpful. The albuterol was not helpful for her cough. In order to do further workup I asked the patient return to clinic at a later date for more testing which she did.   I saw her on August 7. At that time she had hypertension, tachycardia, coughing in spasms which she noted coughing to be triggered by increased heart rate.  Blood pressures have been in the range of 150-70 over 70-90 and pulse between 120 and 130.   she also began to have temperatures between 100-102 without any other symptoms other than coughing, increased heart rate and blood pressure.   Eating also seemed to increase her cough.  At her physical we ordered a FIT test for colon cancer screening, this returned positive for blood and a colonoscopy was ordered .  at this visit her hemoglobin was low, white blood count elevated,  CRP elevated, and liver function tests were also mildly elevated.  In my discussion with Dr. Sims regarding further workup and treatment, we felt her tachycardia and elevated blood pressure were related to her anemia however patient was  Symptomatic and wanted to try a medication to lower her heart rate and blood pressure. Metoprolol was started.  We had plans to recheck hemoglobin and white count and liver  tests in 1 week as we waited for other tickborne illness laboratory studies to be completed. The repeated tests were not any better. Abdominal ultrasound was ordered showing large number of lesions in her liver. CT scan was ordered next by our covering provider , Vale uY,, in my absence.  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  Vale arranged for an oncology appointment this week.    Patient is scared and apprehensive. She is questioning whether or not she needs to do the colonoscopy scheduled for the 24th.  her cough perhaps seems somewhat better she still has coughing fits . Her appetite is reduced  She is trying to increase her fluids. She does not have any shortness of breath except for dyspnea on exertion with walking up stairs. She denies any chest pains.  She has not been vomiting.          Problem list and histories reviewed & adjusted, as indicated.  Additional history: none        BP Readings from Last 3 Encounters:   08/21/17 178/70   08/07/17 161/83   07/28/17 154/80    Wt Readings from Last 3 Encounters:   08/21/17 151 lb 9.6 oz (68.8 kg)   08/07/17 153 lb 14.4 oz (69.8 kg)   08/17/17 153 lb (69.4 kg)                  Labs reviewed in EPIC      ROS:  CONSTITUTIONAL:Positive for fevers, weight loss  E/M: NEGATIVE for ear, mouth and throat problems  RESP:Coughing in fits  CV: elevated heart rate and blood pressure metoprolol has helped to some extent per patient this is much more likely to herlow hemoglobin   GI: liver lesions and colon mass on CT and ultrasound  PSYCHIATRIC: positive for anxiety and guilt over not doing her screening colonoscopy as recommended    OBJECTIVE:     /70 (BP Location: Right arm, Patient Position: Chair, Cuff Size: Adult Regular)  Pulse 120  Temp 98.6  F (37  C) (Oral)  Wt 151 lb 9.6 oz (68.8 kg)  LMP 06/07/2007  BMI 24.32 kg/m2  Body mass index is 24.32 kg/(m^2).  GENERAL: alert, no distress and fatigued  NECK:  no adenopathy, no asymmetry, masses, or scars and thyroid normal to palpation  RESP: lungs clear to auscultation - no rales, rhonchi or wheezes  CV: regular rate and rhythm, normal S1 S2, no S3 or S4, no murmur, click or rub, no peripheral edema and peripheral pulses strong  ABDOMEN: soft, nontender, no hepatosplenomegaly, no masses and bowel sounds normal  MS: no gross musculoskeletal defects noted, no edema  SKIN: no suspicious lesions or rashes  PSYCH: tearful at times, makes good eye contact and speaks with normal rate and tone she is here with her  today . They are good support for each other    Diagnostic Test Results:  Results for orders placed or performed in visit on 08/18/17   CBC with platelets   Result Value Ref Range    WBC 19.9 (H) 4.0 - 11.0 10e9/L    RBC Count 3.26 (L) 3.8 - 5.2 10e12/L    Hemoglobin 8.1 (L) 11.7 - 15.7 g/dL    Hematocrit 26.6 (L) 35.0 - 47.0 %    MCV 82 78 - 100 fl    MCH 24.8 (L) 26.5 - 33.0 pg    MCHC 30.5 (L) 31.5 - 36.5 g/dL    RDW 15.2 (H) 10.0 - 15.0 %    Platelet Count 498 (H) 150 - 450 10e9/L       ASSESSMENT/PLAN:         I  updated Dr. Sims regarding this patient and her current CT findings, she recommended a surgical consult as well, we have arranged for this.    1. Mass of colon  She also has an appointment with oncology tomorrow as well this is at 2:30-- I will defer the decision of whether or not she needs colonoscopy on the 24th to surgery and oncology,, this is patient's biggest concern  today  - GENERAL SURG ADULT REFERRAL appointment is at 1:00    2. Anemia, unspecified type  Await appointment with oncology tomorrow suspected is related to  Colon tumor    3. Hepatic lesion  Also suspect this is related to colon tumor    4. Fever, unspecified  Fever of unknown origin likely to be related to colon tumor as well      Follow-up with general surgery and oncology    This chart documentation was completed in part with Dragon voice recognition software.   Documentation is reviewed after completion, however, some words and grammatical errors may remain.  NICOLA Santizo PA-C  [facility]  Electronically signed by Donna Shirley PA-C

## 2017-08-16 NOTE — TELEPHONE ENCOUNTER
Spoke with patient today, she has never had bleeding from her ear, she had a temp of 102 last night, back to low grade now, the upper aspect of her stomach is painful especially with coughing.  Her heart rate is a bit lower.  Her cough is also a bit better with her lower heart rate.  BP down to 167/87 and 153/90 recently.  She thinks her cough is not related to her lungs or throat, seems to come from abdomen and with increased heart rate.    We have added ab ultrasound and lipase.      Please call pt and help her set up ab ultrasound she prefers [facility] but will go anywhere.  Please set up in the next few days   Donna Shirley PA-C

## 2017-08-18 NOTE — TELEPHONE ENCOUNTER
Charlene Douglas is a 67 year old female who calls with fever. Called pt back to triage.  Pt was driving but gave verbal order to talk to her , Tony.    NURSING ASSESSMENT:  Description:  Pt was seen on 8/7/17 for cough and fever.  She was started on Zithromax.  She has completed the course of antibiotics.  She has had a daily fever since.  Yesterday her fever was 102.  Tylenol had brought it down for a little bit but it went back up.  She woke up at 3 am with an upset stomach and dry heaving.  Her stomach is empty due to being NPO for her abdominal ultrasound today.  No fever reported today.  Pt and her  would like her to be put on another antibiotic.  Onset/duration:  ongoing  Precip. factors:  unknown  Associated symptoms:  Fever, upset stomach.  Denies urinary symptoms, confusion.    Allergies:   Allergies   Allergen Reactions     No Known Allergies      RECOMMENDED DISPOSITION:  To ED/UC for evaluation, another person to drive - Due to fever not improving since being on antibiotics and there are no clinic openings today pt needs to be evaluated.  Will comply with recommendation: No- Barriers to comply with plan of care pt is driving to an ultrasound right now..  They will monitor symptoms.  Advised to go to the UC or ER over the weekend if fever returns today.  If further questions/concerns or if symptoms do not improve, worsen or new symptoms develop, call your PCP or [facility] as soon as possible.      Guideline used: fever, adult  Telephone Triage Protocols for Nurses, Fifth Edition, Elma Garduno RN

## 2017-08-18 NOTE — TELEPHONE ENCOUNTER
I discussed ultrasound results and need for further imaging with patient. I will order the CT of her liver/abdomen and pelvis to further evaluate liver lesions and she will follow up with Donna on Monday as well as follow up with a colonoscopy to further evaluate positive FIT test next week. I did instruct her to go to the ER if she is having increased weakness, fevers or other acute worsening of symptoms. She voices understanding to plan and will follow up as directed.

## 2017-08-18 NOTE — TELEPHONE ENCOUNTER
Patient did follow up with CT abdomen which does show liver Mets and a a colon mass which radiology believes to be the primary source of cancer. I discussed findings with patient and will have her scheduled with oncology and have her follow up as scheduled with her PCP on Monday.

## 2017-08-18 NOTE — TELEPHONE ENCOUNTER
Reason for call:  Patient reporting a symptom    Symptom or request: fever / upset stomach    Duration (how long have symptoms been present): ?    Have you been treated for this before? No    Additional comments: patient states her fever is now not going down with tylenol and last night she got an upset stomach     Phone Number patient can be reached at:  Other phone number:  [phone]    Best Time:  Anytime     Can we leave a detailed message on this number:  YES    Call taken on 8/18/2017 at 8:27 AM by Aviva Mayer

## 2017-08-21 NOTE — MR AVS SNAPSHOT
After Visit Summary   8/21/2017    Charlene Douglas    MRN: [id number]           Patient Information     Date Of Birth          1950        Visit Information        Provider Department      8/21/2017 10:30 AM Donna Shirley PA-C [facility]        Today's Diagnoses     Malignant neoplasm of colon, unspecified part of colon (H)    -  1       Follow-ups after your visit        Additional Services     GENERAL SURG ADULT REFERRAL       Your provider has referred you to: FMG: [facility] [phone]   [email or web address]  FMG: [facility] [phone]   [email or web address]  FM[facility]: [facility] [phone]   [email or web address]  FMG: [facility] [phone]   [email or web address]    Please be aware that coverage of these services is subject to the terms and limitations of your health insurance plan.  Call member services at your health plan with any benefit or coverage questions.      Please bring the following with you to your appointment:    (1) Any X-Rays, CTs or MRIs which have been performed.  Contact the facility where they were done to arrange for  prior to your scheduled appointment.   (2) List of current medications   (3) This referral request   (4) Any documents/labs given to you for this referral                  Your next 10 appointments already scheduled     Aug 22, 2017  1:00 PM CDT   New Visit with Vinny Peterson DO   [facility] ([facility])    [address] [place] [address] [phone]            Aug 22, 2017  2:30 PM CDT   New Visit with Duy Dove MD   [facility] ([facility])    [address] [place] [id number]    [phone]            Aug 24, 2017   Procedure with William Charles Duane, MD   [facility] [place] (--)    [address] [place] [address] [phone]              Who to contact     If you have questions or need follow up information about today's clinic visit or your schedule please contact [facility] [place] directly at [phone].  Normal or non-critical lab and imaging results will be communicated to you by PharmAbcinehart, letter or phone within 4 business days after the clinic has received the results. If you do not hear from us within 7 days, please contact the clinic through SpendCrowdt or phone. If you have a critical or abnormal lab result, we will notify you by phone as soon as possible.  Submit refill requests through [employer] or call your pharmacy and they will forward the refill request to us. Please allow 3 business days for your refill to be completed.          Additional Information About Your Visit        CartMomo Information     [employer] gives you secure access to your electronic health record. If you see a primary care provider, you can also send messages to your care team and make appointments. If you have questions, please call your primary care clinic.  If you do not have a primary care provider, please call [phone] and they will assist you.        Care EveryWhere ID     This is your Care EveryWhere ID. This could be used by other organizations to access your [place] medical records  [id number]        Your Vitals Were     Pulse Temperature Last Period BMI (Body Mass Index)          120 98.6  F (37  C) (Oral) 06/07/2007 24.32 kg/m2         Blood Pressure from Last 3 Encounters:   08/21/17 178/70   08/07/17 161/83   07/28/17 154/80    Weight from Last 3 Encounters:   08/21/17 151 lb 9.6 oz (68.8 kg)   08/07/17 153 lb 14.4 oz (69.8 kg)   08/17/17 153 lb (69.4 kg)              We Performed the Following     GENERAL SURG ADULT REFERRAL        Primary Care Provider  Office Phone # Fax #    Donna Shirley PA-C [phone] [address] [place] [address]        Equal Access to Services     RON PERALTA : Hadii aad ku hadjosetony Socara, walayoda luqadaha, qaybta kaalmada shahla, debra murrell brennanlisandra morales lajuliannslim reese. So [facility] [phone].    ATENCIÓN: Si habla español, tiene a hunt disposición servicios gratuitos de asistencia lingüística. Llame al [phone].    We comply with applicable federal civil rights laws and [place] laws. We do not discriminate on the basis of race, color, national origin, age, disability sex, sexual orientation or gender identity.            Thank you!     Thank you for choosing [facility]  for your care. Our goal is always to provide you with excellent care. Hearing back from our patients is one way we can continue to improve our services. Please take a few minutes to complete the written survey that you may receive in the mail after your visit with us. Thank you!             Your Updated Medication List - Protect others around you: Learn how to safely use, store and throw away your medicines at www.disposemymeds.org.          This list is accurate as of: 8/21/17 10:46 AM.  Always use your most recent med list.                   Brand Name Dispense Instructions for use Diagnosis    ADVIL 200 MG capsule   Generic drug:  ibuprofen     120    1 CAPSULE EVERY 4 TO 6 HOURS AS NEEDED        ANTI-OXIDANT PO      Take  by mouth. Take one tablet by mouth daily        CALCIUM + D PO      1 TABLET DAILY        ferrous sulfate 325 (65 FE) MG tablet    IRON    90 tablet    Take 1 tablet (325 mg) by mouth 3 times daily (with meals)    Anemia, unspecified type       metoprolol 25 MG 24 hr tablet    TOPROL-XL    30 tablet    Take 1 tablet (25 mg) by mouth daily    Hypertension, goal below 140/90, Tachycardia       NEW MED      Phytomega takes 2 soft gel caps daily

## 2017-08-22 PROBLEM — C18.9 METASTATIC COLON CANCER TO LIVER (H): Status: ACTIVE | Noted: 2017-01-01

## 2017-08-22 PROBLEM — C78.7 METASTATIC COLON CANCER TO LIVER (H): Status: ACTIVE | Noted: 2017-01-01

## 2017-08-22 NOTE — MR AVS SNAPSHOT
After Visit Summary   8/22/2017    Charlene Douglas    MRN: [id number]           Patient Information     Date Of Birth          1950        Visit Information        Provider Department      8/22/2017 2:30 PM Duy Dove MD [facility]        Today's Diagnoses     Elevated blood pressure reading without diagnosis of hypertension    -  1    Anemia, unspecified type          Care Instructions      Please follow up with Dr. Dove with results and plan of care from workup.  Stan, Oncology RN will coordinate and call you with all appointments    Liver ultrasound guided biopsy Date/Time:    PET/CT Scan Date/time:      Follow Up Date/Time:     Possible Port-a-cath Placement Date/Time:    If you have any questions or concerns please feel free to call.    Stan Balderas, RN, BSN   Oncology Care Coordinator RN  [facility]  [phone]              Follow-ups after your visit        Your next 10 appointments already scheduled     Aug 24, 2017   Procedure with William Charles Duane, MD   [facility] [place])    [address] [place]  [facility] [address] [phone]              Who to contact     If you have questions or need follow up information about today's clinic visit or your schedule please contact [facility] directly at [phone].  Normal or non-critical lab and imaging results will be communicated to you by MyChart, letter or phone within 4 business days after the clinic has received the results. If you do not hear from us within 7 days, please contact the clinic through MyChart or phone. If you have a critical or abnormal lab result, we will notify you by phone as soon as possible.  Submit refill requests through [employer] or call your pharmacy and they will forward the refill request to us. Please allow 3 business days for your refill to be completed.          Additional Information About Your Visit        MyChart Information      "[employer] gives you secure access to your electronic health record. If you see a primary care provider, you can also send messages to your care team and make appointments. If you have questions, please call your primary care clinic.  If you do not have a primary care provider, please call [phone] and they will assist you.        Care EveryWhere ID     This is your Care EveryWhere ID. This could be used by other organizations to access your [place] medical records  [id number]        Your Vitals Were     Pulse Temperature Respirations Height Last Period Pulse Oximetry    136 98  F (36.7  C) (Temporal) 16 1.683 m (5' 6.25\") 06/07/2007 98%    BMI (Body Mass Index)                   24.03 kg/m2            Blood Pressure from Last 3 Encounters:   08/22/17 172/80   08/22/17 172/80   08/21/17 178/70    Weight from Last 3 Encounters:   08/22/17 68 kg (150 lb)   08/22/17 68 kg (150 lb)   08/21/17 68.8 kg (151 lb 9.6 oz)              Today, you had the following     No orders found for display       Primary Care Provider Office Phone # Fax #    Donna Shirley PA-C [phone] [address] [place] [address]        Equal Access to Services     RON PERALTA : Hadii nat ku hadasho Soomaali, waaxda luqadaha, qaybta kaalmada adeegyada, waxay rerein haykatrinn beau rowe . So [facility] [phone].    ATENCIÓN: Si habla español, tiene a hunt disposición servicios gratuitos de asistencia lingüística. Llame al [phone].    We comply with applicable federal civil rights laws and [place] laws. We do not discriminate on the basis of race, color, national origin, age, disability sex, sexual orientation or gender identity.            Thank you!     Thank you for choosing [facility]  for your care. Our goal is always to provide you with excellent care. Hearing back from our patients is one way we can continue to improve our services. Please take a few minutes to complete the written " survey that you may receive in the mail after your visit with us. Thank you!             Your Updated Medication List - Protect others around you: Learn how to safely use, store and throw away your medicines at www.disposemymeds.org.          This list is accurate as of: 8/22/17  3:14 PM.  Always use your most recent med list.                   Brand Name Dispense Instructions for use Diagnosis    ADVIL 200 MG capsule   Generic drug:  ibuprofen     120    1 CAPSULE EVERY 4 TO 6 HOURS AS NEEDED        ANTI-OXIDANT PO      Take  by mouth. Take one tablet by mouth daily        CALCIUM + D PO      1 TABLET DAILY        ferrous sulfate 325 (65 FE) MG tablet    IRON    90 tablet    Take 1 tablet (325 mg) by mouth 3 times daily (with meals)    Anemia, unspecified type       metoprolol 25 MG 24 hr tablet    TOPROL-XL    30 tablet    Take 1 tablet (25 mg) by mouth daily    Hypertension, goal below 140/90, Tachycardia       NEW MED      Phytomega takes 2 soft gel caps daily

## 2017-08-22 NOTE — PATIENT INSTRUCTIONS
Please follow up with Dr. Dove with results and plan of care from workup.  Stan, Oncology RN will coordinate and call you with all appointments.    Pre-op Date/time:  8/23/17    Liver ultrasound guided biopsy Date/Time:  8/28/17 - arrive at Same Day Surgery Check in at 8:15 for 8:30 check in.  Nothing to eat or drink for 4 hours prior to scan.      PET/CT Scan Date/time:  8/25/17 at 1:30    Follow Up Date/Time:     Possible Port-a-cath Placement Date/Time:    If you have any questions or concerns please feel free to call.    Stan Balderas, RN, BSN   Oncology Care Coordinator RN  [facility]  [phone]

## 2017-08-22 NOTE — PROGRESS NOTES
Patient seen in consultation for colon mass by Donna Shirley    HPI:  Patient is a 67 year old female  with complaints of colon mass with liver lesions found on recent CT scan  Pt has has a complicated course over the last couple of month with multiple visits and tests which ultimately revealed likely colon CA with liver metastases. She states that only in  did she start to feel as though something was wrong with her. She says that all this started with a cough and subsequent imaging and testing showed her mass. She has had decrease in appetite, without significant weight loss. She is anemic with associated fatigue and weakness without SOB. She denies blood in her stool but does report a change in her bowel pattern now with multiple trips in the AM and the stools range in caliber from formed to loose. She does have a BM every day, but again, denies blood. She also complains of fever at times. Spicy food bothers her and she does have some mild non specific abdominal pain in the epigastric area.    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     HIP SURGERY      14 years old       Family History   Problem Relation Age of Onset     C.A.D. Mother      quad- bypass at age 78     CEREBROVASCULAR DISEASE Mother      in her 70's     Lipids Mother      Hypertension Mother      Other - See Comments Mother      Triple Bi-pass     Circulatory Father       abd aneurysm     GASTROINTESTINAL DISEASE Father      diverticulitis     Hypertension Father        Social History     Social History     Marital status:      Spouse name: Praveen Nobles)     Number of children: 2     Years of education: 14     Occupational History           [employer]     Social History Main Topics     Smoking status: Never Smoker     Smokeless tobacco: Never Used     Alcohol use 1.0 oz/week      Comment: 1 per week     Drug use: No     Sexual activity: Yes     Partners: Male     Birth control/ protection: Surgical, Male Surgical      Comment:  had a vasectomy     Other Topics Concern      Service No     Blood Transfusions Yes     1962     Caffeine Concern No     Occupational Exposure No     Hobby Hazards No     Sleep Concern No     Stress Concern No     Weight Concern Yes     gradual weight gain     Special Diet No     Back Care Yes     low back pain occasionally     Exercise No     Bike Helmet No     n/a     Seat Belt Yes     uses seatbelts 100%     Self-Exams Yes     does monthly breast exams     Social History Narrative       Current Outpatient Prescriptions   Medication Sig Dispense Refill     metoprolol (TOPROL-XL) 25 MG 24 hr tablet Take 1 tablet (25 mg) by mouth daily 30 tablet 0     ferrous sulfate (IRON) 325 (65 FE) MG tablet Take 1 tablet (325 mg) by mouth 3 times daily (with meals) (Patient not taking: Reported on 8/21/2017) 90 tablet 1     NEW MED Phytomega takes 2 soft gel caps daily       Multiple Vitamin (ANTI-OXIDANT PO) Take  by mouth. Take one tablet by mouth daily       CALCIUM + D OR 1 TABLET DAILY       ADVIL 200 MG OR CAPS 1 CAPSULE EVERY 4 TO 6 HOURS AS NEEDED 120 0       Medications and history reviewed    Physical exam:  Vitals: /80 (BP Location: Right arm, Patient Position: Sitting, Cuff Size: Adult Regular)  Pulse 136  Temp 98  F (36.7  C) (Oral)  Resp 16  Wt 150 lb (68 kg)  LMP 06/07/2007  SpO2 98%   BMI 24.06 kg/m2  BMI= Body mass index is 24.06 kg/(m^2).    Constitutional: alert, mild distress and pale  Head: Normocephalic. No masses, lesions, tenderness or abnormalities  Cardiovascular: Reg rhythm with tachycardia, no murmurs, rubs or gallops  Respiratory: negative, Percussion normal. Good diaphragmatic excursion. Lungs clear  Gastrointestinal: soft, minimal epigastric ttp, fullness without tenderness on the R side of the abd, no rebound, non distended  : Deferred  Musculoskeletal: extremities normal- no gross deformities noted, gait normal and normal muscle tone  Skin: no suspicious lesions or rashes  Psychiatric: affect normal/bright, anxious and worried  Hematologic/Lymphatic/Immunologic: Normal cervical lymph nodes  Patient able to get up on table without difficulty.    Labs show:  Recent Results (from the past 168 hour(s))   CBC with platelets   Result Value Ref Range Status    WBC 19.9 (H) 4.0 - 11.0 10e9/L Final    RBC Count 3.26 (L) 3.8 - 5.2 10e12/L Final    Hemoglobin 8.1 (L) 11.7 - 15.7 g/dL Final    Hematocrit 26.6 (L) 35.0 - 47.0 % Final    MCV 82 78 - 100 fl Final    MCH 24.8 (L) 26.5 - 33.0 pg Final    MCHC 30.5 (L) 31.5 - 36.5 g/dL Final    RDW 15.2 (H) 10.0 - 15.0 % Final    Platelet Count 498 (H) 150 - 450 10e9/L Final       Imaging shows:  Recent Results (from the past 744 hour(s))   XR Chest 2 Views    Narrative    CHEST TWO VIEWS   8/7/2017 2:41 PM     HISTORY: Tachycardia, unspecified. Cough.    COMPARISON: Chest CT dated 7/14/2017, chest x-rays dated 7/14/2017.    FINDINGS:  The lungs are clear. No pleural effusions or pneumothorax.  Heart size and pulmonary vascularity are within normal limits. No  acute fracture.       Impression    IMPRESSION: No evidence of acute cardiopulmonary disease is seen.    I discussed the negative findings of this chest x-ray with Donna Shirley on 8/7/2017 at 2:47 PM.    GENA CAMPBELL MD   US Abdomen Complete    Narrative    ABDOMINAL ULTRASOUND August  18, 2017 9:57 AM        Impression    IMPRESSION: Multiple large heterogeneously echoic lesions in the liver  are mostly hyperechoic with possibly hypoechoic halos. These are  concerning for malignancy such as metastasis. Multiple hemangiomata  are also possible. I recommend further evaluation with CT  abdomen/pelvis with hemangioma protocol for the liver.    I discussed these findings with Vale Yu on 8/18/2017 at 10:13  AM, in the absence of the Donna Casper.    GENA CAMPBELL MD   CT Abdomen Pelvis w Contrast            Impression    IMPRESSION:  1. Large ascending colon mass with associated mesenteric/peritoneal  drop metastases, possible lymph node metastases and probable hepatic  metastases as described above.  2. Nonaneurysmal atherosclerosis.  3. Trace free fluid in pelvis.    I discussed these findings with Vale Yu on 8/18/2017 at  approximately 2:40 PM.    GENA CAMPBELL MD         Assessment:     ICD-10-CM    1. Colonic mass K63.9     - Likely colon cancer, with probable metastases  2. Anemia, symptomatic  3. Cough  4. Abdominal pain    Plan  1. C-scope this week for tissue diagnosis along with liver biopsy to confirm stage  2. Return for VAP as needed per oncology    Narrative  I had a long discussion with Charlene and her family about the CT results and the presumed diagnosis of metastatic colon cancer. We talked at length about what to expect from all the specialists who will be involved in her care. I told her specifically from my perspective I could help her with VAP placement as well as if she were to need any kind of palliative surgery I would be able to do that as well. We talked about how the primary treatment for non-resectable metastatic colon cancer is chemotherapy. She knows to pay close attention to her bowel pattern and abdominal girth as well as color and consistency of her stool so we can know if she were to obstruct or bleed. We discussed, and she seemed to understand, that  if this is indeed stage IV colon cancer that the treatment goals would be to extend life and improve quality of life, not intended to cure.        Vinny Peterson DO     Please schedule for surgery, pre op H&P, and post ops.    Surgery:  Patient Name:  Charlene Douglas ([id number])  Procedure: venous access port placement  Diagnosis:    Colon cancer  Assistant: None  Surgeon:  Vinny Peterson DO  Anesthesia:  MAC  PT type:  Same Day Surgery  Time needed: 75 minutes  Patient position:  lying supine  Mini fluoro:  No  C-arm:  Yes  Equipment:  Will need ultrasound and flouroscopy  Anticoagulation:  No  Vendor:  no  Surgeon Notes:     Post op appts:    prn    Expected work restrictions:  none    [facility] Discussed:  Not Applicable

## 2017-08-22 NOTE — PROGRESS NOTES
DATE OF VISIT: Aug 22, 2017    REASON FOR REFERRAL: Colon mass and multiple metastatic liver lesions.    CHIEF COMPLAINT:   Chief Complaint   Patient presents with     Oncology Clinic Visit     Colon mass     Consult     Ref: Donna Shirley PA-C       HISTORY OF PRESENT ILLNESS:   57-year-old female patient who presented with 2 months history of cough, shortness of breath, decreased appetite and abdominal pain. Patient has black stool and occult blood which was positive. Subsequently the patient went on to have a CT angiogram of the chest because of shortness of breath. Lungs were clear. Multiple liver lesions were seen. Subsequently the patient went on to have an ultrasound of the liver done on 2017 which confirmed the presence of multiple liver metastases. Abdominal CT was done on 2017 showing large ascending colon mass with associated mesenteric/peritoneal drop metastasis and lymphadenopathy. There are multiple liver metastasis seen. Patient is here today to discuss these findings.    REVIEW OF SYSTEMS:   Constitutional: Negative for fever, chills, and night sweats. Weight loss of about 5 pounds since last month.  Skin: negative.  Eyes: negative.  Ears/Nose/Throat: negative.  Respiratory: Dry cough. Usually associated with palpitation.  Cardiovascular: negative.  Gastrointestinal: Epigastric pain. The patient described 2/10 epigastric pain specially after eating  Genitourinary: negative.  Musculoskeletal: negative.  Neurologic: negative.  Psychiatric: negative.  Hematologic/Lymphatic/Immunologic: negative.  Endocrine: negative.    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     HIP SURGERY      14  years old       ALLERGIES:   Allergies as of 08/22/2017 - Miguel as Reviewed 08/22/2017   Allergen Reaction Noted     No known allergies  08/11/2003       MEDICATIONS:   Current Outpatient Prescriptions   Medication Sig Dispense Refill     benzonatate (TESSALON) 200 MG capsule Take 1 capsule (200 mg) by mouth 3 times daily as needed for cough 60 capsule 0     metoprolol (TOPROL-XL) 25 MG 24 hr tablet Take 1 tablet (25 mg) by mouth daily 30 tablet 0     ferrous sulfate (IRON) 325 (65 FE) MG tablet Take 1 tablet (325 mg) by mouth 3 times daily (with meals) (Patient not taking: Reported on 8/21/2017) 90 tablet 1     NEW MED Phytomega takes 2 soft gel caps daily       Multiple Vitamin (ANTI-OXIDANT PO) Take  by mouth. Take one tablet by mouth daily       CALCIUM + D OR 1 TABLET DAILY       ADVIL 200 MG OR CAPS 1 CAPSULE EVERY 4 TO 6 HOURS AS NEEDED (Patient not taking: Reported on 8/22/2017) 120 0        FAMILY HISTORY:   Family History   Problem Relation Age of Onset     C.A.D. Mother      quad- bypass at age 78     CEREBROVASCULAR DISEASE Mother      in her 70's     Lipids Mother      Hypertension Mother      Other - See Comments Mother      Triple Bi-pass     Circulatory Father      abd aneurysm     GASTROINTESTINAL DISEASE Father      diverticulitis     Hypertension Father        SOCIAL HISTORY:   Social History     Social History     Marital status:      Spouse name: Praveen Nobles)     Number of children: 2     Years of education: 14     Occupational History           [employer]     Social History Main Topics     Smoking status: Never Smoker     Smokeless tobacco: Never Used     Alcohol use 1.0 oz/week      Comment: 1 per week     Drug use: No     Sexual activity: Yes     Partners: Male     Birth control/ protection: Surgical, Male Surgical      Comment:  had a vasectomy     Other Topics Concern      Service No     Blood Transfusions Yes     1962     Caffeine  "Concern No     Occupational Exposure No     Hobby Hazards No     Sleep Concern No     Stress Concern No     Weight Concern Yes     gradual weight gain     Special Diet No     Back Care Yes     low back pain occasionally     Exercise No     Bike Helmet No     n/a     Seat Belt Yes     uses seatbelts 100%     Self-Exams Yes     does monthly breast exams     Social History Narrative       PHYSICAL EXAMINATION:   /80 (BP Location: Right arm, Patient Position: Chair, Cuff Size: Adult Regular)  Pulse 136  Temp 98  F (36.7  C) (Temporal)  Resp 16  Ht 1.683 m (5' 6.25\")  Wt 68 kg (150 lb)  LMP 06/07/2007  SpO2 98%  BMI 24.03 kg/m2  Wt Readings from Last 10 Encounters:   08/22/17 68 kg (150 lb)   08/22/17 68 kg (150 lb)   08/21/17 68.8 kg (151 lb 9.6 oz)   08/07/17 69.8 kg (153 lb 14.4 oz)   08/17/17 69.4 kg (153 lb)   07/19/17 71.2 kg (157 lb)   07/14/17 71.7 kg (158 lb)   07/14/17 71.7 kg (158 lb 1.6 oz)   06/18/13 71.7 kg (158 lb)   06/21/10 73.9 kg (163 lb)      ECOG performance status: 1  GENERAL APPEARANCE: Healthy, alert and in no acute distress.  HEENT: Sclerae anicteric. PERRLA. Oropharynx without ulcers, lesions, or thrush.  NECK: Supple. No asymmetry or masses.  LYMPHATICS: No palpable cervical, supraclavicular, axillary, or inguinal lymphadenopathy.  RESP: Lungs clear to auscultation bilaterally without rales, rhonchi or wheezes.  CARDIOVASCULAR: Regular rate and rhythm. Normal S1, S2; no S3 or S4. No murmur, gallop, or rub.  ABDOMEN: Soft, nontender. Bowel sounds normal. No palpable organomegaly or masses.  MUSCULOSKELETAL: Extremities without gross deformities noted. No edema of bilateral lower extremities.  SKIN: No suspicious lesions or rashes.  NEURO: Alert and oriented x 3. Cranial nerves II-XII grossly intact.  PSYCHIATRIC: Mentation and affect appear normal.    LABORATORY RESULTS:  Orders Only on 08/18/2017   Component Date Value Ref Range Status     WBC 08/18/2017 19.9* 4.0 - 11.0 10e9/L " Final     RBC Count 08/18/2017 3.26* 3.8 - 5.2 10e12/L Final     Hemoglobin 08/18/2017 8.1* 11.7 - 15.7 g/dL Final     Hematocrit 08/18/2017 26.6* 35.0 - 47.0 % Final     MCV 08/18/2017 82  78 - 100 fl Final     MCH 08/18/2017 24.8* 26.5 - 33.0 pg Final     MCHC 08/18/2017 30.5* 31.5 - 36.5 g/dL Final     RDW 08/18/2017 15.2* 10.0 - 15.0 % Final     Platelet Count 08/18/2017 498* 150 - 450 10e9/L Final       IMAGING RESULTS:  Recent Results (from the past 744 hour(s))   XR Chest 2 Views    Narrative    CHEST TWO VIEWS   8/7/2017 2:41 PM     HISTORY: Tachycardia, unspecified. Cough.    COMPARISON: Chest CT dated 7/14/2017, chest x-rays dated 7/14/2017.    FINDINGS:  The lungs are clear. No pleural effusions or pneumothorax.  Heart size and pulmonary vascularity are within normal limits. No  acute fracture.       Impression    IMPRESSION: No evidence of acute cardiopulmonary disease is seen.    I discussed the negative findings of this chest x-ray with Donna Shirley on 8/7/2017 at 2:47 PM.    GENA CAMPBELL MD   US Abdomen Complete    Narrative    ABDOMINAL ULTRASOUND August 18, 2017 9:57 AM    HISTORY: Upper abdominal pain, unspecified.    COMPARISON: CT chest dated 7/14/2017.    FINDINGS:    Gallbladder: Normal with no cholelithiasis, wall thickening or focal  tenderness.      Bile ducts: CHD is normal diameter. No intrahepatic biliary  dilatation.    Liver: The liver is diffusely abnormal with multiple hypoechoic  lesions scattered throughout liver measuring up to maximum of 7.8 cm.  Some these may have some hypoechoic halo. These findings are  concerning for malignancy such as metastasis, cholangiocarcinoma or  other. Multiple hemangiomata are also possible and further evaluation  with CT abdomen and pelvis with hemangioma protocol is recommended.    Pancreas: Partially obscured but grossly unremarkable, where seen.     Spleen: Normal.     Right kidney: Normal.     Left kidney: Normal.    Aorta and Proximal IVC:  Normal.      No abnormal fluid collections are seen in the scanned portions of the  abdomen.  The appendix was not seen.      Impression    IMPRESSION: Multiple large heterogeneously echoic lesions in the liver  are mostly hyperechoic with possibly hypoechoic halos. These are  concerning for malignancy such as metastasis. Multiple hemangiomata  are also possible. I recommend further evaluation with CT  abdomen/pelvis with hemangioma protocol for the liver.    I discussed these findings with Vale Yu on 8/18/2017 at 10:13  AM, in the absence of the Donna Casper.    GENA CAMPBELL MD   CT Abdomen Pelvis w Contrast    Narrative    CT ABDOMEN AND PELVIS WITH CONTRAST  8/18/2017 2:41 PM    HISTORY: Liver lesions seen on ultrasound. Abnormal findings on  diagnostic imaging of other abdominal regions, including  retroperitoneum.    TECHNIQUE: Scans obtained from the diaphragm through the pelvis with  oral and IV contrast, Isovue-370, 75 mL.   Radiation dose for this scan was reduced using automated exposure  control, adjustment of the mA and/or kV according to patient size, or  iterative reconstruction technique.    COMPARISON:  Ultrasound abdomen dated 8/18/2017.    FINDINGS: Visualized portions of the lung bases and mediastinal  contents are unremarkable.  There are no aggressive osseous lesions.      There are innumerable hypodense lesions scattered throughout the liver  with the largest in the right lobe of liver measuring up to 10 cm and  largest in left lobe of the liver measuring up to 5.6 cm. These have  an appearance concerning for metastases. The gallbladder is mostly  decompressed but otherwise unremarkable. The pancreas, spleen,  bilateral adrenal glands and bilateral kidneys enhance grossly  normally. No hydronephrosis, nephrolithiasis, hydroureter or ureteral  calculus is identified. Urinary bladder is normal in appearance.    The uterus and ovaries are unremarkable. There is  nonaneurysmal  atherosclerosis.    There is a large mass extending from the ascending colon (image 63  series 2 and image 18 series 3) measuring up to 3.2 x 3.9 x 3.2 cm.  There is soft tissue extension of this mass outside of the colon wall  into the adjacent mesenteric adipose tissues with associated  nodularity. Nodularity appears to be confluent to the mid abdomen.  There is another nodule mid abdomen just anterior and inferior to the  aortic bifurcation measuring up to 1.6 x 2.3 x 2.3 cm (image 48 series  2 and image 24 series 3). These could represent either mesenteric  metastasis or lymphadenopathy. There are soft tissue nodules/confluent  mass in the posterior right side of the pelvis posterior and to the  right of the uterus (image 78 series 2) measuring up to 2.9 x 2.0 x  1.5 cm enteric metastases. Trace free fluid is seen in pelvis.    No other evidence for lymphadenopathy is identified. No free air is  seen in the peritoneal cavity.    The colon is otherwise grossly of normal caliber without evidence for  obstruction. Small bowel is grossly of normal caliber and stomach is  unremarkable.      Impression    IMPRESSION:  1. Large ascending colon mass with associated mesenteric/peritoneal  drop metastases, possible lymph node metastases and probable hepatic  metastases as described above.  2. Nonaneurysmal atherosclerosis.  3. Trace free fluid in pelvis.    I discussed these findings with Vale Yu on 8/18/2017 at  approximately 2:40 PM.    GENA CAMPBELL MD       ASSESSMENT AND PLAN:    This is a 67-year-old female patient who is a new diagnosis of a colon mass was multiple mesenteric adenopathy and multiple hepatic metastases. Patient is scheduled to have colonoscopy on Thursday. My plan is to arrange for ultrasound-guided biopsy from tumor lesions. Today and will check CEA level. I reviewed with the patient today the natural history of metastatic colon cancer. We reviewed the biology, staging,  management and prognosis. We talked about different modalities and treatment of colon cancer. We talked about systemic chemotherapy, biologic agents and immunotherapy in details. We will continue to confirm the diagnosis with biopsy and staging workup with a PET scan. I will meet with the patient again next week to discuss the management plan in details.    (R03.0) Elevated blood pressure reading without diagnosis of hypertension     Patient currently on metoprolol 25 mg orally daily.    (D64.9) Anemia, unspecified type  We will continue to monitor hemoglobin and offer  blood transfusion if hemoglobin is less than 8 or becoming more symptomatic.    The patient is ready to learn, no apparent learning barriers were identified, Diagnosis and treatment plans were explained to the patient. The patient expressed understanding of the content. The patient questions were answered to her satisfaction.    Duy Dove MD    Chart documentation with Dragon Voice recognition Software. Although reviewed after completion, some words and grammatical errors may remain.  Time spent 60 minutes with 50% in counseling and coordination of care including discussion of natural history of metastatic colon cancer, biology, staging, management and prognosis

## 2017-08-22 NOTE — PROGRESS NOTES
Pt is brand new. Does not have a port yet. Will be deciding if she wants one from the education about the port from the RN.    Rosanna Jack, CMA

## 2017-08-22 NOTE — MR AVS SNAPSHOT
After Visit Summary   8/22/2017    Charlene Douglas    MRN: [id number]           Patient Information     Date Of Birth          1950        Visit Information        Provider Department      8/22/2017 1:00 PM Vinny Peterson,  [facility]        Today's Diagnoses     Colonic mass    -  1       Follow-ups after your visit        Follow-up notes from your care team     Return and if needs a VAP.      Your next 10 appointments already scheduled     Aug 22, 2017  2:30 PM CDT   New Visit with Duy Dove MD   [facility] ([facility])    [address] [facility] [id number] [phone]            Aug 24, 2017   Procedure with William Charles Duane, MD   [facility] (--)    [address]  [facility] [address] [phone]              Who to contact     If you have questions or need follow up information about today's clinic visit or your schedule please contact [facility] directly at [phone].  Normal or non-critical lab and imaging results will be communicated to you by Sightlyhart, letter or phone within 4 business days after the clinic has received the results. If you do not hear from us within 7 days, please contact the clinic through [employer] or phone. If you have a critical or abnormal lab result, we will notify you by phone as soon as possible.  Submit refill requests through [employer] or call your pharmacy and they will forward the refill request to us. Please allow 3 business days for your refill to be completed.          Additional Information About Your Visit        MyChart Information     [employer] gives you secure access to your electronic health record. If you see a primary care provider, you can also send messages to your care team and make appointments. If you have questions, please call your primary care clinic.  If you do not have a primary care provider, please call  [phone] and they will assist you.        Care EveryWhere ID     This is your Care EveryWhere ID. This could be used by other organizations to access your [place] medical records  [id number]        Your Vitals Were     Pulse Temperature Respirations Last Period Pulse Oximetry BMI (Body Mass Index)    136 98  F (36.7  C) (Oral) 16 06/07/2007 98% 24.06 kg/m2       Blood Pressure from Last 3 Encounters:   08/22/17 172/80   08/21/17 178/70   08/07/17 161/83    Weight from Last 3 Encounters:   08/22/17 150 lb (68 kg)   08/21/17 151 lb 9.6 oz (68.8 kg)   08/07/17 153 lb 14.4 oz (69.8 kg)              Today, you had the following     No orders found for display       Primary Care Provider Office Phone # Fax #    Donna Shirley PA-C [phone] [address] [place] [address]        Equal Access to Services     [facility]: Hadii aad ku hadasho Soomaali, waaxda luqadaha, qaybta kaalmada adeegyada, waxay rerein hayfred rowe . So [facility] [phone].    ATENCIÓN: Si habla español, tiene a hunt disposición servicios gratuitos de asistencia lingüística. Llame al [phone].    We comply with applicable federal civil rights laws and [place] laws. We do not discriminate on the basis of race, color, national origin, age, disability sex, sexual orientation or gender identity.            Thank you!     Thank you for choosing [facility]  for your care. Our goal is always to provide you with excellent care. Hearing back from our patients is one way we can continue to improve our services. Please take a few minutes to complete the written survey that you may receive in the mail after your visit with us. Thank you!             Your Updated Medication List - Protect others around you: Learn how to safely use, store and throw away your medicines at www.disposemymeds.org.          This list is accurate as of: 8/22/17  2:15 PM.  Always use your most recent med list.                    Brand Name Dispense Instructions for use Diagnosis    ADVIL 200 MG capsule   Generic drug:  ibuprofen     120    1 CAPSULE EVERY 4 TO 6 HOURS AS NEEDED        ANTI-OXIDANT PO      Take  by mouth. Take one tablet by mouth daily        CALCIUM + D PO      1 TABLET DAILY        ferrous sulfate 325 (65 FE) MG tablet    IRON    90 tablet    Take 1 tablet (325 mg) by mouth 3 times daily (with meals)    Anemia, unspecified type       metoprolol 25 MG 24 hr tablet    TOPROL-XL    30 tablet    Take 1 tablet (25 mg) by mouth daily    Hypertension, goal below 140/90, Tachycardia       NEW MED      Phytomega takes 2 soft gel caps daily

## 2017-08-22 NOTE — NURSING NOTE
"Oncology Rooming Note    August 22, 2017 1:56 PM   Charlene Douglas is a 67 year old female who presents for:    Chief Complaint   Patient presents with     Oncology Clinic Visit     Colon mass     Consult     Ref: Donna Shirley PA-C     Initial Vitals: /80 (BP Location: Right arm, Patient Position: Chair, Cuff Size: Adult Regular)  Pulse 136  Temp 98  F (36.7  C) (Temporal)  Resp 16  Ht 1.683 m (5' 6.25\")  Wt 68 kg (150 lb)  LMP 06/07/2007  SpO2 98%  BMI 24.03 kg/m2 Estimated body mass index is 24.03 kg/(m^2) as calculated from the following:    Height as of this encounter: 1.683 m (5' 6.25\").    Weight as of this encounter: 68 kg (150 lb). Body surface area is 1.78 meters squared.  No Pain (0) Comment: Data Unavailable   Patient's last menstrual period was 06/07/2007.   Vitals were taken at Dr. Peterson's appointment, right before oncology appointment.  Allergies reviewed: Yes  Medications reviewed: Yes    Medications: Medication refills not needed today.  Pharmacy name entered into EPIC: Data Unavailable    Clinical concerns: none Dr. Dove was NOT notified.    5 minutes for nursing intake (face to face time)     Rosanna Jack CMA               "

## 2017-08-22 NOTE — NURSING NOTE
"Chief Complaint   Patient presents with     Consult       Initial /80 (BP Location: Right arm, Patient Position: Sitting, Cuff Size: Adult Regular)  Pulse 136  Temp 98  F (36.7  C) (Oral)  Resp 16  Wt 150 lb (68 kg)  LMP 06/07/2007  SpO2 98%  BMI 24.06 kg/m2 Estimated body mass index is 24.06 kg/(m^2) as calculated from the following:    Height as of 8/17/17: 5' 6.2\" (1.681 m).    Weight as of this encounter: 150 lb (68 kg).  Medication Reconciliation: complete   Pt here for consult regarding colon mass. ARIANA Reyes       "

## 2017-08-23 NOTE — PROGRESS NOTES
This patient was a no show for Lab on 8/22/17. I have canceled and put the orders in for 1 week. Please contact the patient. Le MCLAUGHLIN

## 2017-08-23 NOTE — PATIENT INSTRUCTIONS
Try lemonade crystal light for the sour taste-  Recheck blood on Friday- make that appointment on your way out.  Watch for blood in stool or worsening shortness of breath or fatigue  You can take the metoprolol the morning before your procedures with a small sip of water if your blood pressure is greater than 110 over 55 and your heart rate is greater than 60.      Before Your Surgery      Call your surgeon if there is any change in your health. This includes signs of a cold or flu (such as a sore throat, runny nose, cough, rash or fever).    Do not smoke, drink alcohol or take over the counter medicine (unless your surgeon or primary care doctor tells you to) for the 24 hours before and after surgery.    If you take prescribed drugs: Follow your doctor s orders about which medicines to take and which to stop until after surgery.    Eating and drinking prior to surgery: follow the instructions from your surgeon    Take a shower or bath the night before surgery. Use the soap your surgeon gave you to gently clean your skin. If you do not have soap from your surgeon, use your regular soap. Do not shave or scrub the surgery site.  Wear clean pajamas and have clean sheets on your bed.

## 2017-08-23 NOTE — MR AVS SNAPSHOT
After Visit Summary   8/23/2017    Charlene Douglas    MRN: [id number]           Patient Information     Date Of Birth          1950        Visit Information        Provider Department      8/23/2017 11:00 AM Lesly Edwards NP [facility]        Today's Diagnoses     Preop general physical exam    -  1      Care Instructions    Try lemonade crystal light for the sour taste-  Recheck blood on Friday- make that appointment on your way out.  Watch for blood in stool or worsening shortness of breath or fatigue      Before Your Surgery      Call your surgeon if there is any change in your health. This includes signs of a cold or flu (such as a sore throat, runny nose, cough, rash or fever).    Do not smoke, drink alcohol or take over the counter medicine (unless your surgeon or primary care doctor tells you to) for the 24 hours before and after surgery.    If you take prescribed drugs: Follow your doctor s orders about which medicines to take and which to stop until after surgery.    Eating and drinking prior to surgery: follow the instructions from your surgeon    Take a shower or bath the night before surgery. Use the soap your surgeon gave you to gently clean your skin. If you do not have soap from your surgeon, use your regular soap. Do not shave or scrub the surgery site.  Wear clean pajamas and have clean sheets on your bed.           Follow-ups after your visit        Your next 10 appointments already scheduled     Aug 24, 2017   Procedure with William Charles Duane, MD   [facility] [place] [facility]--)    [address] [place] [address]   [phone]            Aug 25, 2017  2:00 PM CDT   PE NPET ONCOLOGY (EYES TO THIGHS) with PHPET1   [facility] PET CT Scan ([facility])    [address] [place] [address]   [phone]           Tell your doctor:   If there is any chance you may be pregnant or if you are breastfeeding.   If you have  problems lying in small spaces (claustrophobia). If you do, your doctor may give you medicine to help you relax. If you have diabetes:   Have your exam early in the morning. Your blood glucose will go up as the day goes by.   Your glucose level must be 180 or less at the start of the exam. Please take any medicines you need to ensure this blood glucose level. 24 hours before your scan: Don t do any heavy exercise. (No jogging, aerobics or other workouts.) Exercise will make your pictures less accurate. 6 hours before your scan:   Stop all food and liquids (except water).   Do not chew gum or suck on mints.   If you need to take medicine with food, you may take it with a few crackers.  Please call your Imaging Department at your exam site with any questions.            Aug 28, 2017  9:30 AM CDT   US BIOPSY LIVER with PHUS1, PH RAD   [facility] Ultrasound ([facility])    [address] [address] [phone]           Tell us in advance if there s any chance you may be pregnant.  Bring a list of your medicines to the exam. Include vitamins, minerals and over-the-counter drugs.  No eating or drinking for 4 hours prior to exam.  If you take blood thinners, you may need to stop taking them a few days before treatment. Talk to your doctor before stopping these medicines. You will need a blood test the morning of your exam.   Stop taking Coumadin (warfarin) 3 days before your exam. Restart the day after your exam.   If you take aspirin, you may need to stop taking it 3 days before your scan.   If you take Plavix, Ticlid, Pletal or Persantine, you may need to stop taking them 5 days before your scan. Please talk to your doctor before stopping these medicines.  If you will receive sedation for this test (medicine to help you relax):   See your family doctor for an exam within 30 days of treatment.   Plan for an adult to drive you home and stay with you for at least 6 hours.    Follow the eating and drinking guidelines checked below:   No eating or drinking for 4 hours before your test. You may take medicine with small sips of water.   If you have diabetes:If you take insulin, call your diabetes care team. Do not take diabetes pills on the morning of your test. If you take metformin (Avandamet, Glucophage, Glucovance, Metaglip) and received contrast, wait 48 hours before re-starting this medicine.  Please call the Imaging Department at your exam site with any questions.            Aug 28, 2017   Procedure with GENERIC ANESTHESIA PROVIDER   [facility] Periop Services ([facility])    [address] [place] [address]   [phone]           From [address]: Exit at [address] on south side of [place]. Turn right on [address]. Turn left at stoplight on [address]. [facility] will be in view two blocks ahead              Future tests that were ordered for you today     Open Future Orders        Priority Expected Expires Ordered    US Biopsy Liver Routine  8/23/2018 8/23/2017    CBC with platelets Routine  8/23/2018 8/23/2017    CEA Routine  8/29/2017 8/23/2017    Partial thromboplastin time Routine  8/29/2017 8/23/2017    INR Routine  8/29/2017 8/23/2017    PET Oncology (Eyes to Thighs) Routine  8/23/2018 8/22/2017            Who to contact     If you have questions or need follow up information about today's clinic visit or your schedule please contact [facility] directly at [phone].  Normal or non-critical lab and imaging results will be communicated to you by MyChart, letter or phone within 4 business days after the clinic has received the results. If you do not hear from us within 7 days, please contact the clinic through MyChart or phone. If you have a critical or abnormal lab result, we will notify you by phone as soon as possible.  Submit refill requests through 800APP or call your pharmacy and they will  "forward the refill request to us. Please allow 3 business days for your refill to be completed.          Additional Information About Your Visit        Dis[employer] Information     [employer] gives you secure access to your electronic health record. If you see a primary care provider, you can also send messages to your care team and make appointments. If you have questions, please call your primary care clinic.  If you do not have a primary care provider, please call [phone] and they will assist you.        Care EveryWhere ID     This is your Care EveryWhere ID. This could be used by other organizations to access your [place] medical records  [id number]        Your Vitals Were     Pulse Temperature Respirations Height Last Period Breastfeeding?    72 99.7  F (37.6  C) (Temporal) 16 5' 6.53\" (1.69 m) 06/07/2007 No    BMI (Body Mass Index)                   23.87 kg/m2            Blood Pressure from Last 3 Encounters:   08/23/17 156/72   08/22/17 172/80   08/22/17 172/80    Weight from Last 3 Encounters:   08/23/17 150 lb 4.8 oz (68.2 kg)   08/22/17 150 lb (68 kg)   08/22/17 150 lb (68 kg)               Primary Care Provider Office Phone # Fax #    Donna Shirley PA-C [phone] [address] [place] [address]        Equal Access to Services     [facility]: Hadii aad ku hadasho Soomaali, waaxda luqadaha, qaybta kaalmada adeegyada, debra rowe . So [facility] [phone].    ATENCIÓN: Si habla español, tiene a hunt disposición servicios gratuitos de asistencia lingüística. Llrufino al [phone].    We comply with applicable federal civil rights laws and [place] laws. We do not discriminate on the basis of race, color, national origin, age, disability sex, sexual orientation or gender identity.            Thank you!     Thank you for choosing [facility]  for your care. Our goal is always to provide you with excellent care. Hearing back from our " patients is one way we can continue to improve our services. Please take a few minutes to complete the written survey that you may receive in the mail after your visit with us. Thank you!             Your Updated Medication List - Protect others around you: Learn how to safely use, store and throw away your medicines at www.disposemymeds.org.          This list is accurate as of: 8/23/17 12:41 PM.  Always use your most recent med list.                   Brand Name Dispense Instructions for use Diagnosis    ADVIL 200 MG capsule   Generic drug:  ibuprofen     120    1 CAPSULE EVERY 4 TO 6 HOURS AS NEEDED        ANTI-OXIDANT PO      Take  by mouth. Take one tablet by mouth daily        benzonatate 200 MG capsule    TESSALON    60 capsule    Take 1 capsule (200 mg) by mouth 3 times daily as needed for cough    Elevated blood pressure reading without diagnosis of hypertension       CALCIUM + D PO      1 TABLET DAILY        ferrous sulfate 325 (65 FE) MG tablet    IRON    90 tablet    Take 1 tablet (325 mg) by mouth 3 times daily (with meals)    Anemia, unspecified type       metoprolol 25 MG 24 hr tablet    TOPROL-XL    30 tablet    Take 1 tablet (25 mg) by mouth daily    Hypertension, goal below 140/90, Tachycardia       NEW MED      Phytomega takes 2 soft gel caps daily

## 2017-08-23 NOTE — NURSING NOTE
DISCHARGE PLAN:  Next appointments: See patient instruction section  Departure Mode: Ambulatory  Accompanied by:  and daughter  30 minutes for nursing discharge (face to face time)     Charlene Douglas is here today for Oncology Consult for possible Colon Ca with mets to liver.  Writing nurse seen patient after Medical Oncology appointment to address questions/concerns/coordinate care. Introduced self, role, and philosophy of care. Reviewed plan of care and what to expect.  Colonoscopy will be on Thursday.  PET Scan Friday, Biopsy Monday and follow up with results.  Will coordinate accordingly.  Patient did see surgeon and is on board with a Port-a-cath but would like to wait until results are in.  Patient provided information on PET/CT with instructions, Liver biopsy, and Port-a-cath.  Patient will be called with all appointments scheduled.  Emotional support provided.  See patient instructions and Oncologist's Progress note for further details. Questions and concerns addressed to patient's satisfaction. Patient verbalized and demonstrated understanding of plan.  Contact information provided and patient is encouraged to call with any that arise in the interim of care.    Called appointments to patient and  with review of plan.  Will review with Dr. Dove after some workup complete to see if we should schedule port and when she should have follow up.    Stan Balderas, RN, BSN, OCN   Oncology Care Coordinator RN  [facility]  [phone]  8/23/2017, 12:52 PM

## 2017-08-23 NOTE — NURSING NOTE
"Chief Complaint   Patient presents with     Pre-Op Exam       Initial /72  Pulse 72  Temp 99.7  F (37.6  C) (Temporal)  Resp 16  Ht 5' 6.53\" (1.69 m)  Wt 150 lb 4.8 oz (68.2 kg)  LMP 06/07/2007  Breastfeeding? No  BMI 23.87 kg/m2 Estimated body mass index is 23.87 kg/(m^2) as calculated from the following:    Height as of this encounter: 5' 6.53\" (1.69 m).    Weight as of this encounter: 150 lb 4.8 oz (68.2 kg).  Medication Reconciliation: complete      "

## 2017-08-24 NOTE — H&P (VIEW-ONLY)
[facility]  [address] [address] [phone]  Dept: [phone]    PRE-OP EVALUATION:  Today's date: 2017    Charlene Douglas (: 1950) presents for pre-operative evaluation assessment as requested by Radiologist, Dr. Dove order procedure .  She requires evaluation and anesthesia risk assessment prior to undergoing surgery/procedure for treatment of Liver .  Proposed procedure: Needle biopsy    Date of Surgery/ Procedure: 17 colonoscopy with tissue biopsy, 2017needle biopsy of liver with possible port a cath placement  Time of Surgery/ Procedure: 8:15am  Hospital/Surgical Facility: [facility]  Primary Physician: Donna Shirley  Type of Anesthesia Anticipated: MAC she thinks.      Patient has a Health Care Directive or Living Will:  YES but not scanned in through [facility].    Preop Questions 2017   1.  Do you have a history of heart attack, stroke, stent, bypass or surgery on an artery in the head, neck, heart or legs? No   2.  Do you ever have any pain or discomfort in your chest? No   3.  Do you have a history of  Heart Failure? No   4.   Are you troubled by shortness of breath when:  walking on a level surface, or up a slight hill, or at night? YES - has had shortness of breath that has been increasing recently with lower recently.  Fatigue has been gradual over last month.  If climbs stairs to go to bedroom has to sit down on bed to catch breath before she can do what she went upstairs to do.  She was last seen  with Hgb 8.1 she does not feel it is worse since then.     5.  Do you currently have a cold, bronchitis or other respiratory infection? No   6.  Do you have a cough, shortness of breath, or wheezing? YES - see above   7.  Do you sometimes get pains in the calves of your legs when you walk? No   8. Do you or anyone in your family have previous history of blood clots? No   9.  Do you or does anyone in your family have a serious  bleeding problem such as prolonged bleeding following surgeries or cuts? No   10. Have you ever had problems with anemia or been told to take iron pills? YES - early in July it was 10.   11. Have you had any abnormal blood loss such as black, tarry or bloody stools, or abnormal vaginal bleeding? YES - did not see.  Did have unusual in past. Was on iron for awhile.    12. Have you ever had a blood transfusion? YES - was 14 and had surgery for CHD   13. Have you or any of your relatives ever had problems with anesthesia? With appendectomy at age 22's had laryngeal stridor.  At cesarian section age 31 no issues.  No surgeries after.   14. Do you have sleep apnea, excessive snoring or daytime drowsiness? No   15. Do you have any prosthetic heart valves? No   16. Do you have prosthetic joints? No   17. Is there any chance that you may be pregnant? No           HPI:                                                      Brief HPI related to upcoming procedure: Patient seen 8/7/17 for follow up of cough and positive FIT and found to have large ascending colon mass with a large number of lesions to liver. She is anemic with some tachycardia and hypertension, shortness of breath.   Oncology and Surgical consults were completed.  Will have tissue diagnosis tomorrow along with liver biopsy to confirm staging on 8/28/17.      Elevated bp without previous diagnosis of HTN- has been taking 25mg metoprolol daily- no side effects.  bp is now 150.  HR 72 today-  She reports HR seems to mostly be around 100- she checks at home.      MEDICAL HISTORY:                                                    Patient Active Problem List    Diagnosis Date Noted     Liver metastasis (H) 08/22/2017     Priority: Medium     Metastatic colon cancer to liver (H) 08/22/2017     Priority: Medium     Fever, unspecified 08/07/2017     Priority: Medium     Anemia, unspecified type 08/07/2017     Priority: Medium     Elevated blood pressure reading without  diagnosis of hypertension 2017     Priority: Medium     Lung nodule 2017     Priority: Medium     Advanced directives, counseling/discussion 2013     Priority: Medium     Congenital hip dysplasia 2013     Priority: Medium     CARDIOVASCULAR SCREENING; LDL GOAL LESS THAN 160 2013     Priority: Medium     Family history of other cardiovascular diseases 10/17/2005     Priority: Medium     Problem list name updated by automated process. Provider to review and confirm  Problem list name updated by automated process. Provider to review       UTI (urinary tract infection) 2004     Priority: Medium     never had them until twice in   Problem list name updated by automated process. Provider to review       Keloid scar 2004     Priority: Medium     dorsum right wrist        Past Medical History:   Diagnosis Date     Hypertension      NO ACTIVE PROBLEMS      Past Surgical History:   Procedure Laterality Date     C APPENDECTOMY       C  DELIVERY ONLY       C NONSPECIFIC PROCEDURE  1964    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     HIP SURGERY      14 years old     Current Outpatient Prescriptions   Medication Sig Dispense Refill     benzonatate (TESSALON) 200 MG capsule Take 1 capsule (200 mg) by mouth 3 times daily as needed for cough 60 capsule 0     metoprolol (TOPROL-XL) 25 MG 24 hr tablet Take 1 tablet (25 mg) by mouth daily (Patient not taking: Reported on 2017) 30 tablet 0     ferrous sulfate (IRON) 325 (65 FE) MG tablet Take 1 tablet (325 mg) by mouth 3 times daily (with meals) (Patient not taking: Reported on 2017) 90 tablet 1     NEW MED Phytomega takes 2 soft gel caps daily       Multiple Vitamin (ANTI-OXIDANT PO) Take  by mouth. Take one tablet by mouth daily       CALCIUM + D OR 1 TABLET DAILY       ADVIL 200 MG OR CAPS 1 CAPSULE EVERY 4 TO 6 HOURS AS NEEDED (Patient not taking: Reported on 2017) 120 0     OTC products:  "None, except as noted above    Allergies   Allergen Reactions     No Known Allergies       Latex Allergy: NO    Social History   Substance Use Topics     Smoking status: Never Smoker     Smokeless tobacco: Never Used     Alcohol use 1.0 oz/week      Comment: 1 per week     History   Drug Use No       REVIEW OF SYSTEMS:                                                    C: NEGATIVE for fever, chills, change in weight  I: NEGATIVE for worrisome rashes, moles or lesions  E: NEGATIVE for vision changes or irritation  E/M: NEGATIVE for ear, mouth and throat problems  RESP:as above  B: NEGATIVE for masses, tenderness or discharge  CV: NEGATIVE for chest pain, palpitations or peripheral edema  GI: hematochezia  : NEGATIVE for frequency, dysuria, or hematuria  M: NEGATIVE for significant arthralgias or myalgia  N: NEGATIVE for weakness, dizziness or paresthesias  E: NEGATIVE for temperature intolerance, skin/hair changes  H: NEGATIVE for bleeding problems  P: NEGATIVE for changes in mood or affect    EXAM:                                                    /72  Pulse 72  Temp 99.7  F (37.6  C) (Temporal)  Resp 16  Ht 5' 6.53\" (1.69 m)  Wt 150 lb 4.8 oz (68.2 kg)  LMP 06/07/2007  Breastfeeding? No  BMI 23.87 kg/m2    GENERAL APPEARANCE: alert and pale     EYES: EOMI, PERRL     HENT: ear canals and TM's normal and nose and mouth without ulcers or lesions     NECK: no adenopathy, no asymmetry, masses, or scars and thyroid normal to palpation     RESP: lungs clear to auscultation - no rales, rhonchi or wheezes     CV: regular rates and rhythm, normal S1 S2, no S3 or S4 and no murmur, click or rub     ABDOMEN: soft, mild tenderness to RLQ, good bowel sounds     MS: extremities normal- no gross deformities noted, no evidence of inflammation in joints, FROM in all extremities.     SKIN: no suspicious lesions or rashes     NEURO: Normal strength and tone, sensory exam grossly normal, mentation intact and speech " normal     PSYCH: affect flat     LYMPHATICS: No axillary, cervical, or supraclavicular nodes    DIAGNOSTICS:                                                    EKG: done 8/7/17 with some sinus tachycardia.  Her Hgb has been stable.  Will recheck before liver biopsy on 8/25/17.      Recent Labs   Lab Test  08/18/17   1517  08/15/17   0907  08/07/17   1442  07/14/17   1428   HGB  8.1*  8.3*  8.9*  10.2*   PLT  498*  491*  479*  424   INR   --    --    --   1.04   NA   --    --   135  139   POTASSIUM   --    --   4.4  3.9   CR   --    --   0.90  0.86        IMPRESSION:                                                    Reason for surgery/procedure: formal diagnosis and staging of suspected colon cancer    The proposed surgical procedure is considered INTERMEDIATE risk.    REVISED CARDIAC RISK INDEX  The patient has the following serious cardiovascular risks for perioperative complications such as (MI, PE, VFib and 3  AV Block):  No serious cardiac risks  INTERPRETATION: 1 risks: Class II (low risk - 0.9% complication rate)    The patient has the following additional risks for perioperative complications:  History of laryngeal stridor post op with appendectomy in her 20's  Anemia- will recheck 2 days preop for liver biopsy        ICD-10-CM    1. Preop general physical exam Z01.818 CBC with platelets   2. Malignant neoplasm of ascending colon (H) C18.2 Oncology following   3. Liver metastases (H) C78.7 Surgery following   4. Anemia, unspecified type D64.9 Recheck prior to liver biopsy or sooner if has gross bleeding with bowel prep   5. Tachycardia R00.0 Improved with metoprolol- tolerating well   6. Cough R05 Suspect related to increased intra-abdominal pressure   7. Shortness of breath R06.02 Due to anemia- continue to monitor       RECOMMENDATIONS:                                                      --Consult hospital rounder / IM to assist post-op medical management    Anemia  Anemia and does not require treatment  prior to surgery.  Monitor Hemoglobin postoperatively.    Had laryngeal stridor post op with appendectomy- no intubations since.  No current plan for intubation with this procedure but will note this.      --Patient is to take all scheduled medications on the day of surgery EXCEPT for modifications listed below.    APPROVAL GIVEN to proceed with proposed procedure, without further diagnostic evaluation- did discuss patient's care in detail with Dr. Angie Sims who has been following.         Signed Electronically by: Lesly Edwards NP    Copy of this evaluation report is provided to requesting physician.    [facility] Preop Guidelines

## 2017-08-28 NOTE — ANESTHESIA CARE TRANSFER NOTE
Patient: Charlene Douglas    Procedure(s):  Ultrasound Liver Biopsy - Wound Class: II-Clean Contaminated    Diagnosis: tbd  Diagnosis Additional Information: No value filed.    Anesthesia Type:   MAC     Note:  Airway :Room Air  Patient transferred to:PACU        Vitals: (Last set prior to Anesthesia Care Transfer)    CRNA VITALS  8/28/2017 0958 - 8/28/2017 1039      8/28/2017             NIBP: 145/77    NIBP Mean: 96    Ht Rate: 96                Electronically Signed By: DARLENE Brice CRNA  August 28, 2017  10:39 AM

## 2017-08-28 NOTE — ANESTHESIA PREPROCEDURE EVALUATION
Anesthesia Evaluation     . Pt has had prior anesthetic. Type: General and Regional           ROS/MED HX    ENT/Pulmonary:     (+), . Other pulmonary disease pt with increasing shortness of breath.    Neurologic:  - neg neurologic ROS     Cardiovascular:     (+) Dyslipidemia, hypertension----. : . . . :. . Previous cardiac testing Echodate:8/9/17results:EF 60-65%date: results:ECG reviewed date:8/7/17 results:Sinus tachycardia date: results:          METS/Exercise Tolerance:     Hematologic:     (+) Anemia, -      Musculoskeletal:  - neg musculoskeletal ROS       GI/Hepatic:     (+) appendicitis, liver disease,       Renal/Genitourinary:  - ROS Renal section negative       Endo:  - neg endo ROS       Psychiatric:  - neg psychiatric ROS       Infectious Disease:  - neg infectious disease ROS       Malignancy:   (+) Malignancy History of GI  GI CA Active status post,         Other:    - neg other ROS                 Physical Exam  Normal systems: cardiovascular, pulmonary and dental    Airway   Mallampati: II  TM distance: >3 FB  Neck ROM: full    Dental     Cardiovascular   Rhythm and rate: regular and normal      Pulmonary    breath sounds clear to auscultation                    Anesthesia Plan      History & Physical Review  History and physical reviewed and following examination; no interval change.    ASA Status:  3 .    NPO Status:  > 8 hours    Plan for MAC with Propofol induction. Maintenance will be TIVA.  Reason for MAC:  Deep or markedly invasive procedure (G8)  PONV prophylaxis:  Ondansetron (or other 5HT-3)  Patient verbalizes understanding of MAC anesthesia; denies further questions.        Postoperative Care  Postoperative pain management:  IV analgesics and Oral pain medications.      Consents  Anesthetic plan, risks, benefits and alternatives discussed with:  Patient.  Use of blood products discussed: No .   .                          .

## 2017-08-28 NOTE — ANESTHESIA POSTPROCEDURE EVALUATION
Patient: Charlene Douglas    Procedure(s):  Ultrasound Liver Biopsy - Wound Class: II-Clean Contaminated    Diagnosis:tbd  Diagnosis Additional Information: No value filed.    Anesthesia Type:  MAC    Note:  Anesthesia Post Evaluation    Patient location during evaluation: PACU  Patient participation: Able to fully participate in evaluation  Level of consciousness: awake  Pain management: adequate  Cardiovascular status: acceptable and blood pressure returned to baseline  Respiratory status: acceptable and room air  Hydration status: acceptable  PONV: none     Anesthetic complications: None          Last vitals:  Vitals:    08/28/17 1040 08/28/17 1055 08/28/17 1145   BP: 152/75 166/76    Pulse:      Resp: 20  18   Temp:      SpO2: 97%  97%         Electronically Signed By: DARLENE Brice CRNA  August 28, 2017  11:56 AM

## 2017-08-30 NOTE — TELEPHONE ENCOUNTER
Dr. Dove returned call.  Review of chart and pathology reports.  He would like to see patient tomorrow in [place] for review of path, plan to start chemotherapy, and to address cough, N/V, and fever.  Patient scheduled at 1:00 in [place].  Coordination of care for start of treatment.  Port Placement is planned for 2/1/17 with Dr. Peterson.  Surgery will be calling patient with details.  Chemotherapy to start on 9/7/17 with disconnect on 9/9/17 with Home care.  Writing nurse called [facility] to confirm that they could disconnect on a Saturday for this patient.  Chemotherapy teaching will be Tuesday 9/5/17 early afternoon.  Will schedule follow up accordingly after patient is seen tomorrow.  Patient is updated and agrees with plan.  Reviewed what to expect over the next week.  All questions and concerns addressed to her satisfaction.  Did encourage patient to bring someone with her for appointment tomorrow and for teaching.    Stan Balderas RN, BSN, OCN  8/30/2017, 5:21 PM

## 2017-08-30 NOTE — TELEPHONE ENCOUNTER
Patient calling for follow up plan of care and to see if there is anything else that she can do for her cough.  She reports that it does cause emesis after coughing.  Patient was prescribed Tessalon during consult.  Patient is eating small meals at this time to help with bloating feeling.  She also reports that she has been having a fever on and off for the past week or so, resolved with tylenol.  Highest noted was 102.0 and nothing today.  Message to Dr. Dove.    Stan Balderas, RN, BSN, OCN  Oncology Care Coordinator RN  [facility]  [phone]  8/30/2017, 5:00 PM

## 2017-08-30 NOTE — PROGRESS NOTES
Therapy: 5FU  Insurance: [employer]   Ded: $3250  Met: $3250  Co-Insurance: 80%  Max Out of Pocket: $5950  Met: $3973    Please contact Intake with any questions, [phone] or In Basket pool, [facility] ([phone]).    [facility]: in reference to referral made on 08/30/2017 to check 5fu and home disconnect coverage.

## 2017-08-30 NOTE — TELEPHONE ENCOUNTER
Surgery Scheduled    Date of Surgery 9/1   Procedure: [facility]/Surgical Facility: [facility]  Surgeon: Nicholas  Type of Anesthesia : General  Pre-op done per RN  2 week post op:NA         . Patients instructed to arrive 1 hours prior to surgery. Patient understood and agrees to plan.    Joy Geller  Surgical Scheduler

## 2017-08-31 NOTE — NURSING NOTE
"Oncology Rooming Note    August 31, 2017 1:07 PM   Charlene Douglas is a 67 year old female who presents for:    Chief Complaint   Patient presents with     Oncology Clinic Visit     Recheck Metastatic colon cancer to liver, Cough & Bloating     Initial Vitals: /79 (BP Location: Right arm, Patient Position: Sitting, Cuff Size: Adult Regular)  Pulse 134  Temp 100.7  F (38.2  C) (Tympanic)  Resp 16  Ht 1.69 m (5' 6.53\")  Wt 67.2 kg (148 lb 1.6 oz)  LMP 06/07/2007  SpO2 98%  Breastfeeding? No  BMI 23.52 kg/m2 Estimated body mass index is 23.52 kg/(m^2) as calculated from the following:    Height as of this encounter: 1.69 m (5' 6.53\").    Weight as of this encounter: 67.2 kg (148 lb 1.6 oz). Body surface area is 1.78 meters squared.  Extreme Pain (8) Comment: Ribs    Patient's last menstrual period was 06/07/2007.  Allergies reviewed: Yes  Medications reviewed: Yes    Medications: Medication refills not needed today.  Pharmacy name entered into [facility]: [facility]  [place] - [id number]     Clinical concerns:  Recheck Metastatic colon cancer to liver, Cough & Bloating  1. Patient reports a acid reflux when coughing.   2  Leg weakness.  3. Bilateral rib pain that can get up to a 8/10.  4. Loss of appetite. .       10  minutes for nursing intake (face to face time)     Laxmi Burton, TRENT              "

## 2017-08-31 NOTE — PATIENT INSTRUCTIONS
We would like to see you back in clinic with Dr. Dove 1 week after starting chemotherapy. You will receive information today on [employer] and education next week (Tuesday) .  Copy of appointments, and after visit summary (AVS) given to patient.  If you have any questions during business hours (M-F 8 AM- 4PM), please call Stan Balderas RN Oncology Hematology  at [facility] [phone].   For questions after business hours, or on holidays/weekends, please call our after hours Nurse Triage line [phone]. Thank you.

## 2017-08-31 NOTE — H&P (VIEW-ONLY)
[employer]  [id number] [address] [address] [phone]  Dept: [phone]    PRE-OP EVALUATION:  Today's date: 2017    Charlene Douglas (: 1950) presents for pre-operative evaluation assessment as requested by Radiologist, Dr. Dove order procedure .  She requires evaluation and anesthesia risk assessment prior to undergoing surgery/procedure for treatment of Liver .  Proposed procedure: Needle biopsy    Date of Surgery/ Procedure: 17 colonoscopy with tissue biopsy, 2017needle biopsy of liver with possible port a cath placement  Time of Surgery/ Procedure: 8:15am  Hospital/Surgical Facility: [facility]  Primary Physician: Donna Shirley  Type of Anesthesia Anticipated: MAC she thinks.      Patient has a Health Care Directive or Living Will:  YES but not scanned in through [place].    Preop Questions 2017   1.  Do you have a history of heart attack, stroke, stent, bypass or surgery on an artery in the head, neck, heart or legs? No   2.  Do you ever have any pain or discomfort in your chest? No   3.  Do you have a history of  Heart Failure? No   4.   Are you troubled by shortness of breath when:  walking on a level surface, or up a slight hill, or at night? YES - has had shortness of breath that has been increasing recently with lower recently.  Fatigue has been gradual over last month.  If climbs stairs to go to bedroom has to sit down on bed to catch breath before she can do what she went upstairs to do.  She was last seen  with Hgb 8.1 she does not feel it is worse since then.     5.  Do you currently have a cold, bronchitis or other respiratory infection? No   6.  Do you have a cough, shortness of breath, or wheezing? YES - see above   7.  Do you sometimes get pains in the calves of your legs when you walk? No   8. Do you or anyone in your family have previous history of blood clots? No   9.  Do you or does anyone in your family have a serious  bleeding problem such as prolonged bleeding following surgeries or cuts? No   10. Have you ever had problems with anemia or been told to take iron pills? YES - early in July it was 10.   11. Have you had any abnormal blood loss such as black, tarry or bloody stools, or abnormal vaginal bleeding? YES - did not see.  Did have unusual in past. Was on iron for awhile.    12. Have you ever had a blood transfusion? YES - was 14 and had surgery for CHD   13. Have you or any of your relatives ever had problems with anesthesia? With appendectomy at age 22's had laryngeal stridor.  At cesarian section age 31 no issues.  No surgeries after.   14. Do you have sleep apnea, excessive snoring or daytime drowsiness? No   15. Do you have any prosthetic heart valves? No   16. Do you have prosthetic joints? No   17. Is there any chance that you may be pregnant? No           HPI:                                                      Brief HPI related to upcoming procedure: Patient seen 8/7/17 for follow up of cough and positive FIT and found to have large ascending colon mass with a large number of lesions to liver. She is anemic with some tachycardia and hypertension, shortness of breath.   Oncology and Surgical consults were completed.  Will have tissue diagnosis tomorrow along with liver biopsy to confirm staging on 8/28/17.      Elevated bp without previous diagnosis of HTN- has been taking 25mg metoprolol daily- no side effects.  bp is now 150.  HR 72 today-  She reports HR seems to mostly be around 100- she checks at home.      MEDICAL HISTORY:                                                    Patient Active Problem List    Diagnosis Date Noted     Liver metastasis (H) 08/22/2017     Priority: Medium     Metastatic colon cancer to liver (H) 08/22/2017     Priority: Medium     Fever, unspecified 08/07/2017     Priority: Medium     Anemia, unspecified type 08/07/2017     Priority: Medium     Elevated blood pressure reading without  diagnosis of hypertension 2017     Priority: Medium     Lung nodule 2017     Priority: Medium     Advanced directives, counseling/discussion 2013     Priority: Medium     Congenital hip dysplasia 2013     Priority: Medium     CARDIOVASCULAR SCREENING; LDL GOAL LESS THAN 160 2013     Priority: Medium     Family history of other cardiovascular diseases 10/17/2005     Priority: Medium     Problem list name updated by automated process. Provider to review and confirm  Problem list name updated by automated process. Provider to review       UTI (urinary tract infection) 2004     Priority: Medium     never had them until twice in   Problem list name updated by automated process. Provider to review       Keloid scar 2004     Priority: Medium     dorsum right wrist        Past Medical History:   Diagnosis Date     Hypertension      NO ACTIVE PROBLEMS      Past Surgical History:   Procedure Laterality Date     C APPENDECTOMY       C  DELIVERY ONLY       C NONSPECIFIC PROCEDURE  1964    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     HIP SURGERY      14 years old     Current Outpatient Prescriptions   Medication Sig Dispense Refill     benzonatate (TESSALON) 200 MG capsule Take 1 capsule (200 mg) by mouth 3 times daily as needed for cough 60 capsule 0     metoprolol (TOPROL-XL) 25 MG 24 hr tablet Take 1 tablet (25 mg) by mouth daily (Patient not taking: Reported on 2017) 30 tablet 0     ferrous sulfate (IRON) 325 (65 FE) MG tablet Take 1 tablet (325 mg) by mouth 3 times daily (with meals) (Patient not taking: Reported on 2017) 90 tablet 1     NEW MED Phytomega takes 2 soft gel caps daily       Multiple Vitamin (ANTI-OXIDANT PO) Take  by mouth. Take one tablet by mouth daily       CALCIUM + D OR 1 TABLET DAILY       ADVIL 200 MG OR CAPS 1 CAPSULE EVERY 4 TO 6 HOURS AS NEEDED (Patient not taking: Reported on 2017) 120 0     OTC products:  "None, except as noted above    Allergies   Allergen Reactions     No Known Allergies       Latex Allergy: NO    Social History   Substance Use Topics     Smoking status: Never Smoker     Smokeless tobacco: Never Used     Alcohol use 1.0 oz/week      Comment: 1 per week     History   Drug Use No       REVIEW OF SYSTEMS:                                                    C: NEGATIVE for fever, chills, change in weight  I: NEGATIVE for worrisome rashes, moles or lesions  E: NEGATIVE for vision changes or irritation  E/M: NEGATIVE for ear, mouth and throat problems  RESP:as above  B: NEGATIVE for masses, tenderness or discharge  CV: NEGATIVE for chest pain, palpitations or peripheral edema  GI: hematochezia  : NEGATIVE for frequency, dysuria, or hematuria  M: NEGATIVE for significant arthralgias or myalgia  N: NEGATIVE for weakness, dizziness or paresthesias  E: NEGATIVE for temperature intolerance, skin/hair changes  H: NEGATIVE for bleeding problems  P: NEGATIVE for changes in mood or affect    EXAM:                                                    /72  Pulse 72  Temp 99.7  F (37.6  C) (Temporal)  Resp 16  Ht 5' 6.53\" (1.69 m)  Wt 150 lb 4.8 oz (68.2 kg)  LMP 06/07/2007  Breastfeeding? No  BMI 23.87 kg/m2    GENERAL APPEARANCE: alert and pale     EYES: EOMI, PERRL     HENT: ear canals and TM's normal and nose and mouth without ulcers or lesions     NECK: no adenopathy, no asymmetry, masses, or scars and thyroid normal to palpation     RESP: lungs clear to auscultation - no rales, rhonchi or wheezes     CV: regular rates and rhythm, normal S1 S2, no S3 or S4 and no murmur, click or rub     ABDOMEN: soft, mild tenderness to RLQ, good bowel sounds     MS: extremities normal- no gross deformities noted, no evidence of inflammation in joints, FROM in all extremities.     SKIN: no suspicious lesions or rashes     NEURO: Normal strength and tone, sensory exam grossly normal, mentation intact and speech " normal     PSYCH: affect flat     LYMPHATICS: No axillary, cervical, or supraclavicular nodes    DIAGNOSTICS:                                                    EKG: done 8/7/17 with some sinus tachycardia.  Her Hgb has been stable.  Will recheck before liver biopsy on 8/25/17.      Recent Labs   Lab Test  08/18/17   1517  08/15/17   0907  08/07/17   1442  07/14/17   1428   HGB  8.1*  8.3*  8.9*  10.2*   PLT  498*  491*  479*  424   INR   --    --    --   1.04   NA   --    --   135  139   POTASSIUM   --    --   4.4  3.9   CR   --    --   0.90  0.86        IMPRESSION:                                                    Reason for surgery/procedure: formal diagnosis and staging of suspected colon cancer    The proposed surgical procedure is considered INTERMEDIATE risk.    REVISED CARDIAC RISK INDEX  The patient has the following serious cardiovascular risks for perioperative complications such as (MI, PE, VFib and 3  AV Block):  No serious cardiac risks  INTERPRETATION: 1 risks: Class II (low risk - 0.9% complication rate)    The patient has the following additional risks for perioperative complications:  History of laryngeal stridor post op with appendectomy in her 20's  Anemia- will recheck 2 days preop for liver biopsy        ICD-10-CM    1. Preop general physical exam Z01.818 CBC with platelets   2. Malignant neoplasm of ascending colon (H) C18.2 Oncology following   3. Liver metastases (H) C78.7 Surgery following   4. Anemia, unspecified type D64.9 Recheck prior to liver biopsy or sooner if has gross bleeding with bowel prep   5. Tachycardia R00.0 Improved with metoprolol- tolerating well   6. Cough R05 Suspect related to increased intra-abdominal pressure   7. Shortness of breath R06.02 Due to anemia- continue to monitor       RECOMMENDATIONS:                                                      --Consult hospital rounder / IM to assist post-op medical management    Anemia  Anemia and does not require treatment  prior to surgery.  Monitor Hemoglobin postoperatively.    Had laryngeal stridor post op with appendectomy- no intubations since.  No current plan for intubation with this procedure but will note this.      --Patient is to take all scheduled medications on the day of surgery EXCEPT for modifications listed below.    APPROVAL GIVEN to proceed with proposed procedure, without further diagnostic evaluation- did discuss patient's care in detail with Dr. Angie Sims who has been following.         Signed Electronically by: Lesly Edwards NP    Copy of this evaluation report is provided to requesting physician.    [place] Preop Guidelines

## 2017-08-31 NOTE — MR AVS SNAPSHOT
After Visit Summary   8/31/2017    Charlene Douglas    MRN: [id number]           Patient Information     Date Of Birth          1950        Visit Information        Provider Department      8/31/2017 1:00 PM Duy Dove MD [facility]        Today's Diagnoses     Liver metastasis (H)    -  1      Care Instructions    We would like to see you back in clinic with Dr. Dove 1 week after starting chemotherapy. You will receive information today on Folfox and education next week (Tuesday) .  Copy of appointments, and after visit summary (AVS) given to patient.  If you have any questions during business hours (M-F 8 AM- 4PM), please call Stan Balderas RN Oncology Hematology  at [facility] [phone].   For questions after business hours, or on holidays/weekends, please call our after hours Nurse Triage line [phone]. Thank you.            Follow-ups after your visit        Your next 10 appointments already scheduled     Sep 01, 2017   Procedure with Vinny Peterson DO   [facility] ([facility])    [address] [place] [address] Dr [place] [address]   [phone]           From [facility] [address]: Exit at [address] on south side of [place]. Turn right on [address]. Turn left at stoplight on [address] [place] [address]. [facility] will be in view two blocks ahead            Sep 05, 2017  1:00 PM CDT   Nurse Only with PH SPECIALTY RN   [facility] ([facility]    [address] [place] [address]   [phone]            Sep 07, 2017  8:00 AM CDT   Level 6 with NL INFUSION CHAIR 5   [facility] ([facility])    [address] [place] [address] Dr [place] [address]   [phone]              Who to contact     If you have questions or need follow up information about today's clinic visit or your schedule please contact [facility]  "directly at [phone].  Normal or non-critical lab and imaging results will be communicated to you by Bricsnethart, letter or phone within 4 business days after the clinic has received the results. If you do not hear from us within 7 days, please contact the clinic through Maiyett or phone. If you have a critical or abnormal lab result, we will notify you by phone as soon as possible.  Submit refill requests through KitNipBox or call your pharmacy and they will forward the refill request to us. Please allow 3 business days for your refill to be completed.          Additional Information About Your Visit        BricsnetharT1 Visions Information     KitNipBox gives you secure access to your electronic health record. If you see a primary care provider, you can also send messages to your care team and make appointments. If you have questions, please call your primary care clinic.  If you do not have a primary care provider, please call [phone] and they will assist you.        Care EveryWhere ID     This is your Care EveryWhere ID. This could be used by other organizations to access your [place] medical records  [id number]        Your Vitals Were     Pulse Temperature Respirations Height Last Period Pulse Oximetry    134 100.7  F (38.2  C) (Tympanic) 16 1.69 m (5' 6.53\") 06/07/2007 98%    Breastfeeding? BMI (Body Mass Index)                No 23.52 kg/m2           Blood Pressure from Last 3 Encounters:   08/31/17 154/79   08/28/17 172/81   08/24/17 164/75    Weight from Last 3 Encounters:   08/31/17 67.2 kg (148 lb 1.6 oz)   08/17/17 69.4 kg (153 lb)   08/23/17 68.2 kg (150 lb 4.8 oz)              Today, you had the following     No orders found for display         Today's Medication Changes          These changes are accurate as of: 8/31/17  2:13 PM.  If you have any questions, ask your nurse or doctor.               Start taking these medicines.        Dose/Directions    guaiFENesin-codeine 100-10 MG/5ML Soln solution   Commonly " known as:  ROBITUSSIN AC   Used for:  Liver metastasis (H)   Started by:  Duy Dove MD        Dose:  1 tsp.   Take 5 mLs by mouth every 4 hours as needed for cough   Quantity:  120 mL   Refills:  0       omeprazole 20 MG CR capsule   Commonly known as:  priLOSEC   Used for:  Liver metastasis (H)   Started by:  Duy Dove MD        Dose:  20 mg   Take 1 capsule (20 mg) by mouth daily   Quantity:  30 capsule   Refills:  1            Where to get your medicines      These medications were sent to [facility] - [address]   [address] [place] [address]     Phone:  [phone]     omeprazole 20 MG CR capsule         Some of these will need a paper prescription and others can be bought over the counter.  Ask your nurse if you have questions.     Bring a paper prescription for each of these medications     guaiFENesin-codeine 100-10 MG/5ML Soln solution                Primary Care Provider Office Phone # Fax #    Donna Shirley PA-C [phone] [address] [place] [address]        Equal Access to Services     [facility]: Hadii nat ku hadasho Soomaali, waaxda luqadaha, qaybta kaalmada adelisandrayada, debra rowe . So [facility] [phone].    ATENCIÓN: Si habla español, tiene a hunt disposición servicios gratuitos de asistencia lingüística. [facility] [phone].    We comply with applicable federal civil rights laws and [place] laws. We do not discriminate on the basis of race, color, national origin, age, disability sex, sexual orientation or gender identity.            Thank you!     Thank you for choosing [facility]  for your care. Our goal is always to provide you with excellent care. Hearing back from our patients is one way we can continue to improve our services. Please take a few minutes to complete the written survey that you may receive in the mail after your visit with us. Thank  you!             Your Updated Medication List - Protect others around you: Learn how to safely use, store and throw away your medicines at www.disposemymeds.org.          This list is accurate as of: 8/31/17  2:13 PM.  Always use your most recent med list.                   Brand Name Dispense Instructions for use Diagnosis    ACETAMINOPHEN PO      Take by mouth every 4 hours as needed for pain        ADVIL 200 MG capsule   Generic drug:  ibuprofen     120    1 CAPSULE EVERY 4 TO 6 HOURS AS NEEDED        ferrous sulfate 325 (65 FE) MG tablet    IRON    90 tablet    Take 1 tablet (325 mg) by mouth 3 times daily (with meals)    Anemia, unspecified type       guaiFENesin-codeine 100-10 MG/5ML Soln solution    ROBITUSSIN AC    120 mL    Take 5 mLs by mouth every 4 hours as needed for cough    Liver metastasis (H)       metoprolol 25 MG 24 hr tablet    TOPROL-XL    30 tablet    Take 1 tablet (25 mg) by mouth daily    Hypertension, goal below 140/90, Tachycardia       omeprazole 20 MG CR capsule    priLOSEC    30 capsule    Take 1 capsule (20 mg) by mouth daily    Liver metastasis (H)       PROAIR  (90 BASE) MCG/ACT Inhaler   Generic drug:  albuterol           * TESSALON PERLES PO      Take 200 mg by mouth 3 times daily as needed for cough        * benzonatate 200 MG capsule    TESSALON          * Notice:  This list has 2 medication(s) that are the same as other medications prescribed for you. Read the directions carefully, and ask your doctor or other care provider to review them with you.

## 2017-09-01 NOTE — BRIEF OP NOTE
[facility] Brief Operative Note    Pre-operative diagnosis: colon cancer   Post-operative diagnosis metastatic colon CA   Procedure: Procedure(s):  Port placement - Wound Class: I-Clean   Surgeon: Vinny Peterson DO   Assistants(s): none   Estimated blood loss: Less than 10 ml    Specimens: None   Findings: flouroscopic images look like the VAP is in good position

## 2017-09-01 NOTE — PROGRESS NOTES
DATE OF VISIT: Aug 31, 2017    Charlene Douglas is a 67 year old female is seen today for   Chief Complaint   Patient presents with     Oncology Clinic Visit     Recheck Metastatic colon cancer to liver, Cough & Bloating   .     (C18.9,  C78.7) Metastatic colon cancer to liver (H)  (C78.7) Liver metastasis (H)    I reviewed with the patient today the results are from colonoscopy findings. We talked about the pathology both from tumor mass in the colon as well as liver metastases which came back confirming a diagnosis of colorectal cancer. I also reviewed with the patient the result from most recent PET scan showing large hypermetabolic mass in the ascending colon near the ileocecal valve consistent with colonic malignancy. There is also extensive hypermetabolic hepatic metastatic disease. There is hypermetabolic mesenteric lymphadenopathy as well. There are 3 subcentimeter pulmonary nodules as well. I reviewed with the patient and her  today the natural history of metastatic colon cancer. We talked about biology, staging, management and prognosis. I discussed with the patient the palliative nature of the systemic chemotherapy. The patient understands that the disease is not curable. Patient is not a candidate for immunotherapy.  We discussed systemic chemotherapy with modified FOLFOX 6 and bevacizumab. Patient will be receiving Avastin 5 mg/kg on day 1 with oxaliplatin 85 mg/m  on day 1. And fluorouracil 400 mg meter square to be given intravenously on day 1. Fluorouracil will be also given as continuous infusion for 46 hours at a dose of 2400 mg/m  to start on day 1 every 14 days. We will repeat imaging studies after 4 cycles to assess response to treatment. CEA level will be monitored as well. I went on to review the potential risks, side-effects and toxicities of this chemotherapy including but not limited to nausea, vomiting, diarrhea, stomatitis, redness and peeling of hands and feet, neuropathy which can  effect daily life, cold-induced paresthesias, bone marrow suppression, increased risk for bleeding, infection, fatigue, allergic reactions, secondary malignancies and death. The patient instructed to call with signs or symptoms of infection including, but not limited to temperature greater than or equal to 100.5 degrees Fahrenheit, chills, severe sore throat, ear or sinus pain, mouth sores, cough, painful urination, and/or non-healing wound. We also discussed the risk of clots and excessive bleeding. And bowel perforation related to Avastin. Blood pressure will be monitored.  The patient was given written information about the medications. He agrees to proceed with treatment, and denies any further questions at this time.    (R03.0) Elevated blood pressure reading without diagnosis of hypertension  Patient currently on metoprolol 25 mg daily.    The patient is ready to learn, no apparent learning barriers were identified.  Diagnosis and treatment plans were explained to the patient. The patient expressed understanding of the content. The patient asked appropriate questions. The patient questions were answered to her satisfaction.  Time spent 45 minutes more than 50% of the timing counseling and coordination of care including discussion of stage IV colon cancer management and prognosis including discussion of potential side effects of chemotherapy.   Chart documentation with Dragon Voice recognition Software. Although reviewed after completion, some words and grammatical errors may remain.

## 2017-09-01 NOTE — ANESTHESIA CARE TRANSFER NOTE
Patient: Charlene Douglas    Procedure(s):  Port placement - Wound Class: I-Clean    Diagnosis: colon cancer  Diagnosis Additional Information: No value filed.    Anesthesia Type:   MAC     Note:  Airway :Face Mask  Patient transferred to:Phase II        Vitals: (Last set prior to Anesthesia Care Transfer)    CRNA VITALS  9/1/2017 1252 - 9/1/2017 1327      9/1/2017             Pulse: 95    SpO2: 98 %                Electronically Signed By: DARLENE Brice CRNA  September 1, 2017  1:27 PM

## 2017-09-01 NOTE — DISCHARGE INSTRUCTIONS
Please observe for hematoma or significant swelling.  Call office if this does develop.    Ice to operative site as needed.      No shower for 24 hours after procedure. May shower post-op day 1.    [facility]   Adult Discharge Orders & Instructions     For 24 hours after surgery    1. Get plenty of rest.  A responsible adult must stay with you for at least 24 hours after you leave the hospital.   2. Do not drive or use heavy equipment.  If you have weakness or tingling, don't drive or use heavy equipment until this feeling goes away.  3. Do not drink alcohol.  4. Avoid strenuous or risky activities.  Ask for help when climbing stairs.   5. You may feel lightheaded.  IF so, sit for a few minutes before standing.  Have someone help you get up.   6. If you have nausea (feel sick to your stomach): Drink only clear liquids such as apple juice, ginger ale, broth or 7-Up.  Rest may also help.  Be sure to drink enough fluids.  Move to a regular diet as you feel able.  7. You may have a slight fever. Call the doctor if your fever is over 100 F (37.7 C) (taken under the tongue) or lasts longer than 24 hours.  8. You may have a dry mouth, a sore throat, muscle aches or trouble sleeping.  These should go away after 24 hours.  9. Do not make important or legal decisions.   Call your doctor for any of the followin.  Signs of infection (fever, growing tenderness at the surgery site, a large amount of drainage or bleeding, severe pain, foul-smelling drainage, redness, swelling).    2. It has been over 8 to 10 hours since surgery and you are still not able to urinate (pass water).    3.  Headache for over 24 hours.

## 2017-09-01 NOTE — ANESTHESIA POSTPROCEDURE EVALUATION
Patient: Charlene Douglas    Procedure(s):  Port placement - Wound Class: I-Clean    Diagnosis:colon cancer  Diagnosis Additional Information: No value filed.    Anesthesia Type:  MAC    Note:  Anesthesia Post Evaluation    Patient location during evaluation: PACU  Patient participation: Able to fully participate in evaluation  Level of consciousness: awake  Pain management: adequate  Cardiovascular status: acceptable and blood pressure returned to baseline  Respiratory status: acceptable and room air  Hydration status: acceptable  PONV: none     Anesthetic complications: None          Last vitals:  Vitals:    09/01/17 1141 09/01/17 1324   BP: 180/85 148/69   Resp: 14 14   Temp: 100.7  F (38.2  C)    SpO2: 97% 95%         Electronically Signed By: DARLENE Brice CRNA  September 1, 2017  1:51 PM

## 2017-09-01 NOTE — IP AVS SNAPSHOT
MRN:[id number]                      After Visit Summary   9/1/2017    Charlene Douglas    MRN: [id number]           Thank you!     Thank you for choosing [facility] for your care. Our goal is always to provide you with excellent care. Hearing back from our patients is one way we can continue to improve our services. Please take a few minutes to complete the written survey that you may receive in the mail after you visit with us. Thank you!        Patient Information     Date Of Birth          1950        About your hospital stay     You were admitted on:  September 1, 2017 You last received care in the:  [facility] Phase II    You were discharged on:  September 1, 2017       Who to Call     For medical emergencies, please call 911.  For non-urgent questions about your medical care, please call your primary care provider or clinic, [phone]  For questions related to your surgery, please call your surgery clinic        Attending Provider     Provider Specialty    Vinny Peterson, DO Surgery       Primary Care Provider Office Phone # Fax #    Donna Shirley PA-C [phone]      After Care Instructions     Ice to affected area       Ice to operative site PRN            No driving or operating machinery        until the day after procedure                  Your next 10 appointments already scheduled     Sep 05, 2017  1:00 PM CDT   Nurse Only with PH SPECIALTY RN   [facility]    [address] [facility] [id number] [phone]            Sep 07, 2017  8:00 AM CDT   Level 6 with NL INFUSION CHAIR 5   [facility] ([facility])    [address] [place], [place] [address] [place] [address] [phone]              Further instructions from your care team       Please observe for hematoma or significant swelling.  Call office if this does develop.    Ice to operative site as needed.      No  shower for 24 hours after procedure. May shower post-op day 1.    [facility]   Adult Discharge Orders & Instructions     For 24 hours after surgery    1. Get plenty of rest.  A responsible adult must stay with you for at least 24 hours after you leave the hospital.   2. Do not drive or use heavy equipment.  If you have weakness or tingling, don't drive or use heavy equipment until this feeling goes away.  3. Do not drink alcohol.  4. Avoid strenuous or risky activities.  Ask for help when climbing stairs.   5. You may feel lightheaded.  IF so, sit for a few minutes before standing.  Have someone help you get up.   6. If you have nausea (feel sick to your stomach): Drink only clear liquids such as apple juice, ginger ale, broth or 7-Up.  Rest may also help.  Be sure to drink enough fluids.  Move to a regular diet as you feel able.  7. You may have a slight fever. Call the doctor if your fever is over 100 F (37.7 C) (taken under the tongue) or lasts longer than 24 hours.  8. You may have a dry mouth, a sore throat, muscle aches or trouble sleeping.  These should go away after 24 hours.  9. Do not make important or legal decisions.   Call your doctor for any of the followin.  Signs of infection (fever, growing tenderness at the surgery site, a large amount of drainage or bleeding, severe pain, foul-smelling drainage, redness, swelling).    2. It has been over 8 to 10 hours since surgery and you are still not able to urinate (pass water).    3.  Headache for over 24 hours.            Pending Results     Date and Time Order Name Status Description    2017 1404 XR Chest Port 1 View Preliminary     2017 1206 XR Surgery DANIELLE L/T 5 Min Fluoro w Stills In process             Admission Information     Date & Time Provider Department Dept. Phone    2017 Vinny Peterson DO [facility] Phase [phone]      Your Vitals Were     Blood Pressure Temperature Respirations Height  "Weight Last Period    153/78 100.7  F (38.2  C) 14 1.69 m (5' 6.54\") 67.2 kg (148 lb 1.6 oz) 06/07/2007    Pulse Oximetry BMI (Body Mass Index)                95% 23.52 kg/m2          [email or web address] Information     MyLuvs gives you secure access to your electronic health record. If you see a primary care provider, you can also send messages to your care team and make appointments. If you have questions, please call your primary care clinic.  If you do not have a primary care provider, please call [phone] and they will assist you.        Care EveryWhere ID     This is your Care EveryWhere ID. This could be used by other organizations to access your [place] medical records  [id number]        Equal Access to Services     RON PERALTA : Nic Blandon, roxy beltran, yudith gale, debra rowe . So [facility] [phone].    ATENCIÓN: Si habla español, tiene a hunt disposición servicios gratuitos de asistencia lingüística. Llame al [phone].    We comply with applicable federal civil rights laws and [place] laws. We do not discriminate on the basis of race, color, national origin, age, disability sex, sexual orientation or gender identity.               Review of your medicines      CONTINUE these medicines which have NOT CHANGED        Dose / Directions    ACETAMINOPHEN PO        Take by mouth every 4 hours as needed for pain   Refills:  0       ferrous sulfate 325 (65 FE) MG tablet   Commonly known as:  IRON   Used for:  Anemia, unspecified type        Dose:  325 mg   Take 1 tablet (325 mg) by mouth 3 times daily (with meals)   Quantity:  90 tablet   Refills:  1       guaiFENesin-codeine 100-10 MG/5ML Soln solution   Commonly known as:  ROBITUSSIN AC   Used for:  Liver metastasis (H)        Dose:  1 tsp.   Take 5 mLs by mouth every 4 hours as needed for cough   Quantity:  120 mL   Refills:  0       metoprolol 25 MG 24 hr tablet   Commonly known as:  TOPROL-XL "   Used for:  Hypertension, goal below 140/90, Tachycardia        Dose:  25 mg   Take 1 tablet (25 mg) by mouth daily   Quantity:  30 tablet   Refills:  0       omeprazole 20 MG CR capsule   Commonly known as:  priLOSEC   Used for:  Liver metastasis (H)        Dose:  20 mg   Take 1 capsule (20 mg) by mouth daily   Quantity:  30 capsule   Refills:  1       PROAIR  (90 BASE) MCG/ACT Inhaler   Generic drug:  albuterol        Refills:  0       * TESSALON PERLES PO   Indication:  Cough        Dose:  200 mg   Take 200 mg by mouth 3 times daily as needed for cough   Refills:  0       * benzonatate 200 MG capsule   Commonly known as:  TESSALON        Refills:  0       * Notice:  This list has 2 medication(s) that are the same as other medications prescribed for you. Read the directions carefully, and ask your doctor or other care provider to review them with you.             Protect others around you: Learn how to safely use, store and throw away your medicines at www.disposemymeds.org.             Medication List: This is a list of all your medications and when to take them. Check marks below indicate your daily home schedule. Keep this list as a reference.      Medications           Morning Afternoon Evening Bedtime As Needed    ACETAMINOPHEN PO   Take by mouth every 4 hours as needed for pain                                ferrous sulfate 325 (65 FE) MG tablet   Commonly known as:  IRON   Take 1 tablet (325 mg) by mouth 3 times daily (with meals)                                guaiFENesin-codeine 100-10 MG/5ML Soln solution   Commonly known as:  ROBITUSSIN AC   Take 5 mLs by mouth every 4 hours as needed for cough                                metoprolol 25 MG 24 hr tablet   Commonly known as:  TOPROL-XL   Take 1 tablet (25 mg) by mouth daily                                omeprazole 20 MG CR capsule   Commonly known as:  priLOSEC   Take 1 capsule (20 mg) by mouth daily                                PROAIR HFA  108 (90 BASE) MCG/ACT Inhaler   Generic drug:  albuterol                                * TESSALON PERLES PO   Take 200 mg by mouth 3 times daily as needed for cough                                * benzonatate 200 MG capsule   Commonly known as:  TESSALON                                * Notice:  This list has 2 medication(s) that are the same as other medications prescribed for you. Read the directions carefully, and ask your doctor or other care provider to review them with you.

## 2017-09-01 NOTE — TELEPHONE ENCOUNTER
Caller (; patient on speaker phone) state that she has a fever o 102 and shaking chills; Recent cancer diagnosis; had a port placed earlier today; has not had chemo yet  Has had intermittent fevers for  4 weeks; Is not on antibiotics  Advised to proceed to   ED at  [place] now  Dayana Gray RN  FNA     Reason for Disposition    [1] Fever > 101 F (38.3 C) AND [2] age > 60    Protocols used: CANCER - FEVER-ADULT-  Dayana Gray RN  FNA

## 2017-09-01 NOTE — OP NOTE
PROCEDURE PERFORMED:  Ultrasound-guided right internal jugular venous access port placement.      DATE OF PROCEDURE:  09/01/2017      PREOPERATIVE DIAGNOSIS:  Metastatic colon cancer.      POSTOPERATIVE DIAGNOSIS:  Metastatic colon cancer.      SURGEON:  Vinny Petersno DO      ASSISTANT:  None.      ANESTHESIA:  MAC.      ESTIMATED BLOOD LOSS:  10 mL.      COMPLICATIONS:  None apparent.      INDICATIONS:  Ms. Charlene Douglas is a pleasant 67-year-old female who was seen in my clinic with a new diagnosis of colon mass with likely metastatic lesions in her liver.  This was confirmed in the subsequent days leading up to today's procedure with biopsy proven colon adenocarcinoma with metastases to the liver.  Plan is for chemotherapy.  Therefore, she returned and schedule port placement with me today.      DESCRIPTION OF PROCEDURE:  After informed consent, the patient was brought from the preoperative holding area to the operating room.  She was laid in the supine position and anesthesia was induced.  She was prepped and draped in normal sterile fashion.  We did a timeout and after correct patient and correct procedure were confirmed I began by injecting her right neck area with local anesthesia.  A small nick in the skin was made using 11 blade scalpel so that the needle would more easily breakthrough her skin.  Using ultrasound, I accessed her right internal jugular vein and a guidewire was placed.  Confirmation of venous placement was done with fluoroscopy at the time.  The wire was found to traverse the SVC and all the way down through the heart into the IVC.  Next, I anesthetized the area on her anterior chest wall, which will be the future place of the port.  Using a #15 blade scalpel I made a transverse incision on the anterior chest wall.  Dissection was done using electrocautery down to almost the chest wall and a pocket was made using blunt dissection and electrocautery.  We then tunneled the catheter  from the chest wall up into the neck.  Under fluoroscopic guidance I then placed the dilator and sheath over the guidewire using the Seldinger technique.  The guidewire and obturator were removed from the sheath.  The catheter was then threaded into the sheath.  Under fluoroscopic guidance I then retracted the catheter until the tip of the catheter was approximately at the abraham.  Next, I attached the port to the catheter after I cut it at approximately 18 cm.  This port was inserted into the pocket that was previously made and secured in place using three 2-0 Vicryl sutures.  I then checked the catheter by drawing and flushing hep saline solution and then confirmed placement one last time with fluoroscopy, it appeared as though there was a gentle curve up in the neck and the tip of the catheter was in the SVC.  Next, I closed the chest incision with inverted interrupted 3-0 Vicryl sutures and closed the skin with a running 4-0 Monocryl subcuticular suture and I closed the neck stab incision with inverted interrupted 4-0 Monocryl suture.  Then I injected a Hep-Lock flush into the port through the skin 4 mL and I covered the incisions with Dermabond dressing.        At the completion of the procedure, all sponges, needles and instrument counts were correct.  The patient was awoken from anesthesia and returned to the recovery unit in stable condition.         JESSICA GERMAN MD             D: 2017 13:32   T: 2017 17:06   MT: BRITTANY#[id number]      Name:     LUDMILA SAINI   MRN:      [id number]        Account:        [id number]   :      1950           Procedure Date: 2017      Document: [id number]

## 2017-09-01 NOTE — ANESTHESIA PREPROCEDURE EVALUATION
Anesthesia Evaluation     . Pt has had prior anesthetic. Type: General, Regional and MAC           ROS/MED HX    ENT/Pulmonary:     (+), . Other pulmonary disease pt with increasing shortness of breath.    Neurologic:  - neg neurologic ROS     Cardiovascular:     (+) Dyslipidemia, hypertension----. : . . . :. . Previous cardiac testing Echodate:8/9/17results:EF 60-65%date: results:ECG reviewed date:8/7/17 results:Sinus tachycardia date: results:          METS/Exercise Tolerance:     Hematologic:     (+) Anemia, -      Musculoskeletal:  - neg musculoskeletal ROS       GI/Hepatic:     (+) appendicitis, liver disease,       Renal/Genitourinary:  - ROS Renal section negative       Endo:  - neg endo ROS       Psychiatric:  - neg psychiatric ROS       Infectious Disease:  - neg infectious disease ROS       Malignancy:   (+) Malignancy History of GI  GI CA Active status post,         Other:    - neg other ROS                 Physical Exam  Normal systems: cardiovascular, pulmonary and dental    Airway   Mallampati: I  TM distance: >3 FB  Neck ROM: full    Dental     Cardiovascular   Rhythm and rate: regular and normal      Pulmonary    breath sounds clear to auscultation                    Anesthesia Plan      History & Physical Review  History and physical reviewed and following examination; no interval change.    ASA Status:  3 .    NPO Status:  > 8 hours    Plan for MAC with Propofol induction. Maintenance will be Balanced.  Reason for MAC:  Deep or markedly invasive procedure (G8)  PONV prophylaxis:  Ondansetron (or other 5HT-3)       Postoperative Care  Postoperative pain management:  IV analgesics and Oral pain medications.      Consents  Anesthetic plan, risks, benefits and alternatives discussed with:  Patient.  Use of blood products discussed: No .   .                          .

## 2017-09-05 NOTE — TELEPHONE ENCOUNTER
toprol       Last Written Prescription Date: 8/7/17  Last Fill Quantity: 30, # refills: 0  Last Office Visit with G, P or [facility] prescribing provider: 8/23/17    Next 5 appointments (look out 90 days)     Sep 12, 2017  1:00 PM CDT   [facility]    [address] [place] [address] [phone]                   Potassium   Date Value Ref Range Status   08/07/2017 4.4 3.4 - 5.3 mmol/L Final     Creatinine   Date Value Ref Range Status   08/07/2017 0.90 0.52 - 1.04 mg/dL Final     BP Readings from Last 3 Encounters:   09/01/17 157/73   08/31/17 154/79   08/28/17 172/81

## 2017-09-05 NOTE — NURSING NOTE
"Chemotherapy Education    Patient is a 67 year old female here today for chemotherapy education, accompanied by her , Tony.  Pt has a cancer diagnosis of Colon Cancer and their main concern is dealing with side effect, N/V.  Their Oncologist is Dr. Dove, and PCP is Donna Shirley.  Reviewed the following with the patient and their support person:  Treatment goal: Palliative  Treatment regimen & duration:  FOLFOX/Avastin.  Rationale for strict adherence to this schedule, including specific medication names including pre-treatment medications and at home scheduled or as needed medications, delivery methods,.  Potential side effects:  Side effects and management; including nausea/vominting, skin changes/hand-foot syndrome, anemia, neutropenia, thrombocytopenia, diarrhea/constipation, hair loss syndrome, memory changes/ \"chemobrain\", mouth sores, taste changes, neuropathy, fatigue, myelosuppression, sexuality/infertility, and risk of extravasation or infiltration.  Reviewed detailed potential side effects of each chemotherapy/immunotherapy/hormonal therapy and take home as needed medications per patient information handouts from [employer] and/or [employer].  Infection prevention, and monitoring of lab values, what lab tests and what changes of these values meant, along with the possibility of hydration or blood product transfusion, or the need to defer or hold treatment.    Chemotherapy information, including ways it is excreted from the body and cleaning and containment of vomitus or other bodily fluid, use of the bathroom, sexual health and intimacy, what to do if needing to miss a treatment, when to call a provider and the need for staff to wear protective equipment.  Reviewed and encouraged patient to read in detail \"Getting Ready for Chemotherapy\".  Importance of Central line care (port) or IV site care.  Written information:  \"My Cancer Guidebook - [facility]\" with written information including the \"Getting " "Ready for Chemotherapy: What to Expect, Before, During, and After your Treatment\" booklet, specific drug information guides printed from [employer], [employer] booklets \"Eating Hints: Before, During, and After Cancer Treatment\" and \"Chemotherapy and You\", support services, resources and local resources.  Introduction letter to infusion with contact information.  Provided patient calendars, contact information, and who to call and when to call Oncology support.    General orientation to the Medical Oncology department, Infusion Services department, Huc/scheduling, bathrooms and usual flow of the treatment day provided as well as introduction to the Infusion nurses.  No barriers to learning identified. Patient and family verbalized understanding of all written and verbal information. All questions answered to patients satisfaction.   Other concerns: Reviewed pump and what to expect.  Provided information for [facility].  Discussed resources and available referrals as part of Oncology team.  Pt instructed to call with further questions or concerns.  Patient states understanding and is in agreement with this plan.  Copy of appointments, and after visit summary (AVS) given to patient. Patient discharged home.    Face to Face time with patient: 90 minutes      Stan Balderas RN, BSN, OCN  Oncology Hematology Care Coordinator  [facility]  [email or web address]  Phone [phone]        "

## 2017-09-05 NOTE — TELEPHONE ENCOUNTER
Reason for Call:  Medication or medication refill:    Do you use a [facility]?  Name of the pharmacy and phone number for the current request:  [facility] - [phone]    Name of the medication requested: metoprolol (TOPROL-XL) 25 MG 24 hr tablet    Other request: patient is almost out    Can we leave a detailed message on this number? YES    Phone number patient can be reached at: Home number on file [phone] (home)    Best Time: any    Call taken on 9/5/2017 at 2:51 PM by Zohra Nettles

## 2017-09-05 NOTE — TELEPHONE ENCOUNTER
Medication is being filled for 1 time refill only due to:  BP check     Sintia Valenzuela, RN, BSN

## 2017-09-05 NOTE — MR AVS SNAPSHOT
After Visit Summary   9/5/2017    Charlene Douglas    MRN: [id number]           Patient Information     Date Of Birth          1950        Visit Information        Provider Department      9/5/2017 1:00 PM PH SPECIALTY RN [facility]        Today's Diagnoses     Metastatic colon cancer to liver (H)    -  1       Follow-ups after your visit        Your next 10 appointments already scheduled     Sep 07, 2017  8:00 AM CDT   Level 6 with NL INFUSION CHAIR 5   [facility] ([facility])    [address] [place] [address] [address] [place] [address] [phone]            Sep 12, 2017  1:00 PM CDT   Return Visit with Duy Dove MD   [facility] ([facility])    [address] [facility] [address] [phone]              Who to contact     If you have questions or need follow up information about today's clinic visit or your schedule please contact [facility] directly at [phone].  Normal or non-critical lab and imaging results will be communicated to you by [employer], letter or phone within 4 business days after the clinic has received the results. If you do not hear from us within 7 days, please contact the clinic through [employer] or phone. If you have a critical or abnormal lab result, we will notify you by phone as soon as possible.  Submit refill requests through [employer] or call your pharmacy and they will forward the refill request to us. Please allow 3 business days for your refill to be completed.          Additional Information About Your Visit        [employer] Information     [employer] gives you secure access to your electronic health record. If you see a primary care provider, you can also send messages to your care team and make appointments. If you have questions, please call your primary care clinic.  If you do not have a primary care provider, please call [phone] and they will assist  you.        Care EveryWhere ID     This is your Care EveryWhere ID. This could be used by other organizations to access your [facility] medical records  [id number]        Your Vitals Were     Last Period                   06/07/2007            Blood Pressure from Last 3 Encounters:   09/01/17 157/73   08/31/17 154/79   08/28/17 172/81    Weight from Last 3 Encounters:   09/01/17 67.2 kg (148 lb 1.6 oz)   08/31/17 67.2 kg (148 lb 1.6 oz)   08/17/17 69.4 kg (153 lb)              Today, you had the following     No orders found for display         Today's Medication Changes          These changes are accurate as of: 9/5/17  3:49 PM.  If you have any questions, ask your nurse or doctor.               Start taking these medicines.        Dose/Directions    lidocaine-prilocaine cream   Commonly known as:  EMLA   Used for:  Metastatic colon cancer to liver (H)        Apply thick amount to port area 30-60 minutes prior to port access.  Do not rub in.  Cover with plastic wrap/tegaderm.   Quantity:  30 g   Refills:  3       LORazepam 0.5 MG tablet   Commonly known as:  ATIVAN   Used for:  Metastatic colon cancer to liver (H)        Dose:  0.5 mg   Take 1 tablet (0.5 mg) by mouth every 4 hours as needed (Anxiety, Nausea/Vomiting or Sleep)   Quantity:  30 tablet   Refills:  2       ondansetron 8 MG tablet   Commonly known as:  ZOFRAN   Used for:  Metastatic colon cancer to liver (H)        Dose:  8 mg   Take 1 tablet (8 mg) by mouth every 8 hours as needed (Nausea/Vomiting)   Quantity:  10 tablet   Refills:  2       prochlorperazine 10 MG tablet   Commonly known as:  COMPAZINE   Used for:  Metastatic colon cancer to liver (H)        Dose:  10 mg   Take 1 tablet (10 mg) by mouth every 6 hours as needed (Nausea/Vomiting)   Quantity:  30 tablet   Refills:  2            Where to get your medicines      These medications were sent to [facility] [place]  [place], [place] - [address]  [address], [place] [address]      Phone:  [phone]     lidocaine-prilocaine cream    ondansetron 8 MG tablet    prochlorperazine 10 MG tablet         Some of these will need a paper prescription and others can be bought over the counter.  Ask your nurse if you have questions.     Bring a paper prescription for each of these medications     LORazepam 0.5 MG tablet                Primary Care Provider Office Phone # Fax #    Donna Shirley PA-C [phone] [address] [place] [address]        Equal Access to Services     [facility] Hospital[facility]NNETTE : Hadii aad ku hadasho Soomaali, waaxda luqadaha, qaybta kaalmada adeegyada, waxay idiin hayaan beau rowe . So [facility] [phone].    ATENCIÓN: Si habla español, tiene a hunt disposición servicios gratuitos de asistencia lingüística. [facility] [phone].    We comply with applicable federal civil rights laws and [place] laws. We do not discriminate on the basis of race, color, national origin, age, disability sex, sexual orientation or gender identity.            Thank you!     Thank you for choosing [facility]  for your care. Our goal is always to provide you with excellent care. Hearing back from our patients is one way we can continue to improve our services. Please take a few minutes to complete the written survey that you may receive in the mail after your visit with us. Thank you!             Your Updated Medication List - Protect others around you: Learn how to safely use, store and throw away your medicines at www.disposemymeds.org.          This list is accurate as of: 9/5/17  3:49 PM.  Always use your most recent med list.                   Brand Name Dispense Instructions for use Diagnosis    ACETAMINOPHEN PO      Take by mouth every 4 hours as needed for pain        ferrous sulfate 325 (65 FE) MG tablet    IRON    90 tablet    Take 1 tablet (325 mg) by mouth 3 times daily (with meals)    Anemia, unspecified type       guaiFENesin-codeine 100-10  MG/5ML Soln solution    ROBITUSSIN AC    120 mL    Take 5 mLs by mouth every 4 hours as needed for cough    Liver metastasis (H)       lidocaine-prilocaine cream    EMLA    30 g    Apply thick amount to port area 30-60 minutes prior to port access.  Do not rub in.  Cover with plastic wrap/tegaderm.    Metastatic colon cancer to liver (H)       LORazepam 0.5 MG tablet    ATIVAN    30 tablet    Take 1 tablet (0.5 mg) by mouth every 4 hours as needed (Anxiety, Nausea/Vomiting or Sleep)    Metastatic colon cancer to liver (H)       metoprolol 25 MG 24 hr tablet    TOPROL-XL    30 tablet    Take 1 tablet (25 mg) by mouth daily    Hypertension, goal below 140/90, Tachycardia       omeprazole 20 MG CR capsule    priLOSEC    30 capsule    Take 1 capsule (20 mg) by mouth daily    Liver metastasis (H)       ondansetron 8 MG tablet    ZOFRAN    10 tablet    Take 1 tablet (8 mg) by mouth every 8 hours as needed (Nausea/Vomiting)    Metastatic colon cancer to liver (H)       PROAIR  (90 BASE) MCG/ACT Inhaler   Generic drug:  albuterol           prochlorperazine 10 MG tablet    COMPAZINE    30 tablet    Take 1 tablet (10 mg) by mouth every 6 hours as needed (Nausea/Vomiting)    Metastatic colon cancer to liver (H)       * TESSALON PERLES PO      Take 200 mg by mouth 3 times daily as needed for cough        * benzonatate 200 MG capsule    TESSALON          * Notice:  This list has 2 medication(s) that are the same as other medications prescribed for you. Read the directions carefully, and ask your doctor or other care provider to review them with you.

## 2017-09-07 NOTE — PATIENT INSTRUCTIONS
Pt to return on 9/20 for C2D1 chemotherapy. Copies of medication list and upcoming appointments given prior to discharge.   Pt to resume iron tablets 1 daily.  Also - Pt is to  Rx @ [facility] for KCL 10meq, take one tablet daily.   RN from [facility] will disconnect chemo pump on Saturday at approx 2:00 pm.

## 2017-09-07 NOTE — MR AVS SNAPSHOT
After Visit Summary   9/7/2017    Charlene Douglas    MRN: [id number]           Patient Information     Date Of Birth          1950        Visit Information        Provider Department      9/7/2017 8:00 AM NL INFUSION CHAIR 5 [facility]        Today's Diagnoses     Metastatic colon cancer to liver (H)    -  1    Anemia, unspecified type          Care Instructions    Pt to return on 9/20 for C2D1 chemotherapy. Copies of medication list and upcoming appointments given prior to discharge.   Pt to resume iron tablets 1 daily.  Also - Pt is to  Rx @ [facility] for KCL 10meq, take one tablet daily.   RN from [facility] will disconnect chemo pump on Saturday at approx 2:00 pm.           Follow-ups after your visit        Follow-up notes from your care team     Return in 2 weeks (on 9/20/2017).      Your next 10 appointments already scheduled     Sep 12, 2017  1:00 PM CDT   [facility] Visit with Duy [facility]ove MD   [facility]    [address] [place] [id number] [phone]            Sep 20, 2017 10:00 AM CDT   Level 5 with NL INFUSION CHAIR 5   [facility] ([facility])    [facility] [address] [place] [id number] [phone]            Sep 22, 2017 12:00 PM CDT   Level 1 with NL INFUSION CHAIR 5   [facility] ([facility])    [address] [place] [address] [address] [place] [address] [phone]              Who to contact     If you have questions or need follow up information about today's clinic visit or your schedule please contact [facility] directly at [phone].  Normal or non-critical lab and imaging results will be communicated to you by MyChart, letter or phone within 4 business days after the clinic has received the results. If you do not hear from us within 7 days, please contact the clinic through  [employer] or phone. If you have a critical or abnormal lab result, we will notify you by phone as soon as possible.  Submit refill requests through [employer] or call your pharmacy and they will forward the refill request to us. Please allow 3 business days for your refill to be completed.          Additional Information About Your Visit        [employer] Information     [employer] gives you secure access to your electronic health record. If you see a primary care provider, you can also send messages to your care team and make appointments. If you have questions, please call your primary care clinic.  If you do not have a primary care provider, please call [phone] and they will assist you.        Care EveryWhere ID     This is your Care EveryWhere ID. This could be used by other organizations to access your [facility] medical records  [id number]        Your Vitals Were     Pulse Temperature Respirations Last Period Pulse Oximetry BMI (Body Mass Index)    86 97.5  F (36.4  C) (Temporal) 16 06/07/2007 98% 23.53 kg/m2       Blood Pressure from Last 3 Encounters:   09/07/17 160/84   09/01/17 157/73   08/31/17 154/79    Weight from Last 3 Encounters:   09/07/17 67.2 kg (148 lb 3.2 oz)   09/01/17 67.2 kg (148 lb 1.6 oz)   08/31/17 67.2 kg (148 lb 1.6 oz)              We Performed the Following     ABO/Rh type and screen     Blood component     Blood component     CBC with platelets differential     CEA     Comprehensive metabolic panel     Protein qualitative urine     Transfuse red blood cell unit     Transfuse red blood cell unit          Today's Medication Changes          These changes are accurate as of: 9/7/17  4:40 PM.  If you have any questions, ask your nurse or doctor.               Start taking these medicines.        Dose/Directions    potassium chloride SA 10 MEQ CR tablet   Commonly known as:  K-DUR/KLOR-CON M   Used for:  Metastatic colon cancer to liver (H)        Dose:  10 mEq   Take 1 tablet (10 mEq) by mouth  daily   Quantity:  30 tablet   Refills:  1            Where to get your medicines      These medications were sent to [facility] - [address]   [address] [address]     Phone:  [phone]     potassium chloride SA 10 MEQ CR tablet                Primary Care Provider Office Phone # Fax #    Donna ShirleyNICOLA [phone] [address] [place] [address]        Equal Access to Services     [facility]: Hadii aad ku hadasho Soomaali, waaxda luqadaha, qaybta kaalmada adeegyada, waxay idiin hayaan adeeg kharash la'aaslim . So [facility] [phone].    ATENCIÓN: Si habla español, tiene a hunt disposición servicios gratuitos de asistencia lingüística. [facility] [phone].    We comply with applicable federal civil rights laws and [place] laws. We do not discriminate on the basis of race, color, national origin, age, disability sex, sexual orientation or gender identity.            Thank you!     Thank you for choosing [facility]  for your care. Our goal is always to provide you with excellent care. Hearing back from our patients is one way we can continue to improve our services. Please take a few minutes to complete the written survey that you may receive in the mail after your visit with us. Thank you!             Your Updated Medication List - Protect others around you: Learn how to safely use, store and throw away your medicines at www.disposemymeds.org.          This list is accurate as of: 9/7/17  4:40 PM.  Always use your most recent med list.                   Brand Name Dispense Instructions for use Diagnosis    ACETAMINOPHEN PO      Take by mouth every 4 hours as needed for pain        ferrous sulfate 325 (65 FE) MG tablet    IRON    90 tablet    Take 1 tablet (325 mg) by mouth 3 times daily (with meals)    Anemia, unspecified type       guaiFENesin-codeine 100-10 MG/5ML Soln solution    ROBITUSSIN AC     120 mL    Take 5 mLs by mouth every 4 hours as needed for cough    Liver metastasis (H)       lidocaine-prilocaine cream    EMLA    30 g    Apply thick amount to port area 30-60 minutes prior to port access.  Do not rub in.  Cover with plastic wrap/tegaderm.    Metastatic colon cancer to liver (H)       LORazepam 0.5 MG tablet    ATIVAN    30 tablet    Take 1 tablet (0.5 mg) by mouth every 4 hours as needed (Anxiety, Nausea/Vomiting or Sleep)    Metastatic colon cancer to liver (H)       metoprolol 25 MG 24 hr tablet    TOPROL-XL    30 tablet    Take 1 tablet (25 mg) by mouth daily    Hypertension, goal below 140/90, Tachycardia       omeprazole 20 MG CR capsule    priLOSEC    30 capsule    Take 1 capsule (20 mg) by mouth daily    Liver metastasis (H)       ondansetron 8 MG tablet    ZOFRAN    10 tablet    Take 1 tablet (8 mg) by mouth every 8 hours as needed (Nausea/Vomiting)    Metastatic colon cancer to liver (H)       potassium chloride SA 10 MEQ CR tablet    K-DUR/KLOR-CON M    30 tablet    Take 1 tablet (10 mEq) by mouth daily    Metastatic colon cancer to liver (H)       PROAIR  (90 BASE) MCG/ACT Inhaler   Generic drug:  albuterol           prochlorperazine 10 MG tablet    COMPAZINE    30 tablet    Take 1 tablet (10 mg) by mouth every 6 hours as needed (Nausea/Vomiting)    Metastatic colon cancer to liver (H)       * TESSALON PERLES PO      Take 200 mg by mouth 3 times daily as needed for cough        * benzonatate 200 MG capsule    TESSALON          * Notice:  This list has 2 medication(s) that are the same as other medications prescribed for you. Read the directions carefully, and ask your doctor or other care provider to review them with you.

## 2017-09-07 NOTE — PROGRESS NOTES
Patient here for C1D1 Folfox chemotherapy today. Labs/BSA/Dose verified by 2 RN'S. Hgb-6.7, ANC-9.4 and Plt-565,000.  Dr Cedillo and Dr Dove notified of labs and pt c/o having to get up every hour during night to void for the last 2 nights. Orders rec'd to proceed with chemo and give 2 units PRBCs and obtain UA.  UA WNL.  Premeds given and tolerated chemotherapy well.  Positive blood return pre/post infusion.  2 units PRBC given post chemo prior to Flourouracil pump connect.  Lungs clear pre and post units. Discharged in stable condition with Flourouracil pump infusing, clamps open x2 and connection sites secured which was verified by RN x2.

## 2017-09-09 PROBLEM — R09.02 HYPOXIA: Status: ACTIVE | Noted: 2017-01-01

## 2017-09-09 NOTE — ED PROVIDER NOTES
"  History     Chief Complaint   Patient presents with     Fatigue     The history is provided by the patient.     Charlene Douglas is a 67 year old female with a history of colon cancer with metastases to the liver, who presents to the ED via EMS complaining of fatigue. The patient was just recently diagnosed with colon cancer with metastases to the liver in the last 2-3 weeks. She has been following with Dr. Dove of Oncology since. Patient had a port placed on 9/1, 8 days ago. Two days ago she had a 46 hour Folfox  chemotherapy pump placed that was removed this afternoon around 1300. This was her first round of chemotherapy. Since beginning th chemotherapy, she reports increased weakness, fatigue, and dizziness. She has also been febrile intermittently, around 100 F. She was occasionally short of breath prior to her chemotherapy, but today she is increasingly short of breath even at rest. She says that her  has had to help her ambulate around the house which is new. He mentioned that she was somewhat \"incoherent and different\" today. Patient also mentions some nausea that she treated with Compazine, and a \"severly dry mouth\". She has not been eating and drinking much due to these symptoms.  En route to the ED, the patient was given 500 cc of NS. She denies any chest pain, vomiting, or other associated symptoms. No PMHx of PE/DVT.    Patient Active Problem List   Diagnosis     UTI (urinary tract infection)     Keloid scar     Family history of other cardiovascular diseases     Advanced directives, counseling/discussion     Congenital hip dysplasia     CARDIOVASCULAR SCREENING; LDL GOAL LESS THAN 160     Elevated blood pressure reading without diagnosis of hypertension     Lung nodule     Fever, unspecified     Anemia, unspecified type     Liver metastasis (H)     Metastatic colon cancer to liver (H)     Past Medical History:   Diagnosis Date     Hypertension      NO ACTIVE PROBLEMS        Past Surgical " History:   Procedure Laterality Date     C APPENDECTOMY       C  DELIVERY ONLY       C NONSPECIFIC PROCEDURE  1964    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     COLONOSCOPY N/A 2017    Procedure: COMBINED COLONOSCOPY, SINGLE OR MULTIPLE BIOPSY/POLYPECTOMY BY BIOPSY;;  Surgeon: Duane, William Charles, MD;  Location: [facility] OR     COLONOSCOPY WITH CO2 INSUFFLATION N/A 2017    Procedure: COLONOSCOPY WITH CO2 INSUFFLATION;  BMI: 25.2  Referring: Donna Mota  Diagnoses: Positive FIT (fecal immunochemical test)  Special screening for malignant neoplasms, colon  Pharmacy: [facility] Fax: [phone];  Surgeon: Duane, William Charles, MD;  Location: [facility] OR     EXAM UNDER ANESTHESIA, ULTRASOUND N/A 2017    Procedure: EXAM UNDER ANESTHESIA, ULTRASOUND;  Ultrasound Liver Biopsy;  Surgeon: GENERIC ANESTHESIA PROVIDER;  Location: [facility] OR     HIP SURGERY      14 years old     INSERT PORT VASCULAR ACCESS N/A 2017    Procedure: INSERT PORT VASCULAR ACCESS;  Port placement;  Surgeon: Vinny Peterson DO;  Location: [facility] OR       Family History   Problem Relation Age of Onset     C.A.D. Mother      quad- bypass at age 78     CEREBROVASCULAR DISEASE Mother      in her 70's     Lipids Mother      Hypertension Mother      Other - See Comments Mother      Triple Bi-pass     Circulatory Father      abd aneurysm     GASTROINTESTINAL DISEASE Father      diverticulitis     Hypertension Father        Social History   Substance Use Topics     Smoking status: Never Smoker     Smokeless tobacco: Never Used     Alcohol use No      Comment: 1 per week        Immunization History   Administered Date(s) Administered     Influenza (IIV3) 10/14/2008, 2010, 01/10/2013     Pneumococcal (PCV 13) 2017     TD (ADULT, 7+) 1997     TDAP Vaccine (Adacel) 2007, 2017          Allergies   Allergen Reactions     No Known Allergies        Current Outpatient Prescriptions    Medication Sig Dispense Refill     potassium chloride SA (K-DUR/KLOR-CON M) 10 MEQ CR tablet Take 1 tablet (10 mEq) by mouth daily 30 tablet 1     LORazepam (ATIVAN) 0.5 MG tablet Take 1 tablet (0.5 mg) by mouth every 4 hours as needed (Anxiety, Nausea/Vomiting or Sleep) 30 tablet 2     prochlorperazine (COMPAZINE) 10 MG tablet Take 1 tablet (10 mg) by mouth every 6 hours as needed (Nausea/Vomiting) 30 tablet 2     ondansetron (ZOFRAN) 8 MG tablet Take 1 tablet (8 mg) by mouth every 8 hours as needed (Nausea/Vomiting) 10 tablet 2     metoprolol (TOPROL-XL) 25 MG 24 hr tablet Take 1 tablet (25 mg) by mouth daily 30 tablet 0     ACETAMINOPHEN PO Take by mouth every 4 hours as needed for pain       guaiFENesin-codeine (ROBITUSSIN AC) 100-10 MG/5ML SOLN solution Take 5 mLs by mouth every 4 hours as needed for cough 120 mL 0     omeprazole (PRILOSEC) 20 MG CR capsule Take 1 capsule (20 mg) by mouth daily 30 capsule 1     ferrous sulfate (IRON) 325 (65 FE) MG tablet Take 1 tablet (325 mg) by mouth 3 times daily (with meals) 90 tablet 1     lidocaine-prilocaine (EMLA) cream Apply thick amount to port area 30-60 minutes prior to port access.  Do not rub in.  Cover with plastic wrap/tegaderm. 30 g 3     Benzonatate (TESSALON PERLES PO) Take 200 mg by mouth 3 times daily as needed for cough       PROAIR  (90 BASE) MCG/ACT inhaler        benzonatate (TESSALON) 200 MG capsule        I have reviewed the Medications, Allergies, Past Medical and Surgical History, and Social History in the Epic system.    Review of Systems   Constitutional: Positive for activity change (decreased), appetite change (decreased), fatigue and fever.   Respiratory: Positive for shortness of breath.    Cardiovascular: Negative for chest pain.   Gastrointestinal: Positive for nausea. Negative for vomiting.   Neurological: Positive for dizziness and weakness.   All other systems reviewed and are negative.      Physical Exam   BP: (!)  158/98  Pulse: 147  Temp: 99.5  F (37.5  C)  Resp: 30  Weight: 67.1 kg (148 lb)  SpO2: 94 %    Physical Exam   Constitutional: She is oriented to person, place, and time. No distress.   HENT:   Head: Normocephalic and atraumatic.   Mouth/Throat: Mucous membranes are dry.   Eyes: Conjunctivae and EOM are normal. Pupils are equal, round, and reactive to light.   Neck: Normal range of motion. Neck supple.   Cardiovascular: Regular rhythm, normal heart sounds and intact distal pulses.  Tachycardia present.    No murmur heard.  Pulmonary/Chest: Effort normal. No respiratory distress. She has no wheezes. She has rales (slight bibasilar). She exhibits no tenderness.   Abdominal: Soft. She exhibits distension (slight). There is no tenderness. There is no rebound and no guarding.   Lymphadenopathy:     She has no cervical adenopathy.   Neurological: She is alert and oriented to person, place, and time.   Skin: Skin is warm and dry. No rash noted. She is not diaphoretic. No pallor.   Psychiatric: She has a normal mood and affect. Her behavior is normal.   Nursing note and vitals reviewed.      ED Course     ED Course     Intubation  Date/Time: 9/9/2017 10:36 PM  Performed by: EDWIN NICE  Authorized by: EDWIN NICE   Indications: respiratory distress  Intubation method: fiberoptic oral  Patient status: paralyzed (RSI)  Preoxygenation: BVM  Sedatives: etomidate  Paralytic: succinylcholine  Tube size: 7.5 mm  Tube type: cuffed  Number of attempts: 1  Cords visualized: yes  Post-procedure assessment: chest rise and ETCO2 monitor  Breath sounds: equal  Cuff inflated: yes  ETT to lip: 22 cm  Tube secured with: ETT woo  Patient tolerance: Patient tolerated the procedure well with no immediate complications                   EKG reveals sinus tachycardia at 140 bpm.  No acute ST segment or T-wave changes noted.    Critical Care time:  was 120 minutes for this patient excluding procedures.     Patient did have an  elevated lactic acid of 2.1.  I do not feel this is likely sepsis at this point.  She appears to be in more respiratory distress from congestive heart failure.  Blood cultures were sent and she was given Zosyn however.  She could not tolerate a full bolus of fluid as she went into respiratory distress with the beginnings of a fluid bolus.           Results for orders placed or performed during the hospital encounter of 09/09/17 (from the past 24 hour(s))   CBC with platelets differential   Result Value Ref Range    WBC 10.0 4.0 - 11.0 10e9/L    RBC Count 3.80 3.8 - 5.2 10e12/L    Hemoglobin 9.7 (L) 11.7 - 15.7 g/dL    Hematocrit 31.2 (L) 35.0 - 47.0 %    MCV 82 78 - 100 fl    MCH 25.5 (L) 26.5 - 33.0 pg    MCHC 31.1 (L) 31.5 - 36.5 g/dL    RDW 18.2 (H) 10.0 - 15.0 %    Platelet Count 448 150 - 450 10e9/L    Diff Method Automated Method     % Neutrophils 83.2 %    % Lymphocytes 15.0 %    % Monocytes 1.6 %    % Eosinophils 0.0 %    % Basophils 0.1 %    % Immature Granulocytes 0.1 %    Absolute Neutrophil 8.3 1.6 - 8.3 10e9/L    Absolute Lymphocytes 1.5 0.8 - 5.3 10e9/L    Absolute Monocytes 0.2 0.0 - 1.3 10e9/L    Absolute Eosinophils 0.0 0.0 - 0.7 10e9/L    Absolute Basophils 0.0 0.0 - 0.2 10e9/L    Abs Immature Granulocytes 0.0 0 - 0.4 10e9/L   Comprehensive metabolic panel   Result Value Ref Range    Sodium 133 133 - 144 mmol/L    Potassium 3.2 (L) 3.4 - 5.3 mmol/L    Chloride 95 94 - 109 mmol/L    Carbon Dioxide 25 20 - 32 mmol/L    Anion Gap 13 3 - 14 mmol/L    Glucose 141 (H) 70 - 99 mg/dL    Urea Nitrogen 17 7 - 30 mg/dL    Creatinine 0.76 0.52 - 1.04 mg/dL    GFR Estimate 76 >60 mL/min/1.7m2    GFR Estimate If Black >90 >60 mL/min/1.7m2    Calcium 7.7 (L) 8.5 - 10.1 mg/dL    Bilirubin Total 0.5 0.2 - 1.3 mg/dL    Albumin 1.8 (L) 3.4 - 5.0 g/dL    Protein Total 6.7 (L) 6.8 - 8.8 g/dL    Alkaline Phosphatase 492 (H) 40 - 150 U/L    ALT 39 0 - 50 U/L    AST 68 (H) 0 - 45 U/L   Lactic acid whole blood   Result  Value Ref Range    Lactic Acid 2.1 (H) 0.7 - 2.0 mmol/L   Nt probnp inpatient (BNP)   Result Value Ref Range    N-Terminal Pro BNP Inpatient 5191 (H) 0 - 900 pg/mL   UA with Microscopic   Result Value Ref Range    Color Urine Yellow     Appearance Urine Clear     Glucose Urine Negative NEG^Negative mg/dL    Bilirubin Urine Negative NEG^Negative    Ketones Urine Negative NEG^Negative mg/dL    Specific Gravity Urine 1.010 1.003 - 1.035    Blood Urine Small (A) NEG^Negative    pH Urine 6.0 5.0 - 7.0 pH    Protein Albumin Urine Negative NEG^Negative mg/dL    Urobilinogen mg/dL 0.0 0.0 - 2.0 mg/dL    Nitrite Urine Negative NEG^Negative    Leukocyte Esterase Urine Negative NEG^Negative    Source Unspecified Urine     WBC Urine 2 0 - 2 /HPF    RBC Urine <1 0 - 2 /HPF    Bacteria Urine Few (A) NEG^Negative /HPF    Squamous Epithelial /HPF Urine 1 0 - 1 /HPF   XR Chest Port 1 View    Narrative    CHEST ONE VIEW PORTABLE   9/9/2017 9:12 PM     HISTORY: Dyspnea    COMPARISON: 9/1/2017      Impression    IMPRESSION: Increased interstitial markings in both lungs more on the  right than on the left consistent with either pneumonia or pulmonary  edema, but Kerley B lines at the right base suggests edema. Borderline  cardiomegaly, unchanged. Central venous catheter infusion port with  tip in the superior vena cava, unchanged. No definite pleural  effusions.    CARLITOS WELCH MD       Medications   lidocaine 1 % 1 mL (not administered)   lidocaine (LMX4) kit (not administered)   sodium chloride (PF) 0.9% PF flush 3 mL (not administered)   sodium chloride (PF) 0.9% PF flush 3 mL (not administered)   0.9% sodium chloride BOLUS (25 mLs Intravenous Restarted 9/9/17 2034)     Followed by   0.9% sodium chloride BOLUS (0 mLs Intravenous Stopped 9/9/17 1929)     Followed by   0.9% sodium chloride infusion (not administered)   ondansetron (ZOFRAN) injection 4 mg (not administered)   sodium chloride (PF) 0.9% PF flush 3 mL (not administered)    sodium chloride 0.9 % bag 500mL for CT scan flush use (not administered)   iopamidol (ISOVUE-370) solution 500 mL (not administered)   No lozenges or gum should be given while patient on BIPAP/AVAPS/AVAPS AE (not administered)   hypromellose-dextran (ARTIFICAL TEARS) ophthalmic solution 1 drop (not administered)   HOLD: All Oral Medications (not administered)   nitroGLYcerin (NITROSTAT) sublingual tablet 0.4 mg (0.8 mg Sublingual Given 9/9/17 2017)   nitroGLYcerin 50 mg in D5W 250 mL (adult std) infusion (0.25 mcg/kg/min × 67.1 kg Intravenous New Bag 9/9/17 2043)   nitroGLYcerin (NITROSTAT) sublingual tablet 0.8 mg (not administered)   piperacillin-tazobactam (ZOSYN) intermittent infusion 4.5 g (4.5 g Intravenous New Bag 9/9/17 2108)   etomidate (AMIDATE) injection 20 mg (not administered)   succinylcholine (ANECTINE) injection 150 mg (not administered)   LORazepam (ATIVAN) 2 MG/ML injection (not administered)   LORazepam (ATIVAN) injection 0.5 mg (0.5 mg Intravenous Given 9/9/17 1949)   nitroGLYcerin (NITROSTAT) sublingual tablet 0.8 mg (0.8 mg Sublingual Given 9/9/17 2006)   furosemide (LASIX) injection 40 mg (40 mg Intravenous Given 9/9/17 2010)   HYDROmorphone (PF) (DILAUDID) injection 0.5 mg (0.5 mg Intravenous Given 9/9/17 2047)       Assessments & Plan (with Medical Decision Making)  Charlene is a 67 year old female who presents to the ED via EMS complaining of fatigue, weakness, dizziness, and shortness of breath. The patient was recently diagnosed with colon cancer and underwent her first round of chemotherapy.  She had been initiated on fluids per sepsis protocol.  Her lactic acid was slightly elevated at 2.1.  Within the 1st hour she became more dyspneic and went into respiratory distress with tachycardia.  Suspicion is this is more congestive heart failure- ?  Due to chemotherapy this week.  She was initiated on BiPAP, given sublingual and IV nitroglycerin as well as IV Lasix.  Modest improvement.  Case  was discussed with our hospitalist and the intensivist at the [facility].  The feeling was it was best to transfer her to the [facility] for further intensive care.  She is accepted by Dr. Wells there.  She is intubated due to fatigue and transfer and this was uneventful.         I have reviewed the nursing notes.    I have reviewed the findings, diagnosis, plan and need for follow up with the patient.       New Prescriptions    No medications on file       Final diagnoses:   Acute respiratory failure with hypoxia (H)   Acute congestive heart failure, unspecified congestive heart failure type (H)   Metastatic colon cancer in female (H)     This document serves as a record of services personally performed by Ish Koehler MD. It was created on their behalf by Akilah Gunn, a trained medical scribe. The creation of this record is based on the provider's personal observations and the statements of the patient. This document has been checked and approved by the attending provider.    Note: Chart documentation done in part with Dragon Voice Recognition software. Although reviewed after completion, some word and grammatical errors may remain.    9/9/2017   [facility] EMERGENCY DEPARTMENT     Ish Koehler MD  09/09/17 3980

## 2017-09-09 NOTE — IP AVS SNAPSHOT
MRN:[id number]                      After Visit Summary   9/9/2017    Charlene Douglas    MRN: [id number]           Thank you!     Thank you for choosing [facility] for your care. Our goal is always to provide you with excellent care. Hearing back from our patients is one way we can continue to improve our services. Please take a few minutes to complete the written survey that you may receive in the mail after you visit with us. Thank you!        Patient Information     Date Of Birth          1950        Designated Caregiver       Most Recent Value    Caregiver    Will someone help with your care after discharge? yes    Name of designated caregiver Tony Douglas    Phone number of caregiver [phone]    Caregiver address [address] [address] [place] [address]      About your hospital stay     You were admitted on:  September 9, 2017 You last received care in the:  Unit [address] [facility]    You were discharged on:  September 19, 2017        Reason for your hospital stay       68 yo female with metastatic colon cancer, s/p cycle 1 FOLFOX 9/7-9/10, admitted with dyspnea, found to have acute CHF with EF 15-20%, secondary to stress cardiomyopathy vs 5-FU induced cardiotoxicity. Also hypertensive urgency.                  Who to Call     For medical emergencies, please call 911.  For non-urgent questions about your medical care, please call your primary care provider or clinic, [phone]          Attending Provider     Provider Specialty    Abram Wells MD Transplant       Primary Care Provider Office Phone # Fax #    Donna Shirley PA-C [phone]      After Care Instructions     Activity - Up ad danilo           Advance Diet as Tolerated       Follow this diet upon discharge: Regular            Daily weights       Call Provider for weight gain of more than 2 pounds per day or 5 pounds per week.            Fall precautions           General info for SNF       Length of Stay  "Estimate: Short Term Care: Estimated # of Days <30  Condition at Discharge: Improving  Level of care:skilled   Rehabilitation Potential: Excellent  Admission H&P remains valid and up-to-date: Yes  Recent Chemotherapy: Date:                     9/7/17  Use Nursing Home Standing Orders: Yes            Intake and output       Every shift            Mantoux instructions       Give two-step Mantoux (PPD) Per Facility Policy Yes                  Your next 10 appointments already scheduled     Sep 20, 2017 10:00 AM CDT   Level 5 with NL INFUSION CHAIR 5   [facility] ([facility])    [address] [place] [address]   [phone]            Sep 22, 2017 12:00 PM CDT   Level 1 with NL INFUSION CHAIR 5   [facility] ([facility])    [address] [place] [address]   [phone]              Additional Services     Occupational Therapy Adult Consult       Evaluate and treat as clinically indicated.    Reason:  Deconditioning, CHF            Physical Therapy Adult Consult       Evaluate and treat as clinically indicated.    Reason:  Deconditioning, CHF                  Future tests that were ordered for you     Droplet Isolation       Until RVP returns - expect 9/19 or 9/20                  General Recommendations To Control Heart Failure When You Get Home     Instructions To Patients and Families: Please read and check off each of these important instructions as you do them when you get home.           Weight and symptoms      ___ Put a scale in your bathroom  ___ Post a weight chart or calendar next to the scale  ___Weigh yourself every day as soon as you you get up in the morning. You should only be wearing your pajamas. Write your weight on the chart/calendar.  ___ Bring your weight chart/calendar with you to all appointments    ___Call your doctor if you gain 2 pounds in 1 day or 5 pounds in 1 week from your \"dry\" weight " (baseline weight). Also call your doctor if you have shortness of breath that gets worse over time, leg swelling or fatigue.         Medicines and diet     ___ Make sure to take your medicines as prescribed.    ___Bring a current list of your medicines and all of your medicine bottles with you to all appointments.    ___ Limit fluids if you still have swelling or shortness of breath, or if your doctor tells you to do so.  ___ Eat less than 2000 mg of sodium (salt) every day. Read food labels, and do not add salt to meals.   ___ Heart healthy diet with low fat and low cholesterol          Activity and suggested lifestyle changes    ___ Stay active. Talk to your doctor about an exercise program that is safe for your heart.    ___ Stop smoking. Reduce alcohol use.      ___ Lose weight if you are overweight. Extra weight puts a lot of stress on the heart.          Control for Leg Swelling   ___ Keep your legs elevated (raised) as needed for swelling. If swelling is uncomfortable or elevation doesn t help, ask your doctor about using ACE wrap or Jobst stockings.          Follow-up appointments   ___ Make a [facility] appointment with a basic metabolic panel lab draw 3 to 5 days after you leave the hospital. Call one of the following locations:   [facility]  [phone],  [facility] [phone],  [facility]  [phone].     ___ Make sure to take your medications as prescribed and bring an accurate list of your medications and your weight chart/calendar to your follow up appointment at the [facility] Clinic for continued education and adjustments          What is the CORE clinic?    The C.O.R.E (Cardiomyopathy, Optimization, Rehabilitation, Education) Clinic is a heart failure specialty clinic within the [facility]. At C.O.R.E., you will work with nurse practitioners to carefully adjust  medicines, get education and learn who and when to call if symptoms appear. C.O.R.E nurses specialize in helping you:    better understand your disease.    slow the progress of your disease.    improve the length and quality of your life.    detect future heart problems before they become life threatening.    avoid hospital stays.            Pending Results     Date and Time Order Name Status Description    9/18/2017 1127 Respiratory Virus Panel by PCR In process     9/17/2017 2011 Blood culture Preliminary     9/17/2017 2011 Blood culture Preliminary     9/10/2017 0034 Creatinine In process     9/10/2017 0034 Platelet count In process             Statement of Approval     Ordered          09/19/17 1325  I have reviewed and agree with all the recommendations and orders detailed in this document.  EFFECTIVE NOW     Approved and electronically signed by:  Jenni Canales MD           09/19/17 1328  I have reviewed and agree with all the recommendations and orders detailed in this document.  EFFECTIVE NOW     Approved and electronically signed by:  Jenni Canales MD             Admission Information     Date & Time Provider Department Dept. Phone    9/9/2017 Abram Wells MD Unit [facility]D [facility] [phone]      Your Vitals Were     Blood Pressure Pulse Temperature Respirations Weight Last Period    130/63 93 98.6  F (37  C) (Oral) 18 65.4 kg (144 lb 1.6 oz) 06/07/2007    Pulse Oximetry BMI (Body Mass Index)                97% 22.88 kg/m2          MyChart Information     [employer] gives you secure access to your electronic health record. If you see a primary care provider, you can also send messages to your care team and make appointments. If you have questions, please call your primary care clinic.  If you do not have a primary care provider, please call [phone] and they will assist you.        Care EveryWhere ID     This is your Care EveryWhere ID. This could be used by other organizations to access  your [facility] medical records  [id number]        Equal Access to Services     RON PERALTA : Hadii nat Blandon, roxy beltran, qacamilla gale, debra reese. So [facility] [phone].    ATENCIÓN: Si habla español, tiene a hunt disposición servicios gratuitos de asistencia lingüística. Llame al [phone].    We comply with applicable federal civil rights laws and [place] laws. We do not discriminate on the basis of race, color, national origin, age, disability sex, sexual orientation or gender identity.               Review of your medicines      START taking        Dose / Directions    carvedilol 6.25 MG tablet   Commonly known as:  COREG   Used for:  Acute systolic congestive heart failure (H)        Dose:  6.25 mg   Take 1 tablet (6.25 mg) by mouth 2 times daily (with meals)   Quantity:  60 tablet   Refills:  3       diclofenac 1 % Gel topical gel   Commonly known as:  VOLTAREN   Used for:  Metastatic colon cancer to liver (H)        Dose:  2 g   Place 2 g onto the skin 4 times daily as needed for moderate pain (for RUQ pain)   Refills:  0       lidocaine 5 % Patch   Commonly known as:  LIDODERM   Used for:  Metastatic colon cancer to liver (H)        Dose:  1-3 patch   Place 1-3 patches onto the skin every 24 hours To RUQ   Quantity:  30 patch   Refills:  0       losartan 50 MG tablet   Commonly known as:  COZAAR   Used for:  Acute systolic congestive heart failure (H)        Dose:  50 mg   Take 1 tablet (50 mg) by mouth daily   Quantity:  30 tablet   Refills:  0       sennosides 8.6 MG tablet   Commonly known as:  SENOKOT   Used for:  Slow transit constipation        Dose:  1-2 tablet   Take 1-2 tablets by mouth 2 times daily as needed for constipation   Quantity:  120 each   Refills:  0         CONTINUE these medicines which may have CHANGED, or have new prescriptions. If we are uncertain of the size of tablets/capsules you have at home, strength may be  listed as something that might have changed.        Dose / Directions    TESSALON PERLES PO   Indication:  Cough   This may have changed:  Another medication with the same name was removed. Continue taking this medication, and follow the directions you see here.        Dose:  200 mg   Take 200 mg by mouth 3 times daily as needed for cough   Refills:  0         CONTINUE these medicines which have NOT CHANGED        Dose / Directions    ACETAMINOPHEN PO        Take by mouth every 4 hours as needed for pain   Refills:  0       guaiFENesin-codeine 100-10 MG/5ML Soln solution   Commonly known as:  ROBITUSSIN AC   Used for:  Liver metastasis (H)        Dose:  1 tsp.   Take 5 mLs by mouth every 4 hours as needed for cough   Quantity:  120 mL   Refills:  0       lidocaine-prilocaine cream   Commonly known as:  EMLA   Used for:  Metastatic colon cancer to liver (H)        Apply thick amount to port area 30-60 minutes prior to port access.  Do not rub in.  Cover with plastic wrap/tegaderm.   Quantity:  30 g   Refills:  3       LORazepam 0.5 MG tablet   Commonly known as:  ATIVAN   Used for:  Metastatic colon cancer to liver (H)        Dose:  0.5 mg   Take 1 tablet (0.5 mg) by mouth every 4 hours as needed (Anxiety, Nausea/Vomiting or Sleep)   Quantity:  30 tablet   Refills:  2       omeprazole 20 MG CR capsule   Commonly known as:  priLOSEC   Used for:  Liver metastasis (H)        Dose:  20 mg   Take 1 capsule (20 mg) by mouth daily   Quantity:  30 capsule   Refills:  1       ondansetron 8 MG tablet   Commonly known as:  ZOFRAN   Used for:  Metastatic colon cancer to liver (H)        Dose:  8 mg   Take 1 tablet (8 mg) by mouth every 8 hours as needed (Nausea/Vomiting)   Quantity:  10 tablet   Refills:  2       prochlorperazine 10 MG tablet   Commonly known as:  COMPAZINE   Used for:  Metastatic colon cancer to liver (H)        Dose:  10 mg   Take 1 tablet (10 mg) by mouth every 6 hours as needed (Nausea/Vomiting)   Quantity:   30 tablet   Refills:  2         STOP taking     metoprolol 25 MG 24 hr tablet   Commonly known as:  TOPROL-XL                Where to get your medicines      Some of these will need a paper prescription and others can be bought over the counter. Ask your nurse if you have questions.     You don't need a prescription for these medications     carvedilol 6.25 MG tablet    diclofenac 1 % Gel topical gel    lidocaine 5 % Patch    losartan 50 MG tablet    sennosides 8.6 MG tablet                Protect others around you: Learn how to safely use, store and throw away your medicines at www.disposemymeds.org.             Medication List: This is a list of all your medications and when to take them. Check marks below indicate your daily home schedule. Keep this list as a reference.      Medications           Morning Afternoon Evening Bedtime As Needed    ACETAMINOPHEN PO   Take by mouth every 4 hours as needed for pain   Last time this was given:  325 mg on 9/19/2017  6:09 AM                                   carvedilol 6.25 MG tablet   Commonly known as:  COREG   Take 1 tablet (6.25 mg) by mouth 2 times daily (with meals)   Last time this was given:  12.5 mg on 9/19/2017  8:37 AM                                      diclofenac 1 % Gel topical gel   Commonly known as:  VOLTAREN   Place 2 g onto the skin 4 times daily as needed for moderate pain (for RUQ pain)   Last time this was given:  2 g on 9/16/2017  4:45 PM                                   guaiFENesin-codeine 100-10 MG/5ML Soln solution   Commonly known as:  ROBITUSSIN AC   Take 5 mLs by mouth every 4 hours as needed for cough   Last time this was given:  5 mLs on 9/19/2017  8:22 AM                                   lidocaine 5 % Patch   Commonly known as:  LIDODERM   Place 1-3 patches onto the skin every 24 hours To RUQ   Last time this was given:  1 patch on 9/18/2017  8:11 PM         Remove patch @ 8am every morning to prevent lidocaine toxicity           Patch on  @ 8 pm daily               lidocaine-prilocaine cream   Commonly known as:  EMLA   Apply thick amount to port area 30-60 minutes prior to port access.  Do not rub in.  Cover with plastic wrap/tegaderm.                                   LORazepam 0.5 MG tablet   Commonly known as:  ATIVAN   Take 1 tablet (0.5 mg) by mouth every 4 hours as needed (Anxiety, Nausea/Vomiting or Sleep)                                   losartan 50 MG tablet   Commonly known as:  COZAAR   Take 1 tablet (50 mg) by mouth daily   Last time this was given:  50 mg on 9/19/2017  8:23 AM                                   omeprazole 20 MG CR capsule   Commonly known as:  priLOSEC   Take 1 capsule (20 mg) by mouth daily   Last time this was given:  20 mg on 9/19/2017  8:22 AM                                   ondansetron 8 MG tablet   Commonly known as:  ZOFRAN   Take 1 tablet (8 mg) by mouth every 8 hours as needed (Nausea/Vomiting)                                   prochlorperazine 10 MG tablet   Commonly known as:  COMPAZINE   Take 1 tablet (10 mg) by mouth every 6 hours as needed (Nausea/Vomiting)                                   sennosides 8.6 MG tablet   Commonly known as:  SENOKOT   Take 1-2 tablets by mouth 2 times daily as needed for constipation   Last time this was given:  8.6 mg on 9/18/2017  8:22 PM                                   TESSALON PERLES PO   Take 200 mg by mouth 3 times daily as needed for cough   Last time this was given:  100 mg on 9/19/2017 11:20 AM

## 2017-09-09 NOTE — IP AVS SNAPSHOT
` ` Patient Information     Patient Name Sex     Charlene Douglas ([id number]) Female 1950       Room Bed    [facility] 75-49      Patient Demographics     Address Phone E-mail Address    [address] [address] [place] [address] [phone] (Home)  [phone] (Mobile) [email or web address]      Patient Ethnicity & Race     Ethnic Group Patient Race    American White      Emergency Contact(s)     Name Relation Home Work Mobile    Praveen Douglas Spouse [phone] none none    NONE PER PATIENT          Documents on File        Status Date Received Description       Documents for the Patient    Privacy Notice - [place]  03     Face Sheet  () 04     Face Sheet  () 07     Insurance Card  () 07     Face Sheet Received () 06/21/10     Insurance Card Received () 06/21/10     External Medication Information Consent Accepted () 06/21/10     Privacy Notice - [place] Received 06/21/10     Immunization Record   Immunization Record from [facility]-12/20/10    Immunization Record   Immunization [facility]-25/10/13    Immunization Record   [facility] - Flu Vaccine - 01/10/13    Patient ID Received 17 Exp 21    Consent for Services - Hospital/Clinic Received () 13     Consent for EHR Access Received 13     [facility] Specified Other       External Medication Information Consent Accepted 13     Insurance Card Received 13 medica    Consent for Services/Privacy Notice - Hospital/Clinic Received 17     Insurance Card Received 17 medica    [place] Authorization - File Only  17 [employer] ACCESS, 2017    Business/Insurance/Care Coordination/Health Form - Patient   MEDICA AVASTIN PA RAYA 17-18    Business/Insurance/Care Coordination/Health Form - Patient   MEDICA NEULASTA PA RAYA 17-18       Documents for the Encounter    CMS IM for Patient Signature       Discharge  Attachment   CARDIOMYOPATHY, DISCHARGE INSTRUCTIONS FOR (ENGLISH)    Discharge Attachment   HEART FAILURE, CONGESTIVE (CHF) (ENGLISH)    Discharge Attachment   HEART FAILURE, DISCHARGE INSTRUCTIONS FOR (ENGLISH)    Discharge Attachment   HEART FAILURE, COPING WITH (ENGLISH)      Admission Information     Attending Provider Admitting Provider Admission Type Admission Date/Time    Abram Wells MD Beilman, Gregory Joseph, MD Urgent 09/09/17  2340    Discharge Date Hospital Service Auth/Cert Status Service Area     Hem/Onc Incomplete [facility]    Unit Room/Bed Admission Status       UU U7D 7513/7513-01 Admission (Confirmed)       Admission     Complaint    Respiratory failure, Hypoxia      Hospital Account     Name Acct ID Class Status Primary Coverage    Charlene Douglas [id number] [facility]patient [facility]ICA [facility] MEDICA            Guarantor Account (for Hospital Account #[id number])     Name Relation to Pt Service Area Active? Acct Type    Charlene Douglas  FCS Yes Personal/Family    Address Phone          [address] [address] [place] [address] [phone](H)              Coverage Information (for Hospital Account #[id number])     F/O Payor/Plan Precert #    MEDICA/[facility] AND [facility] SELF INSURED WITH MEDICA     Subscriber Subscriber #    Praveen Douglas [id number]    Address Phone    [address] [address]  [place] [address] [phone]

## 2017-09-09 NOTE — LETTER
Transition Communication Hand-off for Care Transitions to Next Level of Care Provider    Name: Charlene Douglas  MRN #: [id number]  Primary Care Provider: Donna Shirley     Primary Clinic: [address] [place] [address]     Reason for Hospitalization:  Heart failure with reduced left ventricular function (H) [I50.9]  Admit Date/Time: 9/9/2017 11:40 PM  Discharge Date: 09/19/17    68 y/o woman with metastatic colon cancer, cycle 1 FOLFOX on 9/7/17. Admitted to ICU on 9/9 with cardiogenic shock. Per cardiology is non-ischemic, EF improved to 35% on 9/12.   She was confused through 9/14, likely from Versed and delayed hepatic metabolism (she has mets in the liver). Now back to normal mental status.   Controlling BP with Coreg and lisinopril. Hydralazine prn.   Fever on 9/17. Workup negative thus far, looks stable.        Scheduled in [facility]ORE clinic for new CHF on 9/27.  Spouse will contact [facility] to reschedule chemo and oncology appts.  Planning for d/c to TCU today        9/27/2017 4:00 PM Cheyanne Ellis, NP [facility]       ________________________________________  Evangelina Brito RN, BSN    7D/Oncology Care Coordinator  Pager  [phone]  Phone [phone]

## 2017-09-09 NOTE — ED NOTES
Pt comes in via EMS for weakness, fatigue, fever, and SOB. Pt states she just finished her first chemo dose at 1300 today. Pt had a chemo pump in since Thursday afternoon. Pt has colon cancer. Pt was feeling well until today. EMS gave 500 cc NS.

## 2017-09-09 NOTE — IP AVS SNAPSHOT
Charlene Douglas #[id number] (CSN: [id number])  (67 year old F)  (Adm: 17)     [id number]               [address] [facility]: [phone]            Patient Demographics     Patient Name Sex          Age SSN Address Phone    Charlene Douglas Female 1950 (67 year old) [id number] [address] [place] [id number] [phone] (Home)  [phone] (Mobile)      Emergency Contact(s)     Name Relation Home Work Mobile    Praveen Douglas Spouse [phone] none none    NONE PER PATIENT          Admission Information     Attending Provider Admitting Provider Admission Type Admission Date/Time    Abram Wells MD Beilman, Gregory Joseph, MD Urgent 17  2340    Discharge Date Hospital Service Auth/Cert Status Service Area     Hem/Onc Incomplete [facility] SERVICES    Unit Room/Bed Admission Status       UU U7D 7513/7513-01 Admission (Confirmed)       Admission     Complaint    Respiratory failure, Hypoxia      Hospital Account     Name Acct ID Class Status Primary Coverage    Charlene Douglas [id number] Inpatient Open MEDICA - [facility] AND [facility] SELF INSURED WITH MEDICA            Guarantor Account (for Hospital Account #[id number])     Name Relation to Pt Service Area Active? Acct Type    Charlene Douglas  FCS Yes Personal/Family    Address Phone          [address] [place] [id number] [phone](H)              Coverage Information (for Hospital Account #[id number])     F/O Payor/Plan Precert #    MEDICA/[facility] WITH MEDICA     Subscriber Subscriber #    Praveen Douglas [id number]    Address Phone    [address] [address]  [place] [address] [phone]                                                INTERAGENCY TRANSFER FORM - PHYSICIAN ORDERS   2017                       UNIT [address] [facility]: [phone]            Attending Provider: Abram Wells MD     Allergies:  Lisinopril, No Known  "Allergies    Infection:  None   Service:  Hem/Onc    Ht:  1.69 m (5' 6.53\")   Wt:  65.4 kg (144 lb 1.6 oz)   Admission Wt:  67.4 kg (148 lb 9.4 oz)    BMI:  22.89 kg/m 2   BSA:  1.75 m 2            ED Clinical Impression     Diagnosis Description Comment Added By Time Added    Metastatic colon cancer to liver (H) [C18.9, C78.7] Metastatic colon cancer to liver (H) [C18.9, C78.7]  Julien, Alena CAVAZOS PA-C 9/17/2017  8:21 PM    Acute systolic congestive heart failure (H) [I50.21] Acute systolic congestive heart failure (H) [I50.21]  Jenni Canales MD 9/19/2017  1:14 PM    Slow transit constipation [K59.01] Slow transit constipation [K59.01]  Jenni Canales MD 9/19/2017  1:17 PM      Hospital Problems as of 9/19/2017              Priority Class Noted POA    Hypoxia Medium  9/9/2017 Yes      Non-Hospital Problems as of 9/19/2017              Priority Class Noted    UTI (urinary tract infection) Medium  9/24/2004    Keloid scar Medium  9/24/2004    Family history of other cardiovascular diseases Medium  10/17/2005    Advanced directives, counseling/discussion Medium  6/18/2013    Congenital hip dysplasia Medium  6/18/2013    CARDIOVASCULAR SCREENING; LDL GOAL LESS THAN 160 Medium  6/18/2013    Elevated blood pressure reading without diagnosis of hypertension Medium  7/19/2017    Lung nodule Medium  7/19/2017    Fever, unspecified Medium  8/7/2017    Anemia, unspecified type Medium  8/7/2017    Liver metastasis (H) Medium  8/22/2017    Metastatic colon cancer to liver (H) Medium  8/22/2017      Code Status History     Date Active Date Inactive Code Status Order ID Comments User Context    9/19/2017  1:24 PM  Full Code [id number]  Jenni Canales MD Outpatient    9/9/2017 11:53 PM 9/19/2017  1:24 PM Full Code [id number]  Dustin Oliver MD Inpatient      Current Code Status     Date Active Code Status Order ID Comments User Context       Prior      Summary of Visit     Reason for your hospital stay       66 yo female with " metastatic colon cancer, s/p cycle 1 FOLFOX 9/7-9/10, admitted with dyspnea, found to have acute CHF with EF 15-20%, secondary to stress cardiomyopathy vs 5-FU induced cardiotoxicity. Also hypertensive urgency.                Medication Review      START taking        Dose / Directions Comments    carvedilol 6.25 MG tablet   Commonly known as:  COREG   Used for:  Acute systolic congestive heart failure (H)        Dose:  6.25 mg   Take 1 tablet (6.25 mg) by mouth 2 times daily (with meals)   Quantity:  60 tablet   Refills:  3        diclofenac 1 % Gel topical gel   Commonly known as:  VOLTAREN   Used for:  Metastatic colon cancer to liver (H)        Dose:  2 g   Place 2 g onto the skin 4 times daily as needed for moderate pain (for RUQ pain)   Refills:  0        lidocaine 5 % Patch   Commonly known as:  LIDODERM   Used for:  Metastatic colon cancer to liver (H)        Dose:  1-3 patch   Place 1-3 patches onto the skin every 24 hours To RUQ   Quantity:  30 patch   Refills:  0        losartan 50 MG tablet   Commonly known as:  COZAAR   Used for:  Acute systolic congestive heart failure (H)        Dose:  50 mg   Take 1 tablet (50 mg) by mouth daily   Quantity:  30 tablet   Refills:  0        sennosides 8.6 MG tablet   Commonly known as:  SENOKOT   Used for:  Slow transit constipation        Dose:  1-2 tablet   Take 1-2 tablets by mouth 2 times daily as needed for constipation   Quantity:  120 each   Refills:  0          CONTINUE these medications which may have CHANGED, or have new prescriptions. If we are uncertain of the size of tablets/capsules you have at home, strength may be listed as something that might have changed.        Dose / Directions Comments    JARAD HEIN   Indication:  Cough   This may have changed:  Another medication with the same name was removed. Continue taking this medication, and follow the directions you see here.        Dose:  200 mg   Take 200 mg by mouth 3 times daily as needed for  cough   Refills:  0          CONTINUE these medications which have NOT CHANGED        Dose / Directions Comments    ACETAMINOPHEN PO        Take by mouth every 4 hours as needed for pain   Refills:  0        guaiFENesin-codeine 100-10 MG/5ML Soln solution   Commonly known as:  ROBITUSSIN AC   Used for:  Liver metastasis (H)        Dose:  1 tsp.   Take 5 mLs by mouth every 4 hours as needed for cough   Quantity:  120 mL   Refills:  0        lidocaine-prilocaine cream   Commonly known as:  EMLA   Used for:  Metastatic colon cancer to liver (H)        Apply thick amount to port area 30-60 minutes prior to port access.  Do not rub in.  Cover with plastic wrap/tegaderm.   Quantity:  30 g   Refills:  3        LORazepam 0.5 MG tablet   Commonly known as:  ATIVAN   Used for:  Metastatic colon cancer to liver (H)        Dose:  0.5 mg   Take 1 tablet (0.5 mg) by mouth every 4 hours as needed (Anxiety, Nausea/Vomiting or Sleep)   Quantity:  30 tablet   Refills:  2        omeprazole 20 MG CR capsule   Commonly known as:  priLOSEC   Used for:  Liver metastasis (H)        Dose:  20 mg   Take 1 capsule (20 mg) by mouth daily   Quantity:  30 capsule   Refills:  1        ondansetron 8 MG tablet   Commonly known as:  ZOFRAN   Used for:  Metastatic colon cancer to liver (H)        Dose:  8 mg   Take 1 tablet (8 mg) by mouth every 8 hours as needed (Nausea/Vomiting)   Quantity:  10 tablet   Refills:  2        prochlorperazine 10 MG tablet   Commonly known as:  COMPAZINE   Used for:  Metastatic colon cancer to liver (H)        Dose:  10 mg   Take 1 tablet (10 mg) by mouth every 6 hours as needed (Nausea/Vomiting)   Quantity:  30 tablet   Refills:  2          STOP taking     metoprolol 25 MG 24 hr tablet   Commonly known as:  TOPROL-XL                   After Care     Activity - Up ad danilo           Advance Diet as Tolerated       Follow this diet upon discharge: Regular       Daily weights       Call Provider for weight gain of more than  "2 pounds per day or 5 pounds per week.       Fall precautions           General info for SNF       Length of Stay Estimate: Short Term Care: Estimated # of Days <30  Condition at Discharge: Improving  Level of care:skilled   Rehabilitation Potential: Excellent  Admission H&P remains valid and up-to-date: Yes  Recent Chemotherapy: Date:                     9/7/17  Use Nursing Home Standing Orders: Yes       Intake and output       Every shift       Mantoux instructions       Give two-step Mantoux (PPD) Per Facility Policy Yes             Other Orders     Droplet Isolation       Until RVP returns - expect 9/19 or 9/20             General Recommendations To Control Heart Failure When You Get Home (Give at DC from TCU)     Instructions To Patients and Families: Please read and check off each of these important instructions as you do them when you get home.           Weight and symptoms      ___ Put a scale in your bathroom  ___ Post a weight chart or calendar next to the scale  ___Weigh yourself every day as soon as you you get up in the morning. You should only be wearing your pajamas. Write your weight on the chart/calendar.  ___ Bring your weight chart/calendar with you to all appointments    ___Call your doctor if you gain 2 pounds in 1 day or 5 pounds in 1 week from your \"dry\" weight (baseline weight). Also call your doctor if you have shortness of breath that gets worse over time, leg swelling or fatigue.         Medicines and diet     ___ Make sure to take your medicines as prescribed.    ___Bring a current list of your medicines and all of your medicine bottles with you to all appointments.    ___ Limit fluids if you still have swelling or shortness of breath, or if your doctor tells you to do so.  ___ Eat less than 2000 mg of sodium (salt) every day. Read food labels, and do not add salt to meals.   ___ Heart healthy diet with low fat and low cholesterol          Activity and suggested lifestyle changes    ___ " Stay active. Talk to your doctor about an exercise program that is safe for your heart.    ___ Stop smoking. Reduce alcohol use.      ___ Lose weight if you are overweight. Extra weight puts a lot of stress on the heart.          Control for Leg Swelling   ___ Keep your legs elevated (raised) as needed for swelling. If swelling is uncomfortable or elevation doesn t help, ask your doctor about using ACE wrap or Jobst stockings.          Follow-up appointments   ___ Make a [facility] appointment with a basic metabolic panel lab draw 3 to 5 days after you leave the hospital. Call one of the following locations:   [facility]  [phone],  [facility] [phone],  [facility]  [phone].     ___ Make sure to take your medications as prescribed and bring an accurate list of your medications and your weight chart/calendar to your follow up appointment at the [facility] for continued education and adjustments          What is the C[facility] clinic?    The [facility].O.R.[facility] (Cardiomyopathy, Optimization, Rehabilitation, Education) Clinic is a heart failure specialty clinic within the [facility]. At [facility].O.R[facility] you will work with nurse practitioners to carefully adjust medicines, get education and learn who and when to call if symptoms appear. [facility] nurses specialize in helping you:    better understand your disease.    slow the progress of your disease.    improve the length and quality of your life.    detect future heart problems before they become life threatening.    avoid hospital stays.            Referrals     Occupational Therapy Adult Consult       Evaluate and treat as clinically indicated.    Reason:  Deconditioning, CHF       Physical Therapy Adult Consult       Evaluate and treat as clinically indicated.    Reason:  Deconditioning, CHF             Your next 10 appointments already  "scheduled     Sep 20, 2017 10:00 AM CDT   Level 5 with NL INFUSION CHAIR 5   [facility] ([facility])    [address] [place] [address]   [phone]            Sep 22, 2017 12:00 PM CDT   Level 1 with NL INFUSION CHAIR 5   [facility] ([facility])    [address] [place] [address]   [phone]              Statement of Approval     Ordered          09/19/17 1325  I have reviewed and agree with all the recommendations and orders detailed in this document.  EFFECTIVE NOW     Approved and electronically signed by:  Jenni Canales MD           09/19/17 1326  I have reviewed and agree with all the recommendations and orders detailed in this document.  EFFECTIVE NOW     Approved and electronically signed by:  Jenni Canales MD                                                 INTERAGENCY TRANSFER FORM - NURSING   9/9/2017                       [address] [facility] BANK: [phone]            Attending Provider: Abram Wells MD     Allergies:  Lisinopril, No Known Allergies    Infection:  None   Service:  Hem/Onc    Ht:  1.69 m (5' 6.53\")   Wt:  65.4 kg (144 lb 1.6 oz)   Admission Wt:  67.4 kg (148 lb 9.4 oz)    BMI:  22.89 kg/m 2   BSA:  1.75 m 2            Advance Directives        Does patient have a scanned Advance Directive/ACP document in EPIC?           No        Immunizations     Name Date      Influenza (IIV3) 01/10/13     Influenza (IIV3) 12/20/10     Influenza (IIV3) 10/14/08     Pneumococcal (PCV 13) 07/19/17     TD (ADULT, 7+) 01/01/97     TDAP Vaccine (Adacel) 07/19/17     TDAP Vaccine (Adacel) 07/02/07       ASSESSMENT     Discharge Profile Flowsheet     DISCHARGE NEEDS ASSESSMENT     SKIN      Equipment Currently Used at Home  cane, straight 09/15/17 1618   Inspection of bony prominences  Full except (identify areas not inspected) 09/19/17 1011    Transportation Available  car;family or " "friend will provide 09/15/17 1618   Full except areas not inspected   Occiput;Buttock, left;Buttock, right;Sacrum;Coccyx;Spine 09/17/17 1713    GASTROINTESTINAL (ADULT,PEDIATRIC,OB)     Skin WDL  ex 09/19/17 1011    GI WDL  ex 09/19/17 1011   Skin Color/Characteristics  bruised (ecchymotic) 09/19/17 1011    Abdominal Appearance  rounded 09/19/17 0013   Skin Temperature  warm 09/17/17 1713    All Quadrants Bowel Sounds  hypoactive 09/19/17 0013   Skin Moisture  dry 09/17/17 1713    Last Bowel Movement  09/18/17 09/19/17 1011   Skin Elasticity  quick return to original state 09/16/17 1704    GI Signs/Symptoms  no gastrointestinal signs/symptoms 09/19/17 1011   Skin Integrity  scab(s) 09/19/17 1011    Passing flatus  yes 09/18/17 0144   SAFETY      COMMUNICATION ASSESSMENT     Safety WDL  WDL 09/19/17 1011    Patient's communication style  spoken language (English or Bilingual) 09/09/17 1838   Safety Equipment  oxygen flowmeter;manual resusciator with appropriate mask;suction regulator;suction equipment 09/13/17 2113                 Assessment WDL (Within Defined Limits) Definitions           Safety WDL     Effective: 09/28/15    Row Information: <b>WDL Definition:</b> Bed in low position, wheels locked; call light in reach; upper side rails up x 2; ID band on<br> <font color=\"gray\"><i>Item=AS safety wdl>>List=AS safety wdl>>Version=F14</i></font>      Skin WDL     Effective: 09/28/15    Row Information: <b>WDL Definition:</b> Warm; dry; intact; elastic; without discoloration; pressure points without redness<br> <font color=\"gray\"><i>Item=AS skin wdl>>List=AS skin wdl>>Version=F14</i></font>      Vitals     Vital Signs Flowsheet     COMMENTS     Functioning  can do most things, but pain gets in the way of some 09/17/17 1438    Comments  scheduled metoprolol given 09/14/17 2122   Sleep  normal sleep 09/17/17 1438    VITAL SIGNS     CRITICAL-CARE PAIN OBSERVATION TOOL (CPOT)      Temp  98.6  F (37  C) 09/19/17 1112   " Facial Expression  0 09/12/17 2004    Temp src  Oral 09/19/17 1112   Body Movements  0 09/12/17 2004    Resp  18 09/19/17 1112   Compliance w/ventilator (intubated patients)  Extubated 09/12/17 2004    Pulse  93 09/18/17 2212   Vocalization (extubated patients)  0 09/12/17 2004    Heart Rate  88 09/19/17 1112   Muscle Tension  0 09/12/17 2004    Pulse/Heart Rate Source  Monitor 09/18/17 2346   Total  0 09/12/17 2004    BP  130/63 09/19/17 1112   ANALGESIA SIDE EFFECTS MONITORING      BP Location  Left arm 09/18/17 2346   Side Effects Monitoring: Respiratory Quality  R 09/18/17 0147    OXYGEN THERAPY     Side Effects Monitoring: Respiratory Depth  N 09/18/17 0147    SpO2  97 % 09/19/17 0730   Side Effects Monitoring: Sedation Level  1 09/18/17 0147    O2 Device  None (Room air) 09/19/17 0730   HEIGHT AND WEIGHT      FiO2 (%)  35 % 09/12/17 1653   Weight  65.4 kg (144 lb 1.6 oz) 09/19/17 0926    Oxygen Delivery  2 LPM 09/17/17 0315   Weight Method  Standing scale 09/18/17 0847    Suction Occurrance  2 09/12/17 1346   POSITIONING      PAIN/COMFORT     Body Position  independently positioning 09/19/17 1011    Patient Currently in Pain  denies 09/19/17 1001   Head of Bed (HOB)  HOB at 30-45 degrees 09/17/17 1713    Preferred Pain Scale  CAPA (Clinically Aligned Pain Assessment) ([facility] Adults Only) 09/19/17 1001   Positioning/Transfer Devices  pillows 09/16/17 2032    Pain Location  Abdomen 09/19/17 0805   DAILY CARE      Pain Orientation  Right 09/19/17 0805   Activity Type  up in chair 09/19/17 1014    Pain Descriptors  Aching 09/19/17 0805   Activity Level of Assistance  assistance, stand-by 09/19/17 1014    Pain Intervention(s)  Medication (See eMAR) 09/19/17 0805   ECG      Response to Interventions  Decrease in pain 09/19/17 0806   ECG Rhythm  Normal sinus rhythm 09/13/17 2020    CLINICALLY ALIGNED PAIN ASSESSMENT (CAPA) ([facility] ADULTS ONLY)     Ectopy  None 09/13/17 2020     Comfort  comfortably manageable 09/17/17 1438   Lead Monitored  Lead II;V 1 09/13/17 2020    Change in Pain  about the same 09/17/17 1438   DRUG CALCULATION WEIGHT      Pain Control  partially effective 09/17/17 1438   Drug Calculation Weight  67.4 kg (148 lb 9.4 oz) 09/10/17 0011            Patient Lines/Drains/Airways Status    Active LINES/DRAINS/AIRWAYS     Name: Placement date: Placement time: Site: Days: Last dressing change:    Urethral Catheter Latex 16 fr 09/09/17   2015   Latex   9     Wound 09/14/17 Left Temporal region Laceration laceration sustained after a fall  09/14/17   2321   Temporal region   4     Incision/Surgical Site 09/01/17 Right Chest 09/01/17 1317    18     Incision/Surgical Site 09/01/17 Right Neck 09/01/17 1317    18             Patient Lines/Drains/Airways Status    Active PICC/CVC     Name: Placement date: Placement time: Site: Days: Additional Info Last dressing change:    Port A Cath Single 09/01/17 Right Chest wall 09/01/17   1256   Chest wall   18 Orientation: Right            Power Port: Yes            Inserted by: RUFINA Peterson MD            Total Catheter Length (cm): 18 cm            Line Flushed (See MAR): Yes            Diameter [place] Size: 8 Fr            Tip location: SVC            Lot #: [id number]               Intake/Output Detail Report     Date Intake         Output     Net    Shift P.O. I.V. NG/GT IV Piggyback Blood Components Total Urine Emesis/NG output Other Total       Day 09/18/17 0000 - 09/18/17 0659 -- 20 -- -- -- 20 -- -- -- -- 20    Celi 09/18/17 0700 - 09/18/17 1459 -- 380 -- -- -- 380 -- -- -- -- 380    Noc 09/18/17 1500 - 09/18/17 2359 -- 300 -- -- 300 600 125 -- -- 125 475    Day 09/19/17 0000 - 09/19/17 0659 -- -- -- -- -- -- -- -- -- -- 0    Celi 09/19/17 0700 - 09/19/17 1459 -- -- -- -- -- -- -- -- -- -- 0      Last Void/BM       Most Recent Value    Urine Occurrence 1 at 09/19/2017 0600    Stool Occurrence 1 at 09/19/2017 0600      Case  Management/Discharge Planning     Case Management/Discharge Planning Flowsheet     LIVING ENVIRONMENT     MH/BH CAREGIVER      Lives With  spouse 09/15/17 1618   Filed Complexity Screen Score  8 09/12/17 1603    Living Arrangements  house 09/15/17 1618   ABUSE RISK SCREEN      COPING/STRESS     QUESTION TO PATIENT:  Has a member of your family or a partner(now or in the past) intimidated, hurt, manipulated, or controlled you in any way?  patient declined to answer or is unable to answer 09/10/17 0158    Major Change/Loss/Stressor  illness;hospitalization;significant life changes 09/10/17 0140   QUESTION TO PATIENT: Do you feel safe going back to the place where you are living?  patient declined to answer or is unable to answer 09/10/17 0158    DISCHARGE PLANNING     OBSERVATION: Is there reason to believe there has been maltreatment of a vulnerable adult (ie. Physical/Sexual/Emotional abuse, self neglect, lack of adequate food, shelter, medical care, or financial exploitation)?  no 09/10/17 0158    Transportation Available  car;family or friend will provide 09/15/17 1618   (R) MENTAL HEALTH SUICIDE RISK      FINAL RESOURCES     Are you depressed or being treated for depression?  No 09/10/17 0158    Equipment Currently Used at Home  cane, straight 09/15/17 1618                       UNIT 7D [facility] BANK: [phone]            Medication Administration Report for Charlene Douglas as of 09/19/17 1421   Legend:    Given Hold Not Given Due Canceled Entry Other Actions    Time Time (Time) Time  Time-Action       Inactive    Active    Linked        Medications 09/13/17 09/14/17 09/15/17 09/16/17 09/17/17 09/18/17 09/19/17    acetaminophen (TYLENOL) tablet 325 mg  Dose: 325 mg Freq: EVERY 4 HOURS PRN Route: PO  PRN Reasons: mild pain,fever  Start: 09/17/17 2225   Admin Instructions: Maximum acetaminophen dose from all sources = 75 mg/kg/day not to exceed 4 grams/day.          1903 (325 mg)-Given        0609 (325  mg)-Given           benzonatate (TESSALON) capsule 100 mg  Dose: 100 mg Freq: 3 TIMES DAILY PRN Route: PO  PRN Reason: cough  Start: 09/16/17 1220       1347 (100 mg)-Given       1935 (100 mg)-Given        0539 (100 mg)-Given       (0928)-Not Given [C]       2012 (100 mg)-Given        0027 (100 mg)-Given       0955 (100 mg)-Given        0609 (100 mg)-Given       1120 (100 mg)-Given           diclofenac (VOLTAREN) 1 % topical gel 2 g  Dose: 2 g Freq: 4 TIMES DAILY PRN Route: TD  PRN Reason: moderate pain  Start: 09/16/17 1446   Admin Instructions: Apply to right upper abdominal quadrant  Send dosing card with product.        1645 (2 g)-Given              guaiFENesin-codeine (ROBITUSSIN AC) 100-10 MG/5ML solution 5 mL  Dose: 5 mL Freq: EVERY 4 HOURS PRN Route: PO  PRN Reason: cough  Start: 09/16/17 2201       2323 (5 mL)-Given        0312 (5 mL)-Given       0927 (5 mL)-Given       1352 (5 mL)-Given       1805 (5 mL)-Given       2202 (5 mL)-Given        0256 (5 mL)-Given       1104 (5 mL)-Given       1700 (5 mL)-Given       2358 (5 mL)-Given        0822 (5 mL)-Given           heparin 100 UNIT/ML injection 5 mL  Dose: 5 mL Freq: EVERY 28 DAYS Route: IK  Start: 09/14/17 0800   Admin Instructions: ONLY to de-access each port in dual implanted port.  Flush with 10 mL NS followed by 5 mL heparin (100 units/mL) at discharge and at least every 28 days.  MAX: 5 mL per port      (0930)-Not Given            1353 (5 mL)-Given           heparin lock flush 10 UNIT/ML injection 5-10 mL  Dose: 5-10 mL Freq: EVERY 1 HOUR PRN Route: IK  PRN Reason: other  PRN Comment: to lock each port in dual implanted port.  Start: 09/14/17 0749   Admin Instructions: MAX: 5 mL per port.      1114 (5 mL)-Given           0558 (5 mL)-Given       2213 (5 mL)-Given        0701 (5 mL)-Given           heparin lock flush 10 UNIT/ML injection 5-10 mL  Dose: 5-10 mL Freq: EVERY 24 HOURS Route: IK  Start: 09/14/17 0800   Admin Instructions: To lock each dormant  port in dual implanted port.  Check PRN heparin flush order to see when last dose of PRN heparin was given before administering.   MAX: 5 mL per port.      0948 (5 mL)-Given        0821 (5 mL)-Given        0826 (5 mL)-Given        (0930)-Not Given                0823 (5 mL)-Given           hydrALAZINE (APRESOLINE) injection 10 mg  Dose: 10 mg Freq: EVERY 6 HOURS PRN Route: IV  PRN Reason: high blood pressure  Start: 09/14/17 0932   Admin Instructions: For SBP >160 and/or DBP >100      1110 (10 mg)-Given       1713 (10 mg)-Given        0821 (10 mg)-Given        0353 (10 mg)-Given       0932 (10 mg)-Given          0654 (10 mg)-Given           lidocaine (LIDODERM) 5 % Patch 1 patch  Dose: 1 patch Freq: EVERY 24 HOURS 2000 Route: TD  Start: 09/16/17 2000   Admin Instructions: Apply patch(s) to right upper abdomen. To prevent lidocaine toxicity, patient should be patch free for 12 hrs daily. Patches may be cut to smaller size prior to removing release liner.  NEVER APPLY HEAT OVER PATCH which will increase absorption and may lead to risk of local anesthetic toxicity. Do not apply over area where liposomal bupivacaine was injected for 96 hours post injection.        1935 (1 patch)-Given        2012 (1 patch)-Given        2011 (1 patch)-Given        [ ] 2000          And  lidocaine (LIDODERM) patch REMOVAL  Freq: EVERY 24 HOURS 0800 Route: TD  Start: 09/17/17 0800   Admin Instructions: Remove lidocaine Patch.         0849 ( )-Patch Removed        0955 ( )-Patch Removed        0823 ( )-Patch Removed          And  lidocaine (LIDODERM) patch in PLACE  Freq: EVERY 8 HOURS Route: TD  Start: 09/16/17 2000   Admin Instructions: Chart every shift, confirming that patch is still in place on patient (no barcode scan needed). See patch order for dose information.  NEVER APPLY HEAT OVER PATCH which will increase absorption and may lead to risk of local anesthetic toxicity. Do not apply over area where liposomal bupivacaine injected  "for 96 hours.        1935 ( )-Patch in Place        0324 ( )-Patch in Place       (1351)-Not Given       2013 ( )-Patch in Place        0316 ( )-Patch in Place       1149 ( )-Patch Removed       2014 ( )-Given        0611 ( )-Patch in Place       1119 ( )-Patch Removed       [ ] 2000           lidocaine (LMX4) kit  Freq: EVERY 1 HOUR PRN Route: Top  PRN Reason: moderate pain  PRN Comment: with VAD insertion or accessing implanted port  Start: 09/14/17 0749   Admin Instructions: Do NOT give if patient has a history of allergy to any local anesthetic or any \"blaise\" product.   Apply 30 minutes prior to VAD insertion or port access.  MAX Dose:  2.5 g (  of 5 g tube)               lidocaine (PF) (XYLOCAINE) 1 % injection 50 mg  Dose: 5 mL Freq: ONCE Route: SC  Start: 09/14/17 1515   Admin Instructions: For laceration repair on L forehead      (8515)-Not Given [C]                lidocaine 1 % 1 mL  Dose: 1 mL Freq: EVERY 1 HOUR PRN Route: OTHER  PRN Comment: mild pain with VAD insertion or accessing implanted port  Start: 09/14/17 0749   Admin Instructions: Do NOT give if patient has a history of allergy to any local anesthetic or any \"blaise\" product. MAX dose 1 mL subcutaneous OR intradermal in divided doses.               losartan (COZAAR) tablet 50 mg  Dose: 50 mg Freq: DAILY Route: PO  Start: 09/19/17 0800          0823 (50 mg)-Given           magnesium sulfate 2 g in NS intermittent infusion ([facility] or [facility])  Dose: 2 g Freq: DAILY PRN Route: IV  PRN Reason: magnesium supplementation  Last Dose: 2 g (09/14/17 2003)  Start: 09/14/17 1322   Admin Instructions: For Serum Mg++ 1.6 - 2 mg/dL  Give 2 g and recheck magnesium level next AM.      2003 (2 g)-New Bag          1017 (2 g)-New Bag        1149 (2 g)-New Bag            magnesium sulfate 4 g in 100 mL sterile water (premade)  Dose: 4 g Freq: EVERY 4 HOURS PRN Route: IV  PRN Reason: magnesium supplementation  Start: 09/14/17 1322   Admin Instructions: For serum " Mg++ less than 1.6 mg/dL  Give 4 g and recheck magnesium level 2 hours after dose, and next AM.               metoprolol (LOPRESSOR) injection 5 mg  Dose: 5 mg Freq: 3 TIMES DAILY PRN Route: IV  PRN Reason: high blood pressure  PRN Comment: SBP >160 and/or DBP >100, if hydralazine ineffective (wait at least 30 minutes after hydralazine)  Start: 09/15/17 1615      1617 (10 mg)-Given [C]               naloxone (NARCAN) injection 0.1-0.4 mg  Dose: 0.1-0.4 mg Freq: EVERY 2 MIN PRN Route: IV  PRN Reason: opioid reversal  Start: 09/09/17 2350   Admin Instructions: For respiratory rate LESS than or EQUAL to 8.  Partial reversal dose:  0.1 mg titrated q 2 minutes for Analgesia Side Effects Monitoring Sedation Level of 3 (frequently drowsy, arousable, drifts to sleep during conversation).Full reversal dose:  0.4 mg bolus for Analgesia Side Effects Monitoring Sedation Level of 4 (somnolent, minimal or no response to stimulation).               omeprazole (priLOSEC) CR capsule 20 mg  Dose: 20 mg Freq: EVERY MORNING BEFORE BREAKFAST Route: PO  Start: 09/18/17 1130         1149 (20 mg)-Given        0822 (20 mg)-Given           potassium chloride (KLOR-CON) Packet 20-40 mEq  Dose: 20-40 mEq Freq: EVERY 2 HOURS PRN Route: ORAL OR FEED  PRN Reason: potassium supplementation  Start: 09/14/17 1322   Admin Instructions: Use if unable to tolerate tablets.    If Serum K+ 3.4-4.0, dose = 20 mEq x1. Recheck K+ level the next AM.  If Serum K+ 3.0-3.3, dose = 60 mEq po total dose (40 mEq x 1 followed in 2 hours by 20 mEq X1). Recheck K+ level 4 hours after dose and the next AM.  If Serum K+ 2.5-2.9, dose = 80 mEq po total dose (40 mEq Q2H x2). Recheck K+ level 4 hours after dose and the next AM.  If Serum K+ less than 2.5, See IV order.  Dissolve packet contents in 4-8 ounces of cold water or juice.               potassium chloride 10 mEq in 100 mL intermittent infusion  Dose: 10 mEq Freq: EVERY 1 HOUR PRN Route: IV  PRN Reason: potassium  supplementation  Last Dose: 10 mEq (09/15/17 1407)  Start: 09/14/17 1322   Admin Instructions: Infuse via PERIPHERAL LINE or CENTRAL LINE. Use for central line replacement if patient weight less than 65 kg, if patient is on TPN with high potassium content or if unit does not stock 20 mEq bags.  If Serum K+ 3.4-4.0, dose = 10 mEq/hr x2 doses. Recheck K+ level the next AM.  If Serum K+ 3.0-3.3, dose = 10 mEq/hr x4 doses (40 mEq IV total dose). Recheck K+ level 2 hours after dose and the next AM.  If Serum K+ less than 3.0, dose = 10 mEq/hr x6 doses (60 mEq IV total dose). Recheck K+ level 2 hours after dose and the next AM.      1412 (10 mEq)-New Bag       1654 (10 mEq)-New Bag        1116 (10 mEq)-New Bag       1407 (10 mEq)-New Bag               potassium chloride 10 mEq in 100 mL intermittent infusion with 10 mg lidocaine  Dose: 10 mEq Freq: EVERY 1 HOUR PRN Route: IV  PRN Reason: potassium supplementation  Start: 09/14/17 1322   Admin Instructions: Infuse via PERIPHERAL LINE. Use potassium with lidocaine for pain with peripheral administration.  If Serum K+ 3.4-4.0, dose = 10 mEq/hr x2 doses. Recheck K+ level the next AM.  If Serum K+ 3.0-3.3, dose = 10 mEq/hr x4 doses (40 mEq IV total dose). Recheck K+ level 2 hours after dose and the next AM.  If Serum K+ less than 3.0, dose = 10 mEq/hr x6 doses (60 mEq IV total dose). Recheck K+ level 2 hours after dose and the next AM.               potassium chloride 20 mEq in 100 mL NON-STANDARD CONC intermittent infusion  Dose: 20 mEq Freq: EVERY 1 HOUR PRN Route: IV  PRN Reason: potassium supplementation  Start: 09/16/17 1034   Admin Instructions: Infuse via CENTRAL LINE Only.  May need EKG if less than 65 kg or on TPN - Max rate is 0.3 mEq/kg/hr for patients not on EKG monitoring.    If Serum K+ 3.4-4.0, dose = 20 mEq/hr x1 doses. Recheck K+ level the next AM.  If Serum K+ 3.0-3.3, dose = 20 mEq/hr x2 doses (40 mEq IV total dose).  Recheck K+ level 2 hours after dose and  the next AM.  If Serum K+ less than 3.0, dose = 20 mEq/hr x3 doses (60 mEq IV total dose). Recheck K+ level 2 hours after dose and the next AM.        1715 (20 mEq)-Given [C]              potassium chloride SA (K-DUR/KLOR-CON M) CR tablet 20-40 mEq  Dose: 20-40 mEq Freq: EVERY 2 HOURS PRN Route: PO  PRN Reason: potassium supplementation  Start: 09/14/17 1322   Admin Instructions: Use if able to take PO.   If Serum K+ 3.4-4.0, dose = 20 mEq x1. Recheck K+ level the next AM.  If Serum K+ 3.0-3.3, dose = 60 mEq po total dose (40 mEq x1 followed in 2 hours by 20 mEq x1). Recheck K+ level 4 hours after dose and the next AM.  If Serum K+ 2.5-2.9, dose = 80 mEq po total dose (40 mEq Q2H x2). Recheck K+ level 4 hours after dose and the next AM.  If Serum K+ less than 2.5, See IV order.  DO NOT CRUSH.               potassium phosphate 10 mmol in D5W 250 mL intermittent infusion  Dose: 10 mmol Freq: DAILY PRN Route: IV  PRN Reason: phosphorous supplementation  Start: 09/14/17 1323   Admin Instructions: For serum phosphorus level 2.5-2.7  Do not infuse Phosphorus in the same line as TPN.   Give 10 mmol and recheck phosphorus level the next AM.               potassium phosphate 15 mmol in D5W 250 mL intermittent infusion  Dose: 15 mmol Freq: DAILY PRN Route: IV  PRN Reason: phosphorous supplementation  Start: 09/14/17 1323   Admin Instructions: For serum phosphorus level 2.0-2.4  Do not infuse Phosphorus in the same line as TPN.   Give 15 mmol and recheck phosphorus level next AM.        2317 (15 mmol)-New Bag        1619 (15 mmol)-New Bag        1348 (15 mmol)-New Bag            potassium phosphate 20 mmol in D5W 250 mL intermittent infusion  Dose: 20 mmol Freq: EVERY 6 HOURS PRN Route: IV  PRN Reason: phosphorous supplementation  Start: 09/14/17 1323   Admin Instructions: For serum phosphorus level 1.1-1.9  For CENTRAL Line ONLY  Do not infuse Phosphorus in the same line as TPN.   Give 20 mmol and recheck phosphorus level 2  hours after dose and next AM.      2127 (20 mmol)-New Bag         1208 (20 mmol)-New Bag              potassium phosphate 25 mmol in D5W 500 mL intermittent infusion  Dose: 25 mmol Freq: EVERY 8 HOURS PRN Route: IV  PRN Reason: phosphorous supplementation  Start: 09/14/17 1323   Admin Instructions: For serum phosphorus level less than 1.1  Do not infuse Phosphorus in the same line as TPN.   Give 25 mmol and recheck phosphorus level 2 hours after dose and next AM.               sennosides (SENOKOT) tablet 8.6 mg  Dose: 8.6 mg Freq: 2 TIMES DAILY PRN Route: PO  PRN Reason: constipation  Start: 09/18/17 1508         2022 (8.6 mg)-Given            sodium chloride (PF) 0.9% PF flush 10-20 mL  Dose: 10-20 mL Freq: EVERY 1 HOUR PRN Route: IK  PRN Reasons: line flush,post meds or blood draw  Start: 09/14/17 0749   Admin Instructions: And Daily PRN, to de-access each port in dual implanted port.  Flush with 10 mL NS followed by 5 mL heparin (100 units/mL) at discharge and at least every 28 days.       1117 (10 mL)-Given       1619 (10 mL)-Given        0810 (20 mL)-Given         2214 (10 mL)-Given            sodium chloride (PF) 0.9% PF flush 10-20 mL  Dose: 10-20 mL Freq: EVERY 1 HOUR PRN Route: IK  PRN Reasons: line flush,post meds or blood draw  PRN Comment: To flush each CVC Implanted port.  Start: 09/14/17 0749   Admin Instructions: 10 mL post IV meds;   20 mL post blood draw.      1114 (20 mL)-Given        1404 (20 mL)-Given          0558 (20 mL)-Given            sodium chloride (PF) 0.9% PF flush 3 mL  Dose: 3 mL Freq: EVERY 8 HOURS Route: IK  Start: 09/14/17 0730     0716 (20 mL)-Given [C]       1700 (3 mL)-Given       (2258)-Not Given                       (0005)-Not Given       (0825)-Not Given       (1705)-Not Given [C]        (0000)-Not Given       (0932)-Not Given       1621 (3 mL)-Given                              0000 (3 mL)-Given       (0823)-Not Given       [ ] 1530       [ ] 2330          Future  Medications  Medications 09/13/17 09/14/17 09/15/17 09/16/17 09/17/17 09/18/17 09/19/17       carvedilol (COREG) tablet 6.25 mg  Dose: 6.25 mg Freq: 2 TIMES DAILY WITH MEALS Route: PO  Start: 09/19/17 1800          [ ] 1800          Completed Medications  Medications 09/13/17 09/14/17 09/15/17 09/16/17 09/17/17 09/18/17 09/19/17         Dose: 6.25 mg Freq: ONCE Route: PO  Start: 09/18/17 1015   End: 09/18/17 1104         1104 (6.25 mg)-Given           Discontinued Medications  Medications 09/13/17 09/14/17 09/15/17 09/16/17 09/17/17 09/18/17 09/19/17         Dose: 12.5 mg Freq: 2 TIMES DAILY WITH MEALS Route: PO  Start: 09/18/17 1800   End: 09/19/17 1200         (1946)-Not Given [C]        (0823)-Not Given       0837 (12.5 mg)-Given       1200-Med Discontinued         Dose: 6.25 mg Freq: 2 TIMES DAILY WITH MEALS Route: PO  Start: 09/15/17 1615   End: 09/18/17 1011      (1747)-Not Given [C]        0822 (6.25 mg)-Given       1850 (6.25 mg)-Given        0928 (6.25 mg)-Given       1749 (6.25 mg)-Given        0951 (6.25 mg)-Given       1011-Med Discontinued          Dose: 5,000 Units Freq: EVERY 8 HOURS Route: SC  Start: 09/11/17 0400   End: 09/17/17 1222   Admin Instructions: High concentration HEParin. Not for line flush or cath care.     0340 (5,000 Units)-Given       1201 (5,000 Units)-Given       2022 (5,000 Units)-Given        0405 (5,000 Units)-Given       1214 (5,000 Units)-Given       2004 (5,000 Units)-Given        0452 (5,000 Units)-Given       1407 (5,000 Units)-Given       2031 (5,000 Units)-Given        0357 (5,000 Units)-Given       1351 (5,000 Units)-Given       1935 (5,000 Units)-Given        0322 (5,000 Units)-Given       1222-Med Discontinued  (1351)-Not Given               Dose: 5 mg Freq: DAILY Route: PO  Start: 09/16/17 1000   End: 09/18/17 1508       0822 (5 mg)-Given               0929 (5 mg)-Given        0950 (5 mg)-Given       1508-Med Discontinued          Dose: 50 mg Freq: EVERY 8 HOURS Route:  IV  Start: 17 1600   End: 17 1504   Admin Instructions: Dose adjusted per renal dosing policy.  Estimated CrCl > 50 mL/min.      1631 (50 mg)-Given        0019 (50 mg)-Given       0821 (50 mg)-Given       1614 (50 mg)-Given        0004 (50 mg)-Given       0823 (50 mg)-Given       1504-Med Discontinued            Dose: 150 mg Freq: 2 TIMES DAILY Route: PO  Start: 17 1000   End: 17 1126        1017 (150 mg)-Given       2012 (150 mg)-Given        0950 (150 mg)-Given       1126-Med Discontinued     Medications 09/13/17 09/14/17 09/15/17 09/16/17 09/17/17 09/18/17 09/19/17               INTERAGENCY TRANSFER FORM - NOTES (H&P, Discharge Summary, Consults, Procedures, Therapies)   2017                       UNIT [address] [facility] BANK: [phone]               History & Physicals      H&P by Dustin Oliver MD at 9/10/2017  1:47 AM     Author:  Dustin Oliver MD Service:  General Medicine Author Type:  Resident    Filed:  9/10/2017  6:18 AM Date of Service:  9/10/2017  1:47 AM Creation Time:  9/10/2017  1:47 AM    Status:  Attested :  Dustin Oliver MD (Resident)    Cosigner:  Abram Wells MD at 9/10/2017  6:57 AM        Attestation signed by Abram Wells MD at 9/10/2017  6:57 AM        ICU Staff  Please see my separate note of this morning    GB                               [facility]       MICU History and Physical  Charlene Douglas   MRN: [id number]  : 1950  Date of Admission:2017  Primary care provider: Donna Shirley [phone]           Chief Complaint  Shortness of Breath        History of Present Illnes[MN1.1]denilson Douglas is our 66 yo woman with PMH of HTN and recently diagnosed colon cancer with liver metastases who presented to the ED with shortness of breath.     Briefly, she was recently diagnosed with the colon cancer following several months of worsening fatigue, and found to have the cancer on routine colonoscopy. She  is being cared for by [facility]. She had port placed , 8 days ago, and just completed a 46 hour Folfox chemotherapy treatment that was removed at 1300 /, the afternoon prior to presentation.    She reports that she had been weak, fatigued, and dizzy throughout all of this, but acutely worsened with worsening shortness of breath and fatigue today. Additionally, she has been intermittently febrile to near 100F. She presented with SOB at rest and has become progressively more incoherent. Other symptoms include some dry mouth, and nausea (treated with compazine). No chest pain, vomiting. No PMHx of DVT or PE's.    Has also had poor appetite, and poor fluid intake. EMS was called due to her worsening mentation, and she was given 500 ml NS en route to the ED.     In the ED, EKG was notable for sinus tachycardia. LA was 2.1. Blood cultures were sent. CXR with diffuse pulmonary edema. Noted to be in respiratory distress with poor oxygenation. Responded poorly to fluid bolus.    WBC notable for anemia to 9.7.  Sodium of 133, potassium of 3.2, with normal creatinine. AP of 492 (known to be elevated) with ALT, AST of 39, 68 respectively. UA negative for infection. BNP was found to be 5000.    Due to worsening respiratory status, patient was intubated and transferred to [facility] MICU for further management with concern for new onset heart failure (last echo prior to chemotherapy with normal EF of 60-65%). Nitroglycerin was started for afterload reduction prior to transfer and lasix 40 mg was given.     History is obtained from the chart review and from .  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     COLONOSCOPY N/A 8/24/2017    Procedure: COMBINED COLONOSCOPY, SINGLE OR MULTIPLE BIOPSY/POLYPECTOMY BY BIOPSY;;  Surgeon: Duane, William Charles, MD;  Location: [facility] OR     COLONOSCOPY WITH CO2 INSUFFLATION N/A 8/24/2017    Procedure: COLONOSCOPY WITH CO2 INSUFFLATION;  BMI: 25.2  Referring: Donna Mota  Diagnoses: Positive FIT (fecal immunochemical test)  Special screening for malignant neoplasms, colon  Pharmacy: [facility] Fax: [phone];  Surgeon: Duane, William Charles, MD;  Location: [facility] OR     EXAM UNDER ANESTHESIA, ULTRASOUND N/A 8/28/2017    Procedure: EXAM UNDER ANESTHESIA, ULTRASOUND;  Ultrasound Liver Biopsy;  Surgeon: GENERIC ANESTHESIA PROVIDER;  Location: [facility] OR     HIP SURGERY      14 years old     INSERT PORT VASCULAR ACCESS N/A 9/1/2017    Procedure: INSERT PORT VASCULAR ACCESS;  Port placement;  Surgeon: Vinny Peterson DO;  Location: [facility] OR[MN1.3]              Social History[MN1.1]  Social History     Social History     Marital status:      Spouse name: Praveen Nobles)     Number of children: 2     Years of education: 14     Occupational History           [employer]     Social History Main Topics     Smoking status: Never Smoker     Smokeless tobacco: Never Used     Alcohol use No      Comment: 1 per week     Drug use: No     Sexual activity: Yes     Partners: Male     Birth control/ protection: Surgical, Male Surgical      Comment:  had a vasectomy     Other Topics Concern      Service No     Blood Transfusions Yes     1962     Caffeine Concern No     Occupational Exposure No     Hobby Hazards No     Sleep Concern No     Stress Concern No     Weight Concern Yes     gradual weight gain     Special Diet No     Back Care Yes     low back pain occasionally     Exercise No     Bike Helmet No     n/a     Seat Belt Yes     uses seatbelts  100%     Self-Exams Yes     does monthly breast exams     Social History Narrative[MN1.3]             Family History[MN1.1]  Family History   Problem Relation Age of Onset     C.A.D. Mother      quad- bypass at age 78     CEREBROVASCULAR DISEASE Mother      in her 70's     Lipids Mother      Hypertension Mother      Other - See Comments Mother      Triple Bi-pass     Circulatory Father      abd aneurysm     GASTROINTESTINAL DISEASE Father      diverticulitis     Hypertension Father[MN1.3]      Family history reviewed[MN1.2]          Allergies[MN1.1]  Allergies   Allergen Reactions     No Known Allergies[MN1.3]              Medications[MN1.1]  Prescriptions Prior to Admission   Medication Sig Dispense Refill Last Dose     potassium chloride SA (K-DUR/KLOR-CON M) 10 MEQ CR tablet Take 1 tablet (10 mEq) by mouth daily 30 tablet 1 9/8/2017 at 1800     LORazepam (ATIVAN) 0.5 MG tablet Take 1 tablet (0.5 mg) by mouth every 4 hours as needed (Anxiety, Nausea/Vomiting or Sleep) 30 tablet 2 Past Month at Unknown time     prochlorperazine (COMPAZINE) 10 MG tablet Take 1 tablet (10 mg) by mouth every 6 hours as needed (Nausea/Vomiting) 30 tablet 2 Past Week at Unknown time     ondansetron (ZOFRAN) 8 MG tablet Take 1 tablet (8 mg) by mouth every 8 hours as needed (Nausea/Vomiting) 10 tablet 2 Past Week at Unknown time     lidocaine-prilocaine (EMLA) cream Apply thick amount to port area 30-60 minutes prior to port access.  Do not rub in.  Cover with plastic wrap/tegaderm. 30 g 3      metoprolol (TOPROL-XL) 25 MG 24 hr tablet Take 1 tablet (25 mg) by mouth daily 30 tablet 0 9/9/2017 at 0800     ACETAMINOPHEN PO Take by mouth every 4 hours as needed for pain   Past Month at Unknown time     Benzonatate (TESSALON PERLES PO) Take 200 mg by mouth 3 times daily as needed for cough   More than a month at Unknown time     PROAIR  (90 BASE) MCG/ACT inhaler    More than a month at Unknown time     benzonatate (TESSALON) 200 MG  capsule    More than a month at Unknown time     guaiFENesin-codeine (ROBITUSSIN AC) 100-10 MG/5ML SOLN solution Take 5 mLs by mouth every 4 hours as needed for cough 120 mL 0 9/8/2017 at 2200     omeprazole (PRILOSEC) 20 MG CR capsule Take 1 capsule (20 mg) by mouth daily 30 capsule 1 9/8/2017 at 0800     ferrous sulfate (IRON) 325 (65 FE) MG tablet Take 1 tablet (325 mg) by mouth 3 times daily (with meals) 90 tablet 1 Past Week at Unknown time[MN1.3]             Vitals[MN1.1]  Temp:  [98.8  F (37.1  C)-100.4  F (38  C)] 99.1  F (37.3  C)  Pulse:  [147] 147  Heart Rate:  [111-172] 132  Resp:  [12-48] 14  BP: ()/() 139/79  FiO2 (%):  [50 %-100 %] 50 %  SpO2:  [86 %-100 %] 97 %[MN1.3]        Ventilator[MN1.1]  Ventilation Mode: CMV/AC  FiO2 (%): 50 %  Rate Set (breaths/minute): 12 breaths/min  Tidal Volume Set (mL): 500 mL  PEEP (cm H2O): 10 cmH2O  Oxygen Concentration (%): 100 %  Resp: 14[MN1.3]            Intake/Output  Intake/Output Summary (Last 24 hours) at 09/10/17 0148  Last data filed at 09/10/17 0000   Gross per 24 hour   Intake                0 ml   Output              150 ml   Net             -150 ml            Physical Exam  Gen: Intubated,   HEENT: AT/ NC, PERRL b/l,  sclera anicteric  PULM/THORAX: Coarse breath sounds anteriorly, no rales/rhonchi/wheezes  CV:[MN1.1]tachycardic[MN1.2] , S1 and S2 appreciated, no extra heart sounds, murmurs or rub auscultated. No JVD  ABD: obese, soft, nontender, nondistended. Normoactive bowel sounds x 4, no HSM appreciated.  EXT: warm,[MN1.1] 1+[MN1.2] edema, clubbing or cyanosis. No asymmetrical edema or tenderness   SKIN: Capillary refill normal[MN1.1]. Unable to appreciate rashes[MN1.2]    LINES:[MN1.1] port in place. Two peripheral IV's.[MN1.2]         ROUTINE ICU LABS (Last four results)    CMP[MN1.1]    Recent Labs  Lab 09/10/17  0040 09/09/17  1900 09/07/17  0825   * 133 132*   POTASSIUM 4.0 3.2* 3.1*   CHLORIDE 94 95 95   CO2 21 25 28    ANIONGAP 16* 13 9   * 141* 138*   BUN 23 17 9   CR 1.22* 0.76 0.70   GFRESTIMATED 44* 76 84   GFRESTBLACK 53* >90 >90   RON 7.8* 7.7* 8.1*   PROTTOTAL  --  6.7* 7.0   ALBUMIN  --  1.8* 1.8*   BILITOTAL  --  0.5 0.5   ALKPHOS  --  492* 598*   AST  --  68* 57*   ALT  --  39 33[MN1.3]     CBC[MN1.1]    Recent Labs  Lab 09/10/17  0040 09/09/17  1900 09/07/17  0825   WBC 19.1* 10.0 12.8*   RBC 4.35 3.80 2.79*   HGB 11.4* 9.7* 6.7*   HCT 36.0 31.2* 22.4*   MCV 83 82 80   MCH 26.2* 25.5* 24.0*   MCHC 31.7 31.1* 29.9*   RDW 18.0* 18.2* 17.5*   * 448 565*[MN1.3]     INR[MN1.1]    Recent Labs  Lab 09/10/17  0133   INR 1.28*[MN1.3]     Arterial Blood Gas[MN1.1]    Recent Labs  Lab 09/10/17  0424 09/10/17  0316 09/10/17  0111 09/10/17  0040   [facility]  --   --  7.28*  --    PCO2  --   --  43  --    PO2  --   --  118*  --    HCO3  --   --  20*  --    O2PER 50 60 80 80[MN1.3]        Microbiology[MN1.1]  Blood Cx sent, Urine Cx pending[MN1.2]           Imaging  CXR September 10, 2017[MN1.1]  Bilateral pulmonary edema[MN1.2]        Assessment and Plan[MN1.1]  Charlene Douglas is our 68 yo woman with PMH of HTN and recently diagnosed colon cancer with liver metastases s/p first dose of FOLFOX chemotherapy prior to presentation who presented to the ED with shortness of breath. Found to have CXR with new pulmonary edema and BNP of 5000 concerning for new onset heart failure.[MN1.2]    Neurologic[MN1.1]  #. Sedation: intubated with succ and etomidate. Given ativan en route, but required ketamine drip prior to arrival. Initially began with propofol and fentanyl ggt, but became hypotensive due to combination of propofol, fentanyl, nitro and lasix. Switched to versed due to hypotension  -- continue versed ggt, will attempt to switch to propofol once pressures can tolerate  -- continue fentanyl ggt with bumps.[MN1.2]    Cardiovascular[MN1.1]  #. Non-ischemic cardiomyopathy with cardiogenic shock: CXR with pulmonary edema, gradual  worsening shortness of breath and new BNP of 5000 in setting of recent chemotherapy. Persistently tachycardic with EKG consistent with sinus tach. Normal ECHO with EF OF 60-65% in August. Discussed with cardiology fellow, Dr. Castillo, who conducted bedside ECHO and found with EF of <30%. Unclear etiology though given timing, concerning for chemotherapy related. 5FU infusion has been known to cause some cardiotoxicity. Possible that this is a regional dependent dysfunction with preserved RV function (suggestive against PE).   -- trend lactic acid q4h at this time (now downtrending 2 -> 6 -> 4).  -- troponin still uptrending, no EKG changes on repeat EKG (2 -> 3.5). Will trend until downtrending  -- cardiology following, will obtain Harmony-natalee catheterization for more accurate hemodynamic measurement  -- continue nitro ggt for afterload reduction as tolerated  -- due to hypotension, will have dopamine for pressor support for combined ionotropic and blood pressure support  -- no beta blockade or calcium channel blockers at this time.  -- will attempt to diurese - s/p 40 mg IV lasix in the ED.  -- will give additional IV lasix now.  -- given fevers, will treat for underlying infection as source of tachycardia.[MN1.2]    Respiratory[MN1.1]  #. Pulmonary edema due to acute onset non-ischemic cardiomyopathy: intubated due to worsening shortness of breath and concern for patient tiring out. Post-intubation ABG and VBG wnl.  -- continue mechanical ventilation  -- will diurese as able.  -- s/p 40 mg IV lasix x2  -- strict I/O's  -- continue CMV mechanical ventilation[MN1.2]    Ventilation Mode: CMV/AC  FiO2 (%): 50 %  Rate Set (breaths/minute): 12 breaths/min  Tidal Volume Set (mL): 500 mL  PEEP (cm H2O): 10 cmH2O  Oxygen Concentration (%): 100 %  Resp: 14[MN1.4]     Gastrointestinal[MN1.1]  #. Stage IV Colon cancer: involves the ascending colon near ileocecal valve with extensive hepatic involvement. Also with possible  pulmonary nodules consistent with metastatic disease on PET scan 8/25/2017. S/p one infusion of FOLFOX treatment which includes folinic acid, flurouracil and oxaliplatin.   -- hematology/oncology consult in the am to discuss goals of care, given the possibility that patient is unable to tolerate chemotherapy moving forward given the acute cardiomyopathy  -- monitor CMP given known elevation of transaminases[MN1.2]    Genitourinary[MN1.1]  Continue sheriff catheterization for strict I/O's while sedated.[MN1.2]    Infectious Disease[MN1.1]  #. Fevers: has had on and off fevers to 100 since initiating chemotherapy.  -- Blood Cx, Urine Cx pending (9/9-present)  -- UA without signs of infection. Likely either chemotherapy related, though given fragile state, will treat for underlying infection with vancomycin and cefepime.  -- will obtain procalcitonin and CRP at this time to have for trending.[MN1.2]    Hematology[MN1.1]  #. Stage IV Colon cancer: involves the ascending colon near ileocecal valve with extensive hepatic involvement. Also with possible pulmonary nodules consistent with metastatic disease on PET scan 8/25/2017. S/p one infusion of FOLFOX treatment which includes folinic acid, flurouracil and oxaliplatin.   -- hematology/oncology consult in the am to discuss goals of care, given the possibility that patient is unable to tolerate chemotherapy moving forward given the acute cardiomyopathy  -- monitor CMP given known elevation of transaminases    #. Chronic anemia: hx of anemia over the past year to 8-9's in setting of metastatic colon cancer. Suspect due anemia of chronic disease/iron deficiency with borderline normocytic anemia (80) in setting of known colon cancer.   -- 9.7 - 11.4 Hgb here.  -- continue to trend    #. Leukocytosis: prior to admission with WBC in the 12.8 range. Presented with WBC of 10 then acutely celi to 19.1. Probably stress response, though cannot rule out infection at this time.  --  Blood cultures pending  -- on broad spectrum antibiotics at this time.[MN1.2]     Endocri[MN1.1]ne  No acute endocrine issues at this time[MN1.2]    Renal/Electolytes/FEN[MN1.1]  #. ELKIN: creatinine acutely elevated from 0.76 -> 1.22. Likely due to underlying cardiogenic shock with elevated creatinine. Possible with cardiorenal syndrome though RV function is normal. Likely due to hypoperfusion due to severe LH hypokinesis.  -- will work on treating cardiogenic shock per cardiology as above.  -- trend BMP with daily creatinine.[MN1.2]    Skin Care[MN1.1]  Per nursing[MN1.2]    PPX:[MN1.1] given one dose of lovenox, but will hold at this time due to swan-catheter placement. Continue with ranitidine 50 mg q8h for GI prophylaxis[MN1.2]    CODE:[MN1.1] Full[MN1.2]    Family:[MN1.1]  bill updated at bedside. Understandably tearful, but knows patient has an advanced directive that her daughters will bring in.[MN1.2]    Disposition Plan[MN1.3]   Expected discharge[MN1.1] unclear due to critical status. Given patient's metastatic colon cancer with poor response to initial chemotherapy, goals of care discussion needs to be held with family in regards to how to proceed. Hope is that patient can have her cardiogenic shock corrected so that she is able to spend her remaining days with family outside of the hospital.   prior living arrangement[MN1.2] once[MN1.1] stable.[MN1.2]     Entered:[MN1.1] Dustin Oliver 09/10/2017[MN1.3],[MN1.1] 5:35 AM[MN1.3]       Patient to be discussed with staff attending, [MN1.1] Russell[MN1.2].  Please feel free to page with questions.    Dustin Oliver MD  Medicine-Pediatrics PGY3  [phone][MN1.1]     Revision History        User Key Date/Time User Provider Type Action    > MN1.4 9/10/2017  6:18 AM Dustin Oliver MD Resident Sign     MN1.3 9/10/2017  5:35 AM Dustin Oliver MD Resident      MN1.2 9/10/2017  5:26 AM Dustin Oliver MD Resident      MN1.1 9/10/2017  1:47 AM  Dustin Oliver MD Resident                   Discharge Summaries     No notes of this type exist for this encounter.         Consult Notes      Consults by Lacy Bergeron LICSW at 2017  2:21 PM     Author:  Lacy Bergeron LICSW Service:  Social Work Author Type:      Filed:  2017  2:21 PM Date of Service:  2017  2:21 PM Creation Time:  2017  2:05 PM    Status:  Signed :  Lacy Bergeron LICSW ()     Consult Orders:    1. Social Work IP Consult [[id number]] ordered by Mikel Mckeon MD at 17 1240                Social Work: Assessment with Discharge Plan    Patient Name:  Charlene Douglas  :  1950  Age:  67 year old  MRN:  [id number]  Risk/Complexity Score:  Filed Complexity Screen Score: 8  Completed assessment with:   in hallway while patient worked with PT    Presenting Information   Reason for Referral:  Discharge plan  Date of Intake:  2017  Referral Source:  Physician  Decision Maker:  Patient  Alternate Decision Maker:  Patient unable to answer at this time d/t working with PT  Health Care Directive: No HCD on file  Living Situation:  House  Previous Functional Status:  Per OT charting patient was independent until 9.10.17 when she started getting increasingly weak from chemo treatment  Patient and family understanding of hospitalization:  Restorative  Cultural/Language/Spiritual Considerations:  n/a  Adjustment to Illness:  Unable to assess    Physical Health  Reason for Admission:  Shortness of breath; recently diagnosed colon cancer with liver mets  Services Needed/Recommended:  TCU    Mental Health/Chemical Dependency  Diagnosis:  No diagnoses listed in charting    Support System  Significant relationship at present time:   Praveen  Family of origin is available for support:  2 daughters (1 present today) live in [facility]; supportive  Other support available:  n/a  Gaps in support system:   "n/a  Patient is caregiver to:  Praveen had a CVA 7 weeks ago today; he has no ongoing deficits (after his double vision cleared up 16 days ago) besides \"tired eyes\" (he has an optho appointment this week). He is currently treating his eyes with rest and darkness - sometimes he is using his sunglasses in the hospital d/t fluorescent lights.     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 but has been on FMLA since his CVA)   Financial Concerns:  none  Insurance:    Payor/Plan Subscriber Name Rel Member # Group #   [employer] -  [place]* PRAVEEN SAINI  [id number]      [address] [address]       Discharge Plan   Patient and family discharge goal:  Rehab and then home once she can manage stairs (their bedroom is on the 2nd floor)  Provided education on discharge plan:  YES  Patient agreeable to discharge plan:   is in agreement - patient is working with another provider  A list of Medicare Certified Facilities was provided to the patient and/or family to encourage patient choice. Patient's choices for facility are:  Yes - for 25 miles from [place] [address];  prefers areas of [place] and [place] (included in the list provided). We also talked about needing to check with [employer] for which of the facilities are considered in-network for her insurance.   Will NH provide Skilled rehabilitation or complex medical:  YES  General information regarding anticipated insurance coverage and possible out of pocket cost was discussed. Patient and patient's family are aware patient may incur the cost of transportation to the facility, pending insurance payment: YES  Barriers to discharge:  Facilities that are in-network and medical readiness    Discharge " "Recommendations   Anticipated Disposition:  Facility:  TBD  Transportation Needs:  Depends on her mobility  Name of Transportation Company and Phone:  TBD    Additional comments   Per  Charlene has not been to a TCU in the past - he is familiar with the rehab process (learning how to maneuver around their house) from his 2 previous RACHEL.  has been sleeping at the hospital - we talked about taking time to care for himself especially given his own recent medical concerns. He indicated that he is okay to stay at the hospital.  endorsed that the recommendation for TCU was \"new information\" to them but \"not a surprise.\" Encouraged him or patient to alert us to questions or concerns as they arise.     Lacy Bergeron [facility]  9/17/2017    ON CALL PAGER   0800 - 1600   [phone]    ON CALL COVERAGE AFTER 1600  [phone][EL1.1]       Revision History        User Key Date/Time User Provider Type Action    > EL1.1 9/17/2017  2:21 PM Lacy Bergeron LICSW  Sign            Consults by Verónica Cisse MD at 9/14/2017  4:31 PM     Author:  Verónica Cisse MD Service:  Plastic Surgery Author Type:  Resident    Filed:  9/14/2017  4:39 PM Date of Service:  9/14/2017  4:31 PM Creation Time:  9/14/2017  4:30 PM    Status:  Attested :  Verónica Cisse MD (Resident)    Cosigner:  MERON Gibbons MD at 9/15/2017  7:33 AM        Attestation signed by MERON Gibbons MD at 9/15/2017  7:33 AM        Attestation: Agree with the plan. Discussed with me.  Physician Attestation   I did not see the patient on this date.    MERON Gibbons                               Plastic Surgery Consult Note    HPI:  We were contacted by Oncology team regarding a traumatic forehead laceration. The patient had an unwitnessed fall earlier this AM while in the hospital and sustained a laceration to her forehead. Head CT was obtained which was negative for intracranial pathology. 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     COLONOSCOPY N/A 2017    Procedure: COMBINED COLONOSCOPY, SINGLE OR MULTIPLE BIOPSY/POLYPECTOMY BY BIOPSY;;  Surgeon: Duane, William Charles, MD;  Location: [facility] OR     COLONOSCOPY WITH CO2 INSUFFLATION N/A 2017    Procedure: COLONOSCOPY WITH CO2 INSUFFLATION;  BMI: 25.2  Referring: Donna Mota  Diagnoses: Positive FIT (fecal immunochemical test)  Special screening for malignant neoplasms, colon  Pharmacy: [facility] Fax: [phone];  Surgeon: Duane, William Charles, MD;  Location: [facility] OR     EXAM UNDER ANESTHESIA, ULTRASOUND N/A 2017    Procedure: EXAM UNDER ANESTHESIA, ULTRASOUND;  Ultrasound Liver Biopsy;  Surgeon: GENERIC ANESTHESIA PROVIDER;  Location:  OR     HIP SURGERY      14 years old     INSERT PORT VASCULAR ACCESS N/A 2017    Procedure: INSERT PORT VASCULAR ACCESS;  Port placement;  Surgeon: Vinny Peterson DO;  Location: [facility] OR[EC1.3]     Physical Exam[EC1.1]  BP (!) 181/92 (BP Location: Right arm)  Pulse 96  Temp 95.9  F (35.5  C) (Axillary)  Resp 20  Wt 67.4 kg (148 lb 9.4 oz)  LMP 2007  SpO2 96%  BMI 23.6 kg/m2[EC1.3]  General: Sleeping, arouses to voice, answers questions with nods  HEENT: 1 cm vertical laceration on left side of forehead just below hair line. Hemostatic, small amount of dried clot at superior portion of the wounds. Clean edges.  Resp: Non-labored breathing on RA      Assessment and Plan:  66 yo female with newly diagnosed colon cancer who had an unwitnessed fall earlier this am with small forehead laceration as a result.    -The  laceration was copiously irrigated with normal saline. The edges were reapproximated and closed with skin glue.   -Patient may shower starting later this evening, no scrubbing laceration  -Laceration can be left open to air, no dressing necessary  -Skin glue will start to flake in 1-2 weeks, after that time patient should moisturize aggressively and avoid sun exposure  -No specific follow-up needed from Plastic Surgery    Patient discussed with Dr. Gibbons who agrees    Verónica Cisse MD[EC1.1]     Revision History        User Key Date/Time User Provider Type Action    > EC1.3 2017  4:39 PM Verónica Cisse MD Resident Sign     EC1.2 2017  4:34 PM Verónica Cisse MD Resident      EC1.1 2017  4:30 PM Verónica Cisse MD Resident             Consults by Donavan Tavarez MD at 9/10/2017 11:04 AM     Author:  Donavan Tavarez MD Service:  Hem/Onc Author Type:  Physician    Filed:  2017  7:54 AM Date of Service:  9/10/2017 11:04 AM Creation Time:  9/10/2017 11:04 AM    Status:  Signed :  Donavan Tavarez MD (Physician)         HEMATOLOGY/ONCOLOGY CONSULT NOTE  Charlene Douglas  : 1950 AGE: 67 year old  MRN: [id number]  DOS: 9/10/2017      REASON FOR CONSULTATION:   Possible chemotherapy induced heart failure    HPI / COURSE[SZ1.1]    Patient is intubated, sedated, and unable to provide any history. History is obtained from  and chart review.[SZ1.2]    Charlene Douglas is a 67 year old yo female[SZ1.1] with PMHx of HTN and recently diagnosed stage 4 colon cancer to the liver,[SZ1.2] who[SZ1.1] initially[SZ1.2] present[SZ1.1]ed to [facility] ER for shortness of breath. She was started on chemotherapy wi[SZ1.2]th[SZ1.1] modified FOLFOX and bevacizumab on 2017. She finished 5-FU infusion around 1pm on  and developed shortness of breath around 5pm. She developed respiratory failure and intubated. She was transferred to [facility] ICU for further care. Workup reveals that she's in cardiogenic shock  with an EF of 25% (baseline at[SZ1.2] 60%~[SZ1.3]65% on 2017). She underwent PA catheter placement,  remains intubated, and is on pressors. 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     COLONOSCOPY N/A 2017    Procedure: COMBINED COLONOSCOPY, SINGLE OR MULTIPLE BIOPSY/POLYPECTOMY BY BIOPSY;;  Surgeon: Duane, William Charles, MD;  Location: [facility] OR     COLONOSCOPY WITH CO2 INSUFFLATION N/A 2017    Procedure: COLONOSCOPY WITH CO2 INSUFFLATION;  BMI: 25.2  Referring: Donna Mota  Diagnoses: Positive FIT (fecal immunochemical test)  Special screening for malignant neoplasms, colon  Pharmacy: [facility] Fax: [phone];  Surgeon: Duane, William Charles, MD;  Location: [facility] OR     EXAM UNDER ANESTHESIA, ULTRASOUND N/A 2017    Procedure: EXAM UNDER ANESTHESIA, ULTRASOUND;  Ultrasound Liver Biopsy;  Surgeon: GENERIC ANESTHESIA PROVIDER;  Location:  OR     HIP SURGERY      14 years old     INSERT PORT VASCULAR ACCESS N/A 2017    Procedure: INSERT PORT VASCULAR ACCESS;  Port placement;  Surgeon: Vinny Peterson DO;  Location:  OR[SZ1.4]       SOCIAL / FAMILY HISTORY  Social History     Social History     Marital status:      Spouse name: Praveen CherieTony)     Number of children: 2     Years of education: 14     Occupational History           [employer]     Social History Main Topics     Smoking status: Never Smoker     Smokeless tobacco: Never Used     Alcohol use No      Comment: 1 per week     Drug  use: No     Sexual activity: Yes     Partners: Male     Birth control/ protection: Surgical, Male Surgical      Comment:  had a vasectomy     Other Topics Concern      Service No     Blood Transfusions Yes     1962     Caffeine Concern No     Occupational Exposure No     Hobby Hazards No     Sleep Concern No     Stress Concern No     Weight Concern Yes     gradual weight gain     Special Diet No     Back Care Yes     low back pain occasionally     Exercise No     Bike Helmet No     n/a     Seat Belt Yes     uses seatbelts 100%     Self-Exams Yes     does monthly breast exams     Social History Narrative[SZ1.1]       Family History   Problem Relation Age of Onset     C.A.D. Mother      quad- bypass at age 78     CEREBROVASCULAR DISEASE Mother      in her 70's     Lipids Mother      Hypertension Mother      Other - See Comments Mother      Triple Bi-pass     Circulatory Father      abd aneurysm     GASTROINTESTINAL DISEASE Father      diverticulitis     Hypertension Father[SZ1.4]        MEDICATIONS[SZ1.1]  No current outpatient prescriptions on file.     Prescriptions Prior to Admission   Medication Sig Dispense Refill Last Dose     potassium chloride SA (K-DUR/KLOR-CON M) 10 MEQ CR tablet Take 1 tablet (10 mEq) by mouth daily 30 tablet 1 9/8/2017 at 1800     LORazepam (ATIVAN) 0.5 MG tablet Take 1 tablet (0.5 mg) by mouth every 4 hours as needed (Anxiety, Nausea/Vomiting or Sleep) 30 tablet 2 Past Month at Unknown time     prochlorperazine (COMPAZINE) 10 MG tablet Take 1 tablet (10 mg) by mouth every 6 hours as needed (Nausea/Vomiting) 30 tablet 2 Past Week at Unknown time     ondansetron (ZOFRAN) 8 MG tablet Take 1 tablet (8 mg) by mouth every 8 hours as needed (Nausea/Vomiting) 10 tablet 2 Past Week at Unknown time     lidocaine-prilocaine (EMLA) cream Apply thick amount to port area 30-60 minutes prior to port access.  Do not rub in.  Cover with plastic wrap/tegaderm. 30 g 3      metoprolol  (TOPROL-XL) 25 MG 24 hr tablet Take 1 tablet (25 mg) by mouth daily 30 tablet 0 9/9/2017 at 0800     ACETAMINOPHEN PO Take by mouth every 4 hours as needed for pain   Past Month at Unknown time     Benzonatate (TESSALON PERLES PO) Take 200 mg by mouth 3 times daily as needed for cough   More than a month at Unknown time     PROAIR  (90 BASE) MCG/ACT inhaler    More than a month at Unknown time     benzonatate (TESSALON) 200 MG capsule    More than a month at Unknown time     guaiFENesin-codeine (ROBITUSSIN AC) 100-10 MG/5ML SOLN solution Take 5 mLs by mouth every 4 hours as needed for cough 120 mL 0 9/8/2017 at 2200     omeprazole (PRILOSEC) 20 MG CR capsule Take 1 capsule (20 mg) by mouth daily 30 capsule 1 9/8/2017 at 0800     ferrous sulfate (IRON) 325 (65 FE) MG tablet Take 1 tablet (325 mg) by mouth 3 times daily (with meals) 90 tablet 1 Past Week at Unknown time     No current outpatient prescriptions on file.[SZ1.4]       ALLERGIES[SZ1.1]  Allergies   Allergen Reactions     No Known Allergies[SZ1.4]        PHYSICAL EXAMINATION:[SZ1.1]  /82  Temp 101.9  F (38.8  C) (Tympanic)  Resp 12  Wt 67.4 kg (148 lb 9.4 oz)  LMP 06/07/2007  SpO2 100%  BMI 23.6 kg/m2[SZ1.4]  GEN:[SZ1.1] Intubated and sedated[SZ1.2]   HEENT: NCAT,[SZ1.1] pupils sluggish[SZ1.2]  NECK:[SZ1.1] No LAD[SZ1.2]  RESP:[SZ1.1] Clear to auscultation anteriorly, no wheezing[SZ1.2]  GI:[SZ1.1] Soft, n[SZ1.2]ondistended  SKIN/MSK: No edema, swelling, rash & bruises[SZ1.1].[SZ1.2]  NEURO:[SZ1.1] Sedated[SZ1.2]    PERTINENT LABS[SZ1.1]  CBC[SZ1.2]   Lab Results   Component Value Date/Time    WBC 19.1 (H) 09/10/2017 12:40 AM    HGB 11.4 (L) 09/10/2017 12:40 AM    HCT 36.0 09/10/2017 12:40 AM     (H) 09/10/2017 12:40 AM    MCV 83 09/10/2017 12:40 AM    RBC 4.35 09/10/2017 12:40 AM    ANEU 8.3 09/09/2017 07:00 PM[SZ1.5]       BMP[SZ1.2]  Lab Results   Component Value Date/Time     09/10/2017 03:43 PM    POTASSIUM 5.2  09/10/2017 03:43 PM    CHLORIDE 98 09/10/2017 03:43 PM    CO2 24 09/10/2017 03:43 PM    BUN 33 (H) 09/10/2017 03:43 PM    CR 1.51 (H) 09/10/2017 03:43 PM    RON 7.7 (L) 09/10/2017 03:43 PM    MAG 2.5 (H) 09/10/2017 03:43 PM[SZ1.5]       LFTs[SZ1.2]   Lab Results   Component Value Date    PROTTOTAL 6.7 (L) 09/09/2017    ALBUMIN 1.8 (L) 09/09/2017    AST 68 (H) 09/09/2017    ALT 39 09/09/2017    BILITOTAL 0.5 09/09/2017    DBIL 0.2 08/15/2017    ALKPHOS 492 (H) 09/09/2017[SZ1.5]       PERTINENT IMAGING & INVESTIGATIONS[SZ1.1]  Xr Chest Port 1 View    Result Date: 9/10/2017  Findings: 2 AP views of the chest. Repositioning of the right IJ Curtis-Onel catheter with the tip over the right main pulmonary artery. Endotracheal tube tip projects over the midthoracic trachea. Right IJ Port-A-Cath tip projects over the mid SVC. Gastric tube tip and sidehole project over the stomach. Cardiac silhouette is within normal limits. Stable pulmonary vascular congestion. Left pleural effusion with left basilar atelectasis/consolidation.     Impression: 1. Right IJ Curtis-Onel catheter tip projects over the right main pulmonary artery. 2. Pulmonary vascular congestion with probable mild pulmonary edema and/or atelectasis versus infection. 3. Left pleural effusion with associated basilar atelectasis/consolidation. I have personally reviewed the examination and initial interpretation and I agree with the findings. RADHA GOMES MD    Pet Oncology (eyes To Thighs)    Result Date: 8/25/2017  FINDINGS: Normal physiologic uptake is identified within the salivary glands, myocardium, kidneys, ureters and bladder. Scattered areas of physiologic bowel uptake are also present. NECK: Lymph nodes: No pathologic activity. Additional findings: None. CHEST: Lungs: There are 3 small bilateral pulmonary nodules, with the largest in the left lower lobe posteriorly abutting the pleura (series 3 image 132) measuring 0.7 cm. Smaller pulmonary nodules are noted  in the right upper lobe (series 3 image 105) and in the left upper lobe (series 3 image 122). These nodules are too small for accurate PET characterization, but appear to demonstrate some associated hypermetabolic activity, and are considered highly suspicious for metastatic disease. Lymph nodes: No pathologic activity. Additional findings: Atherosclerotic calcification of the thoracic aorta and coronary arteries. Small hiatal hernia. ABDOMEN/PELVIS: Hepatobiliary: There are extensive hypermetabolic liver metastases, with the largest in the posterior segment of the right hepatic lobe inferiorly, measuring 10.1 x 6.5 cm, SUV max 38.9. Spleen: No pathologic activity. Pancreas: No pathologic activity. Kidneys: No pathologic activity. Adrenals: No pathologic activity. Reproductive: No pathologic activity. Gastrointestinal: Hypermetabolic mass in the cecum near the ileocecal valve is difficult to measure due to its irregular shape, but measures approximately 4.5 cm, with SUV max 37.5. Lymph nodes: There is hypermetabolic mesenteric adenopathy, consistent with metastatic disease. For example, hypermetabolic lobular lymph node mass in the mesentery of the right lower quadrant (series 3 image 304) measures 3.9 x 3.7 cm, SUV max 44.1. Additional findings: None. SKELETON: No pathologic activity.     IMPRESSION: 1. Large hypermetabolic mass in the ascending colon near the ileocecal valve is consistent with colonic malignancy. 2. There is extensive hypermetabolic hepatic metastatic disease. 3. Hypermetabolic mesenteric adenopathy is also consistent with metastatic disease. 4. Three subcentimeter pulmonary nodules are too small for accurate PET characterization, but are considered highly suspicious for metastatic disease.[SZ1.6]    IMPRESSION:[SZ1.1]  Acute heart failure/cardiogenic shock  Likely related to the chemo FOLFOX regimen given the time course of her disease. 5-FU is known to cause coronary vasospasm in ~5% of  patients, particularly in those with underlying cardiac disease. This patient has history of hypertension, and her last Echo on 8/9/17 showed normal EF of 60~65% with grade 1 diastolic dysfunction. The sudden decrease of EF to 25% and elevated troponin is likely due to coronary vasospasm due to 5-FU; however, other causes of acute heart failure should be excluded.    Stage 4 colon cancer with liver mets  If patient recovers from this cardiogenic shock, 5-FU should not be given in the future chemo regimen. Further treatment plan will depend of her disease course. To follow up with primary oncologist Dr. Dove.[SZ1.3]    RECS:[SZ1.1]  - Treat cardiogenic shock as per primary team and cardiology  - Add 5-FU to patient's allergy list; 5-FU should not be given to patient in the future    Met with patient's  and niece for about 10 mins and reviewed the above. Case discussed[SZ1.3] directly[EL1.1] with ICU attending physician Dr. Olivares and leonard-SHAYY CARPENTER[SZ1.3]    We appreciate the opportunity to participate in the medical care of Charlene Douglas.[SZ1.1] Patient was seen and d[EL1.1]iscussed w/ Dr. Tavarez.    Min EARL[SZ1.1]h[SZ1.3]darryn[SZ1.1], MD/PhD[SZ1.3]  Hem[SZ1.1]/[SZ1.3]Onc fellow[SZ1.1]      ATTENDING PHYSICIAN ADDENDUM: Please see my separate note for details.  Donavan Tavarez MD PhD[EL1.1]     Revision History        User Key Date/Time User Provider Type Action    > EL1.1 9/11/2017  7:54 AM Donavan Tavarez MD Physician Sign     SZ1.3 9/10/2017  5:39 PM Min Krishna Fellow Sign     SZ1.6 9/10/2017  5:13 PM Min Krishna      SZ1.5 9/10/2017  5:12 PM Min Krishna Fellow      SZ1.2 9/10/2017  4:52 PM Min Krishna      SZ1.4 9/10/2017  4:47 PM Min Krishna Fellow      SZ1.1 9/10/2017  4:46 PM Min Krishna Fellow             Consults by Grzegorz Castillo MD at 9/10/2017  2:01 AM     Author:  Grzegorz Castillo MD Service:  Cardiology Author Type:  Physician    Filed:  9/10/2017   6:36 AM Date of Service:  9/10/2017  2:01 AM Creation Time:  9/10/2017  1:23 AM    Status:  Attested :  Grzegorz Castillo MD (Physician)    Cosigner:  Cameron Mccormack MD at 9/10/2017  9:07 PM        Attestation signed by Cameron Mccormack MD at 9/10/2017  9:07 PM        Staff Attestation:   I have personally seen and examined the patient on September 10, 2017. I have discussed with Dr. Castillo and agree with the assessment and plan as documented in this note. I have personally reviewed vital signs, medications, laboratory results, available imaging and hemodynamic data.    We were consulted emergently overnight for cardiogenoc shock and PA catheter placement. In brief, Patient is 68 y/o L with recent dg of colon cancer with extensive hepatic metastases. She had no prior known CAD but HTN and known elevated calcium score in the LAD territory. TTE a month ago with normal EF (reviewed in person). Note that she had sinus tachycardia for several weeks to months as per reviewed ECGs with rates up to 140. She underwent FOLFOX therapy and a day later developed worsening tachycardia and cardiogenic shock. She was intubated, nitro ggt started and transferred to [facility] for further management. We were contacted she became profundly hypotensive and bedside TTE showed severely reduced EF.  Dopamine ggt was initiated for inotropic support and PA catheter was placed, She showed excellent response to 5mg dopamine yet tachycardic. Filling pressure acceptable on this therapy. Troponin celi to 3.2 in the setting of profound hypotension. Lactate above 6.  Given the temporal relationship with chemotherapy, her presenetation is most likley induced by 5FU. Tehre is a 5% risk of this, most described is vasospasm. Increased risk with prolonged infusion and also dose dependent. That said, differential also includes CAD. While very unlikely to be acute plaque rupture with no acute ST segment changes and relatively low level  troponin elevation, it is possible that she had an acute MI last month and her cardiac dysfunction was worsened by 5FU. She could have flow limiting lesion. Also, she has been tachy for weeks that can cause cardiomyopathy. Vasospasm is definitely in the differential.   At this point, given declining troponin and no ST changes, I would not perform LHC, unless her clinical condition changes. Once extubated, may be discussed on opt basis, also would need to discuss with hematology the implications, including survival from underlying malignancy and possible need for anticoagulation. At this point will down titrate inotrope as able to off and will repeat TTE in a couple of days to evaluate for residual myocardial dysfunction/recovery. Her underlying tachycardia will need to be addressed after recovery although carefully as this may represent compensatory mechanism for her low stroke volume.    I have spent 1 hour non-overlaping critical care time during the day to re-evaluate patient, discuss plan with team and family, adjust drips, interpret PA catheter valeriano and to coordinate care. This excludes time spent with procedures.    Cameron Mccormack MD   of Medicine  [facility] Division of Cardiology                                 [facility] Cardiology Consult    Charlene Douglas MRN# [id number]   Age: 67 year old YOB: 1950     Date of Admission:  9/9/2017    Reason for consult: cardiomyopathy       Requesting physician: Melody       Level of consult: One-time consult to assist in determining a diagnosis and to recommend an appropriate treatment plan           Assessment and Recommendation:   Assessment/Recommendations:   Cardiomyopathy, presumably non ischemic, chemotherapy related vs tachycardia vs vasospasm, EF < 30%, the risk of cardiotoxicity with 5FU is increased with continuous infusion  Acute systolic heart failure, her LVEF appears severely reduced  especially when compared with prior echo from August. There also appears some regionality to the dysfunction though there is diffuse hypokinesis on the echocardiogram. The right side appears preserved with normal RV function and no significant elevation or RAP based on IVC especially considering mechanical ventilation. Based on hemodynamics from the PORT, her mixed venous is in the 60s, giving her a CI of 2.4 and an SVR of ~1300 assuming RAP ~10 mmHg based on echo. She is showing signs of reduced perfusion with a rising creatinine 0.7>1.2, and increasing lactic acid 5.6, and significant tachycardia. Given her reduced LV function, and pulmonary edema, I expect that her wedge is significantly elevated and she would benefit from further diuresis and afterload reduction, since her cardiac output is preserved at this time I would not recommend inotropes. Stop her beta blocker as she is clearly decompensated given her HR. Diurese with 40 mg of IV lasix and continue nitroglycerin drip at this time (this could be helpful in two situations - if this is vasospasm from chemotherapy and for afterload reduction to assist with improved perfusion. Her MAPs remain in the 80s even with nitroglycerin at this time. We will forego calcium channel blockers in favor of nitrates for presumed vasospasm treatment given her decompensated heart failure and reduced EF. Repeat another mixed venous in 2 hours, along with lactate. Monitor urine output. If her cardiac output continues to decline, she may require inotropes or mechanical support.[SG1.1]    Addendum:  On repeat mixed venous (49%), the CI is 1.5, the lactic acid is marginally higher than before at 6.0, and there is no hypotension with MAPs drifting < 60. Recommend stopping nitroglycerin at this time, starting dopamine at 5. We will repeat another mixed venous in 30 minutes. If no improvement may need additional inotrope versus mechanical support. Discussed with  (HCP) at the  bedside and patient would want aggressive measures including heart catheterization, balloon pump for support.[SG1.2]            Chief Complaint:     67 year old woman with a history of metastatic colon malignancy with hepatic involvement started on FOLFOX two days ago presenting s/p 46 hour infusion, removed today at 1300. She had been complaining of increasing shortness of breath, now even at rest after starting the infusion. This progressed with weakness, fatigue, intermitted dizziness, and fevers to 100 on chart review. Per , requiring assistance around the house more than usual and altered mental status. She has no chest pain. She received 500 cc NS in the ED and continued to decompensate requiring intubation and transfer from [facility] to [facility]. Here in the ICU her lactic acid had increased to 5.6 from 2.1, creatinine increased to 1.2 from 0.7, with an increased A-a gradient requiring maximal O2 support with an FiO2 of 100% on CMV with a PEEP of 10. She received 40 mg IV lasix at 2000 and was started on a nitroglycerin drip.          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     COLONOSCOPY N/A 2017    Procedure: COMBINED COLONOSCOPY, SINGLE OR MULTIPLE BIOPSY/POLYPECTOMY BY BIOPSY;;  Surgeon: Duane, William Charles, MD;  Location: [facility] OR     COLONOSCOPY WITH CO2 INSUFFLATION N/A 2017    Procedure: COLONOSCOPY WITH CO2 INSUFFLATION;  BMI: 25.2  Referring: Donna Mota  Diagnoses: Positive FIT (fecal immunochemical test)  Special screening for malignant neoplasms, colon  Pharmacy: [facility] Fax: [phone];  Surgeon: Duane, William Charles, MD;   Location: [facility] OR     EXAM UNDER ANESTHESIA, ULTRASOUND N/A 8/28/2017    Procedure: EXAM UNDER ANESTHESIA, ULTRASOUND;  Ultrasound Liver Biopsy;  Surgeon: GENERIC ANESTHESIA PROVIDER;  Location: [facility] OR     HIP SURGERY      14 years old     INSERT PORT VASCULAR ACCESS N/A 9/1/2017    Procedure: INSERT PORT VASCULAR ACCESS;  Port placement;  Surgeon: Vinny Peterson DO;  Location: [facility] OR[SG1.3]             Social History:[SG1.1]     Social History   Substance Use Topics     Smoking status: Never Smoker     Smokeless tobacco: Never Used     Alcohol use No      Comment: 1 per week[SG1.4]             Family History:[SG1.1]     Family History   Problem Relation Age of Onset     C.A.D. Mother      quad- bypass at age 78     CEREBROVASCULAR DISEASE Mother      in her 70's     Lipids Mother      Hypertension Mother      Other - See Comments Mother      Triple Bi-pass     Circulatory Father      abd aneurysm     GASTROINTESTINAL DISEASE Father      diverticulitis     Hypertension Father[SG1.4]      Family history reviewed          Immunizations:[SG1.1]     Immunization History   Administered Date(s) Administered     Influenza (IIV3) 10/14/2008, 12/20/2010, 01/10/2013     Pneumococcal (PCV 13) 07/19/2017     TD (ADULT, 7+) 01/01/1997     TDAP Vaccine (Adacel) 07/02/2007, 07/19/2017[SG1.4]             Allergies:[SG1.1]     Allergies   Allergen Reactions     No Known Allergies[SG1.4]              Medications:[SG1.1]     Current Facility-Administered Medications   Medication     furosemide (LASIX) injection 40 mg     nitroGLYcerin 50 mg in D5W 250 mL (adult std) infusion     naloxone (NARCAN) injection 0.1-0.4 mg     enoxaparin (LOVENOX) injection 40 mg     naloxone (NARCAN) injection 0.1-0.4 mg     propofol (DIPRIVAN) infusion    And     propofol (DIPRIVAN) injection 10 mg/mL vial     fentaNYL (PF) (SUBLIMAZE) injection  mcg     fentaNYL (SUBLIMAZE) infusion     pantoprazole (PROTONIX) 40 mg IV push injection[SG1.4]              Review of Systems:   The Review of Systems is negative other than noted in the HPI[SG1.1]     BP 91/60  Temp 100.4  F (38  C) (Axillary)  Resp 12  Wt 67.4 kg (148 lb 9.4 oz)  LMP 06/07/2007  SpO2 97%  BMI 23.6 kg/m2[SG1.4]     gen intubated, sedated   heent perrl   neck distended neck veins   cv tachycardic   resp mechanical BS   gi soft   ext warm, trace edema   neuro sedated              Data:[SG1.1]     Results for orders placed or performed during the hospital encounter of 09/09/17 (from the past 24 hour(s))   Troponin I   Result Value Ref Range    Troponin I ES 2.990 (HH) 0.000 - 0.045 ug/L   Blood gas venous with oxyhemoglobin (AM Draw)   Result Value Ref Range    Ph Venous 7.21 (L) 7.32 - 7.43 pH    PCO2 Venous 55 (H) 40 - 50 mm Hg    PO2 Venous 46 25 - 47 mm Hg    Bicarbonate Venous 22 21 - 28 mmol/L    FIO2 80     Oxyhemoglobin Venous 67 %    Base Deficit Venous 6.2 mmol/L   Basic metabolic panel   Result Value Ref Range    Sodium 131 (L) 133 - 144 mmol/L    Potassium 4.0 3.4 - 5.3 mmol/L    Chloride 94 94 - 109 mmol/L    Carbon Dioxide 21 20 - 32 mmol/L    Anion Gap 16 (H) 3 - 14 mmol/L    Glucose 139 (H) 70 - 99 mg/dL    Urea Nitrogen 23 7 - 30 mg/dL    Creatinine 1.22 (H) 0.52 - 1.04 mg/dL    GFR Estimate 44 (L) >60 mL/min/1.7m2    GFR Estimate If Black 53 (L) >60 mL/min/1.7m2    Calcium 7.8 (L) 8.5 - 10.1 mg/dL   CBC with platelets   Result Value Ref Range    WBC 19.1 (H) 4.0 - 11.0 10e9/L    RBC Count 4.35 3.8 - 5.2 10e12/L    Hemoglobin 11.4 (L) 11.7 - 15.7 g/dL    Hematocrit 36.0 35.0 - 47.0 %    MCV 83 78 - 100 fl    MCH 26.2 (L) 26.5 - 33.0 pg    MCHC 31.7 31.5 - 36.5 g/dL    RDW 18.0 (H) 10.0 - 15.0 %    Platelet Count 566 (H) 150 - 450 10e9/L   Lactic acid whole blood   Result Value Ref Range    Lactic Acid 5.6 (HH) 0.7 - 2.0 mmol/L   Blood gas arterial and oxyhgb   Result Value Ref Range    pH Arterial 7.28 (L) 7.35 - 7.45 pH    pCO2 Arterial 43 35 - 45 mm Hg    pO2 Arterial  118 (H) 80 - 105 mm Hg    Bicarbonate Arterial 20 (L) 21 - 28 mmol/L    FIO2 80     Oxyhemoglobin Arterial 96 92 - 100 %    Base Deficit Art 6.6 mmol/L   Lactic acid whole blood   Result Value Ref Range    Lactic Acid 5.0 (HH) 0.7 - 2.0 mmol/L[SG1.5]         EKG sinus, tachycardic, normal axis, no ischemic changes, unchanged from prior from August    TTE 8/9/17     Interpretation Summary     1. Normal left ventricle size and systolic function. LV ejection fraction 60 -  65%. Grade 1 diastolic dysfunction.  2. No regional wall motion abnormalities.  3. Normal right ventricular size and systolic function.  4. Aortic valve sclerosis with trace regurgitation, no stenosis.  5. Normal ascending aorta dimension (3.0 cm).     There is no comparison study available.    CT PE 7/14/17 personally reviewed  Motion artifact and timing for PE limit any interpretation of coronary stenosis, however, there is a small amount of calcification in the proximal LAD, the RCA and LCx are not well visualized due to motion artifact.    Bedside Echo 9/10/17  Severely reduced LVEF <30% (likely in the range of 15-20%), there is notable hypo to akinesis of the anterior wall and apex with preserved wall motion of the inferior and inferolateral and septal walls, there is overall diffuse hypokinesis. The chamber is otherwise normal in size.  There is normal function of the RV and size is normal, TAPSE 1.9 and S' >18.  Atria are normal.  There is mild to moderate MR. There is mild MAC.  There is mild to moderate TR with an estimated RVSP of 33 + RAP (~10 mmHg).  There is trace AI.  Pulmonic valve is not well seen, though there are no significant abnormalities on doppler.  The IVC is 2 cm without respiratory variability.   There is no effusion.    When compared with the report from August, there has been a drop in LV function.       Grzegorz Castillo MD[SG1.1]        Revision History        User Key Date/Time User Provider Type Action    > [N/A]  9/10/2017  6:36 AM Grzegorz Castillo MD Physician Sign     SG1.2 9/10/2017  4:01 AM Grzegorz Castillo MD Physician Sign     SG1.5 9/10/2017  2:01 AM Grzegorz Castillo MD Physician Sign     SG1.4 9/10/2017  1:31 AM Grzegorz Castillo MD Physician      SG1.3 9/10/2017  1:30 AM Grzegorz Castillo MD Physician      SG1.1 9/10/2017  1:23 AM Grzegorz Castillo MD Physician             Consults by Donavan Tavarez MD at 9/10/2017 11:31 AM     Author:  Donavan Tavarez MD Service:  Hem/Onc Author Type:  Physician    Filed:  9/10/2017 11:31 AM Date of Service:  9/10/2017 11:31 AM Creation Time:  9/10/2017 11:31 AM    Status:  Signed :  Donavan Tavarez MD (Physician)     Consult Orders:    1. Hematology & Oncology IP Consult: Pt. of Dr. Dove. Stage IV colon cancer who presents s/p Folfox treatment with shortness of breath. Found to have new heart failure. Discuss treatment options moving forward, goals of care.; Consultant may enter... [[id number]] ordered by Dustin Oliver MD at 09/10/17 0558                ATTENDING PHYSICIAN ADDENDUM AND NOTE:  The patient s case was evaluated by me separate from the inpatient oncology fellow, Dr. Krishna. The note above reflects my assessment and plan. I have personally reviewed lab results, and vital and radiology results. >50% of time was spent in assessment and coordination of care.  Mrs. Charlene Douglas is a 68-year-old woman recently diagnosed with metastatic/stage IV colon adenocarcinoma, with numerous hepatic metastases. She s under the care of medical oncologist Dr. Dove at [facility], and was admitted overnight to the ICU with cardiogenic shock. The ICU team has requested consultation from the medical oncology team due to the patient s diagnosis of cancer, recent initiation of chemotherapy, and request a comment on whether or not the patient s current condition is due to coronary vasospasm secondary  chemotherapy.  FOLFOX combination chemotherapy is a standard of care form of treatment for first-line palliative treatment of metastatic colorectal cancer. This regimen comprises bolus administration of 5-FU over two hours, followed by a 46 hour continuous infusion of 5-FU via the port as an outpatient; the second component of this regimen is oxaliplatin, which is administered over  minutes on the first day of each cycle. Mrs. Douglas initiated first treatment with this combination therapy three days ago, 9/7/17. Per my discussion with the patient s  and niece today, the 5-FU pump was completed and appropriately disconnected yesterday Saturday 9/9 between 12-1pm; Mrs. Douglas developed sudden precipitous decline around 5 PM, leading to emergent evaluation ultimately transferred to the [facility] ICU where she is currently. I discussed with the patient s  and niece, and also in a separate conversation with ICU attending physician Dr. Olivares, that coronary vasospasm is a known potential side effect of 5-FU, usually occurring in approximately 3 to 8% of patients, with at risk potentially being higher in patients with a history of cardiovascular disease to the CAD, prior MI, or others. The patient has a known history of hypertension and is on low-dose beta blocker, and her  did confirm she does not have a known history of CAD, or other cardiovascular risk factors. It would be appropriate to request consultation from the cardiology/cardio oncology team as well, for further input and management of guidance from the cardiology perspective. Elevation of troponin is consistent with 5-FU -induced coronary vasospasm, and other causes of troponin elevation should also be thoroughly considered and excluded. If her current situation is truly due to coronary vasospasm, then treatment from the ICU perspective should be to treat cardiogenic shock as would normally be appropriate; from the oncology perspective,  further administration 5-FU would not be advised. I reviewed all the above in-depth with the patient s  and niece, and they stated understanding.  Donavan Tavarez MD, PhD   of Medicine   Division of Hematology, Oncology, and Transplantation[EL1.1]      Revision History        User Key Date/Time User Provider Type Action    > EL1.1 9/10/2017 11:31 AM Donavan Tavarez MD Physician Sign                     Progress Notes - Physician (Notes for yesterday and today)      Progress Notes by Mikel Mckeon MD at 9/18/2017  5:50 PM     Author:  Mikel Mckeon MD Service:  Hem/Onc Author Type:  Physician    Filed:  9/18/2017  6:07 PM Date of Service:  9/18/2017  5:50 PM Creation Time:  9/18/2017  5:50 PM    Status:  Attested :  Mikel Mckeon MD (Physician)    Cosigner:  Jenni Canales MD at 9/18/2017  6:29 PM        Attestation signed by Jenni Canales MD at 9/18/2017  6:29 PM (Updated)        Attestation:  Physician Attestation   IJenni, saw and evaluated Charlene Douglas as part of a shared visit.  I have reviewed and discussed with the advanced practice provider their history, physical and plan.    I personally reviewed the vital signs, medications and labs.    My key history or physical exam findings: Fever to 101.1 last night. Otherwise feeling ok. Cultures pending. Bilirubin stable.     Key management decisions made by me: Looking for TCU. Monitor fever curve but no localizing source and lungs, urine clear. Increase carvedilol today. Medically ready for d/c to TCU tomorrow.     Jenni Canales  Date of Service (when I saw the patient): 09/18/17                               [facility] [place]    Hematology / Oncology Progress Note    Date of Admission: 9/9/2017  Hospital Day #: 9   Date of Service (when I saw the patient): 09/18/2017     Assessment & Plan   Charlene Douglas is a 67 year old female with stage IV colon cancer (mets to the  liver) s/p cycle 1 FOLFOX who presented to the ED with shortness of breath and found to have new onset pulmonary edema on CXR, a BNP of 5000, hypoxic respiratory failure 2/2 pulmonary edema and cardiogenic shock, and acute heart failure requiring inotropic support (EF 10-20%). Admitted to the ICU where she was intubated and diuresed. Extubated 9/12 and has been stable off of mechanical support. Transferred to the Heme/Onc service 9/13.     #Non-ischemic cardiomyopathy   Presented with acute SOB, CXR demonstrated pulmonary edema, BNP of 5000 and ECHO with EF 10-20%. Finished 48-hr infusion of 5-FU on day of admission. Stress cardiomyopathy 2/2 5-FU is probable diagnosis. Repeat ECHO 9/12 with EF improved to 35-40%. Cardiology followed while in ICU.  -increase carvedilol to 12.5 mg bid. Change lisinopril to losartan due to cough   -To f/u with cards for medication optimization in 2-4 weeks.  -Hydralazine 10 mg q6 prn for SBP >160 and DBP >100.  -Keep Mg >2 and K >4.    # Pulmonary edema and Acute hypoxic respiratory failure, resolved  Due to acute onset non-ischemic cardiomyopathy. Extubated 9/12/17.   -- Supplemental O2 if needed.    Fever  Overnight on 9/17. Blood cultures ngtd. U/A clean. CXR clear. Not on antibiotics. She was having fevers for several weeks PTA (likely from tumor).     Cough  She had this PTA but likely is worse with lisinopril so will switch to losartan. Started on PPI in case this is from reflux. Continue tessalon pearls and cough syrup     Elevated direct bilirubin  Abdominal ultrasound without biliary dilatation. Tbili normalized today.     #Stage IV colon cancer. Recently diagnosed. Involves the ascending colon near ileocecal valve with extensive hepatic involvement. PET/CT 8/25/17 with possible pulmonary nodules consistent with mets. S/p FOLFOX chemotherapy (x9/7). Follows with Dr. De La Torre.   -Will need to change regimen as cannot have further 5-FU, follow-up with Dr. Dove to  "discuss    #Chronic anemia. Likely 2/2 chemotherapy.  -Transfuse for hgb <8. Given one unit on 9/15.     Thrombocytopenia: likely from chemotherapy     #ELKIN, resolved   2/2 to cardiogenic shock/po, ssible cardiorenal syndrome.     Loose stools, resolved: C diff negative. Likely medication related, now off antibiotics and zantac.     #Abdominal pain: likely from hepatic mets. Continue topical lidocaine and diclofenac gel     Floaters  Reported brief \"line\" across right visual field on 9/17. If returns will consult optho     #Somnolence, resolved  Suspect 2/2 versed with decreased hepatic metabolism due to liver mets. Ruled out other etiologies with head CT, ABG, other labs.     #Unwitnessed fall.  On 9/14, head CT negative, C-spine cleared.   -Head lac cleaned and glued by plastic surgery.     FEN:  No MIVF  -PRN lyte replacement   -advanced to full liquid diet on 9/16    Prophy/Misc:  -VTE: holding as ambulatory   -GI/PUD: protonix     Code status: Full  Disposition: TCU. Likely discharge tomorrow     Perry Mckeon MD  Heme/Onc Fellow  Pager: [phone]      Interval History   Cough is worse - kept her up frequently during the night. Not productive of sputum. She had this for several months PTA but is worse now.   Had a fever overnight but did not feel sick. Up walking with PT and overall feeling better. Good appetite.      Physical Exam   Temp: 99  F (37.2  C) Temp src: Oral BP: 138/66 Pulse: 96 Heart Rate: 94 Resp: 18 SpO2: 98 % O2 Device: None (Room air)    Vitals:    09/16/17 1046 09/17/17 0900 09/18/17 0846   Weight: 63.5 kg (139 lb 14.4 oz) 62.6 kg (138 lb 0.1 oz) 65.3 kg (143 lb 14.4 oz)     Vital Signs with Ranges  Temp:  [99  F (37.2  C)-101.1  F (38.4  C)] 99  F (37.2  C)  Pulse:  [] 96  Heart Rate:  [] 94  Resp:  [18-20] 18  BP: (138-168)/(66-80) 138/66  SpO2:  [94 %-98 %] 98 %  I/O last 3 completed shifts:  In: 400 [I.V.:400]  Out: -     General: NAD, alert and interactive  HEENT: TERESA MONTESINOS, " no oral lesions  Lungs: CTA bilaterally  Cardiovascular: RRR, no M/R/G, trace b/l LE edema  Abdominal/Rectal: +BS, soft, NT, ND  MSK: normal muscle tone  Skin: no rashes or petechaie  Neuro: A&O       Data   Labs reviewed[DP1.1]       Revision History        User Key Date/Time User Provider Type Action    > DP1.1 9/18/2017  6:07 PM Mikel Mckeon MD Physician Sign                     Procedure Notes      Procedures by Grzegorz Castillo MD at 9/10/2017  6:44 AM     Author:  Grzegorz Castillo MD Service:  Cardiology Author Type:  Physician    Filed:  9/10/2017  6:44 AM Date of Service:  9/10/2017  6:44 AM Creation Time:  9/10/2017  6:36 AM    Status:  Attested :  Grzegorz Castillo MD (Physician)    Cosigner:  Cameron Mccormack MD at 9/10/2017  7:45 AM        Attestation signed by Cameron Mccormack MD at 9/10/2017  7:45 AM        I was present at bedside, scrubbed and participated in the entire procedure. Successful bedside PA catheter placement. No immediate complications.    Cameron Brothers MD                               Pulmonary artery catheter    Consent obtained, timeout performed with identification of procedure and location. Sedation was given with versed drip and fentanyl 25 mcg x1. Access was obtained with venous return observed. There was initial difficulty advancing the wire due to the distal location of the central port. Using an angiocath catheter the wire was then advanced, a scalper was used to nick the skin, and a 9 Tamazight double lumen introducer was advanced over the wire.    The pulmonary artery catheter brought onto the filled and all ports were flushed in the usual manner, it was then advanced under pressure guidance and right atrial, right ventricular, pulmonary, and wedge pressures were obtained. Balloon was deflated and PA catheter was secured at 50 cm. Correct position of the catheter was obtained with a plain chest film with distal tip of the  "catheter in the proximal right PA.    PA catheter was zeroed and the following pressures were obtained:    PA 44/28/34  PCWP 14    Mixed venous was sent[SG1.1]     Revision History        User Key Date/Time User Provider Type Action    > SG1.1 9/10/2017  6:44 AM Grzegorz Castillo MD Physician Sign                  Progress Notes - Therapies (Notes from 09/16/17 through 09/19/17)     No notes of this type exist for this encounter.                                          INTERAGENCY TRANSFER FORM - LAB / IMAGING / EKG / EMG RESULTS   9/9/2017                       [address] [facility]: [phone]            Unresulted Labs (24h ago through future)    Start       Ordered    09/15/17 0600  ABO/Rh type and screen  EVERY THREE DAYS,   Routine      09/14/17 1744    09/15/17 0530  Magnesium  AM DRAW,   Routine      09/14/17 1744    09/15/17 0530  Phosphorus  AM DRAW,   Routine      09/14/17 1744    09/14/17 0800  CBC with platelets differential  AM DRAW,   Routine     Comments:  Last Lab Result: Hemoglobin (g/dL)       Date                     Value                 09/13/2017               7.6 (L)          ----------    09/14/17 0742    09/14/17 0800  Comprehensive metabolic panel  AM DRAW,   Routine      09/14/17 0742    Unscheduled  Potassium  (Potassium Replacement - \"High\" - Replacement for all levels less than 4.1 mmol/L - UU,UR,UA,RH,SH,PH,WY )  CONDITIONAL (SPECIFY),   Routine     Comments:  Obtain Potassium Level for these conditions:  *IF no potassium result within 24 hrs before initiation of order set, draw potassium level with next lab collect.    *2 HOURS AFTER last IV potassium replacement dose and 4 hours after an oral replacement dose when potassium replacement given for level less than 3.4.  *Next morning after potassium dose.     Repeat Potassium Replacement if necessary.    09/14/17 1323    Unscheduled  Magnesium  (Magnesium Replacement - Adult - \"High\" - Replacement for all levels less " "than or equal to 2 mg/dL)  CONDITIONAL (SPECIFY),   Routine     Comments:  Obtain Magnesium Level for these conditions:  *IF no magnesium result within 24 hrs before initiation of order set, draw magnesium level with next lab collect.    *2 HOURS AFTER last magnesium replacement dose when magnesium replacement given for level less than 1.6  *Next morning after magnesium dose.     Repeat Magnesium Replacement if necessary.    09/14/17 1323    Unscheduled  Phosphorus  (POTASSIUM Phosphate - \"High\" - Replacement for all levels less than 2.8 mg/dL )  CONDITIONAL (SPECIFY),   Routine     Comments:  Obtain Phosphorus Level for these conditions:  *IF no phosphorus result within 24 hrs before initiation of order set, draw phosphorus level with next lab collect.    *2 HOURS AFTER last phosphorus replacement dose when phosphorus replacement given for level less than 2.0  *Next morning after phosphorus dose.     Repeat Phosphorus Replacement if necessary.    09/14/17 1323    Unscheduled  Red blood cell prepare order unit conditional  (Conditional Red Blood Cells Unit)  CONDITIONAL (SPECIFY) BLOOD,   Routine     Comments:  For patients with significant heart failure:  Recommend transfusing slowly using the 4 hour allowed time period, if clinically appropriate.  Do NOT change or add to this text.  Use additional instructions field.   Question Answer Comment   Irradiation Indication Not irradiated    Red Blood Cells: NON-Irradiated    Number of Units 1    When to transfuse Hemoglobin is 8 g/dL or less (anemia)    Special Requirements None    Infusion Duration Infuse within 4 hours of release        09/15/17 1056         Lab Results - 3 Days      CBC with platelets differential [[id number]] (Abnormal)  Resulted: 09/19/17 0736, Result status: Final result    Ordering provider: Jaqueline Minor PA-C  09/18/17 7399 Resulting lab: [facility]    Specimen Information    Type Source Collected On   Blood  " 09/19/17 0651          Components       Value Reference Range Flag Lab   WBC 4.9 4.0 - 11.0 10e9/L  51   RBC Count 3.69 3.8 - 5.2 10e12/L L 51   Hemoglobin 9.7 11.7 - 15.7 g/dL L 51   Hematocrit 31.2 35.0 - 47.0 % L 51   MCV 85 78 - 100 fl  51   MCH 26.3 26.5 - 33.0 pg L 51   MCHC 31.1 31.5 - 36.5 g/dL L 51   RDW 18.1 10.0 - 15.0 % H 51   Platelet Count 187 150 - 450 10e9/L  51   Diff Method Automated Method   51   % Neutrophils 50.4 %  51   % Lymphocytes 24.8 %  51   % Monocytes 23.8 %  51   % Eosinophils 0.6 %  51   % Basophils 0.4 %  51   % Immature Granulocytes 0.0 %  51   Nucleated RBCs 0 0 /100  51   Absolute Neutrophil 2.5 1.6 - 8.3 10e9/L  51   Absolute Lymphocytes 1.2 0.8 - 5.3 10e9/L  51   Absolute Monocytes 1.2 0.0 - 1.3 10e9/L  51   Absolute Eosinophils 0.0 0.0 - 0.7 10e9/L  51   Absolute Basophils 0.0 0.0 - 0.2 10e9/L  51   Abs Immature Granulocytes 0.0 0 - 0.4 10e9/L  51   Absolute Nucleated RBC 0.0   51   Platelet Estimate Confirming automated cell count   51            Comprehensive metabolic panel [[id number]] (Abnormal)  Resulted: 09/19/17 0726, Result status: Final result    Ordering provider: Jaqueline Minor PA-C  09/18/17 1731 Resulting lab: [facility]    Specimen Information    Type Source Collected On   Blood  09/19/17 0651          Components       Value Reference Range Flag Lab   Sodium 132 133 - 144 mmol/L L 51   Potassium 4.6 3.4 - 5.3 mmol/L  51   Chloride 101 94 - 109 mmol/L  51   Carbon Dioxide 23 20 - 32 mmol/L  51   Anion Gap 8 3 - 14 mmol/L  51   Glucose 141 70 - 99 mg/dL H 51   Urea Nitrogen 17 7 - 30 mg/dL  51   Creatinine 0.90 0.52 - 1.04 mg/dL  51   GFR Estimate 63 >60 mL/min/1.7m2  51   Comment:  Non  GFR Calc   GFR Estimate If Black 76 >60 mL/min/1.7m2  51   Comment:  African American GFR Calc   Calcium 8.8 8.5 - 10.1 mg/dL  51   Bilirubin Total 1.2 0.2 - 1.3 mg/dL  51   Albumin 1.7 3.4 - 5.0 g/dL L 51   Protein Total 6.6 6.8 -  8.8 g/dL L 51   Alkaline Phosphatase 358 40 - 150 U/L H 51   ALT 40 0 - 50 U/L  51   AST 72 0 - 45 U/L H 51            Magnesium [[id number]]  Resulted: 09/19/17 0726, Result status: Final result    Ordering provider: Jenni Canales MD  09/18/17 2330 Resulting lab: [facility]    Specimen Information    Type Source Collected On   Blood  09/19/17 0651          Components       Value Reference Range Flag Lab   Magnesium 1.9 1.6 - 2.3 mg/dL  51            Phosphorus [[id number]] (Abnormal)  Resulted: 09/19/17 0726, Result status: Final result    Ordering provider: Jenni Canales MD  09/18/17 2330 Resulting lab: [facility]    Specimen Information    Type Source Collected On   Blood  09/19/17 0651          Components       Value Reference Range Flag Lab   Phosphorus 2.4 2.5 - 4.5 mg/dL L 51            Blood culture [[id number]]  Resulted: 09/19/17 0324, Result status: Preliminary result    Ordering provider: Abram Wells MD  09/17/17 2011 Resulting lab: [facility]    Specimen Information    Type Source Collected On   Blood  09/17/17 2054   Comment:  Right Arm          Components       Value Reference Range Flag Lab   Specimen Description Blood Right Arm      Culture Micro No growth after 2 days   75            Blood culture [[id number]]  Resulted: 09/19/17 0324, Result status: Preliminary result    Ordering provider: Abram Wells MD  09/17/17 2011 Resulting lab: [facility]    Specimen Information    Type Source Collected On   Blood  09/17/17 2107   Comment:  PICC          Components       Value Reference Range Flag Lab   Specimen Description Blood PICC      Culture Micro No growth after 2 days   75            Influenza A and B and RSV PCR [[id number]]  Resulted: 09/18/17 1513, Result status: Final result    Ordering provider: Jenni Canales MD  09/18/17 1127 Resulting lab:  [facility]    Specimen Information    Type Source Collected On   Nasopharyngeal Nasopharyngeal 09/18/17 1156          Components       Value Reference Range Flag Lab   Specimen Description Nasopharyngeal   51   Influenza A PCR Negative NEG^Negative  75   Comment:         Flu A target RNA not detected, presumed negative for Influenza A or the viral   load is below the limit of detection.     Influenza B PCR Negative NEG^Negative  75   Comment:         Flu B target RNA not detected, presumed negative for Influenza B or the viral   load is below the limit of detection.     Resp Syncytial Virus Negative NEG^Negative  75   Comment:         RSV target RNA not detected, presumed negative for Respiratory Syncitial Virus   or the viral load is below the limit of detection.  FDA approved assay performed using trinket GeneXpert(R) real-time PCR.              Respiratory Virus Panel by PCR [[id number]]  Resulted: 09/18/17 1229, Result status: In process    Ordering provider: Jenni Canales MD  09/18/17 1127 Resulting lab: [facility]    Specimen Information    Type Source Collected On   Nasopharyngeal  09/18/17 1156            Comprehensive metabolic panel [[id number]] (Abnormal)  Resulted: 09/18/17 0655, Result status: Final result    Ordering provider: Jaqueline Minor PA-C  09/17/17 2330 Resulting lab: [facility]    Specimen Information    Type Source Collected On   Blood  09/18/17 0605          Components       Value Reference Range Flag Lab   Sodium 136 133 - 144 mmol/L  51   Potassium 4.6 3.4 - 5.3 mmol/L  51   Chloride 105 94 - 109 mmol/L  51   Carbon Dioxide 23 20 - 32 mmol/L  51   Anion Gap 9 3 - 14 mmol/L  51   Glucose 110 70 - 99 mg/dL H 51   Urea Nitrogen 20 7 - 30 mg/dL  51   Creatinine 0.86 0.52 - 1.04 mg/dL  51   GFR Estimate 66 >60 mL/min/1.7m2  51   Comment:  Non  GFR Calc   GFR Estimate If Black 80 >60 mL/min/1.7m2  51   Comment:   African American GFR Calc   Calcium 8.0 8.5 - 10.1 mg/dL L 51   Bilirubin Total 0.9 0.2 - 1.3 mg/dL  51   Albumin 1.6 3.4 - 5.0 g/dL L 51   Protein Total 6.2 6.8 - 8.8 g/dL L 51   Alkaline Phosphatase 293 40 - 150 U/L H 51   ALT 29 0 - 50 U/L  51   AST 62 0 - 45 U/L H 51            Magnesium [[id number]]  Resulted: 09/18/17 0655, Result status: Final result    Ordering provider: Jenni Canales MD  09/17/17 2330 Resulting lab: [facility]    Specimen Information    Type Source Collected On   Blood  09/18/17 0605          Components       Value Reference Range Flag Lab   Magnesium 1.8 1.6 - 2.3 mg/dL  51            Phosphorus [[id number]] (Abnormal)  Resulted: 09/18/17 0655, Result status: Final result    Ordering provider: Jenni Canales MD  09/17/17 2330 Resulting lab: [facility]    Specimen Information    Type Source Collected On   Blood  09/18/17 0605          Components       Value Reference Range Flag Lab   Phosphorus 2.2 2.5 - 4.5 mg/dL L 51            CBC with platelets differential [[id number]] (Abnormal)  Resulted: 09/18/17 0638, Result status: Final result    Ordering provider: Jaqueline Minor PA-C  09/17/17 2330 Resulting lab: [facility]    Specimen Information    Type Source Collected On   Blood  09/18/17 0605          Components       Value Reference Range Flag Lab   WBC 5.9 4.0 - 11.0 10e9/L  51   RBC Count 3.09 3.8 - 5.2 10e12/L L 51   Hemoglobin 7.9 11.7 - 15.7 g/dL L 51   Hematocrit 25.3 35.0 - 47.0 % L 51   MCV 82 78 - 100 fl  51   MCH 25.6 26.5 - 33.0 pg L 51   MCHC 31.2 31.5 - 36.5 g/dL L 51   RDW 18.9 10.0 - 15.0 % H 51   Platelet Count 129 150 - 450 10e9/L L 51   Diff Method Automated Method   51   % Neutrophils 59.3 %  51   % Lymphocytes 24.1 %  51   % Monocytes 15.9 %  51   % Eosinophils 0.5 %  51   % Basophils 0.0 %  51   % Immature Granulocytes 0.2 %  51   Nucleated RBCs 0 0 /100  51    Absolute Neutrophil 3.5 1.6 - 8.3 10e9/L  51   Absolute Lymphocytes 1.4 0.8 - 5.3 10e9/L  51   Absolute Monocytes 0.9 0.0 - 1.3 10e9/L  51   Absolute Eosinophils 0.0 0.0 - 0.7 10e9/L  51   Absolute Basophils 0.0 0.0 - 0.2 10e9/L  51   Abs Immature Granulocytes 0.0 0 - 0.4 10e9/L  51   Absolute Nucleated RBC 0.0   51            UA with Microscopic reflex to Culture [[id number]] (Abnormal)  Resulted: 09/17/17 2226, Result status: Final result    Ordering provider: Alena Victoria PA-C  09/17/17 2021 Resulting lab: [facility]    Specimen Information    Type Source Collected On   Midstream Urine Urine clean catch 09/17/17 2130          Components       Value Reference Range Flag Lab   Color Urine Clear   51   Appearance Urine Dark Yellow   51   Glucose Urine Negative NEG^Negative mg/dL  51   Bilirubin Urine Negative NEG^Negative  51   Ketones Urine Negative NEG^Negative mg/dL  51   Specific Gravity Urine 1.025 1.003 - 1.035  51   Blood Urine Trace NEG^Negative A 51   pH Urine 6.0 5.0 - 7.0 pH  51   Protein Albumin Urine 30 NEG^Negative mg/dL A 51   Urobilinogen mg/dL 0.2 0.0 - 2.0 mg/dL  51   Nitrite Urine Negative NEG^Negative  51   Leukocyte Esterase Urine Negative NEG^Negative  51   Source Midstream Urine   51   WBC Urine <1 0 - 2 /HPF  51   RBC Urine 1 0 - 2 /HPF  51   Bacteria Urine Few NEG^Negative /HPF A 51   Squamous Epithelial /HPF Urine <1 0 - 1 /HPF  51   Mucous Urine Present NEG^Negative /LPF A 51            Phosphorus [[id number]] (Abnormal)  Resulted: 09/17/17 0754, Result status: Final result    Ordering provider: Jenni Canales MD  09/16/17 2330 Resulting lab: [facility]    Specimen Information    Type Source Collected On   Blood  09/17/17 0714          Components       Value Reference Range Flag Lab   Phosphorus 2.4 2.5 - 4.5 mg/dL L 51            Comprehensive metabolic panel [[id number]] (Abnormal)  Resulted: 09/17/17 0754, Result  status: Final result    Ordering provider: Jaqueline Minor PA-C  09/16/17 2330 Resulting lab: [facility]    Specimen Information    Type Source Collected On   Blood  09/17/17 0714          Components       Value Reference Range Flag Lab   Sodium 135 133 - 144 mmol/L  51   Potassium 4.3 3.4 - 5.3 mmol/L  51   Chloride 104 94 - 109 mmol/L  51   Carbon Dioxide 23 20 - 32 mmol/L  51   Anion Gap 9 3 - 14 mmol/L  51   Glucose 134 70 - 99 mg/dL H 51   Urea Nitrogen 31 7 - 30 mg/dL H 51   Creatinine 0.85 0.52 - 1.04 mg/dL  51   GFR Estimate 67 >60 mL/min/1.7m2  51   Comment:  Non  GFR Calc   GFR Estimate If Black 82 >60 mL/min/1.7m2  51   Comment:  African American GFR Calc   Calcium 8.2 8.5 - 10.1 mg/dL L 51   Bilirubin Total 1.2 0.2 - 1.3 mg/dL  51   Albumin 1.6 3.4 - 5.0 g/dL L 51   Protein Total 6.5 6.8 - 8.8 g/dL L 51   Alkaline Phosphatase 306 40 - 150 U/L H 51   ALT 27 0 - 50 U/L  51   AST 67 0 - 45 U/L H 51            Magnesium [[id number]]  Resulted: 09/17/17 0754, Result status: Final result    Ordering provider: Jenni Canales MD  09/16/17 2330 Resulting lab: [facility]    Specimen Information    Type Source Collected On   Blood  09/17/17 0714          Components       Value Reference Range Flag Lab   Magnesium 2.0 1.6 - 2.3 mg/dL  51            Bilirubin direct [[id number]] (Abnormal)  Resulted: 09/17/17 0754, Result status: Final result    Ordering provider: Jaqueline Minor PA-C  09/17/17 0714 Resulting lab: [facility]    Specimen Information    Type Source Collected On     09/17/17 0714          Components       Value Reference Range Flag Lab   Bilirubin Direct 0.8 0.0 - 0.2 mg/dL H 51            CBC with platelets differential [[id number]] (Abnormal)  Resulted: 09/17/17 0729, Result status: Final result    Ordering provider: Jaqueline Minor PA-C  09/16/17 2330 Resulting lab: [facility]    Specimen Information    Type Source Collected On   Blood  09/17/17 0714          Components       Value Reference Range Flag Lab   WBC 7.2 4.0 - 11.0 10e9/L  51   RBC Count 3.32 3.8 - 5.2 10e12/L L 51   Hemoglobin 8.4 11.7 - 15.7 g/dL L 51   Hematocrit 27.6 35.0 - 47.0 % L 51   MCV 83 78 - 100 fl  51   MCH 25.3 26.5 - 33.0 pg L 51   MCHC 30.4 31.5 - 36.5 g/dL L 51   RDW 18.7 10.0 - 15.0 % H 51   Platelet Count 110 150 - 450 10e9/L L 51   Diff Method Automated Method   51   % Neutrophils 75.1 %  51   % Lymphocytes 15.8 %  51   % Monocytes 8.5 %  51   % Eosinophils 0.3 %  51   % Basophils 0.0 %  51   % Immature Granulocytes 0.3 %  51   Nucleated RBCs 0 0 /100  51   Absolute Neutrophil 5.4 1.6 - 8.3 10e9/L  51   Absolute Lymphocytes 1.1 0.8 - 5.3 10e9/L  51   Absolute Monocytes 0.6 0.0 - 1.3 10e9/L  51   Absolute Eosinophils 0.0 0.0 - 0.7 10e9/L  51   Absolute Basophils 0.0 0.0 - 0.2 10e9/L  51   Abs Immature Granulocytes 0.0 0 - 0.4 10e9/L  51   Absolute Nucleated RBC 0.0   51            Phosphorus [[id number]] (Abnormal)  Resulted: 09/16/17 2156, Result status: Final result    Ordering provider: Abram Wells MD  09/16/17 2051 Resulting lab: [facility]    Specimen Information    Type Source Collected On   Blood  09/16/17 2119          Components       Value Reference Range Flag Lab   Phosphorus 2.0 2.5 - 4.5 mg/dL L 51            Comprehensive metabolic panel [[id number]] (Abnormal)  Resulted: 09/16/17 0900, Result status: Final result    Ordering provider: Jaqueline Minor PA-C  09/15/17 2330 Resulting lab: [facility]    Specimen Information    Type Source Collected On   Blood  09/16/17 0816          Components       Value Reference Range Flag Lab   Sodium 139 133 - 144 mmol/L  51   Potassium 3.6 3.4 - 5.3 mmol/L  51   Chloride 108 94 - 109 mmol/L  51   Carbon Dioxide 25 20 - 32 mmol/L  51   Anion Gap 7 3 -  14 mmol/L  51   Glucose 127 70 - 99 mg/dL H 51   Urea Nitrogen 40 7 - 30 mg/dL H 51   Creatinine 0.91 0.52 - 1.04 mg/dL  51   GFR Estimate 62 >60 mL/min/1.7m2  51   Comment:  Non  GFR Calc   GFR Estimate If Black 75 >60 mL/min/1.7m2  51   Comment:  African American GFR Calc   Calcium 8.4 8.5 - 10.1 mg/dL L 51   Bilirubin Total 1.4 0.2 - 1.3 mg/dL H 51   Albumin 1.6 3.4 - 5.0 g/dL L 51   Protein Total 6.5 6.8 - 8.8 g/dL L 51   Alkaline Phosphatase 221 40 - 150 U/L H 51   ALT 15 0 - 50 U/L  51   AST 35 0 - 45 U/L  51            Magnesium [[id number]]  Resulted: 09/16/17 0900, Result status: Final result    Ordering provider: Jenni Canales MD  09/15/17 2330 Resulting lab: [facility]    Specimen Information    Type Source Collected On   Blood  09/16/17 0816          Components       Value Reference Range Flag Lab   Magnesium 2.0 1.6 - 2.3 mg/dL  51            Phosphorus [[id number]] (Abnormal)  Resulted: 09/16/17 0900, Result status: Final result    Ordering provider: Jenni Canales MD  09/15/17 2330 Resulting lab: [facility]    Specimen Information    Type Source Collected On   Blood  09/16/17 0816          Components       Value Reference Range Flag Lab   Phosphorus 1.6 2.5 - 4.5 mg/dL L 51            CBC with platelets differential [[id number]] (Abnormal)  Resulted: 09/16/17 0837, Result status: Final result    Ordering provider: Jaqueline Minor PA-C  09/15/17 2330 Resulting lab: [facility]    Specimen Information    Type Source Collected On   Blood  09/16/17 0816          Components       Value Reference Range Flag Lab   WBC 6.7 4.0 - 11.0 10e9/L  51   RBC Count 3.51 3.8 - 5.2 10e12/L L 51   Hemoglobin 8.9 11.7 - 15.7 g/dL L 51   Hematocrit 29.1 35.0 - 47.0 % L 51   MCV 83 78 - 100 fl  51   MCH 25.4 26.5 - 33.0 pg L 51   MCHC 30.6 31.5 - 36.5 g/dL L 51   RDW 18.6 10.0 - 15.0 % H 51   Platelet Count  137 150 - 450 10e9/L L 51   Diff Method Automated Method   51   % Neutrophils 80.0 %  51   % Lymphocytes 14.6 %  51   % Monocytes 4.9 %  51   % Eosinophils 0.4 %  51   % Basophils 0.0 %  51   % Immature Granulocytes 0.1 %  51   Nucleated RBCs 0 0 /100  51   Absolute Neutrophil 5.4 1.6 - 8.3 10e9/L  51   Absolute Lymphocytes 1.0 0.8 - 5.3 10e9/L  51   Absolute Monocytes 0.3 0.0 - 1.3 10e9/L  51   Absolute Eosinophils 0.0 0.0 - 0.7 10e9/L  51   Absolute Basophils 0.0 0.0 - 0.2 10e9/L  51   Abs Immature Granulocytes 0.0 0 - 0.4 10e9/L  51   Absolute Nucleated RBC 0.0   51            Blood culture [[id number]]  Resulted: 09/16/17 0701, Result status: Final result    Ordering provider: Dustin Oliver MD  09/10/17 0041 Resulting lab: [facility]    Specimen Information    Type Source Collected On   Blood Portacath 09/10/17 0041   Comment:  Left Hand          Components       Value Reference Range Flag Lab   Specimen Description Blood Left Hand      Culture Micro No growth   225            Blood culture [[id number]]  Resulted: 09/16/17 0701, Result status: Final result    Ordering provider: Abram Wells MD  09/10/17 0042 Resulting lab: [facility]    Specimen Information    Type Source Collected On   Blood  09/10/17 0041   Comment:  Portacath          Components       Value Reference Range Flag Lab   Specimen Description Blood Portacath      Culture Micro No growth   225            Testing Performed By     Lab - Abbreviation Name Director Address Valid Date Range    45 - [id number] MISYS Unknown Unknown 01/28/02 0000 - Present    51 - Unknown [facility] [place] Unknown [address] [facility] [id number] 12/31/14 1010 - Present    75 - Unknown [facility] Unknown [address] [facility] [address] 01/15/15 1019 - Present    225 - Unknown [facility] Unknown [address] [facility]  [address]  [facility] [address] 12/19/14 0954 - Present               Imaging Results - 3 Days      XR Chest 2 Views [[id number]]  Resulted: 09/18/17 1612, Result status: Final result    Ordering provider: Mikel Mckeon MD  09/18/17 1007 Resulted by: Sandrita Gomes MD [employer]on    Performed: 09/18/17 1534 - 09/18/17 1542 Resulting lab: RADIOLOGY RESULTS    Narrative:       Exam:  XR CHEST 2 VW, 9/18/2017 3:44 PM    History: fever, cough, eval for pna    Comparison:  Chest radiograph from 9/15/2017.    Findings:  PA and lateral views chest. Right chest portacatheter tip  projects over the mid SVC. The cardiomediastinal silhouette is within  normal limits. Resolution of left pleural effusion. No pneumothorax.  No focal airspace opacities. Visualized upper abdomen and osseous  structures are unremarkable.      Impression:       Impression:    No acute cardiopulmonary abnormality. Resolution of previous left  pleural effusion.    I have personally reviewed the examination and initial interpretation  and I agree with the findings.    SANDRITA GOMES MD      US Abdomen Limited Portable [[id number]]  Resulted: 09/17/17 1506, Result status: Final result    Ordering provider: Mikel Mckeon MD  09/17/17 1322 Resulted by: Jet Dougherty MD Al-Samaraee, Ahmad, MD    Performed: 09/17/17 1345 - 09/17/17 1415 Resulting lab: RADIOLOGY RESULTS    Narrative:       EXAMINATION: US ABDOMEN LIMITED PORTABLE  9/17/2017 2:15 PM      CLINICAL HISTORY: elevated direct bilirubin, evaluate for bile duct  obstruction    COMPARISON: PET/CT on 8/25/2017.        TECHNIQUE: Grayscale and color images of the right upper abdomen is  obtained.    FINDINGS:  Multiple echogenic heterogeneous hepatic masses, the largest measures  8.2 x 8.6 x 5.6 cm, consistent with known hepatic metastases. The  common bile duct measures 6 mm. There is gallbladder sludge. The  gallbladder wall measures approximately 3 mm. Sonographic  Damico's is  negative. No pericholecystic fluid.    The visualized portions of the pancreas are normal in echogenicity.    The right kidney is normal in position and echogenicity. The right  kidney measures 11.8 cm. there is no significant urinary tract  dilation.       Impression:       IMPRESSION:   1. Common bile duct is not substantially dilated measuring 6 cm.  2. Multiple hyperechoic heterogenous hepatic masses, consistent with  known hepatic metastases.  3. Gallbladder sludge without sonographic evidence of acute  cholecystitis.    I have personally reviewed the examination and initial interpretation  and I agree with the findings.    FITO BASS MD      Testing Performed By     Lab - Abbreviation Name Director Address Valid Date Range    104 - Rad Rslts RADIOLOGY RESULTS Unknown Unknown 05 1553 - Present               ECG/EMG Results      ECHO LIMITED WITH OPTISON [[id number]]  Resulted: 09/10/17 0824, Result status: Edited Result - FINAL    Ordering provider: Dustin Oliver MD  09/10/17 0631 Resulted by: Cameron Mccormack MD    Performed: 09/10/17 0838 - 09/10/17 0929 Resulting lab: RADIOLOGY RESULTS    Narrative:       [id number]  [id number]  [id number]  [id number]^KAMLA^IFEANYI^[facility],[place]  Echocardiography Laboratory  [address] [place] [address]     Name: LUDMILA SAINI  MRN: [id number]  : 1950  Study Date: 09/10/2017 08:24 AM  Age: 67 yrs  Gender: Female  [place] [facility] [place] Study: Heart Failure - Left  Ordering Physician: IFEANYI CASON  Referring Physician: SELF, REFERRED  Performed By: Chip Gonzáles RDCS     BSA: 1.8 m2  Height: 66 in  Weight: 148 lb  BP: 114/66 mmHg  _____________________________________________________________________________  __        Procedure  Limited Portable Echo Adult. Contrast Optison. Optison (NDC #2225-6688-87)  given intravenously. Patient was given 3 ml mixture of 3 ml Optison  and 6 ml  saline. 6 ml wasted.  _____________________________________________________________________________  __        Interpretation Summary  Technically difficult study. Patient intubated and sedated. PA catheter in  place.     The Ejection Fraction is estimated at 15-20%. Severely (EF <30%) reduced left  ventricular function is present. Diastolic function not assessed due to  tachycardia. Severe diffuse hypokinesis is present. Septal wall akinesis is  present. Posterior wall akinesis is present.  Global right ventricular function is mildly reduced.  Trace to mild tricuspid insufficiency is present.  The inferior vena cava is normal.  No pericardial effusion is present.     Compared to prior study in 8/2017, EF has dropped significantly with multiple  new regional wall motion abnormalities as detailed above.  _____________________________________________________________________________  __        Left Ventricle  Diastolic function not assessed due to tachycardia. Severely (EF <30%) reduced  left ventricular function is present. The Ejection Fraction is estimated at  15-20%. Severe diffuse hypokinesis is present. Septal wall akinesis is  present. Posterior wall akinesis is present.     Right Ventricle  The right ventricle is normal size. Global right ventricular function is  mildly reduced.     Atria  The atria cannot be assessed.     Mitral Valve  The mitral valve is normal. Trace mitral insufficiency is present.        Aortic Valve  The aortic valve cannot be assessed.     Tricuspid Valve  The tricuspid valve is normal. Trace to mild tricuspid insufficiency is  present. The right ventricular systolic pressure is approximated at 27.5 mmHg  plus the right atrial pressure.     Pulmonic Valve  The pulmonic valve is normal.     Vessels  The inferior vena cava is normal.     Pericardium  No pericardial effusion is present.     _____________________________________________________________________________  __         Doppler Measurements & Calculations     MV E max parker: 37.3 cm/sec  MV A max parker: 68.6 cm/sec  MV E/A: 0.54  MV dec slope: 157.0 cm/sec2  TR max parker: 262.0 cm/sec  TR max P.5 mmHg  Lateral E/e': 7.0  Medial E/e': 9.6           _____________________________________________________________________________  __           Report approved by: KAREL Lloyd 09/10/2017 02:27 PM       1    Type Source Collected On     09/10/17 0824          View Image (below)        Echo limited (Adults Only) [[id number]]  Resulted: 09/10/17 0840, Result status: In process    Ordering provider: Dustin Oliver MD  09/10/17 0631 Performed: 09/10/17 0838 - 09/10/17 0838    Resulting lab: RADIANT             ECHO LIMITED WITH OPTISON [[id number]]  Resulted: 17 0706, Result status: Edited Result - FINAL    Ordering provider: Ramila Mohamud MD  17 0005 Resulted by: Alonzo Ruiz MD    Performed: 17 0726 - 17 0727 Resulting lab: RADIOLOGY RESULTS    Narrative:       [id number]  ECH74  [id number]  [id number]^CADE^RAMILA^SUZANNE           [facility],[place]  Echocardiography Laboratory  [address] [place] [id number]     Name: LUDMILA SAINI  MRN: [id number]  : 1950  Study Date: 2017 07:06 AM  Age: 67 yrs  Gender: Female  [place] [facility] [place]  Reason For Study: Shock  Ordering Physician: RAMILA MOHAMUD  Referring Physician: SELF, REFERRED  Performed By: Norberto Schultz RDCS     BSA: 1.8 m2  Height: 66 in  Weight: 148 lb  HR: 92  BP: 155/80 mmHg  _____________________________________________________________________________  __        Procedure  Limited Portable Echo Adult. Contrast Optison. Optison (NDC #1874-1138-43)  given intravenously. Patient was given 4 ml mixture of 3 ml Optison and 6 ml  saline. 5 ml wasted.  _____________________________________________________________________________  __        Interpretation Summary  Moderately (EF  35-40%) reduced left ventricular function is present. Traced at  38%. Moderate diffuse hypokinesis is present.  Poorly visualized, but RV function appears to be mildly reduced.  The right ventricular systolic pressure is approximated at 29.2 mmHg plus the  right atrial pressure. Pulmonary artery systolic pressure is normal.  Dilation of the inferior vena cava is present with abnormal respiratory  variation in diameter. Unable to assess mean RA pressure given the patient is  on a ventilator.  No pericardial effusion is present.  Compared to study dated 9/10/2017, left ventricular systolic function has  improved.  _____________________________________________________________________________  __        Left Ventricle  Left ventricular size is normal. Left ventricular wall thickness is normal.  Moderately (EF 35-40%) reduced left ventricular function is present. Traced at  38%. Moderate diffuse hypokinesis is present.     Right Ventricle  Poorly visualized, but RV function appears to be mildly reduced. A pacemaker  lead is noted in the right ventricle.     Atria  The atria cannot be assessed.     Mitral Valve  The mitral valve cannot be assessed. Mild mitral annular calcification is  present.        Aortic Valve  The aortic valve cannot be assessed.     Tricuspid Valve  Mild tricuspid insufficiency is present. The right ventricular systolic  pressure is approximated at 29.2 mmHg plus the right atrial pressure.  Pulmonary artery systolic pressure is normal.     Pulmonic Valve  The pulmonic valve cannot be assessed.     Vessels  Dilation of the inferior vena cava is present with abnormal respiratory  variation in diameter. Unable to assess mean RA pressure given the patient is  on a ventilator.     Pericardium  No pericardial effusion is present.        Compared to Previous Study  Compared to study dated 9/10/2017, left ventricular systolic function has  improved.     Attestation  I have personally viewed the imaging and  agree with the interpretation and  report as documented by the fellow, Robin Irwin MD, and/or edited by me.  _____________________________________________________________________________  __     MMode/2D Measurements & Calculations  IVSd: 0.88 cm  LVIDd: 4.1 cm  LVIDs: 3.3 cm  LVPWd: 0.72 cm  FS: 18.6 %  EDV(Teich): 72.5 ml  ESV(Teich): 44.3 ml  LV mass(C)d: 95.0 grams  LV mass(C)dI: 54.0 grams/m2        Doppler Measurements & Calculations  TR max parker: 270.0 cm/sec  TR max P.2 mmHg           _____________________________________________________________________________  __           Report approved by: Maru Luna 2017 09:49 AM       1    Type Source Collected On     17 0706          View Image (below)              Encounter-Level Documents:     There are no encounter-level documents.      Order-Level Documents:     There are no order-level documents.

## 2017-09-10 NOTE — PHARMACY-VANCOMYCIN DOSING SERVICE
Pharmacy Vancomycin Initial Note  Date of Service September 10, 2017  Patient's  1950  67 year old, female    Indication: Sepsis    Current estimated CrCl = Estimated Creatinine Clearance: 48.9 mL/min (based on Cr of 1.22).    Creatinine for last 3 days  2017:  8:25 AM Creatinine 0.70 mg/dL  2017:  7:00 PM Creatinine 0.76 mg/dL  9/10/2017: 12:40 AM Creatinine 1.22 mg/dL    Recent Vancomycin Level(s) for last 3 days  No results found for requested labs within last 72 hours.      Vancomycin IV Administrations (past 72 hours)      No vancomycin orders with administrations in past 72 hours.                Nephrotoxins and other renal medications (Future)    Start     Dose/Rate Route Frequency Ordered Stop    09/10/17 0230  vancomycin (VANCOCIN) 1,500 mg in NaCl 0.9 % 250 mL intermittent infusion      1,500 mg Intravenous EVERY 24 HOURS 09/10/17 0220      09/10/17 0115  furosemide (LASIX) injection 40 mg      40 mg Intravenous ONCE 09/10/17 0109            Contrast Orders - past 72 hours     None                Plan:  1.  Start vancomycin  1500 mg IV q24h.   2.  Goal Trough Level: 15-20 mg/L   3.  Pharmacy will check trough levels as appropriate in 1-3 Days.    4. Serum creatinine levels will be ordered daily for the first week of therapy and at least twice weekly for subsequent weeks.    5. [place] method utilized to dose vancomycin therapy: Method 1    Hawk Epps

## 2017-09-10 NOTE — PHARMACY-VANCOMYCIN DOSING SERVICE
Pharmacy Empiric Dose Change Per Policy1  Original Dose Ordered: 1500mg IV vancomycin q24h  Dose Changed To: intermittent dosing due to ELKIN- will check level with AM labs 9/11/17    This dose change was based on the pharmacist's assessment of this patient's age, weight, concurrent drug therapy, treatment goals, whether patient's creatinine clearance adequately indicates renal function (factoring in age, muscle mass, fluid and clinical status), and, if applicable, prior pharmacokinetic data.    Creatinine Clearance=     Estimated Creatinine Clearance: 41.2 mL/min (based on Cr of 1.45). - pat    Will continue to follow and modify dosage according to levels, organ function and clinical condition    Violeta King, PharmD

## 2017-09-10 NOTE — PROGRESS NOTES
MICU Progress Note    MICU Staff      This patient has been seen and evaluated by me.  I have discussed care with Dr. Douglas and agree with the findings and plan in this note. 67 year old female with metastatic colon cancer receiving 5-FU based chemoRx regimen admitted for dyspnea and pulm edema and found to have CM presumably non-ischemic and related to 5 -FU.  SG cath placed by cardiology. Diurese. Inotropic support per cardiology recommendations. Panculture and empiric abxs for now. Keep lightly sedated. Wean FiO2 and PEEP as able. Repeat echo tomorrow.    Williams Olivares  Pulmonary/Critical Care  September 10, 2017 5:16 PM      CCT 45 minutes separate from procedures

## 2017-09-10 NOTE — CONSULTS
[facility] Cardiology Consult    Charlene Douglas MRN# [id number]   Age: 67 year old YOB: 1950     Date of Admission:  9/9/2017    Reason for consult: cardiomyopathy       Requesting physician: Melody       Level of consult: One-time consult to assist in determining a diagnosis and to recommend an appropriate treatment plan           Assessment and Recommendation:   Assessment/Recommendations:   Cardiomyopathy, presumably non ischemic, chemotherapy related vs tachycardia vs vasospasm, EF < 30%, the risk of cardiotoxicity with 5FU is increased with continuous infusion  Acute systolic heart failure, her LVEF appears severely reduced especially when compared with prior echo from August. There also appears some regionality to the dysfunction though there is diffuse hypokinesis on the echocardiogram. The right side appears preserved with normal RV function and no significant elevation or RAP based on IVC especially considering mechanical ventilation. Based on hemodynamics from the PORT, her mixed venous is in the 60s, giving her a CI of 2.4 and an SVR of ~1300 assuming RAP ~10 mmHg based on echo. She is showing signs of reduced perfusion with a rising creatinine 0.7>1.2, and increasing lactic acid 5.6, and significant tachycardia. Given her reduced LV function, and pulmonary edema, I expect that her wedge is significantly elevated and she would benefit from further diuresis and afterload reduction, since her cardiac output is preserved at this time I would not recommend inotropes. Stop her beta blocker as she is clearly decompensated given her HR. Diurese with 40 mg of IV lasix and continue nitroglycerin drip at this time (this could be helpful in two situations - if this is vasospasm from chemotherapy and for afterload reduction to assist with improved perfusion. Her MAPs remain in the 80s even with nitroglycerin at this time. We will forego calcium channel blockers in favor of  nitrates for presumed vasospasm treatment given her decompensated heart failure and reduced EF. Repeat another mixed venous in 2 hours, along with lactate. Monitor urine output. If her cardiac output continues to decline, she may require inotropes or mechanical support.    Addendum:  On repeat mixed venous (49%), the CI is 1.5, the lactic acid is marginally higher than before at 6.0, and there is no hypotension with MAPs drifting < 60. Recommend stopping nitroglycerin at this time, starting dopamine at 5. We will repeat another mixed venous in 30 minutes. If no improvement may need additional inotrope versus mechanical support. Discussed with  (HCP) at the bedside and patient would want aggressive measures including heart catheterization, balloon pump for support.            Chief Complaint:     67 year old woman with a history of metastatic colon malignancy with hepatic involvement started on FOLFOX two days ago presenting s/p 46 hour infusion, removed today at 1300. She had been complaining of increasing shortness of breath, now even at rest after starting the infusion. This progressed with weakness, fatigue, intermitted dizziness, and fevers to 100 on chart review. Per , requiring assistance around the house more than usual and altered mental status. She has no chest pain. She received 500 cc NS in the ED and continued to decompensate requiring intubation and transfer [facility] to [facility]. Here in the ICU her lactic acid had increased to 5.6 from 2.1, creatinine increased to 1.2 from 0.7, with an increased A-a gradient requiring maximal O2 support with an FiO2 of 100% on CMV with a PEEP of 10. She received 40 mg IV lasix at 2000 and was started on a nitroglycerin drip.          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     COLONOSCOPY N/A 2017    Procedure: COMBINED COLONOSCOPY, SINGLE OR MULTIPLE BIOPSY/POLYPECTOMY BY BIOPSY;;  Surgeon: Duane, William Charles, MD;  Location: [facility] OR     COLONOSCOPY WITH CO2 INSUFFLATION N/A 2017    Procedure: COLONOSCOPY WITH CO2 INSUFFLATION;  BMI: 25.2  Referring: Donna Mota  Diagnoses: Positive FIT (fecal immunochemical test)  Special screening for malignant neoplasms, colon  Pharmacy: [facility] Fax: [phone];  Surgeon: Duane, William Charles, MD;  Location: [facility] OR     EXAM UNDER ANESTHESIA, ULTRASOUND N/A 2017    Procedure: EXAM UNDER ANESTHESIA, ULTRASOUND;  Ultrasound Liver Biopsy;  Surgeon: GENERIC ANESTHESIA PROVIDER;  Location:  OR     HIP SURGERY      14 years old     INSERT PORT VASCULAR ACCESS N/A 2017    Procedure: INSERT PORT VASCULAR ACCESS;  Port placement;  Surgeon: Vinny Peterson DO;  Location: [facility] OR             Social History:     Social History   Substance Use Topics     Smoking status: Never Smoker     Smokeless tobacco: Never Used     Alcohol use No      Comment: 1 per week             Family History:     Family History   Problem Relation Age of Onset     C.A.D. Mother      quad- bypass at age 78     CEREBROVASCULAR DISEASE Mother      in her 70's     Lipids Mother      Hypertension Mother      Other - See Comments Mother      Triple Bi-pass     Circulatory Father      abd aneurysm     GASTROINTESTINAL DISEASE Father      diverticulitis     Hypertension Father      Family history reviewed          Immunizations:     Immunization History   Administered Date(s) Administered     Influenza (IIV3) 10/14/2008, 2010, 01/10/2013     Pneumococcal (PCV 13) 2017     TD (ADULT, 7+) 1997     TDAP Vaccine (Adacel) 2007, 2017             Allergies:     Allergies    Allergen Reactions     No Known Allergies              Medications:     Current Facility-Administered Medications   Medication     furosemide (LASIX) injection 40 mg     nitroGLYcerin 50 mg in D5W 250 mL (adult std) infusion     naloxone (NARCAN) injection 0.1-0.4 mg     enoxaparin (LOVENOX) injection 40 mg     naloxone (NARCAN) injection 0.1-0.4 mg     propofol (DIPRIVAN) infusion    And     propofol (DIPRIVAN) injection 10 mg/mL vial     fentaNYL (PF) (SUBLIMAZE) injection  mcg     fentaNYL (SUBLIMAZE) infusion     pantoprazole (PROTONIX) 40 mg IV push injection             Review of Systems:   The Review of Systems is negative other than noted in the HPI     BP 91/60  Temp 100.4  F (38  C) (Axillary)  Resp 12  Wt 67.4 kg (148 lb 9.4 oz)  LMP 06/07/2007  SpO2 97%  BMI 23.6 kg/m2     gen intubated, sedated   heent perrl   neck distended neck veins   cv tachycardic   resp mechanical BS   gi soft   ext warm, trace edema   neuro sedated              Data:     Results for orders placed or performed during the hospital encounter of 09/09/17 (from the past 24 hour(s))   Troponin I   Result Value Ref Range    Troponin I ES 2.990 (HH) 0.000 - 0.045 ug/L   Blood gas venous with oxyhemoglobin (AM Draw)   Result Value Ref Range    Ph Venous 7.21 (L) 7.32 - 7.43 pH    PCO2 Venous 55 (H) 40 - 50 mm Hg    PO2 Venous 46 25 - 47 mm Hg    Bicarbonate Venous 22 21 - 28 mmol/L    FIO2 80     Oxyhemoglobin Venous 67 %    Base Deficit Venous 6.2 mmol/L   Basic metabolic panel   Result Value Ref Range    Sodium 131 (L) 133 - 144 mmol/L    Potassium 4.0 3.4 - 5.3 mmol/L    Chloride 94 94 - 109 mmol/L    Carbon Dioxide 21 20 - 32 mmol/L    Anion Gap 16 (H) 3 - 14 mmol/L    Glucose 139 (H) 70 - 99 mg/dL    Urea Nitrogen 23 7 - 30 mg/dL    Creatinine 1.22 (H) 0.52 - 1.04 mg/dL    GFR Estimate 44 (L) >60 mL/min/1.7m2    GFR Estimate If Black 53 (L) >60 mL/min/1.7m2    Calcium 7.8 (L) 8.5 - 10.1 mg/dL   CBC with platelets    Result Value Ref Range    WBC 19.1 (H) 4.0 - 11.0 10e9/L    RBC Count 4.35 3.8 - 5.2 10e12/L    Hemoglobin 11.4 (L) 11.7 - 15.7 g/dL    Hematocrit 36.0 35.0 - 47.0 %    MCV 83 78 - 100 fl    MCH 26.2 (L) 26.5 - 33.0 pg    MCHC 31.7 31.5 - 36.5 g/dL    RDW 18.0 (H) 10.0 - 15.0 %    Platelet Count 566 (H) 150 - 450 10e9/L   Lactic acid whole blood   Result Value Ref Range    Lactic Acid 5.6 (HH) 0.7 - 2.0 mmol/L   Blood gas arterial and oxyhgb   Result Value Ref Range    pH Arterial 7.28 (L) 7.35 - 7.45 pH    pCO2 Arterial 43 35 - 45 mm Hg    pO2 Arterial 118 (H) 80 - 105 mm Hg    Bicarbonate Arterial 20 (L) 21 - 28 mmol/L    FIO2 80     Oxyhemoglobin Arterial 96 92 - 100 %    Base Deficit Art 6.6 mmol/L   Lactic acid whole blood   Result Value Ref Range    Lactic Acid 5.0 (HH) 0.7 - 2.0 mmol/L         EKG sinus, tachycardic, normal axis, no ischemic changes, unchanged from prior from August    TTE 8/9/17     Interpretation Summary     1. Normal left ventricle size and systolic function. LV ejection fraction 60 -  65%. Grade 1 diastolic dysfunction.  2. No regional wall motion abnormalities.  3. Normal right ventricular size and systolic function.  4. Aortic valve sclerosis with trace regurgitation, no stenosis.  5. Normal ascending aorta dimension (3.0 cm).     There is no comparison study available.    CT PE 7/14/17 personally reviewed  Motion artifact and timing for PE limit any interpretation of coronary stenosis, however, there is a small amount of calcification in the proximal LAD, the RCA and LCx are not well visualized due to motion artifact.    Bedside Echo 9/10/17  Severely reduced LVEF <30% (likely in the range of 15-20%), there is notable hypo to akinesis of the anterior wall and apex with preserved wall motion of the inferior and inferolateral and septal walls, there is overall diffuse hypokinesis. The chamber is otherwise normal in size.  There is normal function of the RV and size is normal, TAPSE 1.9  and S' >18.  Atria are normal.  There is mild to moderate MR. There is mild MAC.  There is mild to moderate TR with an estimated RVSP of 33 + RAP (~10 mmHg).  There is trace AI.  Pulmonic valve is not well seen, though there are no significant abnormalities on doppler.  The IVC is 2 cm without respiratory variability.   There is no effusion.    When compared with the report from August, there has been a drop in LV function.       Grzegorz Castillo MD

## 2017-09-10 NOTE — PROGRESS NOTES
MICU Progress Note    Charlene Douglas MRN: [id number]  1950  Date of Admission:9/9/2017  Primary care provider: Donna Shirley      Assessment and Plan     Charlene Douglas is a 68 yo F w/ a h/o HTN and stage 4 colon cancer with mets to the liver and possible lung mets presenting w/ worsening SOB and AMS then found to have pulmonary edema and left ventricular dysfunction with an EF of 10-20% (previously 60%+ 1 month ago) thought to be nonischemic from Folfox.    Changes since H&P  - DA from 5 to 2.5mcg/kg/hr to get less vasoconstrictive effects while maintaining ionotropy per cards, if unsuccessful will likely switch to dobutamine  - Heparin for DVT prophylaxis    Neurologic  #Sedation  Propofol caused significant hypotension, so switched to versed.   - Versed drip as needed  - Fentanyl drip as needed    Cardiovascular  #Non-ischemic cardiomyopathy w/ cardiogenic shock  CXR w/ pulm edema and new BNP rise to 5000s post-chemo. EF now 10-20% per cards, formal ECHO to be completed. ECHO in August EF 60-65%. Cards thinks inconsistent with ischemia or vasospasm due to distribution of hypokinesis. No EKG changes c/w ischemia at this time. No RV dysfun so PE not suspected at this time. Lactates and troponins down trending. Diuresing 40mg IV lasix x3 with adequate response. Not considering cardiac cath at this time  - Trending lactates q4hr  - Swans-gill catheter in place  - Given low grade fevers and tachycardia treating for possible infection as below  - Avoid beta blockade and calcium channel blockers  - Replace K and Mag to goals of 4 and 2 respectively  - DA from 5 to 2.5mcg/kg/hr to get less vasoconstrictive effects while maintaining ionotropy per cards, if unsuccessful will likely switch to dobutamine    Respiratory  # Pulm edema  2/2 to acute onset non-ischemic cardiomyopathy. Intubated 9/10 in early am due to worsening SOB.   - Continue ventilation  - Strict I/Os  - ABG to  check vent settings (PEEP down 10 to 8 prior)    Ventilation Mode: CMV/AC  FiO2 (%): 50 %  Rate Set (breaths/minute): 12 breaths/min  Tidal Volume Set (mL): 500 mL  PEEP (cm H2O): 10 cmH2O  Oxygen Concentration (%): 100 %  Resp: 12    Gastrointestinal  #Stage IV Colon cancer, as below  - will discuss goals of care prior to considering feeding modality    Genitourinary  Continue sheriff cath for strict I/Os    Infectious Disease  # Fevers and Tachycardia  Given intermittent low grade fevers to 100.4 (one recorded here) and recent chemo, will do empiric ABx until BC and UC return. Though likely 2/2 to chemo. Procal and CRP obtained for trending.  - F/u BC and UC  - Trend procal and CRP    Hematology  #Stage IV colon cancer  Involves ascending colon near ileocecal valve with extensive hepatic involvement and possible pulm nodules on PET scan 8/25/17. S/p 1 infusion Folfox (folinic acid, 5FU, and oxaliplatin)  - Heme onc consulted- asked to address: Goals of care, Whether the LV function is expected to improve with this toxcity, and whether she will be eligible for future chemo  - Monitor CMP for known transaminases qday    #Chronic anemia  Past year 8-9s in the setting of metastatic colon cancer. Likely 2/2 chronic disease and iron deficiency. Normocytic.  - Continue to trend  - Transfuse if <7    #Luekocytosis  Prior to admit 12.8, now up to 19.1 from 10 on admission. Likely stress though given immunocompromise will treat empirically as above.    Endocrine  No abnormalities at this time    Renal/Electolytes/FEN  #ELKIN likely ATN  Given cardiogenic shock with MAPs in the 50s with following rise in Cr from 0.76 to now 1.5 likely represents ATN.  - Treating cardiogenic shock as above  - Trend with BMP w/ Cr daily  - Will monitor for indications for dialysis    Skin Care  Per nursing. No chronic wounds to our knowledge.    PPX: DVT Heparin (given ELKIN and hepatic mets) GI ranitidine 50mg q8hr (continued PTA)    CODE: Full,  "discussing goals of care with family when daughters arrive with ACD    Family:  Tony and kids at bedside, updated.     Patient seen and discussed with staff attending, Dr. Olivares.  Please feel free to page with questions.    Donna Crane MS4  Pager: [phone]      Events of last 24 hrs:     Admitted and was sent to the floor with ativan for sedation, due to awakening and agitation on transport was given ketamine. Switched to fentanyl and propofol for sedation, then had low BP with MAPs in low 50s due to nitro, propofol and lasix contaminantly. D/c-ed propofol and stopped nitro. Switched to versed for sedation and dopamine for afterload reduction for cardiogenic shock. No hypotension since.     Due to new onset cardiogenic shock and hypotension, cards placed a Swans-gill catheter for closer hemodynamic monitoring. Lactate and Troponins have been trended. Not thought to be ischemic at this time. No plan for cath at this time.     , Bill updated, and daughters are coming with [employer] for a goals of care talk. Cardiology has seen him, heme/onc staff will be coming today.      OBJECTIVE   Ventilator  Ventilation Mode: CMV/AC  FiO2 (%): 40 %  Rate Set (breaths/minute): 12 breaths/min  Tidal Volume Set (mL): 500 mL  PEEP (cm H2O): 10 cmH2O  Oxygen Concentration (%): 40 %  Resp: 12  Venous Blood Gas result:  pO2 36; pCO2 45; pH 7.4;  HCO3 28, %O2 Sat 99.       Intake/Output    Intake/Output Summary (Last 24 hours) at 09/10/17 0953  Last data filed at 09/10/17 0900   Gross per 24 hour   Intake           821.32 ml   Output             1292 ml   Net          -470.68 ml       Vital Signs    Temp: 98  F (36.7  C) Temp src: Tympanic BP: 114/76   Heart Rate: 102 Resp: 12 SpO2: 99 % O2 Device: Mechanical Ventilator     Weight: 67.4 kg (148 lb 9.4 oz)  Estimated body mass index is 23.6 kg/(m^2) as calculated from the following:    Height as of 9/1/17: 1.69 m (5' 6.54\").    Weight as of this " encounter: 67.4 kg (148 lb 9.4 oz).    CO 3.9 and CI 2.2 on Dopamine     Physical Exam  GEN     NEURO:  Corneal blink reflex present, sedated  HEENT   NCAT, PERRL, no scleral icteris  RESP   Coarse breath sounds anteriorly, with crackles in posterior lung fields bilaterally  CV   RRR, No M/R/G  ABD   Soft, NT, ND, BS +  EXT   Warm, + mild edema, equal pulses  SKIN   Capillary refill normal, catheter in place    LINES IJ on Right placed today   ROUTINE ICU LABS:     Labs Reviewed  Pertinent for:   Lactate 2->6->4.1->2.1  Troponin 2.99-> 3.58-> 3.50  Procal 9.92    WBC increasing from 10 on admit to 19.1 now  Cr 0.76 on admission now 1.5    Microbiology     BC and UC sent, pending, no growth to date   Imaging     CXR with pulm edema and proper IJ placement and [place] catheter

## 2017-09-10 NOTE — H&P
[facility]       MICU History and Physical  Charlene Douglas   MRN: [id number]  : 1950  Date of Admission:2017  Primary care provider: Donna Shirley [phone]           Chief Complaint  Shortness of Breath        History of Present Illness  Charlene Douglas is our 66 yo woman with PMH of HTN and recently diagnosed colon cancer with liver metastases who presented to the ED with shortness of breath.     Briefly, she was recently diagnosed with the colon cancer following several months of worsening fatigue, and found to have the cancer on routine colonoscopy. She is being cared for by [facility]. She had port placed , 8 days ago, and just completed a 46 hour Folfox chemotherapy treatment that was removed at 1300 , the afternoon prior to presentation.    She reports that she had been weak, fatigued, and dizzy throughout all of this, but acutely worsened with worsening shortness of breath and fatigue today. Additionally, she has been intermittently febrile to near 100F. She presented with SOB at rest and has become progressively more incoherent. Other symptoms include some dry mouth, and nausea (treated with compazine). No chest pain, vomiting. No PMHx of DVT or PE's.    Has also had poor appetite, and poor fluid intake. EMS was called due to her worsening mentation, and she was given 500 ml NS en route to the ED.     In the ED, EKG was notable for sinus tachycardia. LA was 2.1. Blood cultures were sent. CXR with diffuse pulmonary edema. Noted to be in respiratory distress with poor oxygenation. Responded poorly to fluid bolus.    WBC notable for anemia to 9.7.  Sodium of 133, potassium of 3.2, with normal creatinine. AP of 492 (known to be elevated) with ALT, AST of 39, 68 respectively. UA negative for infection. BNP was found to be 5000.    Due to worsening respiratory status, patient was intubated and transferred to [facility] MICU for further management with concern for new onset  heart failure (last echo prior to chemotherapy with normal EF of 60-65%). Nitroglycerin was started for afterload reduction prior to transfer and lasix 40 mg was given.     History is obtained from the chart review and from .  ROS as stated in HPI.        Past Medical History  Past Medical History:   Diagnosis Date     Hypertension      NO ACTIVE PROBLEMS     Metastatic colon cancer with mets to the liver.         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     COLONOSCOPY N/A 2017    Procedure: COMBINED COLONOSCOPY, SINGLE OR MULTIPLE BIOPSY/POLYPECTOMY BY BIOPSY;;  Surgeon: Duane, William Charles, MD;  Location: [facility] OR     COLONOSCOPY WITH CO2 INSUFFLATION N/A 2017    Procedure: COLONOSCOPY WITH CO2 INSUFFLATION;  BMI: 25.2  Referring: Donna Mota  Diagnoses: Positive FIT (fecal immunochemical test)  Special screening for malignant neoplasms, colon  Pharmacy: [facility] Fax: [phone];  Surgeon: Duane, William Charles, MD;  Location: [facility] OR     EXAM UNDER ANESTHESIA, ULTRASOUND N/A 2017    Procedure: EXAM UNDER ANESTHESIA, ULTRASOUND;  Ultrasound Liver Biopsy;  Surgeon: GENERIC ANESTHESIA PROVIDER;  Location: [facility] OR     HIP SURGERY      14 years old     INSERT PORT VASCULAR ACCESS N/A 2017    Procedure: INSERT PORT VASCULAR ACCESS;  Port placement;  Surgeon: Vinny Peterson DO;  Location:  OR              Social History  Social History     Social History     Marital status:      Spouse name: Praveen Nobles)     Number of children: 2     Years of education: 14     Occupational History           [employer]     Social History Main Topics     Smoking status: Never Smoker     Smokeless tobacco: Never Used     Alcohol use No      Comment: 1 per week      Drug use: No     Sexual activity: Yes     Partners: Male     Birth control/ protection: Surgical, Male Surgical      Comment:  had a vasectomy     Other Topics Concern      Service No     Blood Transfusions Yes     1962     Caffeine Concern No     Occupational Exposure No     Hobby Hazards No     Sleep Concern No     Stress Concern No     Weight Concern Yes     gradual weight gain     Special Diet No     Back Care Yes     low back pain occasionally     Exercise No     Bike Helmet No     n/a     Seat Belt Yes     uses seatbelts 100%     Self-Exams Yes     does monthly breast exams     Social History Narrative             Family History  Family History   Problem Relation Age of Onset     C.A.D. Mother      quad- bypass at age 78     CEREBROVASCULAR DISEASE Mother      in her 70's     Lipids Mother      Hypertension Mother      Other - See Comments Mother      Triple Bi-pass     Circulatory Father      abd aneurysm     GASTROINTESTINAL DISEASE Father      diverticulitis     Hypertension Father      Family history reviewed          Allergies  Allergies   Allergen Reactions     No Known Allergies              Medications  Prescriptions Prior to Admission   Medication Sig Dispense Refill Last Dose     potassium chloride SA (K-DUR/KLOR-CON M) 10 MEQ CR tablet Take 1 tablet (10 mEq) by mouth daily 30 tablet 1 9/8/2017 at 1800     LORazepam (ATIVAN) 0.5 MG tablet Take 1 tablet (0.5 mg) by mouth every 4 hours as needed (Anxiety, Nausea/Vomiting or Sleep) 30 tablet 2 Past Month at Unknown time     prochlorperazine (COMPAZINE) 10 MG tablet Take 1 tablet (10 mg) by mouth every 6 hours as needed (Nausea/Vomiting) 30 tablet 2 Past Week at Unknown time     ondansetron (ZOFRAN) 8 MG tablet Take 1 tablet (8 mg) by mouth every 8 hours as needed (Nausea/Vomiting) 10 tablet 2 Past Week at Unknown time     lidocaine-prilocaine (EMLA) cream Apply thick amount to port area 30-60 minutes prior to port access.  Do not rub in.   Cover with plastic wrap/tegaderm. 30 g 3      metoprolol (TOPROL-XL) 25 MG 24 hr tablet Take 1 tablet (25 mg) by mouth daily 30 tablet 0 9/9/2017 at 0800     ACETAMINOPHEN PO Take by mouth every 4 hours as needed for pain   Past Month at Unknown time     Benzonatate (TESSALON PERLES PO) Take 200 mg by mouth 3 times daily as needed for cough   More than a month at Unknown time     PROAIR  (90 BASE) MCG/ACT inhaler    More than a month at Unknown time     benzonatate (TESSALON) 200 MG capsule    More than a month at Unknown time     guaiFENesin-codeine (ROBITUSSIN AC) 100-10 MG/5ML SOLN solution Take 5 mLs by mouth every 4 hours as needed for cough 120 mL 0 9/8/2017 at 2200     omeprazole (PRILOSEC) 20 MG CR capsule Take 1 capsule (20 mg) by mouth daily 30 capsule 1 9/8/2017 at 0800     ferrous sulfate (IRON) 325 (65 FE) MG tablet Take 1 tablet (325 mg) by mouth 3 times daily (with meals) 90 tablet 1 Past Week at Unknown time             Vitals  Temp:  [98.8  F (37.1  C)-100.4  F (38  C)] 99.1  F (37.3  C)  Pulse:  [147] 147  Heart Rate:  [111-172] 132  Resp:  [12-48] 14  BP: ()/() 139/79  FiO2 (%):  [50 %-100 %] 50 %  SpO2:  [86 %-100 %] 97 %        Ventilator  Ventilation Mode: CMV/AC  FiO2 (%): 50 %  Rate Set (breaths/minute): 12 breaths/min  Tidal Volume Set (mL): 500 mL  PEEP (cm H2O): 10 cmH2O  Oxygen Concentration (%): 100 %  Resp: 14            Intake/Output  Intake/Output Summary (Last 24 hours) at 09/10/17 0148  Last data filed at 09/10/17 0000   Gross per 24 hour   Intake                0 ml   Output              150 ml   Net             -150 ml            Physical Exam  Gen: Intubated,   HEENT: AT/ NC, PERRL b/l,  sclera anicteric  PULM/THORAX: Coarse breath sounds anteriorly, no rales/rhonchi/wheezes  CV:tachycardic , S1 and S2 appreciated, no extra heart sounds, murmurs or rub auscultated. No JVD  ABD: obese, soft, nontender, nondistended. Normoactive bowel sounds x 4, no HSM  appreciated.  EXT: warm, 1+ edema, clubbing or cyanosis. No asymmetrical edema or tenderness   SKIN: Capillary refill normal. Unable to appreciate rashes    LINES: port in place. Two peripheral IV's.         ROUTINE ICU LABS (Last four results)    CMP    Recent Labs  Lab 09/10/17  0040 09/09/17  1900 09/07/17  0825   * 133 132*   POTASSIUM 4.0 3.2* 3.1*   CHLORIDE 94 95 95   CO2 21 25 28   ANIONGAP 16* 13 9   * 141* 138*   BUN 23 17 9   CR 1.22* 0.76 0.70   GFRESTIMATED 44* 76 84   GFRESTBLACK 53* >90 >90   RON 7.8* 7.7* 8.1*   PROTTOTAL  --  6.7* 7.0   ALBUMIN  --  1.8* 1.8*   BILITOTAL  --  0.5 0.5   ALKPHOS  --  492* 598*   AST  --  68* 57*   ALT  --  39 33     CBC    Recent Labs  Lab 09/10/17  0040 09/09/17  1900 09/07/17  0825   WBC 19.1* 10.0 12.8*   RBC 4.35 3.80 2.79*   HGB 11.4* 9.7* 6.7*   HCT 36.0 31.2* 22.4*   MCV 83 82 80   MCH 26.2* 25.5* 24.0*   MCHC 31.7 31.1* 29.9*   RDW 18.0* 18.2* 17.5*   * 448 565*     INR    Recent Labs  Lab 09/10/17  0133   INR 1.28*     Arterial Blood Gas    Recent Labs  Lab 09/10/17  0424 09/10/17  0316 09/10/17  0111 09/10/17  0040   PH  --   --  7.28*  --    PCO2  --   --  43  --    PO2  --   --  118*  --    HCO3  --   --  20*  --    O2PER 50 60 80 80        Microbiology  Blood Cx sent, Urine Cx pending           Imaging  CXR September 10, 2017  Bilateral pulmonary edema        Assessment and Plan  Charlene Douglas is our 68 yo woman with PMH of HTN and recently diagnosed colon cancer with liver metastases s/p first dose of FOLFOX chemotherapy prior to presentation who presented to the ED with shortness of breath. Found to have CXR with new pulmonary edema and BNP of 5000 concerning for new onset heart failure.    Neurologic  #. Sedation: intubated with succ and etomidate. Given ativan en route, but required ketamine drip prior to arrival. Initially began with propofol and fentanyl ggt, but became hypotensive due to combination of propofol, fentanyl, nitro  and lasix. Switched to versed due to hypotension  -- continue versed ggt, will attempt to switch to propofol once pressures can tolerate  -- continue fentanyl ggt with bumps.    Cardiovascular  #. Non-ischemic cardiomyopathy with cardiogenic shock: CXR with pulmonary edema, gradual worsening shortness of breath and new BNP of 5000 in setting of recent chemotherapy. Persistently tachycardic with EKG consistent with sinus tach. Normal ECHO with EF OF 60-65% in August. Discussed with cardiology fellow, Dr. Castillo, who conducted bedside ECHO and found with EF of <30%. Unclear etiology though given timing, concerning for chemotherapy related. 5FU infusion has been known to cause some cardiotoxicity. Possible that this is a regional dependent dysfunction with preserved RV function (suggestive against PE).   -- trend lactic acid q4h at this time (now downtrending 2 -> 6 -> 4).  -- troponin still uptrending, no EKG changes on repeat EKG (2 -> 3.5). Will trend until downtrending  -- cardiology following, will obtain [place]-natalee catheterization for more accurate hemodynamic measurement  -- continue nitro ggt for afterload reduction as tolerated  -- due to hypotension, will have dopamine for pressor support for combined ionotropic and blood pressure support  -- no beta blockade or calcium channel blockers at this time.  -- will attempt to diurese - s/p 40 mg IV lasix in the ED.  -- will give additional IV lasix now.  -- given fevers, will treat for underlying infection as source of tachycardia.    Respiratory  #. Pulmonary edema due to acute onset non-ischemic cardiomyopathy: intubated due to worsening shortness of breath and concern for patient tiring out. Post-intubation ABG and VBG wnl.  -- continue mechanical ventilation  -- will diurese as able.  -- s/p 40 mg IV lasix x2  -- strict I/O's  -- continue CMV mechanical ventilation    Ventilation Mode: CMV/AC  FiO2 (%): 50 %  Rate Set (breaths/minute): 12 breaths/min  Tidal  Volume Set (mL): 500 mL  PEEP (cm H2O): 10 cmH2O  Oxygen Concentration (%): 100 %  Resp: 14     Gastrointestinal  #. Stage IV Colon cancer: involves the ascending colon near ileocecal valve with extensive hepatic involvement. Also with possible pulmonary nodules consistent with metastatic disease on PET scan 8/25/2017. S/p one infusion of FOLFOX treatment which includes folinic acid, flurouracil and oxaliplatin.   -- hematology/oncology consult in the am to discuss goals of care, given the possibility that patient is unable to tolerate chemotherapy moving forward given the acute cardiomyopathy  -- monitor CMP given known elevation of transaminases    Genitourinary  Continue sheriff catheterization for strict I/O's while sedated.    Infectious Disease  #. Fevers: has had on and off fevers to 100 since initiating chemotherapy.  -- Blood Cx, Urine Cx pending (9/9-present)  -- UA without signs of infection. Likely either chemotherapy related, though given fragile state, will treat for underlying infection with vancomycin and cefepime.  -- will obtain procalcitonin and CRP at this time to have for trending.    Hematology  #. Stage IV Colon cancer: involves the ascending colon near ileocecal valve with extensive hepatic involvement. Also with possible pulmonary nodules consistent with metastatic disease on PET scan 8/25/2017. S/p one infusion of FOLFOX treatment which includes folinic acid, flurouracil and oxaliplatin.   -- hematology/oncology consult in the am to discuss goals of care, given the possibility that patient is unable to tolerate chemotherapy moving forward given the acute cardiomyopathy  -- monitor CMP given known elevation of transaminases    #. Chronic anemia: hx of anemia over the past year to 8-9's in setting of metastatic colon cancer. Suspect due anemia of chronic disease/iron deficiency with borderline normocytic anemia (80) in setting of known colon cancer.   -- 9.7 - 11.4 Hgb here.  -- continue to  trend    #. Leukocytosis: prior to admission with WBC in the 12.8 range. Presented with WBC of 10 then acutely celi to 19.1. Probably stress response, though cannot rule out infection at this time.  -- Blood cultures pending  -- on broad spectrum antibiotics at this time.     Endocrine  No acute endocrine issues at this time    Renal/Electolytes/FEN  #. ELKIN: creatinine acutely elevated from 0.76 -> 1.22. Likely due to underlying cardiogenic shock with elevated creatinine. Possible with cardiorenal syndrome though RV function is normal. Likely due to hypoperfusion due to severe LH hypokinesis.  -- will work on treating cardiogenic shock per cardiology as above.  -- trend BMP with daily creatinine.    Skin Care  Per nursing    PPX: given one dose of lovenox, but will hold at this time due to swan-catheter placement. Continue with ranitidine 50 mg q8h for GI prophylaxis    CODE: Full    Family:  bill updated at bedside. Understandably tearful, but knows patient has an advanced directive that her daughters will bring in.    Disposition Plan   Expected discharge unclear due to critical status. Given patient's metastatic colon cancer with poor response to initial chemotherapy, goals of care discussion needs to be held with family in regards to how to proceed. Hope is that patient can have her cardiogenic shock corrected so that she is able to spend her remaining days with family outside of the hospital.   prior living arrangement once stable.     Entered: Dustin Oliver 09/10/2017, 5:35 AM       Patient to be discussed with staff attending, Dr. Wells.  Please feel free to page with questions.    Dustin Oliver MD  Medicine-Pediatrics PGY3  [phone]

## 2017-09-10 NOTE — CONSULTS
HEMATOLOGY/ONCOLOGY CONSULT NOTE  Charlene Douglas  : 1950 AGE: 67 year old  MRN: [id number]  DOS: 9/10/2017      REASON FOR CONSULTATION:   Possible chemotherapy induced heart failure    HPI / COURSE    Patient is intubated, sedated, and unable to provide any history. History is obtained from  and chart review.    Charlene Douglas is a 67 year old yo female with PMHx of HTN and recently diagnosed stage 4 colon cancer to the liver, who initially presented to [facility] ER for shortness of breath. She was started on chemotherapy with modified FOLFOX and bevacizumab on 2017. She finished 5-FU infusion around 1pm on  and developed shortness of breath around 5pm. She developed respiratory failure and intubated. She was transferred to [facility] ICU for further care. Workup reveals that she's in cardiogenic shock with an EF of 25% (baseline at 60%~65% on 2017). She underwent PA catheter placement,  remains intubated, and is on pressors. Hem/Onc service was consulted for possible 5-FU induced heart failure.    ROS:  Unable to obtain.    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     COLONOSCOPY N/A 2017    Procedure: COMBINED COLONOSCOPY, SINGLE OR MULTIPLE BIOPSY/POLYPECTOMY BY BIOPSY;;  Surgeon: Duane, William Charles, MD;  Location: [facility] OR     COLONOSCOPY WITH CO2 INSUFFLATION N/A 2017    Procedure: COLONOSCOPY WITH CO2 INSUFFLATION;  BMI: 25.2  Referring: Donna Mota  Diagnoses: Positive FIT (fecal immunochemical test)  Special screening for malignant neoplasms, colon  Pharmacy: [facility] Fax: [phone];  Surgeon: Duane, William Charles, MD;   Location: [facility] OR     EXAM UNDER ANESTHESIA, ULTRASOUND N/A 8/28/2017    Procedure: EXAM UNDER ANESTHESIA, ULTRASOUND;  Ultrasound Liver Biopsy;  Surgeon: GENERIC ANESTHESIA PROVIDER;  Location: [facility] OR     HIP SURGERY      14 years old     INSERT PORT VASCULAR ACCESS N/A 9/1/2017    Procedure: INSERT PORT VASCULAR ACCESS;  Port placement;  Surgeon: Vinny Peterson DO;  Location: [facility] OR       SOCIAL / FAMILY HISTORY  Social History     Social History     Marital status:      Spouse name: Praveen Nobles)     Number of children: 2     Years of education: 14     Occupational History           [employer]     Social History Main Topics     Smoking status: Never Smoker     Smokeless tobacco: Never Used     Alcohol use No      Comment: 1 per week     Drug use: No     Sexual activity: Yes     Partners: Male     Birth control/ protection: Surgical, Male Surgical      Comment:  had a vasectomy     Other Topics Concern      Service No     Blood Transfusions Yes     1962     Caffeine Concern No     Occupational Exposure No     Hobby Hazards No     Sleep Concern No     Stress Concern No     Weight Concern Yes     gradual weight gain     Special Diet No     Back Care Yes     low back pain occasionally     Exercise No     Bike Helmet No     n/a     Seat Belt Yes     uses seatbelts 100%     Self-Exams Yes     does monthly breast exams     Social History Narrative       Family History   Problem Relation Age of Onset     C.A.D. Mother      quad- bypass at age 78     CEREBROVASCULAR DISEASE Mother      in her 70's     Lipids Mother      Hypertension Mother      Other - See Comments Mother      Triple Bi-pass     Circulatory Father      abd aneurysm     GASTROINTESTINAL DISEASE Father      diverticulitis     Hypertension Father        MEDICATIONS  No current outpatient prescriptions on file.     Prescriptions Prior to Admission   Medication Sig Dispense Refill Last Dose      potassium chloride SA (K-DUR/KLOR-CON M) 10 MEQ CR tablet Take 1 tablet (10 mEq) by mouth daily 30 tablet 1 9/8/2017 at 1800     LORazepam (ATIVAN) 0.5 MG tablet Take 1 tablet (0.5 mg) by mouth every 4 hours as needed (Anxiety, Nausea/Vomiting or Sleep) 30 tablet 2 Past Month at Unknown time     prochlorperazine (COMPAZINE) 10 MG tablet Take 1 tablet (10 mg) by mouth every 6 hours as needed (Nausea/Vomiting) 30 tablet 2 Past Week at Unknown time     ondansetron (ZOFRAN) 8 MG tablet Take 1 tablet (8 mg) by mouth every 8 hours as needed (Nausea/Vomiting) 10 tablet 2 Past Week at Unknown time     lidocaine-prilocaine (EMLA) cream Apply thick amount to port area 30-60 minutes prior to port access.  Do not rub in.  Cover with plastic wrap/tegaderm. 30 g 3      metoprolol (TOPROL-XL) 25 MG 24 hr tablet Take 1 tablet (25 mg) by mouth daily 30 tablet 0 9/9/2017 at 0800     ACETAMINOPHEN PO Take by mouth every 4 hours as needed for pain   Past Month at Unknown time     Benzonatate (TESSALON PERLES PO) Take 200 mg by mouth 3 times daily as needed for cough   More than a month at Unknown time     PROAIR  (90 BASE) MCG/ACT inhaler    More than a month at Unknown time     benzonatate (TESSALON) 200 MG capsule    More than a month at Unknown time     guaiFENesin-codeine (ROBITUSSIN AC) 100-10 MG/5ML SOLN solution Take 5 mLs by mouth every 4 hours as needed for cough 120 mL 0 9/8/2017 at 2200     omeprazole (PRILOSEC) 20 MG CR capsule Take 1 capsule (20 mg) by mouth daily 30 capsule 1 9/8/2017 at 0800     ferrous sulfate (IRON) 325 (65 FE) MG tablet Take 1 tablet (325 mg) by mouth 3 times daily (with meals) 90 tablet 1 Past Week at Unknown time     No current outpatient prescriptions on file.       ALLERGIES  Allergies   Allergen Reactions     No Known Allergies        PHYSICAL EXAMINATION:  /82  Temp 101.9  F (38.8  C) (Tympanic)  Resp 12  Wt 67.4 kg (148 lb 9.4 oz)  LMP 06/07/2007  SpO2 100%  BMI 23.6  kg/m2  GEN: Intubated and sedated   HEENT: NCAT, pupils sluggish  NECK: No LAD  RESP: Clear to auscultation anteriorly, no wheezing  GI: Soft, nondistended  SKIN/MSK: No edema, swelling, rash & bruises.  NEURO: Sedated    PERTINENT LABS  CBC   Lab Results   Component Value Date/Time    WBC 19.1 (H) 09/10/2017 12:40 AM    HGB 11.4 (L) 09/10/2017 12:40 AM    HCT 36.0 09/10/2017 12:40 AM     (H) 09/10/2017 12:40 AM    MCV 83 09/10/2017 12:40 AM    RBC 4.35 09/10/2017 12:40 AM    ANEU 8.3 09/09/2017 07:00 PM       BMP  Lab Results   Component Value Date/Time     09/10/2017 03:43 PM    POTASSIUM 5.2 09/10/2017 03:43 PM    CHLORIDE 98 09/10/2017 03:43 PM    CO2 24 09/10/2017 03:43 PM    BUN 33 (H) 09/10/2017 03:43 PM    CR 1.51 (H) 09/10/2017 03:43 PM    RON 7.7 (L) 09/10/2017 03:43 PM    MAG 2.5 (H) 09/10/2017 03:43 PM       LFTs   Lab Results   Component Value Date    PROTTOTAL 6.7 (L) 09/09/2017    ALBUMIN 1.8 (L) 09/09/2017    AST 68 (H) 09/09/2017    ALT 39 09/09/2017    BILITOTAL 0.5 09/09/2017    DBIL 0.2 08/15/2017    ALKPHOS 492 (H) 09/09/2017       PERTINENT IMAGING & INVESTIGATIONS  Xr Chest Port 1 View    Result Date: 9/10/2017  Findings: 2 AP views of the chest. Repositioning of the right IJ Guinda-Onel catheter with the tip over the right main pulmonary artery. Endotracheal tube tip projects over the midthoracic trachea. Right IJ Port-A-Cath tip projects over the mid SVC. Gastric tube tip and sidehole project over the stomach. Cardiac silhouette is within normal limits. Stable pulmonary vascular congestion. Left pleural effusion with left basilar atelectasis/consolidation.     Impression: 1. Right IJ Guinda-Onel catheter tip projects over the right main pulmonary artery. 2. Pulmonary vascular congestion with probable mild pulmonary edema and/or atelectasis versus infection. 3. Left pleural effusion with associated basilar atelectasis/consolidation. I have personally reviewed the examination and  initial interpretation and I agree with the findings. RADHA GOMES MD    Pet Oncology (eyes To Thighs)    Result Date: 8/25/2017  FINDINGS: Normal physiologic uptake is identified within the salivary glands, myocardium, kidneys, ureters and bladder. Scattered areas of physiologic bowel uptake are also present. NECK: Lymph nodes: No pathologic activity. Additional findings: None. CHEST: Lungs: There are 3 small bilateral pulmonary nodules, with the largest in the left lower lobe posteriorly abutting the pleura (series 3 image 132) measuring 0.7 cm. Smaller pulmonary nodules are noted in the right upper lobe (series 3 image 105) and in the left upper lobe (series 3 image 122). These nodules are too small for accurate PET characterization, but appear to demonstrate some associated hypermetabolic activity, and are considered highly suspicious for metastatic disease. Lymph nodes: No pathologic activity. Additional findings: Atherosclerotic calcification of the thoracic aorta and coronary arteries. Small hiatal hernia. ABDOMEN/PELVIS: Hepatobiliary: There are extensive hypermetabolic liver metastases, with the largest in the posterior segment of the right hepatic lobe inferiorly, measuring 10.1 x 6.5 cm, SUV max 38.9. Spleen: No pathologic activity. Pancreas: No pathologic activity. Kidneys: No pathologic activity. Adrenals: No pathologic activity. Reproductive: No pathologic activity. Gastrointestinal: Hypermetabolic mass in the cecum near the ileocecal valve is difficult to measure due to its irregular shape, but measures approximately 4.5 cm, with SUV max 37.5. Lymph nodes: There is hypermetabolic mesenteric adenopathy, consistent with metastatic disease. For example, hypermetabolic lobular lymph node mass in the mesentery of the right lower quadrant (series 3 image 304) measures 3.9 x 3.7 cm, SUV max 44.1. Additional findings: None. SKELETON: No pathologic activity.     IMPRESSION: 1. Large hypermetabolic mass in  the ascending colon near the ileocecal valve is consistent with colonic malignancy. 2. There is extensive hypermetabolic hepatic metastatic disease. 3. Hypermetabolic mesenteric adenopathy is also consistent with metastatic disease. 4. Three subcentimeter pulmonary nodules are too small for accurate PET characterization, but are considered highly suspicious for metastatic disease.    IMPRESSION:  Acute heart failure/cardiogenic shock  Likely related to the chemo FOLFOX regimen given the time course of her disease. 5-FU is known to cause coronary vasospasm in ~5% of patients, particularly in those with underlying cardiac disease. This patient has history of hypertension, and her last Echo on 8/9/17 showed normal EF of 60~65% with grade 1 diastolic dysfunction. The sudden decrease of EF to 25% and elevated troponin is likely due to coronary vasospasm due to 5-FU; however, other causes of acute heart failure should be excluded.    Stage 4 colon cancer with liver mets  If patient recovers from this cardiogenic shock, 5-FU should not be given in the future chemo regimen. Further treatment plan will depend of her disease course. To follow up with primary oncologist Dr. Dove.    RECS:  - Treat cardiogenic shock as per primary team and cardiology  - Add 5-FU to patient's allergy list; 5-FU should not be given to patient in the future    Met with patient's  and niece for about 10 mins and reviewed the above. Case discussed directly with ICU attending physician Dr. Olivares and Sabra GARCIA.    We appreciate the opportunity to participate in the medical care of Charlene Douglas. Patient was seen and discussed w/ Dr. Tavarez.    Min Krishna MD/PhD  Hem/Onc fellow      ATTENDING PHYSICIAN ADDENDUM: Please see my separate note for details.  Donavan Tavarez MD PhD

## 2017-09-10 NOTE — PROCEDURES
Pulmonary artery catheter    Consent obtained, timeout performed with identification of procedure and location. Sedation was given with versed drip and fentanyl 25 mcg x1. Access was obtained with venous return observed. There was initial difficulty advancing the wire due to the distal location of the central port. Using an angiocath catheter the wire was then advanced, a scalper was used to nick the skin, and a 9 Chinese double lumen introducer was advanced over the wire.    The pulmonary artery catheter brought onto the filled and all ports were flushed in the usual manner, it was then advanced under pressure guidance and right atrial, right ventricular, pulmonary, and wedge pressures were obtained. Balloon was deflated and PA catheter was secured at 50 cm. Correct position of the catheter was obtained with a plain chest film with distal tip of the catheter in the proximal right PA.    PA catheter was zeroed and the following pressures were obtained:    PA 44/28/34  PCWP 14    Mixed venous was sent

## 2017-09-10 NOTE — ED NOTES
LATE ENTRY Due to PT care.     Assumed cares of pt at 1915, bedside report received from Garret LANE.  Pt up to the bedside commode.  Pt complaining of increased shortness of breath. Urine sample collected and sent to lab.  Pt breathing through her mouth, oxygen saturations 86-88%.  Changed pt to an oxymask from the nasal cannula.  Pt continued to have feeling of increased shortness of breath.  Pt states that she feels that her heart is racing in her chest.  Updated provider.  Order for Ativan, medication administered.  Pt continued to feel SOB.  Provider in to see pt.  Pt placed on Bipap per provider.  Administered 0.8 mg of sublingual Nitro.  IV lasix administered.  Samuels catheter placed.  Nitro drip started and an additional 0.8 mg of sublingual Nitro administered.  Maintain blood pressures of systolic above 100.  Blood pressures every 5 minutes.  Bipap set to 100% oxygen.  Pt continued to struggle with SOB.  Position change multiple times for pt comfort.  Provider in to speak with pt and her  about intubation.  Both were in agreement.  Pt prepared for intubation.  Mario BECKER placed a 7.5 fr ET tube.  Placement verified with capnography and lung ausculation.  16 FR OG placed- 60cm. ET tube secured.  Multiple times pt was suction via the ET tube for fluid. EMS present.  Sun LANE was given report.  Family present throughout.  Pt tolerated.  Report given to EMS.  Updated [facility].    Etomidate 20 mg administered at 2126  Succinylcholine 150 mg administered at 2127  ET tube 22 at the lip  Vecuronium 8 mg administered at 2130  Ativan 1 mg administered at 2131

## 2017-09-10 NOTE — PLAN OF CARE
Problem: Goal Outcome Summary  Goal: Goal Outcome Summary  Outcome: No Change  D: Hypoxia. I/A: Pt arrived from [facility] to [facility] MICU overnight. Pt arrived intubated, sedated with ketamine, and on a nitroglycerine gtt for HTN/afterload reduction. Pt now remains intubated on PEEP of 10 and FiO2 50% and titrating down. Nitro was turned off and dopamine turned on for hypotension and low venous oxyhemoglobins. Ketamine was changed to propofol initially but was changed to versed because of hypotension. Fentanyl gtt infusing for pain. Tachy to 140s on arrival. Now HR 100s- 110s. OG in place. P: Monitor for changes in condition.

## 2017-09-10 NOTE — CONSULTS
ATTENDING PHYSICIAN ADDENDUM AND NOTE:  The patient s case was evaluated by me separate from the inpatient oncology fellow, Dr. Krishna. The note above reflects my assessment and plan. I have personally reviewed lab results, and vital and radiology results. >50% of time was spent in assessment and coordination of care.  Mrs. Charlene Douglas is a 68-year-old woman recently diagnosed with metastatic/stage IV colon adenocarcinoma, with numerous hepatic metastases. She s under the care of medical oncologist Dr. Dove at [facility], and was admitted overnight to the ICU with cardiogenic shock. The ICU team has requested consultation from the medical oncology team due to the patient s diagnosis of cancer, recent initiation of chemotherapy, and request a comment on whether or not the patient s current condition is due to coronary vasospasm secondary chemotherapy.  FOLFOX combination chemotherapy is a standard of care form of treatment for first-line palliative treatment of metastatic colorectal cancer. This regimen comprises bolus administration of 5-FU over two hours, followed by a 46 hour continuous infusion of 5-FU via the port as an outpatient; the second component of this regimen is oxaliplatin, which is administered over  minutes on the first day of each cycle. Mrs. Douglas initiated first treatment with this combination therapy three days ago, 9/7/17. Per my discussion with the patient s  and niece today, the 5-FU pump was completed and appropriately disconnected yesterday Saturday 9/9 between 12-1pm; Mrs. Douglas developed sudden precipitous decline around 5 PM, leading to emergent evaluation ultimately transferred to the [facility] ICU where she is currently. I discussed with the patient s  and niece, and also in a separate conversation with ICU attending physician Dr. Olivares, that coronary vasospasm is a known potential side effect of 5-FU, usually occurring in approximately 3 to 8% of patients, with  at risk potentially being higher in patients with a history of cardiovascular disease to the CAD, prior MI, or others. The patient has a known history of hypertension and is on low-dose beta blocker, and her  did confirm she does not have a known history of CAD, or other cardiovascular risk factors. It would be appropriate to request consultation from the cardiology/cardio oncology team as well, for further input and management of guidance from the cardiology perspective. Elevation of troponin is consistent with 5-FU -induced coronary vasospasm, and other causes of troponin elevation should also be thoroughly considered and excluded. If her current situation is truly due to coronary vasospasm, then treatment from the ICU perspective should be to treat cardiogenic shock as would normally be appropriate; from the oncology perspective, further administration 5-FU would not be advised. I reviewed all the above in-depth with the patient s  and niece, and they stated understanding.  Donavan Tavarez MD, PhD   of Medicine   Division of Hematology, Oncology, and Transplantation

## 2017-09-10 NOTE — PROGRESS NOTES
MICU PROGRESS NOTE    Charlene Douglas MRN: [id number]  1950  Date of Admission:9/9/2017  Primary care provider: Donna Shirley     Assessment & Plan     Charlene Douglas is our 66 yo woman with PMH of HTN and recently diagnosed colon cancer with liver metastases s/p first dose of FOLFOX chemotherapy prior to presentation who presented to the ED with shortness of breath. Found to have CXR with new pulmonary edema and BNP of 5000 concerning for new onset heart failure.     Neurologic  # sedation/pain  -- continue versed ggt w/ PRNs  -- continue fentanyl ggt w/ PRNs     Cardiovascular  # non-ischemic cardiomyopathy with cardiogenic shock  CXR with pulmonary edema, gradual worsening shortness of breath and new BNP of 5000 in setting of recent chemotherapy. Persistently tachycardic with EKG consistent with sinus tach. Normal ECHO with EF OF 60-65% in August.  Unclear etiology though given timing, concerning for chemotherapy related. 5FU infusion has been known to cause some cardiotoxicity.  Other possible cause would be tachycardia/stress induced cardiomyopathy.  Repeat ECHO 9/10 with EF of 15 - 20% and severe diffuse hypokinesis. Troponin peaked at 3.5.  Lactate resolved.    -- cardiology consulted, appreciate recommendations   -- hemodynamics monitoring Q4H  -- VBG w/ oxyhemoglobin Q4H  -- dobutamine gtt  -- no calcium channel blockers at this time  -- monitoring CVP -> if > 12 consider additional diuresis  -- given fevers, will treat for underlying infection as source of tachycardia     Respiratory  # pulmonary edema   Due to acute onset non-ischemic cardiomyopathy.   -- vent setting  Ventilation Mode: CMV/AC  FiO2 (%): 40 %  Rate Set (breaths/minute): 12 breaths/min  Tidal Volume Set (mL): 450 mL  PEEP (cm H2O): 8 cmH2O  Oxygen Concentration (%): 40 %  Resp: 12     Gastrointestinal  # stage IV Colon cancer  Involves the ascending colon near ileocecal valve with extensive  hepatic involvement. Also with possible pulmonary nodules consistent with metastatic disease on PET scan 8/25/2017. S/p one infusion of FOLFOX treatment which includes folinic acid, flurouracil and oxaliplatin.   -- hematology/oncology consult in the am to discuss goals of care, given the possibility that patient is unable to tolerate chemotherapy moving forward given the acute cardiomyopathy  -- monitor CMP given known elevation of transaminases     Genitourinary  Continue sheriff catheterization for strict I/O's while sedated.     Infectious Disease  # fevers  Has had on and off fevers to 100 since initiating chemotherapy.  UA without signs of infection. Likely either chemotherapy related, though given fragile state, will treat for underlying infection with vancomycin and cefepime.  Procal elevated at 9.92.    -- blood cx and urine cx pending    Antibiotic History:  Vancomycin (9/10 - present)   Cefepime (9/10 - present)     Hematology  # stage IV Colon cancer  See GI above.      # chronic anemia  Hx of anemia over the past year to 8-9's in setting of metastatic colon cancer. Suspect due anemia of chronic disease/iron deficiency with borderline normocytic anemia (80) in setting of known colon cancer.   -- continue to trend     # leukocytosis  Prior to admission with WBC in the 12.8 range. Presented with WBC of 10 then acutely celi to 19.1. Probably stress response, though cannot rule out infection at this time.  -- blood culture pending  -- abx as above     Endocrine  No acute endocrine issues at this time.      Renal/Electolytes/FEN  # ELKIN  Creatinine acutely elevated from 0.76 -> 1.22. Likely due to underlying cardiogenic shock with elevated creatinine. Possible with cardiorenal syndrome? Likely due to hypoperfusion due to severe LH hypokinesis.  -- trend BMP with daily creatinine  -- avoid nephrotoxic medications    # electrolytes  - tend and replace PRN  - goal K > 4, Mg > 2 (not on lytes replacement)     Skin  "Care  Per nursing    DVT PPX: heparin 5,000 units Q8H  GI PPX: ranitidine 50 mg IV Q12H  CODE: full  Family: updated at bedside    Patient seen and discussed with Dr. Olivares.    Julianne Kraus MD, MS  Internal Medicine, PGY-2     Interval Events     Family at bedside this morning.  Patient intubated and sedated.  Changed from dopamine to dobutamine this afternoon.        Ventilator     Ventilation Mode: CMV/AC  FiO2 (%): 40 %  Rate Set (breaths/minute): 12 breaths/min  Tidal Volume Set (mL): 450 mL  PEEP (cm H2O): 8 cmH2O  Oxygen Concentration (%): 40 %  Resp: 12        Physical Exam     Vital Signs:  Temp: 101.9  F (38.8  C) Temp src: Tympanic BP: 134/75   Heart Rate: 142 Resp: 12 SpO2: 100 % O2 Device: Mechanical Ventilator     Weight: 67.4 kg (148 lb 9.4 oz)  Estimated body mass index is 23.6 kg/(m^2) as calculated from the following:    Height as of 9/1/17: 1.69 m (5' 6.54\").    Weight as of this encounter: 67.4 kg (148 lb 9.4 oz).      Intake/Output:  Date 09/10/17 0700 - 09/11/17 0659   Shift 5463-9988 4699-9667 9848-7203 24 Hour Total   I  N  T  A  K  E   I.V. 416.14 161.68  577.82    Shift Total  (mL/kg) 416.14  (6.17) 161.68  (2.4)  577.82  (8.57)   O  U  T  P  U  T   Urine 1485 200  1685    Shift Total  (mL/kg) 1485  (22.03) 200  (2.97)  1685  (25)   Weight (kg) 67.4 67.4 67.4 67.4          General: intubated,sedated  Neuro: moving extremities when sedation is weaned  HEENT: NC/AT, sclera anicteric, ETT in place  Pulmonary: equal breath sounds bilaterally, no rales/rhonchi/wheezes  Cardiovascular: tachycardic  Abdomen: soft, nontender, nondistended  Extremity: warm and well perfused, no edema  Skin: no visible rashes or lesion    Lines:   R swan    Labs   Reviewed.      Microbiology   Reviewed.   Imaging     Reviewed.              "

## 2017-09-10 NOTE — PROGRESS NOTES
ICU Staff    I examined patient and reviewed data. My findings are as noted in Dr. Oliver's note of today, which represents our mutually-derived assessment and plan.    Patient is in ICU due to respiratory decompensation. She was intubated and transferrred to [facility] from [facility]. She has a relatively new diagnosis of colon cancer metastatic to the liver and received her first course of FOLFOX 6 about 10 days ago. Her last ECHO showed an EF of 65%, and echo today at bedside showed EF of 25%.    Date of service: 9/10/17    I discussed patient's issues with outside ED physician, cardiology fellow and staff, bedside nurse. I had multiple conversations thru the night with patient's .    Problems/Plan:  Cardiogenic shock likely due to flourouracil toxicity. Cards input much appreciated (they placed swan and started pt on dopamine).  Resp failure secondary to above: vent support  Metastatic colon cancer   tells me that his wife had written about her wishes with regards to ongoing support. His daughters will bring these documents in today so we can have a further discussion regarding goals of care. I explained to the  that our first goal is to help her survive this episode if this is congruent with her wishes, then we can have a discussion of whether or not chemotherapy is an option in the future.    Critical Care time: 1 hour, multiple visits    Luigi Wells MD  [phone] pager  [phone] cell

## 2017-09-11 NOTE — PLAN OF CARE
Problem: Goal Outcome Summary  Goal: Goal Outcome Summary  Outcome: Improving  Neuro: Arouses to voice, follows commands appropriately. Pupils equal and reactive at 2mm.  Cardiac: Normal sinus rhythm/Sinus tachycardia. Dubotamine drip at 2mcg/min. [place] Onel patent at 50 cm (see flowsheet for values).  Resp: Lung sounds coarse. ET 7.5, RR 12, PEEP 6,   GI: No BM or gas. Abdomen rounded w/ faint, hypoactive bowel sounds  : Samuels patent w/ taylor, sediment output.   Skin: Skin WDL  Muscle/Mobility: Turning q2h, Pt able to assist with turns intermittently.   Pain: Fentanly at 100mcg/hr, Versed at 8mg/hr  Lines/Drains: R double lumen IJ, R [place] onel, R port, R PIV x2     PLAN: Monitor hemodynamic status, plan to titrate dobutamine tomorrow if stable over night. Pressure support trial in the morning.

## 2017-09-11 NOTE — PROGRESS NOTES
D/I:  Pt well sedated.  Will open eyes and follows commands.  Fentanyl gtt @ 100mcgs and Versed gtt @ 8mg, with PRN bumps for restlessness and agitation. Dobutamine gtt @4mcg.  HR decreased as shift progressed from 130s to 110s.  MAPs >65 throughout shift.  CVP 10's, PAP 30/20s. Restraints and mitts in place to prevent pulling at lines and ETT. Daughter (Radha) @ bedside throughout night. Hgb drop from 11.4 to 8.2 and Plt drop from 566 to 231, MD notified.    A: Decreased U/O.  Urine cloudy, yellow.   P: Continue to monitor and follow plan of care.  Q4hr hemodynamic monitoring.

## 2017-09-11 NOTE — PROGRESS NOTES
MICU PROGRESS NOTE    Charleen Douglas MRN: [id number]  1950  Date of Admission:9/9/2017  Primary care provider: Donna Shirley     Assessment & Plan     Charlene Doulgas is our 68 yo woman with PMH of HTN and recently diagnosed colon cancer with liver metastases s/p first dose of FOLFOX chemotherapy prior to presentation who presented to the ED with shortness of breath. Found to have CXR with new pulmonary edema and BNP of 5000 concerning for new onset heart failure.     Changes Today  -  Wean Dobutamine from 4 to 2    Neurologic  # sedation/pain  -- continue versed ggt w/ PRNs  -- continue fentanyl ggt w/ PRNs     Cardiovascular  # non-ischemic cardiomyopathy with cardiogenic shock  CXR with pulmonary edema, gradual worsening shortness of breath and new BNP of 5000 in setting of recent chemotherapy. Persistently tachycardic with EKG consistent with sinus tach. Normal ECHO with EF OF 60-65% in August.  Unclear etiology though given timing, concerning for chemotherapy related. 5FU infusion has been known to cause some cardiotoxicity.  Other possible cause would be tachycardia/stress induced cardiomyopathy.  Repeat ECHO 9/10 with EF of 15 - 20% and severe diffuse hypokinesis. Troponin peaked at 3.5.  Lactate resolved.   -- Cardiology consulted, appreciate recommendations   -- hemodynamics monitoring Q4H  -- VBG w/ oxyhemoglobin Q4H  -- dobutamine gtt, rate to 2 from 4  - Repeat TTE ordered for am of 9/12  -- no calcium channel blockers at this time  -- monitoring CVP -> if > 12 consider additional diuresis  -- given fevers, will treat for underlying infection as source of tachycardia     Respiratory  # pulmonary edema   Due to acute onset non-ischemic cardiomyopathy.   -- vent setting  Ventilation Mode: CMV/AC  FiO2 (%): 40 %  Rate Set (breaths/minute): 12 breaths/min  Tidal Volume Set (mL): 450 mL  PEEP (cm H2O): 6 cmH2O  Oxygen Concentration (%): 40 %  Resp:  12     Gastrointestinal  # stage IV Colon cancer  Involves the ascending colon near ileocecal valve with extensive hepatic involvement. Also with possible pulmonary nodules consistent with metastatic disease on PET scan 8/25/2017. S/p one infusion of FOLFOX treatment which includes folinic acid, flurouracil and oxaliplatin.   -- Hematology/oncology consulted, advised treatment of currently cardiogenic shock then can evaluate overall status, plan to not use 5-FU in future  -- monitor CMP given known elevation of transaminases     Genitourinary  Continue sheriff catheterization for strict I/O's while sedated.     Infectious Disease  # fevers  Has had on and off fevers to 100 since initiating chemotherapy.  UA without signs of infection. Likely either chemotherapy related, though given fragile state, will treat for underlying infection with vancomycin and cefepime.  Procal elevated at 9.92.    -- blood cx and urine cx pending    Antibiotic History:  Vancomycin (9/10 - present)   Cefepime (9/10 - present)     Hematology  # stage IV Colon cancer  See GI above.      # chronic anemia  Hx of anemia over the past year to 8-9's in setting of metastatic colon cancer. Suspect due anemia of chronic disease/iron deficiency with borderline normocytic anemia (80) in setting of known colon cancer.   -- continue to trend     # leukocytosis  Prior to admission with WBC in the 12.8 range. Presented with WBC of 10 then acutely celi to 19.1. Probably stress response, now 12.5, though cannot rule out infection at this time.  -- blood culture pending  -- abx as above     Endocrine  No acute endocrine issues at this time.      Renal/Electolytes/FEN  # ELKIN  Creatinine acutely elevated from 0.76 -> 1.22. Likely due to underlying cardiogenic shock with elevated creatinine. Possible with cardiorenal syndrome? Likely due to hypoperfusion due to severe LH hypokinesis. Has since peaked 1.72 and trending down.  -- trend BMP with daily creatinine  --  "avoid nephrotoxic medications    # electrolytes  - tend and replace PRN  - goal K > 4, Mg > 2 (not on lytes replacement)     Skin Care  Per nursing    DVT PPX: heparin 5,000 units Q8H  GI PPX: ranitidine 50 mg IV Q12H  CODE: full  Family: updated at bedside    Patient seen and discussed with Dr. Melissa.    Anais Paredes MD PhD  Internal Medicine PGY-3  [phone]       Interval Events     No acute events overnight. Updated family in morning at bedside. Members of ICU and Cardiac teams by during course of day. Dobutamine decreased in afternoon.      Ventilator     Ventilation Mode: CMV/AC  FiO2 (%): 40 %  Rate Set (breaths/minute): 12 breaths/min  Tidal Volume Set (mL): 450 mL  PEEP (cm H2O): 6 cmH2O  Oxygen Concentration (%): 40 %  Resp: 12        Physical Exam     Vital Signs:  Temp: 98.9  F (37.2  C) Temp src: Tympanic BP: 119/67   Heart Rate: 109 Resp: 12 SpO2: 100 % O2 Device: Mechanical Ventilator     Weight: 67.4 kg (148 lb 9.4 oz)  Estimated body mass index is 23.6 kg/(m^2) as calculated from the following:    Height as of 9/1/17: 1.69 m (5' 6.54\").    Weight as of this encounter: 67.4 kg (148 lb 9.4 oz).      Intake/Output:  Date 09/10/17 0700 - 09/11/17 0659   Shift 0558-2539 4057-1215 3960-5346 24 Hour Total   I  N  T  A  K  E   I.V. 416.14 161.68  577.82    Shift Total  (mL/kg) 416.14  (6.17) 161.68  (2.4)  577.82  (8.57)   O  U  T  P  U  T   Urine 1485 200  1685    Shift Total  (mL/kg) 1485  (22.03) 200  (2.97)  1685  (25)   Weight (kg) 67.4 67.4 67.4 67.4        General: intubated,sedated  Neuro: Sedated, not witnessed to move extremities  HEENT: NC/AT, sclera anicteric, ETT in place  Pulmonary: equal breath sounds bilaterally, no rales/rhonchi/wheezes  Cardiovascular: tachycardic  Abdomen: soft, nontender, nondistended  Extremity: warm and well perfused, no edema  Skin: no visible rashes or lesion    Lines:   R kunan    Labs   Reviewed.      Microbiology   Reviewed.   Imaging     Reviewed.  "

## 2017-09-11 NOTE — PLAN OF CARE
Problem: Restraint for Non-Violent/Non-Self-Destructive Behavior  Goal: Prevent/Manage Potential Problems  Maintain safety of patient and others during period of restraint.  Promote psychological and physical wellbeing.  Prevent injury to skin and involved body parts.  Promote nutrition, hydration, and elimination.   Pt has bilateral soft wrist restraints d/t pt making attempts to pull at ETT and lines while unrestrained.  Pt and family education on the need for restraints.   Continue to monitor for need of restraints.

## 2017-09-11 NOTE — PROGRESS NOTES
CLINICAL NUTRITION SERVICES - ASSESSMENT NOTE     Nutrition Prescription    RECOMMENDATIONS FOR MDs/PROVIDERS TO ORDER:  If pt's diet cannot be advanced within the next 1-2 days, would recommend initiating nutrition support (enteral nutrition). If this is indicated, please send appropriate nutrition consult (RD to Assess/Order TF per MNT protocol)    Malnutrition Status:    Severe malnutrition in the context of acute illness    Recommendations already ordered by Registered Dietitian (RD):  None at this time    Future/Additional Recommendations:  1. If diet advances, order calorie count, MVI-M (pending MD approval), and offer ONS    2. If EN is indicated, would recommend feeding via OGT: TwoCal HN @ 10 mL/hr and adv per MD discretion to GOAL 40 ml/hr (960 ml/day) to provide 1920 kcals (29 Kcals/kg/day),  81 g PRO ( 1.2 gms/kg/day),  672 ml free H2O, 210 g CHO and 5 g Fiber daily.  -15 mL liquid MVI for micronutrient needs  -30 mL water flush q4h for tube patency    3. If PN is indicated, would recommend:  --Use dosing weight 67 kg  --Begin CPN, goal volume 1200 ml/day (or per MD/PharmD discretion) with initial 140g Dex daily (GIR = 1.5 mg/kg/min), 110g AA daily, and 250 ml 20% IV lipids 5x/week.  Micro/Rx: standard regimen per PharmD  --ONLY once pt receives ~100% of initial continuous PN volume with K+/Mg++/Phos WNL, advance PN dex by 20 g every 1-2 days (pending lytes/Glu and Pharm D/RD discretion) to initial goal of 160g Dex to increase provisions to 1341 kcals (20 kcal/kg/day), 1.6 g PRO/kg/day, GIR = 1.7 mg/kg/min with 27% kcals from Fat.       REASON FOR ASSESSMENT  Charlene Douglas is a/an 67 year old female assessed by the dietitian for Admission Nutrition Risk Screen for reduced oral intake over the last month    NUTRITION HISTORY  Per pt's family, she was eating poorly (</= 50% normal intake) for the past month related to poor appetite likely r/t chemo. Pt intubated and sedated during interview    Recently  "diagnosed colon cancer with liver metastases, receiving chemo    CURRENT NUTRITION ORDERS  Diet: NPO  Intake/Tolerance: N/A    LABS  9/10: CRP = 260 (H)    9/11:  BUN = 44 (H)  Creatinine = 1.63 (H)    MEDICATIONS  Medications reviewed    ANTHROPOMETRICS  Height: 5'7\"  Most Recent Weight: 67.4 kg (148 lb 9.4 oz)    IBW: 67.3 kg  BMI: 24; Normal BMI  Weight History:   Wt Readings from Last 10 Encounters:   09/09/17 67.4 kg (148 lb 9.4 oz)   09/09/17 67.1 kg (148 lb)   09/07/17 67.2 kg (148 lb 3.2 oz)   09/01/17 67.2 kg (148 lb 1.6 oz)   08/31/17 67.2 kg (148 lb 1.6 oz)   08/17/17 69.4 kg (153 lb)   08/23/17 68.2 kg (150 lb 4.8 oz)   08/22/17 68 kg (150 lb)   08/22/17 68 kg (150 lb)   08/21/17 68.8 kg (151 lb 9.6 oz)   2.3 kg, 3% weight loss over the past month  Dosing Weight: 67 kg (actual based on lowest admit wt of 67.4 kg on 9/9, 100% of IBW 67.3 kg)    ASSESSED NUTRITION NEEDS  Estimated Energy Needs: 9457-5817 kcals/day (25 - 30 kcals/kg)  Justification: Maintenance  Estimated Protein Needs:  grams protein/day (1.2 - 1.5+ grams of pro/kg)  Justification: Increased needs  Estimated Fluid Needs: (1 mL/kcal)   Justification: Per provider pending fluid status    PHYSICAL FINDINGS  See malnutrition section below.  Poor skin turgor     MALNUTRITION  % Intake: </=75% for >/= 1 month (severe)  % Weight Loss: Up to 5% in 1 month (non-severe)  Subcutaneous Fat Loss: Facial region, Upper arm and Lower arm:  Mild  Muscle Loss: Temporal, Thoracic region (clavicle, acromium bone, deltoid, trapezius, pectoral), Upper arm (bicep, tricep), Lower arm  (forearm), Upper leg (quadricep, hamstring), Patellar region and Posterior calf:  Mild-Moderate  Fluid Accumulation/Edema: None noted  Malnutrition Diagnosis: Severe malnutrition in the context of acute illness    NUTRITION DIAGNOSIS  Inadequate protein-energy intake related to poor appetite as evidenced by pt family reports of poor PO intake over the past month, 3% weight " loss over the past month, now intubated without plans to start nutrition support      INTERVENTIONS  Implementation  Nutrition Education: Provided education on RD role in hospital to family   Collaboration with other providers - MICU rounds  Enteral Nutrition - Recs  Parenteral Nutrition/IV Fluids - Recs     Goals  Diet adv v nutrition support within 1-2 days.  Total avg nutritional intake to meet a minimum of 25 kcal/kg and 1.2 g PRO/kg daily (per dosing wt 67 kg).     Monitoring/Evaluation  Progress toward goals will be monitored and evaluated per protocol.    Rosario Friend, FREDDYN, LD  Pgr: [phone]

## 2017-09-11 NOTE — PHARMACY-VANCOMYCIN DOSING SERVICE
Pharmacy Vancomycin Note  Date of Service 2017  Patient's  1950   67 year old, female    Indication: Sepsis  Goal Trough Level: 15-20 mg/L  Day of Therapy: 2  Current Vancomycin regimen:  1500 mg (22.3mg/kg) IV q24h    Current estimated CrCl = Estimated Creatinine Clearance: 36.6 mL/min (based on Cr of 1.63).    Creatinine for last 3 days  2017:  7:00 PM Creatinine 0.76 mg/dL  9/10/2017: 12:40 AM Creatinine 1.22 mg/dL;  8:06 AM Creatinine 1.50 mg/dL; 11:05 AM Creatinine 1.45 mg/dL;  3:43 PM Creatinine 1.51 mg/dL;  8:35 PM Creatinine 1.63 mg/dL  2017: 12:36 AM Creatinine 1.72 mg/dL;  4:07 AM Creatinine 1.72 mg/dL;  8:17 AM Creatinine 1.63 mg/dL    Recent Vancomycin Levels (past 3 days)  2017:  4:07 AM Vancomycin Level 14.1 mg/L (24 hours post dose)    Vancomycin IV Administrations (past 72 hours)                   vancomycin (VANCOCIN) 1,500 mg in NaCl 0.9 % 250 mL intermittent infusion (mg) 1,500 mg New Bag 17 1017    vancomycin (VANCOCIN) 1,500 mg in NaCl 0.9 % 250 mL intermittent infusion (mg) 1,500 mg New Bag 09/10/17 0419                Nephrotoxins and other renal medications (Future)    Start     Dose/Rate Route Frequency Ordered Stop    17 0930  vancomycin (VANCOCIN) 1,500 mg in NaCl 0.9 % 250 mL intermittent infusion      1,500 mg Intravenous EVERY 18 HOURS 17 0902               Contrast Orders - past 72 hours (72h ago through future)    Start     Dose/Rate Route Frequency Ordered Stop    09/10/17 0845  perflutren diluted 1mL to 2mL with saline (OPTISON) diluted injection 4 mL      4 mL Intravenous ONCE 09/10/17 0838 09/10/17 0845          Interpretation of levels and current regimen:  Trough level is  Subtherapeutic  Has serum creatinine changed > 50% in last 72 hours: Yes  Urine output:  good urine output (~ 0.3-0.55 ml/kg/hr)  Renal Function: Stable ELKIN    Plan:  1.  Will change dose to 1500mg IV Q18h  2.  Pharmacy will check trough levels as  appropriate in 1-3 days or sooner if renal function worsens.    3. Serum creatinine levels will be ordered daily.      Pebbles Dawson, PharmD  [phone]        .

## 2017-09-11 NOTE — PLAN OF CARE
Problem: Goal Outcome Summary  Goal: Goal Outcome Summary  D/I:  Pt sedated well on 5 of Versed and 25 of fentanyl.  Tachycardic in 110s, BP stable.  On dopamine gtt at start of shift, per cardiology - weaned dopamine off and started dobutamine.  HR increasingly tachycardic throughout shift, now 140s.  SvO2, CO and CI decreasing throughout shift.  Multiple ABGs and VBGs done throughout shift, see results flowsheets.  CVP 10, diuresed this AM, good urine output, now 50mL/hr out.  Pt becoming more agitated, needing to titrate versed up to 8mg and giving PRN boluses.  PRN fentanyl boluses given also.  Per cardiology - keep pt comfortable and well sedated.  Now titrating dobutamine per cardiology recs.  A/P:  Verify dobutamine weaning/orders with MDs.  If BP drops, notify MDs.  Keep pt as sedated as possible for now.  Hemodynamics q4h.

## 2017-09-11 NOTE — PLAN OF CARE
Problem: Restraint for Non-Violent/Non-Self-Destructive Behavior  Goal: Prevent/Manage Potential Problems  Maintain safety of patient and others during period of restraint.  Promote psychological and physical wellbeing.  Prevent injury to skin and involved body parts.  Promote nutrition, hydration, and elimination.   Pt will occasionally attempt to reach for lines and tubes. Upper limb soft restraint order renewed and continued for today. Will discontinue when appropriate.

## 2017-09-12 NOTE — PROGRESS NOTES
[facility]  Hematology Oncology Progress Note      Patient: Charlene Douglas MRN# [id number]   Age: 67 year old YOB: 1950           Assessment and Plan     Stage IV colon cancer metastasis to liver, s/p FOLFOX cycle 1 day 1 9/7  Acute pulmonary edema from acute cardiac decompensation    Acute cardiac decompensation could rarely occur from possible coronary vasospasm 2/2 5FU. Given the time course of cardiac manifestation (development of pulmonary edema shortly after completion of 5FU), it would be a potential culprit. If other causes are ruled out and coronary vasosplasm is most likely the etiology, further use of 5FU would be completely prohibited.     Currently, the patient is continued to be managed by ICU team in regard to cardiogenic acute pulmonary edema. Assessments and recommendations are directly communicate with ICU team. Care plans discussed with Dr. Tavarez.    Se dereck Morrow  Hematology Oncology and Transplantation Fellow  Pager: [phone]      Interval History   On dobutamine. No events reported overnight.       Current Facility-Administered Medications   Medication     vancomycin (VANCOCIN) 1,500 mg in NaCl 0.9 % 250 mL intermittent infusion     potassium chloride (KLOR-CON) Packet 40 mEq     ceFEPIme (MAXIPIME) 2 g vial to attach to  ml bag for ADULTS or 50 ml bag for PEDS     midazolam (VERSED) 100 mg in sodium chloride 0.9% 100 mL infusion     midazolam (VERSED) injection 1-3 mg     ranitidine (ZANTAC) injection 50 mg     heparin sodium PF injection 5,000 Units     DOBUTamine 500 mg in dextrose 5% 250 mL (adult std)     naloxone (NARCAN) injection 0.1-0.4 mg     fentaNYL (PF) (SUBLIMAZE) injection  mcg     fentaNYL (SUBLIMAZE) infusion           Physical Exams     /70  Temp 99.7  F (37.6  C) (Tympanic)  Resp 12  Wt 67.4 kg (148 lb 9.4 oz)  LMP 06/07/2007  SpO2 100%  BMI 23.6 kg/m2  Wt Readings from Last 3 Encounters:   09/09/17 67.4  kg (148 lb 9.4 oz)   09/09/17 67.1 kg (148 lb)   09/07/17 67.2 kg (148 lb 3.2 oz)          Labs     CBC  ===  WBC (10e9/L)   Date Value   09/11/2017 12.5 (H)     Hemoglobin (g/dL)   Date Value   09/11/2017 8.2 (L)     Platelet Count (10e9/L)   Date Value   09/11/2017 231       BMP  ===  Sodium (mmol/L)   Date Value   09/11/2017 135     Potassium (mmol/L)   Date Value   09/11/2017 3.7     Urea Nitrogen (mg/dL)   Date Value   09/11/2017 45 (H)     Creatinine (mg/dL)   Date Value   09/11/2017 1.45 (H)     Calcium (mg/dL)   Date Value   09/11/2017 7.9 (L)       LFTs  ====  Bilirubin Total (mg/dL)   Date Value   09/11/2017 0.6     Alkaline Phosphatase (U/L)   Date Value   09/11/2017 315 (H)     AST (U/L)   Date Value   09/11/2017 107 (H)     ALT (U/L)   Date Value   09/11/2017 29               Images     Results for orders placed or performed during the hospital encounter of 09/09/17   XR Chest Port 1 View    Narrative    XR CHEST PORT 1 VW 9/10/2017 3:16 AM    Comparison: 9/9/2017    History: Status post intubation    Findings: Single AP view of the chest. Endotracheal tube tip projects  over the midthoracic trachea. Right chest wall Port-A-Cath tip  projects over the mid SVC. Gastric tube tip and sidehole project over  the stomach. Cardiac silhouette is mildly prominent with indistinct  pulmonary vasculature. Left greater than right layering pleural  effusions. Patchy bilateral airspace opacities. Mildly improved  interstitial thickening. Relatively gasless upper abdomen.      Impression    Impression:   1. Endotracheal tube projects over the midthoracic trachea. Gastric  tube tip and sidehole project over the stomach.  2. Mild cardiomegaly and indistinct pulmonary vasculature patchy  bilateral airspace opacities, likely representing pulmonary edema.  Differential includes infection.  3. Left greater than right pleural effusions.    I have personally reviewed the examination and initial interpretation  and I agree with  the findings.    RADHA GOMES MD   XR Chest Port 1 View    Narrative    XR CHEST PORT 1 VW 9/10/2017 6:40 AM    Comparison: 9/10/2017    History: Central line    Findings: Single AP view of the chest, supine. Endotracheal tube tip  projects over the midthoracic trachea. Gastric tube tip and sidehole  project over the stomach. Right chest wall Port-A-Cath tip projects  over the mid SVC. Right IJ central venous catheter tip projects over  the right brachiocephalic vein. Cardiac silhouette is within normal  limits. Pulmonary vascular congestion. Patchy perihilar airspace  disease. Left pleural effusion with associated basilar  atelectasis/consolidation.      Impression    Impression:   1. Right IJ central venous catheter tip projects over the right  innominate vein.  2. Pulmonary vascular congestion with perihilar airspace opacities  likely representing pulmonary edema and/or atelectasis, versus  infection.  3. Left pleural effusion with associated basilar  atelectasis/consolidation.    I have personally reviewed the examination and initial interpretation  and I agree with the findings.    RADHA GOMES MD   XR Chest Port 1 View    Narrative    XR CHEST PORT 2 VW 9/10/2017 6:42 AM    Comparison: 9/10/2017    History: swan cath    Findings: 2 AP views of the chest. Repositioning of the right IJ  [place]-Onel catheter with the tip over the right main pulmonary artery.  Endotracheal tube tip projects over the midthoracic trachea. Right IJ  Port-A-Cath tip projects over the mid SVC. Gastric tube tip and  sidehole project over the stomach. Cardiac silhouette is within normal  limits. Stable pulmonary vascular congestion. Left pleural effusion  with left basilar atelectasis/consolidation.      Impression    Impression:   1. Right IJ [place]-Onel catheter tip projects over the right main  pulmonary artery.  2. Pulmonary vascular congestion with probable mild pulmonary edema  and/or atelectasis versus infection.  3. Left pleural  effusion with associated basilar  atelectasis/consolidation.    I have personally reviewed the examination and initial interpretation  and I agree with the findings.    RADHA GOMES MD         ATTENDING PHYSICIAN ADDENDUM AND NOTE:  The patient s case was evaluated by me separate from the inpatient oncology fellow, Dr. Morrow. The note above reflects my assessment and plan. I have personally reviewed lab results, and vital and radiology results. >50% of time was spent in assessment and coordination of care.  Mrs. Charlene Douglas is a 68-year-old woman recently diagnosed with metastatic/stage IV colon adenocarcinoma, with numerous hepatic metastases. She s under the care of medical oncologist Dr. Dove at [facility], and was admitted the  night of 9/9/17 to the ICU with cardiogenic shock.   We assessed the patient s updated status on Monday 9/11/17, and she remains intubated and sedated. We discussed the patient s condition, different diagnosis, and evaluation with the ICU team including attending physician Dr Melissa. [id number] coronary vasospasm is essentially diagnosis of exclusion, thus unless an alternate cause of the patient s current condition can be stated with strong confidence, it is not recommended that the patient be re-challenged with 5-FU treatment in the future.  Donavan Tavarez MD, PhD   of Medicine   Division of Hematology, Oncology, and Transplantation

## 2017-09-12 NOTE — PROGRESS NOTES
SPIRITUAL HEALTH SERVICES  SPIRITUAL ASSESSMENT Progress Note  [facility] ([place] 4C     Follow-up visit with pt and family at bedside. Pt on vent, awake. Family very encouraging towards pt. Prayer was shared, along with a sung blessing.    PLAN: continue to follow, visiting at least 2x weekly while pt on unit.    Praveen Garcia M.Div (Bill)., [facility]  Staff   Pager [phone]

## 2017-09-12 NOTE — PROGRESS NOTES
MICU PROGRESS NOTE    Charlene Douglas MRN: [id number]  1950  Date of Admission:9/9/2017  Primary care provider: Donna Shirley     Assessment & Plan     Charlene Douglas is a 66 yo woman with PMH of HTN and recently diagnosed colon cancer with liver metastases s/p first dose of FOLFOX chemotherapy prior to presentation who presented to the ED with shortness of breath. Found to have CXR with new pulmonary edema and BNP of 5000 concerning for new onset heart failure.     Changes Today  -  D/c dobutamine  - Pull [place] Onel cath  - Lasix 40mg IV x1  - Wean sedation  - PST  - D/c cefepime, vanco  - Extubate    Neurologic  # sedation/pain  -- continue versed ggt w/ PRNs, wean for sedation vacation  -- continue fentanyl ggt w/ PRNs, wean for sedation vacation     Cardiovascular  # Non-ischemic cardiomyopathy with cardiogenic shock  CXR with pulmonary edema, gradual worsening shortness of breath and new BNP of 5000 in setting of recent chemotherapy. Persistently tachycardic with EKG consistent with sinus tach. Normal ECHO with EF of 60-65% in August.  Unclear etiology though given timing, concerning for chemotherapy related. 5FU infusion has been known to cause some cardiotoxicity.  Other possible cause would be tachycardia/stress induced cardiomyopathy.  Repeat ECHO 9/10 with EF of 15 - 20% and severe diffuse hypokinesis. Troponin peaked at 3.5.  Lactate resolved. Repeat TTE 9/12 with much improved EF to 35-40%.    -- Cardiology consulted, appreciate recommendations   -- No calcium channel blockers at this time  -- Discontinue dobutamine 9/12/17  -- Pull SG cath 9/12/17  -- Start metoprolol XL 12.5mg 9/13/17  -- Once MAP at steady state and renal function stable/improved will start lisinopril 2.5mg  -- Cards follow-up in clinic for medication optimization in 2-4 weeks     Respiratory  # Pulmonary edema   Due to acute onset non-ischemic cardiomyopathy.   -- Vent setting  -- Daily PST,  failed today due to apnea  Ventilation Mode: CMV/AC  FiO2 (%): 35 %  Rate Set (breaths/minute): 12 breaths/min  Tidal Volume Set (mL): 450 mL  PEEP (cm H2O): 6 cmH2O  Pressure Support (cm H2O): 8 cmH2O  Oxygen Concentration (%): 35 %  Resp: 12     Gastrointestinal  # Stage IV Colon cancer  Involves the ascending colon near ileocecal valve with extensive hepatic involvement. Also with possible pulmonary nodules consistent with metastatic disease on PET scan 8/25/2017. S/p one infusion of FOLFOX treatment which includes folinic acid, flurouracil and oxaliplatin.   -- Hematology/oncology consulted, advised treatment of currently cardiogenic shock then can evaluate overall status, plan to not use 5-FU in future  -- Monitor CMP given known elevation of transaminases     Genitourinary  Continue sheriff catheterization for strict I/O's while sedated.     Infectious Disease  # Fevers, resolved  Had on and off fevers to 100 after initiating chemotherapy. UA without signs of infection. Likely either chemotherapy related, though given fragile state, initially treated for underlying infection with vancomycin and cefepime.  Procal elevated at 9.92 on presentation, though this could be secondary to malignancy.  WBC initially elevated   -- Following blood/urine cxs as below    Antibiotic History:  Vancomycin (9/10 - 9/12/17)   Cefepime (9/10 - 9/12/17)     Hematology  # stage IV Colon cancer  See GI above.      # Chronic anemia  Hx of anemia over the past year to 8-9's in setting of metastatic colon cancer. Suspect due anemia of chronic disease/iron deficiency with normocytic anemia in setting of known colon cancer.   -- Continue to trend     # Leukocytosis  Prior to admission with WBC in the 12.8 range. Presented with WBC of 10 then acutely celi to 19.1. Probably stress response, now 8.6, unlikely to be infection.  -- Blood culture pending  -- Abx as above     Endocrine  No acute endocrine issues at this time.  "     Renal/Electolytes/FEN  # ELKIN, improving  Creatinine elevated from baseline. Likely due to underlying cardiogenic shock. Possible  cardiorenal syndrome? Now trending down.  -- Trend  creatinine  -- Avoid nephrotoxic medications    # Electrolytes  -- Replace PRN  -- Goal K > 4, Mg > 2 (not on lytes replacement)     Skin Care  Per nursing    DVT PPX: heparin 5,000 units Q8H  GI PPX: ranitidine 50 mg IV Q12H  CODE: FULL   Family: updated at bedside    Patient seen and discussed with Dr. Melissa.    Keiry Wade MD  Internal Medicine PGY-2  [phone]       Interval Events     No acute events overnight. Cards performed bedside echo yesterday evening and noted improved EF, so dobutamine was decreased. This morning dobutamine was turned off per cardiology recs. Patient still very sleepy this morning.       Ventilator     Ventilation Mode: CMV/AC  FiO2 (%): 35 %  Rate Set (breaths/minute): 12 breaths/min  Tidal Volume Set (mL): 450 mL  PEEP (cm H2O): 6 cmH2O  Pressure Support (cm H2O): 8 cmH2O  Oxygen Concentration (%): 35 %  Resp: 12        Physical Exam     Vital Signs:  Temp: 98.7  F (37.1  C) Temp src: Tympanic BP: 144/75   Heart Rate: 78 Resp: 12 SpO2: 100 % O2 Device: Mechanical Ventilator     Weight: 67.4 kg (148 lb 9.4 oz)  Estimated body mass index is 23.6 kg/(m^2) as calculated from the following:    Height as of 9/1/17: 1.69 m (5' 6.54\").    Weight as of this encounter: 67.4 kg (148 lb 9.4 oz).      Intake/Output:    Intake/Output Summary (Last 24 hours) at 09/12/17 1825  Last data filed at 09/12/17 1800   Gross per 24 hour   Intake           999.13 ml   Output             1725 ml   Net          -725.87 ml     General: intubated,sedated  Neuro: Sedated, but arouses, follows commands, purposeful movements  HEENT: NC/AT, sclera anicteric, ETT in place  Pulmonary: equal breath sounds bilaterally, no rales/rhonchi/wheezes  Cardiovascular: RRR  Abdomen: soft, nontender, nondistended  Extremity: warm and well " perfused, no edema  Skin: no visile rashes or lesion    Lines:   R swan    Labs   Notable for:   WBC 8.6  Hgb 7.7  Cultures: NGTD     Microbiology   Reviewed.   Imaging     TTE:   Interpretation Summary  Moderately (EF 35-40%) reduced left ventricular function is present. Traced at  38%. Moderate diffuse hypokinesis is present.  Poorly visualized, but RV function appears to be mildly reduced.  The right ventricular systolic pressure is approximated at 29.2 mmHg plus the  right atrial pressure. Pulmonary artery systolic pressure is normal.  Dilation of the inferior vena cava is present with abnormal respiratory  variation in diameter. Unable to assess mean RA pressure given the patient is  on a ventilator.  No pericardial effusion is present.  Compared to study dated 9/10/2017, left ventricular systolic function has  improved.

## 2017-09-12 NOTE — PROGRESS NOTES
D/I:  Overnight vented and sedated on versed and fentanyl gtts.  Opened eyes to voice, followed commands (wiggled toes bilaterally, squeezed hands and let go, moved arms and legs to command, lifted head off pillow), nodded head appropriately to questions.    Dobutamine gtt decreased to 1 mcg/kg/min at 2222 last night per Cards fellow.  Per cards fellow echo was done at 2200 last night and EF was 50-55%, so gtt was decreased.  MICU was notified of echo and request for gtt to be decreased.  MARY calcs performed q4hrs (see flowsheets for numbers).  Labs monitored q4hrs.    At 0200 sheriff output=10cc (sediment has been in urine), unable to flush sheriff, so sheriff was changed and 100cc of urine was noted immediately upon sheriff insertion.    A:  Hemodynamically stable overnight.  No further issues with sheriff catheter.  0400 labs- Cr=1.31 (trending down), hgb=7.7 (trending down and MICU notified), and yoe=172 (trending down and MICU notified).      P: Continue to monitor.  See flowsheets for details.  Plan is to pressure support this am at 0800 (verified PS trial with MICU since pt had not pressure supported yesterday and was stable overnight).

## 2017-09-12 NOTE — PROGRESS NOTES
[facility], [place]     Extubation Procedure Note     Patient extubated at: September 12, 2017, 6:31 PM   Supplemental Oxygen: Via nasal cannula at 4 liters per minute   Cough: The cough is productive   Secretion Mode: PRN suction with assistance   Secretion Amount: Moderate amount, thick and clear in color   Respiratory Exam:: Breath sounds: good aeration and crackles     Location: left side   Skin Exam:: Patient color: jaundiced   Patient Status: Currently appears comfortable   Arterial Blood Gasses: pH Arterial (pH)   Date Value   09/12/2017 7.42     pO2 Arterial (mm Hg)   Date Value   09/12/2017 135 (H)     pCO2 Arterial (mm Hg)   Date Value   09/12/2017 35     Bicarbonate Arterial (mmol/L)   Date Value   09/12/2017 22              Recorded by Cristiano Campbell

## 2017-09-12 NOTE — PLAN OF CARE
Problem: Restraint for Non-Violent/Non-Self-Destructive Behavior  Goal: Prevent/Manage Potential Problems  Maintain safety of patient and others during period of restraint.  Promote psychological and physical wellbeing.  Prevent injury to skin and involved body parts.  Promote nutrition, hydration, and elimination.   Outcome: No Change  D/I:  Sedated and vented.  Followed commands, but reached for ETT when restraints were released for turns, so restraints were kept on.  Reassurance and reminders given frequently.   A:  Due to high risk for self extubation restraints are necessary.  P:  Continue to monitor need for restraints.  See flowsheets for details.

## 2017-09-12 NOTE — PLAN OF CARE
Problem: Goal Outcome Summary  Goal: Goal Outcome Summary  Outcome: Improving  Pt pressure supported multiple times this shift, but appeared too lethargic and alarmed for apnea. When attempted at 1600, pt remained hemodynamically stable, was easily arousable, follows commands, and did not have any periods of apnea. ABG after one hour of pressure support was WNLs. Pt remains intubated at this time. Order received to extubate; awaiting RRT for same. Remains hemodynamically stable at this time. Restraints D/C'd after 0800 assessment, as pt was calm and cooperative. Repositioned [id number]. Family at bedside throughout shift and updated as MDs rounded. Continuing to monitor.

## 2017-09-12 NOTE — PROGRESS NOTES
Cardiology Progress Note    Overnight/subj:   - Decrease  overnight     VS reviewed: Notable for temp: AF, HR , MAP , oxygenating on 100% on /  [place]: CVP 9, PA 36/24 (pwp 22), CO 3.2 (5.7), SVR 15, SVO2 63    Wt: on admit 67.4,  I/O: 1.0 in / 895 out yday. Since  in / 615 out    Drips:    1    PO Cardiac Meds: None  Other meds: Cefepime, vanco, SQH, ranitidine    Radiology Imaging  No new images     Cardiology Studies:  No new studies     Labs: Reviewed in epic    Phys exam:  GEN: NAD  Pulm: ctab  Cardiac: , rrr, no murmurs  Vascular: - MARIO and +2 pulses  GI: soft, non distended    ASSESSMENT/PLAN:  Charlene Douglas is a 66yo F with stage 4 CRC undergoing FOLFOX + bevacizumab chemotherapy who developed acute SOB while receiving 5-FU infusion leading to resp failure and intubation found to have low EF on TTE with subsequent HD collapse concerning for cardiogenic shock. Hemodynamics improved with inotropic support confirming suspicion for cardiogenic shock. Patient then developed sepsis wit low SVR. CO/CI have maintained with dobutamine wean furthermore patient tte show recovery in cardiac function.      # Cardiogenic Shock   # Acute Cardiomyopathy (recoving) (ddx stress induce, tachycardia, vasospasm 2/2 5FU, ischemic also possible)  # Low SVR- c/f sepsis   # CRC on chemo  # Anemia   # ELKIN      Recommendations:  -- d/c dobutamine  -- if HD (CO/CI) are stable can d/c SG catheter  -- LAsix 40mg IV times 1 given increase pwp   -- Start metoprolol 12.5mg XL if stable tomorrow  -- once at steady-state from MAPs and renal function start lisinopril 2.5mg daily, increase as able   -- Medications can be increase as outpatient  -- Follow up with cardiology as outpatient (2-4 weeks) for medication uptitration and further testing consideration   -- Once stable will need to discuss risk/benefits of 5FU   -- Defer sepsis/ distributive shock management to ICU team       Cardiology to sign off at this  time, please contact if any questions *[phone]     Patient discussed with Dr. Mccormack.      Harinder Watkins MD  Cardiology Fellow   *[phone]

## 2017-09-13 NOTE — PLAN OF CARE
Problem: Fluid Volume Excess (Adult,Obstetrics,Pediatric)  Goal: Identify Related Risk Factors and Signs and Symptoms  Related risk factors and signs and symptoms are identified upon initiation of Human Response Clinical Practice Guideline (CPG)   Outcome: Improving  Afebrile, NSR 70-80s. Cardiogenic shock resolving. BP now on hypertensive side, MD Dr. Kraus aware, continue to monitor update MD if systolic > 180. On 1-2 LPM nc for 02 sats high 90s. Lethargic, opens eyes to voice/spontaneously. Following commands, answering yes/no questions. Very withdrawn, quiet. Denies pain or discomfort. Weak cough. Mouth swabs for dry mouth.  Slept well between cares. Adequate urine output. No bowel movement, would benefit from laxatives when oral access available. Family updated throughout the night. Continue to monitor for change in condition.

## 2017-09-13 NOTE — PROGRESS NOTES
09/13/17 1511   Quick Adds   Type of Visit Initial PT Evaluation   Living Environment   Lives With spouse   Living Arrangements house   Home Accessibility stairs to enter home;bed and bath on same level;bed and bath are not on the first floor;stairs within home   Number of Stairs to Enter Home 2  (1 step, landing, 1 step to enter home, no railings)   Number of Stairs Within Home 12  (12 up to bedroom and bathroom)   Stair Railings at Home inside, present at both sides   Transportation Available car;family or friend will provide   Living Environment Comment Pt lives with her , she is retired from working at her [employer] completing [employer] type work. Pt was completing all cooking, cleaning, laundry, but her  also shares the chores with her. Pts  is still working.    Self-Care   Usual Activity Tolerance moderate   Current Activity Tolerance poor   Regular Exercise no   Equipment Currently Used at Home cane, straight  (Pt began using a SPC within the last 5 days)   Activity/Exercise/Self-Care Comment Pt and family report that pt was not using an AD previously, but within the last 5 days since receiving chemotherapy began using her 's cane. Pts  reports that they own SPC, WW and shower chair, but pt has not previously needed any equipment, but more recently has needed equipment after chemo treatment.    Functional Level Prior   Ambulation 0-->independent   Transferring 0-->independent   Toileting 0-->independent   Bathing 0-->independent   Dressing 0-->independent   Eating 0-->independent   Communication 0-->understands/communicates without difficulty   Swallowing 0-->swallows foods/liquids without difficulty   Cognition 0 - no cognition issues reported   Fall history within last six months no   Which of the above functional risks had a recent onset or change? ambulation;transferring;toileting;bathing;dressing;eating;swallowing   Prior Functional Level Comment Pts  and  "daughter report that she was driving prior to chemotherapy treatment and cancer diagnosis. Pts  reports that he recently has a stroke, but has since fully recovered, so is familiar with PT, OT and SLP.    General Information   Onset of Illness/Injury or Date of Surgery - Date 09/10/17   Referring Physician Julianne Kraus MD   Patient/Family Goals Statement \"She will need to be able to complete stairs to get to the upstairs shower\"   Pertinent History of Current Problem (include personal factors and/or comorbidities that impact the POC) Pt is a 66 yo F w/ a h/o HTN and stage 4 colon cancer with mets to the liver and possible lung mets presenting w/ worsening SOB and AMS then found to have pulmonary edema and left ventricular dysfunction with an EF of 10-20% (previously 60%+ 1 month ago) thought to be nonischemic from Folfox chemotherapy.    Precautions/Limitations fall precautions   Heart Disease Risk Factors Age;Gender;Medical history;Stress   General Observations Pt in supine position, on RA, O2 sat 95%, HR 83 bpm.    General Info Comments Pts  reports they can move a bed onto their main level, which has a bathroom as well, but does not have a shower. Pts family reports she had cough throughout the summer and progressively has gotten weaker, and then was diagnosed with colon cancer. Pt and family report her cancer was difficult to diagnose as she was intially only symptomatic of cough this summer. A port was placed on 9/1, she received Folfox chemotherapy which was completed on 9/9, and per pt and family progressively got more SOB and weaker after chemotherapy treatment.    Cognitive Status Examination   Orientation person   Level of Consciousness lethargic/somnolent   Follows Commands and Answers Questions unable to answer questions   Cognitive Comment All information provided by pts  and daughter, pt opened eyes to verbal and tactile cues, able to nod head \"yes\" when PT asked permission " "to obtain demographic information from her family.    Range of Motion (ROM)   ROM Comment BLE WFL with the exception of B ankles to neutral only.   Strength   Strength Comments Pt able to wiggle toes and squeeze fingers, but unable to further participate in formal MMT.    Bed Mobility   Bed Mobility Comments Pt rolls sidelying right to sidelying left with Dep A.    Sensory Examination   Sensory Perception Comments DARVIN, pt opens eyes to name, but unable to participate in formal sensation testing and formal MMT.    General Therapy Interventions   Planned Therapy Interventions bed mobility training;gait training;ROM;strengthening;transfer training;progressive activity/exercise;home program guidelines;neuromuscular re-education   Clinical Impression   Criteria for Skilled Therapeutic Intervention yes, treatment indicated   PT Diagnosis Weakness   Influenced by the following impairments decreased strength, activity intolerance   Functional limitations due to impairments bed mob, transfers, gait   Clinical Presentation Evolving/Changing   Clinical Presentation Rationale Pt with new colon cancer diagnosis with metastatic disease to the liver with suspected reaction to folfox chemotherapy treatment.   Clinical Decision Making (Complexity) Moderate complexity   Therapy Frequency` 5 times/week   Predicted Duration of Therapy Intervention (days/wks) 9/27/18   Anticipated Discharge Disposition Transitional Care Facility;Home with Home Therapy   Risk & Benefits of therapy have been explained Yes   Patient, Family & other staff in agreement with plan of care Yes   [facility] QMCODES-PAC TM \"6 Clicks\"   2016, Trustees of [facility], under license to [employer].  All rights reserved.   6 Clicks Short Forms Basic Mobility Inpatient Short Form   [facility] AM-PAC  \"6 Clicks\" V.2 Basic Mobility Inpatient Short Form   1. Turning from your back to your side while in a flat bed without using bedrails? 1 - Total   2. " Moving from lying on your back to sitting on the side of a flat bed without using bedrails? 1 - Total   3. Moving to and from a bed to a chair (including a wheelchair)? 1 - Total   4. Standing up from a chair using your arms (e.g., wheelchair, or bedside chair)? 1 - Total   5. To walk in hospital room? 1 - Total   6. Climbing 3-5 steps with a railing? 1 - Total   Basic Mobility Raw Score (Score out of 24.Lower scores equate to lower levels of function) 6   Total Evaluation Time   Total Evaluation Time (Minutes) 10

## 2017-09-13 NOTE — PLAN OF CARE
Problem: Goal Outcome Summary  Goal: Goal Outcome Summary  OT: holding OT at this time as pt with limited participation in Pt session today, pt not appropriate at this time for PT and OT, will consult as appropriate.

## 2017-09-13 NOTE — PROGRESS NOTES
[facility]    Charlene Douglas MRN: [id number]  1950  Date of Admission:9/9/2017  Primary care provider: Donna Shirley     Transfer Summary:   Charlene Douglas is a 66 yo woman with PMH of HTN and recently diagnosed colon cancer with liver metastases s/p first dose of FOLFOX chemotherapy prior to presentation who presented to the ED with shortness of breath. Found to have CXR with new pulmonary edema and BNP of 5000, new onset heart failure requiring inotropic support (EF 10-20%), and subsequent hypoxic respiratory failure due to pulmonary edema. Cardiomyopathy was thought to be secondary to either Folfox or stress cardiomyopathy. She has been diuresed and was extubated 9/12/17 without difficulty. She continues to diurese well and has been started on metoprolol per recommendations from cardiology. They have provided recommendations for follow-up and med titration as well. She will be evaluated by speech for diet, currently remains NPO. PT/OT have been ordered. Now that patient has been extubated, further discussions between heme/onc, patient, and patient's family would be beneficial to discuss prognosis and other viable options for therapy at this point.     Assessment & Plan     Charlene Douglas is a 66 yo woman with PMH of HTN and recently diagnosed colon cancer with liver metastases s/p first dose of FOLFOX chemotherapy prior to presentation who presented to the ED with shortness of breath. Found to have CXR with new pulmonary edema and BNP of 5000 concerning for new onset heart failure.     Changes Today  - Lasix 40mg IV x1  - Pull Cordis  - SLP eval  - KCl 40mEq IV  - PT/OT  - Start metoprolol XL 12.5mg qday  - Transfer to heme/onc w/ tele     # Non-ischemic cardiomyopathy with cardiogenic shock  CXR with pulmonary edema, gradual worsening shortness of breath and new BNP of 5000 in setting of recent chemotherapy. Normal ECHO with EF of 60-65% in August, 2017.  Unclear etiology though given timing, concerning for chemotherapy related. 5FU infusion has been known to cause some cardiotoxicity. Other possible cause would be stress induced cardiomyopathy. Repeat Echo 9/10 with EF of 15-20% and severe diffuse hypokinesis. Repeat TTE 9/12 with much improved EF to 35-40%.    -- Cardiology consulted, appreciate recommendations   -- No calcium channel blockers at this time  -- Start metoprolol XL 12.5mg 9/13/17  -- Once MAP at steady state and renal function stable/improved, start lisinopril 2.5mg  -- Cards follow-up in clinic for medication optimization in 2-4 weeks     # Pulmonary edema   # Acute hypoxic respiratory failure, improving  Due to acute onset non-ischemic cardiomyopathy. Extubated 9/12/17.   -- Supplemental O2, wean as able     # Stage IV Colon cancer  Involves the ascending colon near ileocecal valve with extensive hepatic involvement. Also with possible pulmonary nodules consistent with metastatic disease on PET scan 8/25/2017. S/p one infusion of FOLFOX treatment which includes folinic acid, flurouracil and oxaliplatin.   -- Hematology/oncology consulted, advised treatment of currently cardiogenic shock then can evaluate overall status, plan to not use 5-FU in future  -- Monitor CMP given known elevation of transaminases     # Fevers, resolved  Had on and off fevers to 100 after initiating chemotherapy. UA without signs of infection. Likely either chemotherapy related, though given fragile state, initially treated for underlying infection with vancomycin and cefepime.  Procal elevated at 9.92 on presentation, though this could be secondary to malignancy.  WBC initially elevated, downtrended. Discontinued Abx on 9/12/17.   -- Following blood/urine cxs    # Chronic anemia  Hx of anemia over the past year to 8-9's in setting of metastatic colon cancer. Suspect due anemia of chronic disease/iron deficiency with normocytic anemia in setting of known colon cancer.   --  "Continue to trend      # Leukocytosis  Prior to admission with WBC in the 12.8 range. Presented with WBC of 10 then acutely celi to 19.1. Probably stress response, now 8.6, unlikely to be infection.    # ELKIN, improving  Creatinine elevated from baseline. Likely due to underlying cardiogenic shock/cardiorenal syndrome. Currently stable but remains elevated. Mild bump with more aggressive diuresis. May need to sacrifice some renal function in favor of diuresis.   -- Trend  creatinine  -- Avoid nephrotoxic medications  -- Replace lytes PRN  -- Goal K > 4, Mg > 2 (not on lytes replacement)    DVT PPX: heparin 5,000 units Q8H  GI PPX: ranitidine 50 mg IV Q12H  CODE: FULL   Family: Updated at bedside this morning.   Dispo: Transfer to general medicine today    Patient seen and discussed with Dr. Melissa.    Keiry Wade MD  Internal Medicine PGY-2  [phone]       Interval Events     No acute events overnight. On 1L or room air overnight. Still pretty sleepy last night and this morning, but arouses and answers questions appropriately. No complaints this morning.       Ventilator     N/A     Physical Exam     Vital Signs:  Temp: 98.3  F (36.8  C) Temp src: Axillary BP: 141/70   Heart Rate: 67 Resp: 22 SpO2: 95 % O2 Device: None (Room air) Oxygen Delivery: 1 LPM   Weight: 67.4 kg (148 lb 9.4 oz)  Estimated body mass index is 23.6 kg/(m^2) as calculated from the following:    Height as of 9/1/17: 1.69 m (5' 6.54\").    Weight as of this encounter: 67.4 kg (148 lb 9.4 oz).      Intake/Output Summary (Last 24 hours) at 09/13/17 1220  Last data filed at 09/13/17 1000   Gross per 24 hour   Intake              245 ml   Output             2940 ml   Net            -2695 ml     General: lying in bed, NC in place, resting comfortably  Neuro: Somnolent, but arouses and answers questions appropriately.   HEENT: NC/AT, sclera anicteric  Pulmonary: equal breath sounds bilaterally, no rales/rhonchi/wheezes   Cardiovascular: " RRR  Abdomen: soft, nontender, nondistended  Extremity: warm and well perfused, no edema  Skin: no visible rashes or lesion      Labs   Notable for:   WBC 8.9  Hgb 7.6  Cr 1.35  BUN 55  K 3.4  AST 52  ALT 20      Microbiology   Reviewed.   Imaging     TTE:   Interpretation Summary  Moderately (EF 35-40%) reduced left ventricular function is present. Traced at  38%. Moderate diffuse hypokinesis is present.  Poorly visualized, but RV function appears to be mildly reduced.  The right ventricular systolic pressure is approximated at 29.2 mmHg plus the  right atrial pressure. Pulmonary artery systolic pressure is normal.  Dilation of the inferior vena cava is present with abnormal respiratory  variation in diameter. Unable to assess mean RA pressure given the patient is  on a ventilator.  No pericardial effusion is present.  Compared to study dated 9/10/2017, left ventricular systolic function has  improved.

## 2017-09-13 NOTE — PLAN OF CARE
Problem: Goal Outcome Summary  Goal: Goal Outcome Summary  ST 4C: Cx- Orders received for swallow evaluation. Pt unable to participate in exam due to inadequate HASEEB. Will follow up to complete as appropriate.

## 2017-09-13 NOTE — PLAN OF CARE
Problem: Goal Outcome Summary  Goal: Goal Outcome Summary  PT-4C- PT Evaluation Completed, interventions initiated, recommend TCU at discharge. Pt able to wiggle toes and fingers, but unable to participate further in AAROM. Pt repositioned with Dep A. Pt unable to answer questions, family answered all demographic questions. Pt lethargic and somnolent.

## 2017-09-14 NOTE — CODE/RAPID RESPONSE
Date of service: 9/14/2017     I was asked to see this patient, as she had an unwitnessed fall hitting her head. The patient was found down on the floor with some bleeding present. I could not appreciate a laceration; however, her head was turned on it's side. The patient denied neck pain. I could not obtain a thorough history as the patient was slow to respond to questions. The patient had voluntarily turned her neck without pain. PERRL. I palpated the patient's C spine and performed slight lateral rotation w/o pain production. The patient will be placed in a C collar and will have a STAT CT head and neck (to rule out intracranial bleed and C spine fracture). Her C spine will be cleared if no evidence of fracture on CT C spine.    Jacques Nunez  [facility]  [phone]

## 2017-09-14 NOTE — PLAN OF CARE
Problem: Goal Outcome Summary  Goal: Goal Outcome Summary  Outcome: Therapy, progress toward functional goals is gradual  SLP: Clinical swallow evaluation completed per MD order. Patient presents with moderate-severe oropharyngeal dysphagia characterized by reduced oral bolus control and AP transit, repeated swallows and soft throat clearing after p.o. trials of ice chips and nectar thick liquid. Note very weak oral motor movement and weak throat clear effort. Patient not able to cough on command and note mostly whispered voicing on command placing patient at further risk of airway compromise. Recommend continue NPO status with consideration of short-term alternate nutrition/hydration/medication source. Please continue thorough oral cares. Patient may have 3-4 ice chips per hour or moistened oral swabs (excess water squeezed out) as tolerated when alert and upright given 1:1 assist. Will continue to follow for p.o. readiness. Consulted with RN and FREDDY.

## 2017-09-14 NOTE — PLAN OF CARE
Problem: Goal Outcome Summary  Goal: Goal Outcome Summary  Fall  D: Prior to fall patient had been sleeping intemittently. She was lethargic and but arousable to voice and able to respond to brief questions using single words or nodding. Patient fell from right side of bed.She set off bed alarm and staff heard her fall as they got to the door. A laceration measuring about 1.5 cm was noted on left forehead.   I: Physician notified. Evaluation for injury included neuro check, ROM of extremities and CT head and neck. Pressure dressing placed on laceration.   A: Post-fall assessment revealed no change in LOC, and a left forehead laceration with moderate amount of bleeding. No pain with upper and lower extremity PROM assessment.   P: Falls precautions to be observed. Additional measures to prevent falls include: Continue bed alarm and move pt closer to nursing station.

## 2017-09-14 NOTE — PLAN OF CARE
Problem: Goal Outcome Summary  Goal: Goal Outcome Summary        Pt has been lethargic all shift. Opens eyes spontaneously and is able to answer some questions and follow some commands. Knew her daughters names and stated she was at [facility]. Ammonia level WNL and blood gas mostly normal. 99% sats on RA, RR 30s. Doesn't appear to be sob or having difficulties breathing. Intermittent c/o abdominal pain, has been passing gas and has tried the bedpan a few times without BM. LBM yesterday. Telemetry re-started and has been in NSR. Port needle changed per policy. K infusing now and needs K phos when available from pharmacy. Turned q 2 hours in bed. Hair washed, still remains matted. Per PT assist of 2 if sitting at edge of bed. Swallow study- unable to complete secondary to lethargy and inability to participate. Pt's daughters at bedside- tearful as this has been such a significant change in their mother. Awaiting MD to stitch laceration on left forehead- no longer bleeding. BP 180s-190s/ 80s-90s despite IV metoprolol and hydralazine.

## 2017-09-14 NOTE — PLAN OF CARE
Problem: Goal Outcome Summary  Goal: Goal Outcome Summary  Outcome: Therapy, progress toward functional goals is gradual  PT-7D- Recommend TCU at discharge. Pt tx supine->sit with Mod A x 2. Pt required Max A->mod A to maintain seated balance at EOB. PT performed PROM BLE x 10 reps. Pt able to perform 1-3 reps of AAROM, then would fatigue and PT completed PROM.

## 2017-09-14 NOTE — PROGRESS NOTES
09/14/17 1200   General Information   Onset Date 09/09/17   Start of Care Date 09/14/17   Referring Physician Keiry Wade MD   Patient Profile Review/OT: Additional Occupational Profile Info See Profile for full history and prior level of function   Patient/Family Goals Statement Patient is thirsty.  (Patient's family would like to give oral swabs for comfort.)   Swallowing Evaluation Bedside swallow evaluation   Behaviorial Observations Lethargic  (awake but very drowsy)   Mode of current nutrition NPO   Respiratory Status Room air;Intubated on (date);Extubated on (date)  (intubated 9/10-9/12)   Comments Charlene Douglas is a 66 yo woman with PMH of HTN and recently diagnosed colon cancer with liver metastases s/p first dose of FOLFOX chemotherapy prior to presentation who presented to the ED with shortness of breath. Found to have CXR with new pulmonary edema and BNP of 5000, new onset heart failure requiring inotropic support (EF 10-20%), and subsequent hypoxic respiratory failure due to pulmonary edema. Cardiomyopathy was thought to be secondary to either Folfox or stress cardiomyopathy. She has been diuresed and was extubated 9/12/17 without difficulty. Patient had an unwitnessed fall early this a.m. HCT revealed no acute intracranial pathology. CXR 9/10 revealed pulmonary vascular congestion with probable mild pulmonary edema and/or atelectasis vs. infection and left pleural effusion with associated basilar atelectasis/consolidation.   Clinical Swallow Evaluation   Dentition present and adequate   Mucosal Quality dry   Mandibular Strength and Mobility impaired   Oral Labial Strength and Mobility impaired retraction;impaired pursing;impaired seal;impaired coordination  (slow, weak)   Lingual Strength and Mobility impaired protrusion;impaired left lateral movement;impaired right lateral movement;impaired coordination  (slow, weak)   Laryngeal Function Throat clear;Swallow  (mostly aphonic with brief periods  of voicing on command)   Clinical Swallow Eval: Thin Liquid Texture Trial   Mode of Presentation, Thin Liquids spoon;fed by clinician   Volume of Liquid or Food Presented 2 small ice chips   Oral Phase of Swallow Poor AP movement   Pharyngeal Phase of Swallow repeated swallows;throat clearing   Diagnostic Statement Risk for aspiration   Clinical Swallow Eval: Nectar Thick Liquid Texture Trial   Mode of Presentation, Nectar spoon;fed by clinician   Volume of Nectar Presented 1 small sip by spoon   Oral Phase, [place] Poor AP movement   Pharyngeal Phase, Nectar repeated swallows;throat clearing   Diagnostic Statement Risk for aspiration   General Therapy Interventions   Planned Therapy Interventions Dysphagia Treatment   Dysphagia treatment Oropharyngeal exercise training   Swallow Eval: Clinical Impressions   Skilled Criteria for Therapy Intervention Skilled criteria met.  Treatment indicated.   Functional Assessment Scale (FAS) 2   Diet texture recommendations NPO  (3-4 ice chips per hour when alert and upright as tolerated)   Demonstrates Need for Referral to Another Service (involved)   Therapy Frequency daily   Predicted Duration of Therapy Intervention (days/wks) 1-2 weeks   Anticipated Discharge Disposition extended care facility   Risks and Benefits of Treatment have been explained. Yes   Patient, family and/or staff in agreement with Plan of Care Yes   Clinical Impression Comments Patient presents with moderate-severe oropharyngeal dysphagia characterized by reduced oral bolus control and AP transit, repeated swallows and soft throat clearing after p.o. trials of ice chips and nectar thick liquid. Note very weak oral motor movement and weak throat clear effort. Patient not able to cough on command and note mostly whispered voicing on command placing patient at further risk of airway compromise. Recommend continue NPO status with consideration of alternate nutrition/hydration/medication source. Please continue  thorough oral cares. Patient may have 3-4 ice chips per hour or moistened oral swabs (excess water squeezed out) as tolerated when alert and upright given 1:1 assist. Will continue to follow for p.o. readiness. Consulted with RN and RD.   Total Evaluation Time   Total Evaluation Time (Minutes) 10

## 2017-09-14 NOTE — PROGRESS NOTES
09/14/17 1142   Vitals   /86     S/P Hydralazine. EUGENIO Hector notified and will monitor for now as metoprolol was just given this am.

## 2017-09-14 NOTE — PLAN OF CARE
Problem: Goal Outcome Summary  Goal: Goal Outcome Summary  OT 7D: per discussion with physical therapist, pt is now appropriate for OT to initiate services, will reschedule OT eval for 9/15.

## 2017-09-14 NOTE — PLAN OF CARE
Problem: Goal Outcome Summary  Goal: Goal Outcome Summary  Outcome: No Change  Pt transferred from  at 2200 via bed. Pt resting comfortably; denies pain. Responds to voice and touch. Continues to be lethargic.Will verbally respond yes with horse voice; per pt family, this is an improvement. Turning pt q 2hrs, however, family refused turn once on unit as they reported she was positioned when transferred. Will reposition at 0000. Vaseline and mouth swabs used for dry mucous membranes at currently at bedside. Continues with HTN. Labetalol ordered IV prn. Last administered at 2100. Systolic still in 170s. Continuing to monitor. Continuous pulse ox on; Respirations tachy. Currently on RA.  Pt had BM today. Samuels catheter intact and used to measure strict I&O.Follows commands. Disoriented to situation. Continue NPO as pt was too lethargic to complete swallow eval. Will attempt to complete in am. Port SL. Pump ordered. Continue with POC.

## 2017-09-14 NOTE — TELEPHONE ENCOUNTER
Received call from Patient's  to update Oncology with patient status of being in Hospital.  Did send Dr. Derrell PISANO message of patient being in Hospital.  Will watch chart for discharge and schedule follow up accordingly.    Stan Balderas RN, BSN, OCN  9/14/2017, 6:02 PM

## 2017-09-14 NOTE — PROGRESS NOTES
[facility], [place]    Hematology / Oncology Progress Note    Date of Admission: 9/9/2017  Hospital Day #: 5   Date of Service (when I saw the patient): 09/14/2017     Assessment & Plan   Charlene Douglas is a 67 year old female with PMHx significant for stage IV colon cancer (mets to the liver) s/p cycle 1 FOLFOX who presented to the ED with shortness of breath and found to have new onset pulmonary edema on CXR, a BNP of 5000, hypoxic respiratory failure 2/2 pulmonary edema and cardiogenic shock, and acute heart failure requiring inotropic support (EF 10-20%). Admitted to the ICU where she was intubated and diuresed. Extubated 9/12 and has been stable off of mechanical support. Transferred to the Heme/Onc service 9/13.     #Non-ischemic cardiomyopathy with cardiogenic shock.  On presentation to ED, CXR demonstrated pulmonary edema, BNP of 5000 and ECHO with EF 10-20%. Worsening SOB prompting presentation. Recently finished chemotherapy (FOLFOX), symptoms could likely be 2/2 chemotherapy and/or stress cardiomyopathy. Improved with diuresis. Repeat ECHO 9/12 with EF improved to 35-40%. Cardiology followed while in ICU.  -Recommended starting metoprolol 12.5 mg daily. Patient not taking PO, so started IV metoprolol 5 mg q6. Increased to IV metoprolol to 7.5 mg q6 after one dose as BP remained in 180s systolic. Will consider carvedilol PO once taking PO.  -Avoid CCB  -Consider starting Lisinopril once MAP steady and renal function improved. Start once taking PO.  -To f/u with cards for medication optimization in 2-4 weeks.  -Hydralazine 10 mg q6 prn for SBP >160 and DBP >100.  -Keep Mg >2 and K >4.    # Pulmonary edema.  # Acute hypoxic respiratory failure. Improved.  Due to acute onset non-ischemic cardiomyopathy. Extubated 9/12/17.   -- Supplemental O2 if needed.    #Somnolence.  Suspect 2/2 versed. R/o hepatic encephalopathy with a normal ammonia. No acute hypercalcemia present. Recent  head CT without acute pathology. Does wake and respond to voice (yes/no questions), able to follow commands.   -ABG today with e/o mild metabolic and respiratory alkalosis.  -Monitor at this time.     #Unwitnessed fall.  Patient got up out of bed without assistance, bed alarm sounded. Nurses found patient on floor with blood on her head. Per notes, unchanged LOC or MS. C-spine evaluation w/o pain. Head CT and neck CT without acute injury or pathology.   -C-collar removed  -Monitor mental status.   -Bed alarm in place and patient moved closer to nursing station for monitoring.  -Head lac cleaned and glued by plastic surgery.     #Stage IV colon cancer. Recently diagnosed. Involves the ascending colon near ileocecal valve with extensive hepatic involvement. PET/CT 8/25/17 with possible pulmonary nodules consistent with mets. S/p FOLFOX chemotherapy (x9/7). Follows with Dr. De La Torre.   -Will need to discuss further treatment options and overall status as patient recovers.    #Chronic anemia. Likely 2/2 chemotherapy.  -Transfuse for hgb <8.    #Leukocytosis. On admission, WBC count was 10, then acutely elevated to 19.1. No infectious source identified. Covered on IV antibiotics, discontinued 9/12.    #ELKIN.   Likely 2/2 to cardiogenic shock/possible cardiorenal syndrome. With diuresis and improvement of volume status, heart function and respiratory status, Cr has improved.   -Cr today 1.07, improved from yesterday.   -Will monitor with daily BMPs  -Will hold off on IVF with improving renal fxn and desire to avoid increase risk of repeat pulmonary edema.     FEN:  No MIVF  -PRN lyte replacement   -NPO, awaiting swallow study    Prophy/Misc:  -VTE: Heparin 5000 u q8  -GI/PUD:ranitidine 50 mg q12hr    Code status: Full  Disposition: Pending improvement of acute issues, uncertain at this time. Possible discharge in 1 week.    Jaqueline Minor PA-C  Hematology/Oncology  [phone]    Interval History   Patient unable to  "provide history. Opens eyes to name and rubbing of arm. Able to follow command to open eyes and look at my nose. Shakes head \"no\" when asked if she was in pain, however does not verbalize with me. Intermittently answered questions, some she did not, and did continue to doze off during evaluation.     Physical Exam   Temp: 95.9  F (35.5  C) Temp src: Axillary BP: (!) 181/92 Pulse: 96 Heart Rate: 103 Resp: 20 SpO2: 96 % O2 Device: None (Room air)    Vitals:    09/09/17 2337   Weight: 67.4 kg (148 lb 9.4 oz)     Vital Signs with Ranges  Temp:  [95.9  F (35.5  C)-99.3  F (37.4  C)] 95.9  F (35.5  C)  Pulse:  [83-97] 96  Heart Rate:  [] 103  Resp:  [16-32] 20  BP: (162-192)/(70-99) 181/92  SpO2:  [92 %-99 %] 96 %  I/O last 3 completed shifts:  In: 165 [I.V.:165]  Out: 860 [Urine:860]    Constitutional: Somnolent, seen lying in bed in NAD. Minimally interactive but able to follow basic commands.  HEENT: ~5 cm laceration on L forehead with dried blood. Appears well approximated with some active bleeding with mild movement of skin. PERRL, anicteric sclera. Oral mucosa mildly dry.  Respiratory: Non-labored breathing but tachypnec, good air exchange, lungs clear to anterior auscultation bilaterally. No cough or wheeze noted.   Cardiovascular: Regular rate and rhythm. No murmur or rub.   GI: Normoactive bowel sounds. Abdomen soft, non-distended, and non-tender.   Skin: Warm and dry. No rash.  Musculoskeletal: Extremities grossly normal, non-tender, no edema. Moves all 4 extremities spontaneously.  Neurologic: Somnolent but responds to voice and basic commands. PERRL.     Medications   Current Facility-Administered Medications   Medication     sodium chloride (PF) 0.9% PF flush 3 mL     lidocaine 1 % 1 mL     lidocaine (LMX4) kit     sodium chloride (PF) 0.9% PF flush 10-20 mL     heparin lock flush 10 UNIT/ML injection 5-10 mL     heparin lock flush 10 UNIT/ML injection 5-10 mL     heparin 100 UNIT/ML injection 5 mL     " sodium chloride (PF) 0.9% PF flush 10-20 mL     ranitidine (ZANTAC) injection 50 mg     metoprolol (LOPRESSOR) injection 5 mg     hydrALAZINE (APRESOLINE) injection 10 mg     potassium chloride SA (K-DUR/KLOR-CON M) CR tablet 20-40 mEq     potassium chloride (KLOR-CON) Packet 20-40 mEq     potassium chloride 10 mEq in 100 mL intermittent infusion     potassium chloride 10 mEq in 100 mL intermittent infusion with 10 mg lidocaine     magnesium sulfate 2 g in NS intermittent infusion (PharMEDium or FV Cmpd)     magnesium sulfate 4 g in 100 mL sterile water (premade)     potassium phosphate 10 mmol in D5W 250 mL intermittent infusion     potassium phosphate 15 mmol in D5W 250 mL intermittent infusion     potassium phosphate 20 mmol in D5W 500 mL intermittent infusion     potassium phosphate 20 mmol in D5W 250 mL intermittent infusion     potassium phosphate 25 mmol in D5W 500 mL intermittent infusion     lidocaine (PF) (XYLOCAINE) 1 % injection 50 mg     heparin sodium PF injection 5,000 Units     naloxone (NARCAN) injection 0.1-0.4 mg       Data   CBC  Recent Labs  Lab 09/14/17  0726 09/13/17  0349 09/12/17  0428 09/11/17  0407   WBC 9.8 8.9 8.6 12.5*   RBC 3.31* 3.01* 3.02* 3.15*   HGB 8.4* 7.6* 7.7* 8.2*   HCT 27.4* 25.0* 24.9* 25.5*   MCV 83 83 83 81   MCH 25.4* 25.2* 25.5* 26.0*   MCHC 30.7* 30.4* 30.9* 32.2   RDW 18.2* 18.5* 18.8* 18.6*    183 173 231     CMP  Recent Labs  Lab 09/14/17  0726 09/13/17  0349 09/12/17  1229 09/12/17  0904 09/12/17  0428  09/11/17  0407    138 136 137 136  < > 135   POTASSIUM 3.4 3.4 3.9 3.8 3.8  < > 3.9   CHLORIDE 103 104 104 104 104  < > 101   CO2 27 24 23 25 22  < > 24   ANIONGAP 9 10 9 8 11  < > 10   * 89 91 94 98  < > 128*   BUN 48* 55* 52* 51* 48*  < > 43*   CR 1.07* 1.35* 1.28* 1.27* 1.31*  < > 1.72*   GFRESTIMATED 51* 39* 42* 42* 41*  < > 30*   GFRESTBLACK 62 48* 51* 51* 49*  < > 36*   RON 8.9 8.4* 8.2* 7.9* 7.9*  < > 7.6*   MAG 2.0 2.4* 2.6* 2.5* 2.5*  < >  2.6*   PHOS 1.5*  --   --   --   --   --   --    PROTTOTAL 6.9 6.3*  --   --  6.0*  --  6.0*   ALBUMIN 1.5* 1.4*  --   --  1.3*  --  1.3*   BILITOTAL 0.9 0.6  --   --  0.6  --  0.6   ALKPHOS 291* 245*  --   --  265*  --  315*   AST 47* 52*  --   --  77*  --  107*   ALT 18 20  --   --  23  --  29   < > = values in this interval not displayed.  INR  Recent Labs  Lab 09/10/17  0133   INR 1.28*       Results for orders placed or performed during the hospital encounter of 09/09/17   XR Chest Port 1 View    Narrative    XR CHEST PORT 1 VW 9/10/2017 3:16 AM    Comparison: 9/9/2017    History: Status post intubation    Findings: Single AP view of the chest. Endotracheal tube tip projects  over the midthoracic trachea. Right chest wall Port-A-Cath tip  projects over the mid SVC. Gastric tube tip and sidehole project over  the stomach. Cardiac silhouette is mildly prominent with indistinct  pulmonary vasculature. Left greater than right layering pleural  effusions. Patchy bilateral airspace opacities. Mildly improved  interstitial thickening. Relatively gasless upper abdomen.      Impression    Impression:   1. Endotracheal tube projects over the midthoracic trachea. Gastric  tube tip and sidehole project over the stomach.  2. Mild cardiomegaly and indistinct pulmonary vasculature patchy  bilateral airspace opacities, likely representing pulmonary edema.  Differential includes infection.  3. Left greater than right pleural effusions.    I have personally reviewed the examination and initial interpretation  and I agree with the findings.    RADHA GOMES MD   XR Chest Port 1 View    Narrative    XR CHEST PORT 1 VW 9/10/2017 6:40 AM    Comparison: 9/10/2017    History: Central line    Findings: Single AP view of the chest, supine. Endotracheal tube tip  projects over the midthoracic trachea. Gastric tube tip and sidehole  project over the stomach. Right chest wall Port-A-Cath tip projects  over the mid SVC. Right IJ central  venous catheter tip projects over  the right brachiocephalic vein. Cardiac silhouette is within normal  limits. Pulmonary vascular congestion. Patchy perihilar airspace  disease. Left pleural effusion with associated basilar  atelectasis/consolidation.      Impression    Impression:   1. Right IJ central venous catheter tip projects over the right  innominate vein.  2. Pulmonary vascular congestion with perihilar airspace opacities  likely representing pulmonary edema and/or atelectasis, versus  infection.  3. Left pleural effusion with associated basilar  atelectasis/consolidation.    I have personally reviewed the examination and initial interpretation  and I agree with the findings.    RADHA GOMES MD   XR Chest Port 1 View    Narrative    XR CHEST PORT 2 VW 9/10/2017 6:42 AM    Comparison: 9/10/2017    History: swan cath    Findings: 2 AP views of the chest. Repositioning of the right IJ  D[place]-Onel catheter with the tip over the right main pulmonary artery.  Endotracheal tube tip projects over the midthoracic trachea. Right IJ  Port-A-Cath tip projects over the mid SVC. Gastric tube tip and  sidehole project over the stomach. Cardiac silhouette is within normal  limits. Stable pulmonary vascular congestion. Left pleural effusion  with left basilar atelectasis/consolidation.      Impression    Impression:   1. Right IJ Dallas-Onel catheter tip projects over the right main  pulmonary artery.  2. Pulmonary vascular congestion with probable mild pulmonary edema  and/or atelectasis versus infection.  3. Left pleural effusion with associated basilar  atelectasis/consolidation.    I have personally reviewed the examination and initial interpretation  and I agree with the findings.    RADHA GOMES MD   CT Soft Tissue Neck w/o Contrast    Narrative    Cervical spine CT W/O CONTRAST 9/14/2017 6:31 AM    Provided History: fall    Comparison: Correlation with PET/CT 8/25/2017    Technique: Using multidetector thin  collimation helical acquisition  technique, axial, coronal and sagittal CT images through the cervical  spine were obtained without intravenous contrast.     Findings:  Minimal grade 1 retrolisthesis of C4 on C5, degenerative. Normal  cervical lordosis. No acute fracture or subluxation. No prevertebral  edema. Multilevel spondylosis. Preserved vertebral body height. Loss  of disc height at C4-5 with bilateral moderate neural foraminal  narrowing.    No abnormality of the paraspinous soft tissues. Partially visualized  central catheter.      Impression    Impression:   1. No acute fracture or subluxation.  2. Multilevel cervical spondylosis, including moderate bilateral  neural foraminal narrowing and mild spinal canal narrowing at C4-5.    I have personally reviewed the examination and initial interpretation  and I agree with the findings.    CARLEY JORGE MD   CT Head w/o contrast*    Narrative    CT HEAD W/O CONTRAST 9/14/2017 6:28 AM    History: fall    Comparison: None.    Technique: Using multidetector thin collimation helical acquisition  technique, axial, coronal and sagittal CT images from the skull base  to the vertex were obtained without intravenous contrast.     Findings:  There is extensive streak artifact over the right temporal  and occipital lobes from an object outside of head.  No intracranial hemorrhage, mass effect, or midline shift. The  ventricles are proportionate to the cerebral sulci. The gray to white  matter differentiation of the cerebral hemispheres is preserved. The  basal cisterns are patent.    The visualized paranasal sinuses are clear. The mastoid air cells are  clear.       Impression    Impression: No acute intracranial pathology.    I have personally reviewed the examination and initial interpretation  and I agree with the findings.    ERIN ALVARADO MD

## 2017-09-14 NOTE — PROVIDER NOTIFICATION
09/14/17 0600 09/14/17 0800   Call Information   Date of Call 09/14/17 --    Time of Call 0543 --    Name of person requesting the team Sofiya --    Title of person requesting team RN --    RRT Arrival time 0548 --    Time RRT ended 0635 --    Reason for call   Type of RRT Adult --    Primary reason for call Additional help needed;Other  (pt fall.) --    Was patient transferred from the ED, ICU, or PACU within last 24 hours prior to RRT call? Yes --    SBAR   Situation admitted recently with respiratory distress needing intubation post chemo. extubated and transfered to 7D   --    Background pmx of HRN. recent dx of metastatic colon ca. chemo.  with increased fatigue and respiratory distress. --    Notable History/Conditions Cancer;Hypertension --    Assessment pt prone/sidelying on floor. with bleeding from scalp lac. slow to respond to questions, hypertensive.  --    Interventions Other (describe)  (head and neck CT, cervical immobilization) --    Patient Outcome   Patient Outcome Stabilized on unit --    RRT Team   Attending/Primary/Covering Physician Jacques Nunez --    Date Attending Physician notified 09/14/17 --    Time Attending Physician notified 0540 --    Lead RN Sofiya Mcbride --    RN Steve Rasheed --    RT Denzel --    Post RRT Intervention Assessment   Post RRT Assessment --  Stable/Improved   Date Follow Up Done --  09/14/17   Time Follow Up Done --  0840   Comments --  Patient lethargic but resposive to voice. Bed alarm on. MD in room to assess patient and to notify family that CT scan cleared spinal precautions. Removed collar with MD order and assistance.

## 2017-09-14 NOTE — PROGRESS NOTES
Brief oncology note    Charlene Douglas has stage IV colon cancer metastatic to the liver. Started cycle 1 FOLFOX on 9/7, then on 9/9 developed SOB and AMS. Admitted to ICU for cardiogenic shock from acute systolic heart failure. EF 15-20% on 9/10, improved to 35-40% on 9/12. Extubated on 9/12.     No wall motion abnormalities on echos c/w non-ischemic cardiomyopathy. Given the timing, suspect 5-FU related cardiotoxicity.     We will take over when she transfers out of ICU (transfer orders in since this afternoon).     Perry Mckeon MD  Heme/Onc Fellow  Pager: [phone]

## 2017-09-14 NOTE — PLAN OF CARE
"Problem: Goal Outcome Summary  Goal: Goal Outcome Summary  Systolic 's-180's. Tachypneic. SpO2 mid to high 90's on RA.  Lethargic. Responds to verbal stimuli. Oriented to self and time. She responds \"[facility]\" when asked where she is. Denies pain. Labetalol given. Physician updated. Requested assistance using the bathroom. Used bedpan with assist of 1. Turned and repositioned. Sleeping in between cares.       "

## 2017-09-14 NOTE — CONSULTS
Plastic Surgery Consult Note    HPI:  We were contacted by Oncology team regarding a traumatic forehead laceration. The patient had an unwitnessed fall earlier this AM while in the hospital and sustained a laceration to her forehead. Head CT was obtained which was negative for intracranial pathology. Plastic Surgery was consulted for management of forehead laceration.     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     COLONOSCOPY N/A 2017    Procedure: COMBINED COLONOSCOPY, SINGLE OR MULTIPLE BIOPSY/POLYPECTOMY BY BIOPSY;;  Surgeon: Duane, William Charles, MD;  Location: [facility] OR     COLONOSCOPY WITH CO2 INSUFFLATION N/A 2017    Procedure: COLONOSCOPY WITH CO2 INSUFFLATION;  BMI: 25.2  Referring: Donna Mota  Diagnoses: Positive FIT (fecal immunochemical test)  Special screening for malignant neoplasms, colon  Pharmacy: [facility] Fax: [phone];  Surgeon: Duane, William Charles, MD;  Location: [facility] OR     EXAM UNDER ANESTHESIA, ULTRASOUND N/A 2017    Procedure: EXAM UNDER ANESTHESIA, ULTRASOUND;  Ultrasound Liver Biopsy;  Surgeon: GENERIC ANESTHESIA PROVIDER;  Location:  OR     HIP SURGERY      14 years old     INSERT PORT VASCULAR ACCESS N/A 2017    Procedure: INSERT PORT VASCULAR ACCESS;  Port placement;  Surgeon: Vinny Peterson DO;  Location: [facility] OR     Physical Exam  BP (!) 181/92 (BP Location: Right arm)  Pulse 96  Temp 95.9  F (35.5  C) (Axillary)  Resp 20  Wt 67.4 kg (148 lb 9.4 oz)  LMP 2007  SpO2 96%  BMI 23.6 kg/m2  General: Sleeping, arouses to voice, answers questions with nods  HEENT: 1 cm vertical laceration on left side of forehead just below hair line. Hemostatic,  small amount of dried clot at superior portion of the wounds. Clean edges.  Resp: Non-labored breathing on RA      Assessment and Plan:  66 yo female with newly diagnosed colon cancer who had an unwitnessed fall earlier this am with small forehead laceration as a result.    -The laceration was copiously irrigated with normal saline. The edges were reapproximated and closed with skin glue.   -Patient may shower starting later this evening, no scrubbing laceration  -Laceration can be left open to air, no dressing necessary  -Skin glue will start to flake in 1-2 weeks, after that time patient should moisturize aggressively and avoid sun exposure  -No specific follow-up needed from Plastic Surgery    Patient discussed with Dr. Gibbons who agrees    Verónica Cisse MD

## 2017-09-15 NOTE — PLAN OF CARE
Problem: Goal Outcome Summary  Goal: Goal Outcome Summary  Outcome: Therapy, progress toward functional goals as expected  SLP: Patient seen for swallow treatment. Patient's daughter present. Patient requesting water. Note improved alertness, cough effort and voicing this date. Vocal quality still soft/weak, but noting increased true voicing compared to yesterday. Patient consumed 3 ice chips, 2 sips water by spoon, 2 oz. nectar thick liquid by spoon or cup and 2 bites applesauce. Note immediate cough after sip of water by spoon and soft throat clear x2 after sips of nectar thick liquid. Vocal quality remained unchanged. Patient c/o globus sensation with puree texture and required multiple swallows and liquid swallow to clear. Recommend cautiously initiate clear NECTAR thick liquid diet given 1:1 assist and swallow precautions (fully upright, no straws, small sips). Recommend crushing medications. Patient may continue to have limited ice chips (3-4 per hour) as tolerated. Please continue thorough oral cares. Monitor for signs/symptoms of aspiration and hold p.o. If occur. Consulted with PA who is considering placement of short-term FT to ensure adequate nutrition/hydration. Consulted with RN and provided swallow education to patient and her daughter. Will continue to follow for diet tolerance and readiness for advancement. Recommend TCU at discharge.

## 2017-09-15 NOTE — PLAN OF CARE
"Problem: Goal Outcome Summary  Goal: Goal Outcome Summary  Alert to self, place and time. Lethargic most of the night but appears more alert this morning. She requested bed pan and verbalized, \" I hope this works.\" She has previously been speaking in single words or nodding. Continues on IV metoprolol. Latest /65. NSR on telemetry. C/o pain in lower abdomen. Oxybutynin patch in place. Repositioned. Reported less discomfort while lying on side. Slept in between cares.      "

## 2017-09-15 NOTE — PLAN OF CARE
Problem: Goal Outcome Summary  Goal: Goal Outcome Summary  PT: Pt demonstrating much improved strength and IND with functional mobility during today's session compared to yesterday!  Pt able to complete bed mobility with min-modA x 1 and sit edge of bed with only SBA.  Pt completed sit<>stand x 2 through out session with use of FWW and CGA-Alyse, able to stand for 1 minute each time and take 3-4 steps laterally at the end.  Pt complaining of increased pain from Samuels through out session.  Discussed with OT on trialing completion of commode transfer to see if removal of Samuels could be an option in the near future, discussed with RN as well.  PT will continue to see daily due to increased pt progress and motivation.  Recommend d/c to rehab upon d/c in order to increase strength and IND with functional mobility.

## 2017-09-15 NOTE — PLAN OF CARE
Problem: Goal Outcome Summary  Goal: Goal Outcome Summary  Outcome: Improving  6563-3585: BP hypertensive (up to 200s/90s) despite scheduled Metoprolol. Pt asymptomatic. An additional 2.5mg of IV Metoprolol given. Too soon for Hydralazine. Now BP 160s/70s. Metoprolol is now 10mg scheduled. Resps tachypneic. Sat-ting high 90s on RA; pt did report feeling SOB. O2 at 2LPM via NC applied for comfort. Pt not responding verbally but does nod to answer. Reported having pain at her sheriff site; patent and draining well. Ditropan patch placed to right lower abdomen with adequate relief thus far. Pt has an incontinent, medium loose stool. Small amt of bright red blood noted in stool; will continue to monitor. Mg and Phos replaced; recheck in the morning. Tele showing NSR. Bed alarm ON. Pt's daughter is staying overnight. Laceration on left head glued shut. No drainage noted. Sheriff care completed. Pt is resting comfortably now. Cont to monitor and with POC.

## 2017-09-15 NOTE — PLAN OF CARE
Problem: Goal Outcome Summary  Goal: Goal Outcome Summary  OT 7D: OT eval and treatment initiated. Pt limited by fatigue, weakness, and cognition although able to tolerate lengthy session and follow 1-2 step commands throughout. Spouse endorses daily improvement with physical and cognition functioning. Pt rolling to each side SBA-CGA x 2 for dependent pericares with bowel incontinence. Supine > sitting EOB CGA and VCs; sitting > supine mod A for BLEs, CGA for trunk. Pt STS with CGA x 2 and CGA-min A x 2 with FWW for pivot transfer <> commode with posterior LOB x 2. Pt benefiting from VCs for sequencing and initiating movements during transfer. Pt overall min A for oral hygiene, facial hygiene, and combing hair. Rec: given improvement in tolerance, pt would likely be a good candidate for ARU given significant below baseline function, good participation and motivation, and strong familial support.

## 2017-09-15 NOTE — PROGRESS NOTES
[facility], [place]    Hematology / Oncology Progress Note    Date of Admission: 9/9/2017  Hospital Day #: 6   Date of Service (when I saw the patient): 09/15/2017     Assessment & Plan   Charlene Douglas is a 67 year old female with PMHx significant for stage IV colon cancer (mets to the liver) s/p cycle 1 FOLFOX who presented to the ED with shortness of breath and found to have new onset pulmonary edema on CXR, a BNP of 5000, hypoxic respiratory failure 2/2 pulmonary edema and cardiogenic shock, and acute heart failure requiring inotropic support (EF 10-20%). Admitted to the ICU where she was intubated and diuresed. Extubated 9/12 and has been stable off of mechanical support. Transferred to the Heme/Onc service 9/13.     #Non-ischemic cardiomyopathy with cardiogenic shock.  On presentation to ED, CXR demonstrated pulmonary edema, BNP of 5000 and ECHO with EF 10-20%. Worsening SOB prompting presentation. Recently finished chemotherapy (FOLFOX), symptoms could likely be 2/2 chemotherapy and/or stress cardiomyopathy. Improved with diuresis. Repeat ECHO 9/12 with EF improved to 35-40%. Cardiology followed while in ICU.  -Recommended starting metoprolol 12.5 mg daily. Patient not taking PO, so started IV metoprolol 5 mg q6. Increased to IV metoprolol to 7.5 mg q6 after one dose as BP remained in 180s systolic. Will consider switching to carvedilol once taking PO.  -Avoid CCB  -Will consider starting lisinopril tomorrow, 9/15.   -To f/u with cards for medication optimization in 2-4 weeks.  -Hydralazine 10 mg q6 prn for SBP >160 and DBP >100.  -Keep Mg >2 and K >4.  -BNP today 20,725.     # Pulmonary edema.  # Acute hypoxic respiratory failure. Improved.  Due to acute onset non-ischemic cardiomyopathy. Extubated 9/12/17.   -- Supplemental O2 if needed.  -CXR today to evaluate status of pulmonary edema without e/o volume overlaod. Patient c/o SOB. Maintaining sats on RA, on supplemental O2 for  comfort.    #Somnolence.  Suspect 2/2 versed. R/o hepatic encephalopathy with a normal ammonia. No acute hypercalcemia present. Recent head CT without acute pathology. Does wake and respond to voice (yes/no questions), able to follow commands. Improved alertness and responsiveness today.   -ABG 9/14 with e/o mild metabolic and respiratory alkalosis.  -Monitor at this time.     #Unwitnessed fall.  Patient got up out of bed without assistance, bed alarm sounded. Nurses found patient on floor with blood on her head. Per notes, unchanged LOC or MS. C-spine evaluation w/o pain. Head CT and neck CT without acute injury or pathology.   -C-collar removed  -Monitor mental status.   -Bed alarm in place and patient moved closer to nursing station for monitoring.  -Head lac cleaned and glued by plastic surgery.     #Stage IV colon cancer. Recently diagnosed. Involves the ascending colon near ileocecal valve with extensive hepatic involvement. PET/CT 8/25/17 with possible pulmonary nodules consistent with mets. S/p FOLFOX chemotherapy (x9/7). Follows with Dr. De La Torre.   -Will need to discuss further treatment options and overall status as patient recovers.    #Chronic anemia. Likely 2/2 chemotherapy.  -Transfuse for hgb <8. Will receive unit of PRBCs today.    #Leukocytosis. On admission, WBC count was 10, then acutely elevated to 19.1. No infectious source identified. Covered on IV antibiotics, discontinued 9/12.    #ELKIN.   Likely 2/2 to cardiogenic shock/possible cardiorenal syndrome. With diuresis and improvement of volume status, heart function and respiratory status, Cr has improved.   -Cr normalized today, 9/15.  -Will monitor with daily BMPs  -Will hold off on IVF with improving renal fxn and desire to avoid increase risk of repeat pulmonary edema.     #Abdominal fullness.  #RUQ tenderness.  Patient c/o of distended abdomen and lower abdominal pain 9/15.   -Abd XR with non-obstructive bowel gas pattern.  -Could trial some  "gas-x if c/o abdominal pain.    FEN:  No MIVF  -PRN lyte replacement   -Clear, nectar thickened liquids. SLP recs \"cautiously initiate clear NECTAR thick liquid diet given 1:1 assist and swallow precautions (fully upright, no straws, small sips). Recommend crushing medications. Patient may continue to have limited ice chips (3-4 per hour) as tolerated. Please continue thorough oral cares. Monitor for signs/symptoms of aspiration and hold p.o. If occur.\"    Prophy/Misc:  -VTE: Heparin 5000 u q8  -GI/PUD:ranitidine 50 mg q12hr    Code status: Full  Disposition: Pending improvement of acute issues, uncertain at this time. Possible discharge in 3-4 days.   -PT/OT/SLP recommending TCU at this time.    Jaqueline Minor PA-C  Hematology/Oncology  [phone]    Interval History   Patient much more alert today. Eyes open and responsive to questions. Vocalizes in short word sentences. Clear and coherent. Notes some lower abdominal pain, sheriff cath discomfort, RUQ pain, shortness of breath, and some aching in her head. Bowels moving and urinating via sheriff cath. Afebrile. Patient states \"I'm very weak\". Provided encouragement and support.    Physical Exam   Temp: 98.1  F (36.7  C) Temp src: Oral BP: 170/76 Pulse: 80 Heart Rate: 80 Resp: 20 SpO2: 99 % O2 Device: Nasal cannula Oxygen Delivery: 2 LPM  Vitals:    09/09/17 2337 09/15/17 0759   Weight: 67.4 kg (148 lb 9.4 oz) 67.3 kg (148 lb 6.4 oz)     Vital Signs with Ranges  Temp:  [95.9  F (35.5  C)-98.1  F (36.7  C)] 98.1  F (36.7  C)  Pulse:  [80-97] 80  Heart Rate:  [] 80  Resp:  [20-30] 20  BP: (148-202)/(62-97) 170/76  SpO2:  [94 %-99 %] 99 %  I/O last 3 completed shifts:  In: 465 [I.V.:465]  Out: 700 [Urine:700]    Constitutional: Alert and oriented, seen propped up in bed in NAD. Responds to questions appropriately.  HEENT: ~5 cm laceration on L forehead well approximated and glued shut. PERRL, anicteric sclera. Oral mucosa dry.  Respiratory: Non-labored breathing " but tachypnec, good air exchange, lungs clear to auscultation bilaterally. No cough or wheeze noted. Voice hoarse.  Cardiovascular: Tachycardic and rhythm. No murmur or rub.   GI: Normoactive bowel sounds. Abdomen soft, mildly-distended, with mild tenderness in RUQ, otherwise non-tender. No rigidity or guarding.   Skin: Warm and dry. No rash.  Musculoskeletal: Extremities grossly normal, non-tender, no edema. Moves all 4 extremities spontaneously.  Neurologic: A&O, answers questions appropriately, appears clear. At present, slow movements and responses.    Medications   Current Facility-Administered Medications   Medication     sodium chloride (PF) 0.9% PF flush 3 mL     lidocaine 1 % 1 mL     lidocaine (LMX4) kit     sodium chloride (PF) 0.9% PF flush 10-20 mL     heparin lock flush 10 UNIT/ML injection 5-10 mL     heparin lock flush 10 UNIT/ML injection 5-10 mL     heparin 100 UNIT/ML injection 5 mL     sodium chloride (PF) 0.9% PF flush 10-20 mL     ranitidine (ZANTAC) injection 50 mg     hydrALAZINE (APRESOLINE) injection 10 mg     potassium chloride SA (K-DUR/KLOR-CON M) CR tablet 20-40 mEq     potassium chloride (KLOR-CON) Packet 20-40 mEq     potassium chloride 10 mEq in 100 mL intermittent infusion     potassium chloride 10 mEq in 100 mL intermittent infusion with 10 mg lidocaine     magnesium sulfate 2 g in NS intermittent infusion (PharMEDium or FV Cmpd)     magnesium sulfate 4 g in 100 mL sterile water (premade)     potassium phosphate 10 mmol in D5W 250 mL intermittent infusion     potassium phosphate 15 mmol in D5W 250 mL intermittent infusion     potassium phosphate 20 mmol in D5W 250 mL intermittent infusion     potassium phosphate 25 mmol in D5W 500 mL intermittent infusion     lidocaine (PF) (XYLOCAINE) 1 % injection 50 mg     metoprolol (LOPRESSOR) injection 10 mg     heparin sodium PF injection 5,000 Units     naloxone (NARCAN) injection 0.1-0.4 mg       Data   CBC    Recent Labs  Lab  09/15/17  0646 09/14/17  0726 09/13/17  0349 09/12/17  0428   WBC 6.8 9.8 8.9 8.6   RBC 3.11* 3.31* 3.01* 3.02*   HGB 7.9* 8.4* 7.6* 7.7*   HCT 26.0* 27.4* 25.0* 24.9*   MCV 84 83 83 83   MCH 25.4* 25.4* 25.2* 25.5*   MCHC 30.4* 30.7* 30.4* 30.9*   RDW 18.6* 18.2* 18.5* 18.8*    193 183 173     CMP  Recent Labs  Lab 09/15/17  0646 09/14/17  0726 09/13/17  0349 09/12/17  1229  09/12/17  0428    140 138 136  < > 136   POTASSIUM 3.7 3.4 3.4 3.9  < > 3.8   CHLORIDE 106 103 104 104  < > 104   CO2 27 27 24 23  < > 22   ANIONGAP 8 9 10 9  < > 11   * 138* 89 91  < > 98   BUN 42* 48* 55* 52*  < > 48*   CR 0.96 1.07* 1.35* 1.28*  < > 1.31*   GFRESTIMATED 58* 51* 39* 42*  < > 41*   GFRESTBLACK 71 62 48* 51*  < > 49*   RON 8.4* 8.9 8.4* 8.2*  < > 7.9*   MAG 2.5* 2.0 2.4* 2.6*  < > 2.5*   PHOS 2.5 1.5*  --   --   --   --    PROTTOTAL 6.6* 6.9 6.3*  --   --  6.0*   ALBUMIN 1.5* 1.5* 1.4*  --   --  1.3*   BILITOTAL 0.9 0.9 0.6  --   --  0.6   ALKPHOS 256* 291* 245*  --   --  265*   AST 38 47* 52*  --   --  77*   ALT 17 18 20  --   --  23   < > = values in this interval not displayed.  INR    Recent Labs  Lab 09/10/17  0133   INR 1.28*       Results for orders placed or performed during the hospital encounter of 09/09/17   XR Chest Port 1 View    Narrative    XR CHEST PORT 1 VW 9/10/2017 3:16 AM    Comparison: 9/9/2017    History: Status post intubation    Findings: Single AP view of the chest. Endotracheal tube tip projects  over the midthoracic trachea. Right chest wall Port-A-Cath tip  projects over the mid SVC. Gastric tube tip and sidehole project over  the stomach. Cardiac silhouette is mildly prominent with indistinct  pulmonary vasculature. Left greater than right layering pleural  effusions. Patchy bilateral airspace opacities. Mildly improved  interstitial thickening. Relatively gasless upper abdomen.      Impression    Impression:   1. Endotracheal tube projects over the midthoracic trachea.  Gastric  tube tip and sidehole project over the stomach.  2. Mild cardiomegaly and indistinct pulmonary vasculature patchy  bilateral airspace opacities, likely representing pulmonary edema.  Differential includes infection.  3. Left greater than right pleural effusions.    I have personally reviewed the examination and initial interpretation  and I agree with the findings.    RADHA GOMES MD   XR Chest Port 1 View    Narrative    XR CHEST PORT 1 VW 9/10/2017 6:40 AM    Comparison: 9/10/2017    History: Central line    Findings: Single AP view of the chest, supine. Endotracheal tube tip  projects over the midthoracic trachea. Gastric tube tip and sidehole  project over the stomach. Right chest wall Port-A-Cath tip projects  over the mid SVC. Right IJ central venous catheter tip projects over  the right brachiocephalic vein. Cardiac silhouette is within normal  limits. Pulmonary vascular congestion. Patchy perihilar airspace  disease. Left pleural effusion with associated basilar  atelectasis/consolidation.      Impression    Impression:   1. Right IJ central venous catheter tip projects over the right  innominate vein.  2. Pulmonary vascular congestion with perihilar airspace opacities  likely representing pulmonary edema and/or atelectasis, versus  infection.  3. Left pleural effusion with associated basilar  atelectasis/consolidation.    I have personally reviewed the examination and initial interpretation  and I agree with the findings.    RADHA GOMES MD   XR Chest Port 1 View    Narrative    XR CHEST PORT 2 VW 9/10/2017 6:42 AM    Comparison: 9/10/2017    History: swan cath    Findings: 2 AP views of the chest. Repositioning of the right IJ  Meacham-Onel catheter with the tip over the right main pulmonary artery.  Endotracheal tube tip projects over the midthoracic trachea. Right IJ  Port-A-Cath tip projects over the mid SVC. Gastric tube tip and  sidehole project over the stomach. Cardiac silhouette is within  normal  limits. Stable pulmonary vascular congestion. Left pleural effusion  with left basilar atelectasis/consolidation.      Impression    Impression:   1. Right IJ [place]z-Onel catheter tip projects over the right main  pulmonary artery.  2. Pulmonary vascular congestion with probable mild pulmonary edema  and/or atelectasis versus infection.  3. Left pleural effusion with associated basilar  atelectasis/consolidation.    I have personally reviewed the examination and initial interpretation  and I agree with the findings.    RADHA GOMES MD   CT Soft Tissue Neck w/o Contrast    Narrative    Cervical spine CT W/O CONTRAST 9/14/2017 6:31 AM    Provided History: fall    Comparison: Correlation with PET/CT 8/25/2017    Technique: Using multidetector thin collimation helical acquisition  technique, axial, coronal and sagittal CT images through the cervical  spine were obtained without intravenous contrast.     Findings:  Minimal grade 1 retrolisthesis of C4 on C5, degenerative. Normal  cervical lordosis. No acute fracture or subluxation. No prevertebral  edema. Multilevel spondylosis. Preserved vertebral body height. Loss  of disc height at C4-5 with bilateral moderate neural foraminal  narrowing.    No abnormality of the paraspinous soft tissues. Partially visualized  central catheter.      Impression    Impression:   1. No acute fracture or subluxation.  2. Multilevel cervical spondylosis, including moderate bilateral  neural foraminal narrowing and mild spinal canal narrowing at C4-5.    I have personally reviewed the examination and initial interpretation  and I agree with the findings.    CARLEY JORGE MD   CT Head w/o contrast*    Narrative    CT HEAD W/O CONTRAST 9/14/2017 6:28 AM    History: fall    Comparison: None.    Technique: Using multidetector thin collimation helical acquisition  technique, axial, coronal and sagittal CT images from the skull base  to the vertex were obtained without intravenous  contrast.     Findings:  There is extensive streak artifact over the right temporal  and occipital lobes from an object outside of head.  No intracranial hemorrhage, mass effect, or midline shift. The  ventricles are proportionate to the cerebral sulci. The gray to white  matter differentiation of the cerebral hemispheres is preserved. The  basal cisterns are patent.    The visualized paranasal sinuses are clear. The mastoid air cells are  clear.       Impression    Impression: No acute intracranial pathology.    I have personally reviewed the examination and initial interpretation  and I agree with the findings.    ERIN ALVARADO MD

## 2017-09-16 NOTE — PLAN OF CARE
Problem: Goal Outcome Summary  Goal: Goal Outcome Summary     [id number]:  BPs - 160's/(70's-80's), pt HTN per baseline. Pt tolerating thickened liquids. Pt continues on telemetry. Up to commode w/ assist of 2, gait belt, walker. Pt continues w/ diarrhea, BMx1, stool sample sent, continues in enteric precaution. Pt w/ much improved cognitive level; A&O x3. Bed alarm on. Daughter at bedside. Continue to monitor & w/ POC.

## 2017-09-16 NOTE — PLAN OF CARE
Problem: Goal Outcome Summary  Goal: Goal Outcome Summary  Outcome: No Change  /70 (BP Location: Left arm)  Pulse 103  Temp 96  F (35.6  C) (Oral)  Resp 18  Wt 63.5 kg (139 lb 14.4 oz)  LMP 06/07/2007  SpO2 99%  BMI 22.22 kg/m2     Afebrile. Hypertensive, but has not met parameter of systolic  for hydralazine. Tachycardia with HR in 100s. Tele monitoring. SaO2 WNL on 2 L O2 via NC. LS clear. Reports frequent cough which tessalon arsenio helps. Administered another Tessalon dose at 2000. Practice aspiration precautions and meds with applesauce/pudding. Diclofenac gel applied to abdomen for discomfort. Lidocaine patch available at 2000. Abdomen distended and round. Passed small hard formed stool x1. Voided 200 cc taylor urine up on commode. Assist of 1-2, GB, and walker to transfer to [facility]. PORT a cath infusing TKO. Phos replacement infusion completed and potassium replaced as well. No recheck of 'lytes till AM.  and daughter at bedside. Family to assist pt in ordering full liquid dinner tray. A&O x4. Bed alarm on for fall history. Continue to monitor and follow POC.

## 2017-09-16 NOTE — PLAN OF CARE
Problem: Goal Outcome Summary  Goal: Goal Outcome Summary  OT 7D: Pt completed STS to chair transfer w/MIN A. Pt completed ADLs while seated in chair w/assist only for washing back and MIN A w/washing and combing hair. Recommend pt discharge to TCU vs. ARU when medically stable to address strength, ADL independence and functional transfer independence.

## 2017-09-16 NOTE — PLAN OF CARE
"Problem: Goal Outcome Summary  Goal: Goal Outcome Summary  Outcome: Improving  Alert and oriented x4, much improved per patient, family and MD's.  She is a bit weepy today over the difficult course of the past few days as well as the wonderful support of her family and friends.  She was up to the commode x2, up to the chair x2.  SLP evaluated and advanced her to thin liquids and full liquid diet which she is tolerating well.  She took small pills this morning in applesauce, she did feel the second pill \"stuck\" and was able to relieve this feeling with thickened fluids.  She later tried to take tessalon gini with just thin fluids and did cough a bit after she threw back her head a bit to get pill down.  Advised her to continue to use applesauce to take meds and follow with liquid as needed.  She continues to have some hypertension and was given hydralazine once this morning.  Lisinopril was restarted this morning to enhance BP control.  Magnesium and phosphorus replaced per protocol.  No rechecks required.  Awaiting potassium replacement from pharmacy in the form of 20mEq/100cc.  She has complained of intermittent abdominal pains; Voltaren gel and lidoderm patches ordered. She continued to have loose stools, C diff negative, until this afternoon when she had a formed stool.  SW consulted for assitance in TCU placement which could occur as early as tomorrow.   and daughter at bedside.      "

## 2017-09-16 NOTE — PLAN OF CARE
Problem: Goal Outcome Summary  Goal: Goal Outcome Summary  Alert and oriented X 4. Systolic BP remains in the 160 range. Hydralazine given. Reported some abdominal pain after drinking juice. Denies pain at rest. Has non-productive cough. Lungs clear. Up to bedside commode with assist of 2. Slept in between cares.

## 2017-09-16 NOTE — PLAN OF CARE
Problem: Goal Outcome Summary  Goal: Goal Outcome Summary  PT/7D: Pt progressing very well. Pt able to amb short distance in room (15 ft) with FWW and CGA. Pt tolerated supine and standing exercises well and able to tolerate standing at EOB with FWW and SBA for 5 min x2. PT recommends DC to TCU to continue strengthening and endurance training for all functional mobility.

## 2017-09-16 NOTE — PLAN OF CARE
Problem: Goal Outcome Summary  Goal: Goal Outcome Summary  SLP: Pt seen this AM at bedside with family present. Voice and attention improved; intermittent cough persists. Recommend full liquid diet-thins with pills crushed in applesauce. Pt to be upright and alert for all PO intake, assistance in meal setup, intermittent supervision, small bites and sips, NO STRAWS, hold PO if coughing with oral intake. SLP to follow per POC.

## 2017-09-16 NOTE — PROGRESS NOTES
[facility], [place]    Hematology / Oncology Progress Note    Date of Admission: 9/9/2017  Hospital Day #: 7   Date of Service (when I saw the patient): 09/16/2017     Assessment & Plan   Charlene Douglas is a 67 year old female with stage IV colon cancer (mets to the liver) s/p cycle 1 FOLFOX who presented to the ED with shortness of breath and found to have new onset pulmonary edema on CXR, a BNP of 5000, hypoxic respiratory failure 2/2 pulmonary edema and cardiogenic shock, and acute heart failure requiring inotropic support (EF 10-20%). Admitted to the ICU where she was intubated and diuresed. Extubated 9/12 and has been stable off of mechanical support. Transferred to the Heme/Onc service 9/13.     #Non-ischemic cardiomyopathy   Presented with acute SOB, CXR demonstrated pulmonary edema, BNP of 5000 and ECHO with EF 10-20%. Finished 48-hr infusion of 5-FU on day of admission. Stress cardiomyopathy 2/2 5-FU is probably diagnosis. Repeat ECHO 9/12 with EF improved to 35-40%. Cardiology followed while in ICU.  -changed from IV metoprolol to carvedilol on 9/15 after passing swallow eval   -lisinopril 5mg started 9/16  -To f/u with cards for medication optimization in 2-4 weeks.  -Hydralazine 10 mg q6 prn for SBP >160 and DBP >100.  -Keep Mg >2 and K >4.  -repeat TTE on Monday     # Pulmonary edema and Acute hypoxic respiratory failure, resolved  Due to acute onset non-ischemic cardiomyopathy. Extubated 9/12/17.   -- Supplemental O2 if needed.    #Somnolence, resolving  Suspect 2/2 versed with decreased hepatic metabolism due to liver mets. Ruled out other etiologies with head CT, ABG, other labs.     #Unwitnessed fall.  On 9/14, head CT negative, C-spine cleared.   -Head lac cleaned and glued by plastic surgery.     #Stage IV colon cancer. Recently diagnosed. Involves the ascending colon near ileocecal valve with extensive hepatic involvement. PET/CT 8/25/17 with possible pulmonary nodules  consistent with mets. S/p FOLFOX chemotherapy (x9/7). Follows with Dr. De La Torre.   -Will need to change regimen as cannot have further 5-FU, follow-up with Dr. Dove to discuss    #Chronic anemia. Likely 2/2 chemotherapy.  -Transfuse for hgb <8. Given one unit on 9/15.     Thrombocytopenia: likely from chemotherapy     #ELKIN, resolved   2/2 to cardiogenic shock/possible cardiorenal syndrome. With diuresis and improvement of volume status, heart function and respiratory status, Cr has improved.     Loose stools: C diff negative. Likely medication related, now off antibiotics and zantac.     #Abdominal pain: likely from hepatic mets. Try topical lidocaine     FEN:  No MIVF  -PRN lyte replacement   -advanced to full liquid diet on 9/16    Prophy/Misc:  -VTE: Heparin 5000 u q8  -GI/PUD:ranitidine 50 mg q12hr    Code status: Full  Disposition: TCU. Possible discharge in 1-2 days.     Perry Mckeon MD  Heme/Onc Fellow  Pager: [phone]      Interval History   Charlene is more alert today. Having normal conversations with her family. Remembers being very SOB and coming to hospital, but nothing from ICU stay. She is having a non-productive cough, present PTA and was using tessalon pearls.     Also c/o intermittent mid/RUQ abdominal pain, present for a few weeks. She was using tylenol at home.   Loose incontinent stools since yesterday. Stools have been loose for the past several months.   No f/c or n/v.     Physical Exam   Temp: 96.7  F (35.9  C) Temp src: Oral BP: 147/65 Pulse: 103 Heart Rate: 96 Resp: 20 SpO2: 98 % O2 Device: Nasal cannula Oxygen Delivery: 2 LPM  Vitals:    09/09/17 2337 09/15/17 0759 09/16/17 1046   Weight: 67.4 kg (148 lb 9.4 oz) 67.3 kg (148 lb 6.4 oz) 63.5 kg (139 lb 14.4 oz)     Vital Signs with Ranges  Temp:  [96.2  F (35.7  C)-98  F (36.7  C)] 96.7  F (35.9  C)  Pulse:  [] 103  Heart Rate:  [92-97] 96  Resp:  [18-20] 20  BP: (144-177)/(65-94) 147/65  SpO2:  [98 %-99 %] 98 %  I/O last 3  completed shifts:  In: 920 [P.O.:120; I.V.:500]  Out: 1225 [Urine:1225]    General: NAD, alert and interactive  HEENT: PERRL, MMM, no oral lesions  Lungs: CTA bilaterally  Cardiovascular: RRR, no M/R/G, trace b/l LE edema  Abdominal/Rectal: +BS, soft, NT, ND, No HSM   MSK: normal muscle tone  Skin: no rashes or petechaie  Neuro: A&O       Data   Labs reviewed

## 2017-09-16 NOTE — PLAN OF CARE
Problem: Goal Outcome Summary  Goal: Goal Outcome Summary  Outcome: No Change  's/90's per baseline. Occasionally tachypneic especially when sleeping. Jaundiced. NSR on tele. 2 assist to BSC. Samuels removed-incontinent of urine and many episodes of diarrhea-enteric precaution initiated per protocol-stool sample needed. Urine with large amount sediment-known UTI.  Swallow study completed-on nectar thickened liquid diet. Pt tolerated cup of broth thickened. R port patent. KCl replaced-recheck in am. 1 unit PRBC given. L forehead lac ALEX. Forgetful but now able to comprehend what others are saying and speak in full sentences though slowly. Bed alarm on. Daughter sleeping at bedside.

## 2017-09-17 NOTE — PLAN OF CARE
Tmax 100.0 Phos replacement given. Redraw in the morning. Pt up to the bathroom with 1 standby assist. Guaifenesin given for cough. Pt changed to DD3 with thin liquids diet. Telemetry discontinued. Pt in good spirits and wanted to rest this evening after having visitors. Will continue plan of care.

## 2017-09-17 NOTE — PLAN OF CARE
"Problem: Goal Outcome Summary  Goal: Goal Outcome Summary  Outcome: Improving  Continues to experience coughing episodes relieved by guaifenasin with codeine.  Ranitidine d/c'd yesterday and patient had the sensation of feeling \"like it's in my stomach and needs to come out.\"  MD notified, ranitidine po ordered.  Lidoderm eased abdominal discomfort overnight and only mild discomfort today.  Abdominal US done this afternoon.  Continues to have loose stool with some incontinence (coughing contributes). Up to w/c to get to bathroom for sponge bath this morning.  Ambulated in halls with walker and PT.  O2 sats 97% on room air.  BP continues to be a bit elevated but no hydralazine required today. She took soup and soda (100%) for lunch. She took her morning meds with applesauce without difficulty.  She continues to have dry mouth and complains that swallowing things like chicken were difficult prior to admission.  Discussed using smoothies, protein shakes and soft, moist foods to increase ability to swallow and provide adequate protein intake.She stated she had a \"thread\" pass over her eye which she attributed to bleeding as she had this in the past; MD notified, will monitor and heparin SQ was discontinued.  Possible transfer to TCU tomorrow; JUAN FRANCISCO discussed with her and her family today.      "

## 2017-09-17 NOTE — PLAN OF CARE
Problem: Goal Outcome Summary  Goal: Goal Outcome Summary  PT/7D: Pt continues to demonstrate improvements with function. Pt able to tolerate BLE exercises well and requesting to do UE exercises tomorrow. Pt transfers safely with  FWW and SBA- good stability noted. Pt amb 60 ft with FWW and CGA- no rest break needed and minimal fatigue reported. Pt would benefit from a short stay at TCU to continue to progress functional strength and endurance,     PT D/C to TCU today     Discharge Planner PT   Patient plan for discharge: TCU  Current status: see above.   Barriers to return to prior living situation: pt needing CGA to min A for mobility skills.   Recommendations for discharge: TCU  Rationale for recommendations: continue skilled PT to progress pt functional mobility.        Entered by: Cordell Espinosa 09/19/2017 2:38 PM       PT: pt D/c before last PT session, pt D/c status from date last seen on 9/17/17.,  Pt met 0/4 PT goals;   Pt needing increased assist for all set PT gaols  Goal stairs not tested.       Physical Therapy Discharge Summary  Reason for discharge:   Discharge from hospital to TCU   Progress towards goals: See goals on Care Plan in [facility] electronic health record for goal details.  Goals partially met. Barriers to achieving goals: limited tolerance,  Fatigue, Weakness, stairs NT  Recommendation(s):   Continued therapy is recommended. Rationale/Recommendations: TCU to increase safety and independence with basic functional mobility, and to address unmet goals.

## 2017-09-17 NOTE — PLAN OF CARE
Problem: Goal Outcome Summary  Goal: Goal Outcome Summary  Pt seen for swallow tx- pt's difficulty previously for diet advancement has been dry mouth and throat - making it difficult to swallow more solid foods. Pt with baseline cough that is not related to po intake. Pt with also reduced appetite - preferring more a of a full liquid diet. With trials of DD2 and DD3- pt improved with oral prep and pharyngeal phase of swallow-- not having the sensation of dryness or retention with trials today; no s/s of aspiration on thin liquids today by straw  Or DD2/DD3 solids. Pt also using artificial saliva spray for oral dryness. Recommend upgrade diet to DD3 with thin liquids. Pt should be upright for all po, take small bites/sips, alternate solids and liquids, pace self- eat slowly with breaks as needed. Pt and family would also like a nutrition/dietician consult as she is still preferring to eat smoothies. SLP to f/u for diet tolerance and readiness for further diet advancement.

## 2017-09-17 NOTE — PROGRESS NOTES
[facility], [place]    Hematology / Oncology Progress Note    Date of Admission: 9/9/2017  Hospital Day #: 8   Date of Service (when I saw the patient): 09/17/2017     Assessment & Plan   Charlene Douglas is a 67 year old female with stage IV colon cancer (mets to the liver) s/p cycle 1 FOLFOX who presented to the ED with shortness of breath and found to have new onset pulmonary edema on CXR, a BNP of 5000, hypoxic respiratory failure 2/2 pulmonary edema and cardiogenic shock, and acute heart failure requiring inotropic support (EF 10-20%). Admitted to the ICU where she was intubated and diuresed. Extubated 9/12 and has been stable off of mechanical support. Transferred to the Heme/Onc service 9/13.     #Non-ischemic cardiomyopathy   Presented with acute SOB, CXR demonstrated pulmonary edema, BNP of 5000 and ECHO with EF 10-20%. Finished 48-hr infusion of 5-FU on day of admission. Stress cardiomyopathy 2/2 5-FU is probably diagnosis. Repeat ECHO 9/12 with EF improved to 35-40%. Cardiology followed while in ICU.  - continue carvedilol 6.25 mg bid and lisinopril 5  -To f/u with cards for medication optimization in 2-4 weeks.  -Hydralazine 10 mg q6 prn for SBP >160 and DBP >100.  -Keep Mg >2 and K >4.    # Pulmonary edema and Acute hypoxic respiratory failure, resolved  Due to acute onset non-ischemic cardiomyopathy. Extubated 9/12/17.   -- Supplemental O2 if needed.    Elevated direct bilirubin  Evaluate for biliary obstruction with abdominal ultrasound    #Stage IV colon cancer. Recently diagnosed. Involves the ascending colon near ileocecal valve with extensive hepatic involvement. PET/CT 8/25/17 with possible pulmonary nodules consistent with mets. S/p FOLFOX chemotherapy (x9/7). Follows with Dr. De La Torre.   -Will need to change regimen as cannot have further 5-FU, follow-up with Dr. Dove to discuss    #Chronic anemia. Likely 2/2 chemotherapy.  -Transfuse for hgb <8. Given one unit on  "9/15.     Thrombocytopenia: likely from chemotherapy     #ELKIN, resolved   2/2 to cardiogenic shock/possible cardiorenal syndrome.     Loose stools: C diff negative. Likely medication related, now off antibiotics and zantac.     #Abdominal pain: likely from hepatic mets. Try topical lidocaine     Floaters  Reported brief \"line\" across right visual field on 9/17. If returns will consult optho     #Somnolence, resolved  Suspect 2/2 versed with decreased hepatic metabolism due to liver mets. Ruled out other etiologies with head CT, ABG, other labs.     #Unwitnessed fall.  On 9/14, head CT negative, C-spine cleared.   -Head lac cleaned and glued by plastic surgery.     FEN:  No MIVF  -PRN lyte replacement   -advanced to full liquid diet on 9/16    Prophy/Misc:  -VTE: holding as ambulatory   -GI/PUD:ranitidine 50 mg q12hr    Code status: Full  Disposition: TCU. Likely discharge tomorrow     Perry Mckeon MD  Heme/Onc Fellow  Pager: [phone]      Interval History   Charlene is doing better today. Walked down the hallway with PT. Cough is still troublesome though helped by cough syrup/codeine.  This morning she had a \"line\" through her right field of vision for several minutes. This happened previously and she saw an eye doctor but cannot remember what the found.   Loose stools are better. No headache or n/v.   Abdominal pain improved with lidocaine patch.     Physical Exam   Temp: 98.3  F (36.8  C) Temp src: Oral BP: 154/72   Heart Rate: 92 Resp: 18 SpO2: 98 % O2 Device: None (Room air) Oxygen Delivery: 2 LPM  Vitals:    09/15/17 0759 09/16/17 1046 09/17/17 0900   Weight: 67.3 kg (148 lb 6.4 oz) 63.5 kg (139 lb 14.4 oz) 62.6 kg (138 lb 0.1 oz)     Vital Signs with Ranges  Temp:  [96  F (35.6  C)-99.7  F (37.6  C)] 98.3  F (36.8  C)  Heart Rate:  [] 92  Resp:  [16-20] 18  BP: (147-157)/(56-77) 154/72  SpO2:  [97 %-99 %] 98 %  I/O last 3 completed shifts:  In: 992 [P.O.:400; I.V.:592]  Out: 1250 [Urine:200; " Other:1050]    General: NAD, alert and interactive  HEENT: PERRL, MMM, no oral lesions  Lungs: CTA bilaterally  Cardiovascular: RRR, no M/R/G, trace b/l LE edema  Abdominal/Rectal: +BS, soft, NT, ND  MSK: normal muscle tone  Skin: no rashes or petechaie  Neuro: A&O       Data   Labs reviewed

## 2017-09-17 NOTE — CONSULTS
"Social Work: Assessment with Discharge Plan    Patient Name:  Charlene Douglas  :  1950  Age:  67 year old  MRN:  [id number]  Risk/Complexity Score:  Filed Complexity Screen Score: 8  Completed assessment with:   in hallway while patient worked with PT    Presenting Information   Reason for Referral:  Discharge plan  Date of Intake:  2017  Referral Source:  Physician  Decision Maker:  Patient  Alternate Decision Maker:  Patient unable to answer at this time d/t working with PT  Health Care Directive: No HCD on file  Living Situation:  House  Previous Functional Status:  Per OT charting patient was independent until 9.10.17 when she started getting increasingly weak from chemo treatment  Patient and family understanding of hospitalization:  Restorative  Cultural/Language/Spiritual Considerations:  n/a  Adjustment to Illness:  Unable to assess    Physical Health  Reason for Admission:  Shortness of breath; recently diagnosed colon cancer with liver mets  Services Needed/Recommended:  TCU    Mental Health/Chemical Dependency  Diagnosis:  No diagnoses listed in charting    Support System  Significant relationship at present time:   Praveen  Family of origin is available for support:  2 daughters (1 present today) live in [facility]; supportive  Other support available:  n/a  Gaps in support system:  n/a  Patient is caregiver to:  Praveen had a CVA 7 weeks ago today; he has no ongoing deficits (after his double vision cleared up 16 days ago) besides \"tired eyes\" (he has an optho appointment this week). He is currently treating his eyes with rest and darkness - sometimes he is using his sunglasses in the hospital d/t fluorescent lights.     Provider Information   Primary Care Physician:  Donna Shirley   [phone]   Clinic:  [address]  / [place] [id number]      :  Stan Balderas, RN    Financial   Income Source:  Social Security shelter and " 's income (he continues to work for a printing company but has been on FMLA since his CVA)   Financial Concerns:  none  Insurance:    Payor/Plan Subscriber Name Rel Member # Group #   [employer] -  AND [place]* ALEJANDRINA SAINI  [id number]       [id number]       Discharge Plan   Patient and family discharge goal:  Rehab and then home once she can manage stairs (their bedroom is on the 2nd floor)  Provided education on discharge plan:  YES  Patient agreeable to discharge plan:   is in agreement - patient is working with another provider  A list of Medicare Certified Facilities was provided to the patient and/or family to encourage patient choice. Patient's choices for facility are:  Yes - for 25 miles from their [place] [address];  prefers areas of [place] and [place] (included in the list provided). We also talked about needing to check with [employer] for which of the facilities are considered in-network for her insurance.   Will NH provide Skilled rehabilitation or complex medical:  YES  General information regarding anticipated insurance coverage and possible out of pocket cost was discussed. Patient and patient's family are aware patient may incur the cost of transportation to the facility, pending insurance payment: YES  Barriers to discharge:  Facilities that are in-network and medical readiness    Discharge Recommendations   Anticipated Disposition:  Facility:  TBD  Transportation Needs:  Depends on her mobility  Name of Transportation Company and Phone:  TBD    Additional comments   Per  Charlene has not been to a TCU in the past - he is familiar with the rehab process (learning how to maneuver around their house) from his 2 previous RACHEL.  has been sleeping at the hospital - we talked about taking time to care for himself especially given his own recent medical concerns. He indicated that he is okay to stay at the hospital.  endorsed that the recommendation for  "TCU was \"new information\" to them but \"not a surprise.\" Encouraged him or patient to alert us to questions or concerns as they arise.     Lacy UGARTE LICSW  9/17/2017    ON CALL PAGER   0800 - 1600 [phone]    ON CALL COVERAGE AFTER 1600  [phone]    "

## 2017-09-17 NOTE — PLAN OF CARE
Problem: Goal Outcome Summary  Goal: Goal Outcome Summary     Afebrile. Hypertensive, not meeting parameter for hydralazine. OVSS. Overnight had 2LPM via NC, but this am complained of her nose hurting, had NC off, has been sating 97% and up; currently off NC, no complaints of feeling SOB. Applied lubricating jelly to nares for comfort. Frequent cough managed w/ tessalon pearls x2 & robitussin x2, some relief. Not coughing up anything and tolerating full liquid diet. Up to bedside commode w/ assist of 1, had some fecal incontinence due to coughing; loose/soft BM x1. Lidocaine patch cut in two and across abdomen, pt reported it is helping abdominal discomfort. Rechecked phos, replacement given, recheck in for this am.  slept at bedside. A&O x4. Pt off bed alarm overnight, calling appropriately. Continues w/ tele monitoring. Potential d/c to TCU. Continue to monitor & w/ POC.

## 2017-09-17 NOTE — PLAN OF CARE
Problem: Goal Outcome Summary  Goal: Goal Outcome Summary  OT 7D: Pt ambulated 10 ft x2 w/FWW and CGA. Sat in chair at sink and completed grooming/upper body bathing w/setup. STS and toilet transfers CGA w/VCs for technique. Pt is limited by activity tolerance, strength and decreased IND w/ADLs. Recommend pt discharge to acute rehab when medically stable as pt is highly motivated.

## 2017-09-18 NOTE — PROGRESS NOTES
CLINICAL NUTRITION SERVICES - REASSESSMENT NOTE     Nutrition Prescription    RECOMMENDATIONS FOR MDs/PROVIDERS TO ORDER:  - Due to ongoing severe xerostomia, would consider trialing a saliva stimulant  - If pt remains hospitalized > 72 hrs, recommend obtaining calorie counts to assess po adequacy.  - Would consider starting pt on oral PO4 replacement with Neutraphos 1 packet TID to help correct and stabilize phosphorus levels.     Malnutrition Status:    Severe malnutrition in the context of acute illness    Recommendations already ordered by Registered Dietitian (RD):  - Entered prn order for pt to request nutritional supplements as needed.       EVALUATION OF THE PROGRESS TOWARD GOALS   Diet: Initiated with Regular diet with nectar thickened liquids on 9/15, but downgraded to FLs on 9/16 and then changed to DDL3 with thin liquids on 9/17 per SLP recommendations     Intakes: Pt reports consuming consuming mostly soft foods, such as soups, smoothies and pudding d/t dry mouth and reduced appetite. Ate about 75% of her french toast this am.  - Currently not consuming any nutritional supplements, but has trialed Ensure Plus and Magic Cup in the past.      Nutrition Support: No initiation at this time.         NEW FINDINGS   Weight: 65.3 kg (9/18), 62.6 kg (9/17), 67.3 kg (9/15); Wt down 2 kg (3% loss) x 3 days, but pt still with h/o 3% weight loss over the past month from PTA.    Labs:  PO4 < normal since 9/16, despite supplementation.     MALNUTRITION  % Intake: </= 50% for >/= 5 days (severe)  % Weight Loss: Up to 5% in 1 month (non-severe)  Subcutaneous Fat Loss: Facial region, Upper arm and Lower arm:  Mild  Muscle Loss:Temporal, Thoracic region (clavicle, acromium bone, deltoid, trapezius, pectoral), Upper arm (bicep, tricep), Lower arm  (forearm), Upper leg (quadricep, hamstring), Patellar region and Posterior calf:  Mild-Moderate  Fluid Accumulation/Edema: None noted  Malnutrition Diagnosis: Severe malnutrition  in the context of acute illness    Previous Goals   1. Diet adv v nutrition support within 1-2 days.     Evaluation: Not met    Total avg nutritional intake to meet a minimum of 25 kcal/kg and 1.2 g PRO/kg daily (per dosing wt 67 kg).    Evaluation: Unable to evaluate    Previous Nutrition Diagnosis  Inadequate protein-energy intake related to poor appetite as evidenced by pt family reports of poor PO intake over the past month, 3% weight loss over the past month, now intubated without plans to start nutrition support      Evaluation: No change    CURRENT NUTRITION DIAGNOSIS  Inadequate protein-energy intake related to slow diet adv d/t altered swallow function, poor appetite and dry mouth as evidenced by SLP recommendation for DDL3 diet on 9/17 and pt consuming < 50% of po intakes over past week and h/o 3% wt loss over past month.     INTERVENTIONS  Implementation  Medical food supplement therapy - Provided list of additional nutritional supplements to trial for acceptance. Pt receptive to sampling Ensure Clear.  Nutrition education for recommended modifications - Provided education on tips for coping with dry mouth. Written information provided for reference.    Goals  1. Patient to consume % of nutritionally adequate meal trays TID, or the equivalent with supplements/snacks.  2. Wt not to decline < 65 kg    Monitoring/Evaluation  Progress toward goals will be monitored and evaluated per protocol.      Anny Mitchell RD,LD  Pager [phone]

## 2017-09-18 NOTE — PROGRESS NOTES
Social Work Services Progress Note    Hospital Day: 10  Date of Initial Social Work Evaluation:  9/17/17 - Lacy  Collaborated with:  Oncology team, 7D RN staff, pt, pt's -Praveen  Data:  Received update from team re: pt's medical status.  PT/OT recommendation for TCU upon DC.   Intervention:  Met with pt and pt's  to follow up re: DC planning.  Both in agreement with TCU upon DC.  Pt and  had reviewed Medicare.gov listing provided to them by weekend JUAN FRANCISCO.    Request referrals to (faxed to all below):  -[facility]  -[facility]  Assessment:  see bedside RN, PT/OT and provider notes  Plan:    Anticipated Disposition:  Facility:  TCU (d)    Barriers to d/c plan:  n/a    Follow Up:  SW will continue to follow and assist with DC planning      ELAINE Urbina, MSW  7D  (Hem/Onc)  Pager: [phone]  9/18/2017

## 2017-09-18 NOTE — PLAN OF CARE
"Problem: Goal Outcome Summary  Goal: Goal Outcome Summary     Tmax 101.1, pt \"didn't know she even had a fever.\" Slightly hypertensive, not meeting parameter for hydralazine. OVSS. Frequent cough managed w/ tessalon pearls x2 & robitussin x2, some relief. Only coughing stomach acid up at times. Tolerating diet (DD3 w/ thin liquids). Up to bathroom w/ stand by assist, incontinent of urine due to coughing. Lidocaine patch cut in two and across abdomen, working well per pt for abdominal discomfort.  slept at bedside. A&O x4. Potential d/c to TCU. Continue to monitor & w/ POC.        "

## 2017-09-18 NOTE — PLAN OF CARE
Problem: Goal Outcome Summary  Goal: Goal Outcome Summary  SLP:  Pt seen to f/u for swallowing. Pt with ongoing severe xerostomia and dry cough affecting PO intake. Pt continues to choose very soft foods and soups to eat.  Tolerating thin liquids well w/o outward s/sx of aspiration.  Recommend continue with dysphagia diet level 3 with thin liquids, small bites/sips, soft/moist foods only.  Encourage PO intake as able.  Pt plans to d/c over the next few days.  Rec. Pt f/u with ST upon discharge to TCU for diet tolerance and further education.

## 2017-09-18 NOTE — PROGRESS NOTES
[facility], [place]    Hematology / Oncology Progress Note    Date of Admission: 9/9/2017  Hospital Day #: 9   Date of Service (when I saw the patient): 09/18/2017     Assessment & Plan   Charlene Douglas is a 67 year old female with stage IV colon cancer (mets to the liver) s/p cycle 1 FOLFOX who presented to the ED with shortness of breath and found to have new onset pulmonary edema on CXR, a BNP of 5000, hypoxic respiratory failure 2/2 pulmonary edema and cardiogenic shock, and acute heart failure requiring inotropic support (EF 10-20%). Admitted to the ICU where she was intubated and diuresed. Extubated 9/12 and has been stable off of mechanical support. Transferred to the Heme/Onc service 9/13.     #Non-ischemic cardiomyopathy   Presented with acute SOB, CXR demonstrated pulmonary edema, BNP of 5000 and ECHO with EF 10-20%. Finished 48-hr infusion of 5-FU on day of admission. Stress cardiomyopathy 2/2 5-FU is probable diagnosis. Repeat ECHO 9/12 with EF improved to 35-40%. Cardiology followed while in ICU.  -increase carvedilol to 12.5 mg bid. Change lisinopril to losartan due to cough   -To f/u with cards for medication optimization in 2-4 weeks.  -Hydralazine 10 mg q6 prn for SBP >160 and DBP >100.  -Keep Mg >2 and K >4.    # Pulmonary edema and Acute hypoxic respiratory failure, resolved  Due to acute onset non-ischemic cardiomyopathy. Extubated 9/12/17.   -- Supplemental O2 if needed.    Fever  Overnight on 9/17. Blood cultures ngtd. U/A clean. CXR clear. Not on antibiotics. She was having fevers for several weeks PTA (likely from tumor).     Cough  She had this PTA but likely is worse with lisinopril so will switch to losartan. Started on PPI in case this is from reflux. Continue tessalon pearls and cough syrup     Elevated direct bilirubin  Abdominal ultrasound without biliary dilatation. Tbili normalized today.     #Stage IV colon cancer. Recently diagnosed. Involves the  "ascending colon near ileocecal valve with extensive hepatic involvement. PET/CT 8/25/17 with possible pulmonary nodules consistent with mets. S/p FOLFOX chemotherapy (x9/7). Follows with Dr. De La Torre.   -Will need to change regimen as cannot have further 5-FU, follow-up with Dr. Dove to discuss    #Chronic anemia. Likely 2/2 chemotherapy.  -Transfuse for hgb <8. Given one unit on 9/15.     Thrombocytopenia: likely from chemotherapy     #EKLIN, resolved   2/2 to cardiogenic shock/po, ssible cardiorenal syndrome.     Loose stools, resolved: C diff negative. Likely medication related, now off antibiotics and zantac.     #Abdominal pain: likely from hepatic mets. Continue topical lidocaine and diclofenac gel     Floaters  Reported brief \"line\" across right visual field on 9/17. If returns will consult optho     #Somnolence, resolved  Suspect 2/2 versed with decreased hepatic metabolism due to liver mets. Ruled out other etiologies with head CT, ABG, other labs.     #Unwitnessed fall.  On 9/14, head CT negative, C-spine cleared.   -Head lac cleaned and glued by plastic surgery.     FEN:  No MIVF  -PRN lyte replacement   -advanced to full liquid diet on 9/16    Prophy/Misc:  -VTE: holding as ambulatory   -GI/PUD: protonix     Code status: Full  Disposition: TCU. Likely discharge tomorrow     Perry Mckeon MD  Heme/Onc Fellow  Pager: [phone]      Interval History   Cough is worse - kept her up frequently during the night. Not productive of sputum. She had this for several months PTA but is worse now.   Had a fever overnight but did not feel sick. Up walking with PT and overall feeling better. Good appetite.      Physical Exam   Temp: 99  F (37.2  C) Temp src: Oral BP: 138/66 Pulse: 96 Heart Rate: 94 Resp: 18 SpO2: 98 % O2 Device: None (Room air)    Vitals:    09/16/17 1046 09/17/17 0900 09/18/17 0846   Weight: 63.5 kg (139 lb 14.4 oz) 62.6 kg (138 lb 0.1 oz) 65.3 kg (143 lb 14.4 oz)     Vital Signs with Ranges  Temp:  " [99  F (37.2  C)-101.1  F (38.4  C)] 99  F (37.2  C)  Pulse:  [] 96  Heart Rate:  [] 94  Resp:  [18-20] 18  BP: (138-168)/(66-80) 138/66  SpO2:  [94 %-98 %] 98 %  I/O last 3 completed shifts:  In: 400 [I.V.:400]  Out: -     General: NAD, alert and interactive  HEENT: PERRL, MMM, no oral lesions  Lungs: CTA bilaterally  Cardiovascular: RRR, no M/R/G, trace b/l LE edema  Abdominal/Rectal: +BS, soft, NT, ND  MSK: normal muscle tone  Skin: no rashes or petechaie  Neuro: A&O       Data   Labs reviewed

## 2017-09-18 NOTE — PLAN OF CARE
Problem: Goal Outcome Summary  Goal: Goal Outcome Summary  Outcome: No Change  Pt continues to have non productive cough despite tessalon gini and robitussin with codeine. Waiting for chest x-ray to check for pneumonia. Prilosec given.  Magnesium replaced and phos. Infusing. Urinary stress incontinence. RSV and influenza swab collected. Droplet and contact precaution to r/o RSV and Droplet precaution to r/o Influenza. Up with stand by assist with walker to the restroom

## 2017-09-18 NOTE — PROGRESS NOTES
Alerted by charge RN that patient has new fever to 100.5.  Patient denies dysuria/hematuria, change in her chronic cough, n/v/d or any other identifiable source.  She states she was having fevers at home prior to her admission, which usually came at night and were accompanied by chills.  She was unaware today that she was developing a fever, did not have any chills.    Patient had a recent CXR on 9/15: no focal airspace disease.  Lung exam is clear.  Coughing is frequent though unchanged from her chronic cough.  It is productive only of stomach acid. Will defer any repeat chest imaging at this time.    Blood cx x 2  UA with microscopic and reflex to UC  Tylenol  Patient has adequate white count and she is hemodynamically stable; thus, no antibiotics will be ordered.    Alena ORELLANA Carrier  9/17/2017

## 2017-09-18 NOTE — PLAN OF CARE
Problem: Goal Outcome Summary  Goal: Goal Outcome Summary  OT 7D: Pt performed toilet transfer with SBA and appropriate use of grab bars. Pt performed sponge bathing task with Alyse from Th to wash back, was able to wash upper portion of body and BLEs while seated without difficulty after full setup. Pt ambulated ~250 feet with CGA and fww in hallway, also complete 9 different BUE strengthening exercises. Tolerated therapy very well today.     Pt would benefit from short TCU stay to increase safety and independence with ADL/IADLs and functional mobility before returning home.

## 2017-09-19 NOTE — DISCHARGE SUMMARY
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 Was intubated for acute hypoxic respiratory failure.     Charlene Douglas is a 67 year old female with stage IV colon cancer (mets to the liver) s/p cycle 1 FOLFOX who presented to the ED with shortness of breath and found to have new onset pulmonary edema on CXR, a BNP of 5000, hypoxic respiratory failure 2/2 pulmonary edema and cardiogenic shock, and acute heart failure requiring inotropic support (EF 10-20%). Admitted to the ICU where she was intubated and diuresed. Extubated 9/12 and has been stable off of mechanical support. Transferred to the Heme/Onc service 9/13.     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 BNP ~5000. TTE showed EF 15-20%, severe diffuse hypokinesis, septal wall akinesis, global RV function mildly reduced and trace to mild TR. Had completed 48 hour infusion of 5-FU on the day of admission. Cordis placed. Required dobutamine 9/9-9/12. Trop peaked at 3.586 9/10. TTE 9/12 showed EF improved to 35-40%. Cardiology followed while in MICU. Diuresed while in MICU but really did not require any after transferring to floor. Started metoprolol but changed to carvedilol with ongoing poorly controlled HTN. Titrated to 6.25 mg bid by  the time of discharge as BPs tolerated. Systolics pretty consistently in the 90s after discharge from MICU to floor 9/13 but gradually increase to the 110s. Lisinopril 5 mg daily added but developed worsening cough, so changed to losartan 50 mg daily. Will need follow up in [facility] clinic in a few weeks for medication optimization and repeat TTE.     Acute hypoxic respiratory failure: Secondary to acute pulmonary edema from decompensated CHF. Intubated 9/9 on admission, extubated 9/12. Required O2 but was on RA at the time of discharge.    Altered mental status: Was sleepy after extubation for 48 hours. Attributed to slow clearance of midazolam used for sedation while intubated in the context of liver dysfunction from tumor burden. Mental status cleared nicely and was not an issue the last 4-5 days of the hospitalization.     ELKIN: Secondary to cardiogenic shock. Creatinine at baseline of 0.76 on presentation, increased to 1.5 the following day 9/10, and peaked at 1.72 on 9/11. Slowly came down and was 0.9 on the day of discharge.     Hypertension: Initially on metoprolol as above, changed to carvedilol as above. Added losartan as above.     Fall: In the context of AMS when transferred to the floor 9/14. Had an unwitnessed fall and a 2 cm lac on the left forehead. HCT negative. C-spine cleared. Plastics consulted and cleaned wound, glued it closed. Worked with PT/OT during the hospitalization and no further issues with clearance of mental status. No follow up needed for lac.     Acute on Chronic Cough: Had cough from CHF and pulmonary edema. Acutely worsened when lisinopril started. Changed to losartan the day prior to discharge, so anticipate it will improve some. Given tessalon and guaifenesin.     Fevers: Had fevers PTA, felt likely due to tumor. Infectious eval here negative. Not on antibiotics. Did not appear septic. Monitor closely.    Cholestasis: Has chronic mildly elevated bilirubin from cancer in the liver.  "Bilirubin typically around 1 - 1.4. RUQ US negative for obstruction. Monitor.    Acute on chronic abdominal pain: Secondary to tumor in liver. Used lidocaine patches, diclofenac gel. May need oxycodone prn.     Chronic anemia: Got 1 unit PRBC 9/15, good response. Keep hgb >8, closer to 9 with acute cardiac issues.    Floaters: Reported brief \"line\" across visual field . Ophtho eval if recurs.    Severe malnutrition: Intake <50% for > 5 days, mild subq fat loss, muscle loss, no edema. Dietician worked with pt. Was fortunately eating pretty well by the day of discharge. Speech path evaluated, cleared.     Metastatic colon cancer: Recently diagnosed. Involves the ascending colon near ileocecal valve with extensive hepatic involvement. PET/CT 17 with possible pulmonary nodules consistent with mets. S/p C1 FOLFOX -. Follows with Dr. De La Torre. Since cardiomyopathy could very well be due to 5-FU, discussed that Dr. De La Torre will likely change to another regimen.     Jenni Canales MD  Oncology Attending    Significant Results and Procedures   [id number]  [id number]  [id number]  [id number]^KAMLA^IFEANYI^ABEL           [facility],[place]  Echocardiography Laboratory  [address] [place] [address]     Name: LUDMILA SAINI  MRN: [id number]  : 1950  Study Date: 09/10/2017 08:24 AM  Age: 67 yrs  Gender: Female  Patient Location: [facility] [place]  Reason For Study: Heart Failure - Left  Ordering Physician: IFEANYI CASON  Referring Physician: SELF, REFERRED  Performed By: Chip Gonzáles RDCS     BSA: 1.8 m2  Height: 66 in  Weight: 148 lb  BP: 114/66 mmHg  _____________________________________________________________________________  __        Procedure  Limited Portable Echo Adult. Contrast Optison. Optison (NDC #2313-1906-56)  given intravenously. Patient was given 3 ml mixture of 3 ml Optison and 6 ml  saline. 6 ml " wasted.  _____________________________________________________________________________  __        Interpretation Summary  Technically difficult study. Patient intubated and sedated. PA catheter in  place.     The Ejection Fraction is estimated at 15-20%. Severely (EF <30%) reduced left  ventricular function is present. Diastolic function not assessed due to  tachycardia. Severe diffuse hypokinesis is present. Septal wall akinesis is  present. Posterior wall akinesis is present.  Global right ventricular function is mildly reduced.  Trace to mild tricuspid insufficiency is present.  The inferior vena cava is normal.  No pericardial effusion is present.     Compared to prior study in 8/2017, EF has dropped significantly with multiple  new regional wall motion abnormalities as detailed above.  _____________________________________________________________________________  __        Left Ventricle  Diastolic function not assessed due to tachycardia. Severely (EF <30%) reduced  left ventricular function is present. The Ejection Fraction is estimated at  15-20%. Severe diffuse hypokinesis is present. Septal wall akinesis is  present. Posterior wall akinesis is present.     Right Ventricle  The right ventricle is normal size. Global right ventricular function is  mildly reduced.     Atria  The atria cannot be assessed.     Mitral Valve  The mitral valve is normal. Trace mitral insufficiency is present.        Aortic Valve  The aortic valve cannot be assessed.     Tricuspid Valve  The tricuspid valve is normal. Trace to mild tricuspid insufficiency is  present. The right ventricular systolic pressure is approximated at 27.5 mmHg  plus the right atrial pressure.     Pulmonic Valve  The pulmonic valve is normal.     Vessels  The inferior vena cava is normal.     Pericardium  No pericardial effusion is present  _________________________________________________________        Doppler Measurements & Calculations     MV E max parker:  37.3 cm/sec  MV A max parker: 68.6 cm/sec  MV E/A: 0.54  MV dec slope: 157.0 cm/sec2  TR max parker: 262.0 cm/sec  TR max P.5 mmHg  Lateral E/e': 7.0  Medial E/e': 9.6  _________________________________________________________     Report approved by: KAREL Lloyd 09/10/2017 02:27 PM      Alonzo Ruiz MD 2017    Narrative         [id number]  ECH74  [id number]  [id number]^CADE^RAMILA^SUZANNE           [facility],[place]  Echocardiography Laboratory  [address] [place] [id number]     Name: LUDMILA SAINI  MRN: [id number]  : 1950  Study Date: 2017 07:06 AM  Age: 67 yrs  Gender: Female  Patient Location: [facility] [place]  Reason For Study: Shock  Ordering Physician: RAMILA MOHAMUD  Referring Physician: SELF, REFERRED  Performed By: Norberto Schultz RDCS     BSA: 1.8 m2  Height: 66 in  Weight: 148 lb  HR: 92  BP: 155/80 mmHg  _______________________________________________________________________  Procedure  Limited Portable Echo Adult. Contrast Optison. Optison (NDC #3672-2567-99)  given intravenously. Patient was given 4 ml mixture of 3 ml Optison and 6 ml  saline. 5 ml wasted.  ______________________________________________________________________     Interpretation Summary  Moderately (EF 35-40%) reduced left ventricular function is present. Traced at  38%. Moderate diffuse hypokinesis is present.  Poorly visualized, but RV function appears to be mildly reduced.  The right ventricular systolic pressure is approximated at 29.2 mmHg plus the  right atrial pressure. Pulmonary artery systolic pressure is normal.  Dilation of the inferior vena cava is present with abnormal respiratory  variation in diameter. Unable to assess mean RA pressure given the patient is  on a ventilator.  No pericardial effusion is present.  Compared to study dated 9/10/2017, left ventricular systolic function  has  improved.  _______________________________________________________________________     Left Ventricle  Left ventricular size is normal. Left ventricular wall thickness is normal.  Moderately (EF 35-40%) reduced left ventricular function is present. Traced at  38%. Moderate diffuse hypokinesis is present.     Right Ventricle  Poorly visualized, but RV function appears to be mildly reduced. A pacemaker  lead is noted in the right ventricle.     Atria  The atria cannot be assessed.     Mitral Valve  The mitral valve cannot be assessed. Mild mitral annular calcification is  present.        Aortic Valve  The aortic valve cannot be assessed.     Tricuspid Valve  Mild tricuspid insufficiency is present. The right ventricular systolic  pressure is approximated at 29.2 mmHg plus the right atrial pressure.  Pulmonary artery systolic pressure is normal.     Pulmonic Valve  The pulmonic valve cannot be assessed.     Vessels  Dilation of the inferior vena cava is present with abnormal respiratory  variation in diameter. Unable to assess mean RA pressure given the patient is  on a ventilator.     Pericardium  No pericardial effusion is present.        Compared to Previous Study  Compared to study dated 9/10/2017, left ventricular systolic function has  improved.     Attestation  I have personally viewed the imaging and agree with the interpretation and  report as documented by the fellow, Robin Irwin MD, and/or edited by me.  _______________________________________________________________________  MMode/2D Measurements & Calculations  IVSd: 0.88 cm  LVIDd: 4.1 cm  LVIDs: 3.3 cm  LVPWd: 0.72 cm  FS: 18.6 %  EDV(Teich): 72.5 ml  ESV(Teich): 44.3 ml  LV mass(C)d: 95.0 grams  LV mass(C)dI: 54.0 grams/m2     Doppler Measurements & Calculations  TR max parker: 270.0 cm/sec  TR max P.2 mmHg  _______________________________________________________________________     Report approved by: Maru Luna 2017 09:49  AM     Pending Results   These results will be followed up by Dr. Canales  Unresulted Labs Ordered in the Past 30 Days of this Admission     Date and Time Order Name Status Description    9/18/2017 1127 Respiratory Virus Panel by PCR In process     9/17/2017 2011 Blood culture Preliminary     9/17/2017 2011 Blood culture Preliminary     9/10/2017 0034 Creatinine In process     9/10/2017 0034 Platelet count In process         Code Status   Full Code    Primary Care Physician   Donna Shirley    Physical Exam                      Vitals:    09/17/17 0900 09/18/17 0846 09/19/17 0900   Weight: 62.6 kg (138 lb 0.1 oz) 65.3 kg (143 lb 14.4 oz) 65.4 kg (144 lb 1.6 oz)     Vital Signs reviewed  GEN: NAD  HEENT: no icterus/injection/pallor. OP clear  LUNGS: faint crackles at bases, o/w clear  CV: regular, no m/r/g  ABD: soft, NT/ND, NABS  EXT: warm, trace edema  NEURO: alert    Time Spent on this Encounter   IJenni, personally saw the patient today and spent greater than 30 minutes discharging this patient.    Discharge Disposition   Discharged to short-term care facility  Condition at discharge: Stable    Consultations This Hospital Stay   RESPIRATORY CARE IP CONSULT  PHARMACY TO DOSE VANCO  VASCULAR ACCESS CARE ADULT IP CONSULT  HEMATOLOGY & ONCOLOGY IP CONSULT  CARDIOLOGY HEART FAILURE (HF) IP CONSULT  PHYSICAL THERAPY ADULT IP CONSULT  OCCUPATIONAL THERAPY ADULT IP CONSULT  SWALLOW EVAL SPEECH PATH AT BEDSIDE IP CONSULT  SOCIAL WORK IP CONSULT  PHYSICAL THERAPY ADULT IP CONSULT  OCCUPATIONAL THERAPY ADULT IP CONSULT    Discharge Orders     General info for SNF   Length of Stay Estimate: Short Term Care: Estimated # of Days <30  Condition at Discharge: Improving  Level of care:skilled   Rehabilitation Potential: Excellent  Admission H&P remains valid and up-to-date: Yes  Recent Chemotherapy: Date:                     9/7/17  Use Nursing Home Standing Orders: Yes     Mantoux instructions   Give two-step Mantoux  (PPD) Per Facility Policy Yes     Reason for your hospital stay   66 yo female with metastatic colon cancer, s/p cycle 1 FOLFOX 9/7-9/10, admitted with dyspnea, found to have acute CHF with EF 15-20%, secondary to stress cardiomyopathy vs 5-FU induced cardiotoxicity. Also hypertensive urgency.     Intake and output   Every shift     Daily weights   Call Provider for weight gain of more than 2 pounds per day or 5 pounds per week.     Activity - Up ad danilo     Full Code     Physical Therapy Adult Consult   Evaluate and treat as clinically indicated.    Reason:  Deconditioning, CHF     Occupational Therapy Adult Consult   Evaluate and treat as clinically indicated.    Reason:  Deconditioning, CHF     Droplet Isolation   Until RVP returns - expect 9/19 or 9/20     Fall precautions     Advance Diet as Tolerated   Follow this diet upon discharge: Regular       Discharge Medications   Current Discharge Medication List      START taking these medications    Details   carvedilol (COREG) 6.25 MG tablet Take 1 tablet (6.25 mg) by mouth 2 times daily (with meals)  Qty: 60 tablet, Refills: 3    Associated Diagnoses: Acute systolic congestive heart failure (H)      diclofenac (VOLTAREN) 1 % GEL topical gel Place 2 g onto the skin 4 times daily as needed for moderate pain (for RUQ pain)    Associated Diagnoses: Metastatic colon cancer to liver (H)      lidocaine (LIDODERM) 5 % Patch Place 1-3 patches onto the skin every 24 hours To RUQ  Qty: 30 patch    Associated Diagnoses: Metastatic colon cancer to liver (H)      sennosides (SENOKOT) 8.6 MG tablet Take 1-2 tablets by mouth 2 times daily as needed for constipation  Qty: 120 each    Associated Diagnoses: Slow transit constipation      losartan (COZAAR) 50 MG tablet Take 1 tablet (50 mg) by mouth daily  Qty: 30 tablet    Associated Diagnoses: Acute systolic congestive heart failure (H)         CONTINUE these medications which have NOT CHANGED    Details   ACETAMINOPHEN PO Take by  mouth every 4 hours as needed for pain      Benzonatate (TESSALON PERLES PO) Take 200 mg by mouth 3 times daily as needed for cough      guaiFENesin-codeine (ROBITUSSIN AC) 100-10 MG/5ML SOLN solution Take 5 mLs by mouth every 4 hours as needed for cough  Qty: 120 mL, Refills: 0    Associated Diagnoses: Liver metastasis (H)      omeprazole (PRILOSEC) 20 MG CR capsule Take 1 capsule (20 mg) by mouth daily  Qty: 30 capsule, Refills: 1    Associated Diagnoses: Liver metastasis (H)      LORazepam (ATIVAN) 0.5 MG tablet Take 1 tablet (0.5 mg) by mouth every 4 hours as needed (Anxiety, Nausea/Vomiting or Sleep)  Qty: 30 tablet, Refills: 2    Associated Diagnoses: Metastatic colon cancer to liver (H)      prochlorperazine (COMPAZINE) 10 MG tablet Take 1 tablet (10 mg) by mouth every 6 hours as needed (Nausea/Vomiting)  Qty: 30 tablet, Refills: 2    Associated Diagnoses: Metastatic colon cancer to liver (H)      ondansetron (ZOFRAN) 8 MG tablet Take 1 tablet (8 mg) by mouth every 8 hours as needed (Nausea/Vomiting)  Qty: 10 tablet, Refills: 2    Associated Diagnoses: Metastatic colon cancer to liver (H)      lidocaine-prilocaine (EMLA) cream Apply thick amount to port area 30-60 minutes prior to port access.  Do not rub in.  Cover with plastic wrap/tegaderm.  Qty: 30 g, Refills: 3    Associated Diagnoses: Metastatic colon cancer to liver (H)         STOP taking these medications       metoprolol (TOPROL-XL) 25 MG 24 hr tablet Comments:   Reason for Stopping:             Allergies   Allergies   Allergen Reactions     Lisinopril Cough     No Known Allergies      Data   Most Recent 3 CBC's:  Recent Labs   Lab Test  09/19/17   0651  09/18/17   0605  09/17/17   0714   WBC  4.9  5.9  7.2   HGB  9.7*  7.9*  8.4*   MCV  85  82  83   PLT  187  129*  110*      Most Recent 3 BMP's:  Recent Labs   Lab Test  09/19/17   0651  09/18/17   0605  09/17/17   0714   NA  132*  136  135   POTASSIUM  4.6  4.6  4.3   CHLORIDE  101  105  104   CO2   23  23  23   BUN  17  20  31*   CR  0.90  0.86  0.85   ANIONGAP  8  9  9   RON  8.8  8.0*  8.2*   GLC  141*  110*  134*     Most Recent 2 LFT's:  Recent Labs   Lab Test  09/19/17   0651  09/18/17   0605   AST  72*  62*   ALT  40  29   ALKPHOS  358*  293*   BILITOTAL  1.2  0.9     Most Recent 3 Troponin's:  Recent Labs   Lab Test  09/10/17   0806  09/10/17   0316  09/10/17   0040   TROPI  3.502*  3.586*  2.990*     Most Recent Cholesterol Panel:  Recent Labs   Lab Test  07/20/17   0848   CHOL  153   LDL  92   HDL  43*   TRIG  90     Most Recent 6 Bacteria Isolates From Any Culture (See EPIC Reports for Culture Details):  Recent Labs   Lab Test  09/17/17   2107  09/17/17   2054  09/10/17   0041  09/09/17   2100  09/09/17   1900  08/07/17   1600   CULT  No growth  No growth  No growth  No growth  No growth after 6 days  No growth after 6 days  No growth after 6 days     Most Recent TSH, T4 and A1c Labs:  Recent Labs   Lab Test  08/07/17   1442   TSH  1.92     Results for orders placed or performed during the hospital encounter of 09/09/17   XR Chest Port 1 View    Narrative    XR CHEST PORT 1 VW 9/10/2017 3:16 AM    Comparison: 9/9/2017    History: Status post intubation    Findings: Single AP view of the chest. Endotracheal tube tip projects  over the midthoracic trachea. Right chest wall Port-A-Cath tip  projects over the mid SVC. Gastric tube tip and sidehole project over  the stomach. Cardiac silhouette is mildly prominent with indistinct  pulmonary vasculature. Left greater than right layering pleural  effusions. Patchy bilateral airspace opacities. Mildly improved  interstitial thickening. Relatively gasless upper abdomen.      Impression    Impression:   1. Endotracheal tube projects over the midthoracic trachea. Gastric  tube tip and sidehole project over the stomach.  2. Mild cardiomegaly and indistinct pulmonary vasculature patchy  bilateral airspace opacities, likely representing pulmonary  edema.  Differential includes infection.  3. Left greater than right pleural effusions.    I have personally reviewed the examination and initial interpretation  and I agree with the findings.    RADHA GOMES MD   XR Chest Port 1 View    Narrative    XR CHEST PORT 1 VW 9/10/2017 6:40 AM    Comparison: 9/10/2017    History: Central line    Findings: Single AP view of the chest, supine. Endotracheal tube tip  projects over the midthoracic trachea. Gastric tube tip and sidehole  project over the stomach. Right chest wall Port-A-Cath tip projects  over the mid SVC. Right IJ central venous catheter tip projects over  the right brachiocephalic vein. Cardiac silhouette is within normal  limits. Pulmonary vascular congestion. Patchy perihilar airspace  disease. Left pleural effusion with associated basilar  atelectasis/consolidation.      Impression    Impression:   1. Right IJ central venous catheter tip projects over the right  innominate vein.  2. Pulmonary vascular congestion with perihilar airspace opacities  likely representing pulmonary edema and/or atelectasis, versus  infection.  3. Left pleural effusion with associated basilar  atelectasis/consolidation.    I have personally reviewed the examination and initial interpretation  and I agree with the findings.    RADHA GOMES MD   XR Chest Port 1 View    Narrative    XR CHEST PORT 2 VW 9/10/2017 6:42 AM    Comparison: 9/10/2017    History: swan cath    Findings: 2 AP views of the chest. Repositioning of the right IJ  Verdon-Onel catheter with the tip over the right main pulmonary artery.  Endotracheal tube tip projects over the midthoracic trachea. Right IJ  Port-A-Cath tip projects over the mid SVC. Gastric tube tip and  sidehole project over the stomach. Cardiac silhouette is within normal  limits. Stable pulmonary vascular congestion. Left pleural effusion  with left basilar atelectasis/consolidation.      Impression    Impression:   1. Right IJ Verdon-Onel  catheter tip projects over the right main  pulmonary artery.  2. Pulmonary vascular congestion with probable mild pulmonary edema  and/or atelectasis versus infection.  3. Left pleural effusion with associated basilar  atelectasis/consolidation.    I have personally reviewed the examination and initial interpretation  and I agree with the findings.    RADHA GOMES MD   CT Soft Tissue Neck w/o Contrast    Narrative    Cervical spine CT W/O CONTRAST 9/14/2017 6:31 AM    Provided History: fall    Comparison: Correlation with PET/CT 8/25/2017    Technique: Using multidetector thin collimation helical acquisition  technique, axial, coronal and sagittal CT images through the cervical  spine were obtained without intravenous contrast.     Findings:  Minimal grade 1 retrolisthesis of C4 on C5, degenerative. Normal  cervical lordosis. No acute fracture or subluxation. No prevertebral  edema. Multilevel spondylosis. Preserved vertebral body height. Loss  of disc height at C4-5 with bilateral moderate neural foraminal  narrowing.    No abnormality of the paraspinous soft tissues. Partially visualized  central catheter.      Impression    Impression:   1. No acute fracture or subluxation.  2. Multilevel cervical spondylosis, including moderate bilateral  neural foraminal narrowing and mild spinal canal narrowing at C4-5.    I have personally reviewed the examination and initial interpretation  and I agree with the findings.    CARLEY JORGE MD   CT Head w/o contrast*    Narrative    CT HEAD W/O CONTRAST 9/14/2017 6:28 AM    History: fall    Comparison: None.    Technique: Using multidetector thin collimation helical acquisition  technique, axial, coronal and sagittal CT images from the skull base  to the vertex were obtained without intravenous contrast.     Findings:  There is extensive streak artifact over the right temporal  and occipital lobes from an object outside of head.  No intracranial hemorrhage, mass effect, or  midline shift. The  ventricles are proportionate to the cerebral sulci. The gray to white  matter differentiation of the cerebral hemispheres is preserved. The  basal cisterns are patent.    The visualized paranasal sinuses are clear. The mastoid air cells are  clear.       Impression    Impression: No acute intracranial pathology.    I have personally reviewed the examination and initial interpretation  and I agree with the findings.    ERIN ALVARADO MD   X-ray Chest 2 vws*    Narrative    EXAM: XR CHEST 2 VW  9/15/2017 12:57 PM     HISTORY:  evaluate fluid status of lungs, patient c/o SOB with h/o  pulmonary edema       COMPARISON:  Radiograph 9/10/2017, CT 7/14/2017    FINDINGS: AP and lateral radiographs of the chest. Right upper  extremity KT PICC tip projects of the mid SVC. Interval removal of the  right IJ central venous catheter and gastric tube. The mediastinal  border, cardiac silhouette, and pulmonary vasculature are within  normal limits. No focal airspace opacities. No pneumothorax. Small  left pleural effusion.      Impression    IMPRESSION:  1. Interval removal of the right IJ [place]-Onel catheter and gastric  tube.  2. No focal airspace opacities.  3. Stable small left pleural effusion.    I have personally reviewed the examination and initial interpretation  and I agree with the findings.    DANIELLA APPIAH MD   XR Abdomen 1 View    Narrative    Examination:  XR ABDOMEN 1 VW    Date:  9/15/2017 12:57 PM     Clinical Information: evaluate for stool/gas burden, ascites in  setting of abdominal distension and pain.     Additional Information: none    Comparison: none    Findings:   Single radiograph of the abdomen is obtained. Centrally located  nondistended loops of bowel. Gaseous distention of the stomach.  Nonobstructive bowel gas pattern. No portal venous gas or pneumobilia  in the partially visualized right upper quadrant. No abnormal  calcifications or soft tissue densities. Phleboliths in the  pelvis.  Deformed left femoral head.      Impression    Impression:  1. Nonobstructive bowel gas pattern.     I have personally reviewed the examination and initial interpretation  and I agree with the findings.    AURY ACUÑA MD   US Abdomen Limited Portable    Narrative    EXAMINATION: US ABDOMEN LIMITED PORTABLE  9/17/2017 2:15 PM      CLINICAL HISTORY: elevated direct bilirubin, evaluate for bile duct  obstruction    COMPARISON: PET/CT on 8/25/2017.        TECHNIQUE: Grayscale and color images of the right upper abdomen is  obtained.    FINDINGS:  Multiple echogenic heterogeneous hepatic masses, the largest measures  8.2 x 8.6 x 5.6 cm, consistent with known hepatic metastases. The  common bile duct measures 6 mm. There is gallbladder sludge. The  gallbladder wall measures approximately 3 mm. Sonographic Damico's is  negative. No pericholecystic fluid.    The visualized portions of the pancreas are normal in echogenicity.    The right kidney is normal in position and echogenicity. The right  kidney measures 11.8 cm. there is no significant urinary tract  dilation.       Impression    IMPRESSION:   1. Common bile duct is not substantially dilated measuring 6 cm.  2. Multiple hyperechoic heterogenous hepatic masses, consistent with  known hepatic metastases.  3. Gallbladder sludge without sonographic evidence of acute  cholecystitis.    I have personally reviewed the examination and initial interpretation  and I agree with the findings.    FITO BASS MD   XR Chest 2 Views    Narrative    Exam:  XR CHEST 2 VW, 9/18/2017 3:44 PM    History: fever, cough, eval for pna    Comparison:  Chest radiograph from 9/15/2017.    Findings:  PA and lateral views chest. Right chest portacatheter tip  projects over the mid SVC. The cardiomediastinal silhouette is within  normal limits. Resolution of left pleural effusion. No pneumothorax.  No focal airspace opacities. Visualized upper abdomen and  osseous  structures are unremarkable.      Impression    Impression:    No acute cardiopulmonary abnormality. Resolution of previous left  pleural effusion.    I have personally reviewed the examination and initial interpretation  and I agree with the findings.    RADHA GOMES MD

## 2017-09-19 NOTE — PROGRESS NOTES
Focus: Discharge Note  D/I: VSS. Pt ambulating with a walker and stand by assist. Cough in ongoing and being control with tessalon gini and robitussin with codeine. Reviewed discharge instructions with pt regarding her medications, and follow up appointment in [facility] and CORE clinic at the [facility] for CHF. Reviewed pt discharge instructions for CHF care including the importance of monitoring weight daily and recording. Limiting salt intake and monitor for dyspnea and SOB and increase swelling. Educational material given to pt regarding CHF  A: Pt and her  stated understanding of discharge instructions and has no further questions at the time.   P: Discharge to TCU at [facility] in [facility]. [place].  will provide transportation there.

## 2017-09-19 NOTE — PLAN OF CARE
Problem: Goal Outcome Summary  Goal: Goal Outcome Summary  Outcome: Therapy, progress toward functional goals as expected  SLP: Pt seen to f/u for swallowing.  Pt continues to c/o significant xerostomia; however, cough reduced from yesterday.  Pt tolerating current diet w/o difficulties.  Recommend advancing diet to regular with thin liquids, pt to softer food diet items that she feels she can successfully tolerate. Pt given education on reflux precautions and food items that typically increase incident of reflux. Pt and  demonstrated understanding.  Pt plans to discharge this afternoon.  Rec. Pt f/u with ST upon transfer to TCU to provide further education on diet items and reflux precautions to reduce risks for aspiration.

## 2017-09-19 NOTE — PLAN OF CARE
Problem: Goal Outcome Summary  Goal: Goal Outcome Summary  Outcome: No Change  Tmax 100.9. HTN though no hydralazine needed. Tachy up to low 100's. - influenza. Droplet precautions maintained for pending RSV. R port patent. 1 unit PRBC given. C/o constipation-given senna. Continues to have NP cough. CXR clear. Robitussin helpful. Coreg held as potential cause-to start losarten in place of this tomorrow. , Bill, sleeping at bedside.

## 2017-09-19 NOTE — PLAN OF CARE
Problem: Goal Outcome Summary  Goal: Goal Outcome Summary  OT 7C: pt performed all mobility with SBA this session, ambulated ~300 feet with fww, performed showering task with Alyse to wash back, SBA for transfer with appropriate use of grab bars. Pt fatigues easily and becomes SOB, symptoms resolving with seated rest breaks.     Occupational Therapy Discharge Summary     Reason for therapy discharge:    Discharged to transitional care facility.     Progress towards therapy goal(s). See goals on Care Plan in [facility] electronic health record for goal details.  Goals partially met.  Barriers to achieving goals:   discharge from facility.     Therapy recommendation(s):    Continued therapy is recommended.  Rationale/Recommendations:  Pt would benefit from short TCU stay to increase safety and independence with ADLs/IADLs and functional mobility before returning to home environment. .

## 2017-09-19 NOTE — PROGRESS NOTES
Care Coordinator- Discharge Planning     Admission Date/Time:  9/9/2017  Attending MD:  Abram Wells,*     Data  Date of initial CC assessment:  9/9  Chart reviewed, discussed with interdisciplinary team.   Patient was admitted for:   1. Metastatic colon cancer to liver (H)    2. Acute systolic congestive heart failure (H)    3. Slow transit constipation         Assessment  Concerns with insurance coverage for discharge needs: None.  Current Living Situation: Patient lives with spouse.  Support System: Supportive and Involved  Services Involved: Skilled Nursing Facility, TCU  Transportation: medical transport  Barriers to Discharge: none    Bedside rounds with Dr. Canales.  Reviewed post hospital appts with pt and spouse.  Scheduled in CORE clinic for new CHF on 9/27.  Spouse will contact [facility] to reschedule chemo and oncology appts.  Planning for d/c to TCU today, facilitated by JUAN FRANCISCO Ruiz.      Plan  Anticipated Discharge Date:  09/19/17  Anticipated Discharge Plan:  TCU    CTS Handoff completed:  YES  Evangelina Brito RN   ________________________________________  Evangelina Brito RN, BSN    7D/Oncology Care Coordinator  Pager  [phone]  Phone [phone]

## 2017-09-19 NOTE — PROGRESS NOTES
Social Work Services Discharge Note      Patient Name:  Charlene Douglas     Anticipated Discharge Date:  9/19/2017    Discharge Disposition:   TCU:  [facility] [address], [place] [address]  Fax: [phone]  [place] Info: [phone]  [facility] [phone]  Admissions: [phone]    Pre-Admission Screening (PAS) online form has been completed.  The Level of Care (LOC) is:  Determined  Confirmation Code is:  [id number]  Patient/caregiver informed of referral to [facility] Line for Pre-Admission Screening for skilled nursing facility (SNF) placement and to expect a phone call post discharge from SNF.     Additional Services/Equipment Arranged:  Pt's  will provide family transport this afternoon.     Patient / Family response to discharge plan:  In agreement with DC today to TCU     Persons notified of above discharge plan:  Oncology team, 7D RN staff, pt, pt's  and [facility] TCU admissions     Staff Discharge Instructions:  SW will fax discharge orders and signed hard scripts for any controlled substances.  Please print a packet (including scripts) and send with patient.     CTS Handoff completed:  Will complete upon DC Medicare Notice of Rights provided to the patient/family:        ELAINE Urbina, TORITO  7D  (Hem/Onc)  Pager: [phone]  9/19/2017

## 2017-09-19 NOTE — PLAN OF CARE
Problem: Goal Outcome Summary  Goal: Goal Outcome Summary  Outcome: No Change  Tylenol x 1 for R) sided abdominal pain. BM x 2. IV hydralazine x 1 for elevated blood pressure. Robitussin & Tessalon for cough. Denies nausea.  at bedside. Will continue w/POC.

## 2017-09-19 NOTE — PLAN OF CARE
Problem: Goal Outcome Summary  Goal: Goal Outcome Summary  Influenza result negative. Respiratory virus panel result still pending. Continue with droplet and contact precaution. Continue with coreg and losartan for hypertension. BP stable 118/57. Pt continues to do well with strength. Up with stand by assist with walker. She continues to have nonproductive dry cough. Robitussin with codeine with little relief. Waiting for TCU placement.

## 2017-09-20 NOTE — TELEPHONE ENCOUNTER
called again asking to speak with Stan regarding Charlene's transitioning care and treatment.  Please call

## 2017-09-20 NOTE — PROGRESS NOTES
Patient has clinic visit within 24-48 hours of Discharge so no post DC follow up call is needed          Date: 9/20/2017 Status: Moy   Time: 11:30 AM Length: 30       DEANA:     Provider: Sheryl Cuevas NP Department: [facility] TRANS CARE   Special Needs:   Comments:     Referral Number:   Referral Status:         CSN: [id number]   Notes: Recheck     Made On:   9/20/2017 9:08 AM By:   MATIAS SILVERMAN [[email or web address]] (ES)

## 2017-09-20 NOTE — PROGRESS NOTES
[place] [facility]  PRIMARY CARE PROVIDER AND CLINIC:  Donna Shirley [address]  / [place] [address]  Chief Complaint   Patient presents with     Hospital F/U       HPI:    Charlene Douglas is a 67 year old  (1950),admitted to the [facility]  from [facility].  Hospital stay 17 through 17.  Admitted to this facility for  rehab, medical management and nursing care.  HPI information obtained from: facility chart records, facility staff, patient report, [facility] chart review and Care Everywhere [facility] chart review.     Current issues are:         Metastatic colon cancer to liver (H)  Chronic systolic congestive heart failure (H)  Benign essential hypertension  Anemia, unspecified type  Thrombocytopenia (H)  Loss of weight  Physical deconditioning    See assessment / plan for HPI     CODE STATUS/ADVANCE DIRECTIVES DISCUSSION:   CPR/Full code     ALLERGIES:Lisinopril and No known allergies  PAST MEDICAL HISTORY:  has a past medical history of Colon cancer metastasized to liver (H); Hypertension; and NO ACTIVE PROBLEMS.  PAST SURGICAL HISTORY:  has a past surgical history that includes NONSPECIFIC PROCEDURE (); APPENDECTOMY ();  DELIVERY ONLY (); hip surgery; Exam Under Anesthesia, Ultrasound (N/A, 2017); Colonoscopy with CO2 insufflation (N/A, 2017); Colonoscopy (N/A, 2017); and Insert port vascular access (N/A, 2017).  FAMILY HISTORY: family history includes C.A.D. in her mother; CEREBROVASCULAR DISEASE in her mother; Circulatory in her father; GASTROINTESTINAL DISEASE in her father; Hypertension in her father and mother; Lipids in her mother; Other - See Comments in her mother.  SOCIAL HISTORY:  reports that she has never smoked. She has never used smokeless tobacco. She reports that she does not drink alcohol or use illicit drugs.    Post Discharge Medication Reconciliation Status: discharge  medications reconciled, continue medications without change.  Current Outpatient Prescriptions   Medication Sig Dispense Refill     Carvedilol (COREG PO) Take 12.5 mg by mouth 2 times daily (with meals)       diclofenac (VOLTAREN) 1 % GEL topical gel Place 2 g onto the skin 4 times daily as needed for moderate pain (for RUQ pain)       lidocaine (LIDODERM) 5 % Patch Place 1-3 patches onto the skin every 24 hours To RUQ 30 patch      sennosides (SENOKOT) 8.6 MG tablet Take 1-2 tablets by mouth 2 times daily as needed for constipation 120 each      losartan (COZAAR) 50 MG tablet Take 1 tablet (50 mg) by mouth daily 30 tablet      guaiFENesin-codeine (ROBITUSSIN AC) 100-10 MG/5ML SOLN solution Take 5 mLs by mouth every 4 hours as needed for cough 120 mL 0     LORazepam (ATIVAN) 0.5 MG tablet Take 1 tablet (0.5 mg) by mouth every 4 hours as needed (Anxiety, Nausea/Vomiting or Sleep) 30 tablet 2     prochlorperazine (COMPAZINE) 10 MG tablet Take 1 tablet (10 mg) by mouth every 6 hours as needed (Nausea/Vomiting) 30 tablet 2     ondansetron (ZOFRAN) 8 MG tablet Take 1 tablet (8 mg) by mouth every 8 hours as needed (Nausea/Vomiting) 10 tablet 2     lidocaine-prilocaine (EMLA) cream Apply thick amount to port area 30-60 minutes prior to port access.  Do not rub in.  Cover with plastic wrap/tegaderm. 30 g 3     ACETAMINOPHEN PO Take 325 mg by mouth every 4 hours as needed for pain        Benzonatate (TESSALON PERLES PO) Take 200 mg by mouth 3 times daily as needed for cough       omeprazole (PRILOSEC) 20 MG CR capsule Take 1 capsule (20 mg) by mouth daily 30 capsule 1       ROS:  10 point ROS of systems including Constitutional, Eyes, Respiratory, Cardiovascular, Gastroenterology, Genitourinary, Integumentary, Muscularskeletal, Psychiatric were all negative except for pertinent positives noted in my HPI.    Exam:  /72  Pulse 78  Temp 99.5  F (37.5  C)  Resp 22  Wt 145 lb 3.2 oz (65.9 kg)  LMP 06/07/2007  BMI  23.06 kg/m2  GENERAL APPEARANCE:  Alert, in no distress  RESP:  respiratory effort and palpation of chest normal, no respiratory distress, lungs sounds clear  CV:  Palpation and auscultation of heart done , regular rate and rhythm, no murmur, rub, or gallop, edema none  ABDOMEN:  normal bowel sounds, soft, nontender, no hepatosplenomegaly or other masses  M/S:  Gait and station walks with assist , no tenderness or swelling of the joints   SKIN:  Inspection and palpation of skin and subcutaneous tissue at baseline  PSYCH:  insight and judgement, memory intact, affect and mood normal     Lab/Diagnostic data:  CBC RESULTS:   Recent Labs   Lab Test  09/19/17   0651  09/18/17   0605   WBC  4.9  5.9   RBC  3.69*  3.09*   HGB  9.7*  7.9*   HCT  31.2*  25.3*   MCV  85  82   MCH  26.3*  25.6*   MCHC  31.1*  31.2*   RDW  18.1*  18.9*   PLT  187  129*       Last Basic Metabolic Panel:  Recent Labs   Lab Test  09/19/17   0651  09/18/17   0605   NA  132*  136   POTASSIUM  4.6  4.6   CHLORIDE  101  105   RON  8.8  8.0*   CO2  23  23   BUN  17  20   CR  0.90  0.86   GLC  141*  110*       Liver Function Studies -   Recent Labs   Lab Test  09/19/17   0651  09/18/17   0605   PROTTOTAL  6.6*  6.2*   ALBUMIN  1.7*  1.6*   BILITOTAL  1.2  0.9   ALKPHOS  358*  293*   AST  72*  62*   ALT  40  29       TSH   Date Value Ref Range Status   08/07/2017 1.92 0.40 - 4.00 mU/L Final   07/20/2017 1.94 0.40 - 4.00 mU/L Final         ASSESSMENT/PLAN:  Metastatic colon cancer to liver (H)  Follows with Dr. Payton of [facility] oncology. S/P 1 cycle of folfox on 9/7 then with acute CHF on 9/9. Required intubation due to pulm edema.   - Follow up with dr. De La Torre regarding further treatment plan  - hold off on any further chemo until discharged from TCU  - Not having much pain, continue PRN's and lidocain patch    Chronic systolic congestive heart failure (H)  Felt to be related to folfox. EF initially 15% with pulm edema. Improved to 35 - 40 % prior to  discharge from the hospital.   - note by cardiology states an increase in coreg to 12.5 mg po BID but currently on 6.25 - will increase  - continues on losartan  - has appt with [facility] Core clinic  - daily weights    Benign essential hypertension  Controlled with Coreg and losartan  - no changes     Anemia, unspecified type  Hemoglobin   Date Value Ref Range Status   09/19/2017 9.7 (L) 11.7 - 15.7 g/dL Final   - received 1 unit of blood while at hospital.   - recheck next week    Thrombocytopenia (H)  Improved    Loss of weight  Low albumin. Previous weight 158 currently 142. She was able to discuss options with the dietician at the hospital  - add nutritional supplement   - have dietician see her    Physical deconditioning  Admitted to TCU for deconditioning related to above diagnosis. Plan to return home.  - continue Physical Therapy / Ocupational Therapy      Orders:  1. HNS 4oz BID  2. Please have dietician see and ok for orders per their recommendations.  3. Increase COREG to 12.5mg PO at 8am and 5pm. Hold for SBP < 100 or HR <60  4. Hgb, bmp on Monday dx anemia    Electronically signed by:  Sheryl Cuevas NP

## 2017-09-20 NOTE — PLAN OF CARE
Problem: Goal Outcome Summary  Goal: Goal Outcome Summary  Speech Language Therapy Discharge Summary     Reason for therapy discharge:    Discharged to transitional care facility.     Progress towards therapy goal(s). See goals on Care Plan in [facility] electronic health record for goal details.  Goals partially met.  Barriers to achieving goals:   discharge from facility.     Therapy recommendation(s):    Continued therapy is recommended.  Rationale/Recommendations:  see below.   SLP note from 9/19/2017: Pt seen to f/u for swallowing.  Pt continues to c/o significant xerostomia; however, cough reduced from yesterday.  Pt tolerating current diet w/o difficulties.  Recommend advancing diet to regular with thin liquids, pt to softer food diet items that she feels she can successfully tolerate. Pt given education on reflux precautions and food items that typically increase incident of reflux. Pt and  demonstrated understanding.  Pt plans to discharge this afternoon.  Rec. Pt f/u with ST upon transfer to TCU to provide further education on diet items and reflux precautions to reduce risks for aspiration.

## 2017-09-20 NOTE — TELEPHONE ENCOUNTER
Patient is now at [facility] in [place].  Coordination of follow up to address treatment.  They are willing to go to [place] if needed.  Will review schedules and let them know.    Stan Balderas RN, BSN, OCN  9/20/2017, 2:03 PM

## 2017-09-21 NOTE — TELEPHONE ENCOUNTER
VM left for  to return call to schedule follow up 9/26/17 at 11:30 with Dr. Dove.    Stan Balderas RN, BSN, OCN  9/21/2017, 4:45 PM

## 2017-09-21 NOTE — TELEPHONE ENCOUNTER
returned call and agrees with appointment date and time.  No questions or concerns.    Stan Balderas RN, BSN, OCN  9/21/2017, 4:54 PM

## 2017-09-22 PROBLEM — D69.6 THROMBOCYTOPENIA (H): Status: ACTIVE | Noted: 2017-01-01

## 2017-09-22 PROBLEM — I10 BENIGN ESSENTIAL HYPERTENSION: Status: ACTIVE | Noted: 2017-01-01

## 2017-09-22 PROBLEM — R63.4 LOSS OF WEIGHT: Status: ACTIVE | Noted: 2017-01-01

## 2017-09-22 PROBLEM — I50.22 CHRONIC SYSTOLIC CONGESTIVE HEART FAILURE (H): Status: ACTIVE | Noted: 2017-01-01

## 2017-09-22 PROBLEM — R53.81 PHYSICAL DECONDITIONING: Status: ACTIVE | Noted: 2017-01-01

## 2017-09-26 NOTE — MR AVS SNAPSHOT
After Visit Summary   9/26/2017    Charlene Douglas    MRN: [id number]           Patient Information     Date Of Birth          1950        Visit Information        Provider Department      9/26/2017 11:30 AM Duy Dove MD [facility]        Today's Diagnoses     Metastatic colon cancer to liver (H)    -  1    Liver metastasis (H)        Anemia, unspecified type          Care Instructions      Please follow up with Oncology next week.  Please review information on Irinotecan and call with any questions or concerns.  Will call and review updated plan for next week after seeing Cardiology.    Follow Up Date/Time:     If you have any questions or concerns please feel free to call.    If you need to reschedule please call:  Clinic or Lab Appointment - [phone]  Infusion - [phone]  Imaging - [phone]    Stan Balderas RN, BSN, OCN   Oncology Care Coordinator RN  [facility]  [phone]              Follow-ups after your visit        Your next 10 appointments already scheduled     Sep 27, 2017  3:00 PM CDT   New Visit with Jc Madden [facility]   [facility]    [address] [place] [id number] [phone]              Who to contact     If you have questions or need follow up information about today's clinic visit or your schedule please contact [facility] directly at [phone].  Normal or non-critical lab and imaging results will be communicated to you by MyChart, letter or phone within 4 business days after the clinic has received the results. If you do not hear from us within 7 days, please contact the clinic through MyChart or phone. If you have a critical or abnormal lab result, we will notify you by phone as soon as possible.  Submit refill requests through [employer] or call your pharmacy and they will forward the refill request to us. Please allow 3 business days for your refill to  "be completed.          Additional Information About Your Visit        MyChart Information     [employer] gives you secure access to your electronic health record. If you see a primary care provider, you can also send messages to your care team and make appointments. If you have questions, please call your primary care clinic.  If you do not have a primary care provider, please call [phone] and they will assist you.        Care EveryWhere ID     This is your Care EveryWhere ID. This could be used by other organizations to access your [facility] medical records  [id number]        Your Vitals Were     Pulse Temperature Respirations Height Last Period Pulse Oximetry    97 98.4  F (36.9  C) (Temporal) 16 1.69 m (5' 6.53\") 06/07/2007 97%    BMI (Body Mass Index)                   22.17 kg/m2            Blood Pressure from Last 3 Encounters:   09/26/17 181/88   09/20/17 158/72   09/19/17 130/63    Weight from Last 3 Encounters:   09/26/17 63.3 kg (139 lb 9.6 oz)   09/20/17 65.9 kg (145 lb 3.2 oz)   09/19/17 65.4 kg (144 lb 1.6 oz)              Today, you had the following     No orders found for display       Primary Care Provider Office Phone # Fax #    Donna Shirley PA-C [phone] [address] [place] [address]        Equal Access to Services     [facility]: Hadii nat smith hadjoseo Socara, waaxda luqadaha, qaybta kaalmada shahla, debra rowe . So [facility] [phone].    ATENCIÓN: Si habla español, tiene a hunt disposición servicios gratuitos de asistencia lingüística. Tameka al [phone].    We comply with applicable federal civil rights laws and [place] laws. We do not discriminate on the basis of race, color, national origin, age, disability sex, sexual orientation or gender identity.            Thank you!     Thank you for choosing [facility]  for your care. Our goal is always to provide you with excellent care. Hearing back from our " patients is one way we can continue to improve our services. Please take a few minutes to complete the written survey that you may receive in the mail after your visit with us. Thank you!             Your Updated Medication List - Protect others around you: Learn how to safely use, store and throw away your medicines at www.disposemymeds.org.          This list is accurate as of: 9/26/17 12:26 PM.  Always use your most recent med list.                   Brand Name Dispense Instructions for use Diagnosis    ACETAMINOPHEN PO      Take 325 mg by mouth every 4 hours as needed for pain        COREG PO      Take 12.5 mg by mouth 2 times daily (with meals)        diclofenac 1 % Gel topical gel    VOLTAREN     Place 2 g onto the skin 4 times daily as needed for moderate pain (for RUQ pain)    Metastatic colon cancer to liver (H)       guaiFENesin-codeine 100-10 MG/5ML Soln solution    ROBITUSSIN AC    120 mL    Take 5 mLs by mouth every 4 hours as needed for cough    Liver metastasis (H)       lidocaine 5 % Patch    LIDODERM    30 patch    Place 1-3 patches onto the skin every 24 hours To RUQ    Metastatic colon cancer to liver (H)       lidocaine-prilocaine cream    EMLA    30 g    Apply thick amount to port area 30-60 minutes prior to port access.  Do not rub in.  Cover with plastic wrap/tegaderm.    Metastatic colon cancer to liver (H)       LORazepam 0.5 MG tablet    ATIVAN    30 tablet    Take 1 tablet (0.5 mg) by mouth every 4 hours as needed (Anxiety, Nausea/Vomiting or Sleep)    Metastatic colon cancer to liver (H)       losartan 50 MG tablet    COZAAR    30 tablet    Take 1 tablet (50 mg) by mouth daily    Acute systolic congestive heart failure (H)       omeprazole 20 MG CR capsule    priLOSEC    30 capsule    Take 1 capsule (20 mg) by mouth daily    Liver metastasis (H)       ondansetron 8 MG tablet    ZOFRAN    10 tablet    Take 1 tablet (8 mg) by mouth every 8 hours as needed (Nausea/Vomiting)    Metastatic  colon cancer to liver (H)       prochlorperazine 10 MG tablet    COMPAZINE    30 tablet    Take 1 tablet (10 mg) by mouth every 6 hours as needed (Nausea/Vomiting)    Metastatic colon cancer to liver (H)       sennosides 8.6 MG tablet    SENOKOT    120 each    Take 1-2 tablets by mouth 2 times daily as needed for constipation    Slow transit constipation       TESSALON PERLES PO      Take 200 mg by mouth 3 times daily as needed for cough

## 2017-09-26 NOTE — PATIENT INSTRUCTIONS
Please follow up with Oncology next week.  Please review information on Irinotecan and call with any questions or concerns.  Will call and review updated plan for next week after seeing Cardiology.    Follow Up Date/Time:     If you have any questions or concerns please feel free to call.    If you need to reschedule please call:  Clinic or Lab Appointment - [phone]  Infusion - [phone]  Imaging - [phone]    Stan Balderas, RN, BSN, OCN   Oncology Care Coordinator RN  [facility]  [phone]

## 2017-09-26 NOTE — TELEPHONE ENCOUNTER
Charlene Douglas    Nursing calls today about: Labs drawn yesterday, hgb 8.9, potassium 3.3    Plan: potassium 20 meq po everyday  Recheck potassium on Friday  Send labs with her to oncology appt today    Sheryl Cuevas NP

## 2017-09-26 NOTE — NURSING NOTE
DISCHARGE PLAN:  Next appointments: See patient instruction section  Departure Mode: Ambulatory  Accompanied by:   10 minutes for nursing discharge (face to face time)     Charlene Douglas is here today for Oncology follow up post Hospital discharge with plan of care.  Writing nurse seen patient after Medical Oncology appointment to address questions/concerns/coordinate care. No treatment today.  Plan to start Irinotecan.  Patient provided patient information.  Patient to see Cardiology tomorrow.  Follow up next week.  Start date to be determined according to Cardiology. Patient ambulated by nurse to  to schedule follow up and/or lab appointments. See patient instructions and Oncologist's Progress note for further details. Questions and concerns addressed to patient's satisfaction. Patient verbalized and demonstrated understanding of plan.  Contact information provided and patient is encouraged to call with any that arise in the interim of care.    Stan Balderas, RN, BSN, OCN   Oncology Care Coordinator RN  [facility]  [phone]  9/26/2017, 12:59 PM

## 2017-09-26 NOTE — NURSING NOTE
"Oncology Rooming Note    September 26, 2017 11:39 AM   Charlene LUCAS Misael is a 67 year old female who presents for:    Chief Complaint   Patient presents with     Oncology Clinic Visit     2 week follow up for Liver metastasis     Hospital F/U     9/9/2017-9/19/2017     Care Plan     Initial Vitals: /88 (BP Location: Right arm, Patient Position: Chair, Cuff Size: Adult Regular)  Pulse 97  Temp 98.4  F (36.9  C) (Temporal)  Resp 16  Ht 1.69 m (5' 6.53\")  Wt 63.3 kg (139 lb 9.6 oz)  LMP 06/07/2007  SpO2 97%  BMI 22.17 kg/m2 Estimated body mass index is 22.17 kg/(m^2) as calculated from the following:    Height as of this encounter: 1.69 m (5' 6.53\").    Weight as of this encounter: 63.3 kg (139 lb 9.6 oz). Body surface area is 1.72 meters squared.  Severe Pain (6) Comment: URQ & LLQ   Patient's last menstrual period was 06/07/2007.  Allergies reviewed: Yes  Medications reviewed: Yes    Medications: Medication refills not needed today.  Pharmacy name entered into [facility]: [facility]  [place] - [address]     Clinical concerns: Concerns about care plan and if Cardiology and Oncology can work together for her. Dr. Dove was notified.      Elsa Butts MA              "

## 2017-09-27 PROBLEM — E87.6 HYPOKALEMIA: Status: ACTIVE | Noted: 2017-01-01

## 2017-09-27 PROBLEM — D70.9 NEUTROPENIC FEVER (H): Status: ACTIVE | Noted: 2017-01-01

## 2017-09-27 PROBLEM — R50.81 NEUTROPENIC FEVER (H): Status: ACTIVE | Noted: 2017-01-01

## 2017-09-27 NOTE — PROGRESS NOTES
[facility]  PRIMARY CARE PROVIDER AND CLINIC:  Donna Shirley [address]  / [place] [address]  Chief Complaint   Patient presents with     Nursing Home Acute     TCU f/u       HPI:    Charlene Douglas is a 67 year old  (1950),admitted to the [facility]  from [facility] [place].  Hospital stay 17 through 17.  Admitted to this facility for  rehab, medical management and nursing care.  HPI information obtained from: facility chart records, facility staff, patient report, [facility] chart review and Care Everywhere [facility] chart review.     Current issues are:         Metastatic colon cancer to liver (H)  Chronic systolic congestive heart failure (H)  Benign essential hypertension  Anemia, unspecified type  Thrombocytopenia (H)  Loss of weight  Physical deconditioning    See assessment / plan for HPI       ALLERGIES:Lisinopril and No known allergies  PAST MEDICAL HISTORY:  has a past medical history of Colon cancer metastasized to liver (H); Hypertension; and NO ACTIVE PROBLEMS.  PAST SURGICAL HISTORY:  has a past surgical history that includes NONSPECIFIC PROCEDURE (); APPENDECTOMY ();  DELIVERY ONLY (); hip surgery; Exam Under Anesthesia, Ultrasound (N/A, 2017); Colonoscopy with CO2 insufflation (N/A, 2017); Colonoscopy (N/A, 2017); and Insert port vascular access (N/A, 2017).  FAMILY HISTORY: family history includes C.A.D. in her mother; CEREBROVASCULAR DISEASE in her mother; Circulatory in her father; GASTROINTESTINAL DISEASE in her father; Hypertension in her father and mother; Lipids in her mother; Other - See Comments in her mother.  SOCIAL HISTORY:  reports that she has never smoked. She has never used smokeless tobacco. She reports that she does not drink alcohol or use illicit drugs.      Current Outpatient Prescriptions   Medication Sig Dispense Refill     POTASSIUM CITRATE PO Take 20 mEq  by mouth daily       Carvedilol (COREG PO) Take 12.5 mg by mouth 2 times daily (with meals)       diclofenac (VOLTAREN) 1 % GEL topical gel Place 2 g onto the skin 4 times daily as needed for moderate pain (for RUQ pain)       lidocaine (LIDODERM) 5 % Patch Place 1-3 patches onto the skin every 24 hours To RUQ 30 patch      sennosides (SENOKOT) 8.6 MG tablet Take 1-2 tablets by mouth 2 times daily as needed for constipation 120 each      losartan (COZAAR) 50 MG tablet Take 1 tablet (50 mg) by mouth daily 30 tablet      guaiFENesin-codeine (ROBITUSSIN AC) 100-10 MG/5ML SOLN solution Take 5 mLs by mouth every 4 hours as needed for cough 120 mL 0     LORazepam (ATIVAN) 0.5 MG tablet Take 1 tablet (0.5 mg) by mouth every 4 hours as needed (Anxiety, Nausea/Vomiting or Sleep) 30 tablet 2     prochlorperazine (COMPAZINE) 10 MG tablet Take 1 tablet (10 mg) by mouth every 6 hours as needed (Nausea/Vomiting) 30 tablet 2     ondansetron (ZOFRAN) 8 MG tablet Take 1 tablet (8 mg) by mouth every 8 hours as needed (Nausea/Vomiting) 10 tablet 2     ACETAMINOPHEN PO Take 650 mg by mouth every 4 hours as needed for pain or fever        Benzonatate (TESSALON PERLES PO) Take 200 mg by mouth 3 times daily as needed for cough       omeprazole (PRILOSEC) 20 MG CR capsule Take 1 capsule (20 mg) by mouth daily 30 capsule 1       ROS:  10 point ROS of systems including Constitutional, Eyes, Respiratory, Cardiovascular, Gastroenterology, Genitourinary, Integumentary, Muscularskeletal, Psychiatric were all negative except for pertinent positives noted in my HPI.    Exam:  /86  Pulse 92  Temp 101.9  F (38.8  C)  Resp 16  Wt 138 lb (62.6 kg)  LMP 06/07/2007  SpO2 93%  BMI 21.92 kg/m2  GENERAL APPEARANCE:  Alert, in no distress  RESP:  respiratory effort and palpation of chest normal, no respiratory distress, lungs sounds clear  CV:  Palpation and auscultation of heart done , regular rate and rhythm, no murmur, rub, or gallop, edema  none  ABDOMEN:  normal bowel sounds, soft, nontender, no hepatosplenomegaly or other masses  M/S:  Gait and station walks with assist , no tenderness or swelling of the joints   SKIN:  Inspection and palpation of skin and subcutaneous tissue at baseline  PSYCH:  insight and judgement, memory intact, affect and mood normal     Lab/Diagnostic data:  CBC RESULTS:   Recent Labs   Lab Test  09/19/17   0651  09/18/17   0605   WBC  4.9  5.9   RBC  3.69*  3.09*   HGB  9.7*  7.9*   HCT  31.2*  25.3*   MCV  85  82   MCH  26.3*  25.6*   MCHC  31.1*  31.2*   RDW  18.1*  18.9*   PLT  187  129*       Last Basic Metabolic Panel:  Recent Labs   Lab Test  09/19/17   0651  09/18/17   0605   NA  132*  136   POTASSIUM  4.6  4.6   CHLORIDE  101  105   RON  8.8  8.0*   CO2  23  23   BUN  17  20   CR  0.90  0.86   GLC  141*  110*       Liver Function Studies -   Recent Labs   Lab Test  09/19/17   0651  09/18/17   0605   PROTTOTAL  6.6*  6.2*   ALBUMIN  1.7*  1.6*   BILITOTAL  1.2  0.9   ALKPHOS  358*  293*   AST  72*  62*   ALT  40  29       TSH   Date Value Ref Range Status   08/07/2017 1.92 0.40 - 4.00 mU/L Final   07/20/2017 1.94 0.40 - 4.00 mU/L Final         ASSESSMENT/PLAN:  Metastatic colon cancer to liver (H)  Follows with Dr. Payton of  oncology. S/P 1 cycle of folfox on 9/7 then with acute CHF on 9/9. Required intubation due to pulm edema.   - Follow up with dr. De La Torre regarding further treatment plan  - hold off on any further chemo until discharged from TCU  - Not having much pain, continue PRN's and lidocain patch    Chronic systolic congestive heart failure (H)  Felt to be related to folfox. EF initially 15% with pulm edema. Improved to 35 - 40 % prior to discharge from the hospital.   - note by cardiology states an increase in coreg to 12.5 mg po BID but currently on 6.25 - will increase  - continues on losartan  - has appt with [facility]  - daily weights    Benign essential hypertension  Controlled with Coreg  and losartan  - no changes     Anemia, unspecified type  Hemoglobin   Date Value Ref Range Status   09/25/2017 8.9 (L) 12.0 - 16.0 g/dL Final   - received 1 unit of blood while at hospital.   - recheck next week    Thrombocytopenia (H)  Improved    Loss of weight  Low albumin. Previous weight 158 currently 142. She was able to discuss options with the dietician at the hospital  - add nutritional supplement   - have dietician see her    Physical deconditioning  Admitted to TCU for deconditioning related to above diagnosis. Plan to return home.  - continue Physical Therapy / Ocupational Therapy      Orders:  1. Novasc 5mg PO qd Dx: HTN  2. Iron Sulfate 325mg PO QD  3. Scripts written for Losartan, Norvasc,Coreg, Lidocaine  4. Ok for D/C to home with current meds and treatments  5. Follow up with Primary care provider in 1 week from D/C    Electronically signed by:  Italo Rene

## 2017-09-27 NOTE — LETTER
9/27/2017    Donna Shirley PA-C  [address] [place] [address]    RE: Charlene Douglas       Dear Colleague,    I had the pleasure of seeing Charlene Douglas in the [facility].    REASON FOR CARDIOLOGY VISIT:  Followup for recent hospitalization.      Mr. Douglas is a pleasant 67-year-old female with recently diagnosed metastatic colon cancer with mets to liver who was admitted on 09/09 with acute hypoxic respiratory failure and cardiogenic shock.  To be noted, this happened in the context of patient receiving first dose of IV 5-fluorouracil and within 2 days of infusion, the patient had symptoms of shortness of breath, weakness, fatigue, dizziness and was admitted with respiratory failure and cardiogenic shock.  She was intubated for 3 days.  Cardiology was consulted at [facility].  Overall, it was felt that this could likely represent 5-fluorouracil-induced acute cardiotoxicity.  Initial echocardiogram showed LVEF of 10%-15% with diffuse hypokinesia with a repeat echocardiogram in about 3 days' time showing improvement in LV function to around 35%-40%.  To be noted, echocardiogram last month in August showed normal LV function. There was only mild troponin elevation (mid 3) and it was felt clinically this is unlikely to be from an acute coronary syndrome/CAD.       The patient was eventually discharged on 09/19 to a TCU and today she is coming in Cardiology Clinic accompanied by her .  The patient tells me that she is being planned for discharge later this weekend.  She is having intermittent fevers over the last day or so.  She denies any shortness of breath or any chest discomfort.  She tells me that she was in good general health before she was diagnosed with colon cancer recently.  She does not have any history of diabetes, dyslipidemia, tobacco abuse or family history of premature coronary artery disease.  She does have a history of  hypertension.        She is presently on carvedilol 12.5 mg b.i.d. and losartan 50 mg daily.  The carvedilol dose was increased from 6.25 mg b.i.d. to 12.5 mg b.i.d. on this 09/22.  She was also noted to have mild hypokalemia and has been started on potassium supplementation.  To be noted, she has sinus tachycardia which was also detected back in 08/2017.  EKG today shows she has sinus tachycardia, heart rate of 110 beats per minute.  No palpitations or dizziness.      PHYSICAL EXAMINATION:   VITAL SIGNS:  Blood pressure 170/70, heart rate 110 and regular, weight 139 pounds, BMI 22.21.   GENERAL:  The patient appears pleasant, comfortable.   NECK:  Normal JVP, no bruit.   CARDIOVASCULAR SYSTEM:  Tachycardia, irregular, no murmur, rub or gallop.   RESPIRATORY SYSTEM:  Clear to auscultation bilaterally.   ABDOMEN:  Soft, nontender.   EXTREMITIES:  No pitting pedal edema.   NEUROLOGICAL:  Alert and oriented x3.   PSYCHIATRIC:  Normal affect.   SKIN:  No obvious rash.   HEENT:  Mild pallor.     Outpatient Encounter Prescriptions as of 9/27/2017   Medication Sig Dispense Refill     POTASSIUM CITRATE PO Take 20 mEq by mouth daily       Carvedilol (COREG PO) Take 18.75 mg by mouth 2 times daily (with meals)       diclofenac (VOLTAREN) 1 % GEL topical gel Place 2 g onto the skin 4 times daily as needed for moderate pain (for RUQ pain)       lidocaine (LIDODERM) 5 % Patch Place 1-3 patches onto the skin every 24 hours To RUQ 30 patch      sennosides (SENOKOT) 8.6 MG tablet Take 1-2 tablets by mouth 2 times daily as needed for constipation 120 each      losartan (COZAAR) 50 MG tablet Take 1 tablet (50 mg) by mouth daily 30 tablet      guaiFENesin-codeine (ROBITUSSIN AC) 100-10 MG/5ML SOLN solution Take 5 mLs by mouth every 4 hours as needed for cough 120 mL 0     LORazepam (ATIVAN) 0.5 MG tablet Take 1 tablet (0.5 mg) by mouth every 4 hours as needed (Anxiety, Nausea/Vomiting or Sleep) 30 tablet 2     prochlorperazine  (COMPAZINE) 10 MG tablet Take 1 tablet (10 mg) by mouth every 6 hours as needed (Nausea/Vomiting) 30 tablet 2     ondansetron (ZOFRAN) 8 MG tablet Take 1 tablet (8 mg) by mouth every 8 hours as needed (Nausea/Vomiting) 10 tablet 2     ACETAMINOPHEN PO Take 650 mg by mouth every 4 hours as needed for pain or fever        Benzonatate (TESSALON PERLES PO) Take 200 mg by mouth 3 times daily as needed for cough       omeprazole (PRILOSEC) 20 MG CR capsule Take 1 capsule (20 mg) by mouth daily 30 capsule 1     No facility-administered encounter medications on file as of 9/27/2017.       IMPRESSION AND PLAN:  A pleasant 67-year-old female with metastatic colon cancer who received 5-fluorouracil infusion followed by cardiogenic shock, respiratory failure with LVEF declined to around 10%-15% initially.  Overall, this is highly suspicious for 5-fluorouracil-induced cardiotoxicity.  Fortunately, she has recovered well from that acute episode and her LV function also has recovered to moderate range.  Presently she does not appear in decompensated congestive heart failure.  Her blood pressure is still elevated, and I recommend increasing carvedilol from 12.5 mg b.i.d. to 18.75 mg b.i.d.  I recommend follow up with an RAYA in 3 weeks' time with a repeat echocardiogram.  She should continue losartan.   1.  Acute decompensated congestive heart failure with cardiogenic shock.  Most likely etiology 5-fluorouracil, given close temporal association and sudden onset symptoms and improvement after cessation of therapy.  Initial LVEF down to 10%-15%, has improved to 35%-40%.  Clinically today, she does not appear in decompensated congestive heart failure.   2.  Hypertension, still elevated.   3.  Metastatic colon cancer being followed by Dr. Bach from Oncology.  She has an appointment next week with Oncology.   4.  Intermittent fevers.  Will defer to her primary care physician and physician at the TCU regarding that.       RECOMMENDATIONS:   1.  Increase carvedilol to 18.75 mg b.i.d.   2.  Continue losartan.   3.  Follow up in 3 weeks with a repeat echocardiogram.     Again, thank you for allowing me to participate in the care of your patient.      Sincerely,    Jc Madden MD     [facility]

## 2017-09-27 NOTE — PROGRESS NOTES
[facility]  PRIMARY CARE PROVIDER AND CLINIC:  Donna Shirley [address]  / [place] [address]  Chief Complaint   Patient presents with     Nursing Home Acute     TCU f/u       HPI:    Charlene Douglas is a 67 year old  (1950),admitted to the [facility]  from [facility].  Hospital stay 17 through 17.  Admitted to this facility for  rehab, medical management and nursing care.  HPI information obtained from: facility chart records, facility staff, patient report, [facility] chart review and Care Everywhere [facility] chart review.     Current issues are:         Metastatic colon cancer to liver (H)  Chronic systolic congestive heart failure (H)  Benign essential hypertension  Anemia, unspecified type  Thrombocytopenia (H)  Loss of weight  Physical deconditioning  Neutropenic fever (H)  Hypokalemia    See assessment / plan for HPI       ALLERGIES:Lisinopril and No known allergies  PAST MEDICAL HISTORY:  has a past medical history of Colon cancer metastasized to liver (H); Hypertension; and NO ACTIVE PROBLEMS.  PAST SURGICAL HISTORY:  has a past surgical history that includes NONSPECIFIC PROCEDURE (); APPENDECTOMY ();  DELIVERY ONLY (); hip surgery; Exam Under Anesthesia, Ultrasound (N/A, 2017); Colonoscopy with CO2 insufflation (N/A, 2017); Colonoscopy (N/A, 2017); and Insert port vascular access (N/A, 2017).  FAMILY HISTORY: family history includes C.A.D. in her mother; CEREBROVASCULAR DISEASE in her mother; Circulatory in her father; GASTROINTESTINAL DISEASE in her father; Hypertension in her father and mother; Lipids in her mother; Other - See Comments in her mother.  SOCIAL HISTORY:  reports that she has never smoked. She has never used smokeless tobacco. She reports that she does not drink alcohol or use illicit drugs.      Current Outpatient Prescriptions   Medication Sig Dispense Refill      POTASSIUM CITRATE PO Take 20 mEq by mouth daily       Carvedilol (COREG PO) Take 12.5 mg by mouth 2 times daily (with meals)       diclofenac (VOLTAREN) 1 % GEL topical gel Place 2 g onto the skin 4 times daily as needed for moderate pain (for RUQ pain)       lidocaine (LIDODERM) 5 % Patch Place 1-3 patches onto the skin every 24 hours To RUQ 30 patch      sennosides (SENOKOT) 8.6 MG tablet Take 1-2 tablets by mouth 2 times daily as needed for constipation 120 each      losartan (COZAAR) 50 MG tablet Take 1 tablet (50 mg) by mouth daily 30 tablet      guaiFENesin-codeine (ROBITUSSIN AC) 100-10 MG/5ML SOLN solution Take 5 mLs by mouth every 4 hours as needed for cough 120 mL 0     LORazepam (ATIVAN) 0.5 MG tablet Take 1 tablet (0.5 mg) by mouth every 4 hours as needed (Anxiety, Nausea/Vomiting or Sleep) 30 tablet 2     prochlorperazine (COMPAZINE) 10 MG tablet Take 1 tablet (10 mg) by mouth every 6 hours as needed (Nausea/Vomiting) 30 tablet 2     ondansetron (ZOFRAN) 8 MG tablet Take 1 tablet (8 mg) by mouth every 8 hours as needed (Nausea/Vomiting) 10 tablet 2     ACETAMINOPHEN PO Take 650 mg by mouth every 4 hours as needed for pain or fever        Benzonatate (TESSALON PERLES PO) Take 200 mg by mouth 3 times daily as needed for cough       omeprazole (PRILOSEC) 20 MG CR capsule Take 1 capsule (20 mg) by mouth daily 30 capsule 1       ROS:  10 point ROS of systems including Constitutional, Eyes, Respiratory, Cardiovascular, Gastroenterology, Genitourinary, Integumentary, Muscularskeletal, Psychiatric were all negative except for pertinent positives noted in my HPI.    Exam:  /86  Pulse 92  Temp 101.9  F (38.8  C)  Resp 16  Wt 138 lb (62.6 kg)  LMP 06/07/2007  SpO2 93%  BMI 21.92 kg/m2  GENERAL APPEARANCE:  Alert, in no distress  RESP:  respiratory effort and palpation of chest normal, no respiratory distress, lungs sounds clear  CV:  Palpation and auscultation of heart done , regular rate and  rhythm, no murmur, rub, or gallop, edema none  ABDOMEN:  normal bowel sounds, soft, nontender, no hepatosplenomegaly or other masses  M/S:  Gait and station walks with assist , no tenderness or swelling of the joints   SKIN:  Inspection and palpation of skin and subcutaneous tissue at baseline  PSYCH:  insight and judgement, memory intact, affect and mood normal     Lab/Diagnostic data:  CBC RESULTS:   Recent Labs   Lab Test  09/19/17   0651  09/18/17   0605   WBC  4.9  5.9   RBC  3.69*  3.09*   HGB  9.7*  7.9*   HCT  31.2*  25.3*   MCV  85  82   MCH  26.3*  25.6*   MCHC  31.1*  31.2*   RDW  18.1*  18.9*   PLT  187  129*       Last Basic Metabolic Panel:  Recent Labs   Lab Test  09/19/17   0651  09/18/17   0605   NA  132*  136   POTASSIUM  4.6  4.6   CHLORIDE  101  105   RON  8.8  8.0*   CO2  23  23   BUN  17  20   CR  0.90  0.86   GLC  141*  110*       Liver Function Studies -   Recent Labs   Lab Test  09/19/17   0651  09/18/17   0605   PROTTOTAL  6.6*  6.2*   ALBUMIN  1.7*  1.6*   BILITOTAL  1.2  0.9   ALKPHOS  358*  293*   AST  72*  62*   ALT  40  29       TSH   Date Value Ref Range Status   08/07/2017 1.92 0.40 - 4.00 mU/L Final   07/20/2017 1.94 0.40 - 4.00 mU/L Final         ASSESSMENT/PLAN:  Fevers  Fever last night. No signs of infection. Likely from cancer. Patient states she has gotten fevers throughout the summer and they are always resolved with tylenol.  - check cbc on friday  - ok for tylenol 650 mg PO Q 4 hours for fever or pain    Metastatic colon cancer to liver (H)  Follows with Dr. Payton of  oncology. S/P 1 cycle of folfox on 9/7 then with acute CHF on 9/9. Required intubation due to pulm edema.   - will have treatment with irinotecan once cleared by cardiology  - hold off on any further chemo until discharged from TCU  - Not having much pain, continue PRN's and lidocain patch    Chronic systolic congestive heart failure (H)  Felt to be related to folfox. EF initially 15% with pulm edema.  Improved to 35 - 40 % prior to discharge from the hospital.   - note by cardiology states an increase in coreg to 12.5 mg po BID but currently on 6.25 - will increase  - continues on losartan  - has appt with [facility]  - daily weights    Benign essential hypertension  Elevated this past week 170 - 180s  - Currently on coreg 12.5 mg po bid increased on 9/22 from 6.25  - on losartan 50 mg qd  - will add norvasc 5 mg - though she is seeing cardiology today and they may prefer to increase coreg. Will follow up later today on their recommendations.     Anemia, unspecified type  hgb 9.7 on 9/19  Hemoglobin   Date Value Ref Range Status   09/25/2017 8.9 (L) 12.0 - 16.0 g/dL Final   received 1 unit of blood while at hospital.   Add iron as that is no longer on her list  - recheck cbc Friday     Thrombocytopenia (H)  Improved platelets 130's    Loss of weight  Low albumin. Previous weight 158 currently 142. She was able to discuss options with the dietician at the hospital  9/27 weight down to 138 today. Continue supplements and 6 small meals per day. Options given to use at home.     Physical deconditioning  Admitted to TCU for deconditioning related to above diagnosis. Plan to return home. Possibly on Saturday. She has a good support system.   - continue Physical Therapy / Ocupational Therapy     Hypokalemia  Potassium on 9/26 was 3.3 - 20 meq potassium added on 9/26  - Recheck on Friday     Orders:  1. Novasc 5mg PO qd Dx: HTN  2. Iron Sulfate 325mg PO QD  3. Scripts written for Losartan, Norvasc,Coreg, Lidocaine  4. Ok for D/C to home with current meds and treatments  5. Follow up with Primary care provider in 1 week from D/C  6. Cbc on Friday dx anemia    Electronically signed by:  Sheryl Cuevas NP

## 2017-09-27 NOTE — PROGRESS NOTES
HPI and Plan:   See dictation(#[id number])    Orders Placed This Encounter   Procedures     Basic metabolic panel     Follow-Up with Cardiac Advanced Practice Provider     EKG 12-LEAD CLINIC READ     Echocardiogram       No orders of the defined types were placed in this encounter.      There are no discontinued medications.      Encounter Diagnoses   Name Primary?     Tachycardia Yes     Secondary cardiomyopathy (H)        CURRENT MEDICATIONS:  Current Outpatient Prescriptions   Medication Sig Dispense Refill     POTASSIUM CITRATE PO Take 20 mEq by mouth daily       Carvedilol (COREG PO) Take 18.75 mg by mouth 2 times daily (with meals)       diclofenac (VOLTAREN) 1 % GEL topical gel Place 2 g onto the skin 4 times daily as needed for moderate pain (for RUQ pain)       lidocaine (LIDODERM) 5 % Patch Place 1-3 patches onto the skin every 24 hours To RUQ 30 patch      sennosides (SENOKOT) 8.6 MG tablet Take 1-2 tablets by mouth 2 times daily as needed for constipation 120 each      losartan (COZAAR) 50 MG tablet Take 1 tablet (50 mg) by mouth daily 30 tablet      guaiFENesin-codeine (ROBITUSSIN AC) 100-10 MG/5ML SOLN solution Take 5 mLs by mouth every 4 hours as needed for cough 120 mL 0     LORazepam (ATIVAN) 0.5 MG tablet Take 1 tablet (0.5 mg) by mouth every 4 hours as needed (Anxiety, Nausea/Vomiting or Sleep) 30 tablet 2     prochlorperazine (COMPAZINE) 10 MG tablet Take 1 tablet (10 mg) by mouth every 6 hours as needed (Nausea/Vomiting) 30 tablet 2     ondansetron (ZOFRAN) 8 MG tablet Take 1 tablet (8 mg) by mouth every 8 hours as needed (Nausea/Vomiting) 10 tablet 2     ACETAMINOPHEN PO Take 650 mg by mouth every 4 hours as needed for pain or fever        Benzonatate (TESSALON PERLES PO) Take 200 mg by mouth 3 times daily as needed for cough       omeprazole (PRILOSEC) 20 MG CR capsule Take 1 capsule (20 mg) by mouth daily 30 capsule 1       ALLERGIES     Allergies   Allergen Reactions     Lisinopril Cough     No  Known Allergies        PAST MEDICAL HISTORY:  Past Medical History:   Diagnosis Date     Colon cancer metastasized to liver (H)      Hypertension      NO ACTIVE PROBLEMS        PAST SURGICAL HISTORY:  Past Surgical History:   Procedure Laterality Date     C APPENDECTOMY       C  DELIVERY ONLY       C NONSPECIFIC PROCEDURE      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     COLONOSCOPY N/A 2017    Procedure: COMBINED COLONOSCOPY, SINGLE OR MULTIPLE BIOPSY/POLYPECTOMY BY BIOPSY;;  Surgeon: Duane, William Charles, MD;  Location: [facility] OR     COLONOSCOPY WITH CO2 INSUFFLATION N/A 2017    Procedure: COLONOSCOPY WITH CO2 INSUFFLATION;  BMI: 25.2  Referring: Donna Mota  Diagnoses: Positive FIT (fecal immunochemical test)  Special screening for malignant neoplasms, colon  Pharmacy: [facility] Fax: [phone];  Surgeon: Duane, William Charles, MD;  Location: [facility] OR     EXAM UNDER ANESTHESIA, ULTRASOUND N/A 2017    Procedure: EXAM UNDER ANESTHESIA, ULTRASOUND;  Ultrasound Liver Biopsy;  Surgeon: GENERIC ANESTHESIA PROVIDER;  Location: [facility] OR     HIP SURGERY      14 years old     INSERT PORT VASCULAR ACCESS N/A 2017    Procedure: INSERT PORT VASCULAR ACCESS;  Port placement;  Surgeon: Vinny Peterson DO;  Location: [facility] OR       FAMILY HISTORY:  Family History   Problem Relation Age of Onset     C.A.D. Mother      quad- bypass at age 78     CEREBROVASCULAR DISEASE Mother      in her 70's     Lipids Mother      Hypertension Mother      Other - See Comments Mother      Triple Bi-pass     Circulatory Father      abd aneurysm     GASTROINTESTINAL DISEASE Father      diverticulitis     Hypertension Father        SOCIAL HISTORY:  Social History     Social History     Marital status:      Spouse name: Praveen Nobles)     Number of children: 2     Years of education: 14     Occupational History           [employer]  "History Main Topics     Smoking status: Never Smoker     Smokeless tobacco: Never Used     Alcohol use No      Comment: 1 per week     Drug use: No     Sexual activity: Yes     Partners: Male     Birth control/ protection: Surgical, Male Surgical      Comment:  had a vasectomy     Other Topics Concern      Service No     Blood Transfusions Yes     1962     Caffeine Concern No     Occupational Exposure No     Hobby Hazards No     Sleep Concern No     Stress Concern No     Weight Concern Yes     gradual weight gain     Special Diet No     Back Care Yes     low back pain occasionally     Exercise No     Bike Helmet No     n/a     Seat Belt Yes     uses seatbelts 100%     Self-Exams Yes     does monthly breast exams     Social History Narrative       Review of Systems:  Skin:  Negative       Eyes:  Positive for glasses    ENT:  Negative      Respiratory:  Negative       Cardiovascular:  Negative for;palpitations;chest pain;edema;lightheadedness;dizziness      Gastroenterology: Positive for nausea    Genitourinary:  Negative      Musculoskeletal:  Positive for   on the left side rib sometimes is a sharp pain  Neurologic:  Positive for headaches has had two high fevers since last night and was not able to eat lunch   Psychiatric:  Negative      Heme/Lymph/Imm:  Positive for allergies    Endocrine:  Negative        Physical Exam:  Vitals: /70 (BP Location: Right arm, Patient Position: Fowlers, Cuff Size: Adult Regular)  Pulse 110  Temp 98.4  F (36.9  C)  Ht 1.689 m (5' 6.5\")  Wt 63.2 kg (139 lb 6.4 oz)  LMP 06/07/2007  SpO2 98%  BMI 22.16 kg/m2    Constitutional:           Skin:           Head:           Eyes:           ENT:           Neck:           Chest:             Cardiac:                    Abdomen:           Vascular:                                          Extremities and Back:                 Neurological:                 CC  No referring provider defined for this " encounter.

## 2017-09-27 NOTE — PROGRESS NOTES
REASON FOR CARDIOLOGY VISIT:  Followup for recent hospitalization.      HISTORY OF PRESENT ILLNESS:  Mr. Douglas is a pleasant 67-year-old female with recently diagnosed metastatic colon cancer with mets to liver who was admitted on 09/09 with acute hypoxic respiratory failure and cardiogenic shock.  To be noted, this happened in the context of patient receiving first dose of IV 5-fluorouracil and within 2 days of infusion, the patient had symptoms of shortness of breath, weakness, fatigue, dizziness and was admitted with respiratory failure and cardiogenic shock.  She was intubated for 3 days.  Cardiology was consulted at [facility].  Overall, it was felt that this could likely represent 5-fluorouracil-induced acute cardiotoxicity.  Initial echocardiogram showed LVEF of 10%-15% with diffuse hypokinesia with a repeat echocardiogram in about 3 days' time showing improvement in LV function to around 35%-40%.  To be noted, echocardiogram last month in August showed normal LV function. There was only mild troponin elevation (mid 3) and it was felt clinically this is unlikely to be from an acute coronary syndrome/CAD.       The patient was eventually discharged on 09/19 to a TCU and today she is coming in Cardiology Clinic accompanied by her .  The patient tells me that she is being planned for discharge later this weekend.  She is having intermittent fevers over the last day or so.  She denies any shortness of breath or any chest discomfort.  She tells me that she was in good general health before she was diagnosed with colon cancer recently.  She does not have any history of diabetes, dyslipidemia, tobacco abuse or family history of premature coronary artery disease.  She does have a history of hypertension.        She is presently on carvedilol 12.5 mg b.i.d. and losartan 50 mg daily.  The carvedilol dose was increased from 6.25 mg b.i.d. to 12.5 mg b.i.d. on this 09/22.  She was also  noted to have mild hypokalemia and has been started on potassium supplementation.  To be noted, she has sinus tachycardia which was also detected back in 08/2017.  EKG today shows she has sinus tachycardia, heart rate of 110 beats per minute.  No palpitations or dizziness.      PHYSICAL EXAMINATION:   VITAL SIGNS:  Blood pressure 170/70, heart rate 110 and regular, weight 139 pounds, BMI 22.21.   GENERAL:  The patient appears pleasant, comfortable.   NECK:  Normal JVP, no bruit.   CARDIOVASCULAR SYSTEM:  Tachycardia, irregular, no murmur, rub or gallop.   RESPIRATORY SYSTEM:  Clear to auscultation bilaterally.   ABDOMEN:  Soft, nontender.   EXTREMITIES:  No pitting pedal edema.   NEUROLOGICAL:  Alert and oriented x3.   PSYCHIATRIC:  Normal affect.   SKIN:  No obvious rash.   HEENT:  Mild pallor.      IMPRESSION AND PLAN:  A pleasant 67-year-old female with metastatic colon cancer who received 5-fluorouracil infusion followed by cardiogenic shock, respiratory failure with LVEF declined to around 10%-15% initially.  Overall, this is highly suspicious for 5-fluorouracil-induced cardiotoxicity.  Fortunately, she has recovered well from that acute episode and her LV function also has recovered to moderate range.  Presently she does not appear in decompensated congestive heart failure.  Her blood pressure is still elevated, and I recommend increasing carvedilol from 12.5 mg b.i.d. to 18.75 mg b.i.d.  I recommend follow up with an RAYA in 3 weeks' time with a repeat echocardiogram.  She should continue losartan.   1.  Acute decompensated congestive heart failure with cardiogenic shock.  Most likely etiology 5-fluorouracil, given close temporal association and sudden onset symptoms and improvement after cessation of therapy.  Initial LVEF down to 10%-15%, has improved to 35%-40%.  Clinically today, she does not appear in decompensated congestive heart failure.   2.  Hypertension, still elevated.   3.  Metastatic colon cancer  being followed by Dr. Bach from Oncology.  She has an appointment next week with Oncology.   4.  Intermittent fevers.  Will defer to her primary care physician and physician at the TCU regarding that.      RECOMMENDATIONS:   1.  Increase carvedilol to 18.75 mg b.i.d.   2.  Continue losartan.   3.  Follow up in 3 weeks with a repeat echocardiogram.         MALIK SMITH MD             D: 2017 15:55   T: 2017 17:29   MT: BRANDON      Name:     LUDMILA SAINI   MRN:      [id number]        Account:      [id number]   :      1950           Service Date: 2017      Document: [id number]

## 2017-09-27 NOTE — MR AVS SNAPSHOT
After Visit Summary   9/27/2017    Charlene Douglas    MRN: [id number]           Patient Information     Date Of Birth          1950        Visit Information        Provider Department      9/27/2017 3:00 PM Jc Madden MD [facility]        Today's Diagnoses     Tachycardia    -  1    Secondary cardiomyopathy (H)           Follow-ups after your visit        Additional Services     Follow-Up with Cardiac Advanced Practice Provider                 Your next 10 appointments already scheduled     Oct 03, 2017  9:00 AM CDT   New Visit with Ronny Mosquera MD   [facility] ([facility])    [address] [address]   [phone]            Oct 10, 2017 11:20 AM CDT   Office Visit with DARLENE Larkin CNP   [facility]    [address] [place] [id number] [phone]           Bring a current list of meds and any records pertaining to this visit. For Physicals, please bring immunization records and any forms needing to be filled out. Please arrive 10 minutes early to complete paperwork.              Future tests that were ordered for you today     Open Future Orders        Priority Expected Expires Ordered    Basic metabolic panel Routine 10/16/2017 9/27/2018 9/27/2017    Echocardiogram Routine 10/16/2017 9/27/2018 9/27/2017    Follow-Up with Cardiac Advanced Practice Provider Routine 10/16/2017 9/27/2018 9/27/2017            Who to contact     If you have questions or need follow up information about today's clinic visit or your schedule please contact [facility] directly at [phone].  Normal or non-critical lab and imaging results will be communicated to you by MyChart, letter or phone within 4 business days after the clinic has received the results. If you do not hear from us within 7 days, please contact the clinic through MyChart or phone. If  "you have a critical or abnormal lab result, we will notify you by phone as soon as possible.  Submit refill requests through [employer] or call your pharmacy and they will forward the refill request to us. Please allow 3 business days for your refill to be completed.          Additional Information About Your Visit        [employer] Information     [employer] gives you secure access to your electronic health record. If you see a primary care provider, you can also send messages to your care team and make appointments. If you have questions, please call your primary care clinic.  If you do not have a primary care provider, please call [phone] and they will assist you.        Care EveryWhere ID     This is your Care EveryWhere ID. This could be used by other organizations to access your [place] medical records  [id number]        Your Vitals Were     Pulse Temperature Height Last Period Pulse Oximetry BMI (Body Mass Index)    110 98.4  F (36.9  C) 1.689 m (5' 6.5\") 06/07/2007 98% 22.16 kg/m2       Blood Pressure from Last 3 Encounters:   09/27/17 170/70   09/27/17 181/86   09/26/17 153/86    Weight from Last 3 Encounters:   09/27/17 63.2 kg (139 lb 6.4 oz)   09/27/17 62.6 kg (138 lb)   09/26/17 63.3 kg (139 lb 9.6 oz)              We Performed the Following     EKG 12-LEAD CLINIC READ        Primary Care Provider Office Phone # Fax #    Donna Shirley PA-C [phone] [address] [place] [address]        Equal Access to Services     [facility]: Hadii nat smith hadasho Soomaali, waaxda luqadaha, qaybta kaalmada shahla, debra rowe . So [facility] [phone].    ATENCIÓN: Si habla español, tiene a hunt disposición servicios gratuitos de asistencia lingüística. Llame al [phone].    We comply with applicable federal civil rights laws and [place] laws. We do not discriminate on the basis of race, color, national origin, age, disability sex, sexual orientation or " gender identity.            Thank you!     Thank you for choosing [facility]  for your care. Our goal is always to provide you with excellent care. Hearing back from our patients is one way we can continue to improve our services. Please take a few minutes to complete the written survey that you may receive in the mail after your visit with us. Thank you!             Your Updated Medication List - Protect others around you: Learn how to safely use, store and throw away your medicines at www.disposemymeds.org.          This list is accurate as of: 9/27/17  3:48 PM.  Always use your most recent med list.                   Brand Name Dispense Instructions for use Diagnosis    ACETAMINOPHEN PO      Take 650 mg by mouth every 4 hours as needed for pain or fever        COREG PO      Take 18.75 mg by mouth 2 times daily (with meals)        diclofenac 1 % Gel topical gel    VOLTAREN     Place 2 g onto the skin 4 times daily as needed for moderate pain (for RUQ pain)    Metastatic colon cancer to liver (H)       guaiFENesin-codeine 100-10 MG/5ML Soln solution    ROBITUSSIN AC    120 mL    Take 5 mLs by mouth every 4 hours as needed for cough    Liver metastasis (H)       lidocaine 5 % Patch    LIDODERM    30 patch    Place 1-3 patches onto the skin every 24 hours To RUQ    Metastatic colon cancer to liver (H)       LORazepam 0.5 MG tablet    ATIVAN    30 tablet    Take 1 tablet (0.5 mg) by mouth every 4 hours as needed (Anxiety, Nausea/Vomiting or Sleep)    Metastatic colon cancer to liver (H)       losartan 50 MG tablet    COZAAR    30 tablet    Take 1 tablet (50 mg) by mouth daily    Acute systolic congestive heart failure (H)       omeprazole 20 MG CR capsule    priLOSEC    30 capsule    Take 1 capsule (20 mg) by mouth daily    Liver metastasis (H)       ondansetron 8 MG tablet    ZOFRAN    10 tablet    Take 1 tablet (8 mg) by mouth every 8 hours as needed (Nausea/Vomiting)    Metastatic colon cancer to  liver (H)       POTASSIUM CITRATE PO      Take 20 mEq by mouth daily        prochlorperazine 10 MG tablet    COMPAZINE    30 tablet    Take 1 tablet (10 mg) by mouth every 6 hours as needed (Nausea/Vomiting)    Metastatic colon cancer to liver (H)       sennosides 8.6 MG tablet    SENOKOT    120 each    Take 1-2 tablets by mouth 2 times daily as needed for constipation    Slow transit constipation       TESSALON PERLES PO      Take 200 mg by mouth 3 times daily as needed for cough

## 2017-09-27 NOTE — LETTER
9/27/2017      RE: Cahrlene Douglas  [address] [address] [place] [id number]       Dear Colleague,    Thank you for the opportunity to participate in the care of your patient, Charlene Douglas, at the [facility]. Please see a copy of my visit note below.    HPI and Plan:   See dictation(#[id number])    Orders Placed This Encounter   Procedures     Basic metabolic panel     Follow-Up with Cardiac Advanced Practice Provider     EKG 12-LEAD CLINIC READ     Echocardiogram       No orders of the defined types were placed in this encounter.      There are no discontinued medications.      Encounter Diagnoses   Name Primary?     Tachycardia Yes     Secondary cardiomyopathy (H)        CURRENT MEDICATIONS:  Current Outpatient Prescriptions   Medication Sig Dispense Refill     POTASSIUM CITRATE PO Take 20 mEq by mouth daily       Carvedilol (COREG PO) Take 18.75 mg by mouth 2 times daily (with meals)       diclofenac (VOLTAREN) 1 % GEL topical gel Place 2 g onto the skin 4 times daily as needed for moderate pain (for RUQ pain)       lidocaine (LIDODERM) 5 % Patch Place 1-3 patches onto the skin every 24 hours To RUQ 30 patch      sennosides (SENOKOT) 8.6 MG tablet Take 1-2 tablets by mouth 2 times daily as needed for constipation 120 each      losartan (COZAAR) 50 MG tablet Take 1 tablet (50 mg) by mouth daily 30 tablet      guaiFENesin-codeine (ROBITUSSIN AC) 100-10 MG/5ML SOLN solution Take 5 mLs by mouth every 4 hours as needed for cough 120 mL 0     LORazepam (ATIVAN) 0.5 MG tablet Take 1 tablet (0.5 mg) by mouth every 4 hours as needed (Anxiety, Nausea/Vomiting or Sleep) 30 tablet 2     prochlorperazine (COMPAZINE) 10 MG tablet Take 1 tablet (10 mg) by mouth every 6 hours as needed (Nausea/Vomiting) 30 tablet 2     ondansetron (ZOFRAN) 8 MG tablet Take 1 tablet (8 mg) by mouth every 8 hours as needed (Nausea/Vomiting) 10 tablet 2     ACETAMINOPHEN PO Take 650  mg by mouth every 4 hours as needed for pain or fever        Benzonatate (TESSALON PERLES PO) Take 200 mg by mouth 3 times daily as needed for cough       omeprazole (PRILOSEC) 20 MG CR capsule Take 1 capsule (20 mg) by mouth daily 30 capsule 1       ALLERGIES     Allergies   Allergen Reactions     Lisinopril Cough     No Known Allergies        PAST MEDICAL HISTORY:  Past Medical History:   Diagnosis Date     Colon cancer metastasized to liver (H)      Hypertension      NO ACTIVE PROBLEMS        PAST SURGICAL HISTORY:  Past Surgical History:   Procedure Laterality Date     C APPENDECTOMY       C  DELIVERY ONLY       C NONSPECIFIC PROCEDURE  1964    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     COLONOSCOPY N/A 2017    Procedure: COMBINED COLONOSCOPY, SINGLE OR MULTIPLE BIOPSY/POLYPECTOMY BY BIOPSY;;  Surgeon: Duane, William Charles, MD;  Location: [facility] OR     COLONOSCOPY WITH CO2 INSUFFLATION N/A 2017    Procedure: COLONOSCOPY WITH CO2 INSUFFLATION;  BMI: 25.2  Referring: Donna Mota  Diagnoses: Positive FIT (fecal immunochemical test)  Special screening for malignant neoplasms, colon  Pharmacy: [facility] Fax: [phone];  Surgeon: Duane, William Charles, MD;  Location: [facility] OR     EXAM UNDER ANESTHESIA, ULTRASOUND N/A 2017    Procedure: EXAM UNDER ANESTHESIA, ULTRASOUND;  Ultrasound Liver Biopsy;  Surgeon: GENERIC ANESTHESIA PROVIDER;  Location: [facility] OR     HIP SURGERY      14 years old     INSERT PORT VASCULAR ACCESS N/A 2017    Procedure: INSERT PORT VASCULAR ACCESS;  Port placement;  Surgeon: Vinny Peterson DO;  Location: [facility] OR       FAMILY HISTORY:  Family History   Problem Relation Age of Onset     C.A.D. Mother      quad- bypass at age 78     CEREBROVASCULAR DISEASE Mother      in her 70's     Lipids Mother      Hypertension Mother      Other - See Comments Mother      Triple Bi-pass     Circulatory Father      abd aneurysm     GASTROINTESTINAL  "DISEASE Father      diverticulitis     Hypertension Father        SOCIAL HISTORY:  Social History     Social History     Marital status:      Spouse name: Praveen Nobles)     Number of children: 2     Years of education: 14     Occupational History           [employer]     Social History Main Topics     Smoking status: Never Smoker     Smokeless tobacco: Never Used     Alcohol use No      Comment: 1 per week     Drug use: No     Sexual activity: Yes     Partners: Male     Birth control/ protection: Surgical, Male Surgical      Comment:  had a vasectomy     Other Topics Concern      Service No     Blood Transfusions Yes     1962     Caffeine Concern No     Occupational Exposure No     Hobby Hazards No     Sleep Concern No     Stress Concern No     Weight Concern Yes     gradual weight gain     Special Diet No     Back Care Yes     low back pain occasionally     Exercise No     Bike Helmet No     n/a     Seat Belt Yes     uses seatbelts 100%     Self-Exams Yes     does monthly breast exams     Social History Narrative       Review of Systems:  Skin:  Negative       Eyes:  Positive for glasses    ENT:  Negative      Respiratory:  Negative       Cardiovascular:  Negative for;palpitations;chest pain;edema;lightheadedness;dizziness      Gastroenterology: Positive for nausea    Genitourinary:  Negative      Musculoskeletal:  Positive for   on the left side rib sometimes is a sharp pain  Neurologic:  Positive for headaches has had two high fevers since last night and was not able to eat lunch   Psychiatric:  Negative      Heme/Lymph/Imm:  Positive for allergies    Endocrine:  Negative        Physical Exam:  Vitals: /70 (BP Location: Right arm, Patient Position: Fowlers, Cuff Size: Adult Regular)  Pulse 110  Temp 98.4  F (36.9  C)  Ht 1.689 m (5' 6.5\")  Wt 63.2 kg (139 lb 6.4 oz)  LMP 06/07/2007  SpO2 98%  BMI 22.16 kg/m2    Constitutional:           Skin:      "      Head:           Eyes:           ENT:           Neck:           Chest:             Cardiac:                    Abdomen:           Vascular:                                          Extremities and Back:                 Neurological:                 CC  No referring provider defined for this encounter.      Please do not hesitate to contact me if you have any questions/concerns.     Sincerely,     Jc Madden MD

## 2017-09-29 NOTE — PROGRESS NOTES
Hematology/ Oncology Follow-up Visit:  Sep 26, 2017    Reason for Visit:   Chief Complaint   Patient presents with     Oncology Clinic Visit     2 week follow up for Liver metastasis     [facility] F/U     9/9/2017-9/19/2017     Care Plan       Oncologic History:  Metastatic colorectal cancer.    Interval History:  Patient was hospitalized after the first cycle of chemotherapy with 5-FU. She developed acute heart failure secondary to 5-FU. Gradually during hospitalization ejection fraction has been improving as well as the patient's symptoms. She is here today to discuss further management plan.    Review Of Systems:  Constitutional: Negative for fever, chills, and night sweats.  Skin: negative.  Eyes: negative.  Ears/Nose/Throat: negative.  Respiratory: No shortness of breath, dyspnea on exertion, cough, or hemoptysis.  Cardiovascular: negative.  Gastrointestinal: negative.  Genitourinary: negative.  Musculoskeletal: negative.  Neurologic: negative.  Psychiatric: negative.  Hematologic/Lymphatic/Immunologic: negative.  Endocrine: negative.    All other ROS negative unless mentioned in interval history.    Past medical, social, surgical, and family histories reviewed.    Allergies:  Allergies as of 09/26/2017 - Miguel as Reviewed 09/26/2017   Allergen Reaction Noted     Lisinopril Cough 09/19/2017     No known allergies  08/11/2003       Current Medications:  Current Outpatient Prescriptions   Medication Sig Dispense Refill     [DISCONTINUED] Carvedilol (COREG PO) Take 18.75 mg by mouth 2 times daily (with meals)       diclofenac (VOLTAREN) 1 % GEL topical gel Place 2 g onto the skin 4 times daily as needed for moderate pain (for RUQ pain)       lidocaine (LIDODERM) 5 % Patch Place 1-3 patches onto the skin every 24 hours To RUQ 30 patch      sennosides (SENOKOT) 8.6 MG tablet Take 1-2 tablets by mouth 2 times daily as needed for constipation 120 each      losartan (COZAAR) 50 MG tablet Take 1 tablet (50 mg) by mouth  "daily 30 tablet      guaiFENesin-codeine (ROBITUSSIN AC) 100-10 MG/5ML SOLN solution Take 5 mLs by mouth every 4 hours as needed for cough 120 mL 0     LORazepam (ATIVAN) 0.5 MG tablet Take 1 tablet (0.5 mg) by mouth every 4 hours as needed (Anxiety, Nausea/Vomiting or Sleep) 30 tablet 2     prochlorperazine (COMPAZINE) 10 MG tablet Take 1 tablet (10 mg) by mouth every 6 hours as needed (Nausea/Vomiting) 30 tablet 2     ondansetron (ZOFRAN) 8 MG tablet Take 1 tablet (8 mg) by mouth every 8 hours as needed (Nausea/Vomiting) 10 tablet 2     ACETAMINOPHEN PO Take 650 mg by mouth every 4 hours as needed for pain or fever        Benzonatate (TESSALON PERLES PO) Take 200 mg by mouth 3 times daily as needed for cough       omeprazole (PRILOSEC) 20 MG CR capsule Take 1 capsule (20 mg) by mouth daily 30 capsule 1     ferrous sulfate (IRON) 325 (65 FE) MG tablet Take 325 mg by mouth daily (with breakfast)       amLODIPine (NORVASC) 5 MG tablet Take 1 tablet (5 mg) by mouth daily 30 tablet 0     carvedilol (COREG) 12.5 MG tablet Take 1.5 tablets (18.75 mg) by mouth 2 times daily (with meals) 90 tablet 0     Potassium Chloride ER 20 MEQ TBCR Take 1 tablet (20 mEq) by mouth daily 30 tablet 0     [DISCONTINUED] AmLODIPine Besylate (NORVASC PO) Take 5 mg by mouth daily          Physical Exam:  /86 (BP Location: Right arm, Patient Position: Chair, Cuff Size: Adult Regular)  Pulse 97  Temp 98.4  F (36.9  C) (Temporal)  Resp 16  Ht 1.69 m (5' 6.53\")  Wt 63.3 kg (139 lb 9.6 oz)  LMP 06/07/2007  SpO2 97%  BMI 22.17 kg/m2  Wt Readings from Last 12 Encounters:   09/29/17 63 kg (139 lb)   09/27/17 63.2 kg (139 lb 6.4 oz)   09/27/17 62.6 kg (138 lb)   09/26/17 63.3 kg (139 lb 9.6 oz)   09/20/17 65.9 kg (145 lb 3.2 oz)   09/19/17 65.4 kg (144 lb 1.6 oz)   09/09/17 67.1 kg (148 lb)   09/07/17 67.2 kg (148 lb 3.2 oz)   09/01/17 67.2 kg (148 lb 1.6 oz)   08/31/17 67.2 kg (148 lb 1.6 oz)   08/17/17 69.4 kg (153 lb)   08/23/17 68.2 " kg (150 lb 4.8 oz)     ECOG performance status: 1  GENERAL APPEARANCE: Healthy, alert and in no acute distress.  HEENT: Sclerae anicteric. PERRLA. Oropharynx without ulcers, lesions, or thrush.  NECK: Supple. No asymmetry or masses.  LYMPHATICS: No palpable cervical, supraclavicular, axillary, or inguinal lymphadenopathy.  RESP: Lungs clear to auscultation bilaterally without rales, rhonchi or wheezes.  CARDIOVASCULAR: Regular rate and rhythm. Normal S1, S2; no S3 or S4. No murmur, gallop, or rub.  ABDOMEN: Soft, nontender. Bowel sounds normal. No palpable organomegaly or masses.  MUSCULOSKELETAL: Extremities without gross deformities noted. No edema of bilateral lower extremities.  SKIN: No suspicious lesions or rashes.  NEURO: Alert and oriented x 3. Cranial nerves II-XII grossly intact.  PSYCHIATRIC: Mentation and affect appear normal.    Laboratory/Imaging Studies:  Transferred Records on 09/25/2017   Component Date Value Ref Range Status     Hemoglobin 09/25/2017 8.9* 12.0 - 16.0 g/dL Final     Sodium 09/25/2017 139  136 - 145 mmol/L Final     Potassium 09/25/2017 3.3* 3.5 - 5.0 mmol/L Final     Chloride 09/25/2017 100  98 - 107 mmol/L Final     Carbon Dioxide 09/25/2017 29  22 - 31 mmol/L Final     Anion Gap 09/25/2017 10  5 - 18 mmol/L Final     Glucose 09/25/2017 109  70 - 125 mg/dL Final     Urea Nitrogen 09/25/2017 11  8 - 22 mg/dL Final     Creatinine 09/25/2017 0.75  0.60 - 1.10 mg/dL Final     Calcium 09/25/2017 9.0  8.5 - 10.5 mg/dL Final     GFR Estimate 09/25/2017 >60  >60 ml/min/1.73m2 Final     GFR Estimate If Black 09/25/2017 >60  >60 ml/min/1.73m2 Final        Recent Results (from the past 744 hour(s))   XR Surgery DANIELLE L/T 5 Min Fluoro w Stills    Narrative    SURGERY C-ARM FLUOROSCOPY LESS THAN FIVE MINUTES WITH STILLS  9/1/2017  1:29 PM     COMPARISON: Chest x-rays dated 8/7/2017.    HISTORY: Port placement.    NUMBER OF IMAGES ACQUIRED: 1.    VIEWS: 1.    FLUOROSCOPY TIME: 0.4 minute(s).       Impression    IMPRESSION: Right-sided Qzzmsn-w-Johc and catheter are projected in  the appropriate positions. Fine detail is limited by technique.    GENA CAMPBELL MD   XR Chest Port 1 View    Narrative    CHEST PORTABLE ONE VIEW   9/1/2017 2:17 PM     HISTORY: Check line placement.    COMPARISON: Chest x-rays dated 8/7/2017. Intraoperative limited chest  x-rays dated 9/1/2017.      Impression    IMPRESSION:  Since the prior study, there has been placement of  right-sided Bzgpne-x-Geol with internal jugular central venous  catheter with catheter projected in appropriate position. Catheter tip  is projected in mid superior vena cava, similar to the intraoperative  exam. Lungs are clear without pneumothorax.  No other evidence of  acute cardiopulmonary disease is seen.     GENA CAMPBELL MD   XR Chest Port 1 View    Narrative    CHEST ONE VIEW PORTABLE   9/9/2017 9:12 PM     HISTORY: Dyspnea    COMPARISON: 9/1/2017      Impression    IMPRESSION: Increased interstitial markings in both lungs more on the  right than on the left consistent with either pneumonia or pulmonary  edema, but Kerley B lines at the right base suggests edema. Borderline  cardiomegaly, unchanged. Central venous catheter infusion port with  tip in the superior vena cava, unchanged. No definite pleural  effusions.    CARLITOS WELCH MD   XR Chest Port 1 View    Narrative    XR CHEST PORT 1 VW 9/10/2017 3:16 AM    Comparison: 9/9/2017    History: Status post intubation    Findings: Single AP view of the chest. Endotracheal tube tip projects  over the midthoracic trachea. Right chest wall Port-A-Cath tip  projects over the mid SVC. Gastric tube tip and sidehole project over  the stomach. Cardiac silhouette is mildly prominent with indistinct  pulmonary vasculature. Left greater than right layering pleural  effusions. Patchy bilateral airspace opacities. Mildly improved  interstitial thickening. Relatively gasless upper abdomen.      Impression     Impression:   1. Endotracheal tube projects over the midthoracic trachea. Gastric  tube tip and sidehole project over the stomach.  2. Mild cardiomegaly and indistinct pulmonary vasculature patchy  bilateral airspace opacities, likely representing pulmonary edema.  Differential includes infection.  3. Left greater than right pleural effusions.    I have personally reviewed the examination and initial interpretation  and I agree with the findings.    RADHA GOMES MD   XR Chest Port 1 View    Narrative    XR CHEST PORT 1 VW 9/10/2017 6:40 AM    Comparison: 9/10/2017    History: Central line    Findings: Single AP view of the chest, supine. Endotracheal tube tip  projects over the midthoracic trachea. Gastric tube tip and sidehole  project over the stomach. Right chest wall Port-A-Cath tip projects  over the mid SVC. Right IJ central venous catheter tip projects over  the right brachiocephalic vein. Cardiac silhouette is within normal  limits. Pulmonary vascular congestion. Patchy perihilar airspace  disease. Left pleural effusion with associated basilar  atelectasis/consolidation.      Impression    Impression:   1. Right IJ central venous catheter tip projects over the right  innominate vein.  2. Pulmonary vascular congestion with perihilar airspace opacities  likely representing pulmonary edema and/or atelectasis, versus  infection.  3. Left pleural effusion with associated basilar  atelectasis/consolidation.    I have personally reviewed the examination and initial interpretation  and I agree with the findings.    RADHA GOMES MD   XR Chest Port 1 View    Narrative    XR CHEST PORT 2 VW 9/10/2017 6:42 AM    Comparison: 9/10/2017    History: swan cath    Findings: 2 AP views of the chest. Repositioning of the right IJ  [place]-Onel catheter with the tip over the right main pulmonary artery.  Endotracheal tube tip projects over the midthoracic trachea. Right IJ  Port-A-Cath tip projects over the mid SVC. Gastric  tube tip and  sidehole project over the stomach. Cardiac silhouette is within normal  limits. Stable pulmonary vascular congestion. Left pleural effusion  with left basilar atelectasis/consolidation.      Impression    Impression:   1. Right IJ [place]-Onel catheter tip projects over the right main  pulmonary artery.  2. Pulmonary vascular congestion with probable mild pulmonary edema  and/or atelectasis versus infection.  3. Left pleural effusion with associated basilar  atelectasis/consolidation.    I have personally reviewed the examination and initial interpretation  and I agree with the findings.    RADAH GOMES MD   CT Head w/o contrast*    Narrative    CT HEAD W/O CONTRAST 9/14/2017 6:28 AM    History: fall    Comparison: None.    Technique: Using multidetector thin collimation helical acquisition  technique, axial, coronal and sagittal CT images from the skull base  to the vertex were obtained without intravenous contrast.     Findings:  There is extensive streak artifact over the right temporal  and occipital lobes from an object outside of head.  No intracranial hemorrhage, mass effect, or midline shift. The  ventricles are proportionate to the cerebral sulci. The gray to white  matter differentiation of the cerebral hemispheres is preserved. The  basal cisterns are patent.    The visualized paranasal sinuses are clear. The mastoid air cells are  clear.       Impression    Impression: No acute intracranial pathology.    I have personally reviewed the examination and initial interpretation  and I agree with the findings.    ERIN ALVARADO MD   CT Soft Tissue Neck w/o Contrast    Narrative    Cervical spine CT W/O CONTRAST 9/14/2017 6:31 AM    Provided History: fall    Comparison: Correlation with PET/CT 8/25/2017    Technique: Using multidetector thin collimation helical acquisition  technique, axial, coronal and sagittal CT images through the cervical  spine were obtained without intravenous contrast.      Findings:  Minimal grade 1 retrolisthesis of C4 on C5, degenerative. Normal  cervical lordosis. No acute fracture or subluxation. No prevertebral  edema. Multilevel spondylosis. Preserved vertebral body height. Loss  of disc height at C4-5 with bilateral moderate neural foraminal  narrowing.    No abnormality of the paraspinous soft tissues. Partially visualized  central catheter.      Impression    Impression:   1. No acute fracture or subluxation.  2. Multilevel cervical spondylosis, including moderate bilateral  neural foraminal narrowing and mild spinal canal narrowing at C4-5.    I have personally reviewed the examination and initial interpretation  and I agree with the findings.    CARLEY JORGE MD   X-ray Chest 2 vws*    Narrative    EXAM: XR CHEST 2 VW  9/15/2017 12:57 PM     HISTORY:  evaluate fluid status of lungs, patient c/o SOB with h/o  pulmonary edema       COMPARISON:  Radiograph 9/10/2017, CT 7/14/2017    FINDINGS: AP and lateral radiographs of the chest. Right upper  extremity KT PICC tip projects of the mid SVC. Interval removal of the  right IJ central venous catheter and gastric tube. The mediastinal  border, cardiac silhouette, and pulmonary vasculature are within  normal limits. No focal airspace opacities. No pneumothorax. Small  left pleural effusion.      Impression    IMPRESSION:  1. Interval removal of the right IJ [place]-Onel catheter and gastric  tube.  2. No focal airspace opacities.  3. Stable small left pleural effusion.    I have personally reviewed the examination and initial interpretation  and I agree with the findings.    DANIELLA APPIAH MD   XR Abdomen 1 View    Narrative    Examination:  XR ABDOMEN 1 VW    Date:  9/15/2017 12:57 PM     Clinical Information: evaluate for stool/gas burden, ascites in  setting of abdominal distension and pain.     Additional Information: none    Comparison: none    Findings:   Single radiograph of the abdomen is obtained. Centrally  located  nondistended loops of bowel. Gaseous distention of the stomach.  Nonobstructive bowel gas pattern. No portal venous gas or pneumobilia  in the partially visualized right upper quadrant. No abnormal  calcifications or soft tissue densities. Phleboliths in the pelvis.  Deformed left femoral head.      Impression    Impression:  1. Nonobstructive bowel gas pattern.     I have personally reviewed the examination and initial interpretation  and I agree with the findings.    AURY ACUÑA MD   US Abdomen Limited Portable    Narrative    EXAMINATION: US ABDOMEN LIMITED PORTABLE  9/17/2017 2:15 PM      CLINICAL HISTORY: elevated direct bilirubin, evaluate for bile duct  obstruction    COMPARISON: PET/CT on 8/25/2017.        TECHNIQUE: Grayscale and color images of the right upper abdomen is  obtained.    FINDINGS:  Multiple echogenic heterogeneous hepatic masses, the largest measures  8.2 x 8.6 x 5.6 cm, consistent with known hepatic metastases. The  common bile duct measures 6 mm. There is gallbladder sludge. The  gallbladder wall measures approximately 3 mm. Sonographic Damico's is  negative. No pericholecystic fluid.    The visualized portions of the pancreas are normal in echogenicity.    The right kidney is normal in position and echogenicity. The right  kidney measures 11.8 cm. there is no significant urinary tract  dilation.       Impression    IMPRESSION:   1. Common bile duct is not substantially dilated measuring 6 cm.  2. Multiple hyperechoic heterogenous hepatic masses, consistent with  known hepatic metastases.  3. Gallbladder sludge without sonographic evidence of acute  cholecystitis.    I have personally reviewed the examination and initial interpretation  and I agree with the findings.    FITO BASS MD   XR Chest 2 Views    Narrative    Exam:  XR CHEST 2 VW, 9/18/2017 3:44 PM    History: fever, cough, eval for pna    Comparison:  Chest radiograph from 9/15/2017.    Findings:  PA and  lateral views chest. Right chest portacatheter tip  projects over the mid SVC. The cardiomediastinal silhouette is within  normal limits. Resolution of left pleural effusion. No pneumothorax.  No focal airspace opacities. Visualized upper abdomen and osseous  structures are unremarkable.      Impression    Impression:    No acute cardiopulmonary abnormality. Resolution of previous left  pleural effusion.    I have personally reviewed the examination and initial interpretation  and I agree with the findings.    RADHA GOMES MD       Assessment and plan:  (C18.9,  C78.7) Metastatic colon cancer to liver (H)  (primary encounter diagnosis)  (C78.7) Liver metastasis (H)  A long discussion today with the patient about further management plan. I would recommend at this point to hold chemotherapy for next 2 weeks until full recovery. She is due to see cardiology tomorrow for evaluation of ejection fraction. I recommended to proceed with single agent irinotecan at this point. In the future Avastin can be added once ejection fraction normalizes and the blood pressure is well controlled. I have discussed with the patient again management of colon cancer potential side effects of different drugs including 5-FU , irinotecan and oxaliplatin.  I will see the patient again in next week or so to discuss further management.    The patient is ready to learn, no apparent learning barriers were identified.  Diagnosis and treatment plans were explained to the patient. The patient expressed understanding of the content. The patient asked appropriate questions. The patient questions were answered to her satisfaction.    Chart documentation with Dragon Voice recognition Software. Although reviewed after completion, some words and grammatical errors may remain.

## 2017-09-29 NOTE — PROGRESS NOTES
[facility]  PRIMARY CARE PROVIDER AND CLINIC:  Donna Shirley [address]  / [place] [id number]  Chief Complaint   Patient presents with     Nursing Home Acute     TCU f/u       HPI:    Charlene Douglas is a 67 year old  (1950),admitted to the [facility]  from [facility].  Hospital stay 17 through 17.  Admitted to this facility for  rehab, medical management and nursing care.  HPI information obtained from: facility chart records, facility staff, patient report, [facility] chart review and Care Everywhere [facility] chart review.     Current issues are:         Leukocytosis, unspecified type  Metastatic colon cancer to liver (H)  Chronic systolic congestive heart failure (H)  Benign essential hypertension  Anemia, unspecified type  Thrombocytopenia (H)  Hypokalemia    See assessment / plan for HPI       ALLERGIES:Lisinopril and No known allergies  PAST MEDICAL HISTORY:  has a past medical history of Colon cancer metastasized to liver (H); Hypertension; and NO ACTIVE PROBLEMS.  PAST SURGICAL HISTORY:  has a past surgical history that includes NONSPECIFIC PROCEDURE (); APPENDECTOMY ();  DELIVERY ONLY (); hip surgery; Exam Under Anesthesia, Ultrasound (N/A, 2017); Colonoscopy with CO2 insufflation (N/A, 2017); Colonoscopy (N/A, 2017); and Insert port vascular access (N/A, 2017).  FAMILY HISTORY: family history includes C.A.D. in her mother; CEREBROVASCULAR DISEASE in her mother; Circulatory in her father; GASTROINTESTINAL DISEASE in her father; Hypertension in her father and mother; Lipids in her mother; Other - See Comments in her mother.  SOCIAL HISTORY:  reports that she has never smoked. She has never used smokeless tobacco. She reports that she does not drink alcohol or use illicit drugs.      Current Outpatient Prescriptions   Medication Sig Dispense Refill     AmLODIPine Besylate (NORVASC  PO) Take 5 mg by mouth daily       ferrous sulfate (IRON) 325 (65 FE) MG tablet Take 325 mg by mouth daily (with breakfast)       POTASSIUM CITRATE PO Take 20 mEq by mouth daily       Carvedilol (COREG PO) Take 18.75 mg by mouth 2 times daily (with meals)       diclofenac (VOLTAREN) 1 % GEL topical gel Place 2 g onto the skin 4 times daily as needed for moderate pain (for RUQ pain)       lidocaine (LIDODERM) 5 % Patch Place 1-3 patches onto the skin every 24 hours To RUQ 30 patch      sennosides (SENOKOT) 8.6 MG tablet Take 1-2 tablets by mouth 2 times daily as needed for constipation 120 each      losartan (COZAAR) 50 MG tablet Take 1 tablet (50 mg) by mouth daily 30 tablet      guaiFENesin-codeine (ROBITUSSIN AC) 100-10 MG/5ML SOLN solution Take 5 mLs by mouth every 4 hours as needed for cough 120 mL 0     LORazepam (ATIVAN) 0.5 MG tablet Take 1 tablet (0.5 mg) by mouth every 4 hours as needed (Anxiety, Nausea/Vomiting or Sleep) 30 tablet 2     prochlorperazine (COMPAZINE) 10 MG tablet Take 1 tablet (10 mg) by mouth every 6 hours as needed (Nausea/Vomiting) 30 tablet 2     ondansetron (ZOFRAN) 8 MG tablet Take 1 tablet (8 mg) by mouth every 8 hours as needed (Nausea/Vomiting) 10 tablet 2     ACETAMINOPHEN PO Take 650 mg by mouth every 4 hours as needed for pain or fever        Benzonatate (TESSALON PERLES PO) Take 200 mg by mouth 3 times daily as needed for cough       omeprazole (PRILOSEC) 20 MG CR capsule Take 1 capsule (20 mg) by mouth daily 30 capsule 1       ROS:  10 point ROS of systems including Constitutional, Eyes, Respiratory, Cardiovascular, Gastroenterology, Genitourinary, Integumentary, Muscularskeletal, Psychiatric were all negative except for pertinent positives noted in my HPI.    Exam:  /85  Pulse 101  Temp 101.7  F (38.7  C)  Resp 18  Wt 139 lb (63 kg)  LMP 06/07/2007  SpO2 93%  BMI 22.1 kg/m2  GENERAL APPEARANCE:  Alert, in no distress  RESP:  respiratory effort and palpation of  chest normal, no respiratory distress, lungs sounds clear  CV:  Palpation and auscultation of heart done , regular rate and rhythm, no murmur, rub, or gallop, edema none  ABDOMEN:  normal bowel sounds, soft, nontender, no hepatosplenomegaly or other masses  M/S:  Gait and station walks with assist , no tenderness or swelling of the joints   SKIN:  Inspection and palpation of skin and subcutaneous tissue at baseline  PSYCH:  insight and judgement, memory intact, affect and mood normal     Lab/Diagnostic data:  Reviewed in [facility] and nursing home chart      ASSESSMENT/PLAN:  Fevers / leukocytosis  Fevers off and on since tuesday. CBC today shows elevated WBC.   - will check CXR - negative  - will check UA  - ok for tylenol 650 mg PO Q 4 hours for fever or pain  - follow up with oncology on 10/3    Metastatic colon cancer to liver (H)  Follows with Dr. Payton of  oncology. S/P 1 cycle of folfox on 9/7 then with acute CHF on 9/9. Required intubation due to pulm edema.   - will have treatment with irinotecan once cleared by cardiology  - hold off on any further chemo until discharged from TCU  - Not having much pain, continue PRN's and lidocain patch    Chronic systolic congestive heart failure (H)  Felt to be related to folfox. EF initially 15% with pulm edema. Improved to 35 - 40 % prior to discharge from the hospital.   - note by cardiology states an increase in coreg to 12.5 mg po BID but currently on 6.25 - will increase  - continues on losartan  - has appt with [facility]  - daily weights  - dose of coreg increased on Wednesday. Tolerating.     Benign essential hypertension  Elevated this past week 170 - 180s  - Currently on coreg 12.5 mg po bid increased on 9/22 from 6.25  - on losartan 50 mg qd  - norvasc added 9/27  - coreg increased 9/27    Anemia, unspecified type  hgb 9.7 on 9/19  - - 8.9 on 9/25 - - 8.5 on 9/29  Hemoglobin   Date Value Ref Range Status   09/25/2017 8.9 (L) 12.0 - 16.0 g/dL Final    received 1 unit of blood while at hospital.   Add iron as that is no longer on her list  - follow up with onoclogy    Hypokalemia  Potassium on 9/26 was 3.3 - 20 meq potassium added on 9/26  Recheck was to be done today but was not drawn, so will be resulting later.  - wait for potassium results.     Orders:  1. UA/UC Dx Leukocytosis  2. CXR Leukocytosis    Electronically signed by:  Sheryl Cuevas NP

## 2017-10-03 NOTE — NURSING NOTE
Take home medication reviewed with patient for start of treatment and patient reports only needing the imodium as she still has compazine and ativan on hand from last regimen.  Brief teaching for Irenotecan with patient information from [employer] and review of how and when to use taking home as needed medication.  Review of who to call and when to call.  All questions addressed to patient's satisfaction.  Patient to start treatment next week with follow up prior with Dr. Dove.    Stan Balderas RN, BSN, OCN  10/3/2017, 2:22 PM

## 2017-10-03 NOTE — PROGRESS NOTES
FOLLOW-UP VISIT NOTE    PATIENT NAME: Charlene Douglas MRN # [id number]  DATE OF VISIT: Oct 3, 2017 YOB: 1950    REFERRING PROVIDER: No referring provider defined for this encounter.    CANCER TYPE: Adenocarcinoma colon  STAGE: 4  ECOG PS: 2    ONCOLOGY HISTORY:  67-year-old female with newly diagnosed metastatic carcinoma of colon with liver metastases- ADRIANA mutated. She was started on systemic chemotherapy with FOLFOX with Avastin. However had severe cardiogenic toxicity from 5-FU and ended up in the hospital with acute respiratory failure requiring intubation. Initial echo showed LVEF and 10-15% which did improve in 3 days time to 35- 40%. Patient was eventually discharged to transitional care unit with cardiology follow-up.      SUBJECTIVE     Presents to clinic today for routine follow-up. States that she was discharged home for few days back. Still has some fatigue and dyspnea on exertion but continues to improve. Was seen by cardiology last week who made adjustment to her blood pressure medications.      PAST MEDICAL HISTORY     Past Medical History:   Diagnosis Date     Colon cancer metastasized to liver (H)      Hypertension      NO ACTIVE PROBLEMS          CURRENT OUTPATIENT MEDICATIONS     Current Outpatient Prescriptions   Medication Sig Dispense Refill     ferrous sulfate (IRON) 325 (65 FE) MG tablet Take 325 mg by mouth daily (with breakfast)       amLODIPine (NORVASC) 5 MG tablet Take 1 tablet (5 mg) by mouth daily 30 tablet 0     carvedilol (COREG) 12.5 MG tablet Take 1.5 tablets (18.75 mg) by mouth 2 times daily (with meals) 90 tablet 0     Potassium Chloride ER 20 MEQ TBCR Take 1 tablet (20 mEq) by mouth daily 30 tablet 0     diclofenac (VOLTAREN) 1 % GEL topical gel Place 2 g onto the skin 4 times daily as needed for moderate pain (for RUQ pain)       lidocaine (LIDODERM) 5 % Patch Place 1-3 patches onto the skin every 24 hours To RUQ 30 patch      sennosides (SENOKOT) 8.6 MG tablet  Take 1-2 tablets by mouth 2 times daily as needed for constipation 120 each      losartan (COZAAR) 50 MG tablet Take 1 tablet (50 mg) by mouth daily 30 tablet      guaiFENesin-codeine (ROBITUSSIN AC) 100-10 MG/5ML SOLN solution Take 5 mLs by mouth every 4 hours as needed for cough 120 mL 0     ACETAMINOPHEN PO Take 650 mg by mouth every 4 hours as needed for pain or fever        Benzonatate (TESSALON PERLES PO) Take 200 mg by mouth 3 times daily as needed for cough       omeprazole (PRILOSEC) 20 MG CR capsule Take 1 capsule (20 mg) by mouth daily 30 capsule 1        ALLERGIES     Allergies   Allergen Reactions     Lisinopril Cough     No Known Allergies         REVIEW OF SYSTEMS   As above in the HPI, o/w complete 12-point ROS was negative.     PHYSICAL EXAM   B/P: 140/73, T: 97.7, P: 94, R: 16  SpO2 Readings from Last 4 Encounters:   10/03/17 99%   09/29/17 93%   09/27/17 98%   09/27/17 93%     Wt Readings from Last 3 Encounters:   10/03/17 62.3 kg (137 lb 4.8 oz)   09/29/17 63 kg (139 lb)   09/27/17 63.2 kg (139 lb 6.4 oz)     GEN: NAD  HEENT: PERRL, EOMI, no icterus, injection or pallor. Oropharynx is clear.  NECK: no cervical or supraclavicular lymphadenopathy  LUNGS: clear bilaterally  CV: regular, no murmurs, rubs, or gallops  ABDOMEN: soft, non-tender, non-distended, normal bowel sounds, no hepatosplenomegaly by percussion or palpation  EXT: warm, well perfused, no edema  NEURO: alert  SKIN: no rashes   LABORATORY AND IMAGING STUDIES     Recent Labs   Lab Test 09/29/17 09/25/17 09/19/17   0651  09/18/17   0605   NA   --   139  132*  136   POTASSIUM  3.4*  3.3*  4.6  4.6   CHLORIDE   --   100  101  105   CO2   --   29  23  23   ANIONGAP   --   10  8  9   BUN   --   11  17  20   CR   --   0.75  0.90  0.86   GLC   --   109  141*  110*   RON   --   9.0  8.8  8.0*   MAG   --    --   1.9  1.8   PHOS   --    --   2.4*  2.2*     Recent Labs   Lab Test 09/25/17 09/19/17   0651  09/18/17   0605  09/17/17   0714   WBC    --   4.9  5.9  7.2   HGB  8.9*  9.7*  7.9*  8.4*   PLT   --   187  129*  110*   MCV   --   85  82  83   NEUTROPHIL   --   50.4  59.3  75.1     Recent Labs   Lab Test  09/19/17   0651  09/18/17   0605  09/17/17   0714   BILITOTAL  1.2  0.9  1.2   ALKPHOS  358*  293*  306*   ALT  40  29  27   AST  72*  62*  67*   ALBUMIN  1.7*  1.6*  1.6*     TSH   Date Value Ref Range Status   08/07/2017 1.92 0.40 - 4.00 mU/L Final   ]    All laboratory data reviewed    Results for orders placed or performed during the hospital encounter of 09/09/17   XR Chest Port 1 View    Narrative    XR CHEST PORT 1 VW 9/10/2017 3:16 AM    Comparison: 9/9/2017    History: Status post intubation    Findings: Single AP view of the chest. Endotracheal tube tip projects  over the midthoracic trachea. Right chest wall Port-A-Cath tip  projects over the mid SVC. Gastric tube tip and sidehole project over  the stomach. Cardiac silhouette is mildly prominent with indistinct  pulmonary vasculature. Left greater than right layering pleural  effusions. Patchy bilateral airspace opacities. Mildly improved  interstitial thickening. Relatively gasless upper abdomen.      Impression    Impression:   1. Endotracheal tube projects over the midthoracic trachea. Gastric  tube tip and sidehole project over the stomach.  2. Mild cardiomegaly and indistinct pulmonary vasculature patchy  bilateral airspace opacities, likely representing pulmonary edema.  Differential includes infection.  3. Left greater than right pleural effusions.    I have personally reviewed the examination and initial interpretation  and I agree with the findings.    RADHA GOMES MD   XR Chest Port 1 View    Narrative    XR CHEST PORT 1 VW 9/10/2017 6:40 AM    Comparison: 9/10/2017    History: Central line    Findings: Single AP view of the chest, supine. Endotracheal tube tip  projects over the midthoracic trachea. Gastric tube tip and sidehole  project over the stomach. Right chest wall  Port-A-Cath tip projects  over the mid SVC. Right IJ central venous catheter tip projects over  the right brachiocephalic vein. Cardiac silhouette is within normal  limits. Pulmonary vascular congestion. Patchy perihilar airspace  disease. Left pleural effusion with associated basilar  atelectasis/consolidation.      Impression    Impression:   1. Right IJ central venous catheter tip projects over the right  innominate vein.  2. Pulmonary vascular congestion with perihilar airspace opacities  likely representing pulmonary edema and/or atelectasis, versus  infection.  3. Left pleural effusion with associated basilar  atelectasis/consolidation.    I have personally reviewed the examination and initial interpretation  and I agree with the findings.    RADHA GOMES MD   XR Chest Port 1 View    Narrative    XR CHEST PORT 2 VW 9/10/2017 6:42 AM    Comparison: 9/10/2017    History: swan cath    Findings: 2 AP views of the chest. Repositioning of the right IJ  [place]-Onel catheter with the tip over the right main pulmonary artery.  Endotracheal tube tip projects over the midthoracic trachea. Right IJ  Port-A-Cath tip projects over the mid SVC. Gastric tube tip and  sidehole project over the stomach. Cardiac silhouette is within normal  limits. Stable pulmonary vascular congestion. Left pleural effusion  with left basilar atelectasis/consolidation.      Impression    Impression:   1. Right IJ [place]-Onel catheter tip projects over the right main  pulmonary artery.  2. Pulmonary vascular congestion with probable mild pulmonary edema  and/or atelectasis versus infection.  3. Left pleural effusion with associated basilar  atelectasis/consolidation.    I have personally reviewed the examination and initial interpretation  and I agree with the findings.    RADHA GOMES MD   CT Soft Tissue Neck w/o Contrast    Narrative    Cervical spine CT W/O CONTRAST 9/14/2017 6:31 AM    Provided History: fall    Comparison: Correlation with  PET/CT 8/25/2017    Technique: Using multidetector thin collimation helical acquisition  technique, axial, coronal and sagittal CT images through the cervical  spine were obtained without intravenous contrast.     Findings:  Minimal grade 1 retrolisthesis of C4 on C5, degenerative. Normal  cervical lordosis. No acute fracture or subluxation. No prevertebral  edema. Multilevel spondylosis. Preserved vertebral body height. Loss  of disc height at C4-5 with bilateral moderate neural foraminal  narrowing.    No abnormality of the paraspinous soft tissues. Partially visualized  central catheter.      Impression    Impression:   1. No acute fracture or subluxation.  2. Multilevel cervical spondylosis, including moderate bilateral  neural foraminal narrowing and mild spinal canal narrowing at C4-5.    I have personally reviewed the examination and initial interpretation  and I agree with the findings.    CARLEY JORGE MD   CT Head w/o contrast*    Narrative    CT HEAD W/O CONTRAST 9/14/2017 6:28 AM    History: fall    Comparison: None.    Technique: Using multidetector thin collimation helical acquisition  technique, axial, coronal and sagittal CT images from the skull base  to the vertex were obtained without intravenous contrast.     Findings:  There is extensive streak artifact over the right temporal  and occipital lobes from an object outside of head.  No intracranial hemorrhage, mass effect, or midline shift. The  ventricles are proportionate to the cerebral sulci. The gray to white  matter differentiation of the cerebral hemispheres is preserved. The  basal cisterns are patent.    The visualized paranasal sinuses are clear. The mastoid air cells are  clear.       Impression    Impression: No acute intracranial pathology.    I have personally reviewed the examination and initial interpretation  and I agree with the findings.    ERIN ALVARADO MD   X-ray Chest 2 vws*    Narrative    EXAM: XR CHEST 2 VW  9/15/2017 12:57  PM     HISTORY:  evaluate fluid status of lungs, patient c/o SOB with h/o  pulmonary edema       COMPARISON:  Radiograph 9/10/2017, CT 7/14/2017    FINDINGS: AP and lateral radiographs of the chest. Right upper  extremity KT PICC tip projects of the mid SVC. Interval removal of the  right IJ central venous catheter and gastric tube. The mediastinal  border, cardiac silhouette, and pulmonary vasculature are within  normal limits. No focal airspace opacities. No pneumothorax. Small  left pleural effusion.      Impression    IMPRESSION:  1. Interval removal of the right IJ [place]-Onel catheter and gastric  tube.  2. No focal airspace opacities.  3. Stable small left pleural effusion.    I have personally reviewed the examination and initial interpretation  and I agree with the findings.    DANIELLA APPIAH MD   XR Abdomen 1 View    Narrative    Examination:  XR ABDOMEN 1 VW    Date:  9/15/2017 12:57 PM     Clinical Information: evaluate for stool/gas burden, ascites in  setting of abdominal distension and pain.     Additional Information: none    Comparison: none    Findings:   Single radiograph of the abdomen is obtained. Centrally located  nondistended loops of bowel. Gaseous distention of the stomach.  Nonobstructive bowel gas pattern. No portal venous gas or pneumobilia  in the partially visualized right upper quadrant. No abnormal  calcifications or soft tissue densities. Phleboliths in the pelvis.  Deformed left femoral head.      Impression    Impression:  1. Nonobstructive bowel gas pattern.     I have personally reviewed the examination and initial interpretation  and I agree with the findings.    AURY ACUÑA MD   US Abdomen Limited Portable    Narrative    EXAMINATION: US ABDOMEN LIMITED PORTABLE  9/17/2017 2:15 PM      CLINICAL HISTORY: elevated direct bilirubin, evaluate for bile duct  obstruction    COMPARISON: PET/CT on 8/25/2017.        TECHNIQUE: Grayscale and color images of the right upper  abdomen is  obtained.    FINDINGS:  Multiple echogenic heterogeneous hepatic masses, the largest measures  8.2 x 8.6 x 5.6 cm, consistent with known hepatic metastases. The  common bile duct measures 6 mm. There is gallbladder sludge. The  gallbladder wall measures approximately 3 mm. Sonographic Damico's is  negative. No pericholecystic fluid.    The visualized portions of the pancreas are normal in echogenicity.    The right kidney is normal in position and echogenicity. The right  kidney measures 11.8 cm. there is no significant urinary tract  dilation.       Impression    IMPRESSION:   1. Common bile duct is not substantially dilated measuring 6 cm.  2. Multiple hyperechoic heterogenous hepatic masses, consistent with  known hepatic metastases.  3. Gallbladder sludge without sonographic evidence of acute  cholecystitis.    I have personally reviewed the examination and initial interpretation  and I agree with the findings.    FITO BASS MD   XR Chest 2 Views    Narrative    Exam:  XR CHEST 2 VW, 9/18/2017 3:44 PM    History: fever, cough, eval for pna    Comparison:  Chest radiograph from 9/15/2017.    Findings:  PA and lateral views chest. Right chest portacatheter tip  projects over the mid SVC. The cardiomediastinal silhouette is within  normal limits. Resolution of left pleural effusion. No pneumothorax.  No focal airspace opacities. Visualized upper abdomen and osseous  structures are unremarkable.      Impression    Impression:    No acute cardiopulmonary abnormality. Resolution of previous left  pleural effusion.    I have personally reviewed the examination and initial interpretation  and I agree with the findings.    RADHA GOMES MD         ASSESSMENT AND PLAN     67-year-old female with metastatic adenocarcinoma who after first cycle of FOLFOX developed severe cardiogenic toxicity from 5-FU and end up in the hospital requiring mechanical ventilation. She has slowly recovered and continues to  follow-up with cardiology will plan on repeating an echocardiogram in 2 weeks    I spoke to her about chemotherapy plans going forward initially starting her on single agent irinotecan with addition of Avastin at a later time once she has improvement in blood pressure and cardiovascular status. Patient however was anxious today about restarting and stated that she would like to wait for 1 more week before initiating chemotherapy again.     I will have her follow-up with Dr. Derrell reece next week to start chemotherapy.  All the questions were answered the best of my ability. Patient understood and was in agreement with plan of care.    Ronny Mosquera MD  Attending Physician   Hematology/Medical Oncology

## 2017-10-03 NOTE — MR AVS SNAPSHOT
After Visit Summary   10/3/2017    Charlene Douglas    MRN: [id number]           Patient Information     Date Of Birth          1950        Visit Information        Provider Department      10/3/2017 9:00 AM Ronny Mosquera MD [facility]        Care Instructions      Please follow up with Dr. Dove in 1 week.  You will need labs prior to your appointment.    Lab appointment date/time: 10/10/2017 @ 10:45am                                                   Follow up appointment date/time: 10/10/2017 @ 11:100am    Infusion appointment date/time: 10/10/2017 @ 11:30am      If you need to reschedule or have scheduling questions please call scheduling at [phone].    Keep in mind to have any labs or scans that are needed done prior to your Oncology visit.      If you have any questions or concerns please feel free to call.    Elsa Butts, [facility]  Oncology Clinic  [facility]  [phone]                  Follow-ups after your visit        Your next 10 appointments already scheduled     Oct 10, 2017 11:20 AM CDT   Office Visit with DARLENE Larkin [facility] ([facility])    [address] [facility] [address] [phone]           Bring a current list of meds and any records pertaining to this visit. For Physicals, please bring immunization records and any forms needing to be filled out. Please arrive 10 minutes early to complete paperwork.            Oct 10, 2017 11:30 AM CDT   Level 4 with NL INFUSION CHAIR 2   [facility] Infusion Services ([facility])    [facility] [address] [place] [address]   [phone]            Oct 13, 2017  9:00 AM CDT   Ech Complete with PHECHR1   [facility] Echocardiography ([facility])    [address] [place] [address]   [phone]           1. Please bring or wear a comfortable two-piece outfit. 2. You may eat,  "drink and take your normal medicines. 3. For any questions that cannot be answered, please contact the ordering physician            Oct 17, 2017  1:15 PM CDT   Return Visit with DARLENE [facility]asso CNP   [facility]    [address] [place] [id number] [phone]              Who to contact     If you have questions or need follow up information about today's clinic visit or your schedule please contact [facility] directly at [phone].  Normal or non-critical lab and imaging results will be communicated to you by [employer], letter or phone within 4 business days after the clinic has received the results. If you do not hear from us within 7 days, please contact the clinic through [employer] or phone. If you have a critical or abnormal lab result, we will notify you by phone as soon as possible.  Submit refill requests through [employer] or call your pharmacy and they will forward the refill request to us. Please allow 3 business days for your refill to be completed.          Additional Information About Your Visit        CHORD Information     CH[employer] gives you secure access to your electronic health record. If you see a primary care provider, you can also send messages to your care team and make appointments. If you have questions, please call your primary care clinic.  If you do not have a primary care provider, please call [phone] and they will assist you.        Care EveryWhere ID     This is your Care EveryWhere ID. This could be used by other organizations to access your [facility] medical records  [id number]        Your Vitals Were     Pulse Temperature Respirations Height Last Period Pulse Oximetry    94 97.7  F (36.5  C) (Temporal) 16 1.689 m (5' 6.5\") 06/07/2007 99%    BMI (Body Mass Index)                   21.83 kg/m2            Blood Pressure from Last 3 Encounters:   10/03/17 140/73   09/29/17 163/85   09/27/17 170/70    " Weight from Last 3 Encounters:   10/03/17 62.3 kg (137 lb 4.8 oz)   09/29/17 63 kg (139 lb)   09/27/17 63.2 kg (139 lb 6.4 oz)              Today, you had the following     No orders found for display         Today's Medication Changes          These changes are accurate as of: 10/3/17  9:36 AM.  If you have any questions, ask your nurse or doctor.               Stop taking these medicines if you haven't already. Please contact your care team if you have questions.     LORazepam 0.5 MG tablet   Commonly known as:  ATIVAN   Stopped by:  Ronny Mosquera MD           ondansetron 8 MG tablet   Commonly known as:  ZOFRAN   Stopped by:  Ronny Mosquera MD           prochlorperazine 10 MG tablet   Commonly known as:  COMPAZINE   Stopped by:  Ronny Mosquera MD                    Primary Care Provider Office Phone # Fax #    Donna Shirley PA-C [phone] [address] [place] [address]        Equal Access to Services     [facility]: Hadii nat ku hadasho Soomaali, waaxda luqadaha, qaybta kaalmada adeegyada, waxay idiin haykatrinn beau rowe . So [facility] [phone].    ATENCIÓN: Si habla español, tiene a hunt disposición servicios gratuitos de asistencia lingüística. Llame al [phone].    We comply with applicable federal civil rights laws and [place] laws. We do not discriminate on the basis of race, color, national origin, age, disability, sex, sexual orientation, or gender identity.            Thank you!     Thank you for choosing [facility]  for your care. Our goal is always to provide you with excellent care. Hearing back from our patients is one way we can continue to improve our services. Please take a few minutes to complete the written survey that you may receive in the mail after your visit with us. Thank you!             Your Updated Medication List - Protect others around you: Learn how to safely use, store and throw away your medicines at  www.disposemymeds.org.          This list is accurate as of: 10/3/17  9:36 AM.  Always use your most recent med list.                   Brand Name Dispense Instructions for use Diagnosis    ACETAMINOPHEN PO      Take 650 mg by mouth every 4 hours as needed for pain or fever        amLODIPine 5 MG tablet    NORVASC    30 tablet    Take 1 tablet (5 mg) by mouth daily    Benign essential hypertension       carvedilol 12.5 MG tablet    COREG    90 tablet    Take 1.5 tablets (18.75 mg) by mouth 2 times daily (with meals)    Chronic systolic congestive heart failure (H)       diclofenac 1 % Gel topical gel    VOLTAREN     Place 2 g onto the skin 4 times daily as needed for moderate pain (for RUQ pain)    Metastatic colon cancer to liver (H)       ferrous sulfate 325 (65 FE) MG tablet    IRON     Take 325 mg by mouth daily (with breakfast)        guaiFENesin-codeine 100-10 MG/5ML Soln solution    ROBITUSSIN AC    120 mL    Take 5 mLs by mouth every 4 hours as needed for cough    Liver metastasis (H)       lidocaine 5 % Patch    LIDODERM    30 patch    Place 1-3 patches onto the skin every 24 hours To RUQ    Metastatic colon cancer to liver (H)       losartan 50 MG tablet    COZAAR    30 tablet    Take 1 tablet (50 mg) by mouth daily    Acute systolic congestive heart failure (H)       omeprazole 20 MG CR capsule    priLOSEC    30 capsule    Take 1 capsule (20 mg) by mouth daily    Liver metastasis (H)       Potassium Chloride ER 20 MEQ Tbcr     30 tablet    Take 1 tablet (20 mEq) by mouth daily    Hypokalemia       sennosides 8.6 MG tablet    SENOKOT    120 each    Take 1-2 tablets by mouth 2 times daily as needed for constipation    Slow transit constipation       TESSALON PERLES PO      Take 200 mg by mouth 3 times daily as needed for cough

## 2017-10-03 NOTE — NURSING NOTE
DISCHARGE PLAN:  Next appointments: See patient instruction section  Departure Mode: Walker  Accompanied by:   5 minutes for nursing discharge (face to face time)   Elsa Butts MA    Writing nurse seen patient after Medical Oncology appointment to address questions/concerns/coordinate care.  Pt to RTC in 1 week with Dr. Dove for possible start on Irinotecan. Appointments scheduled. Questions and concerns addressed to patient's satisfaction. Contact information provided and patient is encouraged to call with any that arise in the interim of care.  See patient instructions and Oncologist's Progress note for further details.    Lyssa Butts CMA  [facility]  [facility]  [phone]  10/3/2017, 4:08 PM

## 2017-10-03 NOTE — NURSING NOTE
"Oncology Rooming Note    October 3, 2017 9:08 AM   Charlene Douglas is a 67 year old female who presents for:    Chief Complaint   Patient presents with     Oncology Clinic Visit     1 week follow up for Metastatic colon cancer to liver     Care Plan     following consult with Cardiology     Initial Vitals: /73 (BP Location: Right arm, Patient Position: Chair, Cuff Size: Adult Regular)  Pulse 94  Temp 97.7  F (36.5  C) (Temporal)  Resp 16  Ht 1.689 m (5' 6.5\")  Wt 62.3 kg (137 lb 4.8 oz)  LMP 06/07/2007  SpO2 99%  BMI 21.83 kg/m2 Estimated body mass index is 21.83 kg/(m^2) as calculated from the following:    Height as of this encounter: 1.689 m (5' 6.5\").    Weight as of this encounter: 62.3 kg (137 lb 4.8 oz). Body surface area is 1.71 meters squared.  No Pain (0) Comment: Data Unavailable   Patient's last menstrual period was 06/07/2007.  Allergies reviewed: Yes  Medications reviewed: Yes    Medications: Medication refills not needed today.  Pharmacy name entered into [employer]: [facility] [place] [facility] [address]     Clinical concerns: Pain in the liver recently and patient has started using lidocaine patch to help with that. She also has questions about her potassium since the pain clinic wanted her to increase her dose and she was getting diarrhea with the dose she was previously taking. Pt states she is not comfortable doubling her dose and would like to discuss options with the provider. Dr. Mosquera was notified.      Elsa Butts MA              "

## 2017-10-03 NOTE — NURSING NOTE
"Chemotherapy Education    Patient is a 67 year old female here today for chemotherapy education, accompanied by her .  Pt has a cancer diagnosis of Colon Cancer and their main concern is reacting to treatment due to severe reaction to last treatment regimen.  Their Oncologist is Dr. Doev, and PCP is Donna Shirley.  Reviewed the following with the patient and their support person:    Brief review, patient already had chemotherapy teaching on a different regimen beginning of September.  Treatment goal: Palliative  Treatment regimen & duration:  Irenotecan to start 10/10/17 for 3 21 day cycles.  Rationale for strict adherence to this schedule, including specific medication names including pre-treatment medications and at home scheduled or as needed medications, delivery methods,.  Potential side effects:  Side effects and management; including nausea/vominting, skin changes/hand-foot syndrome, anemia, neutropenia, thrombocytopenia, diarrhea/constipation, hair loss syndrome, memory changes/ \"chemobrain\", mouth sores, taste changes, neuropathy, fatigue, myelosuppression, sexuality/infertility, and risk of extravasation or infiltration.  Reviewed detailed potential side effects of each chemotherapy/immunotherapy/hormonal therapy and take home as needed medications per patient information handouts from [employer] and/or [employer].  Infection prevention, and monitoring of lab values, what lab tests and what changes of these values meant, along with the possibility of hydration or blood product transfusion, or the need to defer or hold treatment.    Chemotherapy information, including ways it is excreted from the body and cleaning and containment of vomitus or other bodily fluid, use of the bathroom, sexual health and intimacy, what to do if needing to miss a treatment, when to call a provider and the need for staff to wear protective equipment.  Reviewed and encouraged patient to read in detail \"Getting Ready for " "Chemotherapy\".  Importance of Central line care (port) or IV site care.  Written information:  Referred patient back to previously provided patient materials. \"My Cancer Guidebook - [facility]\" with written information including the \"Getting Ready for Chemotherapy: What to Expect, Before, During, and After your Treatment\" booklet, specific drug information guides printed from [employer], [employer] booklets \"Eating Hints: Before, During, and After Cancer Treatment\" and \"Chemotherapy and You\", support services, resources and local resources.  Introduction letter to infusion with contact information.  Provided patient calendars, contact information, and who to call and when to call Oncology support.    General orientation to the Medical Oncology department, Infusion Services department, Huc/scheduling, bathrooms and usual flow of the treatment day provided as well as introduction to the Infusion nurses.  No barriers to learning identified. Patient and family verbalized understanding of all written and verbal information. All questions answered to patients satisfaction.   Other concerns: None at this time after review.  Pt instructed to call with further questions or concerns.  Patient states understanding and is in agreement with this plan.  Copy of appointments, and after visit summary (AVS) given to patient. Patient discharged to home.    Face to Face time with patient: 15      Stan Balderas RN, BSN, OCN  Oncology Hematology Care Coordinator  [facility]  [email or web address]  Phone [phone]        "

## 2017-10-03 NOTE — LETTER
10/3/2017        RE: Charlene Douglas  [address] [facility] [address]          FOLLOW-UP VISIT NOTE    PATIENT NAME: Charlene Douglas MRN # [id number]  DATE OF VISIT: Oct 3, 2017 YOB: 1950    REFERRING PROVIDER: No referring provider defined for this encounter.    CANCER TYPE: Adenocarcinoma colon  STAGE: 4  ECOG PS: 2    ONCOLOGY HISTORY:  67-year-old female with newly diagnosed metastatic carcinoma of colon with liver metastases- ADRIANA mutated. She was started on systemic chemotherapy with FOLFOX with Avastin. However had severe cardiogenic toxicity from 5-FU and ended up in the hospital with acute respiratory failure requiring intubation. Initial echo showed LVEF and 10-15% which did improve in 3 days time to 35- 40%. Patient was eventually discharged to transitional care unit with cardiology follow-up.      SUBJECTIVE     Presents to clinic today for routine follow-up. States that she was discharged home for few days back. Still has some fatigue and dyspnea on exertion but continues to improve. Was seen by cardiology last week who made adjustment to her blood pressure medications.      PAST MEDICAL HISTORY     Past Medical History:   Diagnosis Date     Colon cancer metastasized to liver (H)      Hypertension      NO ACTIVE PROBLEMS          CURRENT OUTPATIENT MEDICATIONS     Current Outpatient Prescriptions   Medication Sig Dispense Refill     ferrous sulfate (IRON) 325 (65 FE) MG tablet Take 325 mg by mouth daily (with breakfast)       amLODIPine (NORVASC) 5 MG tablet Take 1 tablet (5 mg) by mouth daily 30 tablet 0     carvedilol (COREG) 12.5 MG tablet Take 1.5 tablets (18.75 mg) by mouth 2 times daily (with meals) 90 tablet 0     Potassium Chloride ER 20 MEQ TBCR Take 1 tablet (20 mEq) by mouth daily 30 tablet 0     diclofenac (VOLTAREN) 1 % GEL topical gel Place 2 g onto the skin 4 times daily as needed for moderate pain (for RUQ pain)       lidocaine (LIDODERM) 5 % Patch Place 1-3  patches onto the skin every 24 hours To RUQ 30 patch      sennosides (SENOKOT) 8.6 MG tablet Take 1-2 tablets by mouth 2 times daily as needed for constipation 120 each      losartan (COZAAR) 50 MG tablet Take 1 tablet (50 mg) by mouth daily 30 tablet      guaiFENesin-codeine (ROBITUSSIN AC) 100-10 MG/5ML SOLN solution Take 5 mLs by mouth every 4 hours as needed for cough 120 mL 0     ACETAMINOPHEN PO Take 650 mg by mouth every 4 hours as needed for pain or fever        Benzonatate (TESSALON PERLES PO) Take 200 mg by mouth 3 times daily as needed for cough       omeprazole (PRILOSEC) 20 MG CR capsule Take 1 capsule (20 mg) by mouth daily 30 capsule 1        ALLERGIES     Allergies   Allergen Reactions     Lisinopril Cough     No Known Allergies         REVIEW OF SYSTEMS   As above in the HPI, o/w complete 12-point ROS was negative.     PHYSICAL EXAM   B/P: 140/73, T: 97.7, P: 94, R: 16  SpO2 Readings from Last 4 Encounters:   10/03/17 99%   09/29/17 93%   09/27/17 98%   09/27/17 93%     Wt Readings from Last 3 Encounters:   10/03/17 62.3 kg (137 lb 4.8 oz)   09/29/17 63 kg (139 lb)   09/27/17 63.2 kg (139 lb 6.4 oz)     GEN: NAD  HEENT: PERRL, EOMI, no icterus, injection or pallor. Oropharynx is clear.  NECK: no cervical or supraclavicular lymphadenopathy  LUNGS: clear bilaterally  CV: regular, no murmurs, rubs, or gallops  ABDOMEN: soft, non-tender, non-distended, normal bowel sounds, no hepatosplenomegaly by percussion or palpation  EXT: warm, well perfused, no edema  NEURO: alert  SKIN: no rashes   LABORATORY AND IMAGING STUDIES     Recent Labs   Lab Test 09/29/17 09/25/17 09/19/17   0651  09/18/17   0605   NA   --   139  132*  136   POTASSIUM  3.4*  3.3*  4.6  4.6   CHLORIDE   --   100  101  105   CO2   --   29  23  23   ANIONGAP   --   10  8  9   BUN   --   11  17  20   CR   --   0.75  0.90  0.86   GLC   --   109  141*  110*   RON   --   9.0  8.8  8.0*   MAG   --    --   1.9  1.8   PHOS   --    --   2.4*  2.2*      Recent Labs   Lab Test 09/25/17 09/19/17   0651  09/18/17   0605  09/17/17   0714   WBC   --   4.9  5.9  7.2   HGB  8.9*  9.7*  7.9*  8.4*   PLT   --   187  129*  110*   MCV   --   85  82  83   NEUTROPHIL   --   50.4  59.3  75.1     Recent Labs   Lab Test  09/19/17   0651  09/18/17   0605  09/17/17 0714   BILITOTAL  1.2  0.9  1.2   ALKPHOS  358*  293*  306*   ALT  40  29  27   AST  72*  62*  67*   ALBUMIN  1.7*  1.6*  1.6*     TSH   Date Value Ref Range Status   08/07/2017 1.92 0.40 - 4.00 mU/L Final   ]    All laboratory data reviewed    Results for orders placed or performed during the hospital encounter of 09/09/17   XR Chest Port 1 View    Narrative    XR CHEST PORT 1 VW 9/10/2017 3:16 AM    Comparison: 9/9/2017    History: Status post intubation    Findings: Single AP view of the chest. Endotracheal tube tip projects  over the midthoracic trachea. Right chest wall Port-A-Cath tip  projects over the mid SVC. Gastric tube tip and sidehole project over  the stomach. Cardiac silhouette is mildly prominent with indistinct  pulmonary vasculature. Left greater than right layering pleural  effusions. Patchy bilateral airspace opacities. Mildly improved  interstitial thickening. Relatively gasless upper abdomen.      Impression    Impression:   1. Endotracheal tube projects over the midthoracic trachea. Gastric  tube tip and sidehole project over the stomach.  2. Mild cardiomegaly and indistinct pulmonary vasculature patchy  bilateral airspace opacities, likely representing pulmonary edema.  Differential includes infection.  3. Left greater than right pleural effusions.    I have personally reviewed the examination and initial interpretation  and I agree with the findings.    RADHA GOMES MD   XR Chest Port 1 View    Narrative    XR CHEST PORT 1 VW 9/10/2017 6:40 AM    Comparison: 9/10/2017    History: Central line    Findings: Single AP view of the chest, supine. Endotracheal tube tip  projects over the  midthoracic trachea. Gastric tube tip and sidehole  project over the stomach. Right chest wall Port-A-Cath tip projects  over the mid SVC. Right IJ central venous catheter tip projects over  the right brachiocephalic vein. Cardiac silhouette is within normal  limits. Pulmonary vascular congestion. Patchy perihilar airspace  disease. Left pleural effusion with associated basilar  atelectasis/consolidation.      Impression    Impression:   1. Right IJ central venous catheter tip projects over the right  innominate vein.  2. Pulmonary vascular congestion with perihilar airspace opacities  likely representing pulmonary edema and/or atelectasis, versus  infection.  3. Left pleural effusion with associated basilar  atelectasis/consolidation.    I have personally reviewed the examination and initial interpretation  and I agree with the findings.    RADHA GOMES MD   XR Chest Port 1 View    Narrative    XR CHEST PORT 2 VW 9/10/2017 6:42 AM    Comparison: 9/10/2017    History: swan cath    Findings: 2 AP views of the chest. Repositioning of the right IJ  [place]-Onel catheter with the tip over the right main pulmonary artery.  Endotracheal tube tip projects over the midthoracic trachea. Right IJ  Port-A-Cath tip projects over the mid SVC. Gastric tube tip and  sidehole project over the stomach. Cardiac silhouette is within normal  limits. Stable pulmonary vascular congestion. Left pleural effusion  with left basilar atelectasis/consolidation.      Impression    Impression:   1. Right IJ [place]-Onel catheter tip projects over the right main  pulmonary artery.  2. Pulmonary vascular congestion with probable mild pulmonary edema  and/or atelectasis versus infection.  3. Left pleural effusion with associated basilar  atelectasis/consolidation.    I have personally reviewed the examination and initial interpretation  and I agree with the findings.    RADHA GOMES MD   CT Soft Tissue Neck w/o Contrast    Narrative    Cervical spine  CT W/O CONTRAST 9/14/2017 6:31 AM    Provided History: fall    Comparison: Correlation with PET/CT 8/25/2017    Technique: Using multidetector thin collimation helical acquisition  technique, axial, coronal and sagittal CT images through the cervical  spine were obtained without intravenous contrast.     Findings:  Minimal grade 1 retrolisthesis of C4 on C5, degenerative. Normal  cervical lordosis. No acute fracture or subluxation. No prevertebral  edema. Multilevel spondylosis. Preserved vertebral body height. Loss  of disc height at C4-5 with bilateral moderate neural foraminal  narrowing.    No abnormality of the paraspinous soft tissues. Partially visualized  central catheter.      Impression    Impression:   1. No acute fracture or subluxation.  2. Multilevel cervical spondylosis, including moderate bilateral  neural foraminal narrowing and mild spinal canal narrowing at C4-5.    I have personally reviewed the examination and initial interpretation  and I agree with the findings.    CARLEY JORGE MD   CT Head w/o contrast*    Narrative    CT HEAD W/O CONTRAST 9/14/2017 6:28 AM    History: fall    Comparison: None.    Technique: Using multidetector thin collimation helical acquisition  technique, axial, coronal and sagittal CT images from the skull base  to the vertex were obtained without intravenous contrast.     Findings:  There is extensive streak artifact over the right temporal  and occipital lobes from an object outside of head.  No intracranial hemorrhage, mass effect, or midline shift. The  ventricles are proportionate to the cerebral sulci. The gray to white  matter differentiation of the cerebral hemispheres is preserved. The  basal cisterns are patent.    The visualized paranasal sinuses are clear. The mastoid air cells are  clear.       Impression    Impression: No acute intracranial pathology.    I have personally reviewed the examination and initial interpretation  and I agree with the  findings.    ERIN ALVARADO MD   X-ray Chest 2 vws*    Narrative    EXAM: XR CHEST 2 VW  9/15/2017 12:57 PM     HISTORY:  evaluate fluid status of lungs, patient c/o SOB with h/o  pulmonary edema       COMPARISON:  Radiograph 9/10/2017, CT 7/14/2017    FINDINGS: AP and lateral radiographs of the chest. Right upper  extremity KT PICC tip projects of the mid SVC. Interval removal of the  right IJ central venous catheter and gastric tube. The mediastinal  border, cardiac silhouette, and pulmonary vasculature are within  normal limits. No focal airspace opacities. No pneumothorax. Small  left pleural effusion.      Impression    IMPRESSION:  1. Interval removal of the right IJ [place]-Onel catheter and gastric  tube.  2. No focal airspace opacities.  3. Stable small left pleural effusion.    I have personally reviewed the examination and initial interpretation  and I agree with the findings.    DANIELLA APPIAH MD   XR Abdomen 1 View    Narrative    Examination:  XR ABDOMEN 1 VW    Date:  9/15/2017 12:57 PM     Clinical Information: evaluate for stool/gas burden, ascites in  setting of abdominal distension and pain.     Additional Information: none    Comparison: none    Findings:   Single radiograph of the abdomen is obtained. Centrally located  nondistended loops of bowel. Gaseous distention of the stomach.  Nonobstructive bowel gas pattern. No portal venous gas or pneumobilia  in the partially visualized right upper quadrant. No abnormal  calcifications or soft tissue densities. Phleboliths in the pelvis.  Deformed left femoral head.      Impression    Impression:  1. Nonobstructive bowel gas pattern.     I have personally reviewed the examination and initial interpretation  and I agree with the findings.    AURY ACUÑA MD   US Abdomen Limited Portable    Narrative    EXAMINATION: US ABDOMEN LIMITED PORTABLE  9/17/2017 2:15 PM      CLINICAL HISTORY: elevated direct bilirubin, evaluate for bile  duct  obstruction    COMPARISON: PET/CT on 8/25/2017.        TECHNIQUE: Grayscale and color images of the right upper abdomen is  obtained.    FINDINGS:  Multiple echogenic heterogeneous hepatic masses, the largest measures  8.2 x 8.6 x 5.6 cm, consistent with known hepatic metastases. The  common bile duct measures 6 mm. There is gallbladder sludge. The  gallbladder wall measures approximately 3 mm. Sonographic Damico's is  negative. No pericholecystic fluid.    The visualized portions of the pancreas are normal in echogenicity.    The right kidney is normal in position and echogenicity. The right  kidney measures 11.8 cm. there is no significant urinary tract  dilation.       Impression    IMPRESSION:   1. Common bile duct is not substantially dilated measuring 6 cm.  2. Multiple hyperechoic heterogenous hepatic masses, consistent with  known hepatic metastases.  3. Gallbladder sludge without sonographic evidence of acute  cholecystitis.    I have personally reviewed the examination and initial interpretation  and I agree with the findings.    FITO BASS MD   XR Chest 2 Views    Narrative    Exam:  XR CHEST 2 VW, 9/18/2017 3:44 PM    History: fever, cough, eval for pna    Comparison:  Chest radiograph from 9/15/2017.    Findings:  PA and lateral views chest. Right chest portacatheter tip  projects over the mid SVC. The cardiomediastinal silhouette is within  normal limits. Resolution of left pleural effusion. No pneumothorax.  No focal airspace opacities. Visualized upper abdomen and osseous  structures are unremarkable.      Impression    Impression:    No acute cardiopulmonary abnormality. Resolution of previous left  pleural effusion.    I have personally reviewed the examination and initial interpretation  and I agree with the findings.    RADHA GOMES MD         ASSESSMENT AND PLAN     67-year-old female with metastatic adenocarcinoma who after first cycle of FOLFOX developed severe cardiogenic  toxicity from 5-FU and end up in the hospital requiring mechanical ventilation. She has slowly recovered and continues to follow-up with cardiology will plan on repeating an echocardiogram in 2 weeks    I spoke to her about chemotherapy plans going forward initially starting her on single agent irinotecan with addition of Avastin at a later time once she has improvement in blood pressure and cardiovascular status. Patient however was anxious today about restarting and stated that she would like to wait for 1 more week before initiating chemotherapy again.     I will have her follow-up with Dr. Derrell reece next week to start chemotherapy.  All the questions were answered the best of my ability. Patient understood and was in agreement with plan of care.    Ronny Mosquera MD  Attending Physician   Hematology/Medical Oncology      Sincerely,        Ronny Mosquera MD

## 2017-10-07 NOTE — ED AVS SNAPSHOT
[facility]    [address] [place] [address]    Phone:  [phone]    Fax:  [phone]                                       Charlene Douglas   MRN: [id number]    Department:  [facility]   Date of Visit:  10/7/2017           Patient Information     Date Of Birth          1950        Your diagnoses for this visit were:     Abdominal pain, left upper quadrant     Leukocytosis, unspecified type        You were seen by Eliel Quiros MD.      Follow-up Information     Follow up with Duy Dove MD. Go in 2 days.    Specialty:  Hematology & Oncology    Why:  For follow up on your ED stay    Contact information:    [address] [place] [id number] [phone]          Discharge Instructions         *Abdominal Pain, Unknown Cause (Female)    The exact cause of your abdominal (stomach) pain is not certain. This does not mean that this is something to worry about, or the right tests were not done. Everyone likes to know the exact cause of the problem, but sometimes with abdominal pain, there is no clear-cut cause, and this could be a good thing. The good news is that your symptoms can be treated, and you will feel better.   Your condition does not seem serious now; however, sometimes the signs of a serious problem may take more time to appear. For this reason, it is important for you to watch for any new symptoms, problems, or worsening of your condition.  Over the next few days, the abdominal pain may come and go, or be continuous. Other common symptoms can include nausea and vomiting. Sometimes it can be difficult to tell if you feel nauseous, you may just feel bad and not associate that feeling with nausea. Constipation, diarrhea, and a fever may go along with the pain.  The pain may continue even if treated correctly over the following days. Depending on how things go, sometimes the cause can become clear and may require  further or different treatment. Additional evaluations, medications, or tests may be needed.  Home care  Your health care provider may prescribe medications for pain, symptoms, or an infection.  Follow the health care provider's instructions for taking these medications.  General care    Rest until your next exam. No strenuous activities.    Try to find positions that ease discomfort. A small pillow placed on the abdomen may help relieve pain.    Something warm on your abdomen (such as a heating pad) may help, but be careful not to burn yourself.  Diet    Do not force yourself to eat, especially if having cramps, vomiting, or diarrhea.    Water is important so you do not get dehydrated. Soup may also be good. Sports drinks may also help, especially if they are not too acidic. Make sure you don't drink sugary drinks as this can make things worse. Take liquids in small amounts. Do not guzzle them.    Caffeine sometimes makes the pain and cramping worse.    Avoid dairy products if you have vomiting or diarrhea.    Don't eat large amounts at a time. Wait a few minutes between bites.    Eat a diet low in fiber (called a low-residue diet). Foods allowed include refined breads, white rice, fruit and vegetable juices without pulp, tender meats. These foods will pass more easily through the intestine.    Avoid fried or fatty foods, dairy, alcohol and spicy foods until your symptoms go away.  Follow-up care  Follow up with your health care provider as instructed, or if your pain does not begin to improve in the next 24 hours.  When to seek medical care  Seek prompt medical care if any of the following occur:    Pain gets worse or moves to the right lower abdomen    New or worsening vomiting or diarrhea    Swelling of the abdomen    Unable to pass stool for more than three days    New fever over 101  F (38.3 C), or rising fever    Blood in vomit or bowel movements (dark red or black color)    Jaundice (yellow color of eyes and  skin)    Weakness, dizziness    Chest, arm, back, neck or jaw pain    Unexpected vaginal bleeding or missed period  Call 911  Call emergency services if any of the following occur:    Trouble breathing    Confusion    Fainting or loss of consciousness    Rapid heart rate    Seizure    [id number] Horace Holman, [address] [place] [address], [place] [address]. All rights reserved. This information is not intended as a substitute for professional medical care. Always follow your healthcare professional's instructions.          Discharge References/Attachments     PAIN RESPONSE, UNDERSTANDING (ENGLISH)      Future Appointments        Provider Department Dept Phone Center    10/10/2017 11:00 AM Duy Dove MD [facility] [phone] [facility]    10/10/2017 11:30 AM Iion Chair 2  air 1 [facility] [phone] [facility]    10/13/2017 9:00 AM [facility] ECHO ROOM 1 [facility] [phone] [facility]    10/17/2017 1:15 PM DARLENE ORLANDO [facility] [phone] [facility]      24 Hour Appointment Hotline       To make an appointment at any [facility], call [phone] ([phone]). If you don't have a family doctor or clinic, we will help you find one. [facility] are conveniently located to serve the needs of you and your family.             Review of your medicines      START taking        Dose / Directions Last dose taken    oxyCODONE 5 MG IR tablet   Commonly known as:  ROXICODONE   Dose:  5 mg   Quantity:  20 tablet        Take 1 tablet (5 mg) by mouth every 8 hours as needed for pain   Refills:  0          Our records show that you are taking the medicines listed below. If these are incorrect, please call your family doctor or clinic.        Dose / Directions Last dose taken    ACETAMINOPHEN PO   Dose:  650 mg        Take 650 mg by mouth every 4 hours as needed for pain or fever   Refills:  0         carvedilol 12.5 MG tablet   Commonly known as:  COREG   Dose:  18.75 mg   Quantity:  90 tablet        Take 1.5 tablets (18.75 mg) by mouth 2 times daily (with meals)   Refills:  0        diclofenac 1 % Gel topical gel   Commonly known as:  VOLTAREN   Dose:  2 g        Place 2 g onto the skin 4 times daily as needed for moderate pain (for RUQ pain)   Refills:  0        ferrous sulfate 325 (65 FE) MG tablet   Commonly known as:  IRON   Dose:  325 mg        Take 325 mg by mouth daily (with breakfast)   Refills:  0        guaiFENesin-codeine 100-10 MG/5ML Soln solution   Commonly known as:  ROBITUSSIN AC   Dose:  1 tsp.   Quantity:  120 mL        Take 5 mLs by mouth every 4 hours as needed for cough   Refills:  0        lidocaine 5 % Patch   Commonly known as:  LIDODERM   Dose:  1-3 patch   Quantity:  30 patch        Place 1-3 patches onto the skin every 24 hours To RUQ   Refills:  0        loperamide 2 MG capsule   Commonly known as:  IMODIUM   Quantity:  30 capsule        2 caps at 1st sign of diarrhea & 1 cap every 2hrs until 12hrs diarrhea free. During night, 2 caps at bedtime & 2 caps every 4hrs until AM   Refills:  0        omeprazole 20 MG CR capsule   Commonly known as:  priLOSEC   Dose:  20 mg   Quantity:  30 capsule        Take 1 capsule (20 mg) by mouth daily   Refills:  1        Potassium Chloride ER 20 MEQ Tbcr   Dose:  20 mEq   Quantity:  30 tablet        Take 1 tablet (20 mEq) by mouth daily   Refills:  0        sennosides 8.6 MG tablet   Commonly known as:  SENOKOT   Dose:  1-2 tablet   Quantity:  120 each        Take 1-2 tablets by mouth 2 times daily as needed for constipation   Refills:  0        TESSALON PERLES PO   Dose:  200 mg   Indication:  Cough        Take 200 mg by mouth 3 times daily as needed for cough   Refills:  0                Prescriptions were sent or printed at these locations (1 Prescription)                   [facility]   [address],  [facility] [address]    Telephone:  [phone]   Fax:  [phone]   Hours:                  Printed at Department/Unit printer (1 of 1)         oxyCODONE (ROXICODONE) 5 MG IR tablet                Procedures and tests performed during your visit     CBC with platelets differential    CT Abdomen Pelvis w Contrast    Comprehensive metabolic panel    Give 20 ounces of water 15 minutes before CT of abdomen    INR    Lactic acid whole blood    Lipase    Partial thromboplastin time    Peripheral IV: Standard    UA with Microscopic      Orders Needing Specimen Collection     None      Pending Results     No orders found from 10/5/2017 to 10/8/2017.            Pending Culture Results     No orders found from 10/5/2017 to 10/8/2017.            Pending Results Instructions     If you had any lab results that were not finalized at the time of your Discharge, you can call the ED Lab Result RN at [phone]. You will be contacted by this team for any positive Lab results or changes in treatment. The nurses are available 7 days a week from 10A to 6:30P.  You can leave a message 24 hours per day and they will return your call.        Thank you for choosing [facility]       Thank you for choosing [facility] for your care. Our goal is always to provide you with excellent care. Hearing back from our patients is one way we can continue to improve our services. Please take a few minutes to complete the written survey that you may receive in the mail after you visit with us. Thank you!        [employer] Information     [employer] gives you secure access to your electronic health record. If you see a primary care provider, you can also send messages to your care team and make appointments. If you have questions, please call your primary care clinic.  If you do not have a primary care provider, please call [phone] and they will assist you.        Care EveryWhere ID     This is your Care EveryWhere ID. This could be used by other  organizations to access your [facility] medical records  [id number]        Equal Access to Services     RON PERALTA : Nic Blandon, roxy beltran, debra negron. So [place] [phone].    ATENCIÓN: Si habla español, tiene a hunt disposición servicios gratuitos de asistencia lingüística. Llame al [phone].    We comply with applicable federal civil rights laws and [place] laws. We do not discriminate on the basis of race, color, national origin, age, disability, sex, sexual orientation, or gender identity.            After Visit Summary       This is your record. Keep this with you and show to your community pharmacist(s) and doctor(s) at your next visit.

## 2017-10-07 NOTE — ED AVS SNAPSHOT
[facility]    [address] [place] [address]    Phone:  [phone]    Fax:  [phone]                                       Charlene Douglas   MRN: [id number]    Department:  [facility] Department   Date of Visit:  10/7/2017           After Visit Summary Signature Page     I have received my discharge instructions, and my questions have been answered. I have discussed any challenges I see with this plan with the nurse or doctor.    ..........................................................................................................................................  Patient/Patient Representative Signature      ..........................................................................................................................................  Patient Representative Print Name and Relationship to Patient    ..................................................               ................................................  Date                                            Time    ..........................................................................................................................................  Reviewed by Signature/Title    ...................................................              ..............................................  Date                                                            Time

## 2017-10-07 NOTE — ED PROVIDER NOTES
History     Chief Complaint   Patient presents with     Abdominal Pain     The history is provided by the patient and the spouse.     Charlene Douglas is a 67 year old female who presents for abdominal pain. Patient has a medical history for colon and liver cancer. She is experiencing a sharp pain in her lower abdomen. She has not experienced pain like this before. Yesterday she experienced abdominal discomfort, and today she experienced abdominal pain. Patient relates that her pain subsides when she is still. She has been drinking a lot of water as she has been thirsty since 4:00am, and has not been able to quench her thirst. Her bowel movements are regular and black as she takes an iron supplement. She has felt bloated for a while. She was diagnosed in August 2017 with colon cancer that has now spread to her liver, and started chemotherapy soon after. On Tuesday she has her next chemotherapy session. They did not complete a x-ray of her abdomen. She had toxic reaction to her first chemotherapy session. She had her appendix removed. She denies nausea, vomiting, dysuria, and alcohol use.     Patient Active Problem List   Diagnosis     UTI (urinary tract infection)     Keloid scar     Family history of other cardiovascular diseases     Advanced directives, counseling/discussion     Congenital hip dysplasia     CARDIOVASCULAR SCREENING; LDL GOAL LESS THAN 160     Elevated blood pressure reading without diagnosis of hypertension     Lung nodule     Fever, unspecified     Anemia, unspecified type     Liver metastasis (H)     Metastatic colon cancer to liver (H)     Hypoxia     Chronic systolic congestive heart failure (H)     Benign essential hypertension     Thrombocytopenia (H)     Loss of weight     Physical deconditioning     Hypokalemia     Neutropenic fever (H)     Past Medical History:   Diagnosis Date     Colon cancer metastasized to liver (H)      Hypertension      NO ACTIVE PROBLEMS        Past Surgical  History:   Procedure Laterality Date     C APPENDECTOMY       C  DELIVERY ONLY       C NONSPECIFIC PROCEDURE  1964    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     COLONOSCOPY N/A 2017    Procedure: COMBINED COLONOSCOPY, SINGLE OR MULTIPLE BIOPSY/POLYPECTOMY BY BIOPSY;;  Surgeon: Duane, William Charles, MD;  Location: [facility] OR     COLONOSCOPY WITH CO2 INSUFFLATION N/A 2017    Procedure: COLONOSCOPY WITH CO2 INSUFFLATION;  BMI: 25.2  Referring: Donna Mota  Diagnoses: Positive FIT (fecal immunochemical test)  Special screening for malignant neoplasms, colon  Pharmacy: [facility] Fax: [phone];  Surgeon: Duane, William Charles, MD;  Location: [facility] OR     EXAM UNDER ANESTHESIA, ULTRASOUND N/A 2017    Procedure: EXAM UNDER ANESTHESIA, ULTRASOUND;  Ultrasound Liver Biopsy;  Surgeon: GENERIC ANESTHESIA PROVIDER;  Location: [facility] OR     HIP SURGERY      14 years old     INSERT PORT VASCULAR ACCESS N/A 2017    Procedure: INSERT PORT VASCULAR ACCESS;  Port placement;  Surgeon: Vinny Peterson DO;  Location: [facility] OR       Family History   Problem Relation Age of Onset     C.A.D. Mother      quad- bypass at age 78     CEREBROVASCULAR DISEASE Mother      in her 70's     Lipids Mother      Hypertension Mother      Other - See Comments Mother      Triple Bi-pass     Circulatory Father      abd aneurysm     GASTROINTESTINAL DISEASE Father      diverticulitis     Hypertension Father        Social History   Substance Use Topics     Smoking status: Never Smoker     Smokeless tobacco: Never Used     Alcohol use No      Comment: 1 per week        Immunization History   Administered Date(s) Administered     Influenza (IIV3) 10/14/2008, 2010, 01/10/2013     Pneumococcal (PCV 13) 2017     TD (ADULT, 7+) 1997     TDAP Vaccine (Adacel) 2007, 2017          Allergies   Allergen Reactions     Lisinopril Cough     No Known Allergies        Current  Outpatient Prescriptions   Medication Sig Dispense Refill     loperamide (IMODIUM) 2 MG capsule 2 caps at 1st sign of diarrhea & 1 cap every 2hrs until 12hrs diarrhea free. During night, 2 caps at bedtime & 2 caps every 4hrs until AM 30 capsule 0     ferrous sulfate (IRON) 325 (65 FE) MG tablet Take 325 mg by mouth daily (with breakfast)       carvedilol (COREG) 12.5 MG tablet Take 1.5 tablets (18.75 mg) by mouth 2 times daily (with meals) 90 tablet 0     Potassium Chloride ER 20 MEQ TBCR Take 1 tablet (20 mEq) by mouth daily 30 tablet 0     lidocaine (LIDODERM) 5 % Patch Place 1-3 patches onto the skin every 24 hours To RUQ 30 patch      sennosides (SENOKOT) 8.6 MG tablet Take 1-2 tablets by mouth 2 times daily as needed for constipation 120 each      guaiFENesin-codeine (ROBITUSSIN AC) 100-10 MG/5ML SOLN solution Take 5 mLs by mouth every 4 hours as needed for cough 120 mL 0     ACETAMINOPHEN PO Take 650 mg by mouth every 4 hours as needed for pain or fever        Benzonatate (TESSALON PERLES PO) Take 200 mg by mouth 3 times daily as needed for cough       omeprazole (PRILOSEC) 20 MG CR capsule Take 1 capsule (20 mg) by mouth daily 30 capsule 1     diclofenac (VOLTAREN) 1 % GEL topical gel Place 2 g onto the skin 4 times daily as needed for moderate pain (for RUQ pain)         I have reviewed the Medications, Allergies, Past Medical and Surgical History, and Social History in the Epic system.      Review of Systems   Gastrointestinal: Positive for abdominal pain (sharp, lower, bloated). Negative for nausea and vomiting.   Genitourinary: Negative for dysuria.   All other systems reviewed and are negative.      Physical Exam   BP: 173/88  Pulse: 111  Heart Rate: 111  Temp: 99.4  F (37.4  C)  Resp: 16  Height:  (5'6'')  Weight: 61.7 kg (136 lb)  SpO2: 99 %  Physical Exam   Constitutional: She is oriented to person, place, and time. She appears well-developed and well-nourished. No distress.   HENT:   Head:  Normocephalic and atraumatic.   Eyes: Conjunctivae and EOM are normal.   Neck: Normal range of motion.   Cardiovascular: Normal rate, regular rhythm, normal heart sounds and intact distal pulses.    No murmur heard.  Pulmonary/Chest: Effort normal and breath sounds normal. No respiratory distress. She has no wheezes. She has no rales. She exhibits no tenderness.   Abdominal: Soft. Bowel sounds are normal. She exhibits no distension. There is tenderness (RLQ, RUQ, LUQ tenderness, no guarding).   Musculoskeletal: She exhibits no edema.   Neurological: She is alert and oriented to person, place, and time.   Skin: Skin is warm and dry. No rash noted. She is not diaphoretic. No pallor.   Psychiatric: She has a normal mood and affect. Her behavior is normal.   Nursing note and vitals reviewed.      ED Course     ED Course     Procedures         Results for orders placed or performed during the hospital encounter of 10/07/17   CT Abdomen Pelvis w Contrast    Narrative    CT ABDOMEN AND PELVIS WITH CONTRAST 10/7/2017 7:05 PM     HISTORY: Left upper quadrant abdominal pain, known history of liver  and colon cancer, abdominal distention.    COMPARISON: 8/18/2017    TECHNIQUE: Volumetric helical acquisition of CT images from the lung  bases through the symphysis pubis after the administration of 70 mL  Isovue 370  intravenous contrast. Radiation dose for this scan was  reduced using automated exposure control, adjustment of the mA and/or  kV according to patient size, or iterative reconstruction technique.    FINDINGS: No definite bowel obstruction demonstrated. Trace free  fluid. No free air. There are moderate atherosclerotic changes of the  visualized aorta and its branches. There is no evidence of aortic  dissection or aneurysm. No hydronephrosis or urolithiasis  demonstrated. Extensive liver metastases, adenopathy/masses in the  peritoneum again noted and similar to previous. Representative lesion  in the right lobe of  liver measures 10.7 cm today previously 10.1 cm.  Adrenal glands, spleen, and pancreas demonstrate no worrisome focal  lesion. Superior degenerative changes in the left hip. No frankly  destructive bony lesions. Lung bases demonstrate trace peripheral  fibrosis and/or atelectasis.      Impression    IMPRESSION: Trace free fluid, no significant ascites or bowel  obstruction demonstrated.    KAILEY GUERRA MD   CBC with platelets differential   Result Value Ref Range    WBC 20.8 (H) 4.0 - 11.0 10e9/L    RBC Count 2.83 (L) 3.8 - 5.2 10e12/L    Hemoglobin 7.5 (L) 11.7 - 15.7 g/dL    Hematocrit 24.4 (L) 35.0 - 47.0 %    MCV 86 78 - 100 fl    MCH 26.5 26.5 - 33.0 pg    MCHC 30.7 (L) 31.5 - 36.5 g/dL    RDW 18.0 (H) 10.0 - 15.0 %    Platelet Count 396 150 - 450 10e9/L    Diff Method Automated Method     % Neutrophils 75.5 %    % Lymphocytes 12.5 %    % Monocytes 10.9 %    % Eosinophils 0.3 %    % Basophils 0.1 %    % Immature Granulocytes 0.7 %    Absolute Neutrophil 15.7 (H) 1.6 - 8.3 10e9/L    Absolute Lymphocytes 2.6 0.8 - 5.3 10e9/L    Absolute Monocytes 2.3 (H) 0.0 - 1.3 10e9/L    Absolute Eosinophils 0.1 0.0 - 0.7 10e9/L    Absolute Basophils 0.0 0.0 - 0.2 10e9/L    Abs Immature Granulocytes 0.2 0 - 0.4 10e9/L   INR   Result Value Ref Range    INR 1.10 0.86 - 1.14   Partial thromboplastin time   Result Value Ref Range    PTT 45 (H) 22 - 37 sec   Comprehensive metabolic panel   Result Value Ref Range    Sodium 136 133 - 144 mmol/L    Potassium 3.7 3.4 - 5.3 mmol/L    Chloride 98 94 - 109 mmol/L    Carbon Dioxide 23 20 - 32 mmol/L    Anion Gap 15 (H) 3 - 14 mmol/L    Glucose 122 (H) 70 - 99 mg/dL    Urea Nitrogen 14 7 - 30 mg/dL    Creatinine 0.95 0.52 - 1.04 mg/dL    GFR Estimate 59 (L) >60 mL/min/1.7m2    GFR Estimate If Black 72 >60 mL/min/1.7m2    Calcium 8.4 (L) 8.5 - 10.1 mg/dL    Bilirubin Total 0.6 0.2 - 1.3 mg/dL    Albumin 1.6 (L) 3.4 - 5.0 g/dL    Protein Total 6.9 6.8 - 8.8 g/dL    Alkaline Phosphatase 587  (H) 40 - 150 U/L    ALT 46 0 - 50 U/L    AST 97 (H) 0 - 45 U/L   Lipase   Result Value Ref Range    Lipase 374 73 - 393 U/L   UA with Microscopic   Result Value Ref Range    Color Urine Yellow     Appearance Urine Clear     Glucose Urine Negative NEG^Negative mg/dL    Bilirubin Urine Negative NEG^Negative    Ketones Urine Negative NEG^Negative mg/dL    Specific Gravity Urine 1.009 1.003 - 1.035    Blood Urine Negative NEG^Negative    pH Urine 6.0 5.0 - 7.0 pH    Protein Albumin Urine Negative NEG^Negative mg/dL    Urobilinogen mg/dL 0.0 0.0 - 2.0 mg/dL    Nitrite Urine Negative NEG^Negative    Leukocyte Esterase Urine Negative NEG^Negative    Source Midstream Urine     WBC Urine 1 0 - 2 /HPF    RBC Urine <1 0 - 2 /HPF    Bacteria Urine Few (A) NEG^Negative /HPF    Squamous Epithelial /HPF Urine <1 0 - 1 /HPF    Mucous Urine Present (A) NEG^Negative /LPF   Lactic acid whole blood   Result Value Ref Range    Lactic Acid 1.8 0.7 - 2.0 mmol/L     Medications   HYDROmorphone (PF) (DILAUDID) injection 0.5 mg (0.3 mg Intravenous Given 10/7/17 1921)   0.9% sodium chloride infusion (1,000 mLs Intravenous New Bag 10/7/17 1817)   sodium chloride (PF) 0.9% PF flush 3 mL (20 mLs Intravenous Given 10/7/17 1853)   Saline Bag 500mL  CT  flush use only (60 mLs Intravenous Given 10/7/17 1854)   iopamidol (ISOVUE-370) solution 500 mL (70 mLs Intravenous Given 10/7/17 1854)     labs reviewed and patient did have an elevated white blood cell count but in normal lactic acid.  Patient had no clinical signs of infection and the CT scan and urine did not show any signs of infection either.  CT scan just showed multiple liver lesions consistent with her known diagnosis of metastatic colon cancer.  I think her pain is most likely from cancer related pain.  I am a little concerned about the elevation of the white count but since I cannot find a source of fever and she is feeling much better, I think it is okay to discharge her home with close  follow-up.  She will be seeing her oncologist and a couple of days which I think is a reasonable timeframe for follow up.  She was given strict return precautions to come back if she does develop fever, cough worsening pain or vomiting.    Assessments & Plan (with Medical Decision Making)  right upper quadrant abdominal pain, leukocytosis      I have reviewed the nursing notes.    I have reviewed the findings, diagnosis, plan and need for follow up with the patient.            This document serves as a record of services personally performed by Eliel Quiros MD. It was created on their behalf by Karma Hart, a trained medical scribe. The creation of this record is based on the provider's personal observations and the statements of the patient. This document has been checked and approved by the attending provider.    Note: Chart documentation done in part with Dragon Voice Recognition software. Although reviewed after completion, some word and grammatical errors may remain.    10/7/2017   [facility] EMERGENCY DEPARTMENT     Eliel Quiros MD  10/07/17 2030

## 2017-10-08 NOTE — DISCHARGE INSTRUCTIONS
*Abdominal Pain, Unknown Cause (Female)    The exact cause of your abdominal (stomach) pain is not certain. This does not mean that this is something to worry about, or the right tests were not done. Everyone likes to know the exact cause of the problem, but sometimes with abdominal pain, there is no clear-cut cause, and this could be a good thing. The good news is that your symptoms can be treated, and you will feel better.   Your condition does not seem serious now; however, sometimes the signs of a serious problem may take more time to appear. For this reason, it is important for you to watch for any new symptoms, problems, or worsening of your condition.  Over the next few days, the abdominal pain may come and go, or be continuous. Other common symptoms can include nausea and vomiting. Sometimes it can be difficult to tell if you feel nauseous, you may just feel bad and not associate that feeling with nausea. Constipation, diarrhea, and a fever may go along with the pain.  The pain may continue even if treated correctly over the following days. Depending on how things go, sometimes the cause can become clear and may require further or different treatment. Additional evaluations, medications, or tests may be needed.  Home care  Your health care provider may prescribe medications for pain, symptoms, or an infection.  Follow the health care provider's instructions for taking these medications.  General care    Rest until your next exam. No strenuous activities.    Try to find positions that ease discomfort. A small pillow placed on the abdomen may help relieve pain.    Something warm on your abdomen (such as a heating pad) may help, but be careful not to burn yourself.  Diet    Do not force yourself to eat, especially if having cramps, vomiting, or diarrhea.    Water is important so you do not get dehydrated. Soup may also be good. Sports drinks may also help, especially if they are not too acidic. Make sure you  don't drink sugary drinks as this can make things worse. Take liquids in small amounts. Do not guzzle them.    Caffeine sometimes makes the pain and cramping worse.    Avoid dairy products if you have vomiting or diarrhea.    Don't eat large amounts at a time. Wait a few minutes between bites.    Eat a diet low in fiber (called a low-residue diet). Foods allowed include refined breads, white rice, fruit and vegetable juices without pulp, tender meats. These foods will pass more easily through the intestine.    Avoid fried or fatty foods, dairy, alcohol and spicy foods until your symptoms go away.  Follow-up care  Follow up with your health care provider as instructed, or if your pain does not begin to improve in the next 24 hours.  When to seek medical care  Seek prompt medical care if any of the following occur:    Pain gets worse or moves to the right lower abdomen    New or worsening vomiting or diarrhea    Swelling of the abdomen    Unable to pass stool for more than three days    New fever over 101  F (38.3 C), or rising fever    Blood in vomit or bowel movements (dark red or black color)    Jaundice (yellow color of eyes and skin)    Weakness, dizziness    Chest, arm, back, neck or jaw pain    Unexpected vaginal bleeding or missed period  Call 911  Call emergency services if any of the following occur:    Trouble breathing    Confusion    Fainting or loss of consciousness    Rapid heart rate    Seizure    [id number] Horace [facility], [address] [place] [address], [place] [address]. All rights reserved. This information is not intended as a substitute for professional medical care. Always follow your healthcare professional's instructions.

## 2017-10-10 NOTE — PROGRESS NOTES
Pt here for chemotherapy.  Seen in Oncology clinic by Dr Dove, Hgb = 6.6.  1 unit PRBCs given followed by Lasix 20 mg, tolerated well.  Lungs clear pre and post transfusion.  Pt discharged in stable condition with .

## 2017-10-10 NOTE — NURSING NOTE
DISCHARGE PLAN:  Next appointments: See patient instruction section  Departure Mode: W/C  Accompanied by:   5 minutes for nursing discharge (face to face time)     Charlene Douglas is here today for Oncology follow up with plan to start Irinotecan.  Writing nurse seen patient after Medical Oncology appointment to address questions/concerns/coordinate care. Patient to hold start of chemotherapy until Thursday.  Will get 1 unit of PRBC today and tomorrow with Lasix.  Blood Cultures today.  Patient started on ABX.  Follow up in 1 week. Appointments scheduled.  Disability parking. See patient instructions and Oncologist's Progress note for further details. Questions and concerns addressed to patient's satisfaction. Patient verbalized and demonstrated understanding of plan.  Contact information provided and patient is encouraged to call with any that arise in the interim of care.    Stan Balderas, RN, BSN, OCN   Oncology Care Coordinator [facility]  [phone]  10/10/2017, 12:05 PM

## 2017-10-10 NOTE — PATIENT INSTRUCTIONS
Pt to return on 10/11 for 1 unit PRBC. Copies of medication list and upcoming appointments given prior to discharge.

## 2017-10-10 NOTE — NURSING NOTE
Per Dr. Dove, patient to hold iron while on ABX.  Called and update patient and .    Stan Balderas RN, BSN, OCN  10/10/2017, 4:57 PM

## 2017-10-10 NOTE — NURSING NOTE
"Oncology Rooming Note    October 10, 2017 11:22 AM   Charlene I Misael is a 67 year old female who presents for:    Chief Complaint   Patient presents with     Oncology Clinic Visit     1 week follow up for Metastatic colon cancer to liver     Chemotherapy     C1D1 today with labs prior     Hospital F/U     ED 10/7/2017 RUQ pain     Initial Vitals: /67 (BP Location: Right arm, Patient Position: Chair, Cuff Size: Adult Regular)  Pulse 110  Temp 98.8  F (37.1  C) (Temporal)  Resp 16  Ht 1.689 m (5' 6.5\")  Wt 63.7 kg (140 lb 8 oz)  LMP 06/07/2007  SpO2 99%  BMI 22.34 kg/m2 Estimated body mass index is 22.34 kg/(m^2) as calculated from the following:    Height as of this encounter: 1.689 m (5' 6.5\").    Weight as of this encounter: 63.7 kg (140 lb 8 oz). Body surface area is 1.73 meters squared.  Moderate Pain (5) Comment: RUQ   Patient's last menstrual period was 06/07/2007.  Allergies reviewed: Yes  Medications reviewed: Yes    Medications: Medication refills not needed today.  Pharmacy name entered into [facility]:    [facility] - [id number] [facility]    Clinical concerns: Fatigue. Weakness. Pt wants handicap parking. Dr. Dove was notified.      Elsa Butts MA              "

## 2017-10-10 NOTE — MR AVS SNAPSHOT
After Visit Summary   10/10/2017    Charlene Douglas    MRN: [id number]           Patient Information     Date Of Birth          1950        Visit Information        Provider Department      10/10/2017 11:30 AM NL INFUSION CHAIR 2 [facility]        Today's Diagnoses     Metastatic colon cancer to liver (H)    -  1    Anemia, unspecified type          Care Instructions    Pt to return on 10/11 for 1 unit PRBC. Copies of medication list and upcoming appointments given prior to discharge.           Follow-ups after your visit        Follow-up notes from your care team     Return in 1 day (on 10/11/2017).      Your next 10 appointments already scheduled     Oct 11, 2017 12:30 PM CDT   Level 2 with NL INFUSION CHAIR 4   [facility] ([facility])    [address] [place] [address] Dr Rosa VASQUEZ [address]   [phone]            Oct 12, 2017  9:00 AM CDT   Level 4 with NL INFUSION CHAIR 3   [facility] ([facility])    [address] [place] [address] Dr Rosa VASQUEZ [address]   [phone]            Oct 13, 2017  9:00 AM CDT   Ech Complete with PHECHR1   [facility] Echocardiography ([facility])    [address] [place] [address] Dr Rosa VASQUEZ [address]   [phone]           1. Please bring or wear a comfortable two-piece outfit. 2. You may eat, drink and take your normal medicines. 3. For any questions that cannot be answered, please contact the ordering physician            Oct 17, 2017  1:15 PM CDT   [facility] DARLENE [facility]    [address] [place] [address]   [phone]            Oct 17, 2017  2:00 PM CDT   Return Visit with Duy Dove MD   [facility] ([address] [address]   [phone]              Who to contact     If you have questions  or need follow up information about today's clinic visit or your schedule please contact [facility] directly at [phone].  Normal or non-critical lab and imaging results will be communicated to you by FoundValuehart, letter or phone within 4 business days after the clinic has received the results. If you do not hear from us within 7 days, please contact the clinic through TapTrakt or phone. If you have a critical or abnormal lab result, we will notify you by phone as soon as possible.  Submit refill requests through GrabTaxi or call your pharmacy and they will forward the refill request to us. Please allow 3 business days for your refill to be completed.          Additional Information About Your Visit        FoundValueharFrankly Chat Information     GrabTaxi gives you secure access to your electronic health record. If you see a primary care provider, you can also send messages to your care team and make appointments. If you have questions, please call your primary care clinic.  If you do not have a primary care provider, please call [phone] and they will assist you.        Care EveryWhere ID     This is your Care EveryWhere ID. This could be used by other organizations to access your [place] medical records  [id number]        Your Vitals Were     Pulse Temperature Respirations Last Period Pulse Oximetry       90 98.1  F (36.7  C) (Oral) 16 06/07/2007 98%        Blood Pressure from Last 3 Encounters:   10/10/17 119/58   10/10/17 118/67   10/07/17 166/77    Weight from Last 3 Encounters:   10/10/17 63.7 kg (140 lb 8 oz)   10/07/17 61.7 kg (136 lb)   10/03/17 62.3 kg (137 lb 4.8 oz)              We Performed the Following     ABO/Rh type and screen     Blood component     Blood component     CBC with platelets differential     Hepatic panel     Transfuse red blood cell unit          Today's Medication Changes          These changes are accurate as of: 10/10/17  4:02 PM.  If you have any questions, ask your  nurse or doctor.               These medicines have changed or have updated prescriptions.        Dose/Directions    oxyCODONE 5 MG IR tablet   Commonly known as:  ROXICODONE   This may have changed:  reasons to take this        Dose:  5 mg   Take 1 tablet (5 mg) by mouth every 8 hours as needed for pain   Quantity:  20 tablet   Refills:  0            Where to get your medicines      These medications were sent to [facility] - [address]   [address] [place] [address]     Phone:  [phone]     omeprazole 20 MG CR capsule                Primary Care Provider Office Phone # Fax #    Donna Shirley PA-C [phone] [address] [place] [address]        Equal Access to Services     [facility] : Hadii nat smith hadasho Soomaali, waaxda luqadaha, qaybta kaalmada adeegyada, waxay axel rowe . So [facility] [phone].    ATENCIÓN: Si habla español, tiene a hunt disposición servicios gratuitos de asistencia lingüística. [facility] [phone].    We comply with applicable federal civil rights laws and [place] laws. We do not discriminate on the basis of race, color, national origin, age, disability, sex, sexual orientation, or gender identity.            Thank you!     Thank you for choosing [facility] SERVICES  for your care. Our goal is always to provide you with excellent care. Hearing back from our patients is one way we can continue to improve our services. Please take a few minutes to complete the written survey that you may receive in the mail after your visit with us. Thank you!             Your Updated Medication List - Protect others around you: Learn how to safely use, store and throw away your medicines at www.disposemymeds.org.          This list is accurate as of: 10/10/17  4:02 PM.  Always use your most recent med list.                   Brand Name Dispense Instructions for use Diagnosis     ACETAMINOPHEN PO      Take 650 mg by mouth every 4 hours as needed for pain or fever        carvedilol 12.5 MG tablet    COREG    90 tablet    Take 1.5 tablets (18.75 mg) by mouth 2 times daily (with meals)    Chronic systolic congestive heart failure (H)       diclofenac 1 % Gel topical gel    VOLTAREN     Place 2 g onto the skin 4 times daily as needed for moderate pain (for RUQ pain)    Metastatic colon cancer to liver (H)       ferrous sulfate 325 (65 FE) MG tablet    IRON     Take 325 mg by mouth daily (with breakfast)        guaiFENesin-codeine 100-10 MG/5ML Soln solution    ROBITUSSIN AC    120 mL    Take 5 mLs by mouth every 4 hours as needed for cough    Liver metastasis (H)       lidocaine 5 % Patch    LIDODERM    30 patch    Place 1-3 patches onto the skin every 24 hours To RUQ    Metastatic colon cancer to liver (H)       loperamide 2 MG capsule    IMODIUM    30 capsule    2 caps at 1st sign of diarrhea & 1 cap every 2hrs until 12hrs diarrhea free. During night, 2 caps at bedtime & 2 caps every 4hrs until AM    Metastatic colon cancer to liver (H)       omeprazole 20 MG CR capsule    priLOSEC    30 capsule    Take 1 capsule (20 mg) by mouth daily    Liver metastasis (H)       oxyCODONE 5 MG IR tablet    ROXICODONE    20 tablet    Take 1 tablet (5 mg) by mouth every 8 hours as needed for pain        Potassium Chloride ER 20 MEQ Tbcr     30 tablet    Take 1 tablet (20 mEq) by mouth daily    Hypokalemia       sennosides 8.6 MG tablet    SENOKOT    120 each    Take 1-2 tablets by mouth 2 times daily as needed for constipation    Slow transit constipation       TESSALON PERLES PO      Take 200 mg by mouth 3 times daily as needed for cough

## 2017-10-10 NOTE — PATIENT INSTRUCTIONS
Please follow up with Dr. Dove in 1 week.  1 unit of blood today.  Blood cultures today.  1 unit of blood tomorrow.  Chemotherapy on Thursday.    Infusion (Blood) Date/Time:  10/11/17 at 12:30    Infusion for Chemo Date/Time:  10/12/17 at 9:00    Follow Up Date/Time: 10/17/17 at 2:00    If you have any questions or concerns please feel free to call.    If you need to reschedule please call:  Clinic or Lab Appointment - [phone]  Infusion - [phone]  Imaging - [phone]    Stan Balderas, RN, BSN, OCN   Oncology Care Coordinator RN  [facility]  [phone]

## 2017-10-10 NOTE — MR AVS SNAPSHOT
After Visit Summary   10/10/2017    Charlene Douglas    MRN: [id number]           Patient Information     Date Of Birth          1950        Visit Information        Provider Department      10/10/2017 11:00 AM Duy Dove MD [facility]        Today's Diagnoses     Metastatic colon cancer to liver (H)    -  1    Liver metastasis (H)          Care Instructions      Please follow up with Dr. Dove in 1 week.  1 unit of blood today.  Blood cultures today.  1 unit of blood tomorrow.  Chemotherapy on Thursday.    Infusion (Blood) Date/Time:  10/11/17 at 12:30    Infusion for Chemo Date/Time:  10/12/17 at 9:00    Follow Up Date/Time: 10/17/17 at 2:00    If you have any questions or concerns please feel free to call.    If you need to reschedule please call:  Clinic or Lab Appointment - [phone]  Infusion - [phone]  Imaging - [phone]    Stan Balderas, RN, BSN, OCN   Oncology Care Coordinator RN  [facility]  [phone]              Follow-ups after your visit        Your next 10 appointments already scheduled     Oct 11, 2017 12:30 PM CDT   Level 2 with NL INFUSION CHAIR 4   [facility] ([facility])    [address] [place] [address] Dr Rosa VASQUEZ [address]   [phone]            Oct 12, 2017  9:00 AM CDT   Level 4 with NL INFUSION CHAIR 3   [facility] ([facility])    [address] Rosa [place] [address]   [phone]            Oct 13, 2017  9:00 AM CDT   Ech Complete with PHECHR1   [facility] Echocardiography ([facility])    [address] [place] [address]   [phone]           1. Please bring or wear a comfortable two-piece outfit. 2. You may eat, drink and take your normal medicines. 3. For any questions that cannot be answered, please contact the ordering physician            Oct 17, 2017  1:15 PM CDT   Return Visit with Mamta Rutherford  "APRN CNP   [facility]    [address] [place] [id number] [phone]            Oct 17, 2017  2:00 PM CDT   Return Visit with Duy Dove MD   [facility]    [address] [place] [id number] [phone]              Who to contact     If you have questions or need follow up information about today's clinic visit or your schedule please contact [facility] directly at [phone].  Normal or non-critical lab and imaging results will be communicated to you by USIS HOLDINGShart, letter or phone within 4 business days after the clinic has received the results. If you do not hear from us within 7 days, please contact the clinic through Green Genest or phone. If you have a critical or abnormal lab result, we will notify you by phone as soon as possible.  Submit refill requests through BiddingForGood or call your pharmacy and they will forward the refill request to us. Please allow 3 business days for your refill to be completed.          Additional Information About Your Visit        MyChart Information     BiddingForGood gives you secure access to your electronic health record. If you see a primary care provider, you can also send messages to your care team and make appointments. If you have questions, please call your primary care clinic.  If you do not have a primary care provider, please call [phone] and they will assist you.        Care EveryWhere ID     This is your Care EveryWhere ID. This could be used by other organizations to access your [place] medical records  [id number]        Your Vitals Were     Pulse Temperature Respirations Height Last Period Pulse Oximetry    110 98.8  F (37.1  C) (Temporal) 16 1.689 m (5' 6.5\") 06/07/2007 99%    BMI (Body Mass Index)                   22.34 kg/m2            Blood Pressure from Last 3 Encounters:   10/10/17 118/67   10/07/17 166/77   10/03/17 140/73 "    Weight from Last 3 Encounters:   10/10/17 63.7 kg (140 lb 8 oz)   10/07/17 61.7 kg (136 lb)   10/03/17 62.3 kg (137 lb 4.8 oz)              Today, you had the following     No orders found for display         Today's Medication Changes          These changes are accurate as of: 10/10/17 12:00 PM.  If you have any questions, ask your nurse or doctor.               These medicines have changed or have updated prescriptions.        Dose/Directions    oxyCODONE 5 MG IR tablet   Commonly known as:  ROXICODONE   This may have changed:  reasons to take this        Dose:  5 mg   Take 1 tablet (5 mg) by mouth every 8 hours as needed for pain   Quantity:  20 tablet   Refills:  0            Where to get your medicines      These medications were sent to [facility] [address]   [address] [place] [address]     Phone:  [phone]     omeprazole 20 MG CR capsule                Primary Care Provider Office Phone # Fax #    Donna Shirley PA-C [phone] [address] [place] [address]        Equal Access to Services     [facility]: Hadii nat ku hadasho Soomaali, waaxda luqadaha, qaybta kaalmada adeegyada, debra rowe . So [facility] [phone].    ATENCIÓN: Si habla español, tiene a hunt disposición servicios gratuitos de asistencia lingüística. Llame al [phone].    We comply with applicable federal civil rights laws and [place] laws. We do not discriminate on the basis of race, color, national origin, age, disability, sex, sexual orientation, or gender identity.            Thank you!     Thank you for choosing [facility]  for your care. Our goal is always to provide you with excellent care. Hearing back from our patients is one way we can continue to improve our services. Please take a few minutes to complete the written survey that you may receive in the mail after your visit with us. Thank you!              Your Updated Medication List - Protect others around you: Learn how to safely use, store and throw away your medicines at www.disposemymeds.org.          This list is accurate as of: 10/10/17 12:00 PM.  Always use your most recent med list.                   Brand Name Dispense Instructions for use Diagnosis    ACETAMINOPHEN PO      Take 650 mg by mouth every 4 hours as needed for pain or fever        carvedilol 12.5 MG tablet    COREG    90 tablet    Take 1.5 tablets (18.75 mg) by mouth 2 times daily (with meals)    Chronic systolic congestive heart failure (H)       diclofenac 1 % Gel topical gel    VOLTAREN     Place 2 g onto the skin 4 times daily as needed for moderate pain (for RUQ pain)    Metastatic colon cancer to liver (H)       ferrous sulfate 325 (65 FE) MG tablet    IRON     Take 325 mg by mouth daily (with breakfast)        guaiFENesin-codeine 100-10 MG/5ML Soln solution    ROBITUSSIN AC    120 mL    Take 5 mLs by mouth every 4 hours as needed for cough    Liver metastasis (H)       lidocaine 5 % Patch    LIDODERM    30 patch    Place 1-3 patches onto the skin every 24 hours To RUQ    Metastatic colon cancer to liver (H)       loperamide 2 MG capsule    IMODIUM    30 capsule    2 caps at 1st sign of diarrhea & 1 cap every 2hrs until 12hrs diarrhea free. During night, 2 caps at bedtime & 2 caps every 4hrs until AM    Metastatic colon cancer to liver (H)       omeprazole 20 MG CR capsule    priLOSEC    30 capsule    Take 1 capsule (20 mg) by mouth daily    Liver metastasis (H)       oxyCODONE 5 MG IR tablet    ROXICODONE    20 tablet    Take 1 tablet (5 mg) by mouth every 8 hours as needed for pain        Potassium Chloride ER 20 MEQ Tbcr     30 tablet    Take 1 tablet (20 mEq) by mouth daily    Hypokalemia       sennosides 8.6 MG tablet    SENOKOT    120 each    Take 1-2 tablets by mouth 2 times daily as needed for constipation    Slow transit constipation       TESSALON PERLES PO      Take  200 mg by mouth 3 times daily as needed for cough

## 2017-10-11 NOTE — PROGRESS NOTES
Patients here for 2nd unit blood today.  Tolerated 1 unit of blood. Lung sounds clear pre/post  unit of blood.  Lasix 20mg IVP given after transfusion. Discharged in stable condition via w/c with .

## 2017-10-11 NOTE — MR AVS SNAPSHOT
After Visit Summary   10/11/2017    Charlene Douglas    MRN: [id number]           Patient Information     Date Of Birth          1950        Visit Information        Provider Department      10/11/2017 12:30 PM NL INFUSION CHAIR 4 [facility]        Today's Diagnoses     Metastatic colon cancer to liver (H)    -  1    Anemia, unspecified type          Care Instructions    Pt to return on 10/12/17 for chemo. Copies of medication list and upcoming appointments given prior to discharge.             Follow-ups after your visit        Your next 10 appointments already scheduled     Oct 12, 2017  9:00 AM CDT   Level 4 with NL INFUSION CHAIR 3   [facility] ([facility])    [address] [place] [id number] [phone]            Oct 13, 2017  9:00 AM CDT   Ech Complete with PHECHR1   [facility] Echocardiography ([facility])    [address] [place] [address] Dr Rosa [address] [phone]           1. Please bring or wear a comfortable two-piece outfit. 2. You may eat, drink and take your normal medicines. 3. For any questions that cannot be answered, please contact the ordering physician            Oct 17, 2017  1:15 PM CDT   Return Visit with DARLENE Jasso CNP   [facility]    [address] [place] [phone]            Oct 17, 2017  2:00 PM CDT   Return Visit with Duy Dove MD   [facility]    [address] [place] [phone]              Who to contact     If you have questions or need follow up information about today's clinic visit or your schedule please contact [facility] INFUSION SERVICES directly at [phone].  Normal or non-critical lab and imaging results will be communicated to you by MyChart, letter or phone within 4 business days  after the clinic has received the results. If you do not hear from us within 7 days, please contact the clinic through Vitriflex or phone. If you have a critical or abnormal lab result, we will notify you by phone as soon as possible.  Submit refill requests through Vitriflex or call your pharmacy and they will forward the refill request to us. Please allow 3 business days for your refill to be completed.          Additional Information About Your Visit        Vitriflex Information     Vitriflex gives you secure access to your electronic health record. If you see a primary care provider, you can also send messages to your care team and make appointments. If you have questions, please call your primary care clinic.  If you do not have a primary care provider, please call [phone] and they will assist you.        Care EveryWhere ID     This is your Care EveryWhere ID. This could be used by other organizations to access your [place] medical records  [id number]        Your Vitals Were     Pulse Temperature Respirations Last Period Pulse Oximetry       96 98.6  F (37  C) (Oral) 16 06/07/2007 97%        Blood Pressure from Last 3 Encounters:   10/11/17 132/66   10/10/17 119/58   10/10/17 118/67    Weight from Last 3 Encounters:   10/10/17 63.7 kg (140 lb 8 oz)   10/07/17 61.7 kg (136 lb)   10/03/17 62.3 kg (137 lb 4.8 oz)              We Performed the Following     Blood component     Red blood cell prepare order unit     Transfuse red blood cell unit          Today's Medication Changes          These changes are accurate as of: 10/11/17  3:18 PM.  If you have any questions, ask your nurse or doctor.               These medicines have changed or have updated prescriptions.        Dose/Directions    oxyCODONE 5 MG IR tablet   Commonly known as:  ROXICODONE   This may have changed:  reasons to take this        Dose:  5 mg   Take 1 tablet (5 mg) by mouth every 8 hours as needed for pain   Quantity:  20 tablet   Refills:  0                 Primary Care Provider Office Phone # Fax #    Donna Shirley PA-C [phone] [address] [place] [address]        Equal Access to Services     JOSEDENIZ KATHRYN : Nic nat smith rashad Blandon, walayoda luqadaha, qaybta kaalmada shahla, debra morales lajuliannslim hugh. So [facility] [phone].    ATENCIÓN: Si habla español, tiene a hunt disposición servicios gratuitos de asistencia lingüística. Llame al [phone].    We comply with applicable federal civil rights laws and [place] laws. We do not discriminate on the basis of race, color, national origin, age, disability, sex, sexual orientation, or gender identity.            Thank you!     Thank you for choosing [facility]  for your care. Our goal is always to provide you with excellent care. Hearing back from our patients is one way we can continue to improve our services. Please take a few minutes to complete the written survey that you may receive in the mail after your visit with us. Thank you!             Your Updated Medication List - Protect others around you: Learn how to safely use, store and throw away your medicines at www.disposemymeds.org.          This list is accurate as of: 10/11/17  3:18 PM.  Always use your most recent med list.                   Brand Name Dispense Instructions for use Diagnosis    ACETAMINOPHEN PO      Take 650 mg by mouth every 4 hours as needed for pain or fever        carvedilol 12.5 MG tablet    COREG    90 tablet    Take 1.5 tablets (18.75 mg) by mouth 2 times daily (with meals)    Chronic systolic congestive heart failure (H)       ciprofloxacin 500 MG tablet    CIPRO    20 tablet    Take 1 tablet (500 mg) by mouth 2 times daily    Metastatic colon cancer to liver (H)       diclofenac 1 % Gel topical gel    VOLTAREN     Place 2 g onto the skin 4 times daily as needed for moderate pain (for RUQ pain)    Metastatic colon cancer to liver (H)       ferrous  sulfate 325 (65 FE) MG tablet    IRON     Take 325 mg by mouth daily (with breakfast)        guaiFENesin-codeine 100-10 MG/5ML Soln solution    ROBITUSSIN AC    120 mL    Take 5 mLs by mouth every 4 hours as needed for cough    Liver metastasis (H)       lidocaine 5 % Patch    LIDODERM    30 patch    Place 1-3 patches onto the skin every 24 hours To RUQ    Metastatic colon cancer to liver (H)       loperamide 2 MG capsule    IMODIUM    30 capsule    2 caps at 1st sign of diarrhea & 1 cap every 2hrs until 12hrs diarrhea free. During night, 2 caps at bedtime & 2 caps every 4hrs until AM    Metastatic colon cancer to liver (H)       metroNIDAZOLE 500 MG tablet    FLAGYL    21 tablet    Take 1 tablet (500 mg) by mouth 3 times daily    Metastatic colon cancer to liver (H)       omeprazole 20 MG CR capsule    priLOSEC    30 capsule    Take 1 capsule (20 mg) by mouth daily    Liver metastasis (H)       oxyCODONE 5 MG IR tablet    ROXICODONE    20 tablet    Take 1 tablet (5 mg) by mouth every 8 hours as needed for pain        Potassium Chloride ER 20 MEQ Tbcr     30 tablet    Take 1 tablet (20 mEq) by mouth daily    Hypokalemia       sennosides 8.6 MG tablet    SENOKOT    120 each    Take 1-2 tablets by mouth 2 times daily as needed for constipation    Slow transit constipation       TESSALON PERLES PO      Take 200 mg by mouth 3 times daily as needed for cough

## 2017-10-11 NOTE — PATIENT INSTRUCTIONS
Pt to return on 10/12/17 for chemo. Copies of medication list and upcoming appointments given prior to discharge.

## 2017-10-12 NOTE — TELEPHONE ENCOUNTER
"[facility], Dr. Dove - Spoke w/  and pt. First dose of chemo today. Feels \"really lousy\". Mild diarrhea (2-3x)  Feels dizzy. Temp 93.0 orally but does not feel cold. In fact she c/o diaphoresis. They are sure she did not drink anything cold within 10 min of taking temp. Would like to talk to  in lieu of triage. Dr. Dove takes his own call after hrs. Paged Dr. Dove @5:54pm to call pt at [phone]. Advised pt/ to call back if no call from Dr within 20-30 min. Richelle Du RN/FNA    "

## 2017-10-12 NOTE — PATIENT INSTRUCTIONS
Pt to return on 10/17/17 for f/u with Dr. Dove. Copies of medication list and upcoming appointments given prior to discharge.

## 2017-10-12 NOTE — TELEPHONE ENCOUNTER
Omeprazole is not covered by insurance, and no override is available.  Consider a change to a different medication (H2-blocker?).    Thanks.

## 2017-10-12 NOTE — PROGRESS NOTES
Patient here for C1 D1 Irinotecan chemotherapy today. Labs/BSA/Dose verified by 2 RN'S. Hgb-9.3, ANC-16.6 and Plt-383.  Premeds given and tolerated chemotherapy well.  Positive blood return pre/post infusion. Patient having sweating last 30 min of infusion, has these sweat episodes at home as well. VSS thruout and afebrile. Discharged home in stable condition.

## 2017-10-12 NOTE — MR AVS SNAPSHOT
After Visit Summary   10/12/2017    Charlene Douglas    MRN: [id number]           Patient Information     Date Of Birth          1950        Visit Information        Provider Department      10/12/2017 9:00 AM NL INFUSION CHAIR 3 [facility]fusion [facility]        Today's Diagnoses     Metastatic colon cancer to liver (H)    -  1    Liver metastasis (H)          Care Instructions    Pt to return on 10/17/17 for f/u with Dr. Dove. Copies of medication list and upcoming appointments given prior to discharge.             Follow-ups after your visit        Your next 10 appointments already scheduled     Oct 13, 2017  9:00 AM CDT   Ech Complete with PHECHR1   [facility]chocardiography ([facility])    [address] [place] [address] Dr [place] [id number] [phone]           1. Please bring or wear a comfortable two-piece outfit. 2. You may eat, drink and take your normal medicines. 3. For any questions that cannot be answered, please contact the ordering physician            Oct 17, 2017  1:15 PM CDT   Return Visit with DARLENE Jasso CNP   [facility]    [address] [place] [id number] [phone]            Oct 17, 2017  2:00 PM CDT   Return Visit with Duy Dove MD   [facility] ([address] [phone]              Who to contact     If you have questions or need follow up information about today's clinic visit or your schedule please contact [facility] INFUSION SERVICES directly at [phone].  Normal or non-critical lab and imaging results will be communicated to you by MyChart, letter or phone within 4 business days after the clinic has received the results. If you do not hear from us within 7 days, please contact the clinic through MyChart or phone. If you have a critical or abnormal lab result, we will  "notify you by phone as soon as possible.  Submit refill requests through [employer] or call your pharmacy and they will forward the refill request to us. Please allow 3 business days for your refill to be completed.          Additional Information About Your Visit        [employer] Information     [employer] gives you secure access to your electronic health record. If you see a primary care provider, you can also send messages to your care team and make appointments. If you have questions, please call your primary care clinic.  If you do not have a primary care provider, please call [phone] and they will assist you.        Care EveryWhere ID     This is your Care EveryWhere ID. This could be used by other organizations to access your [place] medical records  [id number]        Your Vitals Were     Pulse Temperature Respirations Height Last Period Pulse Oximetry    77 97  F (36.1  C) (Temporal) 16 1.689 m (5' 6.5\") 06/07/2007 97%    BMI (Body Mass Index)                   22.62 kg/m2            Blood Pressure from Last 3 Encounters:   10/12/17 122/62   10/11/17 132/66   10/10/17 119/58    Weight from Last 3 Encounters:   10/12/17 64.5 kg (142 lb 4.8 oz)   10/10/17 63.7 kg (140 lb 8 oz)   10/07/17 61.7 kg (136 lb)              We Performed the Following     CBC with platelets differential     Hepatic panel          Today's Medication Changes          These changes are accurate as of: 10/12/17  2:30 PM.  If you have any questions, ask your nurse or doctor.               These medicines have changed or have updated prescriptions.        Dose/Directions    oxyCODONE 5 MG IR tablet   Commonly known as:  ROXICODONE   This may have changed:  reasons to take this        Dose:  5 mg   Take 1 tablet (5 mg) by mouth every 8 hours as needed for pain   Quantity:  20 tablet   Refills:  0                Primary Care Provider Office Phone # Fax #    Donna Shirley PA-C [phone] [place] " [phone]        Equal Access to Services     [facility] HospitalLYNNETTE : Hadii aad ku hadjosetony Haimali, walayoda lujoenellieha, qacamilla thientamaradebra alejandra. So [facility] [phone].    ATENCIÓN: Si habla español, tiene a hunt disposición servicios gratuitos de asistencia lingüística. Tameka al [phone].    We comply with applicable federal civil rights laws and [place] laws. We do not discriminate on the basis of race, color, national origin, age, disability, sex, sexual orientation, or gender identity.            Thank you!     Thank you for choosing [facility]  for your care. Our goal is always to provide you with excellent care. Hearing back from our patients is one way we can continue to improve our services. Please take a few minutes to complete the written survey that you may receive in the mail after your visit with us. Thank you!             Your Updated Medication List - Protect others around you: Learn how to safely use, store and throw away your medicines at www.disposemymeds.org.          This list is accurate as of: 10/12/17  2:30 PM.  Always use your most recent med list.                   Brand Name Dispense Instructions for use Diagnosis    ACETAMINOPHEN PO      Take 650 mg by mouth every 4 hours as needed for pain or fever        carvedilol 12.5 MG tablet    COREG    90 tablet    Take 1.5 tablets (18.75 mg) by mouth 2 times daily (with meals)    Chronic systolic congestive heart failure (H)       ciprofloxacin 500 MG tablet    CIPRO    20 tablet    Take 1 tablet (500 mg) by mouth 2 times daily    Metastatic colon cancer to liver (H)       diclofenac 1 % Gel topical gel    VOLTAREN     Place 2 g onto the skin 4 times daily as needed for moderate pain (for RUQ pain)    Metastatic colon cancer to liver (H)       ferrous sulfate 325 (65 FE) MG tablet    IRON     Take 325 mg by mouth daily (with breakfast)        guaiFENesin-codeine 100-10 MG/5ML Soln solution     ROBITUSSIN AC    120 mL    Take 5 mLs by mouth every 4 hours as needed for cough    Liver metastasis (H)       lidocaine 5 % Patch    LIDODERM    30 patch    Place 1-3 patches onto the skin every 24 hours To RUQ    Metastatic colon cancer to liver (H)       loperamide 2 MG capsule    IMODIUM    30 capsule    2 caps at 1st sign of diarrhea & 1 cap every 2hrs until 12hrs diarrhea free. During night, 2 caps at bedtime & 2 caps every 4hrs until AM    Metastatic colon cancer to liver (H)       metroNIDAZOLE 500 MG tablet    FLAGYL    21 tablet    Take 1 tablet (500 mg) by mouth 3 times daily    Metastatic colon cancer to liver (H)       omeprazole 20 MG CR capsule    priLOSEC    30 capsule    Take 1 capsule (20 mg) by mouth daily    Liver metastasis (H)       oxyCODONE 5 MG IR tablet    ROXICODONE    20 tablet    Take 1 tablet (5 mg) by mouth every 8 hours as needed for pain        Potassium Chloride ER 20 MEQ Tbcr     30 tablet    Take 1 tablet (20 mEq) by mouth daily    Hypokalemia       sennosides 8.6 MG tablet    SENOKOT    120 each    Take 1-2 tablets by mouth 2 times daily as needed for constipation    Slow transit constipation       TESSALON PERLES PO      Take 200 mg by mouth 3 times daily as needed for cough

## 2017-10-13 NOTE — TELEPHONE ENCOUNTER
----- Message from Stan Balderas RN sent at 10/13/2017 10:27 AM CDT -----  Regarding: Check in  Patient spoke with Derrell this am and they were told to go to ER.  They said they would go around 10:00.  Please follow up and update Derrell.    Stan

## 2017-10-13 NOTE — PROGRESS NOTES
Hematology/ Oncology Follow-up Visit:  Oct 10, 2017    Reason for Visit:   Chief Complaint   Patient presents with     Oncology Clinic Visit     1 week follow up for Metastatic colon cancer to liver     Chemotherapy     C1D1 today with labs prior     Hospital F/U     ED 10/7/2017 RUQ pain       Oncologic History:  Metastatic colon cancer    Interval History:  Patient is here today to receive his chemotherapy with single agent irinotecan. She's been having increasing shortness of breath and exertional dyspnea lately. She is also tired. She is getting fever and chills every night. She denies any cough or wheezing. She denies any nausea or vomiting. There is no black stools.    Review Of Systems:  Constitutional: Negative for fever, chills, and night sweats. Fatigue as mentioned above  Skin: negative.  Eyes: negative.  Ears/Nose/Throat: negative.  Respiratory: No shortness of breath, dyspnea on exertion, cough, or hemoptysis.  Cardiovascular: negative.  Gastrointestinal: negative.  Genitourinary: negative.  Musculoskeletal: negative.  Neurologic: negative.  Psychiatric: negative.  Hematologic/Lymphatic/Immunologic: negative.  Endocrine: negative.    All other ROS negative unless mentioned in interval history.    Past medical, social, surgical, and family histories reviewed.    Allergies:  Allergies as of 10/10/2017 - Miguel as Reviewed 10/10/2017   Allergen Reaction Noted     Lisinopril Cough 09/19/2017     No known allergies  08/11/2003       Current Medications:  Current Outpatient Prescriptions   Medication Sig Dispense Refill     omeprazole (PRILOSEC) 20 MG CR capsule Take 1 capsule (20 mg) by mouth daily 30 capsule 1     ciprofloxacin (CIPRO) 500 MG tablet Take 1 tablet (500 mg) by mouth 2 times daily 20 tablet 0     metroNIDAZOLE (FLAGYL) 500 MG tablet Take 1 tablet (500 mg) by mouth 3 times daily 21 tablet 0     oxyCODONE (ROXICODONE) 5 MG IR tablet Take 1 tablet (5 mg) by mouth every 8 hours as needed for pain  "(Patient taking differently: Take 5 mg by mouth every 8 hours as needed for pain (patient only taking half at a time) ) 20 tablet 0     loperamide (IMODIUM) 2 MG capsule 2 caps at 1st sign of diarrhea & 1 cap every 2hrs until 12hrs diarrhea free. During night, 2 caps at bedtime & 2 caps every 4hrs until AM 30 capsule 0     ferrous sulfate (IRON) 325 (65 FE) MG tablet Take 325 mg by mouth daily (with breakfast)       carvedilol (COREG) 12.5 MG tablet Take 1.5 tablets (18.75 mg) by mouth 2 times daily (with meals) 90 tablet 0     Potassium Chloride ER 20 MEQ TBCR Take 1 tablet (20 mEq) by mouth daily 30 tablet 0     diclofenac (VOLTAREN) 1 % GEL topical gel Place 2 g onto the skin 4 times daily as needed for moderate pain (for RUQ pain)       lidocaine (LIDODERM) 5 % Patch Place 1-3 patches onto the skin every 24 hours To RUQ 30 patch      sennosides (SENOKOT) 8.6 MG tablet Take 1-2 tablets by mouth 2 times daily as needed for constipation 120 each      guaiFENesin-codeine (ROBITUSSIN AC) 100-10 MG/5ML SOLN solution Take 5 mLs by mouth every 4 hours as needed for cough 120 mL 0     ACETAMINOPHEN PO Take 650 mg by mouth every 4 hours as needed for pain or fever        Benzonatate (TESSALON PERLES PO) Take 200 mg by mouth 3 times daily as needed for cough       ranitidine (ZANTAC) 300 MG tablet Take 1 tablet (300 mg) by mouth At Bedtime 30 tablet 1        Physical Exam:  /67 (BP Location: Right arm, Patient Position: Chair, Cuff Size: Adult Regular)  Pulse 110  Temp 98.8  F (37.1  C) (Temporal)  Resp 16  Ht 1.689 m (5' 6.5\")  Wt 63.7 kg (140 lb 8 oz)  LMP 06/07/2007  SpO2 99%  BMI 22.34 kg/m2  Wt Readings from Last 12 Encounters:   10/12/17 64.5 kg (142 lb 4.8 oz)   10/10/17 63.7 kg (140 lb 8 oz)   10/07/17 61.7 kg (136 lb)   10/03/17 62.3 kg (137 lb 4.8 oz)   09/29/17 63 kg (139 lb)   09/27/17 63.2 kg (139 lb 6.4 oz)   09/27/17 62.6 kg (138 lb)   09/26/17 63.3 kg (139 lb 9.6 oz)   09/20/17 65.9 kg (145 lb " 3.2 oz)   09/19/17 65.4 kg (144 lb 1.6 oz)   09/09/17 67.1 kg (148 lb)   09/07/17 67.2 kg (148 lb 3.2 oz)     ECOG performance status: 1  GENERAL APPEARANCE: Healthy, alert and in no acute distress.  HEENT: Sclerae anicteric. PERRLA. Oropharynx without ulcers, lesions, or thrush.  NECK: Supple. No asymmetry or masses.  LYMPHATICS: No palpable cervical, supraclavicular, axillary, or inguinal lymphadenopathy.  RESP: Lungs clear to auscultation bilaterally without rales, rhonchi or wheezes.  CARDIOVASCULAR: Regular rate and rhythm. Normal S1, S2; no S3 or S4. No murmur, gallop, or rub.  ABDOMEN: Soft, nontender. Bowel sounds normal. No palpable organomegaly or masses.  MUSCULOSKELETAL: Extremities without gross deformities noted. No edema of bilateral lower extremities.  SKIN: No suspicious lesions or rashes.  NEURO: Alert and oriented x 3. Cranial nerves II-XII grossly intact.  PSYCHIATRIC: Mentation and affect appear normal.    Laboratory/Imaging Studies:  Transferred Records on 09/29/2017   Component Date Value Ref Range Status     Potassium 09/29/2017 3.4* 3.5 - 5.0 mmol/L Final        Recent Results (from the past 744 hour(s))   CT Head w/o contrast*    Narrative    CT HEAD W/O CONTRAST 9/14/2017 6:28 AM    History: fall    Comparison: None.    Technique: Using multidetector thin collimation helical acquisition  technique, axial, coronal and sagittal CT images from the skull base  to the vertex were obtained without intravenous contrast.     Findings:  There is extensive streak artifact over the right temporal  and occipital lobes from an object outside of head.  No intracranial hemorrhage, mass effect, or midline shift. The  ventricles are proportionate to the cerebral sulci. The gray to white  matter differentiation of the cerebral hemispheres is preserved. The  basal cisterns are patent.    The visualized paranasal sinuses are clear. The mastoid air cells are  clear.       Impression    Impression: No acute  intracranial pathology.    I have personally reviewed the examination and initial interpretation  and I agree with the findings.    ERIN ALVARADO MD   CT Soft Tissue Neck w/o Contrast    Narrative    Cervical spine CT W/O CONTRAST 9/14/2017 6:31 AM    Provided History: fall    Comparison: Correlation with PET/CT 8/25/2017    Technique: Using multidetector thin collimation helical acquisition  technique, axial, coronal and sagittal CT images through the cervical  spine were obtained without intravenous contrast.     Findings:  Minimal grade 1 retrolisthesis of C4 on C5, degenerative. Normal  cervical lordosis. No acute fracture or subluxation. No prevertebral  edema. Multilevel spondylosis. Preserved vertebral body height. Loss  of disc height at C4-5 with bilateral moderate neural foraminal  narrowing.    No abnormality of the paraspinous soft tissues. Partially visualized  central catheter.      Impression    Impression:   1. No acute fracture or subluxation.  2. Multilevel cervical spondylosis, including moderate bilateral  neural foraminal narrowing and mild spinal canal narrowing at C4-5.    I have personally reviewed the examination and initial interpretation  and I agree with the findings.    CARLEY JORGE MD   X-ray Chest 2 vws*    Narrative    EXAM: XR CHEST 2 VW  9/15/2017 12:57 PM     HISTORY:  evaluate fluid status of lungs, patient c/o SOB with h/o  pulmonary edema       COMPARISON:  Radiograph 9/10/2017, CT 7/14/2017    FINDINGS: AP and lateral radiographs of the chest. Right upper  extremity KT PICC tip projects of the mid SVC. Interval removal of the  right IJ central venous catheter and gastric tube. The mediastinal  border, cardiac silhouette, and pulmonary vasculature are within  normal limits. No focal airspace opacities. No pneumothorax. Small  left pleural effusion.      Impression    IMPRESSION:  1. Interval removal of the right IJ [place] catheter and gastric  tube.  2. No focal airspace  opacities.  3. Stable small left pleural effusion.    I have personally reviewed the examination and initial interpretation  and I agree with the findings.    DANIELLA APPIAH MD   XR Abdomen 1 View    Narrative    Examination:  XR ABDOMEN 1 VW    Date:  9/15/2017 12:57 PM     Clinical Information: evaluate for stool/gas burden, ascites in  setting of abdominal distension and pain.     Additional Information: none    Comparison: none    Findings:   Single radiograph of the abdomen is obtained. Centrally located  nondistended loops of bowel. Gaseous distention of the stomach.  Nonobstructive bowel gas pattern. No portal venous gas or pneumobilia  in the partially visualized right upper quadrant. No abnormal  calcifications or soft tissue densities. Phleboliths in the pelvis.  Deformed left femoral head.      Impression    Impression:  1. Nonobstructive bowel gas pattern.     I have personally reviewed the examination and initial interpretation  and I agree with the findings.    AURY ACUÑA MD   US Abdomen Limited Portable    Narrative    EXAMINATION: US ABDOMEN LIMITED PORTABLE  9/17/2017 2:15 PM      CLINICAL HISTORY: elevated direct bilirubin, evaluate for bile duct  obstruction    COMPARISON: PET/CT on 8/25/2017.        TECHNIQUE: Grayscale and color images of the right upper abdomen is  obtained.    FINDINGS:  Multiple echogenic heterogeneous hepatic masses, the largest measures  8.2 x 8.6 x 5.6 cm, consistent with known hepatic metastases. The  common bile duct measures 6 mm. There is gallbladder sludge. The  gallbladder wall measures approximately 3 mm. Sonographic Damico's is  negative. No pericholecystic fluid.    The visualized portions of the pancreas are normal in echogenicity.    The right kidney is normal in position and echogenicity. The right  kidney measures 11.8 cm. there is no significant urinary tract  dilation.       Impression    IMPRESSION:   1. Common bile duct is not substantially  dilated measuring 6 cm.  2. Multiple hyperechoic heterogenous hepatic masses, consistent with  known hepatic metastases.  3. Gallbladder sludge without sonographic evidence of acute  cholecystitis.    I have personally reviewed the examination and initial interpretation  and I agree with the findings.    FITO BASS MD   XR Chest 2 Views    Narrative    Exam:  XR CHEST 2 VW, 9/18/2017 3:44 PM    History: fever, cough, eval for pna    Comparison:  Chest radiograph from 9/15/2017.    Findings:  PA and lateral views chest. Right chest portacatheter tip  projects over the mid SVC. The cardiomediastinal silhouette is within  normal limits. Resolution of left pleural effusion. No pneumothorax.  No focal airspace opacities. Visualized upper abdomen and osseous  structures are unremarkable.      Impression    Impression:    No acute cardiopulmonary abnormality. Resolution of previous left  pleural effusion.    I have personally reviewed the examination and initial interpretation  and I agree with the findings.    RADHA GOMES MD   CT Abdomen Pelvis w Contrast    Narrative    CT ABDOMEN AND PELVIS WITH CONTRAST 10/7/2017 7:05 PM     HISTORY: Left upper quadrant abdominal pain, known history of liver  and colon cancer, abdominal distention.    COMPARISON: 8/18/2017    TECHNIQUE: Volumetric helical acquisition of CT images from the lung  bases through the symphysis pubis after the administration of 70 mL  Isovue 370  intravenous contrast. Radiation dose for this scan was  reduced using automated exposure control, adjustment of the mA and/or  kV according to patient size, or iterative reconstruction technique.    FINDINGS: No definite bowel obstruction demonstrated. Trace free  fluid. No free air. There are moderate atherosclerotic changes of the  visualized aorta and its branches. There is no evidence of aortic  dissection or aneurysm. No hydronephrosis or urolithiasis  demonstrated. Extensive liver metastases,  adenopathy/masses in the  peritoneum again noted and similar to previous. Representative lesion  in the right lobe of liver measures 10.7 cm today previously 10.1 cm.  Adrenal glands, spleen, and pancreas demonstrate no worrisome focal  lesion. Superior degenerative changes in the left hip. No frankly  destructive bony lesions. Lung bases demonstrate trace peripheral  fibrosis and/or atelectasis.      Impression    IMPRESSION: Trace free fluid, no significant ascites or bowel  obstruction demonstrated.    KAILEY GUERRA MD       Assessment and plan:  (C18.9,  C78.7) Metastatic colon cancer to liver (H)  (primary encounter diagnosis)  (C78.7) Liver metastasis (H)  I would recommend today blood cultures. I will start patient empirically on Flagyl and Cipro. We will arrange for blood transfusion of 2 units of packed RBCs. Patient will be starting her chemotherapy this Thursday. I explained to the patient again benefit and risk of chemotherapy in details. We talked about management of nausea and diarrhea in details. I will see the patient again in 1 week's time or sooner if there are new developments or concerns.    (I10) Benign essential hypertension  The patient currently on carvedilol 12.5 mg daily.    (D64.9) Anemia, unspecified type  We will arrange for blood transfusion of 2 units of packed RBCs.      The patient is ready to learn, no apparent learning barriers were identified.  Diagnosis and treatment plans were explained to the patient. The patient expressed understanding of the content. The patient asked appropriate questions. The patient questions were answered to her satisfaction.    Chart documentation with Dragon Voice recognition Software. Although reviewed after completion, some words and grammatical errors may remain.

## 2017-10-13 NOTE — TELEPHONE ENCOUNTER
"  Reason for Disposition    [1] SEVERE headache (e.g., excruciating) AND [2] not improved after 2 hours of pain medicine      and patient Charlene calling\" we just spoke to FNA nurse and to Dr. Dove about 3 hours ago(see epic). She took one Tylenol, her head is still pounding. Patient rated headache 8/10 at this time.She is nauseated(taking Compazine) and dizzy. The doctor explained the drug/chemo today is a strong one, so she might have these sx. \" Denies other sx. She is keeping down water and Gatorade. I advised to take another Tylenol 325 mg. If sx worsen advised would be ER. They are not sure if they will go to ER or not. Call back if needed.    Additional Information    Negative: Difficult to awaken or acting confused  (e.g., disoriented, slurred speech)    Negative: [1] Weakness of the face, arm or leg on one side of the body AND [2] new onset    Negative: [1] Numbness of the face, arm or leg on one side of the body AND [2] new onset    Negative: [1] Loss of speech or garbled speech AND [2] new onset    Negative: Passed out (i.e., lost consciousness, collapsed and was not responding)    Negative: Sounds like a life-threatening emergency to the triager    Negative: Followed a head injury within last 3 days    Negative: Pregnant    Negative: Traumatic Brain Injury (TBI) is suspected    Negative: Unable to walk, or can only walk with assistance (e.g., requires support)    Negative: Stiff neck (can't touch chin to chest)    Negative: Severe pain in one eye    Negative: [1] Other family members (or roommates) with headaches AND [2] possibility of carbon monoxide exposure    Negative: [1] SEVERE headache (e.g., excruciating) AND [2] \"worst headache\" of life    Negative: [1] SEVERE headache AND [2] sudden-onset (i.e., reaching maximum intensity within seconds)    Negative: [1] SEVERE headache AND [2] fever    Negative: Loss of vision or double vision (Exception: same as prior migraines)    Negative: [1] " Fever > 100.5 F (38.1 C) AND [2] diabetes mellitus or weak immune system (e.g., HIV positive, cancer chemo, splenectomy, organ transplant, chronic steroids)    Negative: Patient sounds very sick or weak to the triager    Protocols used: HEADACHE-ADULT-AH

## 2017-10-13 NOTE — TELEPHONE ENCOUNTER
I was finally able to talk with the pt. She is feeling much better today. She had an echo today and went to the chiropractor. She has had no HA, no diarrhea, all of the ear sym and hallucinations are gone. She is taking a few nausea pills. Overall is feeling much better.  I have contacted Dr. Dove and informed him ARIANA Ryees

## 2017-10-17 PROBLEM — T45.1X5A CHEMOTHERAPY INDUCED NAUSEA AND VOMITING: Status: ACTIVE | Noted: 2017-01-01

## 2017-10-17 PROBLEM — T45.1X5A CHEMOTHERAPY INDUCED DIARRHEA: Status: ACTIVE | Noted: 2017-01-01

## 2017-10-17 PROBLEM — R11.2 CHEMOTHERAPY INDUCED NAUSEA AND VOMITING: Status: ACTIVE | Noted: 2017-01-01

## 2017-10-17 PROBLEM — K52.1 CHEMOTHERAPY INDUCED DIARRHEA: Status: ACTIVE | Noted: 2017-01-01

## 2017-10-17 NOTE — PATIENT INSTRUCTIONS
Please follow up with Dr. Dove in 2 weeks.  Continue with treatment as planned.    Follow Up Date/Time: 10/31/17 at 11:30    Infusion to follow at 12:30    If you have any questions or concerns please feel free to call.    If you need to reschedule please call:  Clinic or Lab Appointment - [phone]  Infusion - [phone]  Imaging - [phone]    Stan Balderas RN, BSN, OCN   Oncology Care Coordinator RN  [facility]  [phone]

## 2017-10-17 NOTE — LETTER
10/17/2017      RE: Charlene Douglas  [address] [place] [id number]       Dear Colleague,    Thank you for the opportunity to participate in the care of your patient, Charlene Douglas, at the [facility] at [facility]. Please see a copy of my visit note below.    Cardiology Clinic Progress Note  Charlene Douglas MRN# [id number]   YOB: 1950 Age: 67 year old     Reason for visit: Follow up            Assessment and Plan:     1. Acute decompensated congestive heart failure with cardiogenic shock    Likely secondary to 5-fluorouracil infusion giving the closest timeline of symptom onset post initial dose.    LV EF was initially 10-15% with improvement to 35-40% and on most recent echocardiogram from 10/13/2017 was 50-55% without regional wall motion abnormalities.    Weights fluctuates between 135 and 142 pounds    Continue daily weights    Follow up with Dr. Madden and a limited echo in 3 months    2. Hypertension    Significantly improved from her last office visit with Dr. Madden.    She does have some episodes of low blood pressure prior to medications.    Losartan was discontinued for unknown reason during emergency room visit earlier this month    Restart losartan 25 mg daily    Continue to monitor blood pressure    3. Metastatic colon cancer    Follows with Dr. Cardona in oncology    Restarted chemotherapy with Irinotecan    4. Intermittent fevers of unknown etiology    Blood cultures ordered by oncology have had no growth after six days    Empirically started on Cipro and Flagyl    5. Anemia    Transfused two units of packed red blood cells last week for hgb of 6.6    Most recent hemoglobin was 10.1         History of Presenting Illness:    Charlene Douglas is a very pleasant 67 year old patient of Dr. Madden who presents today for a follow up for cardiomyopathy.  She was diagnosed with metastatic colon cancer with metastases to her liver and  August 2017.  On September 9 tooth and 17 she was admitted to the [facility] with acute hypoxic respiratory failure and cardiogenic shock.  This was two days after receiving her first dose of IV 5-fluorouracil.  She presented with shortness of breath, weakness, fatigue, dizziness and was intubated for three days.    Her echocardiogram in August 2017 showed normal LV function.  Her initial echocardiogram during her hospital admission showed LVEF of 10-15% with diffuse hypokinesis.  It was thought that her respiratory failure and cardiogenic shock were secondary to 5-fluorouracil acute cardiotoxicity.  Repeat echocardiogram three days later showed improvement of her LV to 35-40%.  She mild troponin elevation, but it was felt unlikely to be from acute coronary syndrome or coronary disease.  She was discharged to TCU and subsequently home and was seen by Dr. Madden as an outpatient.  Due to sinus tachycardia and hypertension her carvedilol dose was increased from 12.5 mg twice daily to 18.75 mg twice daily.    She was seen by Dr. Cardona in oncology last week and her obtained blood cultures given her unexplained fevers and he empirically started her on Flagyl and ciprofloxacin.  She was transfused two units of packed red blood cells for hemoglobin of 6.6 and most recent hemoglobin was 10.1.  She is also restarted chemotherapy with Irinotecan last week.  With plan for an infusion every three weeks.    Today clinic she is doing overall well from a cardiopulmonary standpoint. She is somewhat short of breath, but denies PND and orthopnea.           This note was completed in part using Dragon voice recognition software. Although reviewed after completion, some word and grammatical errors may occur       Review of Systems:   Review of Systems:  Skin:  Negative     Eyes:  Positive for glasses  ENT:  Negative    Respiratory:       Cardiovascular:  Negative for;palpitations;chest  "pain;edema;lightheadedness;dizziness    Gastroenterology: Positive for poor appetite  Genitourinary:  Negative    Musculoskeletal:       Neurologic:  Negative headaches  Psychiatric:       Heme/Lymph/Imm:  Positive for allergies  Endocrine:  Negative                Physical Exam:     Vitals: /58 (Cuff Size: Adult Regular)  Pulse 90  Resp (!) 99  Ht 1.664 m (5' 5.5\")  Wt 64.5 kg (142 lb 1.6 oz)  LMP 06/07/2007  BMI 23.29 kg/m2  Constitutional:  cooperative;alert and oriented        Skin:  warm and dry to the touch        Head:  normocephalic, no masses or lesions        Eyes:  pupils equal and round;conjunctivae and lids unremarkable        ENT:  no pallor or cyanosis, dentition good        Chest:  clear to auscultation;normal symmetry        Cardiac: regular rhythm;normal S1 and S2;no murmurs, gallops or rubs detected                  Extremities and Back:  no edema        Neurological:  affect appropriate, oriented to time, person and place               Medications:     Current Outpatient Prescriptions   Medication Sig Dispense Refill     losartan (COZAAR) 25 MG tablet Take 1 tablet (25 mg) by mouth daily 30 tablet 11     omeprazole (PRILOSEC) 20 MG CR capsule Take 1 capsule (20 mg) by mouth daily 30 capsule 1     ciprofloxacin (CIPRO) 500 MG tablet Take 1 tablet (500 mg) by mouth 2 times daily 20 tablet 0     metroNIDAZOLE (FLAGYL) 500 MG tablet Take 1 tablet (500 mg) by mouth 3 times daily 21 tablet 0     oxyCODONE (ROXICODONE) 5 MG IR tablet Take 1 tablet (5 mg) by mouth every 8 hours as needed for pain (Patient taking differently: Take 5 mg by mouth every 8 hours as needed for pain (patient only taking half at a time) ) 20 tablet 0     carvedilol (COREG) 12.5 MG tablet Take 1.5 tablets (18.75 mg) by mouth 2 times daily (with meals) 90 tablet 0     diclofenac (VOLTAREN) 1 % GEL topical gel Place 2 g onto the skin 4 times daily as needed for moderate pain (for RUQ pain)       lidocaine (LIDODERM) 5 % " Patch Place 1-3 patches onto the skin every 24 hours To RUQ 30 patch      sennosides (SENOKOT) 8.6 MG tablet Take 1-2 tablets by mouth 2 times daily as needed for constipation 120 each      ACETAMINOPHEN PO Take 650 mg by mouth every 4 hours as needed for pain or fever        ranitidine (ZANTAC) 300 MG tablet Take 1 tablet (300 mg) by mouth At Bedtime (Patient not taking: Reported on 10/17/2017) 30 tablet 1     loperamide (IMODIUM) 2 MG capsule 2 caps at 1st sign of diarrhea & 1 cap every 2hrs until 12hrs diarrhea free. During night, 2 caps at bedtime & 2 caps every 4hrs until AM (Patient not taking: Reported on 10/17/2017) 30 capsule 0     ferrous sulfate (IRON) 325 (65 FE) MG tablet Take 325 mg by mouth daily (with breakfast)       Potassium Chloride ER 20 MEQ TBCR Take 1 tablet (20 mEq) by mouth daily (Patient not taking: Reported on 10/17/2017) 30 tablet 0     guaiFENesin-codeine (ROBITUSSIN AC) 100-10 MG/5ML SOLN solution Take 5 mLs by mouth every 4 hours as needed for cough (Patient not taking: Reported on 10/17/2017) 120 mL 0     Benzonatate (TESSALON PERLES PO) Take 200 mg by mouth 3 times daily as needed for cough             Family History   Problem Relation Age of Onset     C.A.D. Mother      quad- bypass at age 78     CEREBROVASCULAR DISEASE Mother      in her 70's     Lipids Mother      Hypertension Mother      Other - See Comments Mother      Triple Bi-pass     Circulatory Father      abd aneurysm     GASTROINTESTINAL DISEASE Father      diverticulitis     Hypertension Father        Social History     Social History     Marital status:      Spouse name: Praveen Nobles)     Number of children: 2     Years of education: 14     Occupational History           [employer]     Social History Main Topics     Smoking status: Never Smoker     Smokeless tobacco: Never Used     Alcohol use No      Comment: 1 per week     Drug use: No     Sexual activity: Yes     Partners: Male      Birth control/ protection: Surgical, Male Surgical      Comment:  had a vasectomy     Other Topics Concern      Service No     Blood Transfusions Yes     1962     Caffeine Concern No     Occupational Exposure No     Hobby Hazards No     Sleep Concern No     Stress Concern No     Weight Concern Yes     gradual weight gain     Special Diet No     Back Care Yes     low back pain occasionally     Exercise No     Bike Helmet No     n/a     Seat Belt Yes     uses seatbelts 100%     Self-Exams Yes     does monthly breast exams     Social History Narrative            Past Medical History:     Past Medical History:   Diagnosis Date     Colon cancer metastasized to liver (H)      Hypertension      NO ACTIVE PROBLEMS               Past Surgical History:     Past Surgical History:   Procedure Laterality Date     C APPENDECTOMY       C  DELIVERY ONLY       C NONSPECIFIC PROCEDURE      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     COLONOSCOPY N/A 2017    Procedure: COMBINED COLONOSCOPY, SINGLE OR MULTIPLE BIOPSY/POLYPECTOMY BY BIOPSY;;  Surgeon: Duane, William Charles, MD;  Location: [facility] OR     COLONOSCOPY WITH CO2 INSUFFLATION N/A 2017    Procedure: COLONOSCOPY WITH CO2 INSUFFLATION;  BMI: 25.2  Referring: Donna Mota  Diagnoses: Positive FIT (fecal immunochemical test)  Special screening for malignant neoplasms, colon  Pharmacy: [facility] Fax: [phone];  Surgeon: Duane, William Charles, MD;  Location: [facility] OR     EXAM UNDER ANESTHESIA, ULTRASOUND N/A 2017    Procedure: EXAM UNDER ANESTHESIA, ULTRASOUND;  Ultrasound Liver Biopsy;  Surgeon: GENERIC ANESTHESIA PROVIDER;  Location:  OR     HIP SURGERY      14 years old     INSERT PORT VASCULAR ACCESS N/A 2017    Procedure: INSERT PORT VASCULAR ACCESS;  Port placement;  Surgeon: Vinny Peterson DO;  Location:  OR              Allergies:   Lisinopril and No known allergies       Data:    All laboratory data reviewed:    LAST CHOLESTEROL:  Lab Results   Component Value Date    CHOL 153 07/20/2017     Lab Results   Component Value Date    HDL 43 07/20/2017     Lab Results   Component Value Date    LDL 92 07/20/2017     Lab Results   Component Value Date    TRIG 90 07/20/2017     Lab Results   Component Value Date    CHOLHDLRATIO 5.0 06/18/2013       LAST BMP:  Lab Results   Component Value Date     10/07/2017      Lab Results   Component Value Date    POTASSIUM 3.7 10/07/2017     Lab Results   Component Value Date    CHLORIDE 98 10/07/2017     Lab Results   Component Value Date    RON 8.4 10/07/2017     Lab Results   Component Value Date    CO2 23 10/07/2017     Lab Results   Component Value Date    BUN 14 10/07/2017     Lab Results   Component Value Date    CR 0.95 10/07/2017     Lab Results   Component Value Date     10/07/2017       LAST CBC:  Lab Results   Component Value Date    WBC 20.6 10/12/2017     Lab Results   Component Value Date    RBC 3.43 10/12/2017     Lab Results   Component Value Date    HGB 9.3 10/12/2017     Lab Results   Component Value Date    HCT 29.7 10/12/2017     Lab Results   Component Value Date    MCV 87 10/12/2017     Lab Results   Component Value Date    MCH 27.1 10/12/2017     Lab Results   Component Value Date    MCHC 31.3 10/12/2017     Lab Results   Component Value Date    RDW 16.7 10/12/2017     Lab Results   Component Value Date     10/12/2017         DARLENE ORLANDO, CNP    Please do not hesitate to contact me if you have any questions/concerns.     Sincerely,     DARLENE ORLANDO CNP

## 2017-10-17 NOTE — NURSING NOTE
"Oncology Rooming Note    October 17, 2017 2:02 PM   Charlene Douglas is a 67 year old female who presents for:    Chief Complaint   Patient presents with     Oncology Clinic Visit     1 week follow up for Metastatic colon cancer to liver     Initial Vitals: /58 (BP Location: Right arm, Patient Position: Chair, Cuff Size: Adult Regular)  Pulse 90  Temp 97.5  F (36.4  C) (Temporal)  Resp 16  Ht 1.689 m (5' 6.5\")  Wt 64.5 kg (142 lb 3.2 oz)  LMP 06/07/2007  SpO2 97%  BMI 22.61 kg/m2 Estimated body mass index is 22.61 kg/(m^2) as calculated from the following:    Height as of this encounter: 1.689 m (5' 6.5\").    Weight as of this encounter: 64.5 kg (142 lb 3.2 oz). Body surface area is 1.74 meters squared.  Extreme Pain (8) Comment: Near liver/tumor   Patient's last menstrual period was 06/07/2007.  Allergies reviewed: Yes  Medications reviewed: Yes    Medications: Medication refills not needed today.  Pharmacy name entered into [facility]:    [facility] - [id number] [facility]    Clinical concerns: SOB. N&V. Weakness. Medication concerns. Dr. Dove was notified.      Elsa Butts MA              "

## 2017-10-17 NOTE — PATIENT INSTRUCTIONS
Restart Losartan at 25 mg daily  Call if BP before meds is consistently low (100-110)    If you have questions or concerns please call clinic at [phone].    Please call [phone] for scheduling.      It was a pleasure seeing you today!     Reminder: Please bring in all current medications, over the counter supplements and vitamin bottles to your next appointment.

## 2017-10-17 NOTE — PROGRESS NOTES
Cardiology Clinic Progress Note  Charlene Douglas MRN# [id number]   YOB: 1950 Age: 67 year old     Reason for visit: Follow up            Assessment and Plan:     1. Acute decompensated congestive heart failure with cardiogenic shock    Likely secondary to 5-fluorouracil infusion giving the closest timeline of symptom onset post initial dose.    LV EF was initially 10-15% with improvement to 35-40% and on most recent echocardiogram from 10/13/2017 was 50-55% without regional wall motion abnormalities.    Weights fluctuates between 135 and 142 pounds    Continue daily weights    Follow up with Dr. Madden and a limited echo in 3 months    2. Hypertension    Significantly improved from her last office visit with Dr. Madden.    She does have some episodes of low blood pressure prior to medications.    Losartan was discontinued for unknown reason during emergency room visit earlier this month    Restart losartan 25 mg daily    Continue to monitor blood pressure    3. Metastatic colon cancer    Follows with Dr. Cardona in oncology    Restarted chemotherapy with Irinotecan    4. Intermittent fevers of unknown etiology    Blood cultures ordered by oncology have had no growth after six days    Empirically started on Cipro and Flagyl    5. Anemia    Transfused two units of packed red blood cells last week for hgb of 6.6    Most recent hemoglobin was 10.1         History of Presenting Illness:    Charlene Douglas is a very pleasant 67 year old patient of Dr. Madden who presents today for a follow up for cardiomyopathy.  She was diagnosed with metastatic colon cancer with metastases to her liver and August 2017.  On September 9 tooth and 17 she was admitted to the [facility] with acute hypoxic respiratory failure and cardiogenic shock.  This was two days after receiving her first dose of IV 5-fluorouracil.  She presented with shortness of breath, weakness, fatigue, dizziness and was intubated  for three days.    Her echocardiogram in August 2017 showed normal LV function.  Her initial echocardiogram during her hospital admission showed LVEF of 10-15% with diffuse hypokinesis.  It was thought that her respiratory failure and cardiogenic shock were secondary to 5-fluorouracil acute cardiotoxicity.  Repeat echocardiogram three days later showed improvement of her LV to 35-40%.  She mild troponin elevation, but it was felt unlikely to be from acute coronary syndrome or coronary disease.  She was discharged to TCU and subsequently home and was seen by Dr. Madden as an outpatient.  Due to sinus tachycardia and hypertension her carvedilol dose was increased from 12.5 mg twice daily to 18.75 mg twice daily.    She was seen by Dr. Cardona in oncology last week and her obtained blood cultures given her unexplained fevers and he empirically started her on Flagyl and ciprofloxacin.  She was transfused two units of packed red blood cells for hemoglobin of 6.6 and most recent hemoglobin was 10.1.  She is also restarted chemotherapy with Irinotecan last week.  With plan for an infusion every three weeks.    Today clinic she is doing overall well from a cardiopulmonary standpoint. She is somewhat short of breath, but denies PND and orthopnea.           This note was completed in part using Dragon voice recognition software. Although reviewed after completion, some word and grammatical errors may occur       Review of Systems:   Review of Systems:  Skin:  Negative     Eyes:  Positive for glasses  ENT:  Negative    Respiratory:       Cardiovascular:  Negative for;palpitations;chest pain;edema;lightheadedness;dizziness    Gastroenterology: Positive for poor appetite  Genitourinary:  Negative    Musculoskeletal:       Neurologic:  Negative headaches  Psychiatric:       Heme/Lymph/Imm:  Positive for allergies  Endocrine:  Negative                Physical Exam:     Vitals: /58 (Cuff Size: Adult Regular)  Pulse 90  Resp  "(!) 99   1.664 m (5' 5.5\")  Wt 64.5 kg (142 lb 1.6 oz)  LMP 06/07/2007  BMI 23.29 kg/m2  Constitutional:  cooperative;alert and oriented        Skin:  warm and dry to the touch        Head:  normocephalic, no masses or lesions        Eyes:  pupils equal and round;conjunctivae and lids unremarkable        ENT:  no pallor or cyanosis, dentition good        Chest:  clear to auscultation;normal symmetry        Cardiac: regular rhythm;normal S1 and S2;no murmurs, gallops or rubs detected                  Extremities and Back:  no edema        Neurological:  affect appropriate, oriented to time, person and place               Medications:     Current Outpatient Prescriptions   Medication Sig Dispense Refill     losartan (COZAAR) 25 MG tablet Take 1 tablet (25 mg) by mouth daily 30 tablet 11     omeprazole (PRILOSEC) 20 MG CR capsule Take 1 capsule (20 mg) by mouth daily 30 capsule 1     ciprofloxacin (CIPRO) 500 MG tablet Take 1 tablet (500 mg) by mouth 2 times daily 20 tablet 0     metroNIDAZOLE (FLAGYL) 500 MG tablet Take 1 tablet (500 mg) by mouth 3 times daily 21 tablet 0     oxyCODONE (ROXICODONE) 5 MG IR tablet Take 1 tablet (5 mg) by mouth every 8 hours as needed for pain (Patient taking differently: Take 5 mg by mouth every 8 hours as needed for pain (patient only taking half at a time) ) 20 tablet 0     carvedilol (COREG) 12.5 MG tablet Take 1.5 tablets (18.75 mg) by mouth 2 times daily (with meals) 90 tablet 0     diclofenac (VOLTAREN) 1 % GEL topical gel Place 2 g onto the skin 4 times daily as needed for moderate pain (for RUQ pain)       lidocaine (LIDODERM) 5 % Patch Place 1-3 patches onto the skin every 24 hours To RUQ 30 patch      sennosides (SENOKOT) 8.6 MG tablet Take 1-2 tablets by mouth 2 times daily as needed for constipation 120 each      ACETAMINOPHEN PO Take 650 mg by mouth every 4 hours as needed for pain or fever        ranitidine (ZANTAC) 300 MG tablet Take 1 tablet (300 mg) by mouth At " Bedtime (Patient not taking: Reported on 10/17/2017) 30 tablet 1     loperamide (IMODIUM) 2 MG capsule 2 caps at 1st sign of diarrhea & 1 cap every 2hrs until 12hrs diarrhea free. During night, 2 caps at bedtime & 2 caps every 4hrs until AM (Patient not taking: Reported on 10/17/2017) 30 capsule 0     ferrous sulfate (IRON) 325 (65 FE) MG tablet Take 325 mg by mouth daily (with breakfast)       Potassium Chloride ER 20 MEQ TBCR Take 1 tablet (20 mEq) by mouth daily (Patient not taking: Reported on 10/17/2017) 30 tablet 0     guaiFENesin-codeine (ROBITUSSIN AC) 100-10 MG/5ML SOLN solution Take 5 mLs by mouth every 4 hours as needed for cough (Patient not taking: Reported on 10/17/2017) 120 mL 0     Benzonatate (TESSALON PERLES PO) Take 200 mg by mouth 3 times daily as needed for cough             Family History   Problem Relation Age of Onset     C.A.D. Mother      quad- bypass at age 78     CEREBROVASCULAR DISEASE Mother      in her 70's     Lipids Mother      Hypertension Mother      Other - See Comments Mother      Triple Bi-pass     Circulatory Father      abd aneurysm     GASTROINTESTINAL DISEASE Father      diverticulitis     Hypertension Father        Social History     Social History     Marital status:      Spouse name: Praveen Nobles)     Number of children: 2     Years of education: 14     Occupational History           [employer]     Social History Main Topics     Smoking status: Never Smoker     Smokeless tobacco: Never Used     Alcohol use No      Comment: 1 per week     Drug use: No     Sexual activity: Yes     Partners: Male     Birth control/ protection: Surgical, Male Surgical      Comment:  had a vasectomy     Other Topics Concern      Service No     Blood Transfusions Yes     1964     Caffeine Concern No     Occupational Exposure No     Hobby Hazards No     Sleep Concern No     Stress Concern No     Weight Concern Yes     gradual weight gain      Special Diet No     Back Care Yes     low back pain occasionally     Exercise No     Bike Helmet No     n/a     Seat Belt Yes     uses seatbelts 100%     Self-Exams Yes     does monthly breast exams     Social History Narrative            Past Medical History:     Past Medical History:   Diagnosis Date     Colon cancer metastasized to liver (H)      Hypertension      NO ACTIVE PROBLEMS               Past Surgical History:     Past Surgical History:   Procedure Laterality Date     C APPENDECTOMY       C  DELIVERY ONLY       C NONSPECIFIC PROCEDURE  1964    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     COLONOSCOPY N/A 2017    Procedure: COMBINED COLONOSCOPY, SINGLE OR MULTIPLE BIOPSY/POLYPECTOMY BY BIOPSY;;  Surgeon: Duane, William Charles, MD;  Location: [facility] OR     COLONOSCOPY WITH CO2 INSUFFLATION N/A 2017    Procedure: COLONOSCOPY WITH CO2 INSUFFLATION;  BMI: 25.2  Referring: Donna Mota  Diagnoses: Positive FIT (fecal immunochemical test)  Special screening for malignant neoplasms, colon  Pharmacy: [facility] Fax: [phone];  Surgeon: Duane, William Charles, MD;  Location: [facility] OR     EXAM UNDER ANESTHESIA, ULTRASOUND N/A 2017    Procedure: EXAM UNDER ANESTHESIA, ULTRASOUND;  Ultrasound Liver Biopsy;  Surgeon: GENERIC ANESTHESIA PROVIDER;  Location:  OR     HIP SURGERY      14 years old     INSERT PORT VASCULAR ACCESS N/A 2017    Procedure: INSERT PORT VASCULAR ACCESS;  Port placement;  Surgeon: Vinny Peterson DO;  Location:  OR              Allergies:   Lisinopril and No known allergies       Data:   All laboratory data reviewed:    LAST CHOLESTEROL:  Lab Results   Component Value Date    CHOL 153 2017     Lab Results   Component Value Date    HDL 43 2017     Lab Results   Component Value Date    LDL 92 2017     Lab Results   Component Value Date    TRIG 90 2017     Lab Results   Component Value Date    CHOLHDLRATIO  5.0 06/18/2013       LAST BMP:  Lab Results   Component Value Date     10/07/2017      Lab Results   Component Value Date    POTASSIUM 3.7 10/07/2017     Lab Results   Component Value Date    CHLORIDE 98 10/07/2017     Lab Results   Component Value Date    RON 8.4 10/07/2017     Lab Results   Component Value Date    CO2 23 10/07/2017     Lab Results   Component Value Date    BUN 14 10/07/2017     Lab Results   Component Value Date    CR 0.95 10/07/2017     Lab Results   Component Value Date     10/07/2017       LAST CBC:  Lab Results   Component Value Date    WBC 20.6 10/12/2017     Lab Results   Component Value Date    RBC 3.43 10/12/2017     Lab Results   Component Value Date    HGB 9.3 10/12/2017     Lab Results   Component Value Date    HCT 29.7 10/12/2017     Lab Results   Component Value Date    MCV 87 10/12/2017     Lab Results   Component Value Date    MCH 27.1 10/12/2017     Lab Results   Component Value Date    MCHC 31.3 10/12/2017     Lab Results   Component Value Date    RDW 16.7 10/12/2017     Lab Results   Component Value Date     10/12/2017         JOE LOBO, APRN, CNP

## 2017-10-17 NOTE — MR AVS SNAPSHOT
After Visit Summary   10/17/2017    Charlene Douglas    MRN: [id number]           Patient Information     Date Of Birth          1950        Visit Information        Provider Department      10/17/2017 1:15 PM Mamta Rutherford APRN CNP [facility]        Today's Diagnoses     Secondary cardiomyopathy (H)          Care Instructions    Restart Losartan at 25 mg daily  Call if BP before meds is consistently low (100-110)    If you have questions or concerns please call clinic at [phone].    Please call [phone] for scheduling.      It was a pleasure seeing you today!     Reminder: Please bring in all current medications, over the counter supplements and vitamin bottles to your next appointment.            Follow-ups after your visit        Your next 10 appointments already scheduled     Oct 17, 2017  2:00 PM CDT   Return Visit with Duy Dove MD   [facility]    [address] [place] [id number] [phone]              Who to contact     If you have questions or need follow up information about today's clinic visit or your schedule please contact [facility] directly at [phone].  Normal or non-critical lab and imaging results will be communicated to you by MyChart, letter or phone within 4 business days after the clinic has received the results. If you do not hear from us within 7 days, please contact the clinic through MyChart or phone. If you have a critical or abnormal lab result, we will notify you by phone as soon as possible.  Submit refill requests through [employer] or call your pharmacy and they will forward the refill request to us. Please allow 3 business days for your refill to be completed.          Additional Information About Your Visit        MyChart Information     [employer] gives you secure access to your electronic health record. If you see a primary care provider, you can also  "send messages to your care team and make appointments. If you have questions, please call your primary care clinic.  If you do not have a primary care provider, please call [phone] and they will assist you.        Care EveryWhere ID     This is your Care EveryWhere ID. This could be used by other organizations to access your [facility] medical records  [id number]        Your Vitals Were     Pulse Respirations Height Last Period BMI (Body Mass Index)       90 99 1.664 m (5' 5.5\") 06/07/2007 23.29 kg/m2        Blood Pressure from Last 3 Encounters:   10/17/17 124/58   10/12/17 122/62   10/11/17 132/66    Weight from Last 3 Encounters:   10/17/17 64.5 kg (142 lb 1.6 oz)   10/12/17 64.5 kg (142 lb 4.8 oz)   10/10/17 63.7 kg (140 lb 8 oz)              We Performed the Following     Follow-Up with Cardiac Advanced Practice Provider          Today's Medication Changes          These changes are accurate as of: 10/17/17  1:46 PM.  If you have any questions, ask your nurse or doctor.               Start taking these medicines.        Dose/Directions    losartan 25 MG tablet   Commonly known as:  COZAAR   Used for:  Secondary cardiomyopathy (H)   Started by:  Mamta Rutherford APRN CNP        Dose:  25 mg   Take 1 tablet (25 mg) by mouth daily   Quantity:  30 tablet   Refills:  11         These medicines have changed or have updated prescriptions.        Dose/Directions    oxyCODONE 5 MG IR tablet   Commonly known as:  ROXICODONE   This may have changed:  reasons to take this        Dose:  5 mg   Take 1 tablet (5 mg) by mouth every 8 hours as needed for pain   Quantity:  20 tablet   Refills:  0            Where to get your medicines      These medications were sent to [facility] - [id number]  [address] [address]     Phone:  [phone]     losartan 25 MG tablet                Primary Care Provider Office Phone # Fax #    Donna Shirley PA-C [phone] " [phone]       [address] [place] [address]        Equal Access to Services     DENIZ [facility] : Hadii nat smith judytony Jamia, walayoda lujoenellieha, qaybta thientamaralorenzo yanezjoselorenzo, debra reyna valarieslim aminlisandra gordonwinsomedalton reese. So [facility] [phone].    ATENCIÓN: Si habla español, tiene a hunt disposición servicios gratuitos de asistencia lingüística. Llame al [phone].    We comply with applicable federal civil rights laws and [place] laws. We do not discriminate on the basis of race, color, national origin, age, disability, sex, sexual orientation, or gender identity.            Thank you!     Thank you for choosing [facility]  for your care. Our goal is always to provide you with excellent care. Hearing back from our patients is one way we can continue to improve our services. Please take a few minutes to complete the written survey that you may receive in the mail after your visit with us. Thank you!             Your Updated Medication List - Protect others around you: Learn how to safely use, store and throw away your medicines at www.disposemymeds.org.          This list is accurate as of: 10/17/17  1:46 PM.  Always use your most recent med list.                   Brand Name Dispense Instructions for use Diagnosis    ACETAMINOPHEN PO      Take 650 mg by mouth every 4 hours as needed for pain or fever        carvedilol 12.5 MG tablet    COREG    90 tablet    Take 1.5 tablets (18.75 mg) by mouth 2 times daily (with meals)    Chronic systolic congestive heart failure (H)       ciprofloxacin 500 MG tablet    CIPRO    20 tablet    Take 1 tablet (500 mg) by mouth 2 times daily    Metastatic colon cancer to liver (H)       diclofenac 1 % Gel topical gel    VOLTAREN     Place 2 g onto the skin 4 times daily as needed for moderate pain (for RUQ pain)    Metastatic colon cancer to liver (H)       ferrous sulfate 325 (65 FE) MG tablet    IRON     Take 325 mg by mouth daily (with breakfast)         guaiFENesin-codeine 100-10 MG/5ML Soln solution    ROBITUSSIN AC    120 mL    Take 5 mLs by mouth every 4 hours as needed for cough    Liver metastasis (H)       lidocaine 5 % Patch    LIDODERM    30 patch    Place 1-3 patches onto the skin every 24 hours To RUQ    Metastatic colon cancer to liver (H)       loperamide 2 MG capsule    IMODIUM    30 capsule    2 caps at 1st sign of diarrhea & 1 cap every 2hrs until 12hrs diarrhea free. During night, 2 caps at bedtime & 2 caps every 4hrs until AM    Metastatic colon cancer to liver (H)       losartan 25 MG tablet    COZAAR    30 tablet    Take 1 tablet (25 mg) by mouth daily    Secondary cardiomyopathy (H)       metroNIDAZOLE 500 MG tablet    FLAGYL    21 tablet    Take 1 tablet (500 mg) by mouth 3 times daily    Metastatic colon cancer to liver (H)       omeprazole 20 MG CR capsule    priLOSEC    30 capsule    Take 1 capsule (20 mg) by mouth daily    Liver metastasis (H)       oxyCODONE 5 MG IR tablet    ROXICODONE    20 tablet    Take 1 tablet (5 mg) by mouth every 8 hours as needed for pain        Potassium Chloride ER 20 MEQ Tbcr     30 tablet    Take 1 tablet (20 mEq) by mouth daily    Hypokalemia       ranitidine 300 MG tablet    ZANTAC    30 tablet    Take 1 tablet (300 mg) by mouth At Bedtime    Nausea and vomiting, intractability of vomiting not specified, unspecified vomiting type       sennosides 8.6 MG tablet    SENOKOT    120 each    Take 1-2 tablets by mouth 2 times daily as needed for constipation    Slow transit constipation       TESSALON PERLES PO      Take 200 mg by mouth 3 times daily as needed for cough

## 2017-10-17 NOTE — NURSING NOTE
DISCHARGE PLAN:  Next appointments: See patient instruction section  Departure Mode: Ambulatory  Accompanied by:   8 minutes for nursing discharge (face to face time)     Charlene Douglas is here today for Oncology follow up.  Writing nurse seen patient after Medical Oncology appointment to address questions/concerns/coordinate care. Patient to follow up with next treatment.  Follow up and infusion scheduled.  See patient instructions and Oncologist's Progress note for further details. Questions and concerns addressed to patient's satisfaction. Patient verbalized and demonstrated understanding of plan.  Contact information provided and patient is encouraged to call with any that arise in the interim of care.    Stan Balderas, RN, BSN, OCN   Oncology Care Coordinator RN  [facility]  [phone]  10/17/2017, 2:54 PM

## 2017-10-17 NOTE — MR AVS SNAPSHOT
After Visit Summary   10/17/2017    Charlene Douglas    MRN: [id number]           Patient Information     Date Of Birth          1950        Visit Information        Provider Department      10/17/2017 2:00 PM Duy Dove MD [facility]        Today's Diagnoses     Metastatic colon cancer to liver (H)    -  1    Liver metastasis (H)        Anemia, unspecified type        Benign essential hypertension        Chronic systolic congestive heart failure (H)        Hypokalemia        Thrombocytopenia (H)        Chemotherapy induced nausea and vomiting        Chemotherapy induced diarrhea          Care Instructions      Please follow up with Dr. Dove in 2 weeks.  Continue with treatment as planned.    Follow Up Date/Time: 10/31/17 at 11:30    Infusion to follow at 12:30    If you have any questions or concerns please feel free to call.    If you need to reschedule please call:  Clinic or Lab Appointment - [phone]  Infusion - [phone]  Imaging - [phone]    Stan Balderas RN, BSN, OCN   Oncology Care Coordinator RN  [facility]  [phone]              Follow-ups after your visit        Follow-up notes from your care team     Return in about 2 weeks (around 10/31/2017) for Schedule for chemotherapy as per treatment plan.      Your next 10 appointments already scheduled     Oct 31, 2017 11:30 AM CDT   Return Visit with Duy Dove MD   [facility]    [address] [place] [id number] [phone]            Oct 31, 2017 12:30 PM CDT   Level 4 with NL INFUSION CHAIR 3   [facility] ([facility])    [address] [place] [address] [address] [place] [id number] [phone]              Future tests that were ordered for you today     Open Future Orders        Priority Expected Expires Ordered    CEA Routine 10/17/2017 10/17/2018 10/17/2017    CBC with platelets differential  "Routine 10/17/2017 10/17/2018 10/17/2017    Comprehensive metabolic panel Routine 10/17/2017 10/17/2018 10/17/2017            Who to contact     If you have questions or need follow up information about today's clinic visit or your schedule please contact [facility] directly at [phone].  Normal or non-critical lab and imaging results will be communicated to you by Godigexhart, letter or phone within 4 business days after the clinic has received the results. If you do not hear from us within 7 days, please contact the clinic through Godigexhart or phone. If you have a critical or abnormal lab result, we will notify you by phone as soon as possible.  Submit refill requests through Cobase or call your pharmacy and they will forward the refill request to us. Please allow 3 business days for your refill to be completed.          Additional Information About Your Visit        Godigexhart Information     Cobase gives you secure access to your electronic health record. If you see a primary care provider, you can also send messages to your care team and make appointments. If you have questions, please call your primary care clinic.  If you do not have a primary care provider, please call [phone] and they will assist you.        Care EveryWhere ID     This is your Care EveryWhere ID. This could be used by other organizations to access your [facility] medical records  [id number]        Your Vitals Were     Pulse Temperature Respirations Height Last Period Pulse Oximetry    90 97.5  F (36.4  C) (Temporal) 16 1.689 m (5' 6.5\") 06/07/2007 97%    BMI (Body Mass Index)                   22.61 kg/m2            Blood Pressure from Last 3 Encounters:   10/17/17 124/58   10/17/17 124/58   10/12/17 122/62    Weight from Last 3 Encounters:   10/17/17 64.5 kg (142 lb 3.2 oz)   10/17/17 64.5 kg (142 lb 1.6 oz)   10/12/17 64.5 kg (142 lb 4.8 oz)                 Today's Medication Changes          These changes are accurate " as of: 10/17/17  2:48 PM.  If you have any questions, ask your nurse or doctor.               Start taking these medicines.        Dose/Directions    losartan 25 MG tablet   Commonly known as:  COZAAR   Used for:  Secondary cardiomyopathy (H)   Started by:  Mamta Rutherford APRN CNP        Dose:  25 mg   Take 1 tablet (25 mg) by mouth daily   Quantity:  30 tablet   Refills:  11         These medicines have changed or have updated prescriptions.        Dose/Directions    oxyCODONE 5 MG IR tablet   Commonly known as:  ROXICODONE   This may have changed:  reasons to take this        Dose:  5 mg   Take 1 tablet (5 mg) by mouth every 8 hours as needed for pain   Quantity:  20 tablet   Refills:  0            Where to get your medicines      These medications were sent to [facility] [address]   [address] [place] [address]     Phone:  [phone]     losartan 25 MG tablet                Primary Care Provider Office Phone # Fax #    Donna Shirley PA-C [phone] [address] [place] [address]        Equal Access to Services     [facility]: Hadii aad ku hadasho Soomaali, waaxda luqadaha, qaybta kaalmada adeegyalorenzo, debra reese. So [facility] [place] [phone].    ATENCIÓN: Si habla español, tiene a hunt disposición servicios gratuitos de asistencia lingüística. Tameka al [phone].    We comply with applicable federal civil rights laws and [place] laws. We do not discriminate on the basis of race, color, national origin, age, disability, sex, sexual orientation, or gender identity.            Thank you!     Thank you for choosing [facility]  for your care. Our goal is always to provide you with excellent care. Hearing back from our patients is one way we can continue to improve our services. Please take a few minutes to complete the written survey that you may receive in the mail after your visit with  us. Thank you!             Your Updated Medication List - Protect others around you: Learn how to safely use, store and throw away your medicines at www.disposemymeds.org.          This list is accurate as of: 10/17/17  2:48 PM.  Always use your most recent med list.                   Brand Name Dispense Instructions for use Diagnosis    ACETAMINOPHEN PO      Take 650 mg by mouth every 4 hours as needed for pain or fever        carvedilol 12.5 MG tablet    COREG    90 tablet    Take 1.5 tablets (18.75 mg) by mouth 2 times daily (with meals)    Chronic systolic congestive heart failure (H)       ciprofloxacin 500 MG tablet    CIPRO    20 tablet    Take 1 tablet (500 mg) by mouth 2 times daily    Metastatic colon cancer to liver (H)       diclofenac 1 % Gel topical gel    VOLTAREN     Place 2 g onto the skin 4 times daily as needed for moderate pain (for RUQ pain)    Metastatic colon cancer to liver (H)       ferrous sulfate 325 (65 FE) MG tablet    IRON     Take 325 mg by mouth daily (with breakfast)        guaiFENesin-codeine 100-10 MG/5ML Soln solution    ROBITUSSIN AC    120 mL    Take 5 mLs by mouth every 4 hours as needed for cough    Liver metastasis (H)       lidocaine 5 % Patch    LIDODERM    30 patch    Place 1-3 patches onto the skin every 24 hours To RUQ    Metastatic colon cancer to liver (H)       loperamide 2 MG capsule    IMODIUM    30 capsule    2 caps at 1st sign of diarrhea & 1 cap every 2hrs until 12hrs diarrhea free. During night, 2 caps at bedtime & 2 caps every 4hrs until AM    Metastatic colon cancer to liver (H)       losartan 25 MG tablet    COZAAR    30 tablet    Take 1 tablet (25 mg) by mouth daily    Secondary cardiomyopathy (H)       metroNIDAZOLE 500 MG tablet    FLAGYL    21 tablet    Take 1 tablet (500 mg) by mouth 3 times daily    Metastatic colon cancer to liver (H)       omeprazole 20 MG CR capsule    priLOSEC    30 capsule    Take 1 capsule (20 mg) by mouth daily    Liver  metastasis (H)       oxyCODONE 5 MG IR tablet    ROXICODONE    20 tablet    Take 1 tablet (5 mg) by mouth every 8 hours as needed for pain        Potassium Chloride ER 20 MEQ Tbcr     30 tablet    Take 1 tablet (20 mEq) by mouth daily    Hypokalemia       ranitidine 300 MG tablet    ZANTAC    30 tablet    Take 1 tablet (300 mg) by mouth At Bedtime    Nausea and vomiting, intractability of vomiting not specified, unspecified vomiting type       sennosides 8.6 MG tablet    SENOKOT    120 each    Take 1-2 tablets by mouth 2 times daily as needed for constipation    Slow transit constipation       TESSALON PERLES PO      Take 200 mg by mouth 3 times daily as needed for cough

## 2017-10-18 NOTE — PROGRESS NOTES
Hematology/ Oncology Follow-up Visit:  Oct 17, 2017    Reason for Visit:   Chief Complaint   Patient presents with     Oncology Clinic Visit     1 week follow up for Metastatic colon cancer to liver       Oncologic History:  Metastatic colon cancer    Interval History:  Patient is here today 1 week after her first infusion with irinotecan. She experienced palpitations, headache following the infusion that lasted for a few hours. Her nausea has been controlled lately. She still taking pain was oxycodone if needed. She is still having constipation. She denied any fever or chills. She denies any cough or wheezing. She's been having fatigue.    Review Of Systems:  Constitutional: Negative for fever, chills, and night sweats. Fatigue  Skin: negative.  Eyes: negative.  Ears/Nose/Throat: negative.  Respiratory: No shortness of breath, dyspnea on exertion, cough, or hemoptysis.  Cardiovascular: negative.  Gastrointestinal: Nausea and poor appetite. There is no diarrhea. Epigastric and right upper quadrant pain  Genitourinary: negative.  Musculoskeletal: negative.  Neurologic: negative.  Psychiatric: negative.  Hematologic/Lymphatic/Immunologic: negative.  Endocrine: negative.    All other ROS negative unless mentioned in interval history.    Past medical, social, surgical, and family histories reviewed.    Allergies:  Allergies as of 10/17/2017 - Miguel as Reviewed 10/17/2017   Allergen Reaction Noted     Lisinopril Cough 09/19/2017     No known allergies  08/11/2003       Current Medications:  Current Outpatient Prescriptions   Medication Sig Dispense Refill     losartan (COZAAR) 25 MG tablet Take 1 tablet (25 mg) by mouth daily 30 tablet 11     omeprazole (PRILOSEC) 20 MG CR capsule Take 1 capsule (20 mg) by mouth daily 30 capsule 1     ciprofloxacin (CIPRO) 500 MG tablet Take 1 tablet (500 mg) by mouth 2 times daily 20 tablet 0     metroNIDAZOLE (FLAGYL) 500 MG tablet Take 1 tablet (500 mg) by mouth 3 times daily 21 tablet  "0     oxyCODONE (ROXICODONE) 5 MG IR tablet Take 1 tablet (5 mg) by mouth every 8 hours as needed for pain (Patient taking differently: Take 5 mg by mouth every 8 hours as needed for pain (patient only taking half at a time) ) 20 tablet 0     loperamide (IMODIUM) 2 MG capsule 2 caps at 1st sign of diarrhea & 1 cap every 2hrs until 12hrs diarrhea free. During night, 2 caps at bedtime & 2 caps every 4hrs until AM 30 capsule 0     ferrous sulfate (IRON) 325 (65 FE) MG tablet Take 325 mg by mouth daily (with breakfast)       carvedilol (COREG) 12.5 MG tablet Take 1.5 tablets (18.75 mg) by mouth 2 times daily (with meals) 90 tablet 0     Potassium Chloride ER 20 MEQ TBCR Take 1 tablet (20 mEq) by mouth daily 30 tablet 0     diclofenac (VOLTAREN) 1 % GEL topical gel Place 2 g onto the skin 4 times daily as needed for moderate pain (for RUQ pain)       lidocaine (LIDODERM) 5 % Patch Place 1-3 patches onto the skin every 24 hours To RUQ 30 patch      sennosides (SENOKOT) 8.6 MG tablet Take 1-2 tablets by mouth 2 times daily as needed for constipation 120 each      guaiFENesin-codeine (ROBITUSSIN AC) 100-10 MG/5ML SOLN solution Take 5 mLs by mouth every 4 hours as needed for cough 120 mL 0     ACETAMINOPHEN PO Take 650 mg by mouth every 4 hours as needed for pain or fever        Benzonatate (TESSALON PERLES PO) Take 200 mg by mouth 3 times daily as needed for cough       ranitidine (ZANTAC) 300 MG tablet Take 1 tablet (300 mg) by mouth At Bedtime (Patient not taking: Reported on 10/17/2017) 30 tablet 1        Physical Exam:  /58 (BP Location: Right arm, Patient Position: Chair, Cuff Size: Adult Regular)  Pulse 90  Temp 97.5  F (36.4  C) (Temporal)  Resp 16  Ht 1.689 m (5' 6.5\")  Wt 64.5 kg (142 lb 3.2 oz)  LMP 06/07/2007  SpO2 97%  BMI 22.61 kg/m2  Wt Readings from Last 12 Encounters:   10/17/17 64.5 kg (142 lb 3.2 oz)   10/17/17 64.5 kg (142 lb 1.6 oz)   10/12/17 64.5 kg (142 lb 4.8 oz)   10/10/17 63.7 kg (140 " lb 8 oz)   10/07/17 61.7 kg (136 lb)   10/03/17 62.3 kg (137 lb 4.8 oz)   09/29/17 63 kg (139 lb)   09/27/17 63.2 kg (139 lb 6.4 oz)   09/27/17 62.6 kg (138 lb)   09/26/17 63.3 kg (139 lb 9.6 oz)   09/20/17 65.9 kg (145 lb 3.2 oz)   09/19/17 65.4 kg (144 lb 1.6 oz)     ECOG performance status: 1  GENERAL APPEARANCE: Healthy, alert and in no acute distress.  HEENT: Sclerae anicteric. PERRLA. Oropharynx without ulcers, lesions, or thrush.  NECK: Supple. No asymmetry or masses.  LYMPHATICS: No palpable cervical, supraclavicular, axillary, or inguinal lymphadenopathy.  RESP: Lungs clear to auscultation bilaterally without rales, rhonchi or wheezes.  CARDIOVASCULAR: Regular rate and rhythm. Normal S1, S2; no S3 or S4. No murmur, gallop, or rub.  ABDOMEN: Soft, there are enlarged liver. Bowel sounds normal. MUSCULOSKELETAL: Extremities without gross deformities noted. No edema of bilateral lower extremities.  SKIN: No suspicious lesions or rashes.  NEURO: Alert and oriented x 3. Cranial nerves II-XII grossly intact.  PSYCHIATRIC: Mentation and affect appear normal.    Laboratory/Imaging Studies:  Infusion Therapy Visit on 10/12/2017   Component Date Value Ref Range Status     WBC 10/12/2017 20.6* 4.0 - 11.0 10e9/L Final     RBC Count 10/12/2017 3.43* 3.8 - 5.2 10e12/L Final     Hemoglobin 10/12/2017 9.3* 11.7 - 15.7 g/dL Final     Hematocrit 10/12/2017 29.7* 35.0 - 47.0 % Final     MCV 10/12/2017 87  78 - 100 fl Final     MCH 10/12/2017 27.1  26.5 - 33.0 pg Final     MCHC 10/12/2017 31.3* 31.5 - 36.5 g/dL Final     RDW 10/12/2017 16.7* 10.0 - 15.0 % Final     Platelet Count 10/12/2017 383  150 - 450 10e9/L Final     Diff Method 10/12/2017 Automated Method   Final     % Neutrophils 10/12/2017 80.5  % Final     % Lymphocytes 10/12/2017 8.7  % Final     % Monocytes 10/12/2017 10.0  % Final     % Eosinophils 10/12/2017 0.1  % Final     % Basophils 10/12/2017 0.1  % Final     % Immature Granulocytes 10/12/2017 0.6  % Final      Absolute Neutrophil 10/12/2017 16.6* 1.6 - 8.3 10e9/L Final     Absolute Lymphocytes 10/12/2017 1.8  0.8 - 5.3 10e9/L Final     Absolute Monocytes 10/12/2017 2.1* 0.0 - 1.3 10e9/L Final     Absolute Eosinophils 10/12/2017 0.0  0.0 - 0.7 10e9/L Final     Absolute Basophils 10/12/2017 0.0  0.0 - 0.2 10e9/L Final     Abs Immature Granulocytes 10/12/2017 0.1  0 - 0.4 10e9/L Final     Bilirubin Direct 10/12/2017 0.8* 0.0 - 0.2 mg/dL Final     Bilirubin Total 10/12/2017 1.1  0.2 - 1.3 mg/dL Final     Albumin 10/12/2017 1.5* 3.4 - 5.0 g/dL Final     Protein Total 10/12/2017 6.7* 6.8 - 8.8 g/dL Final     Alkaline Phosphatase 10/12/2017 432* 40 - 150 U/L Final     ALT 10/12/2017 29  0 - 50 U/L Final     AST 10/12/2017 60* 0 - 45 U/L Final   Infusion Therapy Visit on 10/11/2017   Component Date Value Ref Range Status     Unit Number 10/11/2017 [id number]   Final     Blood Component Type 10/11/2017 Red Blood Cells Leukocyte Reduced   Final     Division Number 10/11/2017 00   Final     Status of Unit 10/11/2017 Released to care unit   Final     Blood Product Code 10/11/2017 [id number]   Final     Unit Status 10/11/2017 ISS   Final   Infusion Therapy Visit on 10/10/2017   Component Date Value Ref Range Status     Bilirubin Direct 10/10/2017 0.6* 0.0 - 0.2 mg/dL Final     Bilirubin Total 10/10/2017 0.8  0.2 - 1.3 mg/dL Final     Albumin 10/10/2017 1.6* 3.4 - 5.0 g/dL Final     Protein Total 10/10/2017 6.8  6.8 - 8.8 g/dL Final     Alkaline Phosphatase 10/10/2017 421* 40 - 150 U/L Final     ALT 10/10/2017 45  0 - 50 U/L Final     AST 10/10/2017 169* 0 - 45 U/L Final     WBC 10/10/2017 24.0* 4.0 - 11.0 10e9/L Final     RBC Count 10/10/2017 2.51* 3.8 - 5.2 10e12/L Final     Hemoglobin 10/10/2017 6.6* 11.7 - 15.7 g/dL Final    Comment: This result has been called to IVETTE CHILDS by Monique Lock on 10 10 2017 at   1129, and has been read back.        Hematocrit 10/10/2017 21.4* 35.0 - 47.0 % Final     MCV 10/10/2017 85  78 - 100 fl  Final     MCH 10/10/2017 26.3* 26.5 - 33.0 pg Final     MCHC 10/10/2017 30.8* 31.5 - 36.5 g/dL Final     RDW 10/10/2017 17.8* 10.0 - 15.0 % Final     Platelet Count 10/10/2017 405  150 - 450 10e9/L Final     Diff Method 10/10/2017 Automated Method   Final     % Neutrophils 10/10/2017 80.6  % Final     % Lymphocytes 10/10/2017 9.0  % Final     % Monocytes 10/10/2017 9.9  % Final     % Eosinophils 10/10/2017 0.0  % Final     % Basophils 10/10/2017 0.1  % Final     % Immature Granulocytes 10/10/2017 0.4  % Final     Absolute Neutrophil 10/10/2017 19.4* 1.6 - 8.3 10e9/L Final     Absolute Lymphocytes 10/10/2017 2.2  0.8 - 5.3 10e9/L Final     Absolute Monocytes 10/10/2017 2.4* 0.0 - 1.3 10e9/L Final     Absolute Eosinophils 10/10/2017 0.0  0.0 - 0.7 10e9/L Final     Absolute Basophils 10/10/2017 0.0  0.0 - 0.2 10e9/L Final     Abs Immature Granulocytes 10/10/2017 0.1  0 - 0.4 10e9/L Final     Units Ordered 10/10/2017 2   Final     ABO 10/10/2017 A   Final     RH(D) 10/10/2017 Pos   Final     Antibody Screen 10/10/2017 Neg   Final     Test Valid Only At 10/10/2017 [facility]      Final     Specimen Expires 10/10/2017 10/13/2017   Final     Crossmatch 10/10/2017 Red Blood Cells   Final     Unit Number 10/10/2017 [id number]   Final     Blood Component Type 10/10/2017 Red Blood Cells Leukocyte Reduced   Final     Division Number 10/10/2017 00   Final     Status of Unit 10/10/2017 No longer available 10/11/2017 1240   Final     Blood Product Code 10/10/2017 C1504G35   Final     Unit Status 10/10/2017 RET   Final     Unit Number 10/10/2017 [id number]   Final     Blood Component Type 10/10/2017 Red Blood Cells Leukocyte Reduced   Final     Division Number 10/10/2017 00   Final     Status of Unit 10/10/2017 Released to care unit   Final     Blood Product Code 10/10/2017 [id number]   Final     Unit Status 10/10/2017 ISS   Final   Oncology Visit on 10/10/2017   Component Date Value Ref Range Status      Specimen Description 10/10/2017 Blood Right Arm   Final     Culture Micro 10/10/2017 No growth after 6 days   Final     Specimen Description 10/10/2017 Blood Port   Final     Special Requests 10/10/2017 NURSE DRAW CS   Final     Culture Micro 10/10/2017 No growth after 6 days   Final        Recent Results (from the past 744 hour(s))   XR Chest 2 Views    Narrative    Exam:  XR CHEST 2 VW, 9/18/2017 3:44 PM    History: fever, cough, eval for pna    Comparison:  Chest radiograph from 9/15/2017.    Findings:  PA and lateral views chest. Right chest portacatheter tip  projects over the mid SVC. The cardiomediastinal silhouette is within  normal limits. Resolution of left pleural effusion. No pneumothorax.  No focal airspace opacities. Visualized upper abdomen and osseous  structures are unremarkable.      Impression    Impression:    No acute cardiopulmonary abnormality. Resolution of previous left  pleural effusion.    I have personally reviewed the examination and initial interpretation  and I agree with the findings.    RADHA GOMES MD   CT Abdomen Pelvis w Contrast    Narrative    CT ABDOMEN AND PELVIS WITH CONTRAST 10/7/2017 7:05 PM     HISTORY: Left upper quadrant abdominal pain, known history of liver  and colon cancer, abdominal distention.    COMPARISON: 8/18/2017    TECHNIQUE: Volumetric helical acquisition of CT images from the lung  bases through the symphysis pubis after the administration of 70 mL  Isovue 370  intravenous contrast. Radiation dose for this scan was  reduced using automated exposure control, adjustment of the mA and/or  kV according to patient size, or iterative reconstruction technique.    FINDINGS: No definite bowel obstruction demonstrated. Trace free  fluid. No free air. There are moderate atherosclerotic changes of the  visualized aorta and its branches. There is no evidence of aortic  dissection or aneurysm. No hydronephrosis or urolithiasis  demonstrated. Extensive liver metastases,  adenopathy/masses in the  peritoneum again noted and similar to previous. Representative lesion  in the right lobe of liver measures 10.7 cm today previously 10.1 cm.  Adrenal glands, spleen, and pancreas demonstrate no worrisome focal  lesion. Superior degenerative changes in the left hip. No frankly  destructive bony lesions. Lung bases demonstrate trace peripheral  fibrosis and/or atelectasis.      Impression    IMPRESSION: Trace free fluid, no significant ascites or bowel  obstruction demonstrated.    KAILEY GUERRA MD       Assessment and plan:    (C18.9,  C78.7) Metastatic colon cancer to liver (H)  (primary encounter diagnosis)  (C78.7) Liver metastasis (H)  We will continue with single agent irinotecan. We will add Avastin with next infusion.  I reviewed with the patient today potential side effects of chemotherapy with Avastin, oxaliplatin and irinotecan in details. I will see the patient again in 2 weeks before her second cycle of chemotherapy.    (D64.9) Anemia, unspecified type  Hemoglobin has improved with transfusion from last week. Her most recent hemoglobin is 10.1.    (I10) Benign essential hypertension  Patient blood pressure is currently well controlled    (I50.22) Chronic systolic congestive heart failure (H)  Echocardiogram showed improvement in ejection fraction. Ejection fraction is 50-55.    (E87.6) Hypokalemia  Serum potassium is 4.5 today    (D69.6) Thrombocytopenia (H)  We will continue to monitor platelet count. There is no evidence of bruising or bleeding.    (R11.2,  T45.1X5A) Chemotherapy induced nausea and vomiting  I reviewed with the patient today management of nausea following chemotherapy.    (K52.1,  T45.1X5A) Chemotherapy induced diarrhea  I reviewed with the patient mental diarrhea with Imodium.    The patient is ready to learn, no apparent learning barriers were identified.  Diagnosis and treatment plans were explained to the patient. The patient expressed understanding of the  content. The patient asked appropriate questions. The patient questions were answered to her satisfaction.    Chart documentation with Dragon Voice recognition Software. Although reviewed after completion, some words and grammatical errors may remain.

## 2017-10-19 NOTE — TELEPHONE ENCOUNTER
Scheduling: please schedule patient as noted below (both appointment with Dr. Madden and an echo). Thanks!!!    Writer received note to call patient:     Mamta Rutherford APRN CNP Koch, Brenna M, RN                   Please let her known that Dr. Madden would like to see her in 3-4 months with echo. Thanks       Writer notified patient of this.    Angie Rosario RN  [facility]  10/19/2017 8:55 AM

## 2017-10-19 NOTE — TELEPHONE ENCOUNTER
Patient will be contacted when we have the schedule available. Orders are in our work queue and will notify us to schedule closer to date.

## 2017-10-19 NOTE — TELEPHONE ENCOUNTER
Writing nurse calling to update patient that Dr. Dove has added 2 drugs to her treatment and would like her to start on 10/24/17 with follow up.  Both appointments scheduled.  Patient updated.  Reviewed added drugs and briefly reviewed side effects.  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  Will review again prior to treatment on 10/24/17.  Patient has take home medications on hand except the Decadron.  Will release for patient to  from pharmacy.  Patient's daughter's forms completed and faxed to [employer] [facility] as requested at [phone].    Stan Balderas RN, BSN, OCN  10/19/2017, 2:41 PM

## 2017-10-23 NOTE — TELEPHONE ENCOUNTER
Spoke to patient message below given. Patient stated she was hoping to get some to get through until her appointment tomorrow. Patient stated she will tough it out.  Ciera Butler CMA (MA)

## 2017-10-23 NOTE — TELEPHONE ENCOUNTER
Reason for Call:  Medication or medication refill:    Do you use a [facility]?  Name of the pharmacy and phone number for the current request:  [facility] - [phone]    Name of the medication requested: guaiFENesin-codeine (ROBITUSSIN AC) 100-10 MG/5ML SOLN solution    Other request: n/a    Can we leave a detailed message on this number? YES    Phone number patient can be reached at: Home number on file [phone] (home) or Cell number on file:    Telephone Information:   Mobile [phone]       Best Time: anytime    Call taken on 10/23/2017 at 1:46 PM by Aviva Mayer

## 2017-10-23 NOTE — TELEPHONE ENCOUNTER
Medication was given for acute illness.    Will need appointment for evaluation and treatment.    Please call and help schedule.    Miguel Ángel Sutton RN, BSN

## 2017-10-24 NOTE — PATIENT INSTRUCTIONS
Please follow up with Dr. Dove in a week.  Blood and Neupogen today.  Chemotherapy on 10/25/17.     Infusion Date/Time:  10/25/17 at 8:00    Follow Up Date/Time: 10/31/17 at 11:30    If you have any questions or concerns please feel free to call.    If you need to reschedule please call:  Clinic or Lab Appointment - [phone]  Infusion - [phone]  Imaging - [phone]    Stan Balderas, RN, BSN, OCN   Oncology Care Coordinator RN  [facility]  [phone]

## 2017-10-24 NOTE — NURSING NOTE
"Oncology Rooming Note    October 24, 2017 8:15 AM   Charlene Douglas is a 67 year old female who presents for:    Chief Complaint   Patient presents with     Oncology Clinic Visit     1 week follow up for Metastatic colon cancer to liver     Chemotherapy     C1D1 today with labs prior     Initial Vitals: /65 (BP Location: Right arm, Patient Position: Chair, Cuff Size: Adult Regular)  Pulse 103  Temp 99  F (37.2  C) (Temporal)  Resp 16  Ht 1.689 m (5' 6.5\")  Wt 65.5 kg (144 lb 4.8 oz)  LMP 06/07/2007  SpO2 98%  BMI 22.94 kg/m2 Estimated body mass index is 22.94 kg/(m^2) as calculated from the following:    Height as of this encounter: 1.689 m (5' 6.5\").    Weight as of this encounter: 65.5 kg (144 lb 4.8 oz). Body surface area is 1.75 meters squared.  No Pain (0) Comment: Data Unavailable   Patient's last menstrual period was 06/07/2007.  Allergies reviewed: Yes  Medications reviewed: Yes    Medications: MEDICATION REFILLS NEEDED TODAY. Provider was notified.  Pharmacy name entered into [facility]:    [facility] - [id number] [facility]    Clinical concerns: Weakness Dr. Dove was notified.      Elsa Butts MA              "

## 2017-10-24 NOTE — NURSING NOTE
DISCHARGE PLAN:  Next appointments: See patient instruction section  Departure Mode: Wheelchair   Accompanied by:   10 minutes for nursing discharge (face to face time)     Charlene Douglas is here today for Oncology follow up with plan to start new treatment.  Writing nurse seen patient after Medical Oncology appointment to address questions/concerns/coordinate care. Plan for Blood and Neupogen today.  Chemotherapy tomorrow if counts are back up.  Patient to return next week.  Reviewed new drugs with new treatment plan.  Encouraged to call or I will come and see her during treatment tomorrow if any new questions or concerns. Appointments scheduled. See patient instructions and Oncologist's Progress note for further details. Questions and concerns addressed to patient's satisfaction. Patient verbalized and demonstrated understanding of plan.  Contact information provided and patient is encouraged to call with any that arise in the interim of care.    Stan Balderas, RN, BSN, OCN   Oncology Care Coordinator RN  [facility]  [phone]  10/24/2017, 8:58 AM

## 2017-10-24 NOTE — MR AVS SNAPSHOT
After Visit Summary   10/24/2017    Charlene Douglas    MRN: [id number]           Patient Information     Date Of Birth          1950        Visit Information        Provider Department      10/24/2017 8:00 AM Duy Dove MD [facility]        Today's Diagnoses     Metastatic colon cancer to liver (H)    -  1    Liver metastasis (H)          Care Instructions      Please follow up with Dr. Dove in a week.  Blood and Neupogen today.  Chemotherapy on 10/25/17.     Infusion Date/Time:  10/25/17 at 8:00    Follow Up Date/Time: 10/31/17 at 11:30    If you have any questions or concerns please feel free to call.    If you need to reschedule please call:  Clinic or Lab Appointment - [phone]  Infusion - [phone]  Imaging - [phone]    Stan Balderas RN, BSN, OCN   Oncology Care Coordinator RN  [facility]  [phone]              Follow-ups after your visit        Your next 10 appointments already scheduled     Oct 26, 2017  8:30 AM CDT   Level 7 with NL INFUSION CHAIR 4   [facility]fusion [facility])    [address] [place] [address] [address] [place] [id number] [phone]            Oct 31, 2017 11:30 AM CDT   Return Visit with Duy Dove MD   [facility] ([facility]    [address] [place] [id number] [phone]            Oct 31, 2017 12:30 PM CDT   Level 4 with NL INFUSION CHAIR 3   [address] [place] [id number] [phone]              Who to contact     If you have questions or need follow up information about today's clinic visit or your schedule please contact [facility] directly at [phone].  Normal or non-critical lab and imaging results will be communicated to you by MyChart, letter or phone within 4 business days after the clinic has received the results. If you do  "not hear from us within 7 days, please contact the clinic through [employer] or phone. If you have a critical or abnormal lab result, we will notify you by phone as soon as possible.  Submit refill requests through [employer] or call your pharmacy and they will forward the refill request to us. Please allow 3 business days for your refill to be completed.          Additional Information About Your Visit        [employer] Information     [employer] gives you secure access to your electronic health record. If you see a primary care provider, you can also send messages to your care team and make appointments. If you have questions, please call your primary care clinic.  If you do not have a primary care provider, please call [phone] and they will assist you.        Care EveryWhere ID     This is your Care EveryWhere ID. This could be used by other organizations to access your [place] medical records  [id number]        Your Vitals Were     Pulse Temperature Respirations Height Last Period Pulse Oximetry    103 99  F (37.2  C) (Temporal) 16 1.689 m (5' 6.5\") 06/07/2007 98%    BMI (Body Mass Index)                   22.94 kg/m2            Blood Pressure from Last 3 Encounters:   10/24/17 121/65   10/17/17 124/58   10/17/17 124/58    Weight from Last 3 Encounters:   10/24/17 65.5 kg (144 lb 4.8 oz)   10/17/17 64.5 kg (142 lb 3.2 oz)   10/17/17 64.5 kg (142 lb 1.6 oz)              Today, you had the following     No orders found for display         Today's Medication Changes          These changes are accurate as of: 10/24/17  8:43 AM.  If you have any questions, ask your nurse or doctor.               These medicines have changed or have updated prescriptions.        Dose/Directions    oxyCODONE 5 MG IR tablet   Commonly known as:  ROXICODONE   This may have changed:  reasons to take this        Dose:  5 mg   Take 1 tablet (5 mg) by mouth every 8 hours as needed for pain   Quantity:  20 tablet   Refills:  0                " Primary Care Provider Office Phone # Fax #    Donna Shirley PA-C [phone] [address] [place] [address]        Equal Access to Services     JOSEDENIZ KATHRYN : Nic nat smith rashad Blandon, walayoda luqadaha, qaybta kaalmada shahla, debra hardyslim hugh. So [facility] [place] [phone].    ATENCIÓN: Si habla español, tiene a hunt disposición servicios gratuitos de asistencia lingüística. Llame al [phone].    We comply with applicable federal civil rights laws and [place] laws. We do not discriminate on the basis of race, color, national origin, age, disability, sex, sexual orientation, or gender identity.            Thank you!     Thank you for choosing [facility]  for your care. Our goal is always to provide you with excellent care. Hearing back from our patients is one way we can continue to improve our services. Please take a few minutes to complete the written survey that you may receive in the mail after your visit with us. Thank you!             Your Updated Medication List - Protect others around you: Learn how to safely use, store and throw away your medicines at www.disposemymeds.org.          This list is accurate as of: 10/24/17  8:43 AM.  Always use your most recent med list.                   Brand Name Dispense Instructions for use Diagnosis    ACETAMINOPHEN PO      Take 650 mg by mouth every 4 hours as needed for pain or fever        carvedilol 12.5 MG tablet    COREG    90 tablet    Take 1.5 tablets (18.75 mg) by mouth 2 times daily (with meals)    Chronic systolic congestive heart failure (H)       ciprofloxacin 500 MG tablet    CIPRO    20 tablet    Take 1 tablet (500 mg) by mouth 2 times daily    Metastatic colon cancer to liver (H)       dexamethasone 4 MG tablet    DECADRON    6 tablet    Take 2 tablets (8 mg) by mouth daily (with breakfast) Days 2, 3, and 4 of each cycle.    Metastatic colon cancer to liver (H)       diclofenac 1 % Gel  topical gel    VOLTAREN     Place 2 g onto the skin 4 times daily as needed for moderate pain (for RUQ pain)    Metastatic colon cancer to liver (H)       ferrous sulfate 325 (65 FE) MG tablet    IRON     Take 325 mg by mouth daily (with breakfast)        guaiFENesin-codeine 100-10 MG/5ML Soln solution    ROBITUSSIN AC    120 mL    Take 5 mLs by mouth every 4 hours as needed for cough    Liver metastasis (H)       lidocaine 5 % Patch    LIDODERM    30 patch    Place 1-3 patches onto the skin every 24 hours To RUQ    Metastatic colon cancer to liver (H)       loperamide 2 MG capsule    IMODIUM    30 capsule    2 caps at 1st sign of diarrhea & 1 cap every 2hrs until 12hrs diarrhea free. During night, 2 caps at bedtime & 2 caps every 4hrs until AM    Metastatic colon cancer to liver (H)       LORazepam 0.5 MG tablet    ATIVAN    30 tablet    Take 1 tablet (0.5 mg) by mouth every 4 hours as needed (Anxiety, Nausea/Vomiting or Sleep)    Metastatic colon cancer to liver (H)       losartan 25 MG tablet    COZAAR    30 tablet    Take 1 tablet (25 mg) by mouth daily    Secondary cardiomyopathy (H)       metroNIDAZOLE 500 MG tablet    FLAGYL    21 tablet    Take 1 tablet (500 mg) by mouth 3 times daily    Metastatic colon cancer to liver (H)       omeprazole 20 MG CR capsule    priLOSEC    30 capsule    Take 1 capsule (20 mg) by mouth daily    Liver metastasis (H)       oxyCODONE 5 MG IR tablet    ROXICODONE    20 tablet    Take 1 tablet (5 mg) by mouth every 8 hours as needed for pain        Potassium Chloride ER 20 MEQ Tbcr     30 tablet    Take 1 tablet (20 mEq) by mouth daily    Hypokalemia       prochlorperazine 10 MG tablet    COMPAZINE    30 tablet    Take 1 tablet (10 mg) by mouth every 6 hours as needed (Nausea/Vomiting)    Metastatic colon cancer to liver (H)       ranitidine 300 MG tablet    ZANTAC    30 tablet    Take 1 tablet (300 mg) by mouth At Bedtime    Nausea and vomiting, intractability of vomiting not  specified, unspecified vomiting type       sennosides 8.6 MG tablet    SENOKOT    120 each    Take 1-2 tablets by mouth 2 times daily as needed for constipation    Slow transit constipation       TESSALON PERLES PO      Take 200 mg by mouth 3 times daily as needed for cough

## 2017-10-24 NOTE — PATIENT INSTRUCTIONS
Pt to return on 10/25/17 for labs and possible chemo. Copies of medication list and upcoming appointments given prior to discharge.

## 2017-10-24 NOTE — PROGRESS NOTES
SPIRITUAL HEALTH SERVICES  SPIRITUAL ASSESSMENT Progress Note  [facility]       introduced himself to ghislaine Misael and informed her of his availability.    Steve Hamm M.Div., [facility]  Staff   Office tel: [phone]

## 2017-10-24 NOTE — PROGRESS NOTES
Patients chemo deferred today due to lab counts. Patients Hgb-6.7. Tolerated 1 units of blood. Lung sounds clear but diminshed pre/post each unit of blood.  Discharged in stable to home with . To return for labs and possible chemo tomorrow.

## 2017-10-24 NOTE — PROGRESS NOTES
"SPIRITUAL HEALTH SERVICES  SPIRITUAL ASSESSMENT Progress Note  [facility]      Rounding - Pt remarked, \"Everyone at [facility] has been wonderful.\"    Steve Hamm M.Div., [facility]  Staff   Office tel: [phone]    "

## 2017-10-24 NOTE — MR AVS SNAPSHOT
After Visit Summary   10/24/2017    Charlene Douglas    MRN: [id number]           Patient Information     Date Of Birth          1950        Visit Information        Provider Department      10/24/2017 8:30 AM NL INFUSION CHAIR 5 [facility]fusion [facility]        Today's Diagnoses     Metastatic colon cancer to liver (H)    -  1    Anemia, unspecified type        Thrombocytopenia (H)          Care Instructions    Pt to return on 10/25/17 for labs and possible chemo. Copies of medication list and upcoming appointments given prior to discharge.             Follow-ups after your visit        Your next 10 appointments already scheduled     Oct 25, 2017  8:00 AM CDT   Level 7 with NL INFUSION CHAIR 2   [facility]fusion [facility] ([facility])    [address] [place] [address] [address] [place] [id number] [phone]            Oct 31, 2017 11:30 AM CDT   Return Visit with Duy Dove MD   [facility] ([facility])    [address] [facility] [id number] [phone]            Nov 07, 2017  8:00 AM CST   Level 7 with NL INFUSION CHAIR 2   [facility] ([facility])    [address] [place] [address] Dr [place] [id number] [phone]              Future tests that were ordered for you today     Open Standing Orders        Priority Remaining Interval Expires Ordered    Red blood cell prepare order unit Routine 99/100 CONDITIONAL (SPECIFY) BLOOD  10/24/2017    Transfuse red blood cell unit Routine 99/100 TRANSFUSE 1 UNIT  10/24/2017            Who to contact     If you have questions or need follow up information about today's clinic visit or your schedule please contact [facility] INFUSION SERVICES directly at [phone].  Normal or non-critical lab and imaging results will be communicated to you by MyChart, letter or phone within 4 business days after the clinic has received the results. If you do  "not hear from us within 7 days, please contact the clinic through [employer] or phone. If you have a critical or abnormal lab result, we will notify you by phone as soon as possible.  Submit refill requests through [employer] or call your pharmacy and they will forward the refill request to us. Please allow 3 business days for your refill to be completed.          Additional Information About Your Visit        [employer] Information     [employer] gives you secure access to your electronic health record. If you see a primary care provider, you can also send messages to your care team and make appointments. If you have questions, please call your primary care clinic.  If you do not have a primary care provider, please call [phone] and they will assist you.        Care EveryWhere ID     This is your Care EveryWhere ID. This could be used by other organizations to access your [place] medical records  [id number]        Your Vitals Were     Pulse Temperature Respirations Height Last Period Pulse Oximetry    92 99.5  F (37.5  C) (Temporal) 16 1.689 m (5' 6.5\") 06/07/2007 98%    BMI (Body Mass Index)                   22.96 kg/m2            Blood Pressure from Last 3 Encounters:   10/24/17 122/49   10/24/17 121/65   10/17/17 124/58    Weight from Last 3 Encounters:   10/24/17 65.5 kg (144 lb 6.4 oz)   10/24/17 65.5 kg (144 lb 4.8 oz)   10/17/17 64.5 kg (142 lb 3.2 oz)              We Performed the Following     ABO/Rh type and screen     Blood component     CBC with platelets differential     Comprehensive metabolic panel     Transfuse red blood cell unit          Today's Medication Changes          These changes are accurate as of: 10/24/17  1:55 PM.  If you have any questions, ask your nurse or doctor.               These medicines have changed or have updated prescriptions.        Dose/Directions    oxyCODONE 5 MG IR tablet   Commonly known as:  ROXICODONE   This may have changed:  reasons to take this        Dose:  5 mg "   Take 1 tablet (5 mg) by mouth every 8 hours as needed for pain   Quantity:  20 tablet   Refills:  0            Where to get your medicines      These medications were sent to [facility] - [address]  [address] [address] [address]     Phone:  [phone]     benzonatate 100 MG capsule         Some of these will need a paper prescription and others can be bought over the counter.  Ask your nurse if you have questions.     Bring a paper prescription for each of these medications     guaiFENesin-codeine 100-10 MG/5ML Soln solution                Primary Care Provider Office Phone # Fax #    Donna Shirley PA-C [phone] [address] [place] [address]        Equal Access to Services     RON PERALTA : Hadii nat smith hadjoseo Soomaali, waaxda luqadaha, qaybta kaalmada adeegyada, debra rowe . So [facility] [phone].    ATENCIÓN: Si habla español, tiene a hunt disposición servicios gratuitos de asistencia lingüística. Ll[facility] [phone].    We comply with applicable federal civil rights laws and [place] laws. We do not discriminate on the basis of race, color, national origin, age, disability, sex, sexual orientation, or gender identity.            Thank you!     Thank you for choosing [facility]  for your care. Our goal is always to provide you with excellent care. Hearing back from our patients is one way we can continue to improve our services. Please take a few minutes to complete the written survey that you may receive in the mail after your visit with us. Thank you!             Your Updated Medication List - Protect others around you: Learn how to safely use, store and throw away your medicines at www.disposemymeds.org.          This list is accurate as of: 10/24/17  1:55 PM.  Always use your most recent med list.                   Brand Name Dispense Instructions for use Diagnosis     ACETAMINOPHEN PO      Take 650 mg by mouth every 4 hours as needed for pain or fever        benzonatate 100 MG capsule    TESSALON PERLES    42 capsule    Take 2 capsules (200 mg) by mouth 3 times daily as needed for cough    Metastatic colon cancer to liver (H)       carvedilol 12.5 MG tablet    COREG    90 tablet    Take 1.5 tablets (18.75 mg) by mouth 2 times daily (with meals)    Chronic systolic congestive heart failure (H)       ciprofloxacin 500 MG tablet    CIPRO    20 tablet    Take 1 tablet (500 mg) by mouth 2 times daily    Metastatic colon cancer to liver (H)       dexamethasone 4 MG tablet    DECADRON    6 tablet    Take 2 tablets (8 mg) by mouth daily (with breakfast) Days 2, 3, and 4 of each cycle.    Metastatic colon cancer to liver (H)       diclofenac 1 % Gel topical gel    VOLTAREN     Place 2 g onto the skin 4 times daily as needed for moderate pain (for RUQ pain)    Metastatic colon cancer to liver (H)       ferrous sulfate 325 (65 FE) MG tablet    IRON     Take 325 mg by mouth daily (with breakfast)        guaiFENesin-codeine 100-10 MG/5ML Soln solution    ROBITUSSIN AC    120 mL    Take 5 mLs by mouth every 4 hours as needed for cough    Liver metastasis (H)       lidocaine 5 % Patch    LIDODERM    30 patch    Place 1-3 patches onto the skin every 24 hours To RUQ    Metastatic colon cancer to liver (H)       loperamide 2 MG capsule    IMODIUM    30 capsule    2 caps at 1st sign of diarrhea & 1 cap every 2hrs until 12hrs diarrhea free. During night, 2 caps at bedtime & 2 caps every 4hrs until AM    Metastatic colon cancer to liver (H)       LORazepam 0.5 MG tablet    ATIVAN    30 tablet    Take 1 tablet (0.5 mg) by mouth every 4 hours as needed (Anxiety, Nausea/Vomiting or Sleep)    Metastatic colon cancer to liver (H)       losartan 25 MG tablet    COZAAR    30 tablet    Take 1 tablet (25 mg) by mouth daily    Secondary cardiomyopathy (H)       metroNIDAZOLE 500 MG tablet    FLAGYL    21  tablet    Take 1 tablet (500 mg) by mouth 3 times daily    Metastatic colon cancer to liver (H)       omeprazole 20 MG CR capsule    priLOSEC    30 capsule    Take 1 capsule (20 mg) by mouth daily    Liver metastasis (H)       oxyCODONE 5 MG IR tablet    ROXICODONE    20 tablet    Take 1 tablet (5 mg) by mouth every 8 hours as needed for pain        Potassium Chloride ER 20 MEQ Tbcr     30 tablet    Take 1 tablet (20 mEq) by mouth daily    Hypokalemia       prochlorperazine 10 MG tablet    COMPAZINE    30 tablet    Take 1 tablet (10 mg) by mouth every 6 hours as needed (Nausea/Vomiting)    Metastatic colon cancer to liver (H)       ranitidine 300 MG tablet    ZANTAC    30 tablet    Take 1 tablet (300 mg) by mouth At Bedtime    Nausea and vomiting, intractability of vomiting not specified, unspecified vomiting type       sennosides 8.6 MG tablet    SENOKOT    120 each    Take 1-2 tablets by mouth 2 times daily as needed for constipation    Slow transit constipation

## 2017-10-25 NOTE — MR AVS SNAPSHOT
After Visit Summary   10/25/2017    Charlene Douglas    MRN: [id number]           Patient Information     Date Of Birth          1950        Visit Information        Provider Department      10/25/2017 8:00 AM NL INFUSION CHAIR 2 [facility]        Today's Diagnoses     Anemia, unspecified type    -  1    Colorectal cancer (H)        Metastatic colon cancer to liver (H)        Chemotherapy-induced neutropenia (H)          Care Instructions    Pt to return on 10/26/17 for chemo/lab. Copies of medication list and upcoming appointments given prior to discharge.           Follow-ups after your visit        Your next 10 appointments already scheduled     Oct 26, 2017  8:30 AM CDT   Level 7 with NL INFUSION CHAIR 4   [facility] ([facility])    [address] [place] [address] [phone]            Oct 31, 2017 11:30 AM CDT   Return Visit with Duy Dove MD   [facility]    [address] [address] [phone]            Nov 07, 2017  8:00 AM CST   Level 7 with NL INFUSION CHAIR 2   [facility] ([facility])    [address] [place] [address] [address] [place] [address] [phone]              Future tests that were ordered for you today     Open Standing Orders        Priority Remaining Interval Expires Ordered    CBC with platelets differential Routine 1/1 ROUTINE  10/25/2017            Who to contact     If you have questions or need follow up information about today's clinic visit or your schedule please contact [facility] directly at [phone].  Normal or non-critical lab and imaging results will be communicated to you by MyChart, letter or phone within 4 business days after the clinic has received the results. If you do not hear from us within 7 days, please contact the clinic through [employer]at  "or phone. If you have a critical or abnormal lab result, we will notify you by phone as soon as possible.  Submit refill requests through [employer] or call your pharmacy and they will forward the refill request to us. Please allow 3 business days for your refill to be completed.          Additional Information About Your Visit        Bamateahart Information     Ep[employer]ios gives you secure access to your electronic health record. If you see a primary care provider, you can also send messages to your care team and make appointments. If you have questions, please call your primary care clinic.  If you do not have a primary care provider, please call [phone] and they will assist you.        Care EveryWhere ID     This is your Care EveryWhere ID. This could be used by other organizations to access your [facility] medical records  [id number]        Your Vitals Were     Pulse Temperature Respirations Height Last Period Pulse Oximetry    79 98.6  F (37  C) (Temporal) 18 1.689 m (5' 6.5\") 06/07/2007 99%    BMI (Body Mass Index)                   23.21 kg/m2            Blood Pressure from Last 3 Encounters:   10/25/17 136/65   10/24/17 122/49   10/24/17 121/65    Weight from Last 3 Encounters:   10/25/17 66.2 kg (146 lb)   10/24/17 65.5 kg (144 lb 6.4 oz)   10/24/17 65.5 kg (144 lb 4.8 oz)              We Performed the Following     ABO/Rh type and screen     CBC with platelets differential     Comprehensive metabolic panel     Protein qualitative urine     Transfuse red blood cell unit          Today's Medication Changes          These changes are accurate as of: 10/25/17  2:16 PM.  If you have any questions, ask your nurse or doctor.               These medicines have changed or have updated prescriptions.        Dose/Directions    oxyCODONE 5 MG IR tablet   Commonly known as:  ROXICODONE   This may have changed:  reasons to take this        Dose:  5 mg   Take 1 tablet (5 mg) by mouth every 8 hours as needed for pain "   Quantity:  20 tablet   Refills:  0                Primary Care Provider Office Phone # Fax #    Donna Shirley PA-C [phone] [address] [place] [address]        Equal Access to Services     RON PERALTA : Nic nat smith rashad Blandon, waaxda luqadaha, qaybta kaalmada adelelia, debra morales laAlissonfred reese. So [facility] [phone].    ATENCIÓN: Si habla español, tiene a hunt disposición servicios gratuitos de asistencia lingüística. Llame al [phone].    We comply with applicable federal civil rights laws and [place] laws. We do not discriminate on the basis of race, color, national origin, age, disability, sex, sexual orientation, or gender identity.            Thank you!     Thank you for choosing [facility]  for your care. Our goal is always to provide you with excellent care. Hearing back from our patients is one way we can continue to improve our services. Please take a few minutes to complete the written survey that you may receive in the mail after your visit with us. Thank you!             Your Updated Medication List - Protect others around you: Learn how to safely use, store and throw away your medicines at www.disposemymeds.org.          This list is accurate as of: 10/25/17  2:16 PM.  Always use your most recent med list.                   Brand Name Dispense Instructions for use Diagnosis    ACETAMINOPHEN PO      Take 650 mg by mouth every 4 hours as needed for pain or fever        benzonatate 100 MG capsule    TESSALON PERLES    42 capsule    Take 2 capsules (200 mg) by mouth 3 times daily as needed for cough    Metastatic colon cancer to liver (H)       carvedilol 12.5 MG tablet    COREG    90 tablet    Take 1.5 tablets (18.75 mg) by mouth 2 times daily (with meals)    Chronic systolic congestive heart failure (H)       ciprofloxacin 500 MG tablet    CIPRO    20 tablet    Take 1 tablet (500 mg) by mouth 2 times daily     Metastatic colon cancer to liver (H)       dexamethasone 4 MG tablet    DECADRON    6 tablet    Take 2 tablets (8 mg) by mouth daily (with breakfast) Days 2, 3, and 4 of each cycle.    Metastatic colon cancer to liver (H)       diclofenac 1 % Gel topical gel    VOLTAREN     Place 2 g onto the skin 4 times daily as needed for moderate pain (for RUQ pain)    Metastatic colon cancer to liver (H)       ferrous sulfate 325 (65 FE) MG tablet    IRON     Take 325 mg by mouth daily (with breakfast)        guaiFENesin-codeine 100-10 MG/5ML Soln solution    ROBITUSSIN AC    120 mL    Take 5 mLs by mouth every 4 hours as needed for cough    Liver metastasis (H)       lidocaine 5 % Patch    LIDODERM    30 patch    Place 1-3 patches onto the skin every 24 hours To RUQ    Metastatic colon cancer to liver (H)       loperamide 2 MG capsule    IMODIUM    30 capsule    2 caps at 1st sign of diarrhea & 1 cap every 2hrs until 12hrs diarrhea free. During night, 2 caps at bedtime & 2 caps every 4hrs until AM    Metastatic colon cancer to liver (H)       LORazepam 0.5 MG tablet    ATIVAN    30 tablet    Take 1 tablet (0.5 mg) by mouth every 4 hours as needed (Anxiety, Nausea/Vomiting or Sleep)    Metastatic colon cancer to liver (H)       losartan 25 MG tablet    COZAAR    30 tablet    Take 1 tablet (25 mg) by mouth daily    Secondary cardiomyopathy (H)       metroNIDAZOLE 500 MG tablet    FLAGYL    21 tablet    Take 1 tablet (500 mg) by mouth 3 times daily    Metastatic colon cancer to liver (H)       omeprazole 20 MG CR capsule    priLOSEC    30 capsule    Take 1 capsule (20 mg) by mouth daily    Liver metastasis (H)       oxyCODONE 5 MG IR tablet    ROXICODONE    20 tablet    Take 1 tablet (5 mg) by mouth every 8 hours as needed for pain        Potassium Chloride ER 20 MEQ Tbcr     30 tablet    Take 1 tablet (20 mEq) by mouth daily    Hypokalemia       prochlorperazine 10 MG tablet    COMPAZINE    30 tablet    Take 1 tablet (10 mg) by  mouth every 6 hours as needed (Nausea/Vomiting)    Metastatic colon cancer to liver (H)       ranitidine 300 MG tablet    ZANTAC    30 tablet    Take 1 tablet (300 mg) by mouth At Bedtime    Nausea and vomiting, intractability of vomiting not specified, unspecified vomiting type       sennosides 8.6 MG tablet    SENOKOT    120 each    Take 1-2 tablets by mouth 2 times daily as needed for constipation    Slow transit constipation

## 2017-10-25 NOTE — ED NOTES
Patient was seen in infusion therapy for the last two days and got Neupogen shot and a unit of blood each day. Tonight she is having some chest pian and chills.

## 2017-10-25 NOTE — ED AVS SNAPSHOT
[facility] Emergency Department    [address] [place] [address]    Phone:  [phone]    Fax:  [phone]                                       Charlene Douglas   MRN: [id number]    Department:  [facility] Emergency Department   Date of Visit:  10/25/2017           After Visit Summary Signature Page     I have received my discharge instructions, and my questions have been answered. I have discussed any challenges I see with this plan with the nurse or doctor.    ..........................................................................................................................................  Patient/Patient Representative Signature      ..........................................................................................................................................  Patient Representative Print Name and Relationship to Patient    ..................................................               ................................................  Date                                            Time    ..........................................................................................................................................  Reviewed by Signature/Title    ...................................................              ..............................................  Date                                                            Time

## 2017-10-25 NOTE — PATIENT INSTRUCTIONS
Pt to return on 10/26/17 for chemo/lab. Copies of medication list and upcoming appointments given prior to discharge.

## 2017-10-25 NOTE — ED PROVIDER NOTES
History     Chief Complaint   Patient presents with     Chest Pain     Chills     The history is provided by the patient.     Charlene Douglas is a 67 year old female who presents to the emergency department with her significant other, with concerns of chest pain and chills. The patient states that she was in the infusion center this morning and arrived home around 13:00 this afternoon. She felt chilled, so she laid down with two blankets and when she woke up at 15:30, she had left sided chest pain. She reports that she was told at the infusion center that the shot they gave her might cause some discomfort over the sternum, but because this was more left sided and she has CHF, she was concerned. In the vehicle on the way to the emergency department the chest pain radiated across her chest to the right side. She has had a cough since Friday and says that cough syrup helps alleviate it. The patient denies having a fever, nausea and vomiting.     Problem List:    Patient Active Problem List    Diagnosis Date Noted     Chemotherapy induced nausea and vomiting 10/17/2017     Priority: Medium     Chemotherapy induced diarrhea 10/17/2017     Priority: Medium     Hypokalemia 09/27/2017     Priority: Medium     Neutropenic fever (H) 09/27/2017     Priority: Medium     Chronic systolic congestive heart failure (H) 09/22/2017     Priority: Medium     After cycle of folfox EF sept 2017 35 - 40%       Benign essential hypertension 09/22/2017     Priority: Medium     Thrombocytopenia (H) 09/22/2017     Priority: Medium     Loss of weight 09/22/2017     Priority: Medium     Physical deconditioning 09/22/2017     Priority: Medium     Hypoxia 09/09/2017     Priority: Medium     Liver metastasis (H) 08/22/2017     Priority: Medium     Metastatic colon cancer to liver (H) 08/22/2017     Priority: Medium     Fever, unspecified 08/07/2017     Priority: Medium     Anemia, unspecified type 08/07/2017     Priority: Medium     Elevated  blood pressure reading without diagnosis of hypertension 2017     Priority: Medium     Lung nodule 2017     Priority: Medium     Advanced directives, counseling/discussion 2013     Priority: Medium     Congenital hip dysplasia 2013     Priority: Medium     CARDIOVASCULAR SCREENING; LDL GOAL LESS THAN 160 2013     Priority: Medium     Family history of other cardiovascular diseases 10/17/2005     Priority: Medium     Problem list name updated by automated process. Provider to review and confirm  Problem list name updated by automated process. Provider to review       UTI (urinary tract infection) 2004     Priority: Medium     never had them until twice in   Problem list name updated by automated process. Provider to review       Keloid scar 2004     Priority: Medium     dorsum right wrist          Past Medical History:    Past Medical History:   Diagnosis Date     Colon cancer metastasized to liver (H)      Hypertension      NO ACTIVE PROBLEMS        Past Surgical History:    Past Surgical History:   Procedure Laterality Date     C APPENDECTOMY       C  DELIVERY ONLY       C NONSPECIFIC PROCEDURE  1964    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     COLONOSCOPY N/A 2017    Procedure: COMBINED COLONOSCOPY, SINGLE OR MULTIPLE BIOPSY/POLYPECTOMY BY BIOPSY;;  Surgeon: Duane, William Charles, MD;  Location: [facility] OR     COLONOSCOPY WITH CO2 INSUFFLATION N/A 2017    Procedure: COLONOSCOPY WITH CO2 INSUFFLATION;  BMI: 25.2  Referring: Donna Mota  Diagnoses: Positive FIT (fecal immunochemical test)  Special screening for malignant neoplasms, colon  Pharmacy: [facility] Fax: [phone];  Surgeon: Duane, William Charles, MD;  Location: [facility] OR     EXAM UNDER ANESTHESIA, ULTRASOUND N/A 2017    Procedure: EXAM UNDER ANESTHESIA, ULTRASOUND;  Ultrasound Liver Biopsy;  Surgeon: GENERIC ANESTHESIA PROVIDER;  Location:  OR     HIP  SURGERY      14 years old     INSERT PORT VASCULAR ACCESS N/A 9/1/2017    Procedure: INSERT PORT VASCULAR ACCESS;  Port placement;  Surgeon: Vinny Peterson DO;  Location: [facility] OR       Family History:    Family History   Problem Relation Age of Onset     C.A.D. Mother      quad- bypass at age 78     CEREBROVASCULAR DISEASE Mother      in her 70's     Lipids Mother      Hypertension Mother      Other - See Comments Mother      Triple Bi-pass     Circulatory Father      abd aneurysm     GASTROINTESTINAL DISEASE Father      diverticulitis     Hypertension Father        Social History:  Marital Status:   [2]  Social History   Substance Use Topics     Smoking status: Never Smoker     Smokeless tobacco: Never Used     Alcohol use No      Comment: 1 per week        Medications:      guaiFENesin-codeine (ROBITUSSIN AC) 100-10 MG/5ML SOLN solution   benzonatate (TESSALON PERLES) 100 MG capsule   prochlorperazine (COMPAZINE) 10 MG tablet   loperamide (IMODIUM) 2 MG capsule   losartan (COZAAR) 25 MG tablet   ranitidine (ZANTAC) 300 MG tablet   oxyCODONE (ROXICODONE) 5 MG IR tablet   ferrous sulfate (IRON) 325 (65 FE) MG tablet   carvedilol (COREG) 12.5 MG tablet   Potassium Chloride ER 20 MEQ TBCR   diclofenac (VOLTAREN) 1 % GEL topical gel   lidocaine (LIDODERM) 5 % Patch   sennosides (SENOKOT) 8.6 MG tablet   ACETAMINOPHEN PO   dexamethasone (DECADRON) 4 MG tablet   LORazepam (ATIVAN) 0.5 MG tablet       Review of Systems   Constitutional: Positive for chills. Negative for fever.   Respiratory: Positive for cough.    Cardiovascular: Positive for chest pain.   Gastrointestinal: Negative for nausea and vomiting.   All other systems reviewed and are negative.      Physical Exam   BP: 167/83  Pulse: 101  Temp: 98.2  F (36.8  C)  Resp: 20  Weight: 65.3 kg (144 lb)  SpO2: 100 %      Physical Exam   Constitutional: She is oriented to person, place, and time. She appears well-developed and well-nourished.   HENT:    Head: Atraumatic.   Eyes: EOM are normal.   Neck: Neck supple.   Cardiovascular: Normal rate, regular rhythm and normal heart sounds.    Pulmonary/Chest: Effort normal and breath sounds normal.   Mild left sternal tenderness   Abdominal: Soft. Bowel sounds are normal.   Musculoskeletal: Normal range of motion.   Neurological: She is alert and oriented to person, place, and time.   Skin: Skin is warm and dry.   Psychiatric: She has a normal mood and affect. Her behavior is normal.   Nursing note and vitals reviewed.      ED Course     ED Course     Procedures               EKG Interpretation:      Interpreted by Eliel Quiros  Time reviewed: now   Symptoms at time of EKG: now   Rhythm: normal sinus   Rate: normal  Axis: NORMAL  Ectopy: none  Conduction: normal  ST Segments/ T Waves: No ST-T wave changes  Q Waves: none  Comparison to prior: No old EKG available    Clinical Impression: normal EKG      Results for orders placed or performed in visit on 10/25/17 (from the past 24 hour(s))   CBC with platelets differential   Result Value Ref Range    WBC 3.7 (L) 4.0 - 11.0 10e9/L    RBC Count 2.63 (L) 3.8 - 5.2 10e12/L    Hemoglobin 7.1 (L) 11.7 - 15.7 g/dL    Hematocrit 22.9 (L) 35.0 - 47.0 %    MCV 87 78 - 100 fl    MCH 27.0 26.5 - 33.0 pg    MCHC 31.0 (L) 31.5 - 36.5 g/dL    RDW 16.6 (H) 10.0 - 15.0 %    Platelet Count 133 (L) 150 - 450 10e9/L    Diff Method Manual Differential     % Neutrophils 29.0 %    % Lymphocytes 35.0 %    % Monocytes 35.0 %    % Eosinophils 1.0 %    % Basophils 0.0 %    Absolute Neutrophil 1.1 (L) 1.6 - 8.3 10e9/L    Absolute Lymphocytes 1.3 0.8 - 5.3 10e9/L    Absolute Monocytes 1.3 0.0 - 1.3 10e9/L    Absolute Eosinophils 0.0 0.0 - 0.7 10e9/L    Absolute Basophils 0.0 0.0 - 0.2 10e9/L    Platelet Estimate Confirming automated cell count    Comprehensive metabolic panel   Result Value Ref Range    Sodium 132 (L) 133 - 144 mmol/L    Potassium 3.3 (L) 3.4 - 5.3 mmol/L    Chloride 96 94  - 109 mmol/L    Carbon Dioxide 27 20 - 32 mmol/L    Anion Gap 9 3 - 14 mmol/L    Glucose 141 (H) 70 - 99 mg/dL    Urea Nitrogen 9 7 - 30 mg/dL    Creatinine 0.64 0.52 - 1.04 mg/dL    GFR Estimate >90 >60 mL/min/1.7m2    GFR Estimate If Black >90 >60 mL/min/1.7m2    Calcium 8.0 (L) 8.5 - 10.1 mg/dL    Bilirubin Total 1.0 0.2 - 1.3 mg/dL    Albumin 1.4 (L) 3.4 - 5.0 g/dL    Protein Total 5.7 (L) 6.8 - 8.8 g/dL    Alkaline Phosphatase 539 (H) 40 - 150 U/L    ALT 24 0 - 50 U/L    AST 42 0 - 45 U/L   Protein qualitative urine   Result Value Ref Range    Protein Albumin Urine Negative NEG^Negative mg/dL     Medications   lidocaine 1 % 1 mL (not administered)   lidocaine (LMX4) kit (not administered)   sodium chloride (PF) 0.9% PF flush 3 mL (not administered)   sodium chloride (PF) 0.9% PF flush 3 mL (not administered)   0.9% sodium chloride BOLUS (1,000 mLs Intravenous New Bag 10/25/17 8454)   heparin 100 UNIT/ML injection 100 Units (not administered)   guaiFENesin-codeine (ROBITUSSIN AC) 100-10 MG/5ML solution 5 mL (not administered)     labs reviewed and were unremarkable.  With patient having symptoms for almost 6 hours now this point in having a negative troponin, this is reassuring.  The pain seems to be reproducible more with palpation.  She was actually told today after getting this bone marrow stimulation drug today, she would get sternal pain and joint pain.  I think this is most likely from that.  Her EKG was also unremarkable which is reassuring.  At this point I think it is safe to discharge the patient home.  I will have the patient follow-up with his doctor if there is no improvement over the next couple of days.    Assessments & Plan (with Medical Decision Making)  atypical chest pain      I have reviewed the nursing notes.    I have reviewed the findings, diagnosis, plan and need for follow up with the patient.      This document serves as a record of services personally performed by Eliel Quiros  MD Jose. It was created on their behalf by Tigist Lomax, a trained medical scribe. The creation of this record is based on the provider's personal observations and the statements of the patient. This document has been checked and approved by the attending provider.    Note: Chart documentation done in part with Dragon Voice Recognition software. Although reviewed after completion, some word and grammatical errors may remain.    10/25/2017   [facility] EMERGENCY DEPARTMENT     Eliel Quiros MD  10/25/17 0364

## 2017-10-25 NOTE — DISCHARGE INSTRUCTIONS
*CHEST PAIN, UNCERTAIN CAUSE    Based on your exam today, the exact cause of your chest pain is not certain. Your condition does not seem serious at this time, and your pain does not appear to be coming from your heart. However, sometimes the signs of a serious problem take more time to appear. Therefore, watch for the warning signs listed below.  HOME CARE:  1. Rest today and avoid strenuous activity.  2. Take any prescribed medicine as directed.  FOLLOW UP with your doctor in 1-3 days.   GET PROMPT MEDICAL ATTENTION if any of the following occur:    A change in the type of pain: if it feels different, becomes more severe, lasts longer, or begins to spread into your shoulder, arm, neck, jaw or back    Shortness of breath or increased pain with breathing    Weakness, dizziness, or fainting    Cough with blood or dark colored sputum (phlegm)    Fever over 101  F (38.3  C)    Swelling, pain or redness in one leg    5615-0566 [employer]. [address] [place] [address]. All rights reserved. This information is not intended as a substitute for professional medical care. Always follow your healthcare professional's instructions.  This information has been modified by your health care provider with permission from the publisher.

## 2017-10-25 NOTE — PROGRESS NOTES
Hematology/ Oncology Follow-up Visit:  Oct 24, 2017    Reason for Visit:   Chief Complaint   Patient presents with     Oncology Clinic Visit     1 week follow up for Metastatic colon cancer to liver     Chemotherapy     C1D1 today with labs prior       Oncologic History:    67-year-old female with newly diagnosed metastatic carcinoma of colon with liver metastases- ADRIANA mutated. She was started on systemic chemotherapy with FOLFOX with Avastin. However had severe cardiogenic toxicity from 5-FU and ended up in the hospital with acute respiratory failure requiring intubation. Initial echo showed LVEF and 10-15% which did improve in 3 days time to 35- 40%.      Interval History:  Patient had first cycle of single agent irinotecan 2 weeks ago. She tolerated this well without significant nausea or vomiting. Her diarrhea is managed well with Imodium. She denies any fever or chills. She continues to having increasing fatigue. Her hemoglobin continued to drop.    Review Of Systems:  Constitutional: Negative for fever, chills, and night sweats. Fatigue and poor appetite  Skin: negative.  Eyes: negative.  Ears/Nose/Throat: negative.  Respiratory: No shortness of breath, dyspnea on exertion, cough, or hemoptysis.  Cardiovascular: Shortness of breath with minimal exertion  Gastrointestinal: Occasional nausea, there is no vomiting, mild diarrhea.  Genitourinary: negative.  Musculoskeletal: negative.  Neurologic: negative.  Psychiatric: negative.  Hematologic/Lymphatic/Immunologic: negative.  Endocrine: negative.    All other ROS negative unless mentioned in interval history.    Past medical, social, surgical, and family histories reviewed.    Allergies:  Allergies as of 10/24/2017 - Miguel as Reviewed 10/24/2017   Allergen Reaction Noted     Lisinopril Cough 09/19/2017     No known allergies  08/11/2003       Current Medications:  Current Outpatient Prescriptions   Medication Sig Dispense Refill     guaiFENesin-codeine (ROBITUSSIN  AC) 100-10 MG/5ML SOLN solution Take 5 mLs by mouth every 4 hours as needed for cough 120 mL 0     benzonatate (TESSALON PERLES) 100 MG capsule Take 2 capsules (200 mg) by mouth 3 times daily as needed for cough 42 capsule 1     dexamethasone (DECADRON) 4 MG tablet Take 2 tablets (8 mg) by mouth daily (with breakfast) Days 2, 3, and 4 of each cycle. 6 tablet 11     LORazepam (ATIVAN) 0.5 MG tablet Take 1 tablet (0.5 mg) by mouth every 4 hours as needed (Anxiety, Nausea/Vomiting or Sleep) 30 tablet 5     prochlorperazine (COMPAZINE) 10 MG tablet Take 1 tablet (10 mg) by mouth every 6 hours as needed (Nausea/Vomiting) 30 tablet 5     loperamide (IMODIUM) 2 MG capsule 2 caps at 1st sign of diarrhea & 1 cap every 2hrs until 12hrs diarrhea free. During night, 2 caps at bedtime & 2 caps every 4hrs until AM 30 capsule 0     losartan (COZAAR) 25 MG tablet Take 1 tablet (25 mg) by mouth daily 30 tablet 11     ranitidine (ZANTAC) 300 MG tablet Take 1 tablet (300 mg) by mouth At Bedtime 30 tablet 1     omeprazole (PRILOSEC) 20 MG CR capsule Take 1 capsule (20 mg) by mouth daily 30 capsule 1     ciprofloxacin (CIPRO) 500 MG tablet Take 1 tablet (500 mg) by mouth 2 times daily 20 tablet 0     metroNIDAZOLE (FLAGYL) 500 MG tablet Take 1 tablet (500 mg) by mouth 3 times daily 21 tablet 0     oxyCODONE (ROXICODONE) 5 MG IR tablet Take 1 tablet (5 mg) by mouth every 8 hours as needed for pain (Patient taking differently: Take 5 mg by mouth every 8 hours as needed for pain (patient only taking half at a time) ) 20 tablet 0     ferrous sulfate (IRON) 325 (65 FE) MG tablet Take 325 mg by mouth daily (with breakfast)       carvedilol (COREG) 12.5 MG tablet Take 1.5 tablets (18.75 mg) by mouth 2 times daily (with meals) 90 tablet 0     Potassium Chloride ER 20 MEQ TBCR Take 1 tablet (20 mEq) by mouth daily 30 tablet 0     diclofenac (VOLTAREN) 1 % GEL topical gel Place 2 g onto the skin 4 times daily as needed for moderate pain (for RUQ  "pain)       lidocaine (LIDODERM) 5 % Patch Place 1-3 patches onto the skin every 24 hours To RUQ 30 patch      sennosides (SENOKOT) 8.6 MG tablet Take 1-2 tablets by mouth 2 times daily as needed for constipation 120 each      ACETAMINOPHEN PO Take 650 mg by mouth every 4 hours as needed for pain or fever           Physical Exam:  /65 (BP Location: Right arm, Patient Position: Chair, Cuff Size: Adult Regular)  Pulse 103  Temp 99  F (37.2  C) (Temporal)  Resp 16  Ht 1.689 m (5' 6.5\")  Wt 65.5 kg (144 lb 4.8 oz)  LMP 06/07/2007  SpO2 98%  BMI 22.94 kg/m2  Wt Readings from Last 12 Encounters:   10/25/17 66.2 kg (146 lb)   10/24/17 65.5 kg (144 lb 6.4 oz)   10/24/17 65.5 kg (144 lb 4.8 oz)   10/17/17 64.5 kg (142 lb 3.2 oz)   10/17/17 64.5 kg (142 lb 1.6 oz)   10/12/17 64.5 kg (142 lb 4.8 oz)   10/10/17 63.7 kg (140 lb 8 oz)   10/07/17 61.7 kg (136 lb)   10/03/17 62.3 kg (137 lb 4.8 oz)   09/29/17 63 kg (139 lb)   09/27/17 63.2 kg (139 lb 6.4 oz)   09/27/17 62.6 kg (138 lb)     ECOG performance status: 1  GENERAL APPEARANCE: Healthy, alert and in no acute distress.  HEENT: Sclerae anicteric. PERRLA. Oropharynx without ulcers, lesions, or thrush.  NECK: Supple. No asymmetry or masses.  LYMPHATICS: No palpable cervical, supraclavicular, axillary, or inguinal lymphadenopathy.  RESP: Lungs clear to auscultation bilaterally without rales, rhonchi or wheezes.  CARDIOVASCULAR: Regular rate and rhythm. Normal S1, S2; no S3 or S4. No murmur, gallop, or rub.  ABDOMEN: Soft, nontender. Bowel sounds normal. Enlarged tender liver   MUSCULOSKELETAL: Extremities without gross deformities noted. No edema of bilateral lower extremities.  SKIN: No suspicious lesions or rashes.  NEURO: Alert and oriented x 3. Cranial nerves II-XII grossly intact.  PSYCHIATRIC: Mentation and affect appear normal.    Laboratory/Imaging Studies:  Oncology Visit on 10/17/2017   Component Date Value Ref Range Status     CEA 10/17/2017 845.3* 0 - " 2.5 ug/L Final    Comment: Smoking may cause CEA results to be elevated.  Assay Method:  Chemiluminescence using Siemens Centaur XP       WBC 10/17/2017 7.2  4.0 - 11.0 10e9/L Final     RBC Count 10/17/2017 3.68* 3.8 - 5.2 10e12/L Final     Hemoglobin 10/17/2017 10.1* 11.7 - 15.7 g/dL Final     Hematocrit 10/17/2017 32.0* 35.0 - 47.0 % Final     MCV 10/17/2017 87  78 - 100 fl Final     MCH 10/17/2017 27.4  26.5 - 33.0 pg Final     MCHC 10/17/2017 31.6  31.5 - 36.5 g/dL Final     RDW 10/17/2017 17.2* 10.0 - 15.0 % Final     Platelet Count 10/17/2017 162  150 - 450 10e9/L Final     Diff Method 10/17/2017 Automated Method   Final     % Neutrophils 10/17/2017 81.0  % Final     % Lymphocytes 10/17/2017 18.2  % Final     % Monocytes 10/17/2017 0.4  % Final     % Eosinophils 10/17/2017 0.1  % Final     % Basophils 10/17/2017 0.0  % Final     % Immature Granulocytes 10/17/2017 0.3  % Final     Absolute Neutrophil 10/17/2017 5.8  1.6 - 8.3 10e9/L Final     Absolute Lymphocytes 10/17/2017 1.3  0.8 - 5.3 10e9/L Final     Absolute Monocytes 10/17/2017 0.0  0.0 - 1.3 10e9/L Final     Absolute Eosinophils 10/17/2017 0.0  0.0 - 0.7 10e9/L Final     Absolute Basophils 10/17/2017 0.0  0.0 - 0.2 10e9/L Final     Abs Immature Granulocytes 10/17/2017 0.0  0 - 0.4 10e9/L Final     Sodium 10/17/2017 128* 133 - 144 mmol/L Final     Potassium 10/17/2017 4.5  3.4 - 5.3 mmol/L Final     Chloride 10/17/2017 93* 94 - 109 mmol/L Final     Carbon Dioxide 10/17/2017 27  20 - 32 mmol/L Final     Anion Gap 10/17/2017 8  3 - 14 mmol/L Final     Glucose 10/17/2017 144* 70 - 99 mg/dL Final     Urea Nitrogen 10/17/2017 22  7 - 30 mg/dL Final     Creatinine 10/17/2017 0.85  0.52 - 1.04 mg/dL Final     GFR Estimate 10/17/2017 67  >60 mL/min/1.7m2 Final    Non  GFR Calc     GFR Estimate If Black 10/17/2017 81  >60 mL/min/1.7m2 Final    African American GFR Calc     Calcium 10/17/2017 8.8  8.5 - 10.1 mg/dL Final     Bilirubin Total  10/17/2017 1.4* 0.2 - 1.3 mg/dL Final     Albumin 10/17/2017 1.6* 3.4 - 5.0 g/dL Final     Protein Total 10/17/2017 6.7* 6.8 - 8.8 g/dL Final     Alkaline Phosphatase 10/17/2017 708* 40 - 150 U/L Final     ALT 10/17/2017 25  0 - 50 U/L Final     AST 10/17/2017 70* 0 - 45 U/L Final   Infusion Therapy Visit on 10/12/2017   Component Date Value Ref Range Status     WBC 10/12/2017 20.6* 4.0 - 11.0 10e9/L Final     RBC Count 10/12/2017 3.43* 3.8 - 5.2 10e12/L Final     Hemoglobin 10/12/2017 9.3* 11.7 - 15.7 g/dL Final     Hematocrit 10/12/2017 29.7* 35.0 - 47.0 % Final     MCV 10/12/2017 87  78 - 100 fl Final     MCH 10/12/2017 27.1  26.5 - 33.0 pg Final     MCHC 10/12/2017 31.3* 31.5 - 36.5 g/dL Final     RDW 10/12/2017 16.7* 10.0 - 15.0 % Final     Platelet Count 10/12/2017 383  150 - 450 10e9/L Final     Diff Method 10/12/2017 Automated Method   Final     % Neutrophils 10/12/2017 80.5  % Final     % Lymphocytes 10/12/2017 8.7  % Final     % Monocytes 10/12/2017 10.0  % Final     % Eosinophils 10/12/2017 0.1  % Final     % Basophils 10/12/2017 0.1  % Final     % Immature Granulocytes 10/12/2017 0.6  % Final     Absolute Neutrophil 10/12/2017 16.6* 1.6 - 8.3 10e9/L Final     Absolute Lymphocytes 10/12/2017 1.8  0.8 - 5.3 10e9/L Final     Absolute Monocytes 10/12/2017 2.1* 0.0 - 1.3 10e9/L Final     Absolute Eosinophils 10/12/2017 0.0  0.0 - 0.7 10e9/L Final     Absolute Basophils 10/12/2017 0.0  0.0 - 0.2 10e9/L Final     Abs Immature Granulocytes 10/12/2017 0.1  0 - 0.4 10e9/L Final     Bilirubin Direct 10/12/2017 0.8* 0.0 - 0.2 mg/dL Final     Bilirubin Total 10/12/2017 1.1  0.2 - 1.3 mg/dL Final     Albumin 10/12/2017 1.5* 3.4 - 5.0 g/dL Final     Protein Total 10/12/2017 6.7* 6.8 - 8.8 g/dL Final     Alkaline Phosphatase 10/12/2017 432* 40 - 150 U/L Final     ALT 10/12/2017 29  0 - 50 U/L Final     AST 10/12/2017 60* 0 - 45 U/L Final   Infusion Therapy Visit on 10/11/2017   Component Date Value Ref Range Status      Unit Number 10/11/2017 [id number]   Final     Blood Component Type 10/11/2017 Red Blood Cells Leukocyte Reduced   Final     Division Number 10/11/2017 00   Final     Status of Unit 10/11/2017 Released to care unit   Final     Blood Product Code 10/11/2017 [id number]   Final     Unit Status 10/11/2017 ISS   Final        Recent Results (from the past 744 hour(s))   CT Abdomen Pelvis w Contrast    Narrative    CT ABDOMEN AND PELVIS WITH CONTRAST 10/7/2017 7:05 PM     HISTORY: Left upper quadrant abdominal pain, known history of liver  and colon cancer, abdominal distention.    COMPARISON: 8/18/2017    TECHNIQUE: Volumetric helical acquisition of CT images from the lung  bases through the symphysis pubis after the administration of 70 mL  Isovue 370  intravenous contrast. Radiation dose for this scan was  reduced using automated exposure control, adjustment of the mA and/or  kV according to patient size, or iterative reconstruction technique.    FINDINGS: No definite bowel obstruction demonstrated. Trace free  fluid. No free air. There are moderate atherosclerotic changes of the  visualized aorta and its branches. There is no evidence of aortic  dissection or aneurysm. No hydronephrosis or urolithiasis  demonstrated. Extensive liver metastases, adenopathy/masses in the  peritoneum again noted and similar to previous. Representative lesion  in the right lobe of liver measures 10.7 cm today previously 10.1 cm.  Adrenal glands, spleen, and pancreas demonstrate no worrisome focal  lesion. Superior degenerative changes in the left hip. No frankly  destructive bony lesions. Lung bases demonstrate trace peripheral  fibrosis and/or atelectasis.      Impression    IMPRESSION: Trace free fluid, no significant ascites or bowel  obstruction demonstrated.    KAILEY GUERRA MD       Assessment and plan:  (C18.9,  C78.7) Metastatic colon cancer to liver (H)  (primary encounter diagnosis)  (C78.7) Liver metastasis (H)  I reviewed with  the patient today most recent results. I recommended to add oxaliplatin and Avastin to the regimen. Patient will be receiving Avastin 5 mg/kg on day 1 and irinotecan 165 mg/m , oxaliplatin 85 mg/m  to be given on day 1 every 14 days. I reviewed with the patient the potential benefits and side effects of the regimen. I will see the patient again in 2 weeks' time or sooner if there are new developments or concerns    (D64.9) Anemia, unspecified type  Hemoglobin today is 6.8. I would recommend transfusion of 2 units of packed RBCs.    (K52.1,  T45.1X5A) Chemotherapy induced diarrhea  Patient will continue on Imodium if needed.    (I50.22) Chronic systolic congestive heart failure (H)  Congestive heart failure has been improving. Patient has been followed by cardiology.    (D69.6) Thrombocytopenia (H)  There is no evidence of bruising or bleeding. We will continue to monitor platelet count    The patient is ready to learn, no apparent learning barriers were identified.  Diagnosis and treatment plans were explained to the patient. The patient expressed understanding of the content. The patient asked appropriate questions. The patient questions were answered to her satisfaction.    Chart documentation with Dragon Voice recognition Software. Although reviewed after completion, some words and grammatical errors may remain.

## 2017-10-25 NOTE — PROGRESS NOTES
Patient here for port labs and chemo. Dr. Dove called regarding labs. ANC-1.1, plt-133,000, K+-3.3 and Hgb-7.1. Hold chemo today, give tomorrow. Order to give neupogen, 1 unit PRBC'S and Start KCL 20 meq 1 daily. Patient states she has prescription already and will start when gets home later today. Patient and  agree with plan of care for today.     Patients Hgb-7.1. Tolerated 1 units of blood. Lung sounds clear pre/post  unit of blood.  Discharged via w/c with .

## 2017-10-25 NOTE — ED AVS SNAPSHOT
[facility] Emergency Department    [address]     [place] [address]    Phone:  [phone]    Fax:  [phone]                                       Charlene Douglas   MRN: [id number]    Department:  [facility] Emergency Department   Date of Visit:  10/25/2017           Patient Information     Date Of Birth          1950        Your diagnoses for this visit were:     Atypical chest pain        You were seen by Eliel Quiros MD.      Follow-up Information     Follow up with Duy Dove MD. Schedule an appointment as soon as possible for a visit in 2 days.    Specialty:  Hematology & Oncology    Why:  If not improving.    Contact information:    [address] [address] [phone]          Discharge Instructions          *CHEST PAIN, UNCERTAIN CAUSE    Based on your exam today, the exact cause of your chest pain is not certain. Your condition does not seem serious at this time, and your pain does not appear to be coming from your heart. However, sometimes the signs of a serious problem take more time to appear. Therefore, watch for the warning signs listed below.  HOME CARE:  1. Rest today and avoid strenuous activity.  2. Take any prescribed medicine as directed.  FOLLOW UP with your doctor in 1-3 days.   GET PROMPT MEDICAL ATTENTION if any of the following occur:    A change in the type of pain: if it feels different, becomes more severe, lasts longer, or begins to spread into your shoulder, arm, neck, jaw or back    Shortness of breath or increased pain with breathing    Weakness, dizziness, or fainting    Cough with blood or dark colored sputum (phlegm)    Fever over 101  F (38.3  C)    Swelling, pain or redness in one leg    [id number] [employer]. [address] [place] [id number]. All rights reserved. This information is not intended as a substitute for professional medical care. Always follow your healthcare professional's  instructions.  This information has been modified by your health care provider with permission from the publisher.      Future Appointments        Provider Department Dept Phone Center    10/26/2017 8:30 AM ion 4fustion Chair 1 [facility] [phone] [facility]    10/31/2017 11:30 AM Duy Dove MD [facility] [phone] [facility]    11/7/2017 8:00 AM Iion Chair 2  air 1 [facility] [phone] [facility]      24 Hour Appointment Hotline       To make an appointment at any [facility], call [phone] ([phone]). If you don't have a family doctor or clinic, we will help you find one. [facility] are conveniently located to serve the needs of you and your family.             Review of your medicines      Our records show that you are taking the medicines listed below. If these are incorrect, please call your family doctor or clinic.        Dose / Directions Last dose taken    ACETAMINOPHEN PO   Dose:  650 mg        Take 650 mg by mouth every 4 hours as needed for pain or fever   Refills:  0        benzonatate 100 MG capsule   Commonly known as:  TESSALON PERLES   Dose:  200 mg   Indication:  Cough   Quantity:  42 capsule        Take 2 capsules (200 mg) by mouth 3 times daily as needed for cough   Refills:  1        carvedilol 12.5 MG tablet   Commonly known as:  COREG   Dose:  18.75 mg   Quantity:  90 tablet        Take 1.5 tablets (18.75 mg) by mouth 2 times daily (with meals)   Refills:  0        dexamethasone 4 MG tablet   Commonly known as:  DECADRON   Dose:  8 mg   Quantity:  6 tablet        Take 2 tablets (8 mg) by mouth daily (with breakfast) Days 2, 3, and 4 of each cycle.   Refills:  11        diclofenac 1 % Gel topical gel   Commonly known as:  VOLTAREN   Dose:  2 g        Place 2 g onto the skin 4 times daily as needed for moderate pain (for RUQ pain)   Refills:  0        ferrous sulfate 325 (65 FE) MG  tablet   Commonly known as:  IRON   Dose:  325 mg        Take 325 mg by mouth daily (with breakfast)   Refills:  0        guaiFENesin-codeine 100-10 MG/5ML Soln solution   Commonly known as:  ROBITUSSIN AC   Dose:  1 tsp.   Quantity:  120 mL        Take 5 mLs by mouth every 4 hours as needed for cough   Refills:  0        lidocaine 5 % Patch   Commonly known as:  LIDODERM   Dose:  1-3 patch   Quantity:  30 patch        Place 1-3 patches onto the skin every 24 hours To RUQ   Refills:  0        loperamide 2 MG capsule   Commonly known as:  IMODIUM   Quantity:  30 capsule        2 caps at 1st sign of diarrhea & 1 cap every 2hrs until 12hrs diarrhea free. During night, 2 caps at bedtime & 2 caps every 4hrs until AM   Refills:  0        LORazepam 0.5 MG tablet   Commonly known as:  ATIVAN   Dose:  0.5 mg   Quantity:  30 tablet        Take 1 tablet (0.5 mg) by mouth every 4 hours as needed (Anxiety, Nausea/Vomiting or Sleep)   Refills:  5        losartan 25 MG tablet   Commonly known as:  COZAAR   Dose:  25 mg   Quantity:  30 tablet        Take 1 tablet (25 mg) by mouth daily   Refills:  11        oxyCODONE 5 MG IR tablet   Commonly known as:  ROXICODONE   Dose:  5 mg   Quantity:  20 tablet        Take 1 tablet (5 mg) by mouth every 8 hours as needed for pain   Refills:  0        Potassium Chloride ER 20 MEQ Tbcr   Dose:  20 mEq   Quantity:  30 tablet        Take 1 tablet (20 mEq) by mouth daily   Refills:  0        prochlorperazine 10 MG tablet   Commonly known as:  COMPAZINE   Dose:  10 mg   Quantity:  30 tablet        Take 1 tablet (10 mg) by mouth every 6 hours as needed (Nausea/Vomiting)   Refills:  5        ranitidine 300 MG tablet   Commonly known as:  ZANTAC   Dose:  300 mg   Quantity:  30 tablet        Take 1 tablet (300 mg) by mouth At Bedtime   Refills:  1        sennosides 8.6 MG tablet   Commonly known as:  SENOKOT   Dose:  1-2 tablet   Quantity:  120 each        Take 1-2 tablets by mouth 2 times daily as  needed for constipation   Refills:  0                Procedures and tests performed during your visit     CBC with platelets differential    Comprehensive metabolic panel    EKG 12-lead, tracing only    NT pro BNP    Peripheral IV: Standard    Troponin I (now)    XR Chest Port 1 View      Orders Needing Specimen Collection     None      Pending Results     Date and Time Order Name Status Description    10/25/2017 1638 CBC with platelets differential In process             Pending Culture Results     No orders found from 10/23/2017 to 10/26/2017.            Pending Results Instructions     If you had any lab results that were not finalized at the time of your Discharge, you can call the ED Lab Result RN at [phone]. You will be contacted by this team for any positive Lab results or changes in treatment. The nurses are available 7 days a week from 10A to 6:30P.  You can leave a message 24 hours per day and they will return your call.        Thank you for choosing [facility]       Thank you for choosing [facility] for your care. Our goal is always to provide you with excellent care. Hearing back from our patients is one way we can continue to improve our services. Please take a few minutes to complete the written survey that you may receive in the mail after you visit with us. Thank you!        [employer] Information     [employer] gives you secure access to your electronic health record. If you see a primary care provider, you can also send messages to your care team and make appointments. If you have questions, please call your primary care clinic.  If you do not have a primary care provider, please call [phone] and they will assist you.        Care EveryWhere ID     This is your Care EveryWhere ID. This could be used by other organizations to access your [facility] medical records  [id number]        Equal Access to Services     RON PERALTA : roxy Read qaybta kaalmada adeegyada,  debra taylor'aan ah. So [facility] [phone].    ATENCIÓN: Si habla español, tiene a hunt disposición servicios gratuitos de asistencia lingüística. Llame al [phone].    We comply with applicable federal civil rights laws and [place] laws. We do not discriminate on the basis of race, color, national origin, age, disability, sex, sexual orientation, or gender identity.            After Visit Summary       This is your record. Keep this with you and show to your community pharmacist(s) and doctor(s) at your next visit.                   (4) walks frequently

## 2017-10-26 NOTE — PROGRESS NOTES
Patient here for labs and possible chemo C1D1. Port labs drawn. Went to ER Yesterday phillip for chest pains.  Patient c/o feeling bloated, had to put bigger pants on this am. 2+ Pitting edema in both ankles, swelling extends up to knees. Lungs clear. Denies any worsening in cough or SOA. Weight up 4 # since 10/24. Patient states she isn't voiding as much. DR. Dove updated on above. Order received for CMP and lasix. Talked with DR. Dove about lab results. No further orders. Defer chemo until 10/31/17. Port flushed per protocol and de-accessed.  Patient updated and will return next week to see ,  recheck labs and chemo. Discharged via w/c with .

## 2017-10-26 NOTE — MR AVS SNAPSHOT
After Visit Summary   10/26/2017    Charlene Douglas    MRN: [id number]           Patient Information     Date Of Birth          1950        Visit Information        Provider Department      10/26/2017 8:30 AM NL INFUSION CHAIR 4 [facility]        Today's Diagnoses     Liver metastasis (H)    -  1    Metastatic colon cancer to liver (H)          Care Instructions    Pt to return on 10/31/17  for labs/chemo. Copies of medication list and upcoming appointments given prior to discharge.           Follow-ups after your visit        Your next 10 appointments already scheduled     Oct 31, 2017  8:00 AM CDT   Return Visit with Duy Dove MD   [facility] ([facility]    [address] [facility] [address] [phone]            Oct 31, 2017  8:30 AM CDT   Level 7 with NL INFUSION CHAIR 1   [facility] ([facility])    [facility] [address] [place] [address] [phone]            Nov 07, 2017  8:00 AM CST   Level 7 with NL INFUSION CHAIR 2   [facility] ([facility])    [address] [place] [address] [address] [place] [address]   [phone]              Who to contact     If you have questions or need follow up information about today's clinic visit or your schedule please contact [facility] directly at [phone].  Normal or non-critical lab and imaging results will be communicated to you by MyChart, letter or phone within 4 business days after the clinic has received the results. If you do not hear from us within 7 days, please contact the clinic through MyChart or phone. If you have a critical or abnormal lab result, we will notify you by phone as soon as possible.  Submit refill requests through [employer] or call your pharmacy and they will forward the refill request to us. Please allow 3 business days for your refill to be  completed.          Additional Information About Your Visit        [email or web address] Information     [employer] gives you secure access to your electronic health record. If you see a primary care provider, you can also send messages to your care team and make appointments. If you have questions, please call your primary care clinic.  If you do not have a primary care provider, please call [phone] and they will assist you.        Care EveryWhere ID     This is your Care EveryWhere ID. This could be used by other organizations to access your [facility] medical records  [id number]        Your Vitals Were     Pulse Temperature Respirations Last Period Pulse Oximetry BMI (Body Mass Index)    85 98.5  F (36.9  C) (Oral) 18 06/07/2007 100% 23.58 kg/m2       Blood Pressure from Last 3 Encounters:   10/26/17 129/57   10/25/17 167/82   10/25/17 136/65    Weight from Last 3 Encounters:   10/26/17 67.3 kg (148 lb 4.8 oz)   10/25/17 65.3 kg (144 lb)   10/25/17 66.2 kg (146 lb)              We Performed the Following     CBC with platelets differential     Comprehensive metabolic panel          Today's Medication Changes          These changes are accurate as of: 10/26/17 11:54 AM.  If you have any questions, ask your nurse or doctor.               These medicines have changed or have updated prescriptions.        Dose/Directions    oxyCODONE 5 MG IR tablet   Commonly known as:  ROXICODONE   This may have changed:  reasons to take this        Dose:  5 mg   Take 1 tablet (5 mg) by mouth every 8 hours as needed for pain   Quantity:  20 tablet   Refills:  0                Primary Care Provider Office Phone # Fax #    Donna Shirley PA-C [phone] [address] [place] [address]        Equal Access to Services     [facility] Center[facility]NN[facility]TE : Nic Blandon, roxy beltran, debra negron. So [facility] [phone].    ATENCIÓN: Si jeronimo loving hunt  disposición servicios gratuitos de asistencia lingüística. Tameka toure [phone].    We comply with applicable federal civil rights laws and [place] laws. We do not discriminate on the basis of race, color, national origin, age, disability, sex, sexual orientation, or gender identity.            Thank you!     Thank you for choosing [facility]  for your care. Our goal is always to provide you with excellent care. Hearing back from our patients is one way we can continue to improve our services. Please take a few minutes to complete the written survey that you may receive in the mail after your visit with us. Thank you!             Your Updated Medication List - Protect others around you: Learn how to safely use, store and throw away your medicines at www.disposemymeds.org.          This list is accurate as of: 10/26/17 11:54 AM.  Always use your most recent med list.                   Brand Name Dispense Instructions for use Diagnosis    ACETAMINOPHEN PO      Take 650 mg by mouth every 4 hours as needed for pain or fever        benzonatate 100 MG capsule    TESSALON PERLES    42 capsule    Take 2 capsules (200 mg) by mouth 3 times daily as needed for cough    Metastatic colon cancer to liver (H)       carvedilol 12.5 MG tablet    COREG    90 tablet    Take 1.5 tablets (18.75 mg) by mouth 2 times daily (with meals)    Chronic systolic congestive heart failure (H)       dexamethasone 4 MG tablet    DECADRON    6 tablet    Take 2 tablets (8 mg) by mouth daily (with breakfast) Days 2, 3, and 4 of each cycle.    Metastatic colon cancer to liver (H)       diclofenac 1 % Gel topical gel    VOLTAREN     Place 2 g onto the skin 4 times daily as needed for moderate pain (for RUQ pain)    Metastatic colon cancer to liver (H)       ferrous sulfate 325 (65 FE) MG tablet    IRON     Take 325 mg by mouth daily (with breakfast)        guaiFENesin-codeine 100-10 MG/5ML Soln solution    ROBITUSSIN AC     120 mL    Take 5 mLs by mouth every 4 hours as needed for cough    Liver metastasis (H)       lidocaine 5 % Patch    LIDODERM    30 patch    Place 1-3 patches onto the skin every 24 hours To RUQ    Metastatic colon cancer to liver (H)       loperamide 2 MG capsule    IMODIUM    30 capsule    2 caps at 1st sign of diarrhea & 1 cap every 2hrs until 12hrs diarrhea free. During night, 2 caps at bedtime & 2 caps every 4hrs until AM    Metastatic colon cancer to liver (H)       LORazepam 0.5 MG tablet    ATIVAN    30 tablet    Take 1 tablet (0.5 mg) by mouth every 4 hours as needed (Anxiety, Nausea/Vomiting or Sleep)    Metastatic colon cancer to liver (H)       losartan 25 MG tablet    COZAAR    30 tablet    Take 1 tablet (25 mg) by mouth daily    Secondary cardiomyopathy (H)       oxyCODONE 5 MG IR tablet    ROXICODONE    20 tablet    Take 1 tablet (5 mg) by mouth every 8 hours as needed for pain        Potassium Chloride ER 20 MEQ Tbcr     30 tablet    Take 1 tablet (20 mEq) by mouth daily    Hypokalemia       prochlorperazine 10 MG tablet    COMPAZINE    30 tablet    Take 1 tablet (10 mg) by mouth every 6 hours as needed (Nausea/Vomiting)    Metastatic colon cancer to liver (H)       ranitidine 300 MG tablet    ZANTAC    30 tablet    Take 1 tablet (300 mg) by mouth At Bedtime    Nausea and vomiting, intractability of vomiting not specified, unspecified vomiting type       sennosides 8.6 MG tablet    SENOKOT    120 each    Take 1-2 tablets by mouth 2 times daily as needed for constipation    Slow transit constipation

## 2017-10-26 NOTE — PATIENT INSTRUCTIONS
Pt to return on 10/31/17  for labs/chemo. Copies of medication list and upcoming appointments given prior to discharge.

## 2017-10-31 NOTE — PROGRESS NOTES
Hematology/ Oncology Follow-up Visit:  Oct 31, 2017    Reason for Visit:   Chief Complaint   Patient presents with     Oncology Clinic Visit     1 week follow up for Metastatic colon cancer to liver     Chemotherapy     CIDI today with labs prior       Oncologic History:    Metastatic colon cancer to liver (H)  Charlene Douglas presented with 2 months history of cough, shortness of breath, decreased appetite and abdominal pain. Patient has black stool and occult blood which was positive. Subsequently the patient went on to have a CT angiogram of the chest because of shortness of breath. Lungs were clear. Multiple liver lesions were seen. Subsequently the patient went on to have an ultrasound of the liver done on August 18, 2017 which confirmed the presence of multiple liver metastases. Abdominal CT was done on August 18, 2017 showing large ascending colon mass with associated mesenteric/peritoneal drop metastasis and lymphadenopathy. There are multiple liver metastasis seen. KRAS mutated. She was started on systemic chemotherapy with FOLFOX with Avastin. However had severe cardiogenic toxicity from 5-FU and ended up in the hospital with acute respiratory failure requiring intubation. Initial echo showed LVEF and 10-15% which did improve in 3 days time to 35- 40%.  Subsequently, chemotherapy was switched to single agent irinotecan.    Interval History:  Patient is in today for follow-up. She's been having right upper quadrant pain. The patient is currently well controlled with the current regimen. Appetite has improved. Her cough has improved. She denies any diarrhea or black stools. She denies any fever or chills.    Review Of Systems:  Constitutional: Negative for fever, chills, and night sweats.  Skin: negative.  Eyes: negative.  Ears/Nose/Throat: negative.  Respiratory: No shortness of breath, dyspnea on exertion, cough, or hemoptysis.  Cardiovascular: negative.  Gastrointestinal: Abdominal pain as mentioned  above  Genitourinary: negative.  Musculoskeletal: negative.  Neurologic: negative.  Psychiatric: negative.  Hematologic/Lymphatic/Immunologic: negative.  Endocrine: negative.    All other ROS negative unless mentioned in interval history.    Past medical, social, surgical, and family histories reviewed.    Allergies:  Allergies as of 10/31/2017 - Miguel as Reviewed 10/31/2017   Allergen Reaction Noted     Lisinopril Cough 09/19/2017     No known allergies  08/11/2003       Current Medications:  Current Outpatient Prescriptions   Medication Sig Dispense Refill     guaiFENesin-codeine (ROBITUSSIN AC) 100-10 MG/5ML SOLN solution Take 5 mLs by mouth every 4 hours as needed for cough 120 mL 0     benzonatate (TESSALON PERLES) 100 MG capsule Take 2 capsules (200 mg) by mouth 3 times daily as needed for cough 42 capsule 1     dexamethasone (DECADRON) 4 MG tablet Take 2 tablets (8 mg) by mouth daily (with breakfast) Days 2, 3, and 4 of each cycle. 6 tablet 11     LORazepam (ATIVAN) 0.5 MG tablet Take 1 tablet (0.5 mg) by mouth every 4 hours as needed (Anxiety, Nausea/Vomiting or Sleep) 30 tablet 5     prochlorperazine (COMPAZINE) 10 MG tablet Take 1 tablet (10 mg) by mouth every 6 hours as needed (Nausea/Vomiting) 30 tablet 5     loperamide (IMODIUM) 2 MG capsule 2 caps at 1st sign of diarrhea & 1 cap every 2hrs until 12hrs diarrhea free. During night, 2 caps at bedtime & 2 caps every 4hrs until AM 30 capsule 0     losartan (COZAAR) 25 MG tablet Take 1 tablet (25 mg) by mouth daily 30 tablet 11     ranitidine (ZANTAC) 300 MG tablet Take 1 tablet (300 mg) by mouth At Bedtime 30 tablet 1     oxyCODONE (ROXICODONE) 5 MG IR tablet Take 1 tablet (5 mg) by mouth every 8 hours as needed for pain (Patient taking differently: Take 5 mg by mouth every 8 hours as needed for pain (patient only taking half at a time) ) 20 tablet 0     ferrous sulfate (IRON) 325 (65 FE) MG tablet Take 325 mg by mouth daily (with breakfast)       carvedilol  "(COREG) 12.5 MG tablet Take 1.5 tablets (18.75 mg) by mouth 2 times daily (with meals) 90 tablet 0     Potassium Chloride ER 20 MEQ TBCR Take 1 tablet (20 mEq) by mouth daily 30 tablet 0     diclofenac (VOLTAREN) 1 % GEL topical gel Place 2 g onto the skin 4 times daily as needed for moderate pain (for RUQ pain)       lidocaine (LIDODERM) 5 % Patch Place 1-3 patches onto the skin every 24 hours To RUQ 30 patch      sennosides (SENOKOT) 8.6 MG tablet Take 1-2 tablets by mouth 2 times daily as needed for constipation 120 each      ACETAMINOPHEN PO Take 650 mg by mouth every 4 hours as needed for pain or fever           Physical Exam:  /79 (BP Location: Right arm, Patient Position: Chair, Cuff Size: Adult Regular)  Pulse 85  Temp 97.7  F (36.5  C) (Temporal)  Resp 16  Ht 1.689 m (5' 6.5\")  Wt 62.3 kg (137 lb 6.4 oz)  LMP 06/07/2007  SpO2 97%  BMI 21.84 kg/m2  Wt Readings from Last 12 Encounters:   10/31/17 62.3 kg (137 lb 6.4 oz)   10/26/17 67.3 kg (148 lb 4.8 oz)   10/25/17 65.3 kg (144 lb)   10/25/17 66.2 kg (146 lb)   10/24/17 65.5 kg (144 lb 6.4 oz)   10/24/17 65.5 kg (144 lb 4.8 oz)   10/17/17 64.5 kg (142 lb 3.2 oz)   10/17/17 64.5 kg (142 lb 1.6 oz)   10/12/17 64.5 kg (142 lb 4.8 oz)   10/10/17 63.7 kg (140 lb 8 oz)   10/07/17 61.7 kg (136 lb)   10/03/17 62.3 kg (137 lb 4.8 oz)     ECOG performance status:1  GENERAL APPEARANCE: Healthy, alert and in no acute distress.  HEENT: Sclerae anicteric. PERRLA. Oropharynx without ulcers, lesions, or thrush.  NECK: Supple. No asymmetry or masses.  LYMPHATICS: No palpable cervical, supraclavicular, axillary, or inguinal lymphadenopathy.  RESP: Lungs clear to auscultation bilaterally without rales, rhonchi or wheezes.  CARDIOVASCULAR: Regular rate and rhythm. Normal S1, S2; no S3 or S4. No murmur, gallop, or rub.  ABDOMEN: Soft, . Bowel sounds normal. Enlarged tender liver with multiple nodular areas.  MUSCULOSKELETAL: Extremities without gross deformities " noted. No edema of bilateral lower extremities.  SKIN: No suspicious lesions or rashes.  NEURO: Alert and oriented x 3. Cranial nerves II-XII grossly intact.  PSYCHIATRIC: Mentation and affect appear normal.    Laboratory/Imaging Studies:  Infusion Therapy Visit on 10/26/2017   Component Date Value Ref Range Status     WBC 10/26/2017 12.5* 4.0 - 11.0 10e9/L Final     RBC Count 10/26/2017 2.96* 3.8 - 5.2 10e12/L Final     Hemoglobin 10/26/2017 8.3* 11.7 - 15.7 g/dL Final     Hematocrit 10/26/2017 26.1* 35.0 - 47.0 % Final     MCV 10/26/2017 88  78 - 100 fl Final     MCH 10/26/2017 28.0  26.5 - 33.0 pg Final     MCHC 10/26/2017 31.8  31.5 - 36.5 g/dL Final     RDW 10/26/2017 16.7* 10.0 - 15.0 % Final     Platelet Count 10/26/2017 227  150 - 450 10e9/L Final     Diff Method 10/26/2017 Manual Differential   Final     % Neutrophils 10/26/2017 52.0  % Final     % Lymphocytes 10/26/2017 42.0  % Final     % Monocytes 10/26/2017 3.0  % Final     % Eosinophils 10/26/2017 0.0  % Final     % Basophils 10/26/2017 0.0  % Final     % Metamyelocytes 10/26/2017 3.0  % Final     Absolute Neutrophil 10/26/2017 6.5  1.6 - 8.3 10e9/L Final     Absolute Lymphocytes 10/26/2017 5.3  0.8 - 5.3 10e9/L Final     Absolute Monocytes 10/26/2017 0.4  0.0 - 1.3 10e9/L Final     Absolute Eosinophils 10/26/2017 0.0  0.0 - 0.7 10e9/L Final     Absolute Basophils 10/26/2017 0.0  0.0 - 0.2 10e9/L Final     Absolute Metamyelocytes 10/26/2017 0.4* 0 10e9/L Final     Polychromasia 10/26/2017 M   Final     Hypochromasia 10/26/2017 P   Final     Platelet Estimate 10/26/2017 N   Final     Sodium 10/26/2017 134  133 - 144 mmol/L Final     Potassium 10/26/2017 3.4  3.4 - 5.3 mmol/L Final     Chloride 10/26/2017 98  94 - 109 mmol/L Final     Carbon Dioxide 10/26/2017 27  20 - 32 mmol/L Final     Anion Gap 10/26/2017 9  3 - 14 mmol/L Final     Glucose 10/26/2017 124* 70 - 99 mg/dL Final     Urea Nitrogen 10/26/2017 7  7 - 30 mg/dL Final     Creatinine 10/26/2017  0.62  0.52 - 1.04 mg/dL Final     GFR Estimate 10/26/2017 >90  >60 mL/min/1.7m2 Final    Non  GFR Calc     GFR Estimate If Black 10/26/2017 >90  >60 mL/min/1.7m2 Final    African American GFR Calc     Calcium 10/26/2017 8.2* 8.5 - 10.1 mg/dL Final     Bilirubin Total 10/26/2017 0.9  0.2 - 1.3 mg/dL Final     Albumin 10/26/2017 1.4* 3.4 - 5.0 g/dL Final     Protein Total 10/26/2017 5.6* 6.8 - 8.8 g/dL Final     Alkaline Phosphatase 10/26/2017 550* 40 - 150 U/L Final     ALT 10/26/2017 26  0 - 50 U/L Final     AST 10/26/2017 41  0 - 45 U/L Final   Infusion Therapy Visit on 10/25/2017   Component Date Value Ref Range Status     WBC 10/25/2017 3.7* 4.0 - 11.0 10e9/L Final     RBC Count 10/25/2017 2.63* 3.8 - 5.2 10e12/L Final     Hemoglobin 10/25/2017 7.1* 11.7 - 15.7 g/dL Final     Hematocrit 10/25/2017 22.9* 35.0 - 47.0 % Final     MCV 10/25/2017 87  78 - 100 fl Final     MCH 10/25/2017 27.0  26.5 - 33.0 pg Final     MCHC 10/25/2017 31.0* 31.5 - 36.5 g/dL Final     RDW 10/25/2017 16.6* 10.0 - 15.0 % Final     Platelet Count 10/25/2017 133* 150 - 450 10e9/L Final     Diff Method 10/25/2017 Manual Differential   Final     % Neutrophils 10/25/2017 29.0  % Final     % Lymphocytes 10/25/2017 35.0  % Final     % Monocytes 10/25/2017 35.0  % Final     % Eosinophils 10/25/2017 1.0  % Final     % Basophils 10/25/2017 0.0  % Final     Absolute Neutrophil 10/25/2017 1.1* 1.6 - 8.3 10e9/L Final     Absolute Lymphocytes 10/25/2017 1.3  0.8 - 5.3 10e9/L Final     Absolute Monocytes 10/25/2017 1.3  0.0 - 1.3 10e9/L Final     Absolute Eosinophils 10/25/2017 0.0  0.0 - 0.7 10e9/L Final     Absolute Basophils 10/25/2017 0.0  0.0 - 0.2 10e9/L Final     Platelet Estimate 10/25/2017 Confirming automated cell count   Final     Sodium 10/25/2017 132* 133 - 144 mmol/L Final     Potassium 10/25/2017 3.3* 3.4 - 5.3 mmol/L Final     Chloride 10/25/2017 96  94 - 109 mmol/L Final     Carbon Dioxide 10/25/2017 27  20 - 32 mmol/L  Final     Anion Gap 10/25/2017 9  3 - 14 mmol/L Final     Glucose 10/25/2017 141* 70 - 99 mg/dL Final     Urea Nitrogen 10/25/2017 9  7 - 30 mg/dL Final     Creatinine 10/25/2017 0.64  0.52 - 1.04 mg/dL Final     GFR Estimate 10/25/2017 >90  >60 mL/min/1.7m2 Final    Non  GFR Calc     GFR Estimate If Black 10/25/2017 >90  >60 mL/min/1.7m2 Final    African American GFR Calc     Calcium 10/25/2017 8.0* 8.5 - 10.1 mg/dL Final     Bilirubin Total 10/25/2017 1.0  0.2 - 1.3 mg/dL Final     Albumin 10/25/2017 1.4* 3.4 - 5.0 g/dL Final     Protein Total 10/25/2017 5.7* 6.8 - 8.8 g/dL Final     Alkaline Phosphatase 10/25/2017 539* 40 - 150 U/L Final     ALT 10/25/2017 24  0 - 50 U/L Final     AST 10/25/2017 42  0 - 45 U/L Final     Protein Albumin Urine 10/25/2017 Negative  NEG^Negative mg/dL Final   Infusion Therapy Visit on 10/24/2017   Component Date Value Ref Range Status     WBC 10/24/2017 1.6* 4.0 - 11.0 10e9/L Final     RBC Count 10/24/2017 2.45* 3.8 - 5.2 10e12/L Final     Hemoglobin 10/24/2017 6.7* 11.7 - 15.7 g/dL Final    Comment: This result has been called to CT LEONG by Naya Cotto on 10 24 2017   at 0830, and has been read back.        Hematocrit 10/24/2017 21.9* 35.0 - 47.0 % Final     MCV 10/24/2017 89  78 - 100 fl Final     MCH 10/24/2017 27.3  26.5 - 33.0 pg Final     MCHC 10/24/2017 30.6* 31.5 - 36.5 g/dL Final     RDW 10/24/2017 16.0* 10.0 - 15.0 % Final     Platelet Count 10/24/2017 76* 150 - 450 10e9/L Final     Diff Method 10/24/2017 Manual Differential   Final     % Neutrophils 10/24/2017 7.0  % Final     % Lymphocytes 10/24/2017 61.0  % Final     % Monocytes 10/24/2017 32.0  % Final     % Eosinophils 10/24/2017 0.0  % Final     % Basophils 10/24/2017 0.0  % Final     Absolute Neutrophil 10/24/2017 0.1* 1.6 - 8.3 10e9/L Final    Comment: This result has been called to CT MARTIN by Monique Lock on 10 25 2017 at   0822, and has been read back. Called by  on [id number] at 0885        Absolute Lymphocytes 10/24/2017 1.0  0.8 - 5.3 10e9/L Final     Absolute Monocytes 10/24/2017 0.5  0.0 - 1.3 10e9/L Final     Absolute Eosinophils 10/24/2017 0.0  0.0 - 0.7 10e9/L Final     Absolute Basophils 10/24/2017 0.0  0.0 - 0.2 10e9/L Final     Sodium 10/24/2017 131* 133 - 144 mmol/L Final     Potassium 10/24/2017 3.8  3.4 - 5.3 mmol/L Final     Chloride 10/24/2017 95  94 - 109 mmol/L Final     Carbon Dioxide 10/24/2017 27  20 - 32 mmol/L Final     Anion Gap 10/24/2017 9  3 - 14 mmol/L Final     Glucose 10/24/2017 124* 70 - 99 mg/dL Final     Urea Nitrogen 10/24/2017 10  7 - 30 mg/dL Final     Creatinine 10/24/2017 0.72  0.52 - 1.04 mg/dL Final     GFR Estimate 10/24/2017 81  >60 mL/min/1.7m2 Final    Non  GFR Calc     GFR Estimate If Black 10/24/2017 >90  >60 mL/min/1.7m2 Final    African American GFR Calc     Calcium 10/24/2017 8.0* 8.5 - 10.1 mg/dL Final     Bilirubin Total 10/24/2017 1.1  0.2 - 1.3 mg/dL Final     Albumin 10/24/2017 1.4* 3.4 - 5.0 g/dL Final     Protein Total 10/24/2017 5.8* 6.8 - 8.8 g/dL Final     Alkaline Phosphatase 10/24/2017 477* 40 - 150 U/L Final     ALT 10/24/2017 12  0 - 50 U/L Final     AST 10/24/2017 26  0 - 45 U/L Final     Units Ordered 10/24/2017 2   Final     ABO 10/24/2017 A   Final     RH(D) 10/24/2017 Pos   Final     Antibody Screen 10/24/2017 Neg   Final     Test Valid Only At 10/24/2017 [facility]      Final     Specimen Expires 10/24/2017 10/27/2017   Final     Crossmatch 10/24/2017 Red Blood Cells   Final     Unit Number 10/24/2017 [id number]   Final     Blood Component Type 10/24/2017 Red Blood Cells Leukocyte Reduced   Final     Division Number 10/24/2017 00   Final     Status of Unit 10/24/2017 Released to care unit   Final     Blood Product Code 10/24/2017 [id number]   Final     Unit Status 10/24/2017 ISS   Final     Unit Number 10/24/2017 [id number]   Final     Blood Component Type 10/24/2017 Red Blood Cells Leukocyte  Reduced   Final     Division Number 10/24/2017 00   Final     Status of Unit 10/24/2017 Released to care unit   Final     Blood Product Code 10/24/2017 [id number]   Final     Unit Status 10/24/2017 ISS   Final        Recent Results (from the past 744 hour(s))   CT Abdomen Pelvis w Contrast    Narrative    CT ABDOMEN AND PELVIS WITH CONTRAST 10/7/2017 7:05 PM     HISTORY: Left upper quadrant abdominal pain, known history of liver  and colon cancer, abdominal distention.    COMPARISON: 8/18/2017    TECHNIQUE: Volumetric helical acquisition of CT images from the lung  bases through the symphysis pubis after the administration of 70 mL  Isovue 370  intravenous contrast. Radiation dose for this scan was  reduced using automated exposure control, adjustment of the mA and/or  kV according to patient size, or iterative reconstruction technique.    FINDINGS: No definite bowel obstruction demonstrated. Trace free  fluid. No free air. There are moderate atherosclerotic changes of the  visualized aorta and its branches. There is no evidence of aortic  dissection or aneurysm. No hydronephrosis or urolithiasis  demonstrated. Extensive liver metastases, adenopathy/masses in the  peritoneum again noted and similar to previous. Representative lesion  in the right lobe of liver measures 10.7 cm today previously 10.1 cm.  Adrenal glands, spleen, and pancreas demonstrate no worrisome focal  lesion. Superior degenerative changes in the left hip. No frankly  destructive bony lesions. Lung bases demonstrate trace peripheral  fibrosis and/or atelectasis.      Impression    IMPRESSION: Trace free fluid, no significant ascites or bowel  obstruction demonstrated.    KAILEY GUERRA MD   XR Chest Port 1 View    Narrative    CHEST PORTABLE ONE VIEW   10/25/2017 6:18 PM     HISTORY: Chest pain.    COMPARISON: 9/18/2017    FINDINGS: Port-A-Cath with tip in the SVC. Heart size normal. Lungs  clear. No interval change.      Impression    IMPRESSION:  Nothing acute. No interval change.    ANITRA DENNEY MD       Assessment and plan:  (C18.9,  C78.7) Metastatic colon cancer to liver (H)  (primary encounter diagnosis)  (C78.7) Liver metastasis (H)  Patient will be receiving Avastin 5 mg/kg on day 1 and irinotecan 165 mg/m , oxaliplatin 85 mg/m  to be given on day 1 every 14 days. I reviewed with the patient the potential benefits and side effects of the regimen. I will see the patient again in 1 week time or sooner if there are new developments or concerns  (K52.1,  T45.1X5A) Chemotherapy induced diarrhea  Diarrhea has improved. We discussed with the patient management of diarrhea with Imodium.    (R11.2,  T45.1X5A) Chemotherapy induced nausea and vomiting  Patient nausea has been controlled on the current regimen.    (I50.22) Chronic systolic congestive heart failure (H)  Patient currently on carvedilol 18.75 twice daily. I would add Lasix 20 mg orally daily.    (I10) Benign essential hypertension  Patient currently on Cozaar 25 mg orally daily.    (D64.9) Anemia, unspecified type  We will continue to monitor hemoglobin and a blood transfusion if needed.    (E87.6) Hypokalemia  Patient will continue on potassium chloride 20 mEq daily.      The patient is ready to learn, no apparent learning barriers were identified.  Diagnosis and treatment plans were explained to the patient. The patient expressed understanding of the content. The patient asked appropriate questions. The patient questions were answered to her satisfaction.    Chart documentation with Dragon Voice recognition Software. Although reviewed after completion, some words and grammatical errors may remain.

## 2017-10-31 NOTE — MR AVS SNAPSHOT
After Visit Summary   10/31/2017    Charlene Douglas    MRN: [id number]           Patient Information     Date Of Birth          1950        Visit Information        Provider Department      10/31/2017 8:00 AM Duy Dove MD [facility]        Care Instructions      Please follow up with Dr. Dove in 1 week.      Follow Up Date/Time:     If you have any questions or concerns please feel free to call.    If you need to reschedule please call:  Clinic or Lab Appointment - [phone]  Infusion - [phone]  Imaging - [phone]    Stan Balderas, RN, BSN, OCN   Oncology Care Coordinator RN  [facility]  [phone]              Follow-ups after your visit        Follow-up notes from your care team     Return in about 1 week (around 11/7/2017) for ..      Your next 10 appointments already scheduled     Nov 07, 2017  8:00 AM CST   Level 7 with NL INFUSION CHAIR 2   [facility] ([facility])    [address] [place] [address] [phone]              Who to contact     If you have questions or need follow up information about today's clinic visit or your schedule please contact [facility] directly at [phone].  Normal or non-critical lab and imaging results will be communicated to you by [employer], letter or phone within 4 business days after the clinic has received the results. If you do not hear from us within 7 days, please contact the clinic through [employer] or phone. If you have a critical or abnormal lab result, we will notify you by phone as soon as possible.  Submit refill requests through [employer] or call your pharmacy and they will forward the refill request to us. Please allow 3 business days for your refill to be completed.          Additional Information About Your Visit        [employer] Information     [employer] gives you secure access to your electronic health record. If you see a primary  "care provider, you can also send messages to your care team and make appointments. If you have questions, please call your primary care clinic.  If you do not have a primary care provider, please call [phone] and they will assist you.        Care EveryWhere ID     This is your Care EveryWhere ID. This could be used by other organizations to access your [facility] medical records  [id number]        Your Vitals Were     Pulse Temperature Respirations Height Last Period Pulse Oximetry    85 97.7  F (36.5  C) (Temporal) 16 1.689 m (5' 6.5\") 06/07/2007 97%    BMI (Body Mass Index)                   21.84 kg/m2            Blood Pressure from Last 3 Encounters:   10/31/17 157/79   10/26/17 129/57   10/25/17 167/82    Weight from Last 3 Encounters:   10/31/17 62.3 kg (137 lb 6.4 oz)   10/26/17 67.3 kg (148 lb 4.8 oz)   10/25/17 65.3 kg (144 lb)              Today, you had the following     No orders found for display         Today's Medication Changes          These changes are accurate as of: 10/31/17  9:00 AM.  If you have any questions, ask your nurse or doctor.               These medicines have changed or have updated prescriptions.        Dose/Directions    oxyCODONE 5 MG IR tablet   Commonly known as:  ROXICODONE   This may have changed:  reasons to take this        Dose:  5 mg   Take 1 tablet (5 mg) by mouth every 8 hours as needed for pain   Quantity:  20 tablet   Refills:  0                Primary Care Provider Office Phone # Fax #    Donna Shirley PA-C [phone] [address] [place] [address]        Equal Access to Services     DENIZ PERALTA : roxy Read, debra negron. So [facility] [phone].    ATENCIÓN: Si habla español, tiene a hunt disposición servicios gratuitos de asistencia lingüística. Llame al [phone].    We comply with applicable federal civil rights laws and [place] laws. We " do not discriminate on the basis of race, color, national origin, age, disability, sex, sexual orientation, or gender identity.            Thank you!     Thank you for choosing [facility]  for your care. Our goal is always to provide you with excellent care. Hearing back from our patients is one way we can continue to improve our services. Please take a few minutes to complete the written survey that you may receive in the mail after your visit with us. Thank you!             Your Updated Medication List - Protect others around you: Learn how to safely use, store and throw away your medicines at www.disposemymeds.org.          This list is accurate as of: 10/31/17  9:00 AM.  Always use your most recent med list.                   Brand Name Dispense Instructions for use Diagnosis    ACETAMINOPHEN PO      Take 650 mg by mouth every 4 hours as needed for pain or fever        benzonatate 100 MG capsule    TESSALON PERLES    42 capsule    Take 2 capsules (200 mg) by mouth 3 times daily as needed for cough    Metastatic colon cancer to liver (H)       carvedilol 12.5 MG tablet    COREG    90 tablet    Take 1.5 tablets (18.75 mg) by mouth 2 times daily (with meals)    Chronic systolic congestive heart failure (H)       dexamethasone 4 MG tablet    DECADRON    6 tablet    Take 2 tablets (8 mg) by mouth daily (with breakfast) Days 2, 3, and 4 of each cycle.    Metastatic colon cancer to liver (H)       diclofenac 1 % Gel topical gel    VOLTAREN     Place 2 g onto the skin 4 times daily as needed for moderate pain (for RUQ pain)    Metastatic colon cancer to liver (H)       ferrous sulfate 325 (65 FE) MG tablet    IRON     Take 325 mg by mouth daily (with breakfast)        guaiFENesin-codeine 100-10 MG/5ML Soln solution    ROBITUSSIN AC    120 mL    Take 5 mLs by mouth every 4 hours as needed for cough    Liver metastasis (H)       lidocaine 5 % Patch    LIDODERM    30 patch    Place 1-3 patches onto the skin  every 24 hours To RUQ    Metastatic colon cancer to liver (H)       loperamide 2 MG capsule    IMODIUM    30 capsule    2 caps at 1st sign of diarrhea & 1 cap every 2hrs until 12hrs diarrhea free. During night, 2 caps at bedtime & 2 caps every 4hrs until AM    Metastatic colon cancer to liver (H)       LORazepam 0.5 MG tablet    ATIVAN    30 tablet    Take 1 tablet (0.5 mg) by mouth every 4 hours as needed (Anxiety, Nausea/Vomiting or Sleep)    Metastatic colon cancer to liver (H)       losartan 25 MG tablet    COZAAR    30 tablet    Take 1 tablet (25 mg) by mouth daily    Secondary cardiomyopathy (H)       oxyCODONE 5 MG IR tablet    ROXICODONE    20 tablet    Take 1 tablet (5 mg) by mouth every 8 hours as needed for pain        Potassium Chloride ER 20 MEQ Tbcr     30 tablet    Take 1 tablet (20 mEq) by mouth daily    Hypokalemia       prochlorperazine 10 MG tablet    COMPAZINE    30 tablet    Take 1 tablet (10 mg) by mouth every 6 hours as needed (Nausea/Vomiting)    Metastatic colon cancer to liver (H)       ranitidine 300 MG tablet    ZANTAC    30 tablet    Take 1 tablet (300 mg) by mouth At Bedtime    Nausea and vomiting, intractability of vomiting not specified, unspecified vomiting type       sennosides 8.6 MG tablet    SENOKOT    120 each    Take 1-2 tablets by mouth 2 times daily as needed for constipation    Slow transit constipation

## 2017-10-31 NOTE — MR AVS SNAPSHOT
After Visit Summary   10/31/2017    Charlene Douglas    MRN: [id number]           Patient Information     Date Of Birth          1950        Visit Information        Provider Department      10/31/2017 8:30 AM NL INFUSION CHAIR 1 [facility]        Today's Diagnoses     Metastatic colon cancer to liver (H)    -  1      Care Instructions    Pt to return on 11/07/17 for MD follow up. Copies of medication list and upcoming appointments given prior to discharge.             Follow-ups after your visit        Your next 10 appointments already scheduled     Nov 07, 2017  8:00 AM CST   Level 7 with NL INFUSION CHAIR 2   [facility] ([facility])    [address] [place] [id number] [phone]            Nov 07, 2017  3:30 PM CST   Return Visit with Duy Dove MD   [facility]    [address] [place] [id number] [phone]              Future tests that were ordered for you today     Open Future Orders        Priority Expected Expires Ordered    Protein timed urine with Creat Ratio Routine  10/31/2018 10/31/2017            Who to contact     If you have questions or need follow up information about today's clinic visit or your schedule please contact [facility] directly at [phone].  Normal or non-critical lab and imaging results will be communicated to you by MyChart, letter or phone within 4 business days after the clinic has received the results. If you do not hear from us within 7 days, please contact the clinic through MyChart or phone. If you have a critical or abnormal lab result, we will notify you by phone as soon as possible.  Submit refill requests through [employer] or call your pharmacy and they will forward the refill request to us. Please allow 3 business days for your refill to be completed.          Additional  Information About Your Visit        [employer] Information     [employer] gives you secure access to your electronic health record. If you see a primary care provider, you can also send messages to your care team and make appointments. If you have questions, please call your primary care clinic.  If you do not have a primary care provider, please call [phone] and they will assist you.        Care EveryWhere ID     This is your Care EveryWhere ID. This could be used by other organizations to access your [facility] medical records  [id number]        Your Vitals Were     Pulse Temperature Respirations Last Period Pulse Oximetry       64 97.8  F (36.6  C) (Temporal) 16 06/07/2007 100%        Blood Pressure from Last 3 Encounters:   10/31/17 161/67   10/31/17 157/79   10/26/17 129/57    Weight from Last 3 Encounters:   10/31/17 62.3 kg (137 lb 6.4 oz)   10/26/17 67.3 kg (148 lb 4.8 oz)   10/25/17 65.3 kg (144 lb)              We Performed the Following     CBC with platelets differential     Comprehensive metabolic panel     Protein qualitative urine          Today's Medication Changes          These changes are accurate as of: 10/31/17  5:00 PM.  If you have any questions, ask your nurse or doctor.               These medicines have changed or have updated prescriptions.        Dose/Directions    oxyCODONE 5 MG IR tablet   Commonly known as:  ROXICODONE   This may have changed:  reasons to take this        Dose:  5 mg   Take 1 tablet (5 mg) by mouth every 8 hours as needed for pain   Quantity:  20 tablet   Refills:  0                Primary Care Provider Office Phone # Fax #    Donna Shirley PA-C [phone] [address] [place] [address]        Equal Access to Services     [facility] : Nic Blandon, roxy beltran, debra negron. So [facility] [phone].    ATENCIÓN: Si habla español, tiene a hunt disposición servicios  sasha de asistencia lingüística. Tameka toure [phone].    We comply with applicable federal civil rights laws and [place] laws. We do not discriminate on the basis of race, color, national origin, age, disability, sex, sexual orientation, or gender identity.            Thank you!     Thank you for choosing [facility]  for your care. Our goal is always to provide you with excellent care. Hearing back from our patients is one way we can continue to improve our services. Please take a few minutes to complete the written survey that you may receive in the mail after your visit with us. Thank you!             Your Updated Medication List - Protect others around you: Learn how to safely use, store and throw away your medicines at www.disposemymeds.org.          This list is accurate as of: 10/31/17  5:00 PM.  Always use your most recent med list.                   Brand Name Dispense Instructions for use Diagnosis    ACETAMINOPHEN PO      Take 650 mg by mouth every 4 hours as needed for pain or fever        benzonatate 100 MG capsule    TESSALON PERLES    42 capsule    Take 2 capsules (200 mg) by mouth 3 times daily as needed for cough    Metastatic colon cancer to liver (H)       carvedilol 12.5 MG tablet    COREG    90 tablet    Take 1.5 tablets (18.75 mg) by mouth 2 times daily (with meals)    Chronic systolic congestive heart failure (H)       dexamethasone 4 MG tablet    DECADRON    6 tablet    Take 2 tablets (8 mg) by mouth daily (with breakfast) Days 2, 3, and 4 of each cycle.    Metastatic colon cancer to liver (H)       diclofenac 1 % Gel topical gel    VOLTAREN     Place 2 g onto the skin 4 times daily as needed for moderate pain (for RUQ pain)    Metastatic colon cancer to liver (H)       ferrous sulfate 325 (65 FE) MG tablet    IRON     Take 325 mg by mouth daily (with breakfast)        guaiFENesin-codeine 100-10 MG/5ML Soln solution    ROBITUSSIN AC    120 mL    Take 5 mLs by  mouth every 4 hours as needed for cough    Liver metastasis (H)       lidocaine 5 % Patch    LIDODERM    30 patch    Place 1-3 patches onto the skin every 24 hours To RUQ    Metastatic colon cancer to liver (H)       loperamide 2 MG capsule    IMODIUM    30 capsule    2 caps at 1st sign of diarrhea & 1 cap every 2hrs until 12hrs diarrhea free. During night, 2 caps at bedtime & 2 caps every 4hrs until AM    Metastatic colon cancer to liver (H)       LORazepam 0.5 MG tablet    ATIVAN    30 tablet    Take 1 tablet (0.5 mg) by mouth every 4 hours as needed (Anxiety, Nausea/Vomiting or Sleep)    Metastatic colon cancer to liver (H)       losartan 25 MG tablet    COZAAR    30 tablet    Take 1 tablet (25 mg) by mouth daily    Secondary cardiomyopathy (H)       oxyCODONE 5 MG IR tablet    ROXICODONE    20 tablet    Take 1 tablet (5 mg) by mouth every 8 hours as needed for pain        Potassium Chloride ER 20 MEQ Tbcr     30 tablet    Take 1 tablet (20 mEq) by mouth daily    Hypokalemia       prochlorperazine 10 MG tablet    COMPAZINE    30 tablet    Take 1 tablet (10 mg) by mouth every 6 hours as needed (Nausea/Vomiting)    Metastatic colon cancer to liver (H)       ranitidine 300 MG tablet    ZANTAC    30 tablet    Take 1 tablet (300 mg) by mouth At Bedtime    Nausea and vomiting, intractability of vomiting not specified, unspecified vomiting type       sennosides 8.6 MG tablet    SENOKOT    120 each    Take 1-2 tablets by mouth 2 times daily as needed for constipation    Slow transit constipation

## 2017-10-31 NOTE — NURSING NOTE
DISCHARGE PLAN:  Next appointments: See patient instruction section  Departure Mode: Ambulatory  Accompanied by:   8 minutes for nursing discharge (face to face time)     Charlene Douglas is here today for Oncology follow up with chemotherapy.  Writing nurse seen patient after Medical Oncology appointment to address questions/concerns/coordinate care. Patient to continue with follow up in 1 week and then treatment again in 2 weeks. Patient ambulated by nurse to  to schedule follow up and/or lab appointments. See patient instructions and Oncologist's Progress note for further details. Questions and concerns addressed to patient's satisfaction. Patient verbalized and demonstrated understanding of plan.  Contact information provided and patient is encouraged to call with any that arise in the interim of care.    Stan Balderas, RN, BSN, OCN   Oncology Care Coordinator [facility]  [phone]  10/31/2017, 9:15 AM

## 2017-10-31 NOTE — PROGRESS NOTES
Patient here for Avastin/Irinotecan/Oxaliplatin C1 D1 chemotherapy today. Labs/BSA/Dose verified by 2 RN'S. Hgb-9.0, ANC-10.3 and Plt-377. Urine protein 30, ordered 24 hour urine per parameters to be done 3 days prior to next treatment. K+ 3.2, MD order potassium to be taken BID and patient informed of this.  Premeds given and tolerated chemotherapy well.  Positive blood return pre/post infusion. All questions answered and patient verbalized understanding. Discharged to home in stable condition.

## 2017-10-31 NOTE — PATIENT INSTRUCTIONS
Please follow up with Dr. Dove in 1 week.      Follow Up Date/Time:     If you have any questions or concerns please feel free to call.    If you need to reschedule please call:  Clinic or Lab Appointment - [phone]  Infusion - [phone]  Imaging - [phone]    Stan Balderas RN, BSN, OCN   Oncology Care Coordinator RN  [facility]  [phone]

## 2017-10-31 NOTE — ASSESSMENT & PLAN NOTE
Charlene Douglas presented with 2 months history of cough, shortness of breath, decreased appetite and abdominal pain. Patient has black stool and occult blood which was positive. Subsequently the patient went on to have a CT angiogram of the chest because of shortness of breath. Lungs were clear. Multiple liver lesions were seen. Subsequently the patient went on to have an ultrasound of the liver done on August 18, 2017 which confirmed the presence of multiple liver metastases. Abdominal CT was done on August 18, 2017 showing large ascending colon mass with associated mesenteric/peritoneal drop metastasis and lymphadenopathy. There are multiple liver metastasis seen. KRAS mutated. She was started on systemic chemotherapy with FOLFOX with Avastin. However had severe cardiogenic toxicity from 5-FU and ended up in the hospital with acute respiratory failure requiring intubation. Initial echo showed LVEF and 10-15% which did improve in 3 days time to 35- 40%.  Subsequently, chemotherapy was switched to single agent irinotecan.

## 2017-10-31 NOTE — NURSING NOTE
"Oncology Rooming Note    October 31, 2017 8:37 AM   Charlene LUCAS Misael is a 67 year old female who presents for:    Chief Complaint   Patient presents with     Oncology Clinic Visit     1 week follow up for Metastatic colon cancer to liver     Chemotherapy     CIDI today with labs prior     Initial Vitals: /79 (BP Location: Right arm, Patient Position: Chair, Cuff Size: Adult Regular)  Pulse 85  Temp 97.7  F (36.5  C) (Temporal)  Resp 16  Ht 1.689 m (5' 6.5\")  Wt 62.3 kg (137 lb 6.4 oz)  LMP 06/07/2007  SpO2 97%  BMI 21.84 kg/m2 Estimated body mass index is 21.84 kg/(m^2) as calculated from the following:    Height as of this encounter: 1.689 m (5' 6.5\").    Weight as of this encounter: 62.3 kg (137 lb 6.4 oz). Body surface area is 1.71 meters squared.  No Pain (0) Comment: Data Unavailable   Patient's last menstrual period was 06/07/2007.  Allergies reviewed: Yes  Medications reviewed: Yes    Medications: Medication refills not needed today.  Pharmacy name entered into [facility]:    [facility] - [id number] [facility]    Clinical concerns: Weakness. Dr. Dove was notified.    Elsa Butts MA              "

## 2017-10-31 NOTE — PATIENT INSTRUCTIONS
Pt to return on 11/07/17 for MD follow up. Copies of medication list and upcoming appointments given prior to discharge.

## 2017-10-31 NOTE — LETTER
10/31/2017         RE: Charlene Douglas  [id number] [address] [place] [id number]        Dear Colleague,    Thank you for referring your patient, Charlene Douglas, to the [facility]. Please see a copy of my visit note below.    Hematology/ Oncology Follow-up Visit:  Oct 31, 2017    Reason for Visit:   Chief Complaint   Patient presents with     Oncology Clinic Visit     1 week follow up for Metastatic colon cancer to liver     Chemotherapy     CIDI today with labs prior       Oncologic History:    Metastatic colon cancer to liver (H)  Charlene Douglas presented with 2 months history of cough, shortness of breath, decreased appetite and abdominal pain. Patient has black stool and occult blood which was positive. Subsequently the patient went on to have a CT angiogram of the chest because of shortness of breath. Lungs were clear. Multiple liver lesions were seen. Subsequently the patient went on to have an ultrasound of the liver done on August 18, 2017 which confirmed the presence of multiple liver metastases. Abdominal CT was done on August 18, 2017 showing large ascending colon mass with associated mesenteric/peritoneal drop metastasis and lymphadenopathy. There are multiple liver metastasis seen. KRAS mutated. She was started on systemic chemotherapy with FOLFOX with Avastin. However had severe cardiogenic toxicity from 5-FU and ended up in the hospital with acute respiratory failure requiring intubation. Initial echo showed LVEF and 10-15% which did improve in 3 days time to 35- 40%.  Subsequently, chemotherapy was switched to single agent irinotecan.    Interval History:  Patient is in today for follow-up. She's been having right upper quadrant pain. The patient is currently well controlled with the current regimen. Appetite has improved. Her cough has improved. She denies any diarrhea or black stools. She denies any fever or chills.    Review Of Systems:  Constitutional: Negative for fever,  chills, and night sweats.  Skin: negative.  Eyes: negative.  Ears/Nose/Throat: negative.  Respiratory: No shortness of breath, dyspnea on exertion, cough, or hemoptysis.  Cardiovascular: negative.  Gastrointestinal: Abdominal pain as mentioned above  Genitourinary: negative.  Musculoskeletal: negative.  Neurologic: negative.  Psychiatric: negative.  Hematologic/Lymphatic/Immunologic: negative.  Endocrine: negative.    All other ROS negative unless mentioned in interval history.    Past medical, social, surgical, and family histories reviewed.    Allergies:  Allergies as of 10/31/2017 - Miguel as Reviewed 10/31/2017   Allergen Reaction Noted     Lisinopril Cough 09/19/2017     No known allergies  08/11/2003       Current Medications:  Current Outpatient Prescriptions   Medication Sig Dispense Refill     guaiFENesin-codeine (ROBITUSSIN AC) 100-10 MG/5ML SOLN solution Take 5 mLs by mouth every 4 hours as needed for cough 120 mL 0     benzonatate (TESSALON PERLES) 100 MG capsule Take 2 capsules (200 mg) by mouth 3 times daily as needed for cough 42 capsule 1     dexamethasone (DECADRON) 4 MG tablet Take 2 tablets (8 mg) by mouth daily (with breakfast) Days 2, 3, and 4 of each cycle. 6 tablet 11     LORazepam (ATIVAN) 0.5 MG tablet Take 1 tablet (0.5 mg) by mouth every 4 hours as needed (Anxiety, Nausea/Vomiting or Sleep) 30 tablet 5     prochlorperazine (COMPAZINE) 10 MG tablet Take 1 tablet (10 mg) by mouth every 6 hours as needed (Nausea/Vomiting) 30 tablet 5     loperamide (IMODIUM) 2 MG capsule 2 caps at 1st sign of diarrhea & 1 cap every 2hrs until 12hrs diarrhea free. During night, 2 caps at bedtime & 2 caps every 4hrs until AM 30 capsule 0     losartan (COZAAR) 25 MG tablet Take 1 tablet (25 mg) by mouth daily 30 tablet 11     ranitidine (ZANTAC) 300 MG tablet Take 1 tablet (300 mg) by mouth At Bedtime 30 tablet 1     oxyCODONE (ROXICODONE) 5 MG IR tablet Take 1 tablet (5 mg) by mouth every 8 hours as needed for  "pain (Patient taking differently: Take 5 mg by mouth every 8 hours as needed for pain (patient only taking half at a time) ) 20 tablet 0     ferrous sulfate (IRON) 325 (65 FE) MG tablet Take 325 mg by mouth daily (with breakfast)       carvedilol (COREG) 12.5 MG tablet Take 1.5 tablets (18.75 mg) by mouth 2 times daily (with meals) 90 tablet 0     Potassium Chloride ER 20 MEQ TBCR Take 1 tablet (20 mEq) by mouth daily 30 tablet 0     diclofenac (VOLTAREN) 1 % GEL topical gel Place 2 g onto the skin 4 times daily as needed for moderate pain (for RUQ pain)       lidocaine (LIDODERM) 5 % Patch Place 1-3 patches onto the skin every 24 hours To RUQ 30 patch      sennosides (SENOKOT) 8.6 MG tablet Take 1-2 tablets by mouth 2 times daily as needed for constipation 120 each      ACETAMINOPHEN PO Take 650 mg by mouth every 4 hours as needed for pain or fever           Physical Exam:  /79 (BP Location: Right arm, Patient Position: Chair, Cuff Size: Adult Regular)  Pulse 85  Temp 97.7  F (36.5  C) (Temporal)  Resp 16  Ht 1.689 m (5' 6.5\")  Wt 62.3 kg (137 lb 6.4 oz)  LMP 06/07/2007  SpO2 97%  BMI 21.84 kg/m2  Wt Readings from Last 12 Encounters:   10/31/17 62.3 kg (137 lb 6.4 oz)   10/26/17 67.3 kg (148 lb 4.8 oz)   10/25/17 65.3 kg (144 lb)   10/25/17 66.2 kg (146 lb)   10/24/17 65.5 kg (144 lb 6.4 oz)   10/24/17 65.5 kg (144 lb 4.8 oz)   10/17/17 64.5 kg (142 lb 3.2 oz)   10/17/17 64.5 kg (142 lb 1.6 oz)   10/12/17 64.5 kg (142 lb 4.8 oz)   10/10/17 63.7 kg (140 lb 8 oz)   10/07/17 61.7 kg (136 lb)   10/03/17 62.3 kg (137 lb 4.8 oz)     ECOG performance status:1  GENERAL APPEARANCE: Healthy, alert and in no acute distress.  HEENT: Sclerae anicteric. PERRLA. Oropharynx without ulcers, lesions, or thrush.  NECK: Supple. No asymmetry or masses.  LYMPHATICS: No palpable cervical, supraclavicular, axillary, or inguinal lymphadenopathy.  RESP: Lungs clear to auscultation bilaterally without rales, rhonchi or " wheezes.  CARDIOVASCULAR: Regular rate and rhythm. Normal S1, S2; no S3 or S4. No murmur, gallop, or rub.  ABDOMEN: Soft, . Bowel sounds normal. Enlarged tender liver with multiple nodular areas.  MUSCULOSKELETAL: Extremities without gross deformities noted. No edema of bilateral lower extremities.  SKIN: No suspicious lesions or rashes.  NEURO: Alert and oriented x 3. Cranial nerves II-XII grossly intact.  PSYCHIATRIC: Mentation and affect appear normal.    Laboratory/Imaging Studies:  Infusion Therapy Visit on 10/26/2017   Component Date Value Ref Range Status     WBC 10/26/2017 12.5* 4.0 - 11.0 10e9/L Final     RBC Count 10/26/2017 2.96* 3.8 - 5.2 10e12/L Final     Hemoglobin 10/26/2017 8.3* 11.7 - 15.7 g/dL Final     Hematocrit 10/26/2017 26.1* 35.0 - 47.0 % Final     MCV 10/26/2017 88  78 - 100 fl Final     MCH 10/26/2017 28.0  26.5 - 33.0 pg Final     MCHC 10/26/2017 31.8  31.5 - 36.5 g/dL Final     RDW 10/26/2017 16.7* 10.0 - 15.0 % Final     Platelet Count 10/26/2017 227  150 - 450 10e9/L Final     Diff Method 10/26/2017 Manual Differential   Final     % Neutrophils 10/26/2017 52.0  % Final     % Lymphocytes 10/26/2017 42.0  % Final     % Monocytes 10/26/2017 3.0  % Final     % Eosinophils 10/26/2017 0.0  % Final     % Basophils 10/26/2017 0.0  % Final     % Metamyelocytes 10/26/2017 3.0  % Final     Absolute Neutrophil 10/26/2017 6.5  1.6 - 8.3 10e9/L Final     Absolute Lymphocytes 10/26/2017 5.3  0.8 - 5.3 10e9/L Final     Absolute Monocytes 10/26/2017 0.4  0.0 - 1.3 10e9/L Final     Absolute Eosinophils 10/26/2017 0.0  0.0 - 0.7 10e9/L Final     Absolute Basophils 10/26/2017 0.0  0.0 - 0.2 10e9/L Final     Absolute Metamyelocytes 10/26/2017 0.4* 0 10e9/L Final     Polychromasia 10/26/2017 M   Final     Hypochromasia 10/26/2017 P   Final     Platelet Estimate 10/26/2017 N   Final     Sodium 10/26/2017 134  133 - 144 mmol/L Final     Potassium 10/26/2017 3.4  3.4 - 5.3 mmol/L Final     Chloride 10/26/2017  98  94 - 109 mmol/L Final     Carbon Dioxide 10/26/2017 27  20 - 32 mmol/L Final     Anion Gap 10/26/2017 9  3 - 14 mmol/L Final     Glucose 10/26/2017 124* 70 - 99 mg/dL Final     Urea Nitrogen 10/26/2017 7  7 - 30 mg/dL Final     Creatinine 10/26/2017 0.62  0.52 - 1.04 mg/dL Final     GFR Estimate 10/26/2017 >90  >60 mL/min/1.7m2 Final    Non  GFR Calc     GFR Estimate If Black 10/26/2017 >90  >60 mL/min/1.7m2 Final    African American GFR Calc     Calcium 10/26/2017 8.2* 8.5 - 10.1 mg/dL Final     Bilirubin Total 10/26/2017 0.9  0.2 - 1.3 mg/dL Final     Albumin 10/26/2017 1.4* 3.4 - 5.0 g/dL Final     Protein Total 10/26/2017 5.6* 6.8 - 8.8 g/dL Final     Alkaline Phosphatase 10/26/2017 550* 40 - 150 U/L Final     ALT 10/26/2017 26  0 - 50 U/L Final     AST 10/26/2017 41  0 - 45 U/L Final   Infusion Therapy Visit on 10/25/2017   Component Date Value Ref Range Status     WBC 10/25/2017 3.7* 4.0 - 11.0 10e9/L Final     RBC Count 10/25/2017 2.63* 3.8 - 5.2 10e12/L Final     Hemoglobin 10/25/2017 7.1* 11.7 - 15.7 g/dL Final     Hematocrit 10/25/2017 22.9* 35.0 - 47.0 % Final     MCV 10/25/2017 87  78 - 100 fl Final     MCH 10/25/2017 27.0  26.5 - 33.0 pg Final     MCHC 10/25/2017 31.0* 31.5 - 36.5 g/dL Final     RDW 10/25/2017 16.6* 10.0 - 15.0 % Final     Platelet Count 10/25/2017 133* 150 - 450 10e9/L Final     Diff Method 10/25/2017 Manual Differential   Final     % Neutrophils 10/25/2017 29.0  % Final     % Lymphocytes 10/25/2017 35.0  % Final     % Monocytes 10/25/2017 35.0  % Final     % Eosinophils 10/25/2017 1.0  % Final     % Basophils 10/25/2017 0.0  % Final     Absolute Neutrophil 10/25/2017 1.1* 1.6 - 8.3 10e9/L Final     Absolute Lymphocytes 10/25/2017 1.3  0.8 - 5.3 10e9/L Final     Absolute Monocytes 10/25/2017 1.3  0.0 - 1.3 10e9/L Final     Absolute Eosinophils 10/25/2017 0.0  0.0 - 0.7 10e9/L Final     Absolute Basophils 10/25/2017 0.0  0.0 - 0.2 10e9/L Final     Platelet Estimate  10/25/2017 Confirming automated cell count   Final     Sodium 10/25/2017 132* 133 - 144 mmol/L Final     Potassium 10/25/2017 3.3* 3.4 - 5.3 mmol/L Final     Chloride 10/25/2017 96  94 - 109 mmol/L Final     Carbon Dioxide 10/25/2017 27  20 - 32 mmol/L Final     Anion Gap 10/25/2017 9  3 - 14 mmol/L Final     Glucose 10/25/2017 141* 70 - 99 mg/dL Final     Urea Nitrogen 10/25/2017 9  7 - 30 mg/dL Final     Creatinine 10/25/2017 0.64  0.52 - 1.04 mg/dL Final     GFR Estimate 10/25/2017 >90  >60 mL/min/1.7m2 Final    Non  GFR Calc     GFR Estimate If Black 10/25/2017 >90  >60 mL/min/1.7m2 Final    African American GFR Calc     Calcium 10/25/2017 8.0* 8.5 - 10.1 mg/dL Final     Bilirubin Total 10/25/2017 1.0  0.2 - 1.3 mg/dL Final     Albumin 10/25/2017 1.4* 3.4 - 5.0 g/dL Final     Protein Total 10/25/2017 5.7* 6.8 - 8.8 g/dL Final     Alkaline Phosphatase 10/25/2017 539* 40 - 150 U/L Final     ALT 10/25/2017 24  0 - 50 U/L Final     AST 10/25/2017 42  0 - 45 U/L Final     Protein Albumin Urine 10/25/2017 Negative  NEG^Negative mg/dL Final   Infusion Therapy Visit on 10/24/2017   Component Date Value Ref Range Status     WBC 10/24/2017 1.6* 4.0 - 11.0 10e9/L Final     RBC Count 10/24/2017 2.45* 3.8 - 5.2 10e12/L Final     Hemoglobin 10/24/2017 6.7* 11.7 - 15.7 g/dL Final    Comment: This result has been called to CT LEONG by Naya Cotto on 10 24 2017   at 0830, and has been read back.        Hematocrit 10/24/2017 21.9* 35.0 - 47.0 % Final     MCV 10/24/2017 89  78 - 100 fl Final     MCH 10/24/2017 27.3  26.5 - 33.0 pg Final     MCHC 10/24/2017 30.6* 31.5 - 36.5 g/dL Final     RDW 10/24/2017 16.0* 10.0 - 15.0 % Final     Platelet Count 10/24/2017 76* 150 - 450 10e9/L Final     Diff Method 10/24/2017 Manual Differential   Final     % Neutrophils 10/24/2017 7.0  % Final     % Lymphocytes 10/24/2017 61.0  % Final     % Monocytes 10/24/2017 32.0  % Final     % Eosinophils 10/24/2017 0.0  % Final      % Basophils 10/24/2017 0.0  % Final     Absolute Neutrophil 10/24/2017 0.1* 1.6 - 8.3 10e9/L Final    Comment: This result has been called to CT MARTIN by Monique Lock on 10 25 2017 at   0822, and has been read back. Called by HR on [id number] at 0830       Absolute Lymphocytes 10/24/2017 1.0  0.8 - 5.3 10e9/L Final     Absolute Monocytes 10/24/2017 0.5  0.0 - 1.3 10e9/L Final     Absolute Eosinophils 10/24/2017 0.0  0.0 - 0.7 10e9/L Final     Absolute Basophils 10/24/2017 0.0  0.0 - 0.2 10e9/L Final     Sodium 10/24/2017 131* 133 - 144 mmol/L Final     Potassium 10/24/2017 3.8  3.4 - 5.3 mmol/L Final     Chloride 10/24/2017 95  94 - 109 mmol/L Final     Carbon Dioxide 10/24/2017 27  20 - 32 mmol/L Final     Anion Gap 10/24/2017 9  3 - 14 mmol/L Final     Glucose 10/24/2017 124* 70 - 99 mg/dL Final     Urea Nitrogen 10/24/2017 10  7 - 30 mg/dL Final     Creatinine 10/24/2017 0.72  0.52 - 1.04 mg/dL Final     GFR Estimate 10/24/2017 81  >60 mL/min/1.7m2 Final    Non  GFR Calc     GFR Estimate If Black 10/24/2017 >90  >60 mL/min/1.7m2 Final    African American GFR Calc     Calcium 10/24/2017 8.0* 8.5 - 10.1 mg/dL Final     Bilirubin Total 10/24/2017 1.1  0.2 - 1.3 mg/dL Final     Albumin 10/24/2017 1.4* 3.4 - 5.0 g/dL Final     Protein Total 10/24/2017 5.8* 6.8 - 8.8 g/dL Final     Alkaline Phosphatase 10/24/2017 477* 40 - 150 U/L Final     ALT 10/24/2017 12  0 - 50 U/L Final     AST 10/24/2017 26  0 - 45 U/L Final     Units Ordered 10/24/2017 2   Final     ABO 10/24/2017 A   Final     RH(D) 10/24/2017 Pos   Final     Antibody Screen 10/24/2017 Neg   Final     Test Valid Only At 10/24/2017 [facility]      Final     Specimen Expires 10/24/2017 10/27/2017   Final     Crossmatch 10/24/2017 Red Blood Cells   Final     Unit Number 10/24/2017 [id number]   Final     Blood Component Type 10/24/2017 Red Blood Cells Leukocyte Reduced   Final     Division Number 10/24/2017 00   Final     Status of  Unit 10/24/2017 Released to care unit   Final     Blood Product Code 10/24/2017 [id number]   Final     Unit Status 10/24/2017 ISS   Final     Unit Number 10/24/2017 [id number]   Final     Blood Component Type 10/24/2017 Red Blood Cells Leukocyte Reduced   Final     Division Number 10/24/2017 00   Final     Status of Unit 10/24/2017 Released to care unit   Final     Blood Product Code 10/24/2017 [id number]   Final     Unit Status 10/24/2017 ISS   Final        Recent Results (from the past 744 hour(s))   CT Abdomen Pelvis w Contrast    Narrative    CT ABDOMEN AND PELVIS WITH CONTRAST 10/7/2017 7:05 PM     HISTORY: Left upper quadrant abdominal pain, known history of liver  and colon cancer, abdominal distention.    COMPARISON: 8/18/2017    TECHNIQUE: Volumetric helical acquisition of CT images from the lung  bases through the symphysis pubis after the administration of 70 mL  Isovue 370  intravenous contrast. Radiation dose for this scan was  reduced using automated exposure control, adjustment of the mA and/or  kV according to patient size, or iterative reconstruction technique.    FINDINGS: No definite bowel obstruction demonstrated. Trace free  fluid. No free air. There are moderate atherosclerotic changes of the  visualized aorta and its branches. There is no evidence of aortic  dissection or aneurysm. No hydronephrosis or urolithiasis  demonstrated. Extensive liver metastases, adenopathy/masses in the  peritoneum again noted and similar to previous. Representative lesion  in the right lobe of liver measures 10.7 cm today previously 10.1 cm.  Adrenal glands, spleen, and pancreas demonstrate no worrisome focal  lesion. Superior degenerative changes in the left hip. No frankly  destructive bony lesions. Lung bases demonstrate trace peripheral  fibrosis and/or atelectasis.      Impression    IMPRESSION: Trace free fluid, no significant ascites or bowel  obstruction demonstrated.    KAILEY GUERRA MD   XR Chest Port 1  View    Narrative    CHEST PORTABLE ONE VIEW   10/25/2017 6:18 PM     HISTORY: Chest pain.    COMPARISON: 9/18/2017    FINDINGS: Port-A-Cath with tip in the SVC. Heart size normal. Lungs  clear. No interval change.      Impression    IMPRESSION: Nothing acute. No interval change.    ANITRA DENNEY MD       Assessment and plan:  (C18.9,  C78.7) Metastatic colon cancer to liver (H)  (primary encounter diagnosis)  (C78.7) Liver metastasis (H)  Patient will be receiving Avastin 5 mg/kg on day 1 and irinotecan 165 mg/m , oxaliplatin 85 mg/m  to be given on day 1 every 14 days. I reviewed with the patient the potential benefits and side effects of the regimen. I will see the patient again in 1 week time or sooner if there are new developments or concerns  (K52.1,  T45.1X5A) Chemotherapy induced diarrhea  Diarrhea has improved. We discussed with the patient management of diarrhea with Imodium.    (R11.2,  T45.1X5A) Chemotherapy induced nausea and vomiting  Patient nausea has been controlled on the current regimen.    (I50.22) Chronic systolic congestive heart failure (H)  Patient currently on carvedilol 18.75 twice daily. I would add Lasix 20 mg orally daily.    (I10) Benign essential hypertension  Patient currently on Cozaar 25 mg orally daily.    (D64.9) Anemia, unspecified type  We will continue to monitor hemoglobin and a blood transfusion if needed.    (E87.6) Hypokalemia  Patient will continue on potassium chloride 20 mEq daily.      The patient is ready to learn, no apparent learning barriers were identified.  Diagnosis and treatment plans were explained to the patient. The patient expressed understanding of the content. The patient asked appropriate questions. The patient questions were answered to her satisfaction.    Chart documentation with Dragon Voice recognition Software. Although reviewed after completion, some words and grammatical errors may remain.    Again, thank you for allowing me to participate in  the care of your patient.        Sincerely,        Duy Dove MD

## 2017-11-07 NOTE — MR AVS SNAPSHOT
After Visit Summary   11/7/2017    Charlene Douglas    MRN: [id number]           Patient Information     Date Of Birth          1950        Visit Information        Provider Department      11/7/2017 3:30 PM Duy Dove MD [facility]        Today's Diagnoses     Metastatic colon cancer to liver (H)    -  1    Liver metastasis (H)        Hypokalemia        Chemotherapy induced nausea and vomiting        Chemotherapy induced diarrhea        Benign essential hypertension        Anemia, unspecified type          Care Instructions      Please follow up with Dr. Dove in 1 week.  You will need ports labs in infusion prior to your appointment.                                                  Follow up appointment date/time: 11/14/2017 @ 8:00am    Infusion appointment date/time: 11/14/2017 @ 8:30am      If you need to reschedule or have scheduling questions please call scheduling at [phone].    Keep in mind to have any labs or scans that are needed done prior to your Oncology visit.      If you have any questions or concerns please feel free to call.    Elsa Butts, [facility]  Oncology Clinic  [facility]  [phone]                  Follow-ups after your visit        Follow-up notes from your care team     Return in about 1 week (around 11/14/2017) for lab ordered today, Schedule for chemotherapy as per treatment plan.      Your next 10 appointments already scheduled     Nov 14, 2017  8:00 AM CST   [facility]ove [facility]   [facility]    [address] [place], [place] [address] [id number] [phone]            Nov 14, 2017  8:30 AM CST   Level 7 with NL INFUSION CHAIR 1   [facility] ([facility])    [address] [place] [address] Dr Rosa VASQUEZ [id number] [phone]              Future tests that were ordered for you today     Open Future Orders        Priority Expected Expires  "Ordered    CBC with platelets differential Routine 11/7/2017 11/7/2018 11/7/2017    Comprehensive metabolic panel Routine 11/7/2017 11/7/2018 11/7/2017    CEA Routine 11/7/2017 11/7/2018 11/7/2017            Who to contact     If you have questions or need follow up information about today's clinic visit or your schedule please contact [facility] directly at [phone].  Normal or non-critical lab and imaging results will be communicated to you by Spryhart, letter or phone within 4 business days after the clinic has received the results. If you do not hear from us within 7 days, please contact the clinic through KlickThru or phone. If you have a critical or abnormal lab result, we will notify you by phone as soon as possible.  Submit refill requests through KlickThru or call your pharmacy and they will forward the refill request to us. Please allow 3 business days for your refill to be completed.          Additional Information About Your Visit        KlickThru Information     [employer]ru gives you secure access to your electronic health record. If you see a primary care provider, you can also send messages to your care team and make appointments. If you have questions, please call your primary care clinic.  If you do not have a primary care provider, please call [phone] and they will assist you.        Care EveryWhere ID     This is your Care EveryWhere ID. This could be used by other organizations to access your [place] medical records  [id number]        Your Vitals Were     Pulse Temperature Respirations Height Last Period Pulse Oximetry    95 97.8  F (36.6  C) (Temporal) 16 1.689 m (5' 6.5\") 06/07/2007 100%    BMI (Body Mass Index)                   21.05 kg/m2            Blood Pressure from Last 3 Encounters:   11/07/17 163/81   10/31/17 161/67   10/31/17 157/79    Weight from Last 3 Encounters:   11/07/17 60.1 kg (132 lb 6.4 oz)   10/31/17 62.3 kg (137 lb 6.4 oz)   10/26/17 67.3 kg (148 lb 4.8 " oz)                 Today's Medication Changes          These changes are accurate as of: 11/7/17  3:49 PM.  If you have any questions, ask your nurse or doctor.               These medicines have changed or have updated prescriptions.        Dose/Directions    oxyCODONE IR 5 MG tablet   Commonly known as:  ROXICODONE   This may have changed:  reasons to take this        Dose:  5 mg   Take 1 tablet (5 mg) by mouth every 8 hours as needed for pain   Quantity:  20 tablet   Refills:  0            Where to get your medicines      Call your pharmacy to confirm that your medication is ready for pickup. It may take up to 24 hours for them to receive the prescription. If the prescription is not ready within 3 business days, please contact your clinic or your provider.     We will let you know when these medications are ready. If you don't hear back within 3 business days, please contact us.     lidocaine 5 % Patch                Primary Care Provider Office Phone # Fax #    Donna Shirley PA-C [phone] [address] [place] [address]        Equal Access to Services     [facility]: Hadii aad ku hadasho Soomaali, waaxda luqadaha, qaybta kaalmada adeegyada, waxay rerein handy rowe . So [facility] [phone].    ATENCIÓN: Si habla español, tiene a hunt disposición servicios gratuitos de asistencia lingüística. [facility] [phone].    We comply with applicable federal civil rights laws and [place] laws. We do not discriminate on the basis of race, color, national origin, age, disability, sex, sexual orientation, or gender identity.            Thank you!     Thank you for choosing [facility]  for your care. Our goal is always to provide you with excellent care. Hearing back from our patients is one way we can continue to improve our services. Please take a few minutes to complete the written survey that you may receive in the mail after your visit with us. Thank  you!             Your Updated Medication List - Protect others around you: Learn how to safely use, store and throw away your medicines at www.disposemymeds.org.          This list is accurate as of: 11/7/17  3:49 PM.  Always use your most recent med list.                   Brand Name Dispense Instructions for use Diagnosis    ACETAMINOPHEN PO      Take 650 mg by mouth every 4 hours as needed for pain or fever        benzonatate 100 MG capsule    TESSALON PERLES    42 capsule    Take 2 capsules (200 mg) by mouth 3 times daily as needed for cough    Metastatic colon cancer to liver (H)       carvedilol 12.5 MG tablet    COREG    90 tablet    Take 1.5 tablets (18.75 mg) by mouth 2 times daily (with meals)    Chronic systolic congestive heart failure (H)       dexamethasone 4 MG tablet    DECADRON    6 tablet    Take 2 tablets (8 mg) by mouth daily (with breakfast) Days 2, 3, and 4 of each cycle.    Metastatic colon cancer to liver (H)       diclofenac 1 % Gel topical gel    VOLTAREN     Place 2 g onto the skin 4 times daily as needed for moderate pain (for RUQ pain)    Metastatic colon cancer to liver (H)       ferrous sulfate 325 (65 FE) MG tablet    IRON     Take 325 mg by mouth daily (with breakfast)        guaiFENesin-codeine 100-10 MG/5ML Soln solution    ROBITUSSIN AC    120 mL    Take 5 mLs by mouth every 4 hours as needed for cough    Liver metastasis (H)       lidocaine 5 % Patch    LIDODERM    30 patch    Place 1-3 patches onto the skin every 24 hours To RUQ    Metastatic colon cancer to liver (H)       loperamide 2 MG capsule    IMODIUM    30 capsule    2 caps at 1st sign of diarrhea & 1 cap every 2hrs until 12hrs diarrhea free. During night, 2 caps at bedtime & 2 caps every 4hrs until AM    Metastatic colon cancer to liver (H)       LORazepam 0.5 MG tablet    ATIVAN    30 tablet    Take 1 tablet (0.5 mg) by mouth every 4 hours as needed (Anxiety, Nausea/Vomiting or Sleep)    Metastatic colon cancer to  liver (H)       losartan 25 MG tablet    COZAAR    30 tablet    Take 1 tablet (25 mg) by mouth daily    Secondary cardiomyopathy (H)       oxyCODONE IR 5 MG tablet    ROXICODONE    20 tablet    Take 1 tablet (5 mg) by mouth every 8 hours as needed for pain        Potassium Chloride ER 20 MEQ Tbcr     30 tablet    Take 1 tablet (20 mEq) by mouth daily    Hypokalemia       prochlorperazine 10 MG tablet    COMPAZINE    30 tablet    Take 1 tablet (10 mg) by mouth every 6 hours as needed (Nausea/Vomiting)    Metastatic colon cancer to liver (H)       ranitidine 300 MG tablet    ZANTAC    30 tablet    Take 1 tablet (300 mg) by mouth At Bedtime    Nausea and vomiting, intractability of vomiting not specified, unspecified vomiting type       sennosides 8.6 MG tablet    SENOKOT    120 each    Take 1-2 tablets by mouth 2 times daily as needed for constipation    Slow transit constipation

## 2017-11-07 NOTE — PROGRESS NOTES
Hematology/ Oncology Follow-up Visit:  Nov 7, 2017    Reason for Visit:   Chief Complaint   Patient presents with     Oncology Clinic Visit     1 week follow up for Metastatic colon cancer to liver       Oncologic History:  Metastatic colon cancer to liver (H)  Charlene Douglas presented with 2 months history of cough, shortness of breath, decreased appetite and abdominal pain. Patient has black stool and occult blood which was positive. Subsequently the patient went on to have a CT angiogram of the chest because of shortness of breath. Lungs were clear. Multiple liver lesions were seen. Subsequently the patient went on to have an ultrasound of the liver done on August 18, 2017 which confirmed the presence of multiple liver metastases. Abdominal CT was done on August 18, 2017 showing large ascending colon mass with associated mesenteric/peritoneal drop metastasis and lymphadenopathy. There are multiple liver metastasis seen. KRAS mutated. She was started on systemic chemotherapy with FOLFOX with Avastin. However had severe cardiogenic toxicity from 5-FU and ended up in the hospital with acute respiratory failure requiring intubation. Initial echo showed LVEF and 10-15% which did improve in 3 days time to 35- 40%.  Subsequently, chemotherapy was switched to single agent irinotecan.      Interval History:  Patient is here today for follow-up. She had her first cycle of chemotherapy with irinotecan, oxaliplatin and Avastin last week. She tolerated chemotherapy without significant side effects. She denies any nausea or vomiting. She denies any diarrhea. Abdominal pain has improved. She's been complaining of increasing fatigue and weakness of the lower extremity.    Review Of Systems:  Constitutional: Negative for fever, chills, and night sweats. Generalized weakness  Skin: negative.  Eyes: negative.  Ears/Nose/Throat: negative.  Respiratory: No shortness of breath, dyspnea on exertion, cough, or  hemoptysis.  Cardiovascular: negative.  Gastrointestinal: There is no nausea vomiting or diarrhea.. No blood in her stool  Genitourinary: negative.  Musculoskeletal: negative.  Neurologic: negative.  Psychiatric: negative.  Hematologic/Lymphatic/Immunologic: negative.  Endocrine: negative.    All other ROS negative unless mentioned in interval history.    Past medical, social, surgical, and family histories reviewed.    Allergies:  Allergies as of 11/07/2017 - Miguel as Reviewed 11/07/2017   Allergen Reaction Noted     Lisinopril Cough 09/19/2017     No known allergies  08/11/2003       Current Medications:  Current Outpatient Prescriptions   Medication Sig Dispense Refill     lidocaine (LIDODERM) 5 % Patch Place 1-3 patches onto the skin every 24 hours To RUQ 30 patch 1     guaiFENesin-codeine (ROBITUSSIN AC) 100-10 MG/5ML SOLN solution Take 5 mLs by mouth every 4 hours as needed for cough 120 mL 0     benzonatate (TESSALON PERLES) 100 MG capsule Take 2 capsules (200 mg) by mouth 3 times daily as needed for cough 42 capsule 1     dexamethasone (DECADRON) 4 MG tablet Take 2 tablets (8 mg) by mouth daily (with breakfast) Days 2, 3, and 4 of each cycle. 6 tablet 11     LORazepam (ATIVAN) 0.5 MG tablet Take 1 tablet (0.5 mg) by mouth every 4 hours as needed (Anxiety, Nausea/Vomiting or Sleep) 30 tablet 5     prochlorperazine (COMPAZINE) 10 MG tablet Take 1 tablet (10 mg) by mouth every 6 hours as needed (Nausea/Vomiting) 30 tablet 5     loperamide (IMODIUM) 2 MG capsule 2 caps at 1st sign of diarrhea & 1 cap every 2hrs until 12hrs diarrhea free. During night, 2 caps at bedtime & 2 caps every 4hrs until AM 30 capsule 0     losartan (COZAAR) 25 MG tablet Take 1 tablet (25 mg) by mouth daily 30 tablet 11     ranitidine (ZANTAC) 300 MG tablet Take 1 tablet (300 mg) by mouth At Bedtime 30 tablet 1     oxyCODONE (ROXICODONE) 5 MG IR tablet Take 1 tablet (5 mg) by mouth every 8 hours as needed for pain (Patient taking  "differently: Take 5 mg by mouth every 8 hours as needed for pain (patient only taking half at a time) ) 20 tablet 0     ferrous sulfate (IRON) 325 (65 FE) MG tablet Take 325 mg by mouth daily (with breakfast)       carvedilol (COREG) 12.5 MG tablet Take 1.5 tablets (18.75 mg) by mouth 2 times daily (with meals) 90 tablet 0     Potassium Chloride ER 20 MEQ TBCR Take 1 tablet (20 mEq) by mouth daily 30 tablet 0     diclofenac (VOLTAREN) 1 % GEL topical gel Place 2 g onto the skin 4 times daily as needed for moderate pain (for RUQ pain)       sennosides (SENOKOT) 8.6 MG tablet Take 1-2 tablets by mouth 2 times daily as needed for constipation 120 each      ACETAMINOPHEN PO Take 650 mg by mouth every 4 hours as needed for pain or fever           Physical Exam:  /81 (BP Location: Right arm, Patient Position: Chair, Cuff Size: Adult Regular)  Pulse 95  Temp 97.8  F (36.6  C) (Temporal)  Resp 16  Ht 1.689 m (5' 6.5\")  Wt 60.1 kg (132 lb 6.4 oz)  LMP 06/07/2007  SpO2 100%  BMI 21.05 kg/m2  Wt Readings from Last 12 Encounters:   11/07/17 60.1 kg (132 lb 6.4 oz)   10/31/17 62.3 kg (137 lb 6.4 oz)   10/26/17 67.3 kg (148 lb 4.8 oz)   10/25/17 65.3 kg (144 lb)   10/25/17 66.2 kg (146 lb)   10/24/17 65.5 kg (144 lb 6.4 oz)   10/24/17 65.5 kg (144 lb 4.8 oz)   10/17/17 64.5 kg (142 lb 3.2 oz)   10/17/17 64.5 kg (142 lb 1.6 oz)   10/12/17 64.5 kg (142 lb 4.8 oz)   10/10/17 63.7 kg (140 lb 8 oz)   10/07/17 61.7 kg (136 lb)     ECOG performance status: 1  GENERAL APPEARANCE: Healthy, alert and in no acute distress.  HEENT: Sclerae anicteric. PERRLA. Oropharynx without ulcers, lesions, or thrush.  NECK: Supple. No asymmetry or masses.  LYMPHATICS: No palpable cervical, supraclavicular, axillary, or inguinal lymphadenopathy.  RESP: Lungs clear to auscultation bilaterally without rales, rhonchi or wheezes.  CARDIOVASCULAR: Regular rate and rhythm. Normal S1, S2; no S3 or S4. No murmur, gallop, or rub.  ABDOMEN: Soft, " nontender. Bowel sounds normal. No palpable organomegaly or masses.  MUSCULOSKELETAL: Extremities without gross deformities noted. No edema of bilateral lower extremities.  SKIN: No suspicious lesions or rashes.  NEURO: Alert and oriented x 3. Cranial nerves II-XII grossly intact.  PSYCHIATRIC: Mentation and affect appear normal.    Laboratory/Imaging Studies:  Infusion Therapy Visit on 10/31/2017   Component Date Value Ref Range Status     WBC 10/31/2017 16.4* 4.0 - 11.0 10e9/L Final     RBC Count 10/31/2017 3.23* 3.8 - 5.2 10e12/L Final     Hemoglobin 10/31/2017 9.0* 11.7 - 15.7 g/dL Final     Hematocrit 10/31/2017 29.0* 35.0 - 47.0 % Final     MCV 10/31/2017 90  78 - 100 fl Final     MCH 10/31/2017 27.9  26.5 - 33.0 pg Final     MCHC 10/31/2017 31.0* 31.5 - 36.5 g/dL Final     RDW 10/31/2017 17.0* 10.0 - 15.0 % Final     Platelet Count 10/31/2017 377  150 - 450 10e9/L Final     Diff Method 10/31/2017 Manual Differential   Final     % Neutrophils 10/31/2017 63.0  % Final     % Lymphocytes 10/31/2017 18.0  % Final     % Monocytes 10/31/2017 15.0  % Final     % Eosinophils 10/31/2017 0.0  % Final     % Basophils 10/31/2017 0.0  % Final     % Metamyelocytes 10/31/2017 2.0  % Final     % Myelocytes 10/31/2017 2.0  % Final     Absolute Neutrophil 10/31/2017 10.3* 1.6 - 8.3 10e9/L Final     Absolute Lymphocytes 10/31/2017 3.0  0.8 - 5.3 10e9/L Final     Absolute Monocytes 10/31/2017 2.5* 0.0 - 1.3 10e9/L Final     Absolute Eosinophils 10/31/2017 0.0  0.0 - 0.7 10e9/L Final     Absolute Basophils 10/31/2017 0.0  0.0 - 0.2 10e9/L Final     Absolute Metamyelocytes 10/31/2017 0.3* 0 10e9/L Final     Absolute Myelocytes 10/31/2017 0.3* 0 10e9/L Final     Polychromasia 10/31/2017 Slight   Final     Hypochromasia 10/31/2017 Present   Final     Platelet Estimate 10/31/2017 Normal   Final     Sodium 10/31/2017 133  133 - 144 mmol/L Final     Potassium 10/31/2017 3.2* 3.4 - 5.3 mmol/L Final     Chloride 10/31/2017 96  94 - 109  mmol/L Final     Carbon Dioxide 10/31/2017 30  20 - 32 mmol/L Final     Anion Gap 10/31/2017 7  3 - 14 mmol/L Final     Glucose 10/31/2017 140* 70 - 99 mg/dL Final     Urea Nitrogen 10/31/2017 8  7 - 30 mg/dL Final     Creatinine 10/31/2017 0.54  0.52 - 1.04 mg/dL Final     GFR Estimate 10/31/2017 >90  >60 mL/min/1.7m2 Final    Non  GFR Calc     GFR Estimate If Black 10/31/2017 >90  >60 mL/min/1.7m2 Final    African American GFR Calc     Calcium 10/31/2017 8.5  8.5 - 10.1 mg/dL Final     Bilirubin Total 10/31/2017 1.0  0.2 - 1.3 mg/dL Final     Albumin 10/31/2017 1.6* 3.4 - 5.0 g/dL Final     Protein Total 10/31/2017 6.5* 6.8 - 8.8 g/dL Final     Alkaline Phosphatase 10/31/2017 900* 40 - 150 U/L Final     ALT 10/31/2017 56* 0 - 50 U/L Final     AST 10/31/2017 78* 0 - 45 U/L Final     Protein Albumin Urine 10/31/2017 30* NEG^Negative mg/dL Final   Infusion Therapy Visit on 10/26/2017   Component Date Value Ref Range Status     WBC 10/26/2017 12.5* 4.0 - 11.0 10e9/L Final     RBC Count 10/26/2017 2.96* 3.8 - 5.2 10e12/L Final     Hemoglobin 10/26/2017 8.3* 11.7 - 15.7 g/dL Final     Hematocrit 10/26/2017 26.1* 35.0 - 47.0 % Final     MCV 10/26/2017 88  78 - 100 fl Final     MCH 10/26/2017 28.0  26.5 - 33.0 pg Final     MCHC 10/26/2017 31.8  31.5 - 36.5 g/dL Final     RDW 10/26/2017 16.7* 10.0 - 15.0 % Final     Platelet Count 10/26/2017 227  150 - 450 10e9/L Final     Diff Method 10/26/2017 Manual Differential   Final     % Neutrophils 10/26/2017 52.0  % Final     % Lymphocytes 10/26/2017 42.0  % Final     % Monocytes 10/26/2017 3.0  % Final     % Eosinophils 10/26/2017 0.0  % Final     % Basophils 10/26/2017 0.0  % Final     % Metamyelocytes 10/26/2017 3.0  % Final     Absolute Neutrophil 10/26/2017 6.5  1.6 - 8.3 10e9/L Final     Absolute Lymphocytes 10/26/2017 5.3  0.8 - 5.3 10e9/L Final     Absolute Monocytes 10/26/2017 0.4  0.0 - 1.3 10e9/L Final     Absolute Eosinophils 10/26/2017 0.0  0.0 - 0.7  10e9/L Final     Absolute Basophils 10/26/2017 0.0  0.0 - 0.2 10e9/L Final     Absolute Metamyelocytes 10/26/2017 0.4* 0 10e9/L Final     Polychromasia 10/26/2017 M   Final     Hypochromasia 10/26/2017 P   Final     Platelet Estimate 10/26/2017 N   Final     Sodium 10/26/2017 134  133 - 144 mmol/L Final     Potassium 10/26/2017 3.4  3.4 - 5.3 mmol/L Final     Chloride 10/26/2017 98  94 - 109 mmol/L Final     Carbon Dioxide 10/26/2017 27  20 - 32 mmol/L Final     Anion Gap 10/26/2017 9  3 - 14 mmol/L Final     Glucose 10/26/2017 124* 70 - 99 mg/dL Final     Urea Nitrogen 10/26/2017 7  7 - 30 mg/dL Final     Creatinine 10/26/2017 0.62  0.52 - 1.04 mg/dL Final     GFR Estimate 10/26/2017 >90  >60 mL/min/1.7m2 Final    Non  GFR Calc     GFR Estimate If Black 10/26/2017 >90  >60 mL/min/1.7m2 Final    African American GFR Calc     Calcium 10/26/2017 8.2* 8.5 - 10.1 mg/dL Final     Bilirubin Total 10/26/2017 0.9  0.2 - 1.3 mg/dL Final     Albumin 10/26/2017 1.4* 3.4 - 5.0 g/dL Final     Protein Total 10/26/2017 5.6* 6.8 - 8.8 g/dL Final     Alkaline Phosphatase 10/26/2017 550* 40 - 150 U/L Final     ALT 10/26/2017 26  0 - 50 U/L Final     AST 10/26/2017 41  0 - 45 U/L Final   Infusion Therapy Visit on 10/25/2017   Component Date Value Ref Range Status     WBC 10/25/2017 3.7* 4.0 - 11.0 10e9/L Final     RBC Count 10/25/2017 2.63* 3.8 - 5.2 10e12/L Final     Hemoglobin 10/25/2017 7.1* 11.7 - 15.7 g/dL Final     Hematocrit 10/25/2017 22.9* 35.0 - 47.0 % Final     MCV 10/25/2017 87  78 - 100 fl Final     MCH 10/25/2017 27.0  26.5 - 33.0 pg Final     MCHC 10/25/2017 31.0* 31.5 - 36.5 g/dL Final     RDW 10/25/2017 16.6* 10.0 - 15.0 % Final     Platelet Count 10/25/2017 133* 150 - 450 10e9/L Final     Diff Method 10/25/2017 Manual Differential   Final     % Neutrophils 10/25/2017 29.0  % Final     % Lymphocytes 10/25/2017 35.0  % Final     % Monocytes 10/25/2017 35.0  % Final     % Eosinophils 10/25/2017 1.0  %  Final     % Basophils 10/25/2017 0.0  % Final     Absolute Neutrophil 10/25/2017 1.1* 1.6 - 8.3 10e9/L Final     Absolute Lymphocytes 10/25/2017 1.3  0.8 - 5.3 10e9/L Final     Absolute Monocytes 10/25/2017 1.3  0.0 - 1.3 10e9/L Final     Absolute Eosinophils 10/25/2017 0.0  0.0 - 0.7 10e9/L Final     Absolute Basophils 10/25/2017 0.0  0.0 - 0.2 10e9/L Final     Platelet Estimate 10/25/2017 Confirming automated cell count   Final     Sodium 10/25/2017 132* 133 - 144 mmol/L Final     Potassium 10/25/2017 3.3* 3.4 - 5.3 mmol/L Final     Chloride 10/25/2017 96  94 - 109 mmol/L Final     Carbon Dioxide 10/25/2017 27  20 - 32 mmol/L Final     Anion Gap 10/25/2017 9  3 - 14 mmol/L Final     Glucose 10/25/2017 141* 70 - 99 mg/dL Final     Urea Nitrogen 10/25/2017 9  7 - 30 mg/dL Final     Creatinine 10/25/2017 0.64  0.52 - 1.04 mg/dL Final     GFR Estimate 10/25/2017 >90  >60 mL/min/1.7m2 Final    Non  GFR Calc     GFR Estimate If Black 10/25/2017 >90  >60 mL/min/1.7m2 Final    African American GFR Calc     Calcium 10/25/2017 8.0* 8.5 - 10.1 mg/dL Final     Bilirubin Total 10/25/2017 1.0  0.2 - 1.3 mg/dL Final     Albumin 10/25/2017 1.4* 3.4 - 5.0 g/dL Final     Protein Total 10/25/2017 5.7* 6.8 - 8.8 g/dL Final     Alkaline Phosphatase 10/25/2017 539* 40 - 150 U/L Final     ALT 10/25/2017 24  0 - 50 U/L Final     AST 10/25/2017 42  0 - 45 U/L Final     Protein Albumin Urine 10/25/2017 Negative  NEG^Negative mg/dL Final        Recent Results (from the past 744 hour(s))   CT Abdomen Pelvis w Contrast    Narrative    CT ABDOMEN AND PELVIS WITH CONTRAST 10/7/2017 7:05 PM     HISTORY: Left upper quadrant abdominal pain, known history of liver  and colon cancer, abdominal distention.    COMPARISON: 8/18/2017    TECHNIQUE: Volumetric helical acquisition of CT images from the lung  bases through the symphysis pubis after the administration of 70 mL  Isovue 370  intravenous contrast. Radiation dose for this scan  was  reduced using automated exposure control, adjustment of the mA and/or  kV according to patient size, or iterative reconstruction technique.    FINDINGS: No definite bowel obstruction demonstrated. Trace free  fluid. No free air. There are moderate atherosclerotic changes of the  visualized aorta and its branches. There is no evidence of aortic  dissection or aneurysm. No hydronephrosis or urolithiasis  demonstrated. Extensive liver metastases, adenopathy/masses in the  peritoneum again noted and similar to previous. Representative lesion  in the right lobe of liver measures 10.7 cm today previously 10.1 cm.  Adrenal glands, spleen, and pancreas demonstrate no worrisome focal  lesion. Superior degenerative changes in the left hip. No frankly  destructive bony lesions. Lung bases demonstrate trace peripheral  fibrosis and/or atelectasis.      Impression    IMPRESSION: Trace free fluid, no significant ascites or bowel  obstruction demonstrated.    KAILEY GUERRA MD   XR Chest Port 1 View    Narrative    CHEST PORTABLE ONE VIEW   10/25/2017 6:18 PM     HISTORY: Chest pain.    COMPARISON: 9/18/2017    FINDINGS: Port-A-Cath with tip in the SVC. Heart size normal. Lungs  clear. No interval change.      Impression    IMPRESSION: Nothing acute. No interval change.    ANITRA DENNEY MD       Assessment and plan:  (C18.9,  C78.7) Metastatic colon cancer to liver (H)  (primary encounter diagnosis)  (C78.7) Liver metastasis (H)  The patient tolerated her first cycle of treatment. We will check CBC and CMP today. I will see the patient again next week for cycle #2.    (E87.6) Hypokalemia  Patient will continue on potassium supplements.    (R11.2,  T45.1X5A) Chemotherapy induced nausea and vomiting  Patient nausea has been controlled.    (K52.1,  T45.1X5A) Chemotherapy induced diarrhea  Patient diarrhea has stopped.    (I10) Benign essential hypertension  Patient will continue on Cozaar 25 mg daily. She is also on Coreg  18.75 mg 2 times daily.    (D64.9) Anemia, unspecified type  We will check CBC today and arrange for blood transfusion if hemoglobin less than 8 g      The patient is ready to learn, no apparent learning barriers were identified.  Diagnosis and treatment plans were explained to the patient. The patient expressed understanding of the content. The patient asked appropriate questions. The patient questions were answered to her satisfaction.    Chart documentation with Dragon Voice recognition Software. Although reviewed after completion, some words and grammatical errors may remain.

## 2017-11-07 NOTE — NURSING NOTE
DISCHARGE PLAN:  Next appointments: See patient instruction section  Departure Mode:cane  Accompanied by:   0 minutes for nursing discharge (face to face time)   Elsa Butts MA    Writing nurse was not able to see patient after Medical Oncology appointment to address questions/concerns/coordinate care.  Pt to RTC in 1 week with port labs prior and IVO to follow. Appointments scheduled.  Questions and concerns addressed to patient's satisfaction. Contact information provided and patient is encouraged to call with any that arise in the interim of care.  See patient instructions and Oncologist's Progress note for further details.    Lyssa Butts CMA  [facility]  [facility]  [phone]  11/7/2017, 3:48 PM

## 2017-11-07 NOTE — NURSING NOTE
"Oncology Rooming Note    November 7, 2017 3:32 PM   Charlene Douglas is a 67 year old female who presents for:    Chief Complaint   Patient presents with     Oncology Clinic Visit     1 week follow up for Metastatic colon cancer to liver     Initial Vitals: /81 (BP Location: Right arm, Patient Position: Chair, Cuff Size: Adult Regular)  Pulse 95  Temp 97.8  F (36.6  C) (Temporal)  Resp 16  Ht 1.689 m (5' 6.5\")  Wt 60.1 kg (132 lb 6.4 oz)  LMP 06/07/2007  SpO2 100%  BMI 21.05 kg/m2 Estimated body mass index is 21.05 kg/(m^2) as calculated from the following:    Height as of this encounter: 1.689 m (5' 6.5\").    Weight as of this encounter: 60.1 kg (132 lb 6.4 oz). Body surface area is 1.68 meters squared.  No Pain (0) Comment: Data Unavailable   Patient's last menstrual period was 06/07/2007.  Allergies reviewed: Yes  Medications reviewed: Yes    Medications: MEDICATION REFILLS NEEDED TODAY. Provider was notified.  Pharmacy name entered into [facility]:    [facility] - [id number] [facility]    Clinical concerns: Weakness. Dr. Dove was notified.      Elsa Butts MA              "

## 2017-11-07 NOTE — PATIENT INSTRUCTIONS
Please follow up with Dr. Dove in 1 week.  You will need ports labs in infusion prior to your appointment.                                                  Follow up appointment date/time: 11/14/2017 @ 8:00am    Infusion appointment date/time: 11/14/2017 @ 8:30am      If you need to reschedule or have scheduling questions please call scheduling at [phone].    Keep in mind to have any labs or scans that are needed done prior to your Oncology visit.      If you have any questions or concerns please feel free to call.    Elsa Butts, [facility]  Oncology Clinic  [facility]  [phone]

## 2017-11-07 NOTE — LETTER
11/7/2017         RE: Charlene Douglas  [address] [address] [place] [id number]        Dear Colleague,    Thank you for referring your patient, Charlene Douglas, to the [facility]. Please see a copy of my visit note below.    Hematology/ Oncology Follow-up Visit:  Nov 7, 2017    Reason for Visit:   Chief Complaint   Patient presents with     Oncology Clinic Visit     1 week follow up for Metastatic colon cancer to liver       Oncologic History:  Metastatic colon cancer to liver (H)  Charlene Douglas presented with 2 months history of cough, shortness of breath, decreased appetite and abdominal pain. Patient has black stool and occult blood which was positive. Subsequently the patient went on to have a CT angiogram of the chest because of shortness of breath. Lungs were clear. Multiple liver lesions were seen. Subsequently the patient went on to have an ultrasound of the liver done on August 18, 2017 which confirmed the presence of multiple liver metastases. Abdominal CT was done on August 18, 2017 showing large ascending colon mass with associated mesenteric/peritoneal drop metastasis and lymphadenopathy. There are multiple liver metastasis seen. KRAS mutated. She was started on systemic chemotherapy with FOLFOX with Avastin. However had severe cardiogenic toxicity from 5-FU and ended up in the hospital with acute respiratory failure requiring intubation. Initial echo showed LVEF and 10-15% which did improve in 3 days time to 35- 40%.  Subsequently, chemotherapy was switched to single agent irinotecan.      Interval History:  Patient is here today for follow-up. She had her first cycle of chemotherapy with irinotecan, oxaliplatin and Avastin last week. She tolerated chemotherapy without significant side effects. She denies any nausea or vomiting. She denies any diarrhea. Abdominal pain has improved. She's been complaining of increasing fatigue and weakness of the lower extremity.    Review Of  Systems:  Constitutional: Negative for fever, chills, and night sweats. Generalized weakness  Skin: negative.  Eyes: negative.  Ears/Nose/Throat: negative.  Respiratory: No shortness of breath, dyspnea on exertion, cough, or hemoptysis.  Cardiovascular: negative.  Gastrointestinal: There is no nausea vomiting or diarrhea.. No blood in her stool  Genitourinary: negative.  Musculoskeletal: negative.  Neurologic: negative.  Psychiatric: negative.  Hematologic/Lymphatic/Immunologic: negative.  Endocrine: negative.    All other ROS negative unless mentioned in interval history.    Past medical, social, surgical, and family histories reviewed.    Allergies:  Allergies as of 11/07/2017 - Miguel as Reviewed 11/07/2017   Allergen Reaction Noted     Lisinopril Cough 09/19/2017     No known allergies  08/11/2003       Current Medications:  Current Outpatient Prescriptions   Medication Sig Dispense Refill     lidocaine (LIDODERM) 5 % Patch Place 1-3 patches onto the skin every 24 hours To RUQ 30 patch 1     guaiFENesin-codeine (ROBITUSSIN AC) 100-10 MG/5ML SOLN solution Take 5 mLs by mouth every 4 hours as needed for cough 120 mL 0     benzonatate (TESSALON PERLES) 100 MG capsule Take 2 capsules (200 mg) by mouth 3 times daily as needed for cough 42 capsule 1     dexamethasone (DECADRON) 4 MG tablet Take 2 tablets (8 mg) by mouth daily (with breakfast) Days 2, 3, and 4 of each cycle. 6 tablet 11     LORazepam (ATIVAN) 0.5 MG tablet Take 1 tablet (0.5 mg) by mouth every 4 hours as needed (Anxiety, Nausea/Vomiting or Sleep) 30 tablet 5     prochlorperazine (COMPAZINE) 10 MG tablet Take 1 tablet (10 mg) by mouth every 6 hours as needed (Nausea/Vomiting) 30 tablet 5     loperamide (IMODIUM) 2 MG capsule 2 caps at 1st sign of diarrhea & 1 cap every 2hrs until 12hrs diarrhea free. During night, 2 caps at bedtime & 2 caps every 4hrs until AM 30 capsule 0     losartan (COZAAR) 25 MG tablet Take 1 tablet (25 mg) by mouth daily 30 tablet  "11     ranitidine (ZANTAC) 300 MG tablet Take 1 tablet (300 mg) by mouth At Bedtime 30 tablet 1     oxyCODONE (ROXICODONE) 5 MG IR tablet Take 1 tablet (5 mg) by mouth every 8 hours as needed for pain (Patient taking differently: Take 5 mg by mouth every 8 hours as needed for pain (patient only taking half at a time) ) 20 tablet 0     ferrous sulfate (IRON) 325 (65 FE) MG tablet Take 325 mg by mouth daily (with breakfast)       carvedilol (COREG) 12.5 MG tablet Take 1.5 tablets (18.75 mg) by mouth 2 times daily (with meals) 90 tablet 0     Potassium Chloride ER 20 MEQ TBCR Take 1 tablet (20 mEq) by mouth daily 30 tablet 0     diclofenac (VOLTAREN) 1 % GEL topical gel Place 2 g onto the skin 4 times daily as needed for moderate pain (for RUQ pain)       sennosides (SENOKOT) 8.6 MG tablet Take 1-2 tablets by mouth 2 times daily as needed for constipation 120 each      ACETAMINOPHEN PO Take 650 mg by mouth every 4 hours as needed for pain or fever           Physical Exam:  /81 (BP Location: Right arm, Patient Position: Chair, Cuff Size: Adult Regular)  Pulse 95  Temp 97.8  F (36.6  C) (Temporal)  Resp 16  Ht 1.689 m (5' 6.5\")  Wt 60.1 kg (132 lb 6.4 oz)  LMP 06/07/2007  SpO2 100%  BMI 21.05 kg/m2  Wt Readings from Last 12 Encounters:   11/07/17 60.1 kg (132 lb 6.4 oz)   10/31/17 62.3 kg (137 lb 6.4 oz)   10/26/17 67.3 kg (148 lb 4.8 oz)   10/25/17 65.3 kg (144 lb)   10/25/17 66.2 kg (146 lb)   10/24/17 65.5 kg (144 lb 6.4 oz)   10/24/17 65.5 kg (144 lb 4.8 oz)   10/17/17 64.5 kg (142 lb 3.2 oz)   10/17/17 64.5 kg (142 lb 1.6 oz)   10/12/17 64.5 kg (142 lb 4.8 oz)   10/10/17 63.7 kg (140 lb 8 oz)   10/07/17 61.7 kg (136 lb)     ECOG performance status: 1  GENERAL APPEARANCE: Healthy, alert and in no acute distress.  HEENT: Sclerae anicteric. PERRLA. Oropharynx without ulcers, lesions, or thrush.  NECK: Supple. No asymmetry or masses.  LYMPHATICS: No palpable cervical, supraclavicular, axillary, or inguinal " lymphadenopathy.  RESP: Lungs clear to auscultation bilaterally without rales, rhonchi or wheezes.  CARDIOVASCULAR: Regular rate and rhythm. Normal S1, S2; no S3 or S4. No murmur, gallop, or rub.  ABDOMEN: Soft, nontender. Bowel sounds normal. No palpable organomegaly or masses.  MUSCULOSKELETAL: Extremities without gross deformities noted. No edema of bilateral lower extremities.  SKIN: No suspicious lesions or rashes.  NEURO: Alert and oriented x 3. Cranial nerves II-XII grossly intact.  PSYCHIATRIC: Mentation and affect appear normal.    Laboratory/Imaging Studies:  Infusion Therapy Visit on 10/31/2017   Component Date Value Ref Range Status     WBC 10/31/2017 16.4* 4.0 - 11.0 10e9/L Final     RBC Count 10/31/2017 3.23* 3.8 - 5.2 10e12/L Final     Hemoglobin 10/31/2017 9.0* 11.7 - 15.7 g/dL Final     Hematocrit 10/31/2017 29.0* 35.0 - 47.0 % Final     MCV 10/31/2017 90  78 - 100 fl Final     MCH 10/31/2017 27.9  26.5 - 33.0 pg Final     MCHC 10/31/2017 31.0* 31.5 - 36.5 g/dL Final     RDW 10/31/2017 17.0* 10.0 - 15.0 % Final     Platelet Count 10/31/2017 377  150 - 450 10e9/L Final     Diff Method 10/31/2017 Manual Differential   Final     % Neutrophils 10/31/2017 63.0  % Final     % Lymphocytes 10/31/2017 18.0  % Final     % Monocytes 10/31/2017 15.0  % Final     % Eosinophils 10/31/2017 0.0  % Final     % Basophils 10/31/2017 0.0  % Final     % Metamyelocytes 10/31/2017 2.0  % Final     % Myelocytes 10/31/2017 2.0  % Final     Absolute Neutrophil 10/31/2017 10.3* 1.6 - 8.3 10e9/L Final     Absolute Lymphocytes 10/31/2017 3.0  0.8 - 5.3 10e9/L Final     Absolute Monocytes 10/31/2017 2.5* 0.0 - 1.3 10e9/L Final     Absolute Eosinophils 10/31/2017 0.0  0.0 - 0.7 10e9/L Final     Absolute Basophils 10/31/2017 0.0  0.0 - 0.2 10e9/L Final     Absolute Metamyelocytes 10/31/2017 0.3* 0 10e9/L Final     Absolute Myelocytes 10/31/2017 0.3* 0 10e9/L Final     Polychromasia 10/31/2017 Slight   Final     Hypochromasia  10/31/2017 Present   Final     Platelet Estimate 10/31/2017 Normal   Final     Sodium 10/31/2017 133  133 - 144 mmol/L Final     Potassium 10/31/2017 3.2* 3.4 - 5.3 mmol/L Final     Chloride 10/31/2017 96  94 - 109 mmol/L Final     Carbon Dioxide 10/31/2017 30  20 - 32 mmol/L Final     Anion Gap 10/31/2017 7  3 - 14 mmol/L Final     Glucose 10/31/2017 140* 70 - 99 mg/dL Final     Urea Nitrogen 10/31/2017 8  7 - 30 mg/dL Final     Creatinine 10/31/2017 0.54  0.52 - 1.04 mg/dL Final     GFR Estimate 10/31/2017 >90  >60 mL/min/1.7m2 Final    Non  GFR Calc     GFR Estimate If Black 10/31/2017 >90  >60 mL/min/1.7m2 Final    African American GFR Calc     Calcium 10/31/2017 8.5  8.5 - 10.1 mg/dL Final     Bilirubin Total 10/31/2017 1.0  0.2 - 1.3 mg/dL Final     Albumin 10/31/2017 1.6* 3.4 - 5.0 g/dL Final     Protein Total 10/31/2017 6.5* 6.8 - 8.8 g/dL Final     Alkaline Phosphatase 10/31/2017 900* 40 - 150 U/L Final     ALT 10/31/2017 56* 0 - 50 U/L Final     AST 10/31/2017 78* 0 - 45 U/L Final     Protein Albumin Urine 10/31/2017 30* NEG^Negative mg/dL Final   Infusion Therapy Visit on 10/26/2017   Component Date Value Ref Range Status     WBC 10/26/2017 12.5* 4.0 - 11.0 10e9/L Final     RBC Count 10/26/2017 2.96* 3.8 - 5.2 10e12/L Final     Hemoglobin 10/26/2017 8.3* 11.7 - 15.7 g/dL Final     Hematocrit 10/26/2017 26.1* 35.0 - 47.0 % Final     MCV 10/26/2017 88  78 - 100 fl Final     MCH 10/26/2017 28.0  26.5 - 33.0 pg Final     MCHC 10/26/2017 31.8  31.5 - 36.5 g/dL Final     RDW 10/26/2017 16.7* 10.0 - 15.0 % Final     Platelet Count 10/26/2017 227  150 - 450 10e9/L Final     Diff Method 10/26/2017 Manual Differential   Final     % Neutrophils 10/26/2017 52.0  % Final     % Lymphocytes 10/26/2017 42.0  % Final     % Monocytes 10/26/2017 3.0  % Final     % Eosinophils 10/26/2017 0.0  % Final     % Basophils 10/26/2017 0.0  % Final     % Metamyelocytes 10/26/2017 3.0  % Final     Absolute Neutrophil  10/26/2017 6.5  1.6 - 8.3 10e9/L Final     Absolute Lymphocytes 10/26/2017 5.3  0.8 - 5.3 10e9/L Final     Absolute Monocytes 10/26/2017 0.4  0.0 - 1.3 10e9/L Final     Absolute Eosinophils 10/26/2017 0.0  0.0 - 0.7 10e9/L Final     Absolute Basophils 10/26/2017 0.0  0.0 - 0.2 10e9/L Final     Absolute Metamyelocytes 10/26/2017 0.4* 0 10e9/L Final     Polychromasia 10/26/2017 M   Final     Hypochromasia 10/26/2017 P   Final     Platelet Estimate 10/26/2017 N   Final     Sodium 10/26/2017 134  133 - 144 mmol/L Final     Potassium 10/26/2017 3.4  3.4 - 5.3 mmol/L Final     Chloride 10/26/2017 98  94 - 109 mmol/L Final     Carbon Dioxide 10/26/2017 27  20 - 32 mmol/L Final     Anion Gap 10/26/2017 9  3 - 14 mmol/L Final     Glucose 10/26/2017 124* 70 - 99 mg/dL Final     Urea Nitrogen 10/26/2017 7  7 - 30 mg/dL Final     Creatinine 10/26/2017 0.62  0.52 - 1.04 mg/dL Final     GFR Estimate 10/26/2017 >90  >60 mL/min/1.7m2 Final    Non  GFR Calc     GFR Estimate If Black 10/26/2017 >90  >60 mL/min/1.7m2 Final    African American GFR Calc     Calcium 10/26/2017 8.2* 8.5 - 10.1 mg/dL Final     Bilirubin Total 10/26/2017 0.9  0.2 - 1.3 mg/dL Final     Albumin 10/26/2017 1.4* 3.4 - 5.0 g/dL Final     Protein Total 10/26/2017 5.6* 6.8 - 8.8 g/dL Final     Alkaline Phosphatase 10/26/2017 550* 40 - 150 U/L Final     ALT 10/26/2017 26  0 - 50 U/L Final     AST 10/26/2017 41  0 - 45 U/L Final   Infusion Therapy Visit on 10/25/2017   Component Date Value Ref Range Status     WBC 10/25/2017 3.7* 4.0 - 11.0 10e9/L Final     RBC Count 10/25/2017 2.63* 3.8 - 5.2 10e12/L Final     Hemoglobin 10/25/2017 7.1* 11.7 - 15.7 g/dL Final     Hematocrit 10/25/2017 22.9* 35.0 - 47.0 % Final     MCV 10/25/2017 87  78 - 100 fl Final     MCH 10/25/2017 27.0  26.5 - 33.0 pg Final     MCHC 10/25/2017 31.0* 31.5 - 36.5 g/dL Final     RDW 10/25/2017 16.6* 10.0 - 15.0 % Final     Platelet Count 10/25/2017 133* 150 - 450 10e9/L Final      Diff Method 10/25/2017 Manual Differential   Final     % Neutrophils 10/25/2017 29.0  % Final     % Lymphocytes 10/25/2017 35.0  % Final     % Monocytes 10/25/2017 35.0  % Final     % Eosinophils 10/25/2017 1.0  % Final     % Basophils 10/25/2017 0.0  % Final     Absolute Neutrophil 10/25/2017 1.1* 1.6 - 8.3 10e9/L Final     Absolute Lymphocytes 10/25/2017 1.3  0.8 - 5.3 10e9/L Final     Absolute Monocytes 10/25/2017 1.3  0.0 - 1.3 10e9/L Final     Absolute Eosinophils 10/25/2017 0.0  0.0 - 0.7 10e9/L Final     Absolute Basophils 10/25/2017 0.0  0.0 - 0.2 10e9/L Final     Platelet Estimate 10/25/2017 Confirming automated cell count   Final     Sodium 10/25/2017 132* 133 - 144 mmol/L Final     Potassium 10/25/2017 3.3* 3.4 - 5.3 mmol/L Final     Chloride 10/25/2017 96  94 - 109 mmol/L Final     Carbon Dioxide 10/25/2017 27  20 - 32 mmol/L Final     Anion Gap 10/25/2017 9  3 - 14 mmol/L Final     Glucose 10/25/2017 141* 70 - 99 mg/dL Final     Urea Nitrogen 10/25/2017 9  7 - 30 mg/dL Final     Creatinine 10/25/2017 0.64  0.52 - 1.04 mg/dL Final     GFR Estimate 10/25/2017 >90  >60 mL/min/1.7m2 Final    Non  GFR Calc     GFR Estimate If Black 10/25/2017 >90  >60 mL/min/1.7m2 Final    African American GFR Calc     Calcium 10/25/2017 8.0* 8.5 - 10.1 mg/dL Final     Bilirubin Total 10/25/2017 1.0  0.2 - 1.3 mg/dL Final     Albumin 10/25/2017 1.4* 3.4 - 5.0 g/dL Final     Protein Total 10/25/2017 5.7* 6.8 - 8.8 g/dL Final     Alkaline Phosphatase 10/25/2017 539* 40 - 150 U/L Final     ALT 10/25/2017 24  0 - 50 U/L Final     AST 10/25/2017 42  0 - 45 U/L Final     Protein Albumin Urine 10/25/2017 Negative  NEG^Negative mg/dL Final        Recent Results (from the past 744 hour(s))   CT Abdomen Pelvis w Contrast    Narrative    CT ABDOMEN AND PELVIS WITH CONTRAST 10/7/2017 7:05 PM     HISTORY: Left upper quadrant abdominal pain, known history of liver  and colon cancer, abdominal distention.    COMPARISON:  8/18/2017    TECHNIQUE: Volumetric helical acquisition of CT images from the lung  bases through the symphysis pubis after the administration of 70 mL  Isovue 370  intravenous contrast. Radiation dose for this scan was  reduced using automated exposure control, adjustment of the mA and/or  kV according to patient size, or iterative reconstruction technique.    FINDINGS: No definite bowel obstruction demonstrated. Trace free  fluid. No free air. There are moderate atherosclerotic changes of the  visualized aorta and its branches. There is no evidence of aortic  dissection or aneurysm. No hydronephrosis or urolithiasis  demonstrated. Extensive liver metastases, adenopathy/masses in the  peritoneum again noted and similar to previous. Representative lesion  in the right lobe of liver measures 10.7 cm today previously 10.1 cm.  Adrenal glands, spleen, and pancreas demonstrate no worrisome focal  lesion. Superior degenerative changes in the left hip. No frankly  destructive bony lesions. Lung bases demonstrate trace peripheral  fibrosis and/or atelectasis.      Impression    IMPRESSION: Trace free fluid, no significant ascites or bowel  obstruction demonstrated.    KAILEY GUERRA MD   XR Chest Port 1 View    Narrative    CHEST PORTABLE ONE VIEW   10/25/2017 6:18 PM     HISTORY: Chest pain.    COMPARISON: 9/18/2017    FINDINGS: Port-A-Cath with tip in the SVC. Heart size normal. Lungs  clear. No interval change.      Impression    IMPRESSION: Nothing acute. No interval change.    ANITRA DENNEY MD       Assessment and plan:  (C18.9,  C78.7) Metastatic colon cancer to liver (H)  (primary encounter diagnosis)  (C78.7) Liver metastasis (H)  The patient tolerated her first cycle of treatment. We will check CBC and CMP today. I will see the patient again next week for cycle #2.    (E87.6) Hypokalemia  Patient will continue on potassium supplements.    (R11.2,  T45.1X5A) Chemotherapy induced nausea and vomiting  Patient nausea  has been controlled.    (K52.1,  T45.1X5A) Chemotherapy induced diarrhea  Patient diarrhea has stopped.    (I10) Benign essential hypertension  Patient will continue on Cozaar 25 mg daily. She is also on Coreg 18.75 mg 2 times daily.    (D64.9) Anemia, unspecified type  We will check CBC today and arrange for blood transfusion if hemoglobin less than 8 g      The patient is ready to learn, no apparent learning barriers were identified.  Diagnosis and treatment plans were explained to the patient. The patient expressed understanding of the content. The patient asked appropriate questions. The patient questions were answered to her satisfaction.    Chart documentation with Dragon Voice recognition Software. Although reviewed after completion, some words and grammatical errors may remain.    Again, thank you for allowing me to participate in the care of your patient.        Sincerely,        Duy Dove MD

## 2017-11-08 NOTE — TELEPHONE ENCOUNTER
Lidocaine patches not covered without a prior auth.    [facility]  [phone]  ID [id number]    Thanks.

## 2017-11-10 NOTE — TELEPHONE ENCOUNTER
Called [facility] pharmacy - Spoke with Dat to initiate PA. PA approved for 12 months. Pays at pharmacy level. Called pharmacy to inform them of approval.  Elsa Butts, TRENT

## 2017-11-14 NOTE — PROGRESS NOTES
Hematology/ Oncology Follow-up Visit:  Nov 14, 2017    Reason for Visit:   Chief Complaint   Patient presents with     Oncology Clinic Visit     1 week follow up for Metastatic colon cancer to liver     Chemotherapy     C2D1 today with labs prior       Oncologic History:    Metastatic colon cancer to liver (H)  Charlene Douglas presented with 2 months history of cough, shortness of breath, decreased appetite and abdominal pain. Patient has black stool and occult blood which was positive. Subsequently the patient went on to have a CT angiogram of the chest because of shortness of breath. Lungs were clear. Multiple liver lesions were seen. Subsequently the patient went on to have an ultrasound of the liver done on August 18, 2017 which confirmed the presence of multiple liver metastases. Abdominal CT was done on August 18, 2017 showing large ascending colon mass with associated mesenteric/peritoneal drop metastasis and lymphadenopathy. There are multiple liver metastasis seen. KRAS mutated. She was started on systemic chemotherapy with FOLFOX with Avastin. However had severe cardiogenic toxicity from 5-FU and ended up in the hospital with acute respiratory failure requiring intubation. Initial echo showed LVEF and 10-15% which did improve in 3 days time to 35- 40%.  Subsequently, chemotherapy was switched to single agent irinotecan.    Interval History:  Patient is in for follow-up. She has been gradually improving. Abdominal pain has improved. Appetite has improved. There is no cough or shortness of breath. She is complaining of fatigue. She denies any numbness or tingling in her hands or feet. She denies any nausea or vomiting or diarrhea.    Review Of Systems:  Constitutional: Negative for fever, chills, and night sweats.  Skin: negative.  Eyes: negative.  Ears/Nose/Throat: negative.  Respiratory: No shortness of breath, dyspnea on exertion, cough, or hemoptysis.  Cardiovascular: negative.  Gastrointestinal:  negative.  Genitourinary: negative.  Musculoskeletal: negative.  Neurologic: negative.  Psychiatric: negative.  Hematologic/Lymphatic/Immunologic: negative.  Endocrine: negative.    All other ROS negative unless mentioned in interval history.    Past medical, social, surgical, and family histories reviewed.    Allergies:  Allergies as of 11/14/2017 - Miguel as Reviewed 11/14/2017   Allergen Reaction Noted     Lisinopril Cough 09/19/2017     No known allergies  08/11/2003       Current Medications:  Current Outpatient Prescriptions   Medication Sig Dispense Refill     benzonatate (TESSALON PERLES) 100 MG capsule Take 2 capsules (200 mg) by mouth 3 times daily as needed for cough 42 capsule 1     loperamide (IMODIUM) 2 MG capsule 2 caps at 1st sign of diarrhea & 1 cap every 2hrs until 12hrs diarrhea free. During night, 2 caps at bedtime & 2 caps every 4hrs until AM 30 capsule 0     ranitidine (ZANTAC) 300 MG tablet Take 1 tablet (300 mg) by mouth At Bedtime 30 tablet 1     dexamethasone (DECADRON) 4 MG tablet Take 2 tablets (8 mg) by mouth daily (with breakfast) Days 2, 3, and 4 of each cycle. 6 tablet 11     lidocaine (LIDODERM) 5 % Patch Place 1-3 patches onto the skin every 24 hours To RUQ 30 patch 1     guaiFENesin-codeine (ROBITUSSIN AC) 100-10 MG/5ML SOLN solution Take 5 mLs by mouth every 4 hours as needed for cough 120 mL 0     LORazepam (ATIVAN) 0.5 MG tablet Take 1 tablet (0.5 mg) by mouth every 4 hours as needed (Anxiety, Nausea/Vomiting or Sleep) 30 tablet 5     prochlorperazine (COMPAZINE) 10 MG tablet Take 1 tablet (10 mg) by mouth every 6 hours as needed (Nausea/Vomiting) 30 tablet 5     losartan (COZAAR) 25 MG tablet Take 1 tablet (25 mg) by mouth daily 30 tablet 11     oxyCODONE (ROXICODONE) 5 MG IR tablet Take 1 tablet (5 mg) by mouth every 8 hours as needed for pain (Patient taking differently: Take 5 mg by mouth every 8 hours as needed for pain (patient only taking half at a time) ) 20 tablet 0      "ferrous sulfate (IRON) 325 (65 FE) MG tablet Take 325 mg by mouth daily (with breakfast)       carvedilol (COREG) 12.5 MG tablet Take 1.5 tablets (18.75 mg) by mouth 2 times daily (with meals) 90 tablet 0     Potassium Chloride ER 20 MEQ TBCR Take 1 tablet (20 mEq) by mouth daily 30 tablet 0     diclofenac (VOLTAREN) 1 % GEL topical gel Place 2 g onto the skin 4 times daily as needed for moderate pain (for RUQ pain)       sennosides (SENOKOT) 8.6 MG tablet Take 1-2 tablets by mouth 2 times daily as needed for constipation 120 each      ACETAMINOPHEN PO Take 650 mg by mouth every 4 hours as needed for pain or fever        [DISCONTINUED] dexamethasone (DECADRON) 4 MG tablet Take 2 tablets (8 mg) by mouth daily (with breakfast) Days 2, 3, and 4 of each cycle. 6 tablet 11        Physical Exam:  /76 (BP Location: Right arm, Patient Position: Chair, Cuff Size: Adult Regular)  Pulse 98  Temp 96.7  F (35.9  C) (Temporal)  Resp 16  Ht 1.689 m (5' 6.5\")  Wt 58.6 kg (129 lb 1.6 oz)  LMP 06/07/2007  SpO2 100%  BMI 20.53 kg/m2  Wt Readings from Last 12 Encounters:   11/14/17 58.6 kg (129 lb 3 oz)   11/14/17 58.6 kg (129 lb 1.6 oz)   11/07/17 60.1 kg (132 lb 6.4 oz)   10/31/17 62.3 kg (137 lb 6.4 oz)   10/26/17 67.3 kg (148 lb 4.8 oz)   10/25/17 65.3 kg (144 lb)   10/25/17 66.2 kg (146 lb)   10/24/17 65.5 kg (144 lb 6.4 oz)   10/24/17 65.5 kg (144 lb 4.8 oz)   10/17/17 64.5 kg (142 lb 3.2 oz)   10/17/17 64.5 kg (142 lb 1.6 oz)   10/12/17 64.5 kg (142 lb 4.8 oz)     ECOG performance status: 1  GENERAL APPEARANCE: Healthy, alert and in no acute distress.  HEENT: Sclerae anicteric. PERRLA. Oropharynx without ulcers, lesions, or thrush.  NECK: Supple. No asymmetry or masses.  LYMPHATICS: No palpable cervical, supraclavicular, axillary, or inguinal lymphadenopathy.  RESP: Lungs clear to auscultation bilaterally without rales, rhonchi or wheezes.  CARDIOVASCULAR: Regular rate and rhythm. Normal S1, S2; no S3 or S4. No " murmur, gallop, or rub.  ABDOMEN: Soft, nontender. Bowel sounds normal. Enlarged tender liver MUSCULOSKELETAL: Extremities without gross deformities noted. No edema of bilateral lower extremities.  SKIN: No suspicious lesions or rashes.  NEURO: Alert and oriented x 3. Cranial nerves II-XII grossly intact.  PSYCHIATRIC: Mentation and affect appear normal.    Laboratory/Imaging Studies:  Orders Only on 11/12/2017   Component Date Value Ref Range Status     Protein Random Urine 11/12/2017 0.13  g/L Final     Protein Total 24 Hr Urine 11/12/2017 0.19  0.04 - 0.23 Final    Units: g/24h     Protein Total Urine g/gr Creatinine 11/12/2017 0.34* 0 - 0.2 g/g Cr Final     Creatinine Urine 11/12/2017 40  mg/dL Final     Creatinine Urine Timed 11/12/2017 0.55* 0.80 - 1.80 Final    Units: g/24h     Volume in mL 11/12/2017 1400  mL Final     Duration in hours 11/12/2017 24.0  h Final   Oncology Visit on 11/07/2017   Component Date Value Ref Range Status     WBC 11/07/2017 3.9* 4.0 - 11.0 10e9/L Final     RBC Count 11/07/2017 3.09* 3.8 - 5.2 10e12/L Final     Hemoglobin 11/07/2017 8.6* 11.7 - 15.7 g/dL Final     Hematocrit 11/07/2017 27.2* 35.0 - 47.0 % Final     MCV 11/07/2017 88  78 - 100 fl Final     MCH 11/07/2017 27.8  26.5 - 33.0 pg Final     MCHC 11/07/2017 31.6  31.5 - 36.5 g/dL Final     RDW 11/07/2017 16.0* 10.0 - 15.0 % Final     Platelet Count 11/07/2017 169  150 - 450 10e9/L Final     Diff Method 11/07/2017 Automated Method   Final     % Neutrophils 11/07/2017 52.1  % Final     % Lymphocytes 11/07/2017 37.9  % Final     % Monocytes 11/07/2017 9.7  % Final     % Eosinophils 11/07/2017 0.0  % Final     % Basophils 11/07/2017 0.0  % Final     % Immature Granulocytes 11/07/2017 0.3  % Final     Absolute Neutrophil 11/07/2017 2.0  1.6 - 8.3 10e9/L Final     Absolute Lymphocytes 11/07/2017 1.5  0.8 - 5.3 10e9/L Final     Absolute Monocytes 11/07/2017 0.4  0.0 - 1.3 10e9/L Final     Absolute Eosinophils 11/07/2017 0.0  0.0 -  0.7 10e9/L Final     Absolute Basophils 11/07/2017 0.0  0.0 - 0.2 10e9/L Final     Abs Immature Granulocytes 11/07/2017 0.0  0 - 0.4 10e9/L Final     Sodium 11/07/2017 131* 133 - 144 mmol/L Final     Potassium 11/07/2017 4.1  3.4 - 5.3 mmol/L Final     Chloride 11/07/2017 96  94 - 109 mmol/L Final     Carbon Dioxide 11/07/2017 26  20 - 32 mmol/L Final     Anion Gap 11/07/2017 9  3 - 14 mmol/L Final     Glucose 11/07/2017 111* 70 - 99 mg/dL Final     Urea Nitrogen 11/07/2017 14  7 - 30 mg/dL Final     Creatinine 11/07/2017 0.61  0.52 - 1.04 mg/dL Final     GFR Estimate 11/07/2017 >90  >60 mL/min/1.7m2 Final    Non  GFR Calc     GFR Estimate If Black 11/07/2017 >90  >60 mL/min/1.7m2 Final    African American GFR Calc     Calcium 11/07/2017 8.9  8.5 - 10.1 mg/dL Final     Bilirubin Total 11/07/2017 0.8  0.2 - 1.3 mg/dL Final     Albumin 11/07/2017 1.7* 3.4 - 5.0 g/dL Final     Protein Total 11/07/2017 6.9  6.8 - 8.8 g/dL Final     Alkaline Phosphatase 11/07/2017 705* 40 - 150 U/L Final     ALT 11/07/2017 53* 0 - 50 U/L Final     AST 11/07/2017 64* 0 - 45 U/L Final     CEA 11/07/2017 306.7* 0 - 2.5 ug/L Final    Comment: Smoking may cause CEA results to be elevated.  Assay Method:  Chemiluminescence using Siemens Centaur XP          Recent Results (from the past 744 hour(s))   XR Chest Port 1 View    Narrative    CHEST PORTABLE ONE VIEW   10/25/2017 6:18 PM     HISTORY: Chest pain.    COMPARISON: 9/18/2017    FINDINGS: Port-A-Cath with tip in the SVC. Heart size normal. Lungs  clear. No interval change.      Impression    IMPRESSION: Nothing acute. No interval change.    ANITRA DENNEY MD       Assessment and plan:    (C78.7) Liver metastasis (H)  (primary encounter diagnosis)  (C18.9,  C78.7) Metastatic colon cancer to liver (H)  Patient tolerated first cycle of chemotherapy well. Patient will proceed today with cycle #2 of chemotherapy with oxaliplatin, irinotecan and Avastin.    (R11.2) Nausea and  vomiting, intractability of vomiting not specified, unspecified vomiting type  Because he has been well controlled.    (D64.9) Anemia, unspecified type  Hemoglobin today is 7.9. We will arrange for one units of packed RBCs.    (I10) Benign essential hypertension  Blood pressure is well controlled on current medications. She is on Cozaar 25 mg daily.    (K52.1,  T45.1X5A) Chemotherapy induced diarrhea  We discussed with the patient management of diarrhea with Imodium.    (I50.22) Chronic systolic congestive heart failure (H)  Currently the patient is not symptomatic. She is currently on Coreg 18.75 twice daily.    (E87.6) Hypokalemia  Patient currently on potassium replacement.    (D69.6) Thrombocytopenia (H)  Platelet count has been stable.    The patient is ready to learn, no apparent learning barriers were identified.  Diagnosis and treatment plans were explained to the patient. The patient expressed understanding of the content. The patient asked appropriate questions. The patient questions were answered to her satisfaction.    Chart documentation with Dragon Voice recognition Software. Although reviewed after completion, some words and grammatical errors may remain.

## 2017-11-14 NOTE — PATIENT INSTRUCTIONS
Pt to return on 11/28/17 for port labs/chemo, sooner as needed. Copies of medication list and upcoming appointments given prior to discharge.

## 2017-11-14 NOTE — NURSING NOTE
"Oncology Rooming Note    November 14, 2017 8:19 AM   Charlene Douglas is a 67 year old female who presents for:    Chief Complaint   Patient presents with     Oncology Clinic Visit     1 week follow up for Metastatic colon cancer to liver     Chemotherapy     C2D1 today with labs prior     Initial Vitals: /76 (BP Location: Right arm, Patient Position: Chair, Cuff Size: Adult Regular)  Pulse 98  Temp 96.7  F (35.9  C) (Temporal)  Resp 16  Ht 1.689 m (5' 6.5\")  Wt 58.6 kg (129 lb 1.6 oz)  LMP 06/07/2007  SpO2 100%  BMI 20.53 kg/m2 Estimated body mass index is 20.53 kg/(m^2) as calculated from the following:    Height as of this encounter: 1.689 m (5' 6.5\").    Weight as of this encounter: 58.6 kg (129 lb 1.6 oz). Body surface area is 1.66 meters squared.  No Pain (0) Comment: Data Unavailable   Patient's last menstrual period was 06/07/2007.  Allergies reviewed: Yes  Medications reviewed: Yes    Medications: MEDICATION REFILLS NEEDED TODAY. Provider was notified.  Pharmacy name entered into [facility]:    [facility]  CHRISTINA FERMIN - [id number]  [facility]    Clinical concerns: Cramping. Gas. Constipation. Dr. Dove was notified.    Elsa Butts MA              "

## 2017-11-14 NOTE — NURSING NOTE
DISCHARGE PLAN:  Next appointments: See patient instruction section  Departure Mode: Ambulatory  Accompanied by:   5 minutes for nursing discharge (face to face time)     Charlene Douglas is here today for Oncology follow up with treatment.  Writing nurse seen patient after Medical Oncology appointment to address questions/concerns/coordinate care. Patient to continue with treatment as planned.  Patient will call if she feels as though she will need a blood transfusion.  Follow up in 4 weeks with treatment and CT scan results from 12/7/17. Appointments scheduled. See patient instructions and Oncologist's Progress note for further details. Questions and concerns addressed to patient's satisfaction. Patient verbalized and demonstrated understanding of plan.  Contact information provided and patient is encouraged to call with any that arise in the interim of care.    Stan Balderas, RN, BSN, OCN   Oncology Care Coordinator RN  [facility]  [phone]  11/14/2017, 9:03 AM

## 2017-11-14 NOTE — LETTER
11/14/2017         RE: Charlene Douglas  [address] [address] [place] [id number]        Dear Colleague,    Thank you for referring your patient, Charlene Douglas, to the [facility]. Please see a copy of my visit note below.    Hematology/ Oncology Follow-up Visit:  Nov 14, 2017    Reason for Visit:   Chief Complaint   Patient presents with     Oncology Clinic Visit     1 week follow up for Metastatic colon cancer to liver     Chemotherapy     C2D1 today with labs prior       Oncologic History:    Metastatic colon cancer to liver (H)  Charlene Douglas presented with 2 months history of cough, shortness of breath, decreased appetite and abdominal pain. Patient has black stool and occult blood which was positive. Subsequently the patient went on to have a CT angiogram of the chest because of shortness of breath. Lungs were clear. Multiple liver lesions were seen. Subsequently the patient went on to have an ultrasound of the liver done on August 18, 2017 which confirmed the presence of multiple liver metastases. Abdominal CT was done on August 18, 2017 showing large ascending colon mass with associated mesenteric/peritoneal drop metastasis and lymphadenopathy. There are multiple liver metastasis seen. KRAS mutated. She was started on systemic chemotherapy with FOLFOX with Avastin. However had severe cardiogenic toxicity from 5-FU and ended up in the hospital with acute respiratory failure requiring intubation. Initial echo showed LVEF and 10-15% which did improve in 3 days time to 35- 40%.  Subsequently, chemotherapy was switched to single agent irinotecan.    Interval History:  Patient is in for follow-up. She has been gradually improving. Abdominal pain has improved. Appetite has improved. There is no cough or shortness of breath. She is complaining of fatigue. She denies any numbness or tingling in her hands or feet. She denies any nausea or vomiting or diarrhea.    Review Of  Systems:  Constitutional: Negative for fever, chills, and night sweats.  Skin: negative.  Eyes: negative.  Ears/Nose/Throat: negative.  Respiratory: No shortness of breath, dyspnea on exertion, cough, or hemoptysis.  Cardiovascular: negative.  Gastrointestinal: negative.  Genitourinary: negative.  Musculoskeletal: negative.  Neurologic: negative.  Psychiatric: negative.  Hematologic/Lymphatic/Immunologic: negative.  Endocrine: negative.    All other ROS negative unless mentioned in interval history.    Past medical, social, surgical, and family histories reviewed.    Allergies:  Allergies as of 11/14/2017 - Miguel as Reviewed 11/14/2017   Allergen Reaction Noted     Lisinopril Cough 09/19/2017     No known allergies  08/11/2003       Current Medications:  Current Outpatient Prescriptions   Medication Sig Dispense Refill     benzonatate (TESSALON PERLES) 100 MG capsule Take 2 capsules (200 mg) by mouth 3 times daily as needed for cough 42 capsule 1     loperamide (IMODIUM) 2 MG capsule 2 caps at 1st sign of diarrhea & 1 cap every 2hrs until 12hrs diarrhea free. During night, 2 caps at bedtime & 2 caps every 4hrs until AM 30 capsule 0     ranitidine (ZANTAC) 300 MG tablet Take 1 tablet (300 mg) by mouth At Bedtime 30 tablet 1     dexamethasone (DECADRON) 4 MG tablet Take 2 tablets (8 mg) by mouth daily (with breakfast) Days 2, 3, and 4 of each cycle. 6 tablet 11     lidocaine (LIDODERM) 5 % Patch Place 1-3 patches onto the skin every 24 hours To RUQ 30 patch 1     guaiFENesin-codeine (ROBITUSSIN AC) 100-10 MG/5ML SOLN solution Take 5 mLs by mouth every 4 hours as needed for cough 120 mL 0     LORazepam (ATIVAN) 0.5 MG tablet Take 1 tablet (0.5 mg) by mouth every 4 hours as needed (Anxiety, Nausea/Vomiting or Sleep) 30 tablet 5     prochlorperazine (COMPAZINE) 10 MG tablet Take 1 tablet (10 mg) by mouth every 6 hours as needed (Nausea/Vomiting) 30 tablet 5     losartan (COZAAR) 25 MG tablet Take 1 tablet (25 mg) by mouth  "daily 30 tablet 11     oxyCODONE (ROXICODONE) 5 MG IR tablet Take 1 tablet (5 mg) by mouth every 8 hours as needed for pain (Patient taking differently: Take 5 mg by mouth every 8 hours as needed for pain (patient only taking half at a time) ) 20 tablet 0     ferrous sulfate (IRON) 325 (65 FE) MG tablet Take 325 mg by mouth daily (with breakfast)       carvedilol (COREG) 12.5 MG tablet Take 1.5 tablets (18.75 mg) by mouth 2 times daily (with meals) 90 tablet 0     Potassium Chloride ER 20 MEQ TBCR Take 1 tablet (20 mEq) by mouth daily 30 tablet 0     diclofenac (VOLTAREN) 1 % GEL topical gel Place 2 g onto the skin 4 times daily as needed for moderate pain (for RUQ pain)       sennosides (SENOKOT) 8.6 MG tablet Take 1-2 tablets by mouth 2 times daily as needed for constipation 120 each      ACETAMINOPHEN PO Take 650 mg by mouth every 4 hours as needed for pain or fever        [DISCONTINUED] dexamethasone (DECADRON) 4 MG tablet Take 2 tablets (8 mg) by mouth daily (with breakfast) Days 2, 3, and 4 of each cycle. 6 tablet 11        Physical Exam:  /76 (BP Location: Right arm, Patient Position: Chair, Cuff Size: Adult Regular)  Pulse 98  Temp 96.7  F (35.9  C) (Temporal)  Resp 16  Ht 1.689 m (5' 6.5\")  Wt 58.6 kg (129 lb 1.6 oz)  LMP 06/07/2007  SpO2 100%  BMI 20.53 kg/m2  Wt Readings from Last 12 Encounters:   11/14/17 58.6 kg (129 lb 3 oz)   11/14/17 58.6 kg (129 lb 1.6 oz)   11/07/17 60.1 kg (132 lb 6.4 oz)   10/31/17 62.3 kg (137 lb 6.4 oz)   10/26/17 67.3 kg (148 lb 4.8 oz)   10/25/17 65.3 kg (144 lb)   10/25/17 66.2 kg (146 lb)   10/24/17 65.5 kg (144 lb 6.4 oz)   10/24/17 65.5 kg (144 lb 4.8 oz)   10/17/17 64.5 kg (142 lb 3.2 oz)   10/17/17 64.5 kg (142 lb 1.6 oz)   10/12/17 64.5 kg (142 lb 4.8 oz)     ECOG performance status: 1  GENERAL APPEARANCE: Healthy, alert and in no acute distress.  HEENT: Sclerae anicteric. PERRLA. Oropharynx without ulcers, lesions, or thrush.  NECK: Supple. No asymmetry " or masses.  LYMPHATICS: No palpable cervical, supraclavicular, axillary, or inguinal lymphadenopathy.  RESP: Lungs clear to auscultation bilaterally without rales, rhonchi or wheezes.  CARDIOVASCULAR: Regular rate and rhythm. Normal S1, S2; no S3 or S4. No murmur, gallop, or rub.  ABDOMEN: Soft, nontender. Bowel sounds normal. Enlarged tender liver MUSCULOSKELETAL: Extremities without gross deformities noted. No edema of bilateral lower extremities.  SKIN: No suspicious lesions or rashes.  NEURO: Alert and oriented x 3. Cranial nerves II-XII grossly intact.  PSYCHIATRIC: Mentation and affect appear normal.    Laboratory/Imaging Studies:  Orders Only on 11/12/2017   Component Date Value Ref Range Status     Protein Random Urine 11/12/2017 0.13  g/L Final     Protein Total 24 Hr Urine 11/12/2017 0.19  0.04 - 0.23 Final    Units: g/24h     Protein Total Urine g/gr Creatinine 11/12/2017 0.34* 0 - 0.2 g/g Cr Final     Creatinine Urine 11/12/2017 40  mg/dL Final     Creatinine Urine Timed 11/12/2017 0.55* 0.80 - 1.80 Final    Units: g/24h     Volume in mL 11/12/2017 1400  mL Final     Duration in hours 11/12/2017 24.0  h Final   Oncology Visit on 11/07/2017   Component Date Value Ref Range Status     WBC 11/07/2017 3.9* 4.0 - 11.0 10e9/L Final     RBC Count 11/07/2017 3.09* 3.8 - 5.2 10e12/L Final     Hemoglobin 11/07/2017 8.6* 11.7 - 15.7 g/dL Final     Hematocrit 11/07/2017 27.2* 35.0 - 47.0 % Final     MCV 11/07/2017 88  78 - 100 fl Final     MCH 11/07/2017 27.8  26.5 - 33.0 pg Final     MCHC 11/07/2017 31.6  31.5 - 36.5 g/dL Final     RDW 11/07/2017 16.0* 10.0 - 15.0 % Final     Platelet Count 11/07/2017 169  150 - 450 10e9/L Final     Diff Method 11/07/2017 Automated Method   Final     % Neutrophils 11/07/2017 52.1  % Final     % Lymphocytes 11/07/2017 37.9  % Final     % Monocytes 11/07/2017 9.7  % Final     % Eosinophils 11/07/2017 0.0  % Final     % Basophils 11/07/2017 0.0  % Final     % Immature Granulocytes  11/07/2017 0.3  % Final     Absolute Neutrophil 11/07/2017 2.0  1.6 - 8.3 10e9/L Final     Absolute Lymphocytes 11/07/2017 1.5  0.8 - 5.3 10e9/L Final     Absolute Monocytes 11/07/2017 0.4  0.0 - 1.3 10e9/L Final     Absolute Eosinophils 11/07/2017 0.0  0.0 - 0.7 10e9/L Final     Absolute Basophils 11/07/2017 0.0  0.0 - 0.2 10e9/L Final     Abs Immature Granulocytes 11/07/2017 0.0  0 - 0.4 10e9/L Final     Sodium 11/07/2017 131* 133 - 144 mmol/L Final     Potassium 11/07/2017 4.1  3.4 - 5.3 mmol/L Final     Chloride 11/07/2017 96  94 - 109 mmol/L Final     Carbon Dioxide 11/07/2017 26  20 - 32 mmol/L Final     Anion Gap 11/07/2017 9  3 - 14 mmol/L Final     Glucose 11/07/2017 111* 70 - 99 mg/dL Final     Urea Nitrogen 11/07/2017 14  7 - 30 mg/dL Final     Creatinine 11/07/2017 0.61  0.52 - 1.04 mg/dL Final     GFR Estimate 11/07/2017 >90  >60 mL/min/1.7m2 Final    Non  GFR Calc     GFR Estimate If Black 11/07/2017 >90  >60 mL/min/1.7m2 Final    African American GFR Calc     Calcium 11/07/2017 8.9  8.5 - 10.1 mg/dL Final     Bilirubin Total 11/07/2017 0.8  0.2 - 1.3 mg/dL Final     Albumin 11/07/2017 1.7* 3.4 - 5.0 g/dL Final     Protein Total 11/07/2017 6.9  6.8 - 8.8 g/dL Final     Alkaline Phosphatase 11/07/2017 705* 40 - 150 U/L Final     ALT 11/07/2017 53* 0 - 50 U/L Final     AST 11/07/2017 64* 0 - 45 U/L Final     CEA 11/07/2017 306.7* 0 - 2.5 ug/L Final    Comment: Smoking may cause CEA results to be elevated.  Assay Method:  Chemiluminescence using Siemens Centaur XP          Recent Results (from the past 744 hour(s))   XR Chest Port 1 View    Narrative    CHEST PORTABLE ONE VIEW   10/25/2017 6:18 PM     HISTORY: Chest pain.    COMPARISON: 9/18/2017    FINDINGS: Port-A-Cath with tip in the SVC. Heart size normal. Lungs  clear. No interval change.      Impression    IMPRESSION: Nothing acute. No interval change.    ANITRA DENNEY MD       Assessment and plan:    (C78.7) Liver metastasis (H)   (primary encounter diagnosis)  (C18.9,  C78.7) Metastatic colon cancer to liver (H)  Patient tolerated first cycle of chemotherapy well. Patient will proceed today with cycle #2 of chemotherapy with oxaliplatin, irinotecan and Avastin.    (R11.2) Nausea and vomiting, intractability of vomiting not specified, unspecified vomiting type  Because he has been well controlled.    (D64.9) Anemia, unspecified type  Hemoglobin today is 7.9. We will arrange for one units of packed RBCs.    (I10) Benign essential hypertension  Blood pressure is well controlled on current medications. She is on Cozaar 25 mg daily.    (K52.1,  T45.1X5A) Chemotherapy induced diarrhea  We discussed with the patient management of diarrhea with Imodium.    (I50.22) Chronic systolic congestive heart failure (H)  Currently the patient is not symptomatic. She is currently on Coreg 18.75 twice daily.    (E87.6) Hypokalemia  Patient currently on potassium replacement.    (D69.6) Thrombocytopenia (H)  Platelet count has been stable.    The patient is ready to learn, no apparent learning barriers were identified.  Diagnosis and treatment plans were explained to the patient. The patient expressed understanding of the content. The patient asked appropriate questions. The patient questions were answered to her satisfaction.    Chart documentation with Dragon Voice recognition Software. Although reviewed after completion, some words and grammatical errors may remain.    Again, thank you for allowing me to participate in the care of your patient.        Sincerely,        Duy Dove MD

## 2017-11-14 NOTE — MR AVS SNAPSHOT
"              After Visit Summary   11/14/2017    Charlene Douglas    MRN: [id number]           Patient Information     Date Of Birth          1950        Visit Information        Provider Department      11/14/2017 8:00 AM Duy Dove MD [facility]        Today's Diagnoses     Metastatic colon cancer to liver (H)        Nausea and vomiting, intractability of vomiting not specified, unspecified vomiting type          Care Instructions      Please follow up with Dr. Dove in 4 weeks with treatment and CT results.      CT Scan Date/Time:  12/7/17 at 9:00 - Arrive to infusion for port access at 8:30  Please see \"Getting Ready for Your CT Scan\" for details.  Nothing to eat or drink except oral contrast prep 2 hours prior to scan.  Oral prep will start 2 hours prior to scan.  Please follow instructions provided on prep.  You will need to pick this up from the Radiology Department prior to the day of the scan.    Port Lab Date/Time:  12/12/17 at 8:00    Follow Up Date/Time: 12/12/17 at 8:30    Infusion Date/Time:  12/12/17 at 9:00  If you have any questions or concerns please feel free to call.    If you need to reschedule please call:  Clinic or Lab Appointment - [phone]  Infusion - [phone]  Imaging - [phone]    Stan Balderas, RN, BSN, OCN   Oncology Care Coordinator RN  [facility]  [phone]              Follow-ups after your visit        Follow-up notes from your care team     Return in about 4 weeks (around 12/12/2017) for Blood work before next appointment, Imaging ordered before next appointment.      Your next 10 appointments already scheduled     Dec 07, 2017  8:30 AM CST   Level 1 with NL INFUSION CHAIR 1   [facility] Infusion Services ([facility])    [address] [place] [address] Dr Rosa [place] [address]   [phone]            Dec 07, 2017  9:00 AM CST   CT CHEST/ABDOMEN/PELVIS W CONTRAST with PHCT1   [facility] CT Scan ([facility]" [facility])    [address] [address] [facility] [id number] [phone]           Please bring any scans or X-rays taken at other hospitals, if similar tests were done. Also bring a list of your medicines, including vitamins, minerals and over-the-counter drugs. It is safest to leave personal items at home.  Be sure to tell your doctor:   If you have any allergies.   If there s any chance you are pregnant.   If you are breastfeeding.   If you have any special needs.  You may have contrast for this exam. To prepare:   Do not eat or drink for 2 hours before your exam. If you need to take medicine, you may take it with small sips of water. (We may ask you to take liquid medicine as well.)   The day before your exam, drink extra fluids at least six 8-ounce glasses (unless your doctor tells you to restrict your fluids).  Patients over 70 or patients with diabetes or kidney problems:   If you haven t had a blood test (creatinine test) within the last 30 days, go to your clinic or Diagnostic Imaging Department for this test.  If you have diabetes:   If your kidney function is normal, continue taking your metformin (Avandamet, Glucophage, Glucovance, Metaglip) on the day of your exam.   If your kidney function is abnormal, wait 48 hours before restarting this medicine.  You will have oral contrast for this exam:   You will drink the contrast at home. Get this from your clinic or Diagnostic Imaging Department. Please follow the directions given.  Please wear loose clothing, such as a sweat suit or jogging clothes. Avoid snaps, zippers and other metal. We may ask you to undress and put on a hospital gown.  If you have any questions, please call the Imaging Department where you will have your exam.            Dec 12, 2017  8:30 AM CST   Return Visit with Duy Dove MD   [facility] ([facility])    [address] [facility] [id number] [phone]            Dec 12,  "2017  9:00 AM CST   Level 7 with NL INFUSION CHAIR 5   [facility]fusion [facility] ([facility])    [address] [facility] [address] [place] [address] [phone]              Future tests that were ordered for you today     Open Future Orders        Priority Expected Expires Ordered    CT Chest/Abdomen/Pelvis w Contrast Routine 12/14/2017 11/14/2018 11/14/2017            Who to contact     If you have questions or need follow up information about today's clinic visit or your schedule please contact She[facility]on SEBASTIAN CATALAN directly at [phone].  Normal or non-critical lab and imaging results will be communicated to you by [employer], letter or phone within 4 business days after the clinic has received the results. If you do not hear from us within 7 days, please contact the clinic through [employer] or phone. If you have a critical or abnormal lab result, we will notify you by phone as soon as possible.  Submit refill requests through [employer] or call your pharmacy and they will forward the refill request to us. Please allow 3 business days for your refill to be completed.          Additional Information About Your Visit        MyChart Information     [employer] gives you secure access to your electronic health record. If you see a primary care provider, you can also send messages to your care team and make appointments. If you have questions, please call your primary care clinic.  If you do not have a primary care provider, please call [phone] and they will assist you.        Care EveryWhere ID     This is your Care EveryWhere ID. This could be used by other organizations to access your [place] medical records  [id number]        Your Vitals Were     Pulse Temperature Respirations Height Last Period Pulse Oximetry    98 96.7  F (35.9  C) (Temporal) 16 1.689 m (5' 6.5\") 06/07/2007 100%    BMI (Body Mass Index)                   20.53 kg/m2            Blood Pressure from Last 3 Encounters: "   11/14/17 122/58   11/14/17 131/76   11/07/17 163/81    Weight from Last 3 Encounters:   11/14/17 58.6 kg (129 lb 3 oz)   11/14/17 58.6 kg (129 lb 1.6 oz)   11/07/17 60.1 kg (132 lb 6.4 oz)                 Today's Medication Changes          These changes are accurate as of: 11/14/17  9:01 AM.  If you have any questions, ask your nurse or doctor.               These medicines have changed or have updated prescriptions.        Dose/Directions    oxyCODONE IR 5 MG tablet   Commonly known as:  ROXICODONE   This may have changed:  reasons to take this        Dose:  5 mg   Take 1 tablet (5 mg) by mouth every 8 hours as needed for pain   Quantity:  20 tablet   Refills:  0            Where to get your medicines      These medications were sent to [facility] [address]   [address] [place] [address]     Phone:  [phone]     benzonatate 100 MG capsule    dexamethasone 4 MG tablet    loperamide 2 MG capsule    ranitidine 300 MG tablet                Primary Care Provider Office Phone # Fax #    Donna Shirley PA-C [phone] [address] [place] [address]        Equal Access to Services     RON PERALTA AH: Hadii nat smith hadasho Soomaali, waaxda luqadaha, qaybta kaalmada adeegyada, waxay rerein hayfred reese. So [facility] [phone].    ATENCIÓN: Si habla español, tiene a hunt disposición servicios gratuitos de asistencia lingüística. Llrufino al [phone].    We comply with applicable federal civil rights laws and [place] laws. We do not discriminate on the basis of race, color, national origin, age, disability, sex, sexual orientation, or gender identity.            Thank you!     Thank you for choosing [facility]  for your care. Our goal is always to provide you with excellent care. Hearing back from our patients is one way we can continue to improve our services. Please take a few minutes to complete the written  survey that you may receive in the mail after your visit with us. Thank you!             Your Updated Medication List - Protect others around you: Learn how to safely use, store and throw away your medicines at www.disposemymeds.org.          This list is accurate as of: 11/14/17  9:01 AM.  Always use your most recent med list.                   Brand Name Dispense Instructions for use Diagnosis    ACETAMINOPHEN PO      Take 650 mg by mouth every 4 hours as needed for pain or fever        benzonatate 100 MG capsule    TESSALON PERLES    42 capsule    Take 2 capsules (200 mg) by mouth 3 times daily as needed for cough    Metastatic colon cancer to liver (H)       carvedilol 12.5 MG tablet    COREG    90 tablet    Take 1.5 tablets (18.75 mg) by mouth 2 times daily (with meals)    Chronic systolic congestive heart failure (H)       dexamethasone 4 MG tablet    DECADRON    6 tablet    Take 2 tablets (8 mg) by mouth daily (with breakfast) Days 2, 3, and 4 of each cycle.    Metastatic colon cancer to liver (H)       diclofenac 1 % Gel topical gel    VOLTAREN     Place 2 g onto the skin 4 times daily as needed for moderate pain (for RUQ pain)    Metastatic colon cancer to liver (H)       ferrous sulfate 325 (65 FE) MG tablet    IRON     Take 325 mg by mouth daily (with breakfast)        guaiFENesin-codeine 100-10 MG/5ML Soln solution    ROBITUSSIN AC    120 mL    Take 5 mLs by mouth every 4 hours as needed for cough    Liver metastasis (H)       lidocaine 5 % Patch    LIDODERM    30 patch    Place 1-3 patches onto the skin every 24 hours To RUQ    Metastatic colon cancer to liver (H)       loperamide 2 MG capsule    IMODIUM    30 capsule    2 caps at 1st sign of diarrhea & 1 cap every 2hrs until 12hrs diarrhea free. During night, 2 caps at bedtime & 2 caps every 4hrs until AM    Metastatic colon cancer to liver (H)       LORazepam 0.5 MG tablet    ATIVAN    30 tablet    Take 1 tablet (0.5 mg) by mouth every 4 hours as  needed (Anxiety, Nausea/Vomiting or Sleep)    Metastatic colon cancer to liver (H)       losartan 25 MG tablet    COZAAR    30 tablet    Take 1 tablet (25 mg) by mouth daily    Secondary cardiomyopathy (H)       oxyCODONE IR 5 MG tablet    ROXICODONE    20 tablet    Take 1 tablet (5 mg) by mouth every 8 hours as needed for pain        Potassium Chloride ER 20 MEQ Tbcr     30 tablet    Take 1 tablet (20 mEq) by mouth daily    Hypokalemia       prochlorperazine 10 MG tablet    COMPAZINE    30 tablet    Take 1 tablet (10 mg) by mouth every 6 hours as needed (Nausea/Vomiting)    Metastatic colon cancer to liver (H)       ranitidine 300 MG tablet    ZANTAC    30 tablet    Take 1 tablet (300 mg) by mouth At Bedtime    Nausea and vomiting, intractability of vomiting not specified, unspecified vomiting type       sennosides 8.6 MG tablet    SENOKOT    120 each    Take 1-2 tablets by mouth 2 times daily as needed for constipation    Slow transit constipation

## 2017-11-14 NOTE — PATIENT INSTRUCTIONS
"  Please follow up with Dr. Dove in 4 weeks with treatment and CT results.      CT Scan Date/Time:  12/7/17 at 9:00 - Arrive to infusion for port access at 8:30  Please see \"Getting Ready for Your CT Scan\" for details.  Nothing to eat or drink except oral contrast prep 2 hours prior to scan.  Oral prep will start 2 hours prior to scan.  Please follow instructions provided on prep.  You will need to pick this up from the Radiology Department prior to the day of the scan.    Port Lab Date/Time:  12/12/17 at 8:00    Follow Up Date/Time: 12/12/17 at 8:30    Infusion Date/Time:  12/12/17 at 9:00  If you have any questions or concerns please feel free to call.    If you need to reschedule please call:  Clinic or Lab Appointment - [phone]  Infusion - [phone]  Imaging - [phone]    Stan Balderas RN, BSN, OCN   Oncology Care Coordinator RN  [facility]  [phone]      "

## 2017-11-14 NOTE — PROGRESS NOTES
Patient here for Avastin/Irinotecan/Oxaliplatin chemotherapy today. Labs/BSA/Dose verified by 2 RN'S. Hgb-7.7, ANC-1.4 and Plt-527 and urine protein neg. MD aware of hgb, no transfusion at this time.  Premeds given and tolerated chemotherapy well.  Lasix 40 mg IV given post infusions. Positive blood return pre/post infusion. Discharged to home with  in stable condition.

## 2017-11-14 NOTE — MR AVS SNAPSHOT
After Visit Summary   11/14/2017    Charlene Douglas    MRN: [id number]           Patient Information     Date Of Birth          1950        Visit Information        Provider Department      11/14/2017 8:30 AM NL INFUSION CHAIR 1 [facility]        Today's Diagnoses     Metastatic colon cancer to liver (H)    -  1      Care Instructions    Pt to return on 11/28/17 for port labs/chemo, sooner as needed. Copies of medication list and upcoming appointments given prior to discharge.             Follow-ups after your visit        Your next 10 appointments already scheduled     Nov 28, 2017  9:00 AM CST   Level 7 with NL INFUSION CHAIR 1   [facility] ([facility])    [address] [place] [address]   [phone]            Dec 07, 2017  8:30 AM CST   Level 1 with NL INFUSION CHAIR 1   [facility] ([facility])    [address] [place] [address]   [phone]            Dec 07, 2017  9:00 AM CST   CT CHEST/ABDOMEN/PELVIS W CONTRAST with PHCT1   [facility] CT Scan ([facility])    [address]  [place] [address]   [phone]           Please bring any scans or X-rays taken at other hospitals, if similar tests were done. Also bring a list of your medicines, including vitamins, minerals and over-the-counter drugs. It is safest to leave personal items at home.  Be sure to tell your doctor:   If you have any allergies.   If there s any chance you are pregnant.   If you are breastfeeding.   If you have any special needs.  You may have contrast for this exam. To prepare:   Do not eat or drink for 2 hours before your exam. If you need to take medicine, you may take it with small sips of water. (We may ask you to take liquid medicine as well.)   The day before your exam, drink extra fluids at least six 8-ounce glasses (unless your doctor tells you  to restrict your fluids).  Patients over 70 or patients with diabetes or kidney problems:   If you haven t had a blood test (creatinine test) within the last 30 days, go to your clinic or Diagnostic Imaging Department for this test.  If you have diabetes:   If your kidney function is normal, continue taking your metformin (Avandamet, Glucophage, Glucovance, Metaglip) on the day of your exam.   If your kidney function is abnormal, wait 48 hours before restarting this medicine.  You will have oral contrast for this exam:   You will drink the contrast at home. Get this from your clinic or Diagnostic Imaging Department. Please follow the directions given.  Please wear loose clothing, such as a sweat suit or jogging clothes. Avoid snaps, zippers and other metal. We may ask you to undress and put on a hospital gown.  If you have any questions, please call the Imaging Department where you will have your exam.            Dec 12, 2017  8:30 AM CST   Return Visit with Duy Dove MD   [facility]    [address] [place] [id number] [phone]            Dec 12, 2017  9:00 AM CST   Level 7 with NL INFUSION CHAIR 5   [facility] ([facility])    [address] [place] [address]  [place] [id number] [phone]              Future tests that were ordered for you today     Open Future Orders        Priority Expected Expires Ordered    CT Chest/Abdomen/Pelvis w Contrast Routine 12/14/2017 11/14/2018 11/14/2017            Who to contact     If you have questions or need follow up information about today's clinic visit or your schedule please contact [facility] INF[facility]ION [facility] directly at [phone].  Normal or non-critical lab and imaging results will be communicated to you by MyChart, letter or phone within 4 business days after the clinic has received the results. If you do not hear from us within 7 days, please contact  "the clinic through [employer] or phone. If you have a critical or abnormal lab result, we will notify you by phone as soon as possible.  Submit refill requests through [employer] or call your pharmacy and they will forward the refill request to us. Please allow 3 business days for your refill to be completed.          Additional Information About Your Visit        [employer] Information     [employer] gives you secure access to your electronic health record. If you see a primary care provider, you can also send messages to your care team and make appointments. If you have questions, please call your primary care clinic.  If you do not have a primary care provider, please call [phone] and they will assist you.        Care EveryWhere ID     This is your Care EveryWhere ID. This could be used by other organizations to access your [place] medical records  [id number]        Your Vitals Were     Pulse Temperature Respirations Height Last Period Pulse Oximetry    94 98.5  F (36.9  C) (Temporal) 16 1.689 m (5' 6.5\") 06/07/2007 100%    BMI (Body Mass Index)                   20.54 kg/m2            Blood Pressure from Last 3 Encounters:   11/14/17 140/61   11/14/17 131/76   11/07/17 163/81    Weight from Last 3 Encounters:   11/14/17 58.6 kg (129 lb 3 oz)   11/14/17 58.6 kg (129 lb 1.6 oz)   11/07/17 60.1 kg (132 lb 6.4 oz)              We Performed the Following     CBC with platelets differential     CEA     Comprehensive metabolic panel     Protein qualitative urine          Today's Medication Changes          These changes are accurate as of: 11/14/17  3:54 PM.  If you have any questions, ask your nurse or doctor.               These medicines have changed or have updated prescriptions.        Dose/Directions    oxyCODONE IR 5 MG tablet   Commonly known as:  ROXICODONE   This may have changed:  reasons to take this        Dose:  5 mg   Take 1 tablet (5 mg) by mouth every 8 hours as needed for pain   Quantity:  20 tablet "   Refills:  0            Where to get your medicines      These medications were sent to [facility] - [address]   [address] [place] [address]     Phone:  [phone]     benzonatate 100 MG capsule    dexamethasone 4 MG tablet    loperamide 2 MG capsule    ranitidine 300 MG tablet                Primary Care Provider Office Phone # Fax #    Donna ThompsonNICOLA philippe [phone] [address] [place] [address]        Equal Access to Services     [facility]: Hadii aad ku hadasho Soomaali, waaxda luqadaha, qaybta kaalmada adeegyada, waxay idiin hayaan adeeg kharadalton rowe . So [facility] [phone].    ATENCIÓN: Si habla español, tiene a hunt disposición servicios gratuitos de asistencia lingüística. Ll[facility] [phone].    We comply with applicable federal civil rights laws and [place] laws. We do not discriminate on the basis of race, color, national origin, age, disability, sex, sexual orientation, or gender identity.            Thank you!     Thank you for choosing [facility] INF[facility]ION SERVICES  for your care. Our goal is always to provide you with excellent care. Hearing back from our patients is one way we can continue to improve our services. Please take a few minutes to complete the written survey that you may receive in the mail after your visit with us. Thank you!             Your Updated Medication List - Protect others around you: Learn how to safely use, store and throw away your medicines at www.disposemymeds.org.          This list is accurate as of: 11/14/17  3:54 PM.  Always use your most recent med list.                   Brand Name Dispense Instructions for use Diagnosis    ACETAMINOPHEN PO      Take 650 mg by mouth every 4 hours as needed for pain or fever        benzonatate 100 MG capsule    TESSALON PERLES    42 capsule    Take 2 capsules (200 mg) by mouth 3 times daily as needed for cough    Metastatic colon cancer  to liver (H)       carvedilol 12.5 MG tablet    COREG    90 tablet    Take 1.5 tablets (18.75 mg) by mouth 2 times daily (with meals)    Chronic systolic congestive heart failure (H)       dexamethasone 4 MG tablet    DECADRON    6 tablet    Take 2 tablets (8 mg) by mouth daily (with breakfast) Days 2, 3, and 4 of each cycle.    Metastatic colon cancer to liver (H)       diclofenac 1 % Gel topical gel    VOLTAREN     Place 2 g onto the skin 4 times daily as needed for moderate pain (for RUQ pain)    Metastatic colon cancer to liver (H)       ferrous sulfate 325 (65 FE) MG tablet    IRON     Take 325 mg by mouth daily (with breakfast)        guaiFENesin-codeine 100-10 MG/5ML Soln solution    ROBITUSSIN AC    120 mL    Take 5 mLs by mouth every 4 hours as needed for cough    Liver metastasis (H)       lidocaine 5 % Patch    LIDODERM    30 patch    Place 1-3 patches onto the skin every 24 hours To RUQ    Metastatic colon cancer to liver (H)       loperamide 2 MG capsule    IMODIUM    30 capsule    2 caps at 1st sign of diarrhea & 1 cap every 2hrs until 12hrs diarrhea free. During night, 2 caps at bedtime & 2 caps every 4hrs until AM    Metastatic colon cancer to liver (H)       LORazepam 0.5 MG tablet    ATIVAN    30 tablet    Take 1 tablet (0.5 mg) by mouth every 4 hours as needed (Anxiety, Nausea/Vomiting or Sleep)    Metastatic colon cancer to liver (H)       losartan 25 MG tablet    COZAAR    30 tablet    Take 1 tablet (25 mg) by mouth daily    Secondary cardiomyopathy (H)       oxyCODONE IR 5 MG tablet    ROXICODONE    20 tablet    Take 1 tablet (5 mg) by mouth every 8 hours as needed for pain        Potassium Chloride ER 20 MEQ Tbcr     30 tablet    Take 1 tablet (20 mEq) by mouth daily    Hypokalemia       prochlorperazine 10 MG tablet    COMPAZINE    30 tablet    Take 1 tablet (10 mg) by mouth every 6 hours as needed (Nausea/Vomiting)    Metastatic colon cancer to liver (H)       ranitidine 300 MG tablet     ZANTAC    30 tablet    Take 1 tablet (300 mg) by mouth At Bedtime    Nausea and vomiting, intractability of vomiting not specified, unspecified vomiting type       sennosides 8.6 MG tablet    SENOKOT    120 each    Take 1-2 tablets by mouth 2 times daily as needed for constipation    Slow transit constipation

## 2017-11-17 NOTE — TELEPHONE ENCOUNTER
I think there was a little confusion with this because [place] thought that her infusion was this coming Tuesday 11/20 but it's not until the 11/28. I've added a lab appointment for her for this coming Tuesday and we still need an order placed for this to be done. Thank you.

## 2017-11-17 NOTE — TELEPHONE ENCOUNTER
Reason for Call: Request for an order or referral:    Order or referral being requested: hemoglobin- please place order for Hemoglobin- would like this done on Monday so if the [place] nurses could place this for patient?    Date needed: as soon as possible    Has the patient been seen by the PCP for this problem? YES    Additional comments: none    Phone number Patient can be reached at:  Home number on file [phone] (home)    Best Time:  any    Can we leave a detailed message on this number?  YES    Call taken on 11/17/2017 at 10:20 AM by Angie Turner

## 2017-11-17 NOTE — TELEPHONE ENCOUNTER
Message left for patient to return call to [facility] in regards to lab request.  Pt is scheduled for port lab and infusion on Tuesday, and was seen by Dr. Dove 11.14.17 and declined a blood transfusion at that time. Direct line provided.

## 2017-11-28 NOTE — MR AVS SNAPSHOT
After Visit Summary   11/28/2017    Charlene Douglas    MRN: [id number]           Patient Information     Date Of Birth          1950        Visit Information        Provider Department      11/28/2017 9:00 AM NL INFUSION CHAIR 1 [facility]        Today's Diagnoses     Metastatic colon cancer to liver (H)    -  1    Anemia, unspecified type          Care Instructions    Pt to return on 12/07/17 for CT. Copies of medication list and upcoming appointments given prior to discharge.             Follow-ups after your visit        Your next 10 appointments already scheduled     Dec 07, 2017  8:30 AM CST   Level 1 with NL INFUSION CHAIR 1   [facility] ([facility])    [address]  [place] [address] [phone]            Dec 07, 2017  9:00 AM CST   CT CHEST/ABDOMEN/PELVIS W CONTRAST with PHCT1   [facility] CT Scan ([facility])    [address]  [place] [address]   [phone]           Please bring any scans or X-rays taken at other hospitals, if similar tests were done. Also bring a list of your medicines, including vitamins, minerals and over-the-counter drugs. It is safest to leave personal items at home.  Be sure to tell your doctor:   If you have any allergies.   If there s any chance you are pregnant.   If you are breastfeeding.   If you have any special needs.  You may have contrast for this exam. To prepare:   Do not eat or drink for 2 hours before your exam. If you need to take medicine, you may take it with small sips of water. (We may ask you to take liquid medicine as well.)   The day before your exam, drink extra fluids at least six 8-ounce glasses (unless your doctor tells you to restrict your fluids).  Patients over 70 or patients with diabetes or kidney problems:   If you haven t had a blood test (creatinine test) within the last 30 days, go to your clinic or Diagnostic  Imaging Department for this test.  If you have diabetes:   If your kidney function is normal, continue taking your metformin (Avandamet, Glucophage, Glucovance, Metaglip) on the day of your exam.   If your kidney function is abnormal, wait 48 hours before restarting this medicine.  You will have oral contrast for this exam:   You will drink the contrast at home. Get this from your clinic or Diagnostic Imaging Department. Please follow the directions given.  Please wear loose clothing, such as a sweat suit or jogging clothes. Avoid snaps, zippers and other metal. We may ask you to undress and put on a hospital gown.  If you have any questions, please call the Imaging Department where you will have your exam.            Dec 12, 2017  8:30 AM CST   Return Visit with Duy Dove MD   [facility]    [address] [place] [id number] [phone]            Dec 12, 2017  9:00 AM CST   Level 7 with NL INFUSION CHAIR 5   [facility] ([facility])    [address] [place] [address]  [place] [address] [phone]              Who to contact     If you have questions or need follow up information about today's clinic visit or your schedule please contact [facility] INFUSION SERVICES directly at [phone].  Normal or non-critical lab and imaging results will be communicated to you by [employer], letter or phone within 4 business days after the clinic has received the results. If you do not hear from us within 7 days, please contact the clinic through [employer] or phone. If you have a critical or abnormal lab result, we will notify you by phone as soon as possible.  Submit refill requests through [employer] or call your pharmacy and they will forward the refill request to us. Please allow 3 business days for your refill to be completed.          Additional Information About Your Visit        [employer] Information     [employer] gives  "you secure access to your electronic health record. If you see a primary care provider, you can also send messages to your care team and make appointments. If you have questions, please call your primary care clinic.  If you do not have a primary care provider, please call [phone] and they will assist you.        Care EveryWhere ID     This is your Care EveryWhere ID. This could be used by other organizations to access your [place] medical records  [id number]        Your Vitals Were     Pulse Temperature Respirations Height Last Period Pulse Oximetry    85 97.6  F (36.4  C) (Temporal) 18 1.689 m (5' 6.5\") 06/07/2007 99%    BMI (Body Mass Index)                   20.3 kg/m2            Blood Pressure from Last 3 Encounters:   11/28/17 146/72   11/14/17 140/61   11/14/17 131/76    Weight from Last 3 Encounters:   11/28/17 57.9 kg (127 lb 11.2 oz)   11/14/17 58.6 kg (129 lb 3 oz)   11/14/17 58.6 kg (129 lb 1.6 oz)              We Performed the Following     ABO/Rh type and screen     Blood component     CBC with platelets differential     Comprehensive metabolic panel     Protein qualitative urine     Transfuse red blood cell unit          Today's Medication Changes          These changes are accurate as of: 11/28/17 11:59 PM.  If you have any questions, ask your nurse or doctor.               These medicines have changed or have updated prescriptions.        Dose/Directions    oxyCODONE IR 5 MG tablet   Commonly known as:  ROXICODONE   This may have changed:  reasons to take this        Dose:  5 mg   Take 1 tablet (5 mg) by mouth every 8 hours as needed for pain   Quantity:  20 tablet   Refills:  0                Primary Care Provider Office Phone # Fax #    Donna Shirley PA-C [phone] [address] [place] [address]        Equal Access to Services     RON PERALTA AH: Nic Blandon, roxy beltran, qadebra costa " ah. So [facility] [phone].    ATENCIÓN: Si david chacon, tiene a hunt disposición servicios gratuitos de asistencia lingüística. Tameka toure [phone].    We comply with applicable federal civil rights laws and [place] laws. We do not discriminate on the basis of race, color, national origin, age, disability, sex, sexual orientation, or gender identity.            Thank you!     Thank you for choosing [facility] SERVICES  for your care. Our goal is always to provide you with excellent care. Hearing back from our patients is one way we can continue to improve our services. Please take a few minutes to complete the written survey that you may receive in the mail after your visit with us. Thank you!             Your Updated Medication List - Protect others around you: Learn how to safely use, store and throw away your medicines at www.disposemymeds.org.          This list is accurate as of: 11/28/17 11:59 PM.  Always use your most recent med list.                   Brand Name Dispense Instructions for use Diagnosis    ACETAMINOPHEN PO      Take 650 mg by mouth every 4 hours as needed for pain or fever        benzonatate 100 MG capsule    TESSALON PERLES    42 capsule    Take 2 capsules (200 mg) by mouth 3 times daily as needed for cough    Metastatic colon cancer to liver (H)       carvedilol 12.5 MG tablet    COREG    90 tablet    Take 1.5 tablets (18.75 mg) by mouth 2 times daily (with meals)    Chronic systolic congestive heart failure (H)       dexamethasone 4 MG tablet    DECADRON    6 tablet    Take 2 tablets (8 mg) by mouth daily (with breakfast) Days 2, 3, and 4 of each cycle.    Metastatic colon cancer to liver (H)       diclofenac 1 % Gel topical gel    VOLTAREN     Place 2 g onto the skin 4 times daily as needed for moderate pain (for RUQ pain)    Metastatic colon cancer to liver (H)       ferrous sulfate 325 (65 FE) MG tablet    IRON     Take 325 mg by mouth daily (with breakfast)         guaiFENesin-codeine 100-10 MG/5ML Soln solution    ROBITUSSIN AC    120 mL    Take 5 mLs by mouth every 4 hours as needed for cough    Liver metastasis (H)       lidocaine 5 % Patch    LIDODERM    30 patch    Place 1-3 patches onto the skin every 24 hours To RUQ    Metastatic colon cancer to liver (H)       loperamide 2 MG capsule    IMODIUM    30 capsule    2 caps at 1st sign of diarrhea & 1 cap every 2hrs until 12hrs diarrhea free. During night, 2 caps at bedtime & 2 caps every 4hrs until AM    Metastatic colon cancer to liver (H)       LORazepam 0.5 MG tablet    ATIVAN    30 tablet    Take 1 tablet (0.5 mg) by mouth every 4 hours as needed (Anxiety, Nausea/Vomiting or Sleep)    Metastatic colon cancer to liver (H)       losartan 25 MG tablet    COZAAR    30 tablet    Take 1 tablet (25 mg) by mouth daily    Secondary cardiomyopathy (H)       oxyCODONE IR 5 MG tablet    ROXICODONE    20 tablet    Take 1 tablet (5 mg) by mouth every 8 hours as needed for pain        Potassium Chloride ER 20 MEQ Tbcr     30 tablet    Take 1 tablet (20 mEq) by mouth daily    Hypokalemia       prochlorperazine 10 MG tablet    COMPAZINE    30 tablet    Take 1 tablet (10 mg) by mouth every 6 hours as needed (Nausea/Vomiting)    Metastatic colon cancer to liver (H)       ranitidine 300 MG tablet    ZANTAC    30 tablet    Take 1 tablet (300 mg) by mouth At Bedtime    Nausea and vomiting, intractability of vomiting not specified, unspecified vomiting type       sennosides 8.6 MG tablet    SENOKOT    120 each    Take 1-2 tablets by mouth 2 times daily as needed for constipation    Slow transit constipation

## 2017-11-28 NOTE — PATIENT INSTRUCTIONS
Pt to return on 12/07/17 for CT. Copies of medication list and upcoming appointments given prior to discharge.

## 2017-11-28 NOTE — PROGRESS NOTES
Patient here for C3 D1 Avastin/Irinotecan/Oxaliplatin chemotherapy today. Labs/BSA/Dose verified by 2 RN'S. Hgb-7.3, ANC-4.0, K+-3.2, urine protein-neg and Plt-228. Dr Dove informed of labs.  Premeds given and tolerated chemotherapy well besides side effects of tingling and cold sensitivity with Oxaliplatin.  Positive blood return pre/post infusion. Tolerated 1 units of blood. Lung sounds clear pre/post each unit of blood. Discharged to home in stable condition.

## 2017-12-04 NOTE — TELEPHONE ENCOUNTER
Refused Prescriptions:                       Disp   Refills    carvedilol (COREG) 12.5 MG tablet          90 tab*0        Sig: Take 1.5 tablets (18.75 mg) by mouth 2 times daily           (with meals)  Refused By: ARJUN SHABAZZ  Reason for Refusal: Patient no longer under provider care    Last ov with KW on 08/23/2017  Sees Dr. Dove oncology and Sangeeta in cardiology    Flag for provider to review.    Miguel Ángel Sutton, RN, BSN

## 2017-12-04 NOTE — TELEPHONE ENCOUNTER
Reason for Call:  Medication or medication refill:    Do you use a [facility]?  Name of the pharmacy and phone number for the current request:  [facility] - [phone]    Name of the medication requested: carvedilol    Other request: none    Can we leave a detailed message on this number? YES    Phone number patient can be reached at: Home number on file [phone] (home)    Best Time: any    Call taken on 12/4/2017 at 2:07 PM by Mamta Márquez

## 2017-12-06 NOTE — TELEPHONE ENCOUNTER
carvedilol (COREG) 12.5 MG tablet  Rx was sent 12/06/2017 for 90 tabs and 1 refills by NP. Patient informed via [employer]. Milka Ch RN, BSN

## 2017-12-06 NOTE — TELEPHONE ENCOUNTER
Routing refill request to provider for review/approval because:  A break in medication.  Rx sent to pharmacy on 9/29/17 with a month supply.    Mamta Garduno RN

## 2017-12-07 NOTE — PATIENT INSTRUCTIONS
Pt to return on 12/12 for chemotherapy. Copies of medication list and upcoming appointments given prior to discharge.

## 2017-12-07 NOTE — MR AVS SNAPSHOT
After Visit Summary   12/7/2017    Charlene Douglas    MRN: [id number]           Patient Information     Date Of Birth          1950        Visit Information        Provider Department      12/7/2017 8:30 AM NL INFUSION CHAIR 1 [facility]        Today's Diagnoses     Neutropenic fever (H)    -  1    Metastatic colon cancer to liver (H)          Care Instructions    Pt to return on 12/12 for chemotherapy. Copies of medication list and upcoming appointments given prior to discharge.           Follow-ups after your visit        Follow-up notes from your care team     Return in about 5 days (around 12/12/2017).      Your next 10 appointments already scheduled     Dec 12, 2017  8:30 AM CST   Return Visit with Duy Dove MD   [facility] ([facility])    [address] [place] [id number] [phone]            Dec 12, 2017  9:00 AM CST   Level 7 with NL INFUSION CHAIR 5   [facility] ([facility])    [address] [place] [address] [address] [place] [id number] [phone]            Jan 12, 2018 10:00 AM CST   Ech Limited with PHECHR1   [facility] ([facility])    [address] [place] [address] [address] [place] [address] [phone]           1.  Please bring or wear a comfortable two-piece outfit. 2.  You may eat, drink and take your normal medicines. 3.  For any questions that cannot be answered, please contact the ordering physician            Jan 18, 2018  9:30 AM CST   Return Visit with Rey Koehler MD   [facility] ([facility])    [address] [place] [id number] [phone]              Who to contact     If you have questions or need follow up information about today's clinic visit or your schedule please contact [facility] directly at [phone].  Normal or non-critical  lab and imaging results will be communicated to you by Posmetricshart, letter or phone within 4 business days after the clinic has received the results. If you do not hear from us within 7 days, please contact the clinic through [email or web address] or phone. If you have a critical or abnormal lab result, we will notify you by phone as soon as possible.  Submit refill requests through NovaTorque or call your pharmacy and they will forward the refill request to us. Please allow 3 business days for your refill to be completed.          Additional Information About Your Visit        Posmetricshart Information     NovaTorque gives you secure access to your electronic health record. If you see a primary care provider, you can also send messages to your care team and make appointments. If you have questions, please call your primary care clinic.  If you do not have a primary care provider, please call [phone] and they will assist you.        Care EveryWhere ID     This is your Care EveryWhere ID. This could be used by other organizations to access your [place] medical records  [id number]        Your Vitals Were     Last Period                   06/07/2007            Blood Pressure from Last 3 Encounters:   11/28/17 146/72   11/14/17 140/61   11/14/17 131/76    Weight from Last 3 Encounters:   11/28/17 57.9 kg (127 lb 11.2 oz)   11/14/17 58.6 kg (129 lb 3 oz)   11/14/17 58.6 kg (129 lb 1.6 oz)              Today, you had the following     No orders found for display         Today's Medication Changes          These changes are accurate as of: 12/7/17 10:36 AM.  If you have any questions, ask your nurse or doctor.               These medicines have changed or have updated prescriptions.        Dose/Directions    oxyCODONE IR 5 MG tablet   Commonly known as:  ROXICODONE   This may have changed:  reasons to take this        Dose:  5 mg   Take 1 tablet (5 mg) by mouth every 8 hours as needed for pain   Quantity:  20 tablet   Refills:  0                 Primary Care Provider Office Phone # Fax #    Donna Shirley PA-C [phone] [address] [place] [address]        Equal Access to Services     RON PERALTA : Nic nat smith rashad Blandon, walayoda luqdominic, qaybta katamarada shahla, debra morales lajuliannslim hugh. So [facility] [phone].    ATENCIÓN: Si habla español, tiene a hunt disposición servicios gratuitos de asistencia lingüística. Llame al [phone].    We comply with applicable federal civil rights laws and [place] laws. We do not discriminate on the basis of race, color, national origin, age, disability, sex, sexual orientation, or gender identity.            Thank you!     Thank you for choosing [facility] SERVICES  for your care. Our goal is always to provide you with excellent care. Hearing back from our patients is one way we can continue to improve our services. Please take a few minutes to complete the written survey that you may receive in the mail after your visit with us. Thank you!             Your Updated Medication List - Protect others around you: Learn how to safely use, store and throw away your medicines at www.disposemymeds.org.          This list is accurate as of: 12/7/17 10:36 AM.  Always use your most recent med list.                   Brand Name Dispense Instructions for use Diagnosis    ACETAMINOPHEN PO      Take 650 mg by mouth every 4 hours as needed for pain or fever        benzonatate 100 MG capsule    TESSALON PERLES    42 capsule    Take 2 capsules (200 mg) by mouth 3 times daily as needed for cough    Metastatic colon cancer to liver (H)       carvedilol 12.5 MG tablet    COREG    90 tablet    Take 1.5 tablets (18.75 mg) by mouth 2 times daily (with meals)    Chronic systolic congestive heart failure (H)       dexamethasone 4 MG tablet    DECADRON    6 tablet    Take 2 tablets (8 mg) by mouth daily (with breakfast) Days 2, 3, and 4 of each cycle.    Metastatic colon  cancer to liver (H)       diclofenac 1 % Gel topical gel    VOLTAREN     Place 2 g onto the skin 4 times daily as needed for moderate pain (for RUQ pain)    Metastatic colon cancer to liver (H)       ferrous sulfate 325 (65 FE) MG tablet    IRON     Take 325 mg by mouth daily (with breakfast)        guaiFENesin-codeine 100-10 MG/5ML Soln solution    ROBITUSSIN AC    120 mL    Take 5 mLs by mouth every 4 hours as needed for cough    Liver metastasis (H)       lidocaine 5 % Patch    LIDODERM    30 patch    Place 1-3 patches onto the skin every 24 hours To RUQ    Metastatic colon cancer to liver (H)       loperamide 2 MG capsule    IMODIUM    30 capsule    2 caps at 1st sign of diarrhea & 1 cap every 2hrs until 12hrs diarrhea free. During night, 2 caps at bedtime & 2 caps every 4hrs until AM    Metastatic colon cancer to liver (H)       LORazepam 0.5 MG tablet    ATIVAN    30 tablet    Take 1 tablet (0.5 mg) by mouth every 4 hours as needed (Anxiety, Nausea/Vomiting or Sleep)    Metastatic colon cancer to liver (H)       losartan 25 MG tablet    COZAAR    30 tablet    Take 1 tablet (25 mg) by mouth daily    Secondary cardiomyopathy (H)       oxyCODONE IR 5 MG tablet    ROXICODONE    20 tablet    Take 1 tablet (5 mg) by mouth every 8 hours as needed for pain        Potassium Chloride ER 20 MEQ Tbcr     30 tablet    Take 1 tablet (20 mEq) by mouth daily    Hypokalemia       prochlorperazine 10 MG tablet    COMPAZINE    30 tablet    Take 1 tablet (10 mg) by mouth every 6 hours as needed (Nausea/Vomiting)    Metastatic colon cancer to liver (H)       ranitidine 300 MG tablet    ZANTAC    30 tablet    Take 1 tablet (300 mg) by mouth At Bedtime    Nausea and vomiting, intractability of vomiting not specified, unspecified vomiting type       sennosides 8.6 MG tablet    SENOKOT    120 each    Take 1-2 tablets by mouth 2 times daily as needed for constipation    Slow transit constipation

## 2017-12-07 NOTE — PROGRESS NOTES
Here for power port access prior to CT.  Pt ret'd post CT for De-access.  Pt discharged in stable condition.

## 2017-12-08 NOTE — TELEPHONE ENCOUNTER
Patient has read [email or web address] message   Last Read in [email or web address]   12/6/2017 10:38 AM by Charlene Douglas     No further action needed   Closing encounter  Stacie Henry RT (R)

## 2017-12-12 PROBLEM — G89.3 CANCER ASSOCIATED PAIN: Status: ACTIVE | Noted: 2017-01-01

## 2017-12-12 NOTE — NURSING NOTE
DISCHARGE PLAN:  Next appointments: See patient instruction section  Departure Mode: Ambulatory  Accompanied by: male  10 minutes for nursing discharge (face to face time)     Writing nurse seen patient after Medical Oncology appointment to address questions/concerns/coordinate care. F/u in 4 weekss with labs prior. Coordinating with infusion 4 month f/u. Will walk oxycodone script upstairs once signed. Sent via tube. Called nydia in infusion to inform her the script is on it's way. Received call from infusion HBG 7.9 and potassium 2.6. Informed MD, he called Keren back in infusion regarding labs. See patient instructions and Oncologist's Progress note for further details. Questions and concerns addressed to patient's satisfaction. Contact information provided and patient is encouraged to call with any that arise in the interim of care.    Angie Rosario RN  [facility]  [phone]  12/12/2017 9:02 AM

## 2017-12-12 NOTE — MR AVS SNAPSHOT
After Visit Summary   12/12/2017    Charlene Douglas    MRN: [id number]           Patient Information     Date Of Birth          1950        Visit Information        Provider Department      12/12/2017 9:00 AM NL INFUSION CHAIR 5 [facility]        Today's Diagnoses     Metastatic colon cancer to liver (H)    -  1    Neutropenic fever (H)          Care Instructions    Pt to return on 12/21/17 for labs/poss transfusion. Copies of medication list and upcoming appointments given prior to discharge.           Follow-ups after your visit        Follow-up notes from your care team     Return in about 9 days (around 12/21/2017).      Your next 10 appointments already scheduled     Dec 21, 2017 11:00 AM CST   Level 2 with NL INFUSION CHAIR 3   [facility] ([facility])    [address] [place] [address] Dr [place] [address]   [phone]            Dec 27, 2017  9:00 AM CST   Level 7 with NL INFUSION CHAIR 3   [facility] ([facility])    [address] [place] [address] Dr [place] [address]   [phone]            Jan 09, 2018  8:30 AM CST   Return Visit with Ronny Mosquera MD   [facility] ([facility]    [address] [place] [address]   [phone]            Jan 09, 2018  9:00 AM CST   Level 7 with NL INFUSION CHAIR 4   [facility] ([facility])    [address] [place] [address] Dr [place] [address]   [phone]            Jan 12, 2018 10:00 AM CST   Ech Limited with PHECHR1   [facility] Echocardi[facility] ([facility])    [address] [place] [address] Dr [place] [address]   [phone]           1.  Please bring or wear a comfortable two-piece outfit. 2.  You may eat, drink and take your normal medicines. 3.  For any questions that cannot be answered, please contact the ordering physician            Jan 18, 2018  9:30 AM CST  "  Return Visit with Rey Koehler MD   [facility] ([facility])    [address] [facility] [address] [phone]              Future tests that were ordered for you today     Open Standing Orders        Priority Remaining Interval Expires Ordered    CBC with platelets differential Routine 12/12 week 12/12/2018 12/12/2017            Who to contact     If you have questions or need follow up information about today's clinic visit or your schedule please contact [facility] INFUSION SERVICES directly at [phone].  Normal or non-critical lab and imaging results will be communicated to you by Capzleshart, letter or phone within 4 business days after the clinic has received the results. If you do not hear from us within 7 days, please contact the clinic through TheFormToolt or phone. If you have a critical or abnormal lab result, we will notify you by phone as soon as possible.  Submit refill requests through Nulogy or call your pharmacy and they will forward the refill request to us. Please allow 3 business days for your refill to be completed.          Additional Information About Your Visit        MyChart Information     Nulogy gives you secure access to your electronic health record. If you see a primary care provider, you can also send messages to your care team and make appointments. If you have questions, please call your primary care clinic.  If you do not have a primary care provider, please call [phone] and they will assist you.        Care EveryWhere ID     This is your Care EveryWhere ID. This could be used by other organizations to access your [place] medical records  [id number]        Your Vitals Were     Pulse Temperature Respirations Height Last Period Pulse Oximetry    92 98.4  F (36.9  C) (Temporal) 16 1.689 m (5' 6.5\") 06/07/2007 100%    BMI (Body Mass Index)                   20.33 kg/m2            Blood Pressure from Last " 3 Encounters:   12/12/17 158/80   12/12/17 151/77   11/28/17 146/72    Weight from Last 3 Encounters:   12/12/17 58 kg (127 lb 14.4 oz)   12/12/17 58 kg (127 lb 14.4 oz)   11/28/17 57.9 kg (127 lb 11.2 oz)              We Performed the Following     CBC with platelets differential     CEA     Comprehensive metabolic panel     Protein qualitative urine          Today's Medication Changes          These changes are accurate as of: 12/12/17  5:17 PM.  If you have any questions, ask your nurse or doctor.               These medicines have changed or have updated prescriptions.        Dose/Directions    oxyCODONE IR 5 MG tablet   Commonly known as:  ROXICODONE   This may have changed:  reasons to take this   Used for:  Metastatic colon cancer to liver (H)        Dose:  5 mg   Take 1 tablet (5 mg) by mouth every 8 hours as needed for pain   Quantity:  20 tablet   Refills:  0            Where to get your medicines      These medications were sent to [facility] - [address]   [address] [place] [address]     Phone:  [phone]     loperamide 2 MG capsule         Some of these will need a paper prescription and others can be bought over the counter.  Ask your nurse if you have questions.     Bring a paper prescription for each of these medications     oxyCODONE IR 5 MG tablet                Primary Care Provider Office Phone # Fax #    Donna Shirley PA-C [phone] [address] [place] [address]        Equal Access to Services     DENIZ PERALTA AH: Hadii aad ku hadasho Soestuardoali, waaxda luqadaha, qaybta kaalmada adeegyada, waxay ididenver reese. So [facility] [phone].    ATENCIÓN: Si habla español, tiene a hunt disposición servicios gratuitos de asistencia lingüística. Llame al [phone].    We comply with applicable federal civil rights laws and [place] laws. We do not discriminate on the basis of race, color, national  origin, age, disability, sex, sexual orientation, or gender identity.            Thank you!     Thank you for choosing [facility] SERVICES  for your care. Our goal is always to provide you with excellent care. Hearing back from our patients is one way we can continue to improve our services. Please take a few minutes to complete the written survey that you may receive in the mail after your visit with us. Thank you!             Your Updated Medication List - Protect others around you: Learn how to safely use, store and throw away your medicines at www.disposemymeds.org.          This list is accurate as of: 12/12/17  5:17 PM.  Always use your most recent med list.                   Brand Name Dispense Instructions for use Diagnosis    ACETAMINOPHEN PO      Take 650 mg by mouth every 4 hours as needed for pain or fever        benzonatate 100 MG capsule    TESSALON PERLES    42 capsule    Take 2 capsules (200 mg) by mouth 3 times daily as needed for cough    Metastatic colon cancer to liver (H)       carvedilol 12.5 MG tablet    COREG    90 tablet    Take 1.5 tablets (18.75 mg) by mouth 2 times daily (with meals)    Chronic systolic congestive heart failure (H)       dexamethasone 4 MG tablet    DECADRON    6 tablet    Take 2 tablets (8 mg) by mouth daily (with breakfast) Days 2, 3, and 4 of each cycle.    Metastatic colon cancer to liver (H)       diclofenac 1 % Gel topical gel    VOLTAREN     Place 2 g onto the skin 4 times daily as needed for moderate pain (for RUQ pain)    Metastatic colon cancer to liver (H)       ferrous sulfate 325 (65 FE) MG tablet    IRON     Take 325 mg by mouth daily (with breakfast)        guaiFENesin-codeine 100-10 MG/5ML Soln solution    ROBITUSSIN AC    120 mL    Take 5 mLs by mouth every 4 hours as needed for cough    Liver metastasis (H)       lidocaine 5 % Patch    LIDODERM    30 patch    Place 1-3 patches onto the skin every 24 hours To RUQ    Metastatic colon  cancer to liver (H)       loperamide 2 MG capsule    IMODIUM    30 capsule    2 caps at 1st sign of diarrhea & 1 cap every 2hrs until 12hrs diarrhea free. During night, 2 caps at bedtime & 2 caps every 4hrs until AM    Metastatic colon cancer to liver (H)       LORazepam 0.5 MG tablet    ATIVAN    30 tablet    Take 1 tablet (0.5 mg) by mouth every 4 hours as needed (Anxiety, Nausea/Vomiting or Sleep)    Metastatic colon cancer to liver (H)       losartan 25 MG tablet    COZAAR    30 tablet    Take 1 tablet (25 mg) by mouth daily    Secondary cardiomyopathy (H)       oxyCODONE IR 5 MG tablet    ROXICODONE    20 tablet    Take 1 tablet (5 mg) by mouth every 8 hours as needed for pain    Metastatic colon cancer to liver (H)       Potassium Chloride ER 20 MEQ Tbcr     30 tablet    Take 1 tablet (20 mEq) by mouth daily    Hypokalemia       prochlorperazine 10 MG tablet    COMPAZINE    30 tablet    Take 1 tablet (10 mg) by mouth every 6 hours as needed (Nausea/Vomiting)    Metastatic colon cancer to liver (H)       ranitidine 300 MG tablet    ZANTAC    30 tablet    Take 1 tablet (300 mg) by mouth At Bedtime    Nausea and vomiting, intractability of vomiting not specified, unspecified vomiting type       sennosides 8.6 MG tablet    SENOKOT    120 each    Take 1-2 tablets by mouth 2 times daily as needed for constipation    Slow transit constipation

## 2017-12-12 NOTE — LETTER
12/12/2017         RE: Charlene Douglas  [id number] [address] [place] [id number]        Dear Colleague,    Thank you for referring your patient, Charlene Douglas, to the [facility]. Please see a copy of my visit note below.    Hematology/ Oncology Follow-up Visit:  Dec 12, 2017    Reason for Visit:   Chief Complaint   Patient presents with     Oncology Clinic Visit     1 month follow up for Metastatic colon cancer to liver with liver metastasis      Chemotherapy     C4D1 today with labs prior     Results     CT CAP 12/7/2017       Oncologic History:  Metastatic colon cancer to liver (H)  Charlene Douglas presented with 2 months history of cough, shortness of breath, decreased appetite and abdominal pain. Patient has black stool and occult blood which was positive. Subsequently the patient went on to have a CT angiogram of the chest because of shortness of breath. Lungs were clear. Multiple liver lesions were seen. Subsequently the patient went on to have an ultrasound of the liver done on August 18, 2017 which confirmed the presence of multiple liver metastases. Abdominal CT was done on August 18, 2017 showing large ascending colon mass with associated mesenteric/peritoneal drop metastasis and lymphadenopathy. There are multiple liver metastasis seen. KRAS mutated. She was started on systemic chemotherapy with FOLFOX with Avastin. However had severe cardiogenic toxicity from 5-FU and ended up in the hospital with acute respiratory failure requiring intubation. Initial echo showed LVEF and 10-15% which did improve in 3 days time to 35- 40%.  Subsequently, chemotherapy was switched to single agent irinotecan.      Interval History:  Patient is here today for follow-up and to proceed with chemotherapy according to the treatment plan. Abdominal pain has improved. Appetite has improved. There is no shortness of breath or cough or wheezing. Patient has intermittent diarrhea that responded well to Imodium.  She denies any fever or chills.    Review Of Systems:  Constitutional: Negative for fever, chills, and night sweats. Fatigue  Skin: negative.  Eyes: negative.  Ears/Nose/Throat: negative.  Respiratory: No shortness of breath, dyspnea on exertion, cough, or hemoptysis.  Cardiovascular: negative.  Gastrointestinal: Occasional nausea and diarrhea as mentioned above  Genitourinary: negative.  Musculoskeletal: negative.  Neurologic: negative.  Psychiatric: negative.  Hematologic/Lymphatic/Immunologic: negative.  Endocrine: negative.    All other ROS negative unless mentioned in interval history.    Past medical, social, surgical, and family histories reviewed.    Allergies:  Allergies as of 12/12/2017 - Miguel as Reviewed 12/12/2017   Allergen Reaction Noted     Lisinopril Cough 09/19/2017     No known allergies  08/11/2003       Current Medications:  Current Outpatient Prescriptions   Medication Sig Dispense Refill     loperamide (IMODIUM) 2 MG capsule 2 caps at 1st sign of diarrhea & 1 cap every 2hrs until 12hrs diarrhea free. During night, 2 caps at bedtime & 2 caps every 4hrs until AM 30 capsule 0     oxyCODONE IR (ROXICODONE) 5 MG tablet Take 1 tablet (5 mg) by mouth every 8 hours as needed for pain 20 tablet 0     carvedilol (COREG) 12.5 MG tablet Take 1.5 tablets (18.75 mg) by mouth 2 times daily (with meals) 90 tablet 1     benzonatate (TESSALON PERLES) 100 MG capsule Take 2 capsules (200 mg) by mouth 3 times daily as needed for cough 42 capsule 1     ranitidine (ZANTAC) 300 MG tablet Take 1 tablet (300 mg) by mouth At Bedtime 30 tablet 1     dexamethasone (DECADRON) 4 MG tablet Take 2 tablets (8 mg) by mouth daily (with breakfast) Days 2, 3, and 4 of each cycle. 6 tablet 11     lidocaine (LIDODERM) 5 % Patch Place 1-3 patches onto the skin every 24 hours To RUQ 30 patch 1     guaiFENesin-codeine (ROBITUSSIN AC) 100-10 MG/5ML SOLN solution Take 5 mLs by mouth every 4 hours as needed for cough 120 mL 0     LORazepam  "(ATIVAN) 0.5 MG tablet Take 1 tablet (0.5 mg) by mouth every 4 hours as needed (Anxiety, Nausea/Vomiting or Sleep) 30 tablet 5     prochlorperazine (COMPAZINE) 10 MG tablet Take 1 tablet (10 mg) by mouth every 6 hours as needed (Nausea/Vomiting) 30 tablet 5     losartan (COZAAR) 25 MG tablet Take 1 tablet (25 mg) by mouth daily 30 tablet 11     ferrous sulfate (IRON) 325 (65 FE) MG tablet Take 325 mg by mouth daily (with breakfast)       Potassium Chloride ER 20 MEQ TBCR Take 1 tablet (20 mEq) by mouth daily 30 tablet 0     diclofenac (VOLTAREN) 1 % GEL topical gel Place 2 g onto the skin 4 times daily as needed for moderate pain (for RUQ pain)       sennosides (SENOKOT) 8.6 MG tablet Take 1-2 tablets by mouth 2 times daily as needed for constipation 120 each      ACETAMINOPHEN PO Take 650 mg by mouth every 4 hours as needed for pain or fever           Physical Exam:  /77 (BP Location: Right arm, Patient Position: Chair, Cuff Size: Adult Regular)  Pulse 97  Temp 97.3  F (36.3  C) (Temporal)  Resp 16  Ht 1.689 m (5' 6.5\")  Wt 58 kg (127 lb 14.4 oz)  LMP 06/07/2007  SpO2 100%  BMI 20.33 kg/m2  Wt Readings from Last 12 Encounters:   12/12/17 58 kg (127 lb 14.4 oz)   12/12/17 58 kg (127 lb 14.4 oz)   11/28/17 57.9 kg (127 lb 11.2 oz)   11/14/17 58.6 kg (129 lb 3 oz)   11/14/17 58.6 kg (129 lb 1.6 oz)   11/07/17 60.1 kg (132 lb 6.4 oz)   10/31/17 62.3 kg (137 lb 6.4 oz)   10/26/17 67.3 kg (148 lb 4.8 oz)   10/25/17 65.3 kg (144 lb)   10/25/17 66.2 kg (146 lb)   10/24/17 65.5 kg (144 lb 6.4 oz)   10/24/17 65.5 kg (144 lb 4.8 oz)     ECOG performance status: 1  GENERAL APPEARANCE: Healthy, alert and in no acute distress. Patellar  HEENT: Sclerae anicteric. PERRLA. Oropharynx without ulcers, lesions, or thrush.  NECK: Supple. No asymmetry or masses.  LYMPHATICS: No palpable cervical, supraclavicular, axillary, or inguinal lymphadenopathy.  RESP: Lungs clear to auscultation bilaterally without rales, rhonchi or " wheezes.  CARDIOVASCULAR: Regular rate and rhythm. Normal S1, S2; no S3 or S4. No murmur, gallop, or rub.  ABDOMEN: Soft, nontender. Bowel sounds normal. No palpable organomegaly or masses.  MUSCULOSKELETAL: Extremities without gross deformities noted. No edema of bilateral lower extremities.  SKIN: No suspicious lesions or rashes.  NEURO: Alert and oriented x 3. Cranial nerves II-XII grossly intact.  PSYCHIATRIC: Mentation and affect appear normal.    Laboratory/Imaging Studies:  No visits with results within 2 Week(s) from this visit.  Latest known visit with results is:    Infusion Therapy Visit on 11/28/2017   Component Date Value Ref Range Status     WBC 11/28/2017 6.9  4.0 - 11.0 10e9/L Final     RBC Count 11/28/2017 2.57* 3.8 - 5.2 10e12/L Final     Hemoglobin 11/28/2017 7.3* 11.7 - 15.7 g/dL Final     Hematocrit 11/28/2017 22.7* 35.0 - 47.0 % Final     MCV 11/28/2017 88  78 - 100 fl Final     MCH 11/28/2017 28.4  26.5 - 33.0 pg Final     MCHC 11/28/2017 32.2  31.5 - 36.5 g/dL Final     RDW 11/28/2017 21.0* 10.0 - 15.0 % Final     Platelet Count 11/28/2017 228  150 - 450 10e9/L Final     Diff Method 11/28/2017 Automated Method   Final     % Neutrophils 11/28/2017 58.2  % Final     % Lymphocytes 11/28/2017 26.9  % Final     % Monocytes 11/28/2017 14.2  % Final     % Eosinophils 11/28/2017 0.0  % Final     % Basophils 11/28/2017 0.3  % Final     % Immature Granulocytes 11/28/2017 0.4  % Final     Absolute Neutrophil 11/28/2017 4.0  1.6 - 8.3 10e9/L Final     Absolute Lymphocytes 11/28/2017 1.9  0.8 - 5.3 10e9/L Final     Absolute Monocytes 11/28/2017 1.0  0.0 - 1.3 10e9/L Final     Absolute Eosinophils 11/28/2017 0.0  0.0 - 0.7 10e9/L Final     Absolute Basophils 11/28/2017 0.0  0.0 - 0.2 10e9/L Final     Abs Immature Granulocytes 11/28/2017 0.0  0 - 0.4 10e9/L Final     Sodium 11/28/2017 138  133 - 144 mmol/L Final     Potassium 11/28/2017 3.2* 3.4 - 5.3 mmol/L Final     Chloride 11/28/2017 104  94 - 109  mmol/L Final     Carbon Dioxide 11/28/2017 24  20 - 32 mmol/L Final     Anion Gap 11/28/2017 10  3 - 14 mmol/L Final     Glucose 11/28/2017 124* 70 - 99 mg/dL Final     Urea Nitrogen 11/28/2017 9  7 - 30 mg/dL Final     Creatinine 11/28/2017 0.67  0.52 - 1.04 mg/dL Final     GFR Estimate 11/28/2017 88  >60 mL/min/1.7m2 Final    Non  GFR Calc     GFR Estimate If Black 11/28/2017 >90  >60 mL/min/1.7m2 Final    African American GFR Calc     Calcium 11/28/2017 8.7  8.5 - 10.1 mg/dL Final     Bilirubin Total 11/28/2017 0.4  0.2 - 1.3 mg/dL Final     Albumin 11/28/2017 2.1* 3.4 - 5.0 g/dL Final     Protein Total 11/28/2017 6.3* 6.8 - 8.8 g/dL Final     Alkaline Phosphatase 11/28/2017 423* 40 - 150 U/L Final     ALT 11/28/2017 43  0 - 50 U/L Final     AST 11/28/2017 38  0 - 45 U/L Final     Protein Albumin Urine 11/28/2017 Negative  NEG^Negative mg/dL Final     Units Ordered 11/28/2017 1   Final     ABO 11/28/2017 A   Final     RH(D) 11/28/2017 Pos   Final     Antibody Screen 11/28/2017 Neg   Final     Test Valid Only At 11/28/2017 [facility]      Final     Specimen Expires 11/28/2017 12/01/2017   Final     Crossmatch 11/28/2017 Red Blood Cells   Final     Unit Number 11/28/2017 [id number]   Final     Blood Component Type 11/28/2017 Red Blood Cells Leukocyte Reduced   Final     Division Number 11/28/2017 00   Final     Status of Unit 11/28/2017 Released to care unit   Final     Blood Product Code 11/28/2017 [id number]   Final     Unit Status 11/28/2017 ISS   Final        Recent Results (from the past 744 hour(s))   CT Chest/Abdomen/Pelvis w Contrast    Narrative    CT CHEST, ABDOMEN, AND PELVIS WITH CONTRAST  12/7/2017 9:31 AM     HISTORY: Follow up metastatic colon cancer to liver (H).    COMPARISON: PET/CT 8/25/2017. Abdomen and pelvis CT 10/7/2017.    TECHNIQUE: Volumetric helical acquisition of CT images from the lung  apices through the symphysis pubis after the administration of  65mL  Isovue-370 intravenous contrast. Radiation dose for this scan was  reduced using automated exposure control, adjustment of the mA and/or  kV according to patient size, or iterative reconstruction technique.    FINDINGS:   Chest: A 4 mm nodule in the right upper lobe (image 10) is unchanged.  A 0.7 cm nodule in the left upper lobe (image 17) has slightly  increased in size compared with the previous examination, when it  measured 0.5 cm. A 0.6 cm left lower lobe nodule (image 23) is stable.  A 0.3 cm left lower lobe nodule (image 37) was not seen previously.    Subcutaneous port in the right anterior chest wall. Catheter tip is in  the SVC. No axillary, hilar, or mediastinal lymphadenopathy. No  pleural effusion.    Abdomen and pelvis: Innumerable hepatic metastases. Some of the  lesions have decreased in size. As an example, a lesion in the dome of  the liver measures 2.8 x 2.8 cm compared with 3.8 x 4.0 cm previously.  There is a subtle 2.1 cm hypoechoic lesion in the spleen, not seen  previously. The pancreas, adrenal glands, and kidneys are  unremarkable. There is wall thickening of the cecum likely  corresponding to the primary colonic malignancy. No evidence of bowel  obstruction. There is mesenteric lymphadenopathy. A conglomeration of  lymph nodes in the right lower quadrant adjacent to the cecum measures  1.9 x 1.8 cm, compared with 1.9 x 2.3 cm previously. No ascites or  fluid collections.    There is chronic-appearing deformity of the proximal left femur. No  suspicious bone lesions are identified.      Impression    IMPRESSION:   1. Slight interval increase in size of left upper lobe pulmonary  nodule. There is also a new nodule identified in the left lower lobe.  Additional pulmonary metastases are stable.  2. Extensive hepatic metastases. Some of the hepatic lesions have  decreased in size.  3. Interval decrease in mesenteric lymphadenopathy.    LARS ALMENDAREZ MD       Assessment and  plan:    (C18.9,  C78.7) Metastatic colon cancer to liver (H)  I reviewed with the patient delayed imaging studies with the CT scan. The patient appears to respond well to treatment. We will continue on the current regimen. Because of neurotoxicity I will reduce the dose of oxaliplatin to 65 mg/m . I will see the patient again in one month or sooner if there are new developments or concerns.    (D64.9) Anemia, unspecified type  We will continue to monitor hemoglobin and arrange for blood cell transfusion if patient is symptomatic.    (I50.22) Chronic systolic congestive heart failure (H)  Patient currently on carvedilol 12.5 mg twice daily.     (I10) Benign essential hypertension  Patient currently on Cozaar 25 mg orally daily.    (E87.6) Hypokalemia  Patient will continue on potassium supplements. She will be receiving IV potassium according to the protocol.    (R11.2,  T45.1X5A) Chemotherapy induced nausea and vomiting  Nausea has been well controlled with current regimen.    (K52.1,  T45.1X5A) Chemotherapy induced diarrhea  Patient currently on Imodium with good management of her diarrhea.    (G89.3) Cancer associated pain  Patient pain is currently well controlled on the current regimen.    The patient is ready to learn, no apparent learning barriers were identified.  Diagnosis and treatment plans were explained to the patient. The patient expressed understanding of the content. The patient asked appropriate questions. The patient questions were answered to her satisfaction.    Chart documentation with Dragon Voice recognition Software. Although reviewed after completion, some words and grammatical errors may remain.    Again, thank you for allowing me to participate in the care of your patient.        Sincerely,        Duy Dove MD

## 2017-12-12 NOTE — PATIENT INSTRUCTIONS
Pt to return on 12/21/17 for labs/poss transfusion. Copies of medication list and upcoming appointments given prior to discharge.

## 2017-12-12 NOTE — PROGRESS NOTES
Hematology/ Oncology Follow-up Visit:  Dec 12, 2017    Reason for Visit:   Chief Complaint   Patient presents with     Oncology Clinic Visit     1 month follow up for Metastatic colon cancer to liver with liver metastasis      Chemotherapy     C4D1 today with labs prior     Results     CT CAP 12/7/2017       Oncologic History:  Metastatic colon cancer to liver (H)  Charlene Douglas presented with 2 months history of cough, shortness of breath, decreased appetite and abdominal pain. Patient has black stool and occult blood which was positive. Subsequently the patient went on to have a CT angiogram of the chest because of shortness of breath. Lungs were clear. Multiple liver lesions were seen. Subsequently the patient went on to have an ultrasound of the liver done on August 18, 2017 which confirmed the presence of multiple liver metastases. Abdominal CT was done on August 18, 2017 showing large ascending colon mass with associated mesenteric/peritoneal drop metastasis and lymphadenopathy. There are multiple liver metastasis seen. KRAS mutated. She was started on systemic chemotherapy with FOLFOX with Avastin. However had severe cardiogenic toxicity from 5-FU and ended up in the hospital with acute respiratory failure requiring intubation. Initial echo showed LVEF and 10-15% which did improve in 3 days time to 35- 40%.  Subsequently, chemotherapy was switched to single agent irinotecan.      Interval History:  Patient is here today for follow-up and to proceed with chemotherapy according to the treatment plan. Abdominal pain has improved. Appetite has improved. There is no shortness of breath or cough or wheezing. Patient has intermittent diarrhea that responded well to Imodium. She denies any fever or chills.    Review Of Systems:  Constitutional: Negative for fever, chills, and night sweats. Fatigue  Skin: negative.  Eyes: negative.  Ears/Nose/Throat: negative.  Respiratory: No shortness of breath, dyspnea on  exertion, cough, or hemoptysis.  Cardiovascular: negative.  Gastrointestinal: Occasional nausea and diarrhea as mentioned above  Genitourinary: negative.  Musculoskeletal: negative.  Neurologic: negative.  Psychiatric: negative.  Hematologic/Lymphatic/Immunologic: negative.  Endocrine: negative.    All other ROS negative unless mentioned in interval history.    Past medical, social, surgical, and family histories reviewed.    Allergies:  Allergies as of 12/12/2017 - Miguel as Reviewed 12/12/2017   Allergen Reaction Noted     Lisinopril Cough 09/19/2017     No known allergies  08/11/2003       Current Medications:  Current Outpatient Prescriptions   Medication Sig Dispense Refill     loperamide (IMODIUM) 2 MG capsule 2 caps at 1st sign of diarrhea & 1 cap every 2hrs until 12hrs diarrhea free. During night, 2 caps at bedtime & 2 caps every 4hrs until AM 30 capsule 0     oxyCODONE IR (ROXICODONE) 5 MG tablet Take 1 tablet (5 mg) by mouth every 8 hours as needed for pain 20 tablet 0     carvedilol (COREG) 12.5 MG tablet Take 1.5 tablets (18.75 mg) by mouth 2 times daily (with meals) 90 tablet 1     benzonatate (TESSALON PERLES) 100 MG capsule Take 2 capsules (200 mg) by mouth 3 times daily as needed for cough 42 capsule 1     ranitidine (ZANTAC) 300 MG tablet Take 1 tablet (300 mg) by mouth At Bedtime 30 tablet 1     dexamethasone (DECADRON) 4 MG tablet Take 2 tablets (8 mg) by mouth daily (with breakfast) Days 2, 3, and 4 of each cycle. 6 tablet 11     lidocaine (LIDODERM) 5 % Patch Place 1-3 patches onto the skin every 24 hours To RUQ 30 patch 1     guaiFENesin-codeine (ROBITUSSIN AC) 100-10 MG/5ML SOLN solution Take 5 mLs by mouth every 4 hours as needed for cough 120 mL 0     LORazepam (ATIVAN) 0.5 MG tablet Take 1 tablet (0.5 mg) by mouth every 4 hours as needed (Anxiety, Nausea/Vomiting or Sleep) 30 tablet 5     prochlorperazine (COMPAZINE) 10 MG tablet Take 1 tablet (10 mg) by mouth every 6 hours as needed  "(Nausea/Vomiting) 30 tablet 5     losartan (COZAAR) 25 MG tablet Take 1 tablet (25 mg) by mouth daily 30 tablet 11     ferrous sulfate (IRON) 325 (65 FE) MG tablet Take 325 mg by mouth daily (with breakfast)       Potassium Chloride ER 20 MEQ TBCR Take 1 tablet (20 mEq) by mouth daily 30 tablet 0     diclofenac (VOLTAREN) 1 % GEL topical gel Place 2 g onto the skin 4 times daily as needed for moderate pain (for RUQ pain)       sennosides (SENOKOT) 8.6 MG tablet Take 1-2 tablets by mouth 2 times daily as needed for constipation 120 each      ACETAMINOPHEN PO Take 650 mg by mouth every 4 hours as needed for pain or fever           Physical Exam:  /77 (BP Location: Right arm, Patient Position: Chair, Cuff Size: Adult Regular)  Pulse 97  Temp 97.3  F (36.3  C) (Temporal)  Resp 16  Ht 1.689 m (5' 6.5\")  Wt 58 kg (127 lb 14.4 oz)  LMP 06/07/2007  SpO2 100%  BMI 20.33 kg/m2  Wt Readings from Last 12 Encounters:   12/12/17 58 kg (127 lb 14.4 oz)   12/12/17 58 kg (127 lb 14.4 oz)   11/28/17 57.9 kg (127 lb 11.2 oz)   11/14/17 58.6 kg (129 lb 3 oz)   11/14/17 58.6 kg (129 lb 1.6 oz)   11/07/17 60.1 kg (132 lb 6.4 oz)   10/31/17 62.3 kg (137 lb 6.4 oz)   10/26/17 67.3 kg (148 lb 4.8 oz)   10/25/17 65.3 kg (144 lb)   10/25/17 66.2 kg (146 lb)   10/24/17 65.5 kg (144 lb 6.4 oz)   10/24/17 65.5 kg (144 lb 4.8 oz)     ECOG performance status: 1  GENERAL APPEARANCE: Healthy, alert and in no acute distress. Patellar  HEENT: Sclerae anicteric. PERRLA. Oropharynx without ulcers, lesions, or thrush.  NECK: Supple. No asymmetry or masses.  LYMPHATICS: No palpable cervical, supraclavicular, axillary, or inguinal lymphadenopathy.  RESP: Lungs clear to auscultation bilaterally without rales, rhonchi or wheezes.  CARDIOVASCULAR: Regular rate and rhythm. Normal S1, S2; no S3 or S4. No murmur, gallop, or rub.  ABDOMEN: Soft, nontender. Bowel sounds normal. No palpable organomegaly or masses.  MUSCULOSKELETAL: Extremities without " gross deformities noted. No edema of bilateral lower extremities.  SKIN: No suspicious lesions or rashes.  NEURO: Alert and oriented x 3. Cranial nerves II-XII grossly intact.  PSYCHIATRIC: Mentation and affect appear normal.    Laboratory/Imaging Studies:  No visits with results within 2 Week(s) from this visit.  Latest known visit with results is:    Infusion Therapy Visit on 11/28/2017   Component Date Value Ref Range Status     WBC 11/28/2017 6.9  4.0 - 11.0 10e9/L Final     RBC Count 11/28/2017 2.57* 3.8 - 5.2 10e12/L Final     Hemoglobin 11/28/2017 7.3* 11.7 - 15.7 g/dL Final     Hematocrit 11/28/2017 22.7* 35.0 - 47.0 % Final     MCV 11/28/2017 88  78 - 100 fl Final     MCH 11/28/2017 28.4  26.5 - 33.0 pg Final     MCHC 11/28/2017 32.2  31.5 - 36.5 g/dL Final     RDW 11/28/2017 21.0* 10.0 - 15.0 % Final     Platelet Count 11/28/2017 228  150 - 450 10e9/L Final     Diff Method 11/28/2017 Automated Method   Final     % Neutrophils 11/28/2017 58.2  % Final     % Lymphocytes 11/28/2017 26.9  % Final     % Monocytes 11/28/2017 14.2  % Final     % Eosinophils 11/28/2017 0.0  % Final     % Basophils 11/28/2017 0.3  % Final     % Immature Granulocytes 11/28/2017 0.4  % Final     Absolute Neutrophil 11/28/2017 4.0  1.6 - 8.3 10e9/L Final     Absolute Lymphocytes 11/28/2017 1.9  0.8 - 5.3 10e9/L Final     Absolute Monocytes 11/28/2017 1.0  0.0 - 1.3 10e9/L Final     Absolute Eosinophils 11/28/2017 0.0  0.0 - 0.7 10e9/L Final     Absolute Basophils 11/28/2017 0.0  0.0 - 0.2 10e9/L Final     Abs Immature Granulocytes 11/28/2017 0.0  0 - 0.4 10e9/L Final     Sodium 11/28/2017 138  133 - 144 mmol/L Final     Potassium 11/28/2017 3.2* 3.4 - 5.3 mmol/L Final     Chloride 11/28/2017 104  94 - 109 mmol/L Final     Carbon Dioxide 11/28/2017 24  20 - 32 mmol/L Final     Anion Gap 11/28/2017 10  3 - 14 mmol/L Final     Glucose 11/28/2017 124* 70 - 99 mg/dL Final     Urea Nitrogen 11/28/2017 9  7 - 30 mg/dL Final     Creatinine  11/28/2017 0.67  0.52 - 1.04 mg/dL Final     GFR Estimate 11/28/2017 88  >60 mL/min/1.7m2 Final    Non  GFR Calc     GFR Estimate If Black 11/28/2017 >90  >60 mL/min/1.7m2 Final    African American GFR Calc     Calcium 11/28/2017 8.7  8.5 - 10.1 mg/dL Final     Bilirubin Total 11/28/2017 0.4  0.2 - 1.3 mg/dL Final     Albumin 11/28/2017 2.1* 3.4 - 5.0 g/dL Final     Protein Total 11/28/2017 6.3* 6.8 - 8.8 g/dL Final     Alkaline Phosphatase 11/28/2017 423* 40 - 150 U/L Final     ALT 11/28/2017 43  0 - 50 U/L Final     AST 11/28/2017 38  0 - 45 U/L Final     Protein Albumin Urine 11/28/2017 Negative  NEG^Negative mg/dL Final     Units Ordered 11/28/2017 1   Final     ABO 11/28/2017 A   Final     RH(D) 11/28/2017 Pos   Final     Antibody Screen 11/28/2017 Neg   Final     Test Valid Only At 11/28/2017 [facility]      Final     Specimen Expires 11/28/2017 12/01/2017   Final     Crossmatch 11/28/2017 Red Blood Cells   Final     Unit Number 11/28/2017 [id number]   Final     Blood Component Type 11/28/2017 Red Blood Cells Leukocyte Reduced   Final     Division Number 11/28/2017 00   Final     Status of Unit 11/28/2017 Released to care unit   Final     Blood Product Code 11/28/2017 [id number]   Final     Unit Status 11/28/2017 ISS   Final        Recent Results (from the past 744 hour(s))   CT Chest/Abdomen/Pelvis w Contrast    Narrative    CT CHEST, ABDOMEN, AND PELVIS WITH CONTRAST  12/7/2017 9:31 AM     HISTORY: Follow up metastatic colon cancer to liver (H).    COMPARISON: PET/CT 8/25/2017. Abdomen and pelvis CT 10/7/2017.    TECHNIQUE: Volumetric helical acquisition of CT images from the lung  apices through the symphysis pubis after the administration of 65mL  Isovue-370 intravenous contrast. Radiation dose for this scan was  reduced using automated exposure control, adjustment of the mA and/or  kV according to patient size, or iterative reconstruction technique.    FINDINGS:   Chest:  A 4 mm nodule in the right upper lobe (image 10) is unchanged.  A 0.7 cm nodule in the left upper lobe (image 17) has slightly  increased in size compared with the previous examination, when it  measured 0.5 cm. A 0.6 cm left lower lobe nodule (image 23) is stable.  A 0.3 cm left lower lobe nodule (image 37) was not seen previously.    Subcutaneous port in the right anterior chest wall. Catheter tip is in  the SVC. No axillary, hilar, or mediastinal lymphadenopathy. No  pleural effusion.    Abdomen and pelvis: Innumerable hepatic metastases. Some of the  lesions have decreased in size. As an example, a lesion in the dome of  the liver measures 2.8 x 2.8 cm compared with 3.8 x 4.0 cm previously.  There is a subtle 2.1 cm hypoechoic lesion in the spleen, not seen  previously. The pancreas, adrenal glands, and kidneys are  unremarkable. There is wall thickening of the cecum likely  corresponding to the primary colonic malignancy. No evidence of bowel  obstruction. There is mesenteric lymphadenopathy. A conglomeration of  lymph nodes in the right lower quadrant adjacent to the cecum measures  1.9 x 1.8 cm, compared with 1.9 x 2.3 cm previously. No ascites or  fluid collections.    There is chronic-appearing deformity of the proximal left femur. No  suspicious bone lesions are identified.      Impression    IMPRESSION:   1. Slight interval increase in size of left upper lobe pulmonary  nodule. There is also a new nodule identified in the left lower lobe.  Additional pulmonary metastases are stable.  2. Extensive hepatic metastases. Some of the hepatic lesions have  decreased in size.  3. Interval decrease in mesenteric lymphadenopathy.    LARS ALMENDAREZ MD       Assessment and plan:    (C18.9,  C78.7) Metastatic colon cancer to liver (H)  I reviewed with the patient delayed imaging studies with the CT scan. The patient appears to respond well to treatment. We will continue on the current regimen. Because of neurotoxicity  I will reduce the dose of oxaliplatin to 65 mg/m . I will see the patient again in one month or sooner if there are new developments or concerns.    (D64.9) Anemia, unspecified type  We will continue to monitor hemoglobin and arrange for blood cell transfusion if patient is symptomatic.    (I50.22) Chronic systolic congestive heart failure (H)  Patient currently on carvedilol 12.5 mg twice daily.     (I10) Benign essential hypertension  Patient currently on Cozaar 25 mg orally daily.    (E87.6) Hypokalemia  Patient will continue on potassium supplements. She will be receiving IV potassium according to the protocol.    (R11.2,  T45.1X5A) Chemotherapy induced nausea and vomiting  Nausea has been well controlled with current regimen.    (K52.1,  T45.1X5A) Chemotherapy induced diarrhea  Patient currently on Imodium with good management of her diarrhea.    (G89.3) Cancer associated pain  Patient pain is currently well controlled on the current regimen.    The patient is ready to learn, no apparent learning barriers were identified.  Diagnosis and treatment plans were explained to the patient. The patient expressed understanding of the content. The patient asked appropriate questions. The patient questions were answered to her satisfaction.    Chart documentation with Dragon Voice recognition Software. Although reviewed after completion, some words and grammatical errors may remain.

## 2017-12-12 NOTE — MR AVS SNAPSHOT
After Visit Summary   12/12/2017    Charlene Douglas    MRN: [id number]           Patient Information     Date Of Birth          1950        Visit Information        Provider Department      12/12/2017 8:30 AM Duy Dove MD [facility]        Today's Diagnoses     Liver metastasis (H)    -  1    Metastatic colon cancer to liver (H)          Care Instructions      Please follow up with Dr. Dove in 4 months.  Please schedule labs  prior to follow up appointment.    Lab Date/Time:    Follow Up Date/Time:     If you have any questions or concerns please feel free to call.    Angie Rosario RN  [facility]  [phone]                Follow-ups after your visit        Follow-up notes from your care team     Return in about 4 weeks (around 1/9/2018) for Schedule for chemotherapy as per treatment plan.      Your next 10 appointments already scheduled     Jan 12, 2018 10:00 AM CST   Ech Limited with [id number]   [facility] ([facility])    [address] [place] [address]  [place] [address] [phone]           1.  Please bring or wear a comfortable two-piece outfit. 2.  You may eat, drink and take your normal medicines. 3.  For any questions that cannot be answered, please contact the ordering physician            Jan 18, 2018  9:30 AM CST   Return Visit with Rey Koehler MD   [facility] ([facility])    [address] [place] [id number] [phone]              Future tests that were ordered for you today     Open Standing Orders        Priority Remaining Interval Expires Ordered    CBC with platelets differential Routine 12/12 week 12/12/2018 12/12/2017            Who to contact     If you have questions or need follow up information about today's clinic visit or your schedule please contact [facility] directly at [phone].  Normal or non-critical lab  "and imaging results will be communicated to you by [employer], letter or phone within 4 business days after the clinic has received the results. If you do not hear from us within 7 days, please contact the clinic through [employer] or phone. If you have a critical or abnormal lab result, we will notify you by phone as soon as possible.  Submit refill requests through [employer] or call your pharmacy and they will forward the refill request to us. Please allow 3 business days for your refill to be completed.          Additional Information About Your Visit        [employer] Information     [employer] gives you secure access to your electronic health record. If you see a primary care provider, you can also send messages to your care team and make appointments. If you have questions, please call your primary care clinic.  If you do not have a primary care provider, please call [phone] and they will assist you.        Care EveryWhere ID     This is your Care EveryWhere ID. This could be used by other organizations to access your [facility] medical records  [id number]        Your Vitals Were     Pulse Temperature Respirations Height Last Period Pulse Oximetry    97 97.3  F (36.3  C) (Temporal) 16 1.689 m (5' 6.5\") 06/07/2007 100%    BMI (Body Mass Index)                   20.33 kg/m2            Blood Pressure from Last 3 Encounters:   12/12/17 151/77   11/28/17 146/72   11/14/17 140/61    Weight from Last 3 Encounters:   12/12/17 58 kg (127 lb 14.4 oz)   11/28/17 57.9 kg (127 lb 11.2 oz)   11/14/17 58.6 kg (129 lb 3 oz)                 Today's Medication Changes          These changes are accurate as of: 12/12/17  9:13 AM.  If you have any questions, ask your nurse or doctor.               These medicines have changed or have updated prescriptions.        Dose/Directions    oxyCODONE IR 5 MG tablet   Commonly known as:  ROXICODONE   This may have changed:  reasons to take this   Used for:  Metastatic colon cancer to liver (H)     "    Dose:  5 mg   Take 1 tablet (5 mg) by mouth every 8 hours as needed for pain   Quantity:  20 tablet   Refills:  0            Where to get your medicines      These medications were sent to [facility] - [address]   [address] [place] [address]     Phone:  [phone]     loperamide 2 MG capsule         Some of these will need a paper prescription and others can be bought over the counter.  Ask your nurse if you have questions.     Bring a paper prescription for each of these medications     oxyCODONE IR 5 MG tablet                Primary Care Provider Office Phone # Fax #    Donna Shirley PA-C [phone] [address] [place] [address]        Equal Access to Services     DENIZ PERALTA : Hadii nat smith hadasho Soomaali, waaxda luqadaha, qaybta kaalmada adeegyada, waxay rerein handy rowe . So [facility] [phone].    ATENCIÓN: Si habla español, tiene a hunt disposición servicios gratuitos de asistencia lingüística. [facility] [phone].    We comply with applicable federal civil rights laws and [place] laws. We do not discriminate on the basis of race, color, national origin, age, disability, sex, sexual orientation, or gender identity.            Thank you!     Thank you for choosing [facility]  for your care. Our goal is always to provide you with excellent care. Hearing back from our patients is one way we can continue to improve our services. Please take a few minutes to complete the written survey that you may receive in the mail after your visit with us. Thank you!             Your Updated Medication List - Protect others around you: Learn how to safely use, store and throw away your medicines at www.disposemymeds.org.          This list is accurate as of: 12/12/17  9:13 AM.  Always use your most recent med list.                   Brand Name Dispense Instructions for use Diagnosis    ACETAMINOPHEN PO       Take 650 mg by mouth every 4 hours as needed for pain or fever        benzonatate 100 MG capsule    TESSALON PERLES    42 capsule    Take 2 capsules (200 mg) by mouth 3 times daily as needed for cough    Metastatic colon cancer to liver (H)       carvedilol 12.5 MG tablet    COREG    90 tablet    Take 1.5 tablets (18.75 mg) by mouth 2 times daily (with meals)    Chronic systolic congestive heart failure (H)       dexamethasone 4 MG tablet    DECADRON    6 tablet    Take 2 tablets (8 mg) by mouth daily (with breakfast) Days 2, 3, and 4 of each cycle.    Metastatic colon cancer to liver (H)       diclofenac 1 % Gel topical gel    VOLTAREN     Place 2 g onto the skin 4 times daily as needed for moderate pain (for RUQ pain)    Metastatic colon cancer to liver (H)       ferrous sulfate 325 (65 FE) MG tablet    IRON     Take 325 mg by mouth daily (with breakfast)        guaiFENesin-codeine 100-10 MG/5ML Soln solution    ROBITUSSIN AC    120 mL    Take 5 mLs by mouth every 4 hours as needed for cough    Liver metastasis (H)       lidocaine 5 % Patch    LIDODERM    30 patch    Place 1-3 patches onto the skin every 24 hours To RUQ    Metastatic colon cancer to liver (H)       loperamide 2 MG capsule    IMODIUM    30 capsule    2 caps at 1st sign of diarrhea & 1 cap every 2hrs until 12hrs diarrhea free. During night, 2 caps at bedtime & 2 caps every 4hrs until AM    Metastatic colon cancer to liver (H)       LORazepam 0.5 MG tablet    ATIVAN    30 tablet    Take 1 tablet (0.5 mg) by mouth every 4 hours as needed (Anxiety, Nausea/Vomiting or Sleep)    Metastatic colon cancer to liver (H)       losartan 25 MG tablet    COZAAR    30 tablet    Take 1 tablet (25 mg) by mouth daily    Secondary cardiomyopathy (H)       oxyCODONE IR 5 MG tablet    ROXICODONE    20 tablet    Take 1 tablet (5 mg) by mouth every 8 hours as needed for pain    Metastatic colon cancer to liver (H)       Potassium Chloride ER 20 MEQ Tbcr     30 tablet     Take 1 tablet (20 mEq) by mouth daily    Hypokalemia       prochlorperazine 10 MG tablet    COMPAZINE    30 tablet    Take 1 tablet (10 mg) by mouth every 6 hours as needed (Nausea/Vomiting)    Metastatic colon cancer to liver (H)       ranitidine 300 MG tablet    ZANTAC    30 tablet    Take 1 tablet (300 mg) by mouth At Bedtime    Nausea and vomiting, intractability of vomiting not specified, unspecified vomiting type       sennosides 8.6 MG tablet    SENOKOT    120 each    Take 1-2 tablets by mouth 2 times daily as needed for constipation    Slow transit constipation

## 2017-12-12 NOTE — NURSING NOTE
"Oncology Rooming Note    December 12, 2017 8:40 AM   Charlene SHAYY Douglas is a 67 year old female who presents for:    Chief Complaint   Patient presents with     Oncology Clinic Visit     1 month follow up for Metastatic colon cancer to liver with liver metastasis      Chemotherapy     C4D1 today with labs prior     Results     CT CAP 12/7/2017     Initial Vitals: /77 (BP Location: Right arm, Patient Position: Chair, Cuff Size: Adult Regular)  Pulse 97  Temp 97.3  F (36.3  C) (Temporal)  Resp 16  Ht 1.689 m (5' 6.5\")  Wt 58 kg (127 lb 14.4 oz)  LMP 06/07/2007  SpO2 100%  BMI 20.33 kg/m2 Estimated body mass index is 20.33 kg/(m^2) as calculated from the following:    Height as of this encounter: 1.689 m (5' 6.5\").    Weight as of this encounter: 58 kg (127 lb 14.4 oz). Body surface area is 1.65 meters squared.  No Pain (0) Comment: Data Unavailable   Patient's last menstrual period was 06/07/2007.  Allergies reviewed: Yes  Medications reviewed: Yes    Medications: Medication refills not needed today.  Pharmacy name entered into [facility]:    [facility] - [id number] [place] [facility]    Clinical concerns: SOB. Dr. Dove was notified.    Elsa Butts MA              "

## 2017-12-12 NOTE — PATIENT INSTRUCTIONS
Please follow up with Dr. Dove in 4 weeks.  Please schedule labs  prior to follow up appointment.    Lab Date/Time:    Follow Up Date/Time:     If you have any questions or concerns please feel free to call.    Angie Rosario RN  [facility]  [phone]

## 2017-12-12 NOTE — PROGRESS NOTES
Patient here for C4 D1 Avastins/Irinotecan/Oxaliplatin chemotherapy today. Labs/BSA/Dose verified by 2 RN'S. Hgb-7.9, ANC-1.3 and Plt-226.  Premeds given and tolerated chemotherapy well.  K+ 2.6, Dr Dove informed, ordered potassium protocol but only 40 alix's IV and 20 meq's PO. Lasix 20 mg IV ordered for after treatment for extra fluid. Patient does complain of a headache half way thru treatment, will rest and monitor. Positive blood return pre/post infusion. At end of treatment after esting, headache better. Still having tingling sensation from Oxaliplatin but states may be a little better this round since decrease in dose. Discharged home in stable condition with .

## 2017-12-19 NOTE — TELEPHONE ENCOUNTER
Pt request a refill for Potassium Chloride ER 20 MEQ TBCR from a fax from [facility]  Last fill 9/29/17, quantity 30, refills 0.      Rosanna Jack, CMA

## 2017-12-21 NOTE — PATIENT INSTRUCTIONS
Pt to return on 12/27/17 for port labs/chemo. Copies of medication list and upcoming appointments given prior to discharge.

## 2017-12-21 NOTE — PROGRESS NOTES
Patients Hgb-7.9. C/O JUNG , weak and lightheaded. DR. Dove called and order received for blood transfusion.  Tolerated 1 unit of blood. Lung sounds clear pre/post  unit of blood.  Discharged in stable condition via w/c with .

## 2017-12-21 NOTE — MR AVS SNAPSHOT
After Visit Summary   12/21/2017    Charlene Douglas    MRN: [id number]           Patient Information     Date Of Birth          1950        Visit Information        Provider Department      12/21/2017 11:00 AM NL INFUSION CHAIR 3 [facility]        Today's Diagnoses     Neutropenic fever (H)    -  1    Metastatic colon cancer to liver (H)        Anemia, unspecified type          Care Instructions    Pt to return on 12/27/17 for port labs/chemo. Copies of medication list and upcoming appointments given prior to discharge.           Follow-ups after your visit        Your next 10 appointments already scheduled     Dec 27, 2017  9:00 AM CST   Level 7 with NL INFUSION CHAIR 3   [facility] ([facility])    [address] [place] [address] [place] [address]   [phone]            Jan 09, 2018  8:30 AM CST   Return Visit with Ronny Mosquera MD   [facility] ([facility])    [address] [place] [address]   [phone]            Jan 09, 2018  9:00 AM CST   Level 7 with NL INFUSION CHAIR 4   [facility] ([facility])    [facility] [address] [place] [address]   [phone]            Jan 12, 2018 10:00 AM CST   Ech Limited with PHECHR1   [facility] ([facility])    [facility] [address] [place] [address]   [phone]           1.  Please bring or wear a comfortable two-piece outfit. 2.  You may eat, drink and take your normal medicines. 3.  For any questions that cannot be answered, please contact the ordering physician            Jan 18, 2018  9:30 AM CST   Return Visit with Rey Koehler MD   [facility] ([facility])    [address] [facility] [address]   [phone]              Who to contact     If you have questions or need follow up  information about today's clinic visit or your schedule please contact [facility] directly at [phone].  Normal or non-critical lab and imaging results will be communicated to you by [employer], letter or phone within 4 business days after the clinic has received the results. If you do not hear from us within 7 days, please contact the clinic through [employer] or phone. If you have a critical or abnormal lab result, we will notify you by phone as soon as possible.  Submit refill requests through [employer] or call your pharmacy and they will forward the refill request to us. Please allow 3 business days for your refill to be completed.          Additional Information About Your Visit        [employer] Information     [employer] gives you secure access to your electronic health record. If you see a primary care provider, you can also send messages to your care team and make appointments. If you have questions, please call your primary care clinic.  If you do not have a primary care provider, please call [phone] and they will assist you.        Care EveryWhere ID     This is your Care EveryWhere ID. This could be used by other organizations to access your [facility] medical records  [id number]        Your Vitals Were     Pulse Temperature Respirations Last Period Pulse Oximetry       94 97.3  F (36.3  C) 18 06/07/2007 99%        Blood Pressure from Last 3 Encounters:   12/21/17 160/76   12/12/17 158/80   12/12/17 151/77    Weight from Last 3 Encounters:   12/12/17 58 kg (127 lb 14.4 oz)   12/12/17 58 kg (127 lb 14.4 oz)   11/28/17 57.9 kg (127 lb 11.2 oz)              We Performed the Following     ABO/Rh type and screen     Blood component     CBC with platelets differential     Transfuse red blood cell unit          Today's Medication Changes          These changes are accurate as of: 12/21/17  4:04 PM.  If you have any questions, ask your nurse or doctor.               These medicines have  changed or have updated prescriptions.        Dose/Directions    Potassium Chloride ER 20 MEQ Tbcr   This may have changed:  how much to take   Used for:  Hypokalemia   Changed by:  Stan Balderas, RN        Dose:  40 mEq   Take 2 tablets (40 mEq) by mouth daily   Quantity:  30 tablet   Refills:  0            Where to get your medicines      These medications were sent to [facility] - [address]   [address] [place] [address]     Phone:  [phone]     Potassium Chloride ER 20 MEQ Tbcr                Primary Care Provider Office Phone # Fax #    Donna Shirley PA-C [phone] [address] [place] [address]        Equal Access to Services     [facility]: Hadii aad ku hadasho Soomaali, waaxda luqadaha, qaybta kaalmada adeegyada, waxay rerein hayfred rowe . So [facility] [phone].    ATENCIÓN: Si habla español, tiene a hunt disposición servicios gratuitos de asistencia lingüística. [facility] [phone].    We comply with applicable federal civil rights laws and [place] laws. We do not discriminate on the basis of race, color, national origin, age, disability, sex, sexual orientation, or gender identity.            Thank you!     Thank you for choosing [facility]  for your care. Our goal is always to provide you with excellent care. Hearing back from our patients is one way we can continue to improve our services. Please take a few minutes to complete the written survey that you may receive in the mail after your visit with us. Thank you!             Your Updated Medication List - Protect others around you: Learn how to safely use, store and throw away your medicines at www.disposemymeds.org.          This list is accurate as of: 12/21/17  4:04 PM.  Always use your most recent med list.                   Brand Name Dispense Instructions for use Diagnosis    ACETAMINOPHEN PO      Take 650 mg by  mouth every 4 hours as needed for pain or fever        benzonatate 100 MG capsule    TESSALON PERLES    42 capsule    Take 2 capsules (200 mg) by mouth 3 times daily as needed for cough    Metastatic colon cancer to liver (H)       carvedilol 12.5 MG tablet    COREG    90 tablet    Take 1.5 tablets (18.75 mg) by mouth 2 times daily (with meals)    Chronic systolic congestive heart failure (H)       dexamethasone 4 MG tablet    DECADRON    6 tablet    Take 2 tablets (8 mg) by mouth daily (with breakfast) Days 2, 3, and 4 of each cycle.    Metastatic colon cancer to liver (H)       diclofenac 1 % Gel topical gel    VOLTAREN     Place 2 g onto the skin 4 times daily as needed for moderate pain (for RUQ pain)    Metastatic colon cancer to liver (H)       ferrous sulfate 325 (65 FE) MG tablet    IRON     Take 325 mg by mouth daily (with breakfast)        guaiFENesin-codeine 100-10 MG/5ML Soln solution    ROBITUSSIN AC    120 mL    Take 5 mLs by mouth every 4 hours as needed for cough    Liver metastasis (H)       lidocaine 5 % Patch    LIDODERM    30 patch    Place 1-3 patches onto the skin every 24 hours To RUQ    Metastatic colon cancer to liver (H)       loperamide 2 MG capsule    IMODIUM    30 capsule    2 caps at 1st sign of diarrhea & 1 cap every 2hrs until 12hrs diarrhea free. During night, 2 caps at bedtime & 2 caps every 4hrs until AM    Metastatic colon cancer to liver (H)       LORazepam 0.5 MG tablet    ATIVAN    30 tablet    Take 1 tablet (0.5 mg) by mouth every 4 hours as needed (Anxiety, Nausea/Vomiting or Sleep)    Metastatic colon cancer to liver (H)       losartan 25 MG tablet    COZAAR    30 tablet    Take 1 tablet (25 mg) by mouth daily    Secondary cardiomyopathy (H)       oxyCODONE IR 5 MG tablet    ROXICODONE    20 tablet    Take 1 tablet (5 mg) by mouth every 8 hours as needed for pain    Metastatic colon cancer to liver (H)       Potassium Chloride ER 20 MEQ Tbcr     30 tablet    Take 2 tablets  (40 mEq) by mouth daily    Hypokalemia       prochlorperazine 10 MG tablet    COMPAZINE    30 tablet    Take 1 tablet (10 mg) by mouth every 6 hours as needed (Nausea/Vomiting)    Metastatic colon cancer to liver (H)       ranitidine 300 MG tablet    ZANTAC    30 tablet    Take 1 tablet (300 mg) by mouth At Bedtime    Nausea and vomiting, intractability of vomiting not specified, unspecified vomiting type       sennosides 8.6 MG tablet    SENOKOT    120 each    Take 1-2 tablets by mouth 2 times daily as needed for constipation    Slow transit constipation

## 2017-12-27 NOTE — MR AVS SNAPSHOT
After Visit Summary   12/27/2017    Charlene Douglas    MRN: [id number]           Patient Information     Date Of Birth          1950        Visit Information        Provider Department      12/27/2017 9:00 AM NL INFUSION CHAIR 3 [facility]        Today's Diagnoses     Neutropenic fever (H)    -  1    Metastatic colon cancer to liver (H)          Care Instructions    Pt to return on 1/9/18 for labs/chemo. Copies of medication list and upcoming appointments given prior to discharge.             Follow-ups after your visit        Your next 10 appointments already scheduled     Jan 09, 2018  8:30 AM CST   Return Visit with Ronny Mosquera MD   [facility] ([facility])    [address] [place] [address] [phone]            Jan 09, 2018  9:00 AM CST   Level 7 with NL INFUSION CHAIR 4   [facility] ([facility])    [address] [place] [address] [place] [address] [phone]            Jan 12, 2018 10:00 AM CST   Ech Limited with PHECHR1   [facility] ([facility])    [address] [place] [address] [place] [address] [phone]           1.  Please bring or wear a comfortable two-piece outfit. 2.  You may eat, drink and take your normal medicines. 3.  For any questions that cannot be answered, please contact the ordering physician            Jan 18, 2018  9:30 AM CST   Return Visit with Rey Koehler MD   [facility] ([facility])    [address] [place] [id number] [phone]              Who to contact     If you have questions or need follow up information about today's clinic visit or your schedule please contact [facility] directly at [phone].  Normal or non-critical lab and imaging results will be communicated to you by MyChart, letter or phone within 4  business days after the clinic has received the results. If you do not hear from us within 7 days, please contact the clinic through [employer] or phone. If you have a critical or abnormal lab result, we will notify you by phone as soon as possible.  Submit refill requests through [employer] or call your pharmacy and they will forward the refill request to us. Please allow 3 business days for your refill to be completed.          Additional Information About Your Visit        [employer] Information     [employer] gives you secure access to your electronic health record. If you see a primary care provider, you can also send messages to your care team and make appointments. If you have questions, please call your primary care clinic.  If you do not have a primary care provider, please call [phone] and they will assist you.        Care EveryWhere ID     This is your Care EveryWhere ID. This could be used by other organizations to access your [place] [facility] medical records  [id number]        Your Vitals Were     Pulse Temperature Respirations Last Period          86 98.7  F (37.1  C) (Temporal) 18 06/07/2007         Blood Pressure from Last 3 Encounters:   12/27/17 169/81   12/21/17 160/76   12/12/17 158/80    Weight from Last 3 Encounters:   12/12/17 58 kg (127 lb 14.4 oz)   12/12/17 58 kg (127 lb 14.4 oz)   11/28/17 57.9 kg (127 lb 11.2 oz)              We Performed the Following     CBC with platelets differential     Comprehensive metabolic panel     Protein qualitative urine        Primary Care Provider Office Phone # Fax #    Donna Shirley PA-C [phone] [address] [place] [address]        Equal Access to Services     [facility]: Hadii aad ku hadasho Soomaali, waaxda luqadaha, qaybta kaalmada adeeglucille, debra reese. So [facility] [phone].    ATENCIÓN: Si habla español, tiene a hunt disposición servicios gratuitos de asistencia lingüística. Llame al  [phone].    We comply with applicable federal civil rights laws and [place] laws. We do not discriminate on the basis of race, color, national origin, age, disability, sex, sexual orientation, or gender identity.            Thank you!     Thank you for choosing [facility]  for your care. Our goal is always to provide you with excellent care. Hearing back from our patients is one way we can continue to improve our services. Please take a few minutes to complete the written survey that you may receive in the mail after your visit with us. Thank you!             Your Updated Medication List - Protect others around you: Learn how to safely use, store and throw away your medicines at www.disposemymeds.org.          This list is accurate as of: 12/27/17 11:59 PM.  Always use your most recent med list.                   Brand Name Dispense Instructions for use Diagnosis    ACETAMINOPHEN PO      Take 650 mg by mouth every 4 hours as needed for pain or fever        benzonatate 100 MG capsule    TESSALON PERLES    42 capsule    Take 2 capsules (200 mg) by mouth 3 times daily as needed for cough    Metastatic colon cancer to liver (H)       carvedilol 12.5 MG tablet    COREG    90 tablet    Take 1.5 tablets (18.75 mg) by mouth 2 times daily (with meals)    Chronic systolic congestive heart failure (H)       dexamethasone 4 MG tablet    DECADRON    6 tablet    Take 2 tablets (8 mg) by mouth daily (with breakfast) Days 2, 3, and 4 of each cycle.    Metastatic colon cancer to liver (H)       diclofenac 1 % Gel topical gel    VOLTAREN     Place 2 g onto the skin 4 times daily as needed for moderate pain (for RUQ pain)    Metastatic colon cancer to liver (H)       ferrous sulfate 325 (65 FE) MG tablet    IRON     Take 325 mg by mouth daily (with breakfast)        guaiFENesin-codeine 100-10 MG/5ML Soln solution    ROBITUSSIN AC    120 mL    Take 5 mLs by mouth every 4 hours as needed for cough     Liver metastasis (H)       lidocaine 5 % Patch    LIDODERM    30 patch    Place 1-3 patches onto the skin every 24 hours To RUQ    Metastatic colon cancer to liver (H)       loperamide 2 MG capsule    IMODIUM    30 capsule    2 caps at 1st sign of diarrhea & 1 cap every 2hrs until 12hrs diarrhea free. During night, 2 caps at bedtime & 2 caps every 4hrs until AM    Metastatic colon cancer to liver (H)       LORazepam 0.5 MG tablet    ATIVAN    30 tablet    Take 1 tablet (0.5 mg) by mouth every 4 hours as needed (Anxiety, Nausea/Vomiting or Sleep)    Metastatic colon cancer to liver (H)       losartan 25 MG tablet    COZAAR    30 tablet    Take 1 tablet (25 mg) by mouth daily    Secondary cardiomyopathy (H)       oxyCODONE IR 5 MG tablet    ROXICODONE    20 tablet    Take 1 tablet (5 mg) by mouth every 8 hours as needed for pain    Metastatic colon cancer to liver (H)       Potassium Chloride ER 20 MEQ Tbcr     30 tablet    Take 2 tablets (40 mEq) by mouth daily    Hypokalemia       prochlorperazine 10 MG tablet    COMPAZINE    30 tablet    Take 1 tablet (10 mg) by mouth every 6 hours as needed (Nausea/Vomiting)    Metastatic colon cancer to liver (H)       ranitidine 300 MG tablet    ZANTAC    30 tablet    Take 1 tablet (300 mg) by mouth At Bedtime    Nausea and vomiting, intractability of vomiting not specified, unspecified vomiting type       sennosides 8.6 MG tablet    SENOKOT    120 each    Take 1-2 tablets by mouth 2 times daily as needed for constipation    Slow transit constipation

## 2017-12-27 NOTE — PROGRESS NOTES
Patient tolerated Oxaliplatin infusion. Up to bathroom several times. Positive blood return in port then flushed per protocol. Discharged via w/c with . Patient states legs and hands starting to feel tingly already.

## 2017-12-27 NOTE — PATIENT INSTRUCTIONS
Pt to return on 1/9/18 for labs/chemo. Copies of medication list and upcoming appointments given prior to discharge.

## 2017-12-27 NOTE — PROGRESS NOTES
When ordering the chemo meds to be released,noted warning that stated patient dropped below 10% threshold. Note to provider Derrell who ok'd to continue with current dosing of chemo meds today. Port-a-cath accessed, blood return noted pre and post infusion, labs sent, premeds and chemo tolerated well. States she does get tingly hands with the last chemo medication in the regimen given, but that the tingling has lessened from a week to a few days post infusion.  BSA and chemo meds/dosing/labs verified by 2 RNs.

## 2018-01-01 ENCOUNTER — TELEPHONE (OUTPATIENT)
Dept: FAMILY MEDICINE | Facility: OTHER | Age: 68
End: 2018-01-01

## 2018-01-01 ENCOUNTER — INFUSION THERAPY VISIT (OUTPATIENT)
Dept: INFUSION THERAPY | Facility: CLINIC | Age: 68
End: 2018-01-01
Attending: INTERNAL MEDICINE
Payer: COMMERCIAL

## 2018-01-01 ENCOUNTER — TELEPHONE (OUTPATIENT)
Dept: ONCOLOGY | Facility: CLINIC | Age: 68
End: 2018-01-01

## 2018-01-01 ENCOUNTER — DOCUMENTATION ONLY (OUTPATIENT)
Dept: CARE COORDINATION | Facility: CLINIC | Age: 68
End: 2018-01-01

## 2018-01-01 ENCOUNTER — MEDICAL CORRESPONDENCE (OUTPATIENT)
Dept: HEALTH INFORMATION MANAGEMENT | Facility: CLINIC | Age: 68
End: 2018-01-01

## 2018-01-01 ENCOUNTER — ONCOLOGY VISIT (OUTPATIENT)
Dept: ONCOLOGY | Facility: CLINIC | Age: 68
End: 2018-01-01
Payer: COMMERCIAL

## 2018-01-01 ENCOUNTER — DOCUMENTATION ONLY (OUTPATIENT)
Dept: OTHER | Facility: CLINIC | Age: 68
End: 2018-01-01

## 2018-01-01 ENCOUNTER — HOSPITAL ENCOUNTER (OUTPATIENT)
Dept: CT IMAGING | Facility: CLINIC | Age: 68
Discharge: HOME | End: 2018-02-16
Attending: INTERNAL MEDICINE | Admitting: INTERNAL MEDICINE
Payer: COMMERCIAL

## 2018-01-01 ENCOUNTER — INFUSION THERAPY VISIT (OUTPATIENT)
Dept: INFUSION THERAPY | Facility: CLINIC | Age: 68
End: 2018-01-01
Attending: PHYSICIAN ASSISTANT
Payer: COMMERCIAL

## 2018-01-01 ENCOUNTER — OFFICE VISIT (OUTPATIENT)
Dept: CARDIOLOGY | Facility: CLINIC | Age: 68
End: 2018-01-01
Payer: COMMERCIAL

## 2018-01-01 ENCOUNTER — HOME INFUSION (PRE-WILLOW HOME INFUSION) (OUTPATIENT)
Dept: PHARMACY | Facility: CLINIC | Age: 68
End: 2018-01-01

## 2018-01-01 ENCOUNTER — INFUSION THERAPY VISIT (OUTPATIENT)
Dept: INFUSION THERAPY | Facility: CLINIC | Age: 68
End: 2018-01-01
Payer: COMMERCIAL

## 2018-01-01 ENCOUNTER — HOSPITAL ENCOUNTER (OUTPATIENT)
Dept: CARDIOLOGY | Facility: CLINIC | Age: 68
Discharge: HOME | End: 2018-01-12
Attending: NURSE PRACTITIONER | Admitting: NURSE PRACTITIONER
Payer: COMMERCIAL

## 2018-01-01 VITALS
BODY MASS INDEX: 19.78 KG/M2 | DIASTOLIC BLOOD PRESSURE: 68 MMHG | WEIGHT: 126 LBS | SYSTOLIC BLOOD PRESSURE: 136 MMHG | HEIGHT: 67 IN | OXYGEN SATURATION: 99 % | HEART RATE: 92 BPM

## 2018-01-01 VITALS
SYSTOLIC BLOOD PRESSURE: 155 MMHG | HEART RATE: 93 BPM | BODY MASS INDEX: 20.05 KG/M2 | TEMPERATURE: 97.4 F | WEIGHT: 126.1 LBS | OXYGEN SATURATION: 100 % | DIASTOLIC BLOOD PRESSURE: 73 MMHG | RESPIRATION RATE: 16 BRPM

## 2018-01-01 VITALS
SYSTOLIC BLOOD PRESSURE: 172 MMHG | RESPIRATION RATE: 18 BRPM | HEART RATE: 91 BPM | OXYGEN SATURATION: 100 % | DIASTOLIC BLOOD PRESSURE: 78 MMHG | TEMPERATURE: 98.2 F

## 2018-01-01 VITALS
HEART RATE: 99 BPM | TEMPERATURE: 97.2 F | OXYGEN SATURATION: 100 % | RESPIRATION RATE: 16 BRPM | WEIGHT: 126.1 LBS | DIASTOLIC BLOOD PRESSURE: 93 MMHG | SYSTOLIC BLOOD PRESSURE: 163 MMHG | BODY MASS INDEX: 19.79 KG/M2 | HEIGHT: 67 IN

## 2018-01-01 VITALS
WEIGHT: 125.2 LBS | DIASTOLIC BLOOD PRESSURE: 81 MMHG | BODY MASS INDEX: 20.12 KG/M2 | HEART RATE: 87 BPM | SYSTOLIC BLOOD PRESSURE: 153 MMHG | HEIGHT: 66 IN

## 2018-01-01 VITALS
OXYGEN SATURATION: 100 % | HEART RATE: 104 BPM | HEIGHT: 67 IN | SYSTOLIC BLOOD PRESSURE: 149 MMHG | WEIGHT: 125.2 LBS | TEMPERATURE: 97.3 F | BODY MASS INDEX: 19.65 KG/M2 | DIASTOLIC BLOOD PRESSURE: 79 MMHG | RESPIRATION RATE: 16 BRPM

## 2018-01-01 VITALS
HEART RATE: 95 BPM | HEIGHT: 67 IN | WEIGHT: 129.5 LBS | BODY MASS INDEX: 20.33 KG/M2 | SYSTOLIC BLOOD PRESSURE: 163 MMHG | TEMPERATURE: 97.8 F | RESPIRATION RATE: 16 BRPM | OXYGEN SATURATION: 100 % | DIASTOLIC BLOOD PRESSURE: 86 MMHG

## 2018-01-01 VITALS
SYSTOLIC BLOOD PRESSURE: 177 MMHG | HEIGHT: 67 IN | OXYGEN SATURATION: 100 % | RESPIRATION RATE: 18 BRPM | DIASTOLIC BLOOD PRESSURE: 79 MMHG | WEIGHT: 129.5 LBS | BODY MASS INDEX: 20.33 KG/M2 | TEMPERATURE: 98.2 F | HEART RATE: 86 BPM

## 2018-01-01 VITALS
TEMPERATURE: 97.8 F | RESPIRATION RATE: 18 BRPM | SYSTOLIC BLOOD PRESSURE: 183 MMHG | OXYGEN SATURATION: 100 % | HEART RATE: 67 BPM | DIASTOLIC BLOOD PRESSURE: 83 MMHG

## 2018-01-01 DIAGNOSIS — C18.9 METASTATIC COLON CANCER TO LIVER (H): Primary | ICD-10-CM

## 2018-01-01 DIAGNOSIS — C18.9 METASTATIC COLON CANCER TO LIVER (H): ICD-10-CM

## 2018-01-01 DIAGNOSIS — K52.1 CHEMOTHERAPY INDUCED DIARRHEA: ICD-10-CM

## 2018-01-01 DIAGNOSIS — R50.81 NEUTROPENIC FEVER (H): ICD-10-CM

## 2018-01-01 DIAGNOSIS — I10 BENIGN ESSENTIAL HYPERTENSION: ICD-10-CM

## 2018-01-01 DIAGNOSIS — C78.7 LIVER METASTASIS: Primary | ICD-10-CM

## 2018-01-01 DIAGNOSIS — C78.7 METASTATIC COLON CANCER TO LIVER (H): Primary | ICD-10-CM

## 2018-01-01 DIAGNOSIS — C78.7 METASTATIC COLON CANCER TO LIVER (H): ICD-10-CM

## 2018-01-01 DIAGNOSIS — G89.3 CANCER ASSOCIATED PAIN: ICD-10-CM

## 2018-01-01 DIAGNOSIS — D64.9 ANEMIA, UNSPECIFIED TYPE: ICD-10-CM

## 2018-01-01 DIAGNOSIS — T45.1X5A CHEMOTHERAPY INDUCED NAUSEA AND VOMITING: ICD-10-CM

## 2018-01-01 DIAGNOSIS — E87.6 HYPOKALEMIA: ICD-10-CM

## 2018-01-01 DIAGNOSIS — I42.9 SECONDARY CARDIOMYOPATHY (H): ICD-10-CM

## 2018-01-01 DIAGNOSIS — D70.9 NEUTROPENIC FEVER (H): ICD-10-CM

## 2018-01-01 DIAGNOSIS — R50.81 NEUTROPENIC FEVER (H): Primary | ICD-10-CM

## 2018-01-01 DIAGNOSIS — I50.22 CHRONIC SYSTOLIC CONGESTIVE HEART FAILURE (H): ICD-10-CM

## 2018-01-01 DIAGNOSIS — C78.7 LIVER METASTASIS: ICD-10-CM

## 2018-01-01 DIAGNOSIS — T45.1X5A CHEMOTHERAPY INDUCED DIARRHEA: ICD-10-CM

## 2018-01-01 DIAGNOSIS — D70.9 NEUTROPENIC FEVER (H): Primary | ICD-10-CM

## 2018-01-01 DIAGNOSIS — R11.2 CHEMOTHERAPY INDUCED NAUSEA AND VOMITING: ICD-10-CM

## 2018-01-01 DIAGNOSIS — R03.0 ELEVATED BLOOD PRESSURE READING WITHOUT DIAGNOSIS OF HYPERTENSION: ICD-10-CM

## 2018-01-01 DIAGNOSIS — R11.2 NAUSEA AND VOMITING, INTRACTABILITY OF VOMITING NOT SPECIFIED, UNSPECIFIED VOMITING TYPE: ICD-10-CM

## 2018-01-01 LAB
ABO + RH BLD: NORMAL
ALBUMIN SERPL-MCNC: 1.8 G/DL (ref 3.4–5)
ALBUMIN SERPL-MCNC: 1.9 G/DL (ref 3.4–5)
ALBUMIN SERPL-MCNC: 2 G/DL (ref 3.4–5)
ALBUMIN SERPL-MCNC: 2.1 G/DL (ref 3.4–5)
ALBUMIN UR-MCNC: NEGATIVE MG/DL
ALP SERPL-CCNC: 419 U/L (ref 40–150)
ALP SERPL-CCNC: 516 U/L (ref 40–150)
ALP SERPL-CCNC: 569 U/L (ref 40–150)
ALP SERPL-CCNC: 609 U/L (ref 40–150)
ALT SERPL W P-5'-P-CCNC: 33 U/L (ref 0–50)
ALT SERPL W P-5'-P-CCNC: 34 U/L (ref 0–50)
ALT SERPL W P-5'-P-CCNC: 39 U/L (ref 0–50)
ALT SERPL W P-5'-P-CCNC: 53 U/L (ref 0–50)
ANION GAP SERPL CALCULATED.3IONS-SCNC: 10 MMOL/L (ref 3–14)
ANION GAP SERPL CALCULATED.3IONS-SCNC: 10 MMOL/L (ref 3–14)
ANION GAP SERPL CALCULATED.3IONS-SCNC: 11 MMOL/L (ref 3–14)
ANION GAP SERPL CALCULATED.3IONS-SCNC: 11 MMOL/L (ref 3–14)
ANISOCYTOSIS BLD QL SMEAR: SLIGHT
AST SERPL W P-5'-P-CCNC: 34 U/L (ref 0–45)
AST SERPL W P-5'-P-CCNC: 41 U/L (ref 0–45)
AST SERPL W P-5'-P-CCNC: 45 U/L (ref 0–45)
AST SERPL W P-5'-P-CCNC: 45 U/L (ref 0–45)
BASOPHILS # BLD AUTO: 0 10E9/L (ref 0–0.2)
BASOPHILS NFR BLD AUTO: 0.2 %
BASOPHILS NFR BLD AUTO: 0.3 %
BILIRUB SERPL-MCNC: 0.3 MG/DL (ref 0.2–1.3)
BILIRUB SERPL-MCNC: 0.4 MG/DL (ref 0.2–1.3)
BLD GP AB SCN SERPL QL: NORMAL
BLD PROD TYP BPU: NORMAL
BLD UNIT ID BPU: 0
BLOOD BANK CMNT PATIENT-IMP: NORMAL
BLOOD PRODUCT CODE: NORMAL
BPU ID: NORMAL
BUN SERPL-MCNC: 6 MG/DL (ref 7–30)
BUN SERPL-MCNC: 7 MG/DL (ref 7–30)
BUN SERPL-MCNC: 9 MG/DL (ref 7–30)
BUN SERPL-MCNC: 9 MG/DL (ref 7–30)
CALCIUM SERPL-MCNC: 8.4 MG/DL (ref 8.5–10.1)
CALCIUM SERPL-MCNC: 8.5 MG/DL (ref 8.5–10.1)
CEA SERPL-MCNC: 362.8 UG/L (ref 0–2.5)
CEA SERPL-MCNC: 737 UG/L (ref 0–2.5)
CHLORIDE SERPL-SCNC: 101 MMOL/L (ref 94–109)
CHLORIDE SERPL-SCNC: 103 MMOL/L (ref 94–109)
CHLORIDE SERPL-SCNC: 103 MMOL/L (ref 94–109)
CHLORIDE SERPL-SCNC: 104 MMOL/L (ref 94–109)
CO2 SERPL-SCNC: 22 MMOL/L (ref 20–32)
CO2 SERPL-SCNC: 23 MMOL/L (ref 20–32)
CO2 SERPL-SCNC: 23 MMOL/L (ref 20–32)
CO2 SERPL-SCNC: 25 MMOL/L (ref 20–32)
CREAT SERPL-MCNC: 0.59 MG/DL (ref 0.52–1.04)
CREAT SERPL-MCNC: 0.61 MG/DL (ref 0.52–1.04)
CREAT SERPL-MCNC: 0.64 MG/DL (ref 0.52–1.04)
CREAT SERPL-MCNC: 0.64 MG/DL (ref 0.52–1.04)
DIFFERENTIAL METHOD BLD: ABNORMAL
EOSINOPHIL # BLD AUTO: 0 10E9/L (ref 0–0.7)
EOSINOPHIL NFR BLD AUTO: 0 %
EOSINOPHIL NFR BLD AUTO: 0.2 %
ERYTHROCYTE [DISTWIDTH] IN BLOOD BY AUTOMATED COUNT: 17.8 % (ref 10–15)
ERYTHROCYTE [DISTWIDTH] IN BLOOD BY AUTOMATED COUNT: 17.9 % (ref 10–15)
ERYTHROCYTE [DISTWIDTH] IN BLOOD BY AUTOMATED COUNT: 18.5 % (ref 10–15)
ERYTHROCYTE [DISTWIDTH] IN BLOOD BY AUTOMATED COUNT: 19.2 % (ref 10–15)
FERRITIN SERPL-MCNC: 2361 NG/ML (ref 8–252)
GFR SERPL CREATININE-BSD FRML MDRD: >90 ML/MIN/1.7M2
GLUCOSE SERPL-MCNC: 116 MG/DL (ref 70–99)
GLUCOSE SERPL-MCNC: 130 MG/DL (ref 70–99)
GLUCOSE SERPL-MCNC: 130 MG/DL (ref 70–99)
GLUCOSE SERPL-MCNC: 136 MG/DL (ref 70–99)
HCT VFR BLD AUTO: 22.5 % (ref 35–47)
HCT VFR BLD AUTO: 23.2 % (ref 35–47)
HCT VFR BLD AUTO: 26.3 % (ref 35–47)
HCT VFR BLD AUTO: 26.5 % (ref 35–47)
HGB BLD-MCNC: 7.3 G/DL (ref 11.7–15.7)
HGB BLD-MCNC: 7.7 G/DL (ref 11.7–15.7)
HGB BLD-MCNC: 8.6 G/DL (ref 11.7–15.7)
HGB BLD-MCNC: 8.7 G/DL (ref 11.7–15.7)
IMM GRANULOCYTES # BLD: 0 10E9/L (ref 0–0.4)
IMM GRANULOCYTES NFR BLD: 0 %
IMM GRANULOCYTES NFR BLD: 0.4 %
IMM GRANULOCYTES NFR BLD: 0.5 %
IMM GRANULOCYTES NFR BLD: 0.5 %
LYMPHOCYTES # BLD AUTO: 1.3 10E9/L (ref 0.8–5.3)
LYMPHOCYTES # BLD AUTO: 1.4 10E9/L (ref 0.8–5.3)
LYMPHOCYTES # BLD AUTO: 1.6 10E9/L (ref 0.8–5.3)
LYMPHOCYTES # BLD AUTO: 1.7 10E9/L (ref 0.8–5.3)
LYMPHOCYTES NFR BLD AUTO: 32 %
LYMPHOCYTES NFR BLD AUTO: 33.1 %
LYMPHOCYTES NFR BLD AUTO: 34.2 %
LYMPHOCYTES NFR BLD AUTO: 37.1 %
MCH RBC QN AUTO: 30.1 PG (ref 26.5–33)
MCH RBC QN AUTO: 30.4 PG (ref 26.5–33)
MCH RBC QN AUTO: 30.4 PG (ref 26.5–33)
MCH RBC QN AUTO: 30.8 PG (ref 26.5–33)
MCHC RBC AUTO-ENTMCNC: 32.4 G/DL (ref 31.5–36.5)
MCHC RBC AUTO-ENTMCNC: 32.5 G/DL (ref 31.5–36.5)
MCHC RBC AUTO-ENTMCNC: 33.1 G/DL (ref 31.5–36.5)
MCHC RBC AUTO-ENTMCNC: 33.2 G/DL (ref 31.5–36.5)
MCV RBC AUTO: 92 FL (ref 78–100)
MCV RBC AUTO: 93 FL (ref 78–100)
MCV RBC AUTO: 93 FL (ref 78–100)
MCV RBC AUTO: 94 FL (ref 78–100)
MICROCYTES BLD QL SMEAR: PRESENT
MONOCYTES # BLD AUTO: 0.7 10E9/L (ref 0–1.3)
MONOCYTES # BLD AUTO: 0.9 10E9/L (ref 0–1.3)
MONOCYTES # BLD AUTO: 0.9 10E9/L (ref 0–1.3)
MONOCYTES # BLD AUTO: 1.2 10E9/L (ref 0–1.3)
MONOCYTES NFR BLD AUTO: 17.2 %
MONOCYTES NFR BLD AUTO: 20.6 %
MONOCYTES NFR BLD AUTO: 21.7 %
MONOCYTES NFR BLD AUTO: 22.7 %
NEUTROPHILS # BLD AUTO: 1.7 10E9/L (ref 1.6–8.3)
NEUTROPHILS # BLD AUTO: 1.7 10E9/L (ref 1.6–8.3)
NEUTROPHILS # BLD AUTO: 2 10E9/L (ref 1.6–8.3)
NEUTROPHILS # BLD AUTO: 2.4 10E9/L (ref 1.6–8.3)
NEUTROPHILS NFR BLD AUTO: 41.4 %
NEUTROPHILS NFR BLD AUTO: 43.4 %
NEUTROPHILS NFR BLD AUTO: 45.5 %
NEUTROPHILS NFR BLD AUTO: 48.2 %
NUM BPU REQUESTED: 1
PLATELET # BLD AUTO: 154 10E9/L (ref 150–450)
PLATELET # BLD AUTO: 181 10E9/L (ref 150–450)
PLATELET # BLD AUTO: 214 10E9/L (ref 150–450)
PLATELET # BLD AUTO: 219 10E9/L (ref 150–450)
PLATELET # BLD EST: ABNORMAL 10*3/UL
POTASSIUM SERPL-SCNC: 3 MMOL/L (ref 3.4–5.3)
POTASSIUM SERPL-SCNC: 3.1 MMOL/L (ref 3.4–5.3)
POTASSIUM SERPL-SCNC: 3.2 MMOL/L (ref 3.4–5.3)
POTASSIUM SERPL-SCNC: 3.3 MMOL/L (ref 3.4–5.3)
PROT SERPL-MCNC: 5.9 G/DL (ref 6.8–8.8)
PROT SERPL-MCNC: 6 G/DL (ref 6.8–8.8)
PROT SERPL-MCNC: 6.1 G/DL (ref 6.8–8.8)
PROT SERPL-MCNC: 6.2 G/DL (ref 6.8–8.8)
RBC # BLD AUTO: 2.4 10E12/L (ref 3.8–5.2)
RBC # BLD AUTO: 2.5 10E12/L (ref 3.8–5.2)
RBC # BLD AUTO: 2.86 10E12/L (ref 3.8–5.2)
RBC # BLD AUTO: 2.86 10E12/L (ref 3.8–5.2)
RBC MORPH BLD: ABNORMAL
RBC MORPH BLD: NORMAL
SODIUM SERPL-SCNC: 136 MMOL/L (ref 133–144)
SODIUM SERPL-SCNC: 136 MMOL/L (ref 133–144)
SODIUM SERPL-SCNC: 137 MMOL/L (ref 133–144)
SODIUM SERPL-SCNC: 137 MMOL/L (ref 133–144)
SPECIMEN EXP DATE BLD: NORMAL
TRANSFUSION STATUS PATIENT QL: NORMAL
WBC # BLD AUTO: 3.9 10E9/L (ref 4–11)
WBC # BLD AUTO: 4.2 10E9/L (ref 4–11)
WBC # BLD AUTO: 4.2 10E9/L (ref 4–11)
WBC # BLD AUTO: 5.3 10E9/L (ref 4–11)

## 2018-01-01 PROCEDURE — 86901 BLOOD TYPING SEROLOGIC RH(D): CPT | Performed by: INTERNAL MEDICINE

## 2018-01-01 PROCEDURE — 96413 CHEMO IV INFUSION 1 HR: CPT

## 2018-01-01 PROCEDURE — 82728 ASSAY OF FERRITIN: CPT | Performed by: INTERNAL MEDICINE

## 2018-01-01 PROCEDURE — 25000128 H RX IP 250 OP 636: Performed by: INTERNAL MEDICINE

## 2018-01-01 PROCEDURE — P9016 RBC LEUKOCYTES REDUCED: HCPCS | Performed by: INTERNAL MEDICINE

## 2018-01-01 PROCEDURE — 96375 TX/PRO/DX INJ NEW DRUG ADDON: CPT

## 2018-01-01 PROCEDURE — 25000125 ZZHC RX 250: Performed by: INTERNAL MEDICINE

## 2018-01-01 PROCEDURE — 80053 COMPREHEN METABOLIC PANEL: CPT | Performed by: INTERNAL MEDICINE

## 2018-01-01 PROCEDURE — 74177 CT ABD & PELVIS W/CONTRAST: CPT

## 2018-01-01 PROCEDURE — 93325 DOPPLER ECHO COLOR FLOW MAPG: CPT

## 2018-01-01 PROCEDURE — 96417 CHEMO IV INFUS EACH ADDL SEQ: CPT

## 2018-01-01 PROCEDURE — 82378 CARCINOEMBRYONIC ANTIGEN: CPT | Performed by: INTERNAL MEDICINE

## 2018-01-01 PROCEDURE — 86850 RBC ANTIBODY SCREEN: CPT | Performed by: INTERNAL MEDICINE

## 2018-01-01 PROCEDURE — 0399T ZZHC MYOCARDIAL STRAIN IMAGING: CPT | Performed by: INTERNAL MEDICINE

## 2018-01-01 PROCEDURE — 81003 URINALYSIS AUTO W/O SCOPE: CPT | Performed by: PHYSICIAN ASSISTANT

## 2018-01-01 PROCEDURE — 27210251 ZZH NEEDLE POWER PORT

## 2018-01-01 PROCEDURE — 96367 TX/PROPH/DG ADDL SEQ IV INF: CPT

## 2018-01-01 PROCEDURE — 81003 URINALYSIS AUTO W/O SCOPE: CPT | Performed by: INTERNAL MEDICINE

## 2018-01-01 PROCEDURE — 96415 CHEMO IV INFUSION ADDL HR: CPT

## 2018-01-01 PROCEDURE — 85025 COMPLETE CBC W/AUTO DIFF WBC: CPT | Performed by: INTERNAL MEDICINE

## 2018-01-01 PROCEDURE — 93308 TTE F-UP OR LMTD: CPT | Mod: 26 | Performed by: INTERNAL MEDICINE

## 2018-01-01 PROCEDURE — 36415 COLL VENOUS BLD VENIPUNCTURE: CPT | Performed by: INTERNAL MEDICINE

## 2018-01-01 PROCEDURE — 86923 COMPATIBILITY TEST ELECTRIC: CPT | Performed by: INTERNAL MEDICINE

## 2018-01-01 PROCEDURE — 99215 OFFICE O/P EST HI 40 MIN: CPT | Performed by: INTERNAL MEDICINE

## 2018-01-01 PROCEDURE — 25000132 ZZH RX MED GY IP 250 OP 250 PS 637: Performed by: INTERNAL MEDICINE

## 2018-01-01 PROCEDURE — 36430 TRANSFUSION BLD/BLD COMPNT: CPT

## 2018-01-01 PROCEDURE — 93325 DOPPLER ECHO COLOR FLOW MAPG: CPT | Mod: 26 | Performed by: INTERNAL MEDICINE

## 2018-01-01 PROCEDURE — 93321 DOPPLER ECHO F-UP/LMTD STD: CPT | Mod: 26 | Performed by: INTERNAL MEDICINE

## 2018-01-01 PROCEDURE — 86900 BLOOD TYPING SEROLOGIC ABO: CPT | Performed by: INTERNAL MEDICINE

## 2018-01-01 PROCEDURE — 36591 DRAW BLOOD OFF VENOUS DEVICE: CPT

## 2018-01-01 PROCEDURE — 99213 OFFICE O/P EST LOW 20 MIN: CPT | Performed by: INTERNAL MEDICINE

## 2018-01-01 RX ORDER — HEPARIN SODIUM (PORCINE) LOCK FLUSH IV SOLN 100 UNIT/ML 100 UNIT/ML
500 SOLUTION INTRAVENOUS EVERY 8 HOURS
Status: CANCELLED
Start: 2018-01-01

## 2018-01-01 RX ORDER — METHYLPREDNISOLONE SODIUM SUCCINATE 125 MG/2ML
125 INJECTION, POWDER, LYOPHILIZED, FOR SOLUTION INTRAMUSCULAR; INTRAVENOUS
Status: CANCELLED
Start: 2018-01-01

## 2018-01-01 RX ORDER — HEPARIN SODIUM (PORCINE) LOCK FLUSH IV SOLN 100 UNIT/ML 100 UNIT/ML
500 SOLUTION INTRAVENOUS EVERY 8 HOURS
Status: DISCONTINUED | OUTPATIENT
Start: 2018-01-01 | End: 2018-01-01 | Stop reason: HOSPADM

## 2018-01-01 RX ORDER — MEPERIDINE HYDROCHLORIDE 25 MG/ML
25 INJECTION INTRAMUSCULAR; INTRAVENOUS; SUBCUTANEOUS EVERY 30 MIN PRN
Status: CANCELLED | OUTPATIENT
Start: 2018-01-01

## 2018-01-01 RX ORDER — EPINEPHRINE 1 MG/ML
0.3 INJECTION, SOLUTION, CONCENTRATE INTRAVENOUS EVERY 5 MIN PRN
Status: CANCELLED | OUTPATIENT
Start: 2018-01-01

## 2018-01-01 RX ORDER — POTASSIUM CHLORIDE 1500 MG/1
40 TABLET, EXTENDED RELEASE ORAL DAILY
Qty: 30 TABLET | Refills: 0 | Status: SHIPPED | OUTPATIENT
Start: 2018-01-01 | End: 2018-01-01

## 2018-01-01 RX ORDER — LORAZEPAM 2 MG/ML
0.5 INJECTION INTRAMUSCULAR EVERY 4 HOURS PRN
Status: CANCELLED
Start: 2018-01-01

## 2018-01-01 RX ORDER — PALONOSETRON 0.05 MG/ML
0.25 INJECTION, SOLUTION INTRAVENOUS ONCE
Status: CANCELLED
Start: 2018-01-01

## 2018-01-01 RX ORDER — FUROSEMIDE 10 MG/ML
40 INJECTION INTRAMUSCULAR; INTRAVENOUS ONCE
Status: CANCELLED
Start: 2018-01-01 | End: 2018-01-01

## 2018-01-01 RX ORDER — ALBUTEROL SULFATE 90 UG/1
1-2 AEROSOL, METERED RESPIRATORY (INHALATION)
Status: CANCELLED
Start: 2018-01-01

## 2018-01-01 RX ORDER — DIPHENHYDRAMINE HYDROCHLORIDE 50 MG/ML
50 INJECTION INTRAMUSCULAR; INTRAVENOUS
Status: CANCELLED
Start: 2018-01-01

## 2018-01-01 RX ORDER — LOPERAMIDE HCL 2 MG
CAPSULE ORAL
Qty: 30 CAPSULE | Refills: 0 | Status: SHIPPED | OUTPATIENT
Start: 2018-01-01

## 2018-01-01 RX ORDER — DIPHENOXYLATE HCL/ATROPINE 2.5-.025MG
2 TABLET ORAL 4 TIMES DAILY PRN
Qty: 50 TABLET | Refills: 0 | Status: SHIPPED | OUTPATIENT
Start: 2018-01-01

## 2018-01-01 RX ORDER — SODIUM CHLORIDE 9 MG/ML
1000 INJECTION, SOLUTION INTRAVENOUS CONTINUOUS PRN
Status: CANCELLED
Start: 2018-01-01

## 2018-01-01 RX ORDER — DEXAMETHASONE SODIUM PHOSPHATE 10 MG/ML
12 INJECTION INTRAMUSCULAR; INTRAVENOUS ONCE
Status: CANCELLED
Start: 2018-01-01 | End: 2018-01-01

## 2018-01-01 RX ORDER — EPINEPHRINE 0.3 MG/.3ML
0.3 INJECTION SUBCUTANEOUS EVERY 5 MIN PRN
Status: CANCELLED | OUTPATIENT
Start: 2018-01-01

## 2018-01-01 RX ORDER — POTASSIUM CHLORIDE 1500 MG/1
40 TABLET, EXTENDED RELEASE ORAL DAILY
Qty: 60 TABLET | Refills: 0 | Status: SHIPPED | OUTPATIENT
Start: 2018-01-01

## 2018-01-01 RX ORDER — IOPAMIDOL 755 MG/ML
500 INJECTION, SOLUTION INTRAVASCULAR ONCE
Status: COMPLETED | OUTPATIENT
Start: 2018-01-01 | End: 2018-01-01

## 2018-01-01 RX ORDER — PALONOSETRON 0.05 MG/ML
0.25 INJECTION, SOLUTION INTRAVENOUS ONCE
Status: COMPLETED | OUTPATIENT
Start: 2018-01-01 | End: 2018-01-01

## 2018-01-01 RX ORDER — ALBUTEROL SULFATE 0.83 MG/ML
2.5 SOLUTION RESPIRATORY (INHALATION)
Status: CANCELLED | OUTPATIENT
Start: 2018-01-01

## 2018-01-01 RX ORDER — CARVEDILOL 12.5 MG/1
18.75 TABLET ORAL 2 TIMES DAILY WITH MEALS
Qty: 90 TABLET | Refills: 1 | Status: SHIPPED | OUTPATIENT
Start: 2018-01-01

## 2018-01-01 RX ORDER — DEXAMETHASONE SODIUM PHOSPHATE 10 MG/ML
12 INJECTION INTRAMUSCULAR; INTRAVENOUS ONCE
Status: COMPLETED | OUTPATIENT
Start: 2018-01-01 | End: 2018-01-01

## 2018-01-01 RX ORDER — LORAZEPAM 2 MG/ML
0.5 INJECTION INTRAMUSCULAR EVERY 4 HOURS PRN
Status: DISCONTINUED | OUTPATIENT
Start: 2018-01-01 | End: 2018-01-01 | Stop reason: HOSPADM

## 2018-01-01 RX ORDER — FERROUS SULFATE 325(65) MG
325 TABLET ORAL 2 TIMES DAILY
Qty: 100 TABLET | Refills: 1 | Status: SHIPPED | OUTPATIENT
Start: 2018-01-01

## 2018-01-01 RX ORDER — POTASSIUM CHLORIDE 1500 MG/1
40 TABLET, EXTENDED RELEASE ORAL
Status: DISCONTINUED | OUTPATIENT
Start: 2018-01-01 | End: 2018-01-01 | Stop reason: HOSPADM

## 2018-01-01 RX ORDER — POTASSIUM CHLORIDE 1500 MG/1
20-40 TABLET, EXTENDED RELEASE ORAL
Status: DISCONTINUED | OUTPATIENT
Start: 2018-01-01 | End: 2018-01-01 | Stop reason: HOSPADM

## 2018-01-01 RX ORDER — ONDANSETRON 8 MG/1
8 TABLET, FILM COATED ORAL EVERY 8 HOURS PRN
Qty: 10 TABLET | Refills: 2 | Status: SHIPPED | OUTPATIENT
Start: 2018-01-01

## 2018-01-01 RX ADMIN — BEVACIZUMAB 325 MG: 400 INJECTION, SOLUTION INTRAVENOUS at 09:50

## 2018-01-01 RX ADMIN — POTASSIUM CHLORIDE 20 MEQ: 1500 TABLET, EXTENDED RELEASE ORAL at 14:53

## 2018-01-01 RX ADMIN — SODIUM CHLORIDE, PRESERVATIVE FREE 500 UNITS: 5 INJECTION INTRAVENOUS at 14:52

## 2018-01-01 RX ADMIN — SODIUM CHLORIDE, PRESERVATIVE FREE 500 UNITS: 5 INJECTION INTRAVENOUS at 13:01

## 2018-01-01 RX ADMIN — OXALIPLATIN 113 MG: 5 INJECTION, SOLUTION, CONCENTRATE INTRAVENOUS at 12:15

## 2018-01-01 RX ADMIN — IRINOTECAN HYDROCHLORIDE 245 MG: 20 INJECTION, SOLUTION INTRAVENOUS at 12:03

## 2018-01-01 RX ADMIN — PALONOSETRON HYDROCHLORIDE 0.25 MG: 0.25 INJECTION INTRAVENOUS at 09:00

## 2018-01-01 RX ADMIN — SODIUM CHLORIDE, PRESERVATIVE FREE 500 UNITS: 5 INJECTION INTRAVENOUS at 14:14

## 2018-01-01 RX ADMIN — IOPAMIDOL 65 ML: 755 INJECTION, SOLUTION INTRAVENOUS at 11:07

## 2018-01-01 RX ADMIN — DEXTROSE MONOHYDRATE 250 ML: 50 INJECTION, SOLUTION INTRAVENOUS at 12:14

## 2018-01-01 RX ADMIN — PALONOSETRON HYDROCHLORIDE 0.25 MG: 0.25 INJECTION INTRAVENOUS at 11:04

## 2018-01-01 RX ADMIN — SODIUM CHLORIDE, PRESERVATIVE FREE 500 UNITS: 5 INJECTION INTRAVENOUS at 09:52

## 2018-01-01 RX ADMIN — ATROPINE SULFATE 0.4 MG: 0.4 INJECTION, SOLUTION INTRAMUSCULAR; INTRAVENOUS; SUBCUTANEOUS at 10:48

## 2018-01-01 RX ADMIN — DEXTROSE MONOHYDRATE 250 ML: 50 INJECTION, SOLUTION INTRAVENOUS at 11:58

## 2018-01-01 RX ADMIN — SODIUM CHLORIDE 150 MG: 9 INJECTION, SOLUTION INTRAVENOUS at 09:18

## 2018-01-01 RX ADMIN — DEXAMETHASONE SODIUM PHOSPHATE 12 MG: 10 INJECTION, SOLUTION INTRAMUSCULAR; INTRAVENOUS at 09:07

## 2018-01-01 RX ADMIN — SODIUM CHLORIDE 250 ML: 9 INJECTION, SOLUTION INTRAVENOUS at 12:17

## 2018-01-01 RX ADMIN — ATROPINE SULFATE 0.4 MG: 0.4 INJECTION, SOLUTION INTRAMUSCULAR; INTRAVENOUS; SUBCUTANEOUS at 10:30

## 2018-01-01 RX ADMIN — FAMOTIDINE 20 MG: 10 INJECTION, SOLUTION INTRAVENOUS at 09:35

## 2018-01-01 RX ADMIN — POTASSIUM CHLORIDE 40 MEQ: 1500 TABLET, EXTENDED RELEASE ORAL at 10:11

## 2018-01-01 RX ADMIN — OXALIPLATIN 113 MG: 5 INJECTION, SOLUTION, CONCENTRATE INTRAVENOUS at 11:59

## 2018-01-01 RX ADMIN — FAMOTIDINE 20 MG: 10 INJECTION, SOLUTION INTRAVENOUS at 09:03

## 2018-01-01 RX ADMIN — DEXTROSE MONOHYDRATE 285 MG: 50 INJECTION, SOLUTION INTRAVENOUS at 10:36

## 2018-01-01 RX ADMIN — DEXAMETHASONE SODIUM PHOSPHATE 12 MG: 10 INJECTION, SOLUTION INTRAMUSCULAR; INTRAVENOUS at 10:26

## 2018-01-01 RX ADMIN — DEXTROSE MONOHYDRATE 285 MG: 50 INJECTION, SOLUTION INTRAVENOUS at 10:52

## 2018-01-01 RX ADMIN — POTASSIUM CHLORIDE 20 MEQ: 1500 TABLET, EXTENDED RELEASE ORAL at 12:16

## 2018-01-01 RX ADMIN — SODIUM CHLORIDE 250 ML: 9 INJECTION, SOLUTION INTRAVENOUS at 08:54

## 2018-01-01 RX ADMIN — BEVACIZUMAB 325 MG: 100 INJECTION, SOLUTION INTRAVENOUS at 10:10

## 2018-01-01 RX ADMIN — SODIUM CHLORIDE, PRESERVATIVE FREE 500 UNITS: 5 INJECTION INTRAVENOUS at 15:47

## 2018-01-01 RX ADMIN — ATROPINE SULFATE 0.4 MG: 0.4 INJECTION, SOLUTION INTRAMUSCULAR; INTRAVENOUS; SUBCUTANEOUS at 11:56

## 2018-01-01 RX ADMIN — SODIUM CHLORIDE 150 MG: 9 INJECTION, SOLUTION INTRAVENOUS at 10:37

## 2018-01-01 RX ADMIN — DEXTROSE MONOHYDRATE 250 ML: 50 INJECTION, SOLUTION INTRAVENOUS at 12:01

## 2018-01-01 RX ADMIN — SODIUM CHLORIDE 250 ML: 9 INJECTION, SOLUTION INTRAVENOUS at 10:20

## 2018-01-01 RX ADMIN — POTASSIUM CHLORIDE 40 MEQ: 1500 TABLET, EXTENDED RELEASE ORAL at 12:08

## 2018-01-01 RX ADMIN — FAMOTIDINE 20 MG: 10 INJECTION, SOLUTION INTRAVENOUS at 10:33

## 2018-01-01 RX ADMIN — PALONOSETRON HYDROCHLORIDE 0.25 MG: 0.25 INJECTION INTRAVENOUS at 09:40

## 2018-01-01 RX ADMIN — BEVACIZUMAB 325 MG: 400 INJECTION, SOLUTION INTRAVENOUS at 11:10

## 2018-01-01 RX ADMIN — SODIUM CHLORIDE, PRESERVATIVE FREE 500 UNITS: 5 INJECTION INTRAVENOUS at 11:17

## 2018-01-01 RX ADMIN — LORAZEPAM 0.5 MG: 2 INJECTION INTRAMUSCULAR; INTRAVENOUS at 14:29

## 2018-01-01 RX ADMIN — SODIUM CHLORIDE 250 ML: 9 INJECTION, SOLUTION INTRAVENOUS at 09:27

## 2018-01-01 RX ADMIN — DEXAMETHASONE SODIUM PHOSPHATE 12 MG: 10 INJECTION, SOLUTION INTRAMUSCULAR; INTRAVENOUS at 09:32

## 2018-01-01 RX ADMIN — SODIUM CHLORIDE, PRESERVATIVE FREE 500 UNITS: 5 INJECTION INTRAVENOUS at 14:43

## 2018-01-01 RX ADMIN — OXALIPLATIN 106 MG: 5 INJECTION, SOLUTION, CONCENTRATE INTRAVENOUS at 13:23

## 2018-01-01 RX ADMIN — SODIUM CHLORIDE 150 MG: 9 INJECTION, SOLUTION INTRAVENOUS at 09:43

## 2018-01-01 RX ADMIN — SODIUM CHLORIDE 60 ML: 9 INJECTION, SOLUTION INTRAVENOUS at 11:07

## 2018-01-01 ASSESSMENT — PAIN SCALES - GENERAL
PAINLEVEL: NO PAIN (0)
PAINLEVEL: MILD PAIN (3)
PAINLEVEL: MILD PAIN (2)
PAINLEVEL: NO PAIN (0)
PAINLEVEL: NO PAIN (0)

## 2018-01-08 NOTE — PROGRESS NOTES
Patient here for port labs today. C/O fatigue and SOA with any activity. Patient states she had dark stools x 2 days, better now. No bleeding . Arrived in w/c with . Port labs drawn. UA obtained and sent to lab. HGB-7.3, ANC-2.0, PLT-214,000, Creat-0.59 and K+-3.2.  UA- for protein. Discussed with DR. Lantigua as DR. Dove is off. Plan for 1 unit blood today as patient will receive chemo tomorrow. Patient to see DR. Mosquera tomorrow am prior to chemo. Reviewed K+ Level and No orders for replacement. Patient to continue KCL 20 MEQ BID. Lungs clear. Tolerated 1 unit blood, b/p noted to start off higher today and continued to elevate. Patient has no complaints, had headache earlier at home. Lungs clear post transfusion. Port flushed per protocol and remains accessed for chemo tomorrow. Discharged in stable condition with  via W/C.

## 2018-01-08 NOTE — PATIENT INSTRUCTIONS
Pt to return on 1/9/18 for chemo. Copies of medication list and upcoming appointments given prior to discharge.

## 2018-01-08 NOTE — MR AVS SNAPSHOT
After Visit Summary   1/8/2018    Charlene Douglas    MRN: [id number]           Patient Information     Date Of Birth          1950        Visit Information        Provider Department      1/8/2018 8:30 AM NL INFUSION CHAIR 2 [facility]        Today's Diagnoses     Metastatic colon cancer to liver (H)    -  1    Neutropenic fever (H)        Anemia, unspecified type          Care Instructions    Pt to return on 1/9/18 for chemo. Copies of medication list and upcoming appointments given prior to discharge.           Follow-ups after your visit        Your next 10 appointments already scheduled     Jan 09, 2018  8:30 AM CST   Return Visit with Ronny Mosquera MD   [facility] ([facility])    [address] [place] [id number] [phone]            Jan 09, 2018  9:00 AM CST   Level 7 with NL INFUSION CHAIR 4   [facility] ([facility])    [address] [place] [address] [place] [id number] [phone]            Jan 12, 2018 10:00 AM CST   Ech Limited with PHECHR1   [facility] Echocardiography ([facility])    [address] [place] [address] [place] [address] [phone]           1.  Please bring or wear a comfortable two-piece outfit. 2.  You may eat, drink and take your normal medicines. 3.  For any questions that cannot be answered, please contact the ordering physician            Jan 18, 2018  9:30 AM CST   Return Visit with Rey Koehler MD   [facility] ([facility])    [address] [place] [id number] [phone]              Who to contact     If you have questions or need follow up information about today's clinic visit or your schedule please contact [facility] directly at [phone].  Normal or non-critical lab and imaging results will be communicated to you by Jessica, letter  or phone within 4 business days after the clinic has received the results. If you do not hear from us within 7 days, please contact the clinic through [employer] or phone. If you have a critical or abnormal lab result, we will notify you by phone as soon as possible.  Submit refill requests through [employer] or call your pharmacy and they will forward the refill request to us. Please allow 3 business days for your refill to be completed.          Additional Information About Your Visit        [employer] Information     [employer] gives you secure access to your electronic health record. If you see a primary care provider, you can also send messages to your care team and make appointments. If you have questions, please call your primary care clinic.  If you do not have a primary care provider, please call [phone] and they will assist you.        Care EveryWhere ID     This is your Care EveryWhere ID. This could be used by other organizations to access your [facility] medical records  [id number]        Your Vitals Were     Pulse Temperature Respirations Last Period Pulse Oximetry       91 98.2  F (36.8  C) (Temporal) 18 06/07/2007 100%        Blood Pressure from Last 3 Encounters:   01/08/18 172/78   12/27/17 169/81   12/21/17 160/76    Weight from Last 3 Encounters:   12/12/17 58 kg (127 lb 14.4 oz)   12/12/17 58 kg (127 lb 14.4 oz)   11/28/17 57.9 kg (127 lb 11.2 oz)              We Performed the Following     ABO/Rh type and screen     Blood component     CBC with platelets differential     CEA     Comprehensive metabolic panel     Protein qualitative urine     Transfuse red blood cell unit        Primary Care Provider Office Phone # Fax #    Donna Shirley PA-C [phone] [address] [place] [address]        Equal Access to Services     DENIZ PERALTA : Nic Blandon, roxy beltran, debra negron. So [facility]  [phone].    ATENCIÓN: Si david chacon, tiene a hunt disposición servicios gratuitos de asistencia lingüística. Tameka toure [phone].    We comply with applicable federal civil rights laws and [place] laws. We do not discriminate on the basis of race, color, national origin, age, disability, sex, sexual orientation, or gender identity.            Thank you!     Thank you for choosing [facility]  for your care. Our goal is always to provide you with excellent care. Hearing back from our patients is one way we can continue to improve our services. Please take a few minutes to complete the written survey that you may receive in the mail after your visit with us. Thank you!             Your Updated Medication List - Protect others around you: Learn how to safely use, store and throw away your medicines at www.disposemymeds.org.          This list is accurate as of: 1/8/18  2:36 PM.  Always use your most recent med list.                   Brand Name Dispense Instructions for use Diagnosis    ACETAMINOPHEN PO      Take 650 mg by mouth every 4 hours as needed for pain or fever        benzonatate 100 MG capsule    TESSALON PERLES    42 capsule    Take 2 capsules (200 mg) by mouth 3 times daily as needed for cough    Metastatic colon cancer to liver (H)       carvedilol 12.5 MG tablet    COREG    90 tablet    Take 1.5 tablets (18.75 mg) by mouth 2 times daily (with meals)    Chronic systolic congestive heart failure (H)       dexamethasone 4 MG tablet    DECADRON    6 tablet    Take 2 tablets (8 mg) by mouth daily (with breakfast) Days 2, 3, and 4 of each cycle.    Metastatic colon cancer to liver (H)       diclofenac 1 % Gel topical gel    VOLTAREN     Place 2 g onto the skin 4 times daily as needed for moderate pain (for RUQ pain)    Metastatic colon cancer to liver (H)       ferrous sulfate 325 (65 FE) MG tablet    IRON     Take 325 mg by mouth daily (with breakfast)         guaiFENesin-codeine 100-10 MG/5ML Soln solution    ROBITUSSIN AC    120 mL    Take 5 mLs by mouth every 4 hours as needed for cough    Liver metastasis (H)       lidocaine 5 % Patch    LIDODERM    30 patch    Place 1-3 patches onto the skin every 24 hours To RUQ    Metastatic colon cancer to liver (H)       loperamide 2 MG capsule    IMODIUM    30 capsule    2 caps at 1st sign of diarrhea & 1 cap every 2hrs until 12hrs diarrhea free. During night, 2 caps at bedtime & 2 caps every 4hrs until AM    Metastatic colon cancer to liver (H)       LORazepam 0.5 MG tablet    ATIVAN    30 tablet    Take 1 tablet (0.5 mg) by mouth every 4 hours as needed (Anxiety, Nausea/Vomiting or Sleep)    Metastatic colon cancer to liver (H)       losartan 25 MG tablet    COZAAR    30 tablet    Take 1 tablet (25 mg) by mouth daily    Secondary cardiomyopathy (H)       oxyCODONE IR 5 MG tablet    ROXICODONE    20 tablet    Take 1 tablet (5 mg) by mouth every 8 hours as needed for pain    Metastatic colon cancer to liver (H)       Potassium Chloride ER 20 MEQ Tbcr     30 tablet    Take 2 tablets (40 mEq) by mouth daily    Hypokalemia       prochlorperazine 10 MG tablet    COMPAZINE    30 tablet    Take 1 tablet (10 mg) by mouth every 6 hours as needed (Nausea/Vomiting)    Metastatic colon cancer to liver (H)       ranitidine 300 MG tablet    ZANTAC    30 tablet    Take 1 tablet (300 mg) by mouth At Bedtime    Nausea and vomiting, intractability of vomiting not specified, unspecified vomiting type       sennosides 8.6 MG tablet    SENOKOT    120 each    Take 1-2 tablets by mouth 2 times daily as needed for constipation    Slow transit constipation

## 2018-01-09 NOTE — NURSING NOTE
DISCHARGE PLAN:  Next appointments: See patient instruction section  Departure Mode: Ambulatory  Accompanied by:   8 minutes for nursing discharge (face to face time)     Charlene Douglas is here today for Oncology follow up with treatment.  Writing nurse seen patient after Medical Oncology appointment to address questions/concerns/coordinate care. Patient to continue with treatment and follow up in 4 weeks with treatment. Appointments scheduled. See patient instructions and Oncologist's Progress note for further details. Questions and concerns addressed to patient's satisfaction. Patient verbalized and demonstrated understanding of plan.  Contact information provided and patient is encouraged to call with any that arise in the interim of care.    Stan Balderas, RN, BSN, OCN   Oncology Care Coordinator RN  [facility]  [phone]  1/9/2018, 9:03 AM

## 2018-01-09 NOTE — MR AVS SNAPSHOT
After Visit Summary   1/9/2018    Charlene Douglas    MRN: [id number]           Patient Information     Date Of Birth          1950        Visit Information        Provider Department      1/9/2018 8:30 AM Ronny Mosquera MD [facility]        Today's Diagnoses     Hypokalemia        Nausea and vomiting, intractability of vomiting not specified, unspecified vomiting type          Care Instructions      Please follow up with Dr. Dove in 4 weeks.      Infusion Date/Time:  1/23/18 at 8:00    Follow Up Date/Time: 2/6/18 at 8:30 with infusion to follow at 10:00  Port labs in infusion prior at 8:00.    If you have any questions or concerns please feel free to call.    If you need to reschedule please call:  Clinic or Lab Appointment - [phone]  Infusion - [phone]  Imaging - [phone]    Stan Balderas RN, BSN, OCN   Oncology Care Coordinator RN  [facility]  [phone]              Follow-ups after your visit        Your next 10 appointments already scheduled     Jan 09, 2018  9:00 AM CST   Level 7 with NL INFUSION CHAIR 4   [facility] Infusion [facility] ([facility])    [address] [place] [address] [place] [phone]            Jan 12, 2018 10:00 AM CST   Ech Limited with PHECHR1   [facility] Echocardiography ([facility])    [address] [place] [address] [place] [id number] [phone]           1.  Please bring or wear a comfortable two-piece outfit. 2.  You may eat, drink and take your normal medicines. 3.  For any questions that cannot be answered, please contact the ordering physician            Jan 18, 2018  9:30 AM CST   Return Visit with Rey Koehler MD   [facility] ([facility])    [address] [phone] [phone]            Jan 23, 2018  8:00 AM CST   Level 7 with NL INFUSION CHAIR 2   [facility]  "([facility])    [address] [place] [address] [phone]            Feb 06, 2018  8:30 AM CST   Return Visit with Duy Dove MD   [facility] ([facility])    [address]  [place] [phone] [phone]            Feb 06, 2018 10:00 AM CST   Level 7 with NL INFUSION CHAIR 2   [facility]fusion [facility] ([facility])    [address] [place] [address] [phone]              Who to contact     If you have questions or need follow up information about today's clinic visit or your schedule please contact [facility] directly at [phone].  Normal or non-critical lab and imaging results will be communicated to you by [employer], letter or phone within 4 business days after the clinic has received the results. If you do not hear from us within 7 days, please contact the clinic through [employer] or phone. If you have a critical or abnormal lab result, we will notify you by phone as soon as possible.  Submit refill requests through [employer] or call your pharmacy and they will forward the refill request to us. Please allow 3 business days for your refill to be completed.          Additional Information About Your Visit        [employer] Information     [employer] gives you secure access to your electronic health record. If you see a primary care provider, you can also send messages to your care team and make appointments. If you have questions, please call your primary care clinic.  If you do not have a primary care provider, please call [phone] and they will assist you.        Care EveryWhere ID     This is your Care EveryWhere ID. This could be used by other organizations to access your [facility] medical records  [id number]        Your Vitals Were     Pulse Temperature Respirations Height Last Period Pulse Oximetry    95 97.8  F (36.6  C) (Temporal) 16 1.689 m (5' 6.5\") 06/07/2007 100% "    BMI (Body Mass Index)                   20.59 kg/m2            Blood Pressure from Last 3 Encounters:   01/09/18 163/86   01/08/18 172/78   12/27/17 169/81    Weight from Last 3 Encounters:   01/09/18 58.7 kg (129 lb 8 oz)   12/12/17 58 kg (127 lb 14.4 oz)   12/12/17 58 kg (127 lb 14.4 oz)              Today, you had the following     No orders found for display         Where to get your medicines      These medications were sent to [facility] - [address]   [address] [place] [address]     Phone:  [phone]     Potassium Chloride ER 20 MEQ Tbcr    ranitidine 300 MG tablet          Primary Care Provider Office Phone # Fax #    Donna Shirley PA-C [phone] [address] [place] [address]        Equal Access to Services     [facility]: Hadii aad ku hadasho Soomaali, waaxda luqadaha, qaybta kaalmada adeegyada, waxay rerein haykatrinn beau orwe . So [facility] [phone].    ATENCIÓN: Si habla español, tiene a hunt disposición servicios gratuitos de asistencia lingüística. Llame al [phone].    We comply with applicable federal civil rights laws and [place] laws. We do not discriminate on the basis of race, color, national origin, age, disability, sex, sexual orientation, or gender identity.            Thank you!     Thank you for choosing [facility]  for your care. Our goal is always to provide you with excellent care. Hearing back from our patients is one way we can continue to improve our services. Please take a few minutes to complete the written survey that you may receive in the mail after your visit with us. Thank you!             Your Updated Medication List - Protect others around you: Learn how to safely use, store and throw away your medicines at www.disposemymeds.org.          This list is accurate as of: 1/9/18  8:59 AM.  Always use your most recent med list.                   Brand Name Dispense  Instructions for use Diagnosis    ACETAMINOPHEN PO      Take 650 mg by mouth every 4 hours as needed for pain or fever        benzonatate 100 MG capsule    TESSALON PERLES    42 capsule    Take 2 capsules (200 mg) by mouth 3 times daily as needed for cough    Metastatic colon cancer to liver (H)       carvedilol 12.5 MG tablet    COREG    90 tablet    Take 1.5 tablets (18.75 mg) by mouth 2 times daily (with meals)    Chronic systolic congestive heart failure (H)       dexamethasone 4 MG tablet    DECADRON    6 tablet    Take 2 tablets (8 mg) by mouth daily (with breakfast) Days 2, 3, and 4 of each cycle.    Metastatic colon cancer to liver (H)       diclofenac 1 % Gel topical gel    VOLTAREN     Place 2 g onto the skin 4 times daily as needed for moderate pain (for RUQ pain)    Metastatic colon cancer to liver (H)       ferrous sulfate 325 (65 FE) MG tablet    IRON     Take 325 mg by mouth daily (with breakfast)        guaiFENesin-codeine 100-10 MG/5ML Soln solution    ROBITUSSIN AC    120 mL    Take 5 mLs by mouth every 4 hours as needed for cough    Liver metastasis (H)       lidocaine 5 % Patch    LIDODERM    30 patch    Place 1-3 patches onto the skin every 24 hours To RUQ    Metastatic colon cancer to liver (H)       loperamide 2 MG capsule    IMODIUM    30 capsule    2 caps at 1st sign of diarrhea & 1 cap every 2hrs until 12hrs diarrhea free. During night, 2 caps at bedtime & 2 caps every 4hrs until AM    Metastatic colon cancer to liver (H)       LORazepam 0.5 MG tablet    ATIVAN    30 tablet    Take 1 tablet (0.5 mg) by mouth every 4 hours as needed (Anxiety, Nausea/Vomiting or Sleep)    Metastatic colon cancer to liver (H)       losartan 25 MG tablet    COZAAR    30 tablet    Take 1 tablet (25 mg) by mouth daily    Secondary cardiomyopathy (H)       oxyCODONE IR 5 MG tablet    ROXICODONE    20 tablet    Take 1 tablet (5 mg) by mouth every 8 hours as needed for pain    Metastatic colon cancer to liver (H)        Potassium Chloride ER 20 MEQ Tbcr     30 tablet    Take 2 tablets (40 mEq) by mouth daily    Hypokalemia       prochlorperazine 10 MG tablet    COMPAZINE    30 tablet    Take 1 tablet (10 mg) by mouth every 6 hours as needed (Nausea/Vomiting)    Metastatic colon cancer to liver (H)       ranitidine 300 MG tablet    ZANTAC    30 tablet    Take 1 tablet (300 mg) by mouth At Bedtime    Nausea and vomiting, intractability of vomiting not specified, unspecified vomiting type       sennosides 8.6 MG tablet    SENOKOT    120 each    Take 1-2 tablets by mouth 2 times daily as needed for constipation    Slow transit constipation

## 2018-01-09 NOTE — Clinical Note
1/9/2018         RE: Charlene Douglas  [id number] [address] [place] [id number]        Dear Colleague,    Thank you for referring your patient, Charlene Douglas, to the [facility]. Please see a copy of my visit note below.      FOLLOW-UP VISIT NOTE    PATIENT NAME: Charlene Douglas MRN # [id number]  DATE OF VISIT: Jan 9, 2018 YOB: 1950    REFERRING PROVIDER: No referring provider defined for this encounter.    CANCER TYPE:Colorecal adenocarcinoma  STAGE: IV  ECOG PS: 1    ONCOLOGY HISTORY:    67-year-old female who in August 2017 presented with complaints of blood in the stools, abdominal pain loss of appetite. Imaging revealed ascending colon mass with associated mesenteric lymphadenopathy as well as multiple liver metastases and lung nodules. Rectal mass biopsy as well as liver biopsy were consistent with adenocarcinoma colorectal primary- K-eugenie mutated.    He was started on systemic chemotherapy with FOLFOX with Avastin. However developed severe cardiogenic toxicity from 5-FU and in the hospital with acute coronary respiratory failure requiring intubation with EF decreased to 10-15% which did improve during hospital course to 40%. Subsequently 5 - FU was discontinued.    Patient is currently on Avastin in combination with Avastin in combination with Oxaliplatin and irinotecan    SUBJECTIVE     PATIENT IS HERE FOR HIS NEXT CYCLE OF CHEMOTHERAPY. Neuropathy was much improved with this cycle after O Renan was dose reduced last time. He had last 2 days, she developed nausea and vomiting which has resolved by today. Denies fever/chills, abdominal pain, weight loss, worsening fatigue or any other complaints.      PAST MEDICAL HISTORY     Past Medical History:   Diagnosis Date     Colon cancer metastasized to liver (H)      Hypertension      NO ACTIVE PROBLEMS          CURRENT OUTPATIENT MEDICATIONS     Current Outpatient Prescriptions   Medication Sig Dispense Refill     Potassium  Chloride ER 20 MEQ TBCR Take 2 tablets (40 mEq) by mouth daily 30 tablet 0     ranitidine (ZANTAC) 300 MG tablet Take 1 tablet (300 mg) by mouth At Bedtime 30 tablet 1     loperamide (IMODIUM) 2 MG capsule 2 caps at 1st sign of diarrhea & 1 cap every 2hrs until 12hrs diarrhea free. During night, 2 caps at bedtime & 2 caps every 4hrs until AM 30 capsule 0     oxyCODONE IR (ROXICODONE) 5 MG tablet Take 1 tablet (5 mg) by mouth every 8 hours as needed for pain 20 tablet 0     carvedilol (COREG) 12.5 MG tablet Take 1.5 tablets (18.75 mg) by mouth 2 times daily (with meals) 90 tablet 1     benzonatate (TESSALON PERLES) 100 MG capsule Take 2 capsules (200 mg) by mouth 3 times daily as needed for cough 42 capsule 1     dexamethasone (DECADRON) 4 MG tablet Take 2 tablets (8 mg) by mouth daily (with breakfast) Days 2, 3, and 4 of each cycle. 6 tablet 11     lidocaine (LIDODERM) 5 % Patch Place 1-3 patches onto the skin every 24 hours To RUQ 30 patch 1     guaiFENesin-codeine (ROBITUSSIN AC) 100-10 MG/5ML SOLN solution Take 5 mLs by mouth every 4 hours as needed for cough 120 mL 0     LORazepam (ATIVAN) 0.5 MG tablet Take 1 tablet (0.5 mg) by mouth every 4 hours as needed (Anxiety, Nausea/Vomiting or Sleep) 30 tablet 5     prochlorperazine (COMPAZINE) 10 MG tablet Take 1 tablet (10 mg) by mouth every 6 hours as needed (Nausea/Vomiting) 30 tablet 5     losartan (COZAAR) 25 MG tablet Take 1 tablet (25 mg) by mouth daily 30 tablet 11     ferrous sulfate (IRON) 325 (65 FE) MG tablet Take 325 mg by mouth daily (with breakfast)       diclofenac (VOLTAREN) 1 % GEL topical gel Place 2 g onto the skin 4 times daily as needed for moderate pain (for RUQ pain)       sennosides (SENOKOT) 8.6 MG tablet Take 1-2 tablets by mouth 2 times daily as needed for constipation 120 each      ACETAMINOPHEN PO Take 650 mg by mouth every 4 hours as needed for pain or fever           ALLERGIES     Allergies   Allergen Reactions     Lisinopril Cough      No Known Allergies         REVIEW OF SYSTEMS   As above in the HPI, o/w complete 12-point ROS was negative.     PHYSICAL EXAM   B/P: 163/86, T: 97.8, P: 95, R: 16  SpO2 Readings from Last 4 Encounters:   01/09/18 100%   01/08/18 100%   12/21/17 99%   12/12/17 100%     Wt Readings from Last 3 Encounters:   01/09/18 58.7 kg (129 lb 8 oz)   12/12/17 58 kg (127 lb 14.4 oz)   12/12/17 58 kg (127 lb 14.4 oz)     GEN: NAD  EYES:PERRLA  Mouth/ENT: Oropharynx is clear.  NECK: no cervical or supraclavicular lymphadenopathy  LUNGS: clear bilaterally  CV: regular, no murmurs, rubs, or gallops  ABDOMEN: soft, non-tender, non-distended, normal bowel sounds, no hepatosplenomegaly by percussion or palpation  EXT: warm, well perfused, no edema  NEURO: alert  SKIN: no rashes     LABORATORY AND IMAGING STUDIES     Recent Labs   Lab Test  01/08/18   0850  12/27/17   0915   09/19/17   0651  09/18/17   0605   NA  137  138   < >  132*  136   POTASSIUM  3.2*  3.5   < >  4.6  4.6   CHLORIDE  103  104   < >  101  105   CO2  23  26   < >  23  23   ANIONGAP  11  8   < >  8  9   BUN  9  9   < >  17  20   CR  0.59  0.60   < >  0.90  0.86   GLC  130*  97   < >  141*  110*   RON  8.4*  8.4*   < >  8.8  8.0*   MAG   --    --    --   1.9  1.8   PHOS   --    --    --   2.4*  2.2*    < > = values in this interval not displayed.     Recent Labs   Lab Test  01/08/18   0850  12/27/17   0915  12/21/17   1115   WBC  4.2  4.5  3.9*   HGB  7.3*  7.9*  7.3*   PLT  214  200  185   MCV  94  92  93   NEUTROPHIL  48.2  36.5  47.0     Recent Labs   Lab Test  01/08/18   0850  12/27/17   0915  12/12/17   0820   BILITOTAL  0.3  0.4  0.4   ALKPHOS  419*  440*  433*   ALT  53*  39  40   AST  34  50*  33   ALBUMIN  1.9*  2.1*  2.1*     TSH   Date Value Ref Range Status   08/07/2017 1.92 0.40 - 4.00 mU/L Final   ]    All laboratory data reviewed    Results for orders placed or performed during the hospital encounter of 12/07/17   CT Chest/Abdomen/Pelvis w Contrast     Narrative    CT CHEST, ABDOMEN, AND PELVIS WITH CONTRAST  12/7/2017 9:31 AM     HISTORY: Follow up metastatic colon cancer to liver (H).    COMPARISON: PET/CT 8/25/2017. Abdomen and pelvis CT 10/7/2017.    TECHNIQUE: Volumetric helical acquisition of CT images from the lung  apices through the symphysis pubis after the administration of 65mL  Isovue-370 intravenous contrast. Radiation dose for this scan was  reduced using automated exposure control, adjustment of the mA and/or  kV according to patient size, or iterative reconstruction technique.    FINDINGS:   Chest: A 4 mm nodule in the right upper lobe (image 10) is unchanged.  A 0.7 cm nodule in the left upper lobe (image 17) has slightly  increased in size compared with the previous examination, when it  measured 0.5 cm. A 0.6 cm left lower lobe nodule (image 23) is stable.  A 0.3 cm left lower lobe nodule (image 37) was not seen previously.    Subcutaneous port in the right anterior chest wall. Catheter tip is in  the SVC. No axillary, hilar, or mediastinal lymphadenopathy. No  pleural effusion.    Abdomen and pelvis: Innumerable hepatic metastases. Some of the  lesions have decreased in size. As an example, a lesion in the dome of  the liver measures 2.8 x 2.8 cm compared with 3.8 x 4.0 cm previously.  There is a subtle 2.1 cm hypoechoic lesion in the spleen, not seen  previously. The pancreas, adrenal glands, and kidneys are  unremarkable. There is wall thickening of the cecum likely  corresponding to the primary colonic malignancy. No evidence of bowel  obstruction. There is mesenteric lymphadenopathy. A conglomeration of  lymph nodes in the right lower quadrant adjacent to the cecum measures  1.9 x 1.8 cm, compared with 1.9 x 2.3 cm previously. No ascites or  fluid collections.    There is chronic-appearing deformity of the proximal left femur. No  suspicious bone lesions are identified.      Impression    IMPRESSION:   1. Slight interval increase in size  of left upper lobe pulmonary  nodule. There is also a new nodule identified in the left lower lobe.  Additional pulmonary metastases are stable.  2. Extensive hepatic metastases. Some of the hepatic lesions have  decreased in size.  3. Interval decrease in mesenteric lymphadenopathy.    LARS ALMENDAREZ MD         ASSESSMENT AND PLAN     67-year-old female with    Metastatic colorectal adenocarcinoma with liver and lung metastases  Currently on Avastin , Irinotecan and oxaliplatin regimen and is status post 5 cycles  Recent scans in December showed good response  We'll proceed with cycle 6 of chemotherapy today  Assess disease response with scans every 3 months    Normocytic anemia  Chemotherapy-related  Was transfused 1 unit of packed red blood cells yesterday    Hypokalemia.   likely secondary to recent vomiting  Patient is on p.o. Potassium replacement 40 mg total daily    Neuropathy  Drug related  improved grade 1 with dose reduction of oxaliplatin      Return to infusion in 2 weeks for next cycle and in 4 weeks with Dr. Dove    Chart documentation with Dragon Voice recognition Software. Although reviewed after completion, some words and grammatical errors may remain.  Ronny Mosquera MD  Attending Physician   Hematology/Medical Oncology    Again, thank you for allowing me to participate in the care of your patient.        Sincerely,        Ronny Mosquera MD

## 2018-01-09 NOTE — PROGRESS NOTES
FOLLOW-UP VISIT NOTE    PATIENT NAME: Charlene Douglas MRN # [id number]  DATE OF VISIT: Jan 9, 2018 YOB: 1950    REFERRING PROVIDER: No referring provider defined for this encounter.    CANCER TYPE:Colorecal adenocarcinoma  STAGE: IV  ECOG PS: 1    ONCOLOGY HISTORY:    67-year-old female who in August 2017 presented with complaints of blood in the stools, abdominal pain loss of appetite. Imaging revealed ascending colon mass with associated mesenteric lymphadenopathy as well as multiple liver metastases and lung nodules. Rectal mass biopsy as well as liver biopsy were consistent with adenocarcinoma colorectal primary- K-eugenie mutated.    He was started on systemic chemotherapy with FOLFOX with Avastin. However developed severe cardiogenic toxicity from 5-FU and in the hospital with acute coronary respiratory failure requiring intubation with EF decreased to 10-15% which did improve during hospital course to 40%. Subsequently 5 - FU was discontinued.    Patient is currently on Avastin in combination with Avastin in combination with Oxaliplatin and irinotecan    SUBJECTIVE     PATIENT IS HERE FOR HIS NEXT CYCLE OF CHEMOTHERAPY. Neuropathy was much improved with this cycle after O Renan was dose reduced last time. He had last 2 days, she developed nausea and vomiting which has resolved by today. Denies fever/chills, abdominal pain, weight loss, worsening fatigue or any other complaints.      PAST MEDICAL HISTORY     Past Medical History:   Diagnosis Date     Colon cancer metastasized to liver (H)      Hypertension      NO ACTIVE PROBLEMS          CURRENT OUTPATIENT MEDICATIONS     Current Outpatient Prescriptions   Medication Sig Dispense Refill     Potassium Chloride ER 20 MEQ TBCR Take 2 tablets (40 mEq) by mouth daily 30 tablet 0     ranitidine (ZANTAC) 300 MG tablet Take 1 tablet (300 mg) by mouth At Bedtime 30 tablet 1     loperamide (IMODIUM) 2 MG capsule 2 caps at 1st sign of diarrhea & 1 cap  every 2hrs until 12hrs diarrhea free. During night, 2 caps at bedtime & 2 caps every 4hrs until AM 30 capsule 0     oxyCODONE IR (ROXICODONE) 5 MG tablet Take 1 tablet (5 mg) by mouth every 8 hours as needed for pain 20 tablet 0     carvedilol (COREG) 12.5 MG tablet Take 1.5 tablets (18.75 mg) by mouth 2 times daily (with meals) 90 tablet 1     benzonatate (TESSALON PERLES) 100 MG capsule Take 2 capsules (200 mg) by mouth 3 times daily as needed for cough 42 capsule 1     dexamethasone (DECADRON) 4 MG tablet Take 2 tablets (8 mg) by mouth daily (with breakfast) Days 2, 3, and 4 of each cycle. 6 tablet 11     lidocaine (LIDODERM) 5 % Patch Place 1-3 patches onto the skin every 24 hours To RUQ 30 patch 1     guaiFENesin-codeine (ROBITUSSIN AC) 100-10 MG/5ML SOLN solution Take 5 mLs by mouth every 4 hours as needed for cough 120 mL 0     LORazepam (ATIVAN) 0.5 MG tablet Take 1 tablet (0.5 mg) by mouth every 4 hours as needed (Anxiety, Nausea/Vomiting or Sleep) 30 tablet 5     prochlorperazine (COMPAZINE) 10 MG tablet Take 1 tablet (10 mg) by mouth every 6 hours as needed (Nausea/Vomiting) 30 tablet 5     losartan (COZAAR) 25 MG tablet Take 1 tablet (25 mg) by mouth daily 30 tablet 11     ferrous sulfate (IRON) 325 (65 FE) MG tablet Take 325 mg by mouth daily (with breakfast)       diclofenac (VOLTAREN) 1 % GEL topical gel Place 2 g onto the skin 4 times daily as needed for moderate pain (for RUQ pain)       sennosides (SENOKOT) 8.6 MG tablet Take 1-2 tablets by mouth 2 times daily as needed for constipation 120 each      ACETAMINOPHEN PO Take 650 mg by mouth every 4 hours as needed for pain or fever           ALLERGIES     Allergies   Allergen Reactions     Lisinopril Cough     No Known Allergies         REVIEW OF SYSTEMS   As above in the HPI, o/w complete 12-point ROS was negative.     PHYSICAL EXAM   B/P: 163/86, T: 97.8, P: 95, R: 16  SpO2 Readings from Last 4 Encounters:   01/09/18 100%   01/08/18 100%   12/21/17  99%   12/12/17 100%     Wt Readings from Last 3 Encounters:   01/09/18 58.7 kg (129 lb 8 oz)   12/12/17 58 kg (127 lb 14.4 oz)   12/12/17 58 kg (127 lb 14.4 oz)     GEN: NAD  EYES:PERRLA  Mouth/ENT: Oropharynx is clear.  NECK: no cervical or supraclavicular lymphadenopathy  LUNGS: clear bilaterally  CV: regular, no murmurs, rubs, or gallops  ABDOMEN: soft, non-tender, non-distended, normal bowel sounds, no hepatosplenomegaly by percussion or palpation  EXT: warm, well perfused, no edema  NEURO: alert  SKIN: no rashes     LABORATORY AND IMAGING STUDIES     Recent Labs   Lab Test  01/08/18   0850  12/27/17   0915   09/19/17   0651  09/18/17   0605   NA  137  138   < >  132*  136   POTASSIUM  3.2*  3.5   < >  4.6  4.6   CHLORIDE  103  104   < >  101  105   CO2  23  26   < >  23  23   ANIONGAP  11  8   < >  8  9   BUN  9  9   < >  17  20   CR  0.59  0.60   < >  0.90  0.86   GLC  130*  97   < >  141*  110*   RON  8.4*  8.4*   < >  8.8  8.0*   MAG   --    --    --   1.9  1.8   PHOS   --    --    --   2.4*  2.2*    < > = values in this interval not displayed.     Recent Labs   Lab Test  01/08/18   0850  12/27/17   0915  12/21/17   1115   WBC  4.2  4.5  3.9*   HGB  7.3*  7.9*  7.3*   PLT  214  200  185   MCV  94  92  93   NEUTROPHIL  48.2  36.5  47.0     Recent Labs   Lab Test  01/08/18   0850  12/27/17   0915  12/12/17   0820   BILITOTAL  0.3  0.4  0.4   ALKPHOS  419*  440*  433*   ALT  53*  39  40   AST  34  50*  33   ALBUMIN  1.9*  2.1*  2.1*     TSH   Date Value Ref Range Status   08/07/2017 1.92 0.40 - 4.00 mU/L Final   ]    All laboratory data reviewed    Results for orders placed or performed during the hospital encounter of 12/07/17   CT Chest/Abdomen/Pelvis w Contrast    Narrative    CT CHEST, ABDOMEN, AND PELVIS WITH CONTRAST  12/7/2017 9:31 AM     HISTORY: Follow up metastatic colon cancer to liver (H).    COMPARISON: PET/CT 8/25/2017. Abdomen and pelvis CT 10/7/2017.    TECHNIQUE: Volumetric helical acquisition  of CT images from the lung  apices through the symphysis pubis after the administration of 65mL  Isovue-370 intravenous contrast. Radiation dose for this scan was  reduced using automated exposure control, adjustment of the mA and/or  kV according to patient size, or iterative reconstruction technique.    FINDINGS:   Chest: A 4 mm nodule in the right upper lobe (image 10) is unchanged.  A 0.7 cm nodule in the left upper lobe (image 17) has slightly  increased in size compared with the previous examination, when it  measured 0.5 cm. A 0.6 cm left lower lobe nodule (image 23) is stable.  A 0.3 cm left lower lobe nodule (image 37) was not seen previously.    Subcutaneous port in the right anterior chest wall. Catheter tip is in  the SVC. No axillary, hilar, or mediastinal lymphadenopathy. No  pleural effusion.    Abdomen and pelvis: Innumerable hepatic metastases. Some of the  lesions have decreased in size. As an example, a lesion in the dome of  the liver measures 2.8 x 2.8 cm compared with 3.8 x 4.0 cm previously.  There is a subtle 2.1 cm hypoechoic lesion in the spleen, not seen  previously. The pancreas, adrenal glands, and kidneys are  unremarkable. There is wall thickening of the cecum likely  corresponding to the primary colonic malignancy. No evidence of bowel  obstruction. There is mesenteric lymphadenopathy. A conglomeration of  lymph nodes in the right lower quadrant adjacent to the cecum measures  1.9 x 1.8 cm, compared with 1.9 x 2.3 cm previously. No ascites or  fluid collections.    There is chronic-appearing deformity of the proximal left femur. No  suspicious bone lesions are identified.      Impression    IMPRESSION:   1. Slight interval increase in size of left upper lobe pulmonary  nodule. There is also a new nodule identified in the left lower lobe.  Additional pulmonary metastases are stable.  2. Extensive hepatic metastases. Some of the hepatic lesions have  decreased in size.  3. Interval  decrease in mesenteric lymphadenopathy.    LARS ALMENDAREZ MD         ASSESSMENT AND PLAN     67-year-old female with    Metastatic colorectal adenocarcinoma with liver and lung metastases  Currently on Avastin , Irinotecan and oxaliplatin regimen and is status post 5 cycles  Recent scans in December showed good response  We'll proceed with cycle 6 of chemotherapy today  Assess disease response with scans every 3 months    Normocytic anemia  Chemotherapy-related  Was transfused 1 unit of packed red blood cells yesterday    Hypokalemia.   likely secondary to recent vomiting  Patient is on p.o. Potassium replacement 40 mg total daily    Neuropathy  Drug related  improved grade 1 with dose reduction of oxaliplatin      Return to infusion in 2 weeks for next cycle and in 4 weeks with Dr. Dove    Chart documentation with Dragon Voice recognition Software. Although reviewed after completion, some words and grammatical errors may remain.  Ronny Mosquera MD  Attending Physician   Hematology/Medical Oncology

## 2018-01-09 NOTE — PATIENT INSTRUCTIONS
Pt to return on 01/23/18 for C7 D1 Chemo. Copies of medication list and upcoming appointments given prior to discharge.

## 2018-01-09 NOTE — PATIENT INSTRUCTIONS
Please follow up with Dr. Dove in 4 weeks.      Infusion Date/Time:  1/23/18 at 8:00    Follow Up Date/Time: 2/6/18 at 8:30 with infusion to follow at 9:00  Port labs in infusion prior at 8:00.    If you have any questions or concerns please feel free to call.    If you need to reschedule please call:  Clinic or Lab Appointment - [phone]  Infusion - [phone]  Imaging - [phone]    Stan Balderas, RN, BSN, OCN   Oncology Care Coordinator RN  [facility]  [phone]

## 2018-01-09 NOTE — NURSING NOTE
"Oncology Rooming Note    January 9, 2018 8:37 AM   Charlene LUCAS Misael is a 67 year old female who presents for:    Chief Complaint   Patient presents with     Oncology Clinic Visit     1 month follow up for Metastatic colon cancer to liver      Chemotherapy     C6D1 today with labs prior     Initial Vitals: /86 (BP Location: Right arm, Patient Position: Chair, Cuff Size: Adult Regular)  Pulse 95  Temp 97.8  F (36.6  C) (Temporal)  Resp 16  Ht 1.689 m (5' 6.5\")  Wt 58.7 kg (129 lb 8 oz)  LMP 06/07/2007  SpO2 100%  BMI 20.59 kg/m2 Estimated body mass index is 20.59 kg/(m^2) as calculated from the following:    Height as of this encounter: 1.689 m (5' 6.5\").    Weight as of this encounter: 58.7 kg (129 lb 8 oz). Body surface area is 1.66 meters squared.  Mild Pain (3) Comment: Ribs & RLQ   Patient's last menstrual period was 06/07/2007.  Allergies reviewed: Yes  Medications reviewed: Yes    Medications: MEDICATION REFILLS NEEDED TODAY. Provider was notified.  Pharmacy name entered into [facility]:    [facility] - [id number] [facility]    Clinical concerns: Vomiting x 3 days Dr. Mosquera was notified.    Elsa Butts MA              "

## 2018-01-09 NOTE — PROGRESS NOTES
"Patient here for C6 D1 Avastin/Irinotecan/Oxaliplatin chemotherapy today. Labs/BSA/Dose verified by 2 RN'S. Hgb-7.3, ANC-2.0, Plt-214, K+-3.2 and Urine Protein-Neg.  MD aware of lab results from yesterday. BP tends to be elevated, denies headache or dizziness, does have BP meds at home. Patient will monitor it and contact Cardiologist if remains elevated at home. Premeds given and tolerated chemotherapy well.  Does complain of legs \"jumpy\" and its uncomfortable, not a new symptom from chemo. Lorazepam 0.5 mg given x 1 for comfort. Positive blood return pre/post infusion. Discharged home with  in stable condition.    "

## 2018-01-09 NOTE — MR AVS SNAPSHOT
After Visit Summary   1/9/2018    Charlene Douglas    MRN: [id number]           Patient Information     Date Of Birth          1950        Visit Information        Provider Department      1/9/2018 9:00 AM NL INFUSION CHAIR 4 [facility]        Today's Diagnoses     Metastatic colon cancer to liver (H)    -  1    Neutropenic fever (H)          Care Instructions    Pt to return on 01/23/18 for C7 D1 Chemo. Copies of medication list and upcoming appointments given prior to discharge.             Follow-ups after your visit        Follow-up notes from your care team     Return in about 2 weeks (around 1/23/2018).      Your next 10 appointments already scheduled     Jan 12, 2018 10:00 AM CST   Ech Limited with [id number]   [facility] ([facility])    [address] [place] [address] [place] [address]   [phone]           1.  Please bring or wear a comfortable two-piece outfit. 2.  You may eat, drink and take your normal medicines. 3.  For any questions that cannot be answered, please contact the ordering physician            Jan 18, 2018  9:30 AM CST   Return Visit with Rey Koehler MD   [facility] ([facility])    [address] [place] [address]   [phone]            Jan 23, 2018  8:00 AM CST   Level 7 with NL INFUSION CHAIR 2   [facility] ([facility])    [address] [place] [address] [address] [place] [address]   [phone]            Feb 06, 2018  8:30 AM CST   Level 7 with NL INFUSION CHAIR 2   [facility] ([facility])    [facility] [address] [place] [address]   [phone]            Feb 06, 2018  8:30 AM CST   Return Visit with Duy Dove MD   [facility] ([facility])    [address] [place] [address]   [phone]              Who to contact  "    If you have questions or need follow up information about today's clinic visit or your schedule please contact [facility] directly at [phone].  Normal or non-critical lab and imaging results will be communicated to you by MyChart, letter or phone within 4 business days after the clinic has received the results. If you do not hear from us within 7 days, please contact the clinic through [employer] or phone. If you have a critical or abnormal lab result, we will notify you by phone as soon as possible.  Submit refill requests through [employer] or call your pharmacy and they will forward the refill request to us. Please allow 3 business days for your refill to be completed.          Additional Information About Your Visit        [employer] Information     [employer] gives you secure access to your electronic health record. If you see a primary care provider, you can also send messages to your care team and make appointments. If you have questions, please call your primary care clinic.  If you do not have a primary care provider, please call [phone] and they will assist you.        Care EveryWhere ID     This is your Care EveryWhere ID. This could be used by other organizations to access your [facility] medical records  [id number]        Your Vitals Were     Pulse Temperature Respirations Height Last Period Pulse Oximetry    86 98.2  F (36.8  C) (Temporal) 18 1.689 m (5' 6.5\") 06/07/2007 100%    BMI (Body Mass Index)                   20.59 kg/m2            Blood Pressure from Last 3 Encounters:   01/09/18 177/79   01/09/18 163/86   01/08/18 172/78    Weight from Last 3 Encounters:   01/09/18 58.7 kg (129 lb 8 oz)   01/09/18 58.7 kg (129 lb 8 oz)   12/12/17 58 kg (127 lb 14.4 oz)              Today, you had the following     No orders found for display         Where to get your medicines      These medications were sent to [facility] - [address]   [id number] " [place] [address]     Phone:  [phone]     Potassium Chloride ER 20 MEQ Tbcr    ranitidine 300 MG tablet          Primary Care Provider Office Phone # Fax #    Donna Shirley PA-C [phone] [address] [place] [address]        Equal Access to Services     [facility]: Hadii aad ku hadasho Soomaali, waaxda luqadaha, qaybta kaalmada adeegyada, waxay idiin hayaan adeeg khwinsomesh la'aan . So [facility] [phone].    ATENCIÓN: Si habla español, tiene a hunt disposición servicios gratuitos de asistencia lingüística. [facility] [phone].    We comply with applicable federal civil rights laws and [place] laws. We do not discriminate on the basis of race, color, national origin, age, disability, sex, sexual orientation, or gender identity.            Thank you!     Thank you for choosing [facility]  for your care. Our goal is always to provide you with excellent care. Hearing back from our patients is one way we can continue to improve our services. Please take a few minutes to complete the written survey that you may receive in the mail after your visit with us. Thank you!             Your Updated Medication List - Protect others around you: Learn how to safely use, store and throw away your medicines at www.disposemymeds.org.          This list is accurate as of: 1/9/18  3:25 PM.  Always use your most recent med list.                   Brand Name Dispense Instructions for use Diagnosis    ACETAMINOPHEN PO      Take 650 mg by mouth every 4 hours as needed for pain or fever        benzonatate 100 MG capsule    TESSALON PERLES    42 capsule    Take 2 capsules (200 mg) by mouth 3 times daily as needed for cough    Metastatic colon cancer to liver (H)       carvedilol 12.5 MG tablet    COREG    90 tablet    Take 1.5 tablets (18.75 mg) by mouth 2 times daily (with meals)    Chronic systolic congestive heart failure (H)       dexamethasone 4 MG tablet     DECADRON    6 tablet    Take 2 tablets (8 mg) by mouth daily (with breakfast) Days 2, 3, and 4 of each cycle.    Metastatic colon cancer to liver (H)       diclofenac 1 % Gel topical gel    VOLTAREN     Place 2 g onto the skin 4 times daily as needed for moderate pain (for RUQ pain)    Metastatic colon cancer to liver (H)       ferrous sulfate 325 (65 FE) MG tablet    IRON     Take 325 mg by mouth daily (with breakfast)        guaiFENesin-codeine 100-10 MG/5ML Soln solution    ROBITUSSIN AC    120 mL    Take 5 mLs by mouth every 4 hours as needed for cough    Liver metastasis (H)       lidocaine 5 % Patch    LIDODERM    30 patch    Place 1-3 patches onto the skin every 24 hours To RUQ    Metastatic colon cancer to liver (H)       loperamide 2 MG capsule    IMODIUM    30 capsule    2 caps at 1st sign of diarrhea & 1 cap every 2hrs until 12hrs diarrhea free. During night, 2 caps at bedtime & 2 caps every 4hrs until AM    Metastatic colon cancer to liver (H)       LORazepam 0.5 MG tablet    ATIVAN    30 tablet    Take 1 tablet (0.5 mg) by mouth every 4 hours as needed (Anxiety, Nausea/Vomiting or Sleep)    Metastatic colon cancer to liver (H)       losartan 25 MG tablet    COZAAR    30 tablet    Take 1 tablet (25 mg) by mouth daily    Secondary cardiomyopathy (H)       oxyCODONE IR 5 MG tablet    ROXICODONE    20 tablet    Take 1 tablet (5 mg) by mouth every 8 hours as needed for pain    Metastatic colon cancer to liver (H)       Potassium Chloride ER 20 MEQ Tbcr     30 tablet    Take 2 tablets (40 mEq) by mouth daily    Hypokalemia       prochlorperazine 10 MG tablet    COMPAZINE    30 tablet    Take 1 tablet (10 mg) by mouth every 6 hours as needed (Nausea/Vomiting)    Metastatic colon cancer to liver (H)       ranitidine 300 MG tablet    ZANTAC    30 tablet    Take 1 tablet (300 mg) by mouth At Bedtime    Nausea and vomiting, intractability of vomiting not specified, unspecified vomiting type       sennosides 8.6  MG tablet    SENOKOT    120 each    Take 1-2 tablets by mouth 2 times daily as needed for constipation    Slow transit constipation

## 2018-01-19 NOTE — TELEPHONE ENCOUNTER
Ok to disregard, she has stage 4 colon cancer, this is already being monitored  Donna Shirley PA-C

## 2018-01-23 NOTE — PATIENT INSTRUCTIONS
Pt to return on 2/6/18 for port labs/chemo. Copies of medication list and upcoming appointments given prior to discharge.

## 2018-01-23 NOTE — MR AVS SNAPSHOT
After Visit Summary   1/23/2018    Charlene Douglas    MRN: [id number]           Patient Information     Date Of Birth          1950        Visit Information        Provider Department      1/23/2018 8:00 AM NL INFUSION CHAIR 2 [facility]        Today's Diagnoses     Neutropenic fever (H)    -  1    Metastatic colon cancer to liver (H)          Care Instructions    Pt to return on 2/6/18 for port labs/chemo. Copies of medication list and upcoming appointments given prior to discharge.           Follow-ups after your visit        Your next 10 appointments already scheduled     Feb 01, 2018  3:30 PM CST   Return Visit with Jc Madden MD   [facility] ([address] [phone] [phone]            Feb 06, 2018  8:30 AM CST   Level 7 with NL INFUSION CHAIR 2   [facility] ([address] [id number] [phone]            Feb 06, 2018  8:30 AM CST   Return Visit with Duy Dove MD   [facility]    [address] [place] [id number] [phone]              Who to contact     If you have questions or need follow up information about today's clinic visit or your schedule please contact [facility] INFUSION [facility] directly at [phone].  Normal or non-critical lab and imaging results will be communicated to you by MyChart, letter or phone within 4 business days after the clinic has received the results. If you do not hear from us within 7 days, please contact the clinic through MyChart or phone. If you have a critical or abnormal lab result, we will notify you by phone as soon as possible.  Submit refill requests through [employer] or call your pharmacy and they will forward the refill request to us. Please allow 3 business days for  your refill to be completed.          Additional Information About Your Visit        MyChart Information     [employer] gives you secure access to your electronic health record. If you see a primary care provider, you can also send messages to your care team and make appointments. If you have questions, please call your primary care clinic.  If you do not have a primary care provider, please call [phone] and they will assist you.        Care EveryWhere ID     This is your Care EveryWhere ID. This could be used by other organizations to access your [facility] medical records  [id number]        Your Vitals Were     Pulse Temperature Respirations Last Period Pulse Oximetry BMI (Body Mass Index)    93 97.4  F (36.3  C) (Temporal) 16 06/07/2007 100% 20.05 kg/m2       Blood Pressure from Last 3 Encounters:   01/23/18 155/73   01/09/18 177/79   01/09/18 163/86    Weight from Last 3 Encounters:   01/23/18 57.2 kg (126 lb 1.6 oz)   01/09/18 58.7 kg (129 lb 8 oz)   01/09/18 58.7 kg (129 lb 8 oz)              We Performed the Following     CBC with platelets differential     Comprehensive metabolic panel     Protein qualitative urine        Primary Care Provider Office Phone # Fax #    Donna Shirley PA-C [phone] [address] [place] [address]        Equal Access to Services     [facility]: Hadii aad ku hadasho Soomaali, waaxda luqadaha, qaybta kaalmada adeegyalorenzo, debra rowe . So [facility] [phone].    ATENCIÓN: Si habla español, tiene a hunt disposición servicios gratuitos de asistencia lingüística. Llame al [phone].    We comply with applicable federal civil rights laws and [place] laws. We do not discriminate on the basis of race, color, national origin, age, disability, sex, sexual orientation, or gender identity.            Thank you!     Thank you for choosing [facility]  for your care. Our goal is always to provide you with  excellent care. Hearing back from our patients is one way we can continue to improve our services. Please take a few minutes to complete the written survey that you may receive in the mail after your visit with us. Thank you!             Your Updated Medication List - Protect others around you: Learn how to safely use, store and throw away your medicines at www.disposemymeds.org.          This list is accurate as of: 1/23/18  3:37 PM.  Always use your most recent med list.                   Brand Name Dispense Instructions for use Diagnosis    ACETAMINOPHEN PO      Take 650 mg by mouth every 4 hours as needed for pain or fever        benzonatate 100 MG capsule    TESSALON PERLES    42 capsule    Take 2 capsules (200 mg) by mouth 3 times daily as needed for cough    Metastatic colon cancer to liver (H)       carvedilol 12.5 MG tablet    COREG    90 tablet    Take 1.5 tablets (18.75 mg) by mouth 2 times daily (with meals)    Chronic systolic congestive heart failure (H)       dexamethasone 4 MG tablet    DECADRON    6 tablet    Take 2 tablets (8 mg) by mouth daily (with breakfast) Days 2, 3, and 4 of each cycle.    Metastatic colon cancer to liver (H)       diclofenac 1 % Gel topical gel    VOLTAREN     Place 2 g onto the skin 4 times daily as needed for moderate pain (for RUQ pain)    Metastatic colon cancer to liver (H)       ferrous sulfate 325 (65 FE) MG tablet    IRON     Take 325 mg by mouth 2 times daily        guaiFENesin-codeine 100-10 MG/5ML Soln solution    ROBITUSSIN AC    120 mL    Take 5 mLs by mouth every 4 hours as needed for cough    Liver metastasis (H)       lidocaine 5 % Patch    LIDODERM    30 patch    Place 1-3 patches onto the skin every 24 hours To RUQ    Metastatic colon cancer to liver (H)       loperamide 2 MG capsule    IMODIUM    30 capsule    2 caps at 1st sign of diarrhea & 1 cap every 2hrs until 12hrs diarrhea free. During night, 2 caps at bedtime & 2 caps every 4hrs until AM     Metastatic colon cancer to liver (H)       LORazepam 0.5 MG tablet    ATIVAN    30 tablet    Take 1 tablet (0.5 mg) by mouth every 4 hours as needed (Anxiety, Nausea/Vomiting or Sleep)    Metastatic colon cancer to liver (H)       losartan 25 MG tablet    COZAAR    30 tablet    Take 1 tablet (25 mg) by mouth daily    Secondary cardiomyopathy (H)       oxyCODONE IR 5 MG tablet    ROXICODONE    20 tablet    Take 1 tablet (5 mg) by mouth every 8 hours as needed for pain    Metastatic colon cancer to liver (H)       Potassium Chloride ER 20 MEQ Tbcr     30 tablet    Take 2 tablets (40 mEq) by mouth daily    Hypokalemia       prochlorperazine 10 MG tablet    COMPAZINE    30 tablet    Take 1 tablet (10 mg) by mouth every 6 hours as needed (Nausea/Vomiting)    Metastatic colon cancer to liver (H)       ranitidine 300 MG tablet    ZANTAC    30 tablet    Take 1 tablet (300 mg) by mouth At Bedtime    Nausea and vomiting, intractability of vomiting not specified, unspecified vomiting type       sennosides 8.6 MG tablet    SENOKOT    120 each    Take 1-2 tablets by mouth 2 times daily as needed for constipation    Slow transit constipation

## 2018-02-01 NOTE — NURSING NOTE
"Chief Complaint   Patient presents with     RECHECK     3 month follow up for cardiomyopathy       Initial /68 (BP Location: Right arm, Patient Position: Chair, Cuff Size: Adult Regular)  Pulse 100  Ht 1.689 m (5' 6.5\")  Wt 57.2 kg (126 lb)  LMP 06/07/2007  SpO2 99%  BMI 20.03 kg/m2 Estimated body mass index is 20.03 kg/(m^2) as calculated from the following:    Height as of this encounter: 1.689 m (5' 6.5\").    Weight as of this encounter: 57.2 kg (126 lb).  Medication Reconciliation: complete     Rosanna Jack CMA       "

## 2018-02-01 NOTE — PROGRESS NOTES
HPI and Plan:   See dictation(#[id number])    Orders Placed This Encounter   Procedures     Follow-Up with Cardiac Advanced Practice Provider       No orders of the defined types were placed in this encounter.      There are no discontinued medications.      Encounter Diagnosis   Name Primary?     Secondary cardiomyopathy (H)        CURRENT MEDICATIONS:  Current Outpatient Prescriptions   Medication Sig Dispense Refill     Potassium Chloride ER 20 MEQ TBCR Take 2 tablets (40 mEq) by mouth daily 30 tablet 0     ranitidine (ZANTAC) 300 MG tablet Take 1 tablet (300 mg) by mouth At Bedtime 30 tablet 1     loperamide (IMODIUM) 2 MG capsule 2 caps at 1st sign of diarrhea & 1 cap every 2hrs until 12hrs diarrhea free. During night, 2 caps at bedtime & 2 caps every 4hrs until AM 30 capsule 0     oxyCODONE IR (ROXICODONE) 5 MG tablet Take 1 tablet (5 mg) by mouth every 8 hours as needed for pain 20 tablet 0     carvedilol (COREG) 12.5 MG tablet Take 1.5 tablets (18.75 mg) by mouth 2 times daily (with meals) 90 tablet 1     benzonatate (TESSALON PERLES) 100 MG capsule Take 2 capsules (200 mg) by mouth 3 times daily as needed for cough 42 capsule 1     dexamethasone (DECADRON) 4 MG tablet Take 2 tablets (8 mg) by mouth daily (with breakfast) Days 2, 3, and 4 of each cycle. 6 tablet 11     lidocaine (LIDODERM) 5 % Patch Place 1-3 patches onto the skin every 24 hours To RUQ 30 patch 1     guaiFENesin-codeine (ROBITUSSIN AC) 100-10 MG/5ML SOLN solution Take 5 mLs by mouth every 4 hours as needed for cough 120 mL 0     LORazepam (ATIVAN) 0.5 MG tablet Take 1 tablet (0.5 mg) by mouth every 4 hours as needed (Anxiety, Nausea/Vomiting or Sleep) 30 tablet 5     prochlorperazine (COMPAZINE) 10 MG tablet Take 1 tablet (10 mg) by mouth every 6 hours as needed (Nausea/Vomiting) 30 tablet 5     losartan (COZAAR) 25 MG tablet Take 1 tablet (25 mg) by mouth daily 30 tablet 11     ferrous sulfate (IRON) 325 (65 FE) MG tablet Take 325 mg by mouth 2  times daily        diclofenac (VOLTAREN) 1 % GEL topical gel Place 2 g onto the skin 4 times daily as needed for moderate pain (for RUQ pain)       sennosides (SENOKOT) 8.6 MG tablet Take 1-2 tablets by mouth 2 times daily as needed for constipation 120 each      ACETAMINOPHEN PO Take 650 mg by mouth every 4 hours as needed for pain or fever          ALLERGIES     Allergies   Allergen Reactions     Lisinopril Cough     No Known Allergies        PAST MEDICAL HISTORY:  Past Medical History:   Diagnosis Date     Colon cancer metastasized to liver (H)      Hypertension      NO ACTIVE PROBLEMS        PAST SURGICAL HISTORY:  Past Surgical History:   Procedure Laterality Date     C APPENDECTOMY       C  DELIVERY ONLY       C NONSPECIFIC PROCEDURE  1964    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     COLONOSCOPY N/A 2017    Procedure: COMBINED COLONOSCOPY, SINGLE OR MULTIPLE BIOPSY/POLYPECTOMY BY BIOPSY;;  Surgeon: Duane, William Charles, MD;  Location: [facility] OR     COLONOSCOPY WITH CO2 INSUFFLATION N/A 2017    Procedure: COLONOSCOPY WITH CO2 INSUFFLATION;  BMI: 25.2  Referring: Donna Mota  Diagnoses: Positive FIT (fecal immunochemical test)  Special screening for malignant neoplasms, colon  Pharmacy: [facility] Fax: [phone];  Surgeon: Duane, William Charles, MD;  Location: [facility] OR     EXAM UNDER ANESTHESIA, ULTRASOUND N/A 2017    Procedure: EXAM UNDER ANESTHESIA, ULTRASOUND;  Ultrasound Liver Biopsy;  Surgeon: GENERIC ANESTHESIA PROVIDER;  Location: [facility] OR     HIP SURGERY      14 years old     INSERT PORT VASCULAR ACCESS N/A 2017    Procedure: INSERT PORT VASCULAR ACCESS;  Port placement;  Surgeon: Vinny Peterson DO;  Location: [facility] OR       FAMILY HISTORY:  Family History   Problem Relation Age of Onset     C.A.D. Mother      quad- bypass at age 78     CEREBROVASCULAR DISEASE Mother      in her 70's     Lipids Mother      Hypertension Mother      Other -  "See Comments Mother      Triple Bi-pass     Circulatory Father      abd aneurysm     GASTROINTESTINAL DISEASE Father      diverticulitis     Hypertension Father        SOCIAL HISTORY:  Social History     Social History     Marital status:      Spouse name: Praveen Nobles)     Number of children: 2     Years of education: 14     Occupational History           [employer] in [place]     Social History Main Topics     Smoking status: Never Smoker     Smokeless tobacco: Never Used     Alcohol use No      Comment: 1 per week     Drug use: No     Sexual activity: Yes     Partners: Male     Birth control/ protection: Surgical, Male Surgical      Comment:  had a vasectomy     Other Topics Concern      Service No     Blood Transfusions Yes     1962     Caffeine Concern No     Occupational Exposure No     Hobby Hazards No     Sleep Concern No     Stress Concern No     Weight Concern Yes     gradual weight gain     Special Diet No     Back Care Yes     low back pain occasionally     Exercise No     Bike Helmet No     n/a     Seat Belt Yes     uses seatbelts 100%     Self-Exams Yes     does monthly breast exams     Social History Narrative       Review of Systems:  Skin:  Negative       Eyes:  Positive for glasses    ENT:  Negative      Respiratory:  Positive for shortness of breath     Cardiovascular:  Negative      Gastroenterology: Positive for diarrhea    Genitourinary:  Negative      Musculoskeletal:  Negative      Neurologic:  Negative      Psychiatric:  Negative      Heme/Lymph/Imm:  Negative      Endocrine:  Negative        Physical Exam:  Vitals: /68 (BP Location: Right arm, Patient Position: Chair, Cuff Size: Adult Regular)  Pulse 92  Ht 1.689 m (5' 6.5\")  Wt 57.2 kg (126 lb)  LMP 06/07/2007  SpO2 99%  BMI 20.03 kg/m2    Constitutional:           Skin:             Head:           Eyes:           Lymph:      ENT:           Neck:           Respiratory:        "     Cardiac:                                                           GI:           Extremities and Muscular Skeletal:                 Neurological:           Psych:             CC  Mamta Rutherford, APRN CNP  [facility]  [address]  [place] [id number]

## 2018-02-01 NOTE — LETTER
2/1/2018      Donna Shirley PA-C  [address] [place] [address]      RE: Charlene Douglas       Dear Colleague,    I had the pleasure of seeing Charlene Douglas in the [facility].    Service Date: 02/01/2018      REASON FOR CLINIC VISIT:  Followup cardiomyopathy.      HISTORY OF PRESENT ILLNESS:  Ms. Douglas is a very pleasant 67-year-old female with metastatic colon cancer with mets to liver, who had acute 5-fluorouracil cardiotoxicity leading to cardiogenic shock, with initial LVEF going down to 10%-15% that required ICU cares, intubation.  This happened in 09/2017.  In the followup, patient has done quite well.  Her LVEF improved, and last echocardiogram done a few weeks ago showed LV function has completely normalized, LVEF is 58% with normal global peak longitudinal strain, normal RV systolic function.  She is now on oxaliplatin chemotherapy.  The patient tells me that she has no specific cardiac complaints.  No chest discomfort or shortness of breath.  Her main complaint is fatigue.  To be noted, she has anemia with hemoglobin of 8.7.  Presently she is on 18.75 mg b.i.d. of carvedilol and 25 mg daily of losartan.      PHYSICAL EXAMINATION:   VITAL SIGNS:  Blood pressure 136/68, heart rate 92 regular, weight 126 pounds, BMI 20.07.   GENERAL:  The patient appears pleasant, comfortable.   NECK:  Normal JVP, no bruit.   CARDIOVASCULAR SYSTEM:  S1, S2 normal, no murmur, rub or gallop.   RESPIRATORY SYSTEM:  Clear to auscultation bilaterally.     GASTROINTESTINAL SYSTEM:  Abdomen soft, nontender.   EXTREMITIES:  No pitting pedal edema.   NEUROLOGICAL:  Alert, oriented x3.   PSYCHIATRIC:  Normal affect.   SKIN:  No obvious rash.   HEENT:  No pallor or icterus.      IMPRESSION AND PLAN:  A very pleasant 67-year-old female with history of acute 5-fluorouracil cardiotoxicity related to cardiogenic shock and severely reduced LV systolic function with metastatic colon cancer.  She has  done well since the acute toxicity.  She is off 5-fluorouracil.  LV function has now normalized.  Clinically, she does not appear to be in congestive heart failure.  At this time, I recommend continuing carvedilol and losartan.  We can see her back in 6 months, sooner if there is any change in clinical status, especially if any cardiac-related symptoms like dyspnea on exertion, shortness of breath.         MALKI SMITH MD             D: 2018   T: 2018   MT: ANURADHA      Name:     LUDMILA SAINI   MRN:      [id number]        Account:      [id number]   :      1950           Service Date: 2018      Document: [id number]         Outpatient Encounter Prescriptions as of 2018   Medication Sig Dispense Refill     ranitidine (ZANTAC) 300 MG tablet Take 1 tablet (300 mg) by mouth At Bedtime 30 tablet 1     [DISCONTINUED] Potassium Chloride ER 20 MEQ TBCR Take 2 tablets (40 mEq) by mouth daily 30 tablet 0     oxyCODONE IR (ROXICODONE) 5 MG tablet Take 1 tablet (5 mg) by mouth every 8 hours as needed for pain 20 tablet 0     [DISCONTINUED] loperamide (IMODIUM) 2 MG capsule 2 caps at 1st sign of diarrhea & 1 cap every 2hrs until 12hrs diarrhea free. During night, 2 caps at bedtime & 2 caps every 4hrs until AM 30 capsule 0     [DISCONTINUED] carvedilol (COREG) 12.5 MG tablet Take 1.5 tablets (18.75 mg) by mouth 2 times daily (with meals) 90 tablet 1     benzonatate (TESSALON PERLES) 100 MG capsule Take 2 capsules (200 mg) by mouth 3 times daily as needed for cough 42 capsule 1     dexamethasone (DECADRON) 4 MG tablet Take 2 tablets (8 mg) by mouth daily (with breakfast) Days 2, 3, and 4 of each cycle. 6 tablet 11     lidocaine (LIDODERM) 5 % Patch Place 1-3 patches onto the skin every 24 hours To RUQ 30 patch 1     guaiFENesin-codeine (ROBITUSSIN AC) 100-10 MG/5ML SOLN solution Take 5 mLs by mouth every 4 hours as needed for cough 120 mL 0     LORazepam (ATIVAN) 0.5 MG tablet Take 1 tablet (0.5 mg)  by mouth every 4 hours as needed (Anxiety, Nausea/Vomiting or Sleep) 30 tablet 5     prochlorperazine (COMPAZINE) 10 MG tablet Take 1 tablet (10 mg) by mouth every 6 hours as needed (Nausea/Vomiting) 30 tablet 5     losartan (COZAAR) 25 MG tablet Take 1 tablet (25 mg) by mouth daily 30 tablet 11     [DISCONTINUED] ferrous sulfate (IRON) 325 (65 FE) MG tablet Take 325 mg by mouth 2 times daily        diclofenac (VOLTAREN) 1 % GEL topical gel Place 2 g onto the skin 4 times daily as needed for moderate pain (for RUQ pain)       sennosides (SENOKOT) 8.6 MG tablet Take 1-2 tablets by mouth 2 times daily as needed for constipation 120 each      ACETAMINOPHEN PO Take 650 mg by mouth every 4 hours as needed for pain or fever        No facility-administered encounter medications on file as of 2/1/2018.        Again, thank you for allowing me to participate in the care of your patient.      Sincerely,    Jc Madden MD     [facility]

## 2018-02-01 NOTE — MR AVS SNAPSHOT
After Visit Summary   2/1/2018    Charlene Douglas    MRN: [id number]           Patient Information     Date Of Birth          1950        Visit Information        Provider Department      2/1/2018 3:30 PM Jc Madden MD [facility]        Today's Diagnoses     Secondary cardiomyopathy (H)           Follow-ups after your visit        Additional Services     Follow-Up with Cardiac Advanced Practice Provider                 Your next 10 appointments already scheduled     Feb 06, 2018  8:30 AM CST   Level 7 with NL INFUSION CHAIR 2   [facility] ([facility])    [address] [place] [id number] [phone]            Feb 06, 2018  8:30 AM CST   [facility]   [facility]    [address] [facility] [id number] [phone]              Future tests that were ordered for you today     Open Future Orders        Priority Expected Expires Ordered    Follow-Up with Cardiac Advanced Practice Provider Routine 7/31/2018 2/1/2019 2/1/2018            Who to contact     If you have questions or need follow up information about today's clinic visit or your schedule please contact [facility] directly at [phone].  Normal or non-critical lab and imaging results will be communicated to you by MyChart, letter or phone within 4 business days after the clinic has received the results. If you do not hear from us within 7 days, please contact the clinic through MyChart or phone. If you have a critical or abnormal lab result, we will notify you by phone as soon as possible.  Submit refill requests through [employer] or call your pharmacy and they will forward the refill request to us. Please allow 3 business days for your refill to be completed.          Additional Information About Your Visit       "  MyChart Information     [email or web address] gives you secure access to your electronic health record. If you see a primary care provider, you can also send messages to your care team and make appointments. If you have questions, please call your primary care clinic.  If you do not have a primary care provider, please call [phone] and they will assist you.        Care EveryWhere ID     This is your Care EveryWhere ID. This could be used by other organizations to access your [facility] medical records  [id number]        Your Vitals Were     Pulse Height Last Period Pulse Oximetry BMI (Body Mass Index)       100 1.689 m (5' 6.5\") 06/07/2007 99% 20.03 kg/m2        Blood Pressure from Last 3 Encounters:   02/01/18 136/68   01/23/18 155/73   01/09/18 177/79    Weight from Last 3 Encounters:   02/01/18 57.2 kg (126 lb)   01/23/18 57.2 kg (126 lb 1.6 oz)   01/09/18 58.7 kg (129 lb 8 oz)              We Performed the Following     Follow-Up with Cardiologist        Primary Care Provider Office Phone # Fax #    Donna Shirley PA-C [phone] [address] [place] [address]        Equal Access to Services     RON PERALTA : Hadii aad ku hadasho Soomaali, waaxda luqadaha, qaybta kaalmada adeegyada, waxay idiin haykatrinn beau morales laseema reese. So [facility] [phone].    ATENCIÓN: Si habla español, tiene a hunt disposición servicios gratRocket Reliefos de asistencia lingüística. Llame al [phone].    We comply with applicable federal civil rights laws and [place] laws. We do not discriminate on the basis of race, color, national origin, age, disability, sex, sexual orientation, or gender identity.            Thank you!     Thank you for choosing [facility]  for your care. Our goal is always to provide you with excellent care. Hearing back from our patients is one way we can continue to improve our services. Please take a few minutes to complete the written survey that you may " receive in the mail after your visit with us. Thank you!             Your Updated Medication List - Protect others around you: Learn how to safely use, store and throw away your medicines at www.disposemymeds.org.          This list is accurate as of 2/1/18  4:00 PM.  Always use your most recent med list.                   Brand Name Dispense Instructions for use Diagnosis    ACETAMINOPHEN PO      Take 650 mg by mouth every 4 hours as needed for pain or fever        benzonatate 100 MG capsule    TESSALON PERLES    42 capsule    Take 2 capsules (200 mg) by mouth 3 times daily as needed for cough    Metastatic colon cancer to liver (H)       carvedilol 12.5 MG tablet    COREG    90 tablet    Take 1.5 tablets (18.75 mg) by mouth 2 times daily (with meals)    Chronic systolic congestive heart failure (H)       dexamethasone 4 MG tablet    DECADRON    6 tablet    Take 2 tablets (8 mg) by mouth daily (with breakfast) Days 2, 3, and 4 of each cycle.    Metastatic colon cancer to liver (H)       diclofenac 1 % Gel topical gel    VOLTAREN     Place 2 g onto the skin 4 times daily as needed for moderate pain (for RUQ pain)    Metastatic colon cancer to liver (H)       ferrous sulfate 325 (65 FE) MG tablet    IRON     Take 325 mg by mouth 2 times daily        guaiFENesin-codeine 100-10 MG/5ML Soln solution    ROBITUSSIN AC    120 mL    Take 5 mLs by mouth every 4 hours as needed for cough    Liver metastasis (H)       lidocaine 5 % Patch    LIDODERM    30 patch    Place 1-3 patches onto the skin every 24 hours To RUQ    Metastatic colon cancer to liver (H)       loperamide 2 MG capsule    IMODIUM    30 capsule    2 caps at 1st sign of diarrhea & 1 cap every 2hrs until 12hrs diarrhea free. During night, 2 caps at bedtime & 2 caps every 4hrs until AM    Metastatic colon cancer to liver (H)       LORazepam 0.5 MG tablet    ATIVAN    30 tablet    Take 1 tablet (0.5 mg) by mouth every 4 hours as needed (Anxiety, Nausea/Vomiting or  Sleep)    Metastatic colon cancer to liver (H)       losartan 25 MG tablet    COZAAR    30 tablet    Take 1 tablet (25 mg) by mouth daily    Secondary cardiomyopathy (H)       oxyCODONE IR 5 MG tablet    ROXICODONE    20 tablet    Take 1 tablet (5 mg) by mouth every 8 hours as needed for pain    Metastatic colon cancer to liver (H)       Potassium Chloride ER 20 MEQ Tbcr     30 tablet    Take 2 tablets (40 mEq) by mouth daily    Hypokalemia       prochlorperazine 10 MG tablet    COMPAZINE    30 tablet    Take 1 tablet (10 mg) by mouth every 6 hours as needed (Nausea/Vomiting)    Metastatic colon cancer to liver (H)       ranitidine 300 MG tablet    ZANTAC    30 tablet    Take 1 tablet (300 mg) by mouth At Bedtime    Nausea and vomiting, intractability of vomiting not specified, unspecified vomiting type       sennosides 8.6 MG tablet    SENOKOT    120 each    Take 1-2 tablets by mouth 2 times daily as needed for constipation    Slow transit constipation

## 2018-02-02 NOTE — TELEPHONE ENCOUNTER
A prescription forPotassium Chloride ER 20 MEQ TBCR  is requested.   Last Written Prescription Date: 1/9/2018  Last Fill Quantity: 30,   # refills: 0  Last Office Visit: 1/9/2018  Follow up: 2/6/2018 with Dr. Dove  Preferred Pharmacy: [facility] [place], [place] - [address] [place], [facility]

## 2018-02-02 NOTE — PROGRESS NOTES
Service Date: 02/01/2018      REASON FOR CLINIC VISIT:  Followup cardiomyopathy.      HISTORY OF PRESENT ILLNESS:  Ms. Douglas is a very pleasant 67-year-old female with metastatic colon cancer with mets to liver, who had acute 5-fluorouracil cardiotoxicity leading to cardiogenic shock, with initial LVEF going down to 10%-15% that required ICU cares, intubation.  This happened in 09/2017.  In the followup, patient has done quite well.  Her LVEF improved, and last echocardiogram done a few weeks ago showed LV function has completely normalized, LVEF is 58% with normal global peak longitudinal strain, normal RV systolic function.  She is now on oxaliplatin chemotherapy.  The patient tells me that she has no specific cardiac complaints.  No chest discomfort or shortness of breath.  Her main complaint is fatigue.  To be noted, she has anemia with hemoglobin of 8.7.  Presently she is on 18.75 mg b.i.d. of carvedilol and 25 mg daily of losartan.      PHYSICAL EXAMINATION:   VITAL SIGNS:  Blood pressure 136/68, heart rate 92 regular, weight 126 pounds, BMI 20.07.   GENERAL:  The patient appears pleasant, comfortable.   NECK:  Normal JVP, no bruit.   CARDIOVASCULAR SYSTEM:  S1, S2 normal, no murmur, rub or gallop.   RESPIRATORY SYSTEM:  Clear to auscultation bilaterally.     GASTROINTESTINAL SYSTEM:  Abdomen soft, nontender.   EXTREMITIES:  No pitting pedal edema.   NEUROLOGICAL:  Alert, oriented x3.   PSYCHIATRIC:  Normal affect.   SKIN:  No obvious rash.   HEENT:  No pallor or icterus.      IMPRESSION AND PLAN:  A very pleasant 67-year-old female with history of acute 5-fluorouracil cardiotoxicity related to cardiogenic shock and severely reduced LV systolic function with metastatic colon cancer.  She has done well since the acute toxicity.  She is off 5-fluorouracil.  LV function has now normalized.  Clinically, she does not appear to be in congestive heart failure.  At this time, I recommend continuing carvedilol and  losartan.  We can see her back in 6 months, sooner if there is any change in clinical status, especially if any cardiac-related symptoms like dyspnea on exertion, shortness of breath.         MALIK SMITH MD             D: 2018   T: 2018   MT: ANURADHA      Name:     LUDMILA SAINI   MRN:      [id number]        Account:      [id number]   :      1950           Service Date: 2018      Document: [id number]

## 2018-02-06 NOTE — PROGRESS NOTES
Pt here for chemotherapy, tolerated well. Labs/BSA/dosing verified by RN x 2. Vomiting upon arrival after a coughing jag. Nausea subsided .  Hgb 7.7, denies light headedness of shortness of breath but c/o fatigue. Plan to give 1 unit PRBC tomorrow. K+ = 3.1, 60 meq po ordered/ given. Pt discharged in stable condition with port remaining accessed for transfusion tomorrow.

## 2018-02-06 NOTE — MR AVS SNAPSHOT
After Visit Summary   2/6/2018    Charlene Douglas    MRN: [id number]           Patient Information     Date Of Birth          1950        Visit Information        Provider Department      2/6/2018 8:30 AM NL INFUSION CHAIR 2 [facility]        Today's Diagnoses     Neutropenic fever (H)    -  1    Metastatic colon cancer to liver (H)          Care Instructions    Pt to return on 2/7 for blood transfusion, 1 unit PRBCs. Copies of medication list and upcoming appointments given prior to discharge.           Follow-ups after your visit        Follow-up notes from your care team     Return in 1 day (on 2/7/2018).      Your next 10 appointments already scheduled     Feb 07, 2018 11:30 AM CST   Level 3 with NL INFUSION CHAIR 3   [facility] ([facility])    [facility] [place] [address]   [phone]            Feb 16, 2018 10:00 AM CST   Level 1 with NL INFUSION CHAIR 4   [facility] ([facility])    [facility] [place] [address]   [phone]            Feb 16, 2018 10:45 AM CST   (Arrive by 10:30 AM)   CT CHEST/ABDOMEN/PELVIS W CONTRAST with PHCT1   [facility] CT Scan ([facility])    [address] [facility]  [place] [address]   [phone]           Please bring any scans or X-rays taken at other hospitals, if similar tests were done. Also bring a list of your medicines, including vitamins, minerals and over-the-counter drugs. It is safest to leave personal items at home.  Be sure to tell your doctor:   If you have any allergies.   If there s any chance you are pregnant.   If you are breastfeeding.   If you have any special needs.  You may have contrast for this exam. To prepare:   Do not eat or drink for 2 hours before your exam. If you need to take medicine, you may take it with small sips of water. (We may ask you to take liquid medicine as  well.)   The day before your exam, drink extra fluids at least six 8-ounce glasses (unless your doctor tells you to restrict your fluids).  Patients over 70 or patients with diabetes or kidney problems:   If you haven t had a blood test (creatinine test) within the last 30 days, go to your clinic or Diagnostic Imaging Department for this test.  If you have diabetes:   If your kidney function is normal, continue taking your metformin (Avandamet, Glucophage, Glucovance, Metaglip) on the day of your exam.   If your kidney function is abnormal, wait 48 hours before restarting this medicine.  You will have oral contrast for this exam:   You will drink the contrast at home. Get this from your clinic or Diagnostic Imaging Department. Please follow the directions given.  Please wear loose clothing, such as a sweat suit or jogging clothes. Avoid snaps, zippers and other metal. We may ask you to undress and put on a hospital gown.  If you have any questions, please call the Imaging Department where you will have your exam.            Feb 20, 2018  8:30 AM CST   Return Visit with Duy Dove MD   [facility] ([facility])    [address] [place] [id number] [phone]            Feb 20, 2018  9:00 AM CST   Level 7 with NL INFUSION CHAIR 2   [facility] ([facility])    [address] [place] [address] [id number] [phone]              Future tests that were ordered for you today     Open Future Orders        Priority Expected Expires Ordered    CT Chest/Abdomen/Pelvis w Contrast Routine 2/13/2018 2/7/2019 2/6/2018            Who to contact     If you have questions or need follow up information about today's clinic visit or your schedule please contact [facility] directly at [phone].  Normal or non-critical lab and imaging results will be communicated to you by MyChart, letter or phone within 4  "business days after the clinic has received the results. If you do not hear from us within 7 days, please contact the clinic through [employer] or phone. If you have a critical or abnormal lab result, we will notify you by phone as soon as possible.  Submit refill requests through [employer] or call your pharmacy and they will forward the refill request to us. Please allow 3 business days for your refill to be completed.          Additional Information About Your Visit        [employer] Information     [employer] gives you secure access to your electronic health record. If you see a primary care provider, you can also send messages to your care team and make appointments. If you have questions, please call your primary care clinic.  If you do not have a primary care provider, please call [phone] and they will assist you.        Care EveryWhere ID     This is your Care EveryWhere ID. This could be used by other organizations to access your [place] medical records  [id number]        Your Vitals Were     Pulse Height Last Period BMI (Body Mass Index)          87 1.689 m (5' 6.5\") 06/07/2007 19.91 kg/m2         Blood Pressure from Last 3 Encounters:   02/06/18 149/79   02/06/18 153/81   02/01/18 136/68    Weight from Last 3 Encounters:   02/06/18 56.8 kg (125 lb 3.2 oz)   02/06/18 56.8 kg (125 lb 3.2 oz)   02/01/18 57.2 kg (126 lb)              We Performed the Following     ABO/Rh type and screen     Protein qualitative urine          Today's Medication Changes          These changes are accurate as of 2/6/18  4:15 PM.  If you have any questions, ask your nurse or doctor.               Start taking these medicines.        Dose/Directions    diphenoxylate-atropine 2.5-0.025 MG per tablet   Commonly known as:  LOMOTIL   Used for:  Metastatic colon cancer to liver (H), Chemotherapy induced diarrhea   Started by:  Duy Dove MD        Dose:  2 tablet   Take 2 tablets by mouth 4 times daily as needed for diarrhea "   Quantity:  50 tablet   Refills:  0            Where to get your medicines      These medications were sent to [facility] - [address]   [address] [place] [address]     Phone:  [phone]     carvedilol 12.5 MG tablet    ferrous sulfate 325 (65 FE) MG tablet    loperamide 2 MG capsule    Potassium Chloride ER 20 MEQ Tbcr         Some of these will need a paper prescription and others can be bought over the counter.  Ask your nurse if you have questions.     Bring a paper prescription for each of these medications     diphenoxylate-atropine 2.5-0.025 MG per tablet                Primary Care Provider Office Phone # Fax #    Donna Shirley PA-C [phone] [address] [place] [address]        Equal Access to Services     [facility]: Hadii nat smith hadasho Soomaali, waaxda luqadaha, qaybta kaalmada adeegyalorenzo, debra rowe . So [facility] [phone].    ATENCIÓN: Si habla español, tiene a hunt disposición servicios gratuitos de asistencia lingüística. [facility] [phone].    We comply with applicable federal civil rights laws and [place] laws. We do not discriminate on the basis of race, color, national origin, age, disability, sex, sexual orientation, or gender identity.            Thank you!     Thank you for choosing [facility]  for your care. Our goal is always to provide you with excellent care. Hearing back from our patients is one way we can continue to improve our services. Please take a few minutes to complete the written survey that you may receive in the mail after your visit with us. Thank you!             Your Updated Medication List - Protect others around you: Learn how to safely use, store and throw away your medicines at www.disposemymeds.org.          This list is accurate as of 2/6/18  4:15 PM.  Always use your most recent med list.                   Brand Name  Dispense Instructions for use Diagnosis    ACETAMINOPHEN PO      Take 650 mg by mouth every 4 hours as needed for pain or fever        benzonatate 100 MG capsule    TESSALON PERLES    42 capsule    Take 2 capsules (200 mg) by mouth 3 times daily as needed for cough    Metastatic colon cancer to liver (H)       carvedilol 12.5 MG tablet    COREG    90 tablet    Take 1.5 tablets (18.75 mg) by mouth 2 times daily (with meals)    Chronic systolic congestive heart failure (H)       dexamethasone 4 MG tablet    DECADRON    6 tablet    Take 2 tablets (8 mg) by mouth daily (with breakfast) Days 2, 3, and 4 of each cycle.    Metastatic colon cancer to liver (H)       diclofenac 1 % Gel topical gel    VOLTAREN     Place 2 g onto the skin 4 times daily as needed for moderate pain (for RUQ pain)    Metastatic colon cancer to liver (H)       diphenoxylate-atropine 2.5-0.025 MG per tablet    LOMOTIL    50 tablet    Take 2 tablets by mouth 4 times daily as needed for diarrhea    Metastatic colon cancer to liver (H), Chemotherapy induced diarrhea       ferrous sulfate 325 (65 FE) MG tablet    IRON    100 tablet    Take 1 tablet (325 mg) by mouth 2 times daily    Metastatic colon cancer to liver (H)       guaiFENesin-codeine 100-10 MG/5ML Soln solution    ROBITUSSIN AC    120 mL    Take 5 mLs by mouth every 4 hours as needed for cough    Liver metastasis (H)       lidocaine 5 % Patch    LIDODERM    30 patch    Place 1-3 patches onto the skin every 24 hours To RUQ    Metastatic colon cancer to liver (H)       loperamide 2 MG capsule    IMODIUM    30 capsule    2 caps at 1st sign of diarrhea & 1 cap every 2hrs until 12hrs diarrhea free. During night, 2 caps at bedtime & 2 caps every 4hrs until AM    Metastatic colon cancer to liver (H)       LORazepam 0.5 MG tablet    ATIVAN    30 tablet    Take 1 tablet (0.5 mg) by mouth every 4 hours as needed (Anxiety, Nausea/Vomiting or Sleep)    Metastatic colon cancer to liver (H)       losartan  25 MG tablet    COZAAR    30 tablet    Take 1 tablet (25 mg) by mouth daily    Secondary cardiomyopathy (H)       oxyCODONE IR 5 MG tablet    ROXICODONE    20 tablet    Take 1 tablet (5 mg) by mouth every 8 hours as needed for pain    Metastatic colon cancer to liver (H)       Potassium Chloride ER 20 MEQ Tbcr     60 tablet    Take 2 tablets (40 mEq) by mouth daily    Hypokalemia       prochlorperazine 10 MG tablet    COMPAZINE    30 tablet    Take 1 tablet (10 mg) by mouth every 6 hours as needed (Nausea/Vomiting)    Metastatic colon cancer to liver (H)       ranitidine 300 MG tablet    ZANTAC    30 tablet    Take 1 tablet (300 mg) by mouth At Bedtime    Nausea and vomiting, intractability of vomiting not specified, unspecified vomiting type       sennosides 8.6 MG tablet    SENOKOT    120 each    Take 1-2 tablets by mouth 2 times daily as needed for constipation    Slow transit constipation

## 2018-02-06 NOTE — PATIENT INSTRUCTIONS
Pt to return on 2/7 for blood transfusion, 1 unit PRBCs. Copies of medication list and upcoming appointments given prior to discharge.

## 2018-02-06 NOTE — PATIENT INSTRUCTIONS
Please follow up with Dr. Dove in 2 weeks with CT results.      CT Date/Time:  2/16/18 at 10:45 - Arrive 10:00 for port access in infusion.  Nothing to eat or drink for 2 hours prior to scan except oral prep.  Please  oral prep from Radiology Department prior to day of scan.    Follow Up Date/Time: 2/20/18 at 8:30    If you have any questions or concerns please feel free to call.    If you need to reschedule please call:  Clinic or Lab Appointment - [phone]  Infusion - [phone]  Imaging - [phone]    Stan Balderas, RN, BSN, OCN   Oncology Care Coordinator RN  [facility]  [phone]

## 2018-02-06 NOTE — PROGRESS NOTES
Hematology/ Oncology Follow-up Visit:  Feb 6, 2018    Reason for Visit:   Chief Complaint   Patient presents with     Oncology Clinic Visit     1 month follow up for Metastatic colon cancer to liver     Chemotherapy     C8D1 today with labs prior       Oncologic History:  Metastatic colon cancer to liver (H)  Charlene Douglas presented with 2 months history of cough, shortness of breath, decreased appetite and abdominal pain. Patient has black stool and occult blood which was positive. Subsequently the patient went on to have a CT angiogram of the chest because of shortness of breath. Lungs were clear. Multiple liver lesions were seen. Subsequently the patient went on to have an ultrasound of the liver done on August 18, 2017 which confirmed the presence of multiple liver metastases. Abdominal CT was done on August 18, 2017 showing large ascending colon mass with associated mesenteric/peritoneal drop metastasis and lymphadenopathy. There are multiple liver metastasis seen. KRAS mutated. She was started on systemic chemotherapy with FOLFOX with Avastin. However had severe cardiogenic toxicity from 5-FU and ended up in the hospital with acute respiratory failure requiring intubation. Initial echo showed LVEF and 10-15% which did improve in 3 days time to 35- 40%.  Subsequently, chemotherapy was switched to single agent irinotecan.  Increasing fatigue    Interval History:  Patient is here today for follow-up.  She has been having nausea and vomiting usually that lasted for 2 days following chemotherapy.  She has been also having diarrhea despite the use of Imodium.  She has been having increasing fatigue also.  Her appetite has been poor.  She lost about 2 pounds since last visit.  She denies any abdominal pain.  She continues to have symptoms related to anemia.  She had a transfusion about 3 weeks ago.  Her numbness and tingling in hands and feet has been stable.  Patient currently on chemotherapy with Avastin,  oxaliplatin and irinotecan.  She generally has been tolerating treatment well.  Because of diarrhea the patient suggested to reduce her dose of irinotecan.    Review Of Systems:  Constitutional: Negative for fever, chills, and night sweats.  Increasing fatigue, weight loss and poor appetite  Skin: negative.  Eyes: negative.  Ears/Nose/Throat: negative.  Respiratory: No shortness of breath, dyspnea on exertion, cough, or hemoptysis.  Cardiovascular: negative.  Gastrointestinal: Nausea, vomiting, diarrhea as mentioned above  Genitourinary: negative.  Musculoskeletal: negative.  Neurologic: negative.  Psychiatric: negative.  Hematologic/Lymphatic/Immunologic: negative.  Endocrine: negative.    All other ROS negative unless mentioned in interval history.    Past medical, social, surgical, and family histories reviewed.    Allergies:  Allergies as of 02/06/2018 - Miguel as Reviewed 02/06/2018   Allergen Reaction Noted     Lisinopril Cough 09/19/2017     No known allergies  08/11/2003       Current Medications:  Current Outpatient Prescriptions   Medication Sig Dispense Refill     loperamide (IMODIUM) 2 MG capsule 2 caps at 1st sign of diarrhea & 1 cap every 2hrs until 12hrs diarrhea free. During night, 2 caps at bedtime & 2 caps every 4hrs until AM 30 capsule 0     carvedilol (COREG) 12.5 MG tablet Take 1.5 tablets (18.75 mg) by mouth 2 times daily (with meals) 90 tablet 1     Potassium Chloride ER 20 MEQ TBCR Take 2 tablets (40 mEq) by mouth daily 60 tablet 0     ferrous sulfate (IRON) 325 (65 FE) MG tablet Take 1 tablet (325 mg) by mouth 2 times daily 100 tablet 1     ranitidine (ZANTAC) 300 MG tablet Take 1 tablet (300 mg) by mouth At Bedtime 30 tablet 1     oxyCODONE IR (ROXICODONE) 5 MG tablet Take 1 tablet (5 mg) by mouth every 8 hours as needed for pain 20 tablet 0     benzonatate (TESSALON PERLES) 100 MG capsule Take 2 capsules (200 mg) by mouth 3 times daily as needed for cough 42 capsule 1     dexamethasone  "(DECADRON) 4 MG tablet Take 2 tablets (8 mg) by mouth daily (with breakfast) Days 2, 3, and 4 of each cycle. 6 tablet 11     lidocaine (LIDODERM) 5 % Patch Place 1-3 patches onto the skin every 24 hours To RUQ 30 patch 1     guaiFENesin-codeine (ROBITUSSIN AC) 100-10 MG/5ML SOLN solution Take 5 mLs by mouth every 4 hours as needed for cough 120 mL 0     LORazepam (ATIVAN) 0.5 MG tablet Take 1 tablet (0.5 mg) by mouth every 4 hours as needed (Anxiety, Nausea/Vomiting or Sleep) 30 tablet 5     prochlorperazine (COMPAZINE) 10 MG tablet Take 1 tablet (10 mg) by mouth every 6 hours as needed (Nausea/Vomiting) 30 tablet 5     losartan (COZAAR) 25 MG tablet Take 1 tablet (25 mg) by mouth daily 30 tablet 11     diclofenac (VOLTAREN) 1 % GEL topical gel Place 2 g onto the skin 4 times daily as needed for moderate pain (for RUQ pain)       sennosides (SENOKOT) 8.6 MG tablet Take 1-2 tablets by mouth 2 times daily as needed for constipation 120 each      ACETAMINOPHEN PO Take 650 mg by mouth every 4 hours as needed for pain or fever        [DISCONTINUED] carvedilol (COREG) 12.5 MG tablet Take 1.5 tablets (18.75 mg) by mouth 2 times daily (with meals) 90 tablet 1        Physical Exam:  /79 (BP Location: Right arm, Patient Position: Chair, Cuff Size: Adult Regular)  Pulse 104  Temp 97.3  F (36.3  C) (Temporal)  Resp 16  Ht 1.689 m (5' 6.5\")  Wt 56.8 kg (125 lb 3.2 oz)  LMP 06/07/2007  SpO2 100%  BMI 19.91 kg/m2  Wt Readings from Last 12 Encounters:   02/06/18 56.8 kg (125 lb 3.2 oz)   02/06/18 56.8 kg (125 lb 3.2 oz)   02/01/18 57.2 kg (126 lb)   01/23/18 57.2 kg (126 lb 1.6 oz)   01/09/18 58.7 kg (129 lb 8 oz)   01/09/18 58.7 kg (129 lb 8 oz)   12/12/17 58 kg (127 lb 14.4 oz)   12/12/17 58 kg (127 lb 14.4 oz)   11/28/17 57.9 kg (127 lb 11.2 oz)   11/14/17 58.6 kg (129 lb 3 oz)   11/14/17 58.6 kg (129 lb 1.6 oz)   11/07/17 60.1 kg (132 lb 6.4 oz)     ECOG performance status: 1  GENERAL APPEARANCE: Healthy, alert " and in no acute distress..  Pallor  HEENT: Sclerae anicteric. PERRLA. Oropharynx without ulcers, lesions, or thrush.  NECK: Supple. No asymmetry or masses.  LYMPHATICS: No palpable cervical, supraclavicular, axillary, or inguinal lymphadenopathy.  RESP: Lungs clear to auscultation bilaterally without rales, rhonchi or wheezes.  CARDIOVASCULAR: Regular rate and rhythm. Normal S1, S2; no S3 or S4. No murmur, gallop, or rub.  ABDOMEN: Liver is enlarged but nontender.  MUSCULOSKELETAL: Extremities without gross deformities noted. No edema of bilateral lower extremities.  SKIN: No suspicious lesions or rashes.  NEURO: Alert and oriented x 3. Cranial nerves II-XII grossly intact.  PSYCHIATRIC: Mentation and affect appear normal.    Laboratory/Imaging Studies:  Infusion Therapy Visit on 02/06/2018   Component Date Value Ref Range Status     Protein Albumin Urine 02/06/2018 Negative  NEG^Negative mg/dL Final            Assessment and plan:    (C18.9,  C78.7) Metastatic colon cancer to liver (H)  (primary encounter diagnosis)  (C78.7) Liver metastasis (H)  I reviewed with the patient most recent laboratory tests.  I would recommend to continue on the current regimen including Avastin, oxaliplatin and irinotecan.  The dose of irinotecan would be reduced to 150 mg/m  because of GI symptoms.  I will arrange for imaging studies to assess response to treatment.    (G89.3) Cancer associated pain  Patient pain is currently well controlled.    (R11.2,  T45.1X5A) Chemotherapy induced nausea and vomiting  I reviewed with the patient antinausea medications in details.    (K52.1,  T45.1X5A) Chemotherapy induced diarrhea  Patient will continue on Imodium.  I will add Lomotil as well    (E87.6) Hypokalemia  Plan: Potassium Chloride ER 20 MEQ TBCR    (I10) Benign essential hypertension  Patient blood pressure has been controlled.  She is currently on Cozaar 25 mg orally daily.    (D64.9) Anemia, unspecified type  Patient continues to be  symptomatic from anemia.  We will continue to monitor her hemoglobin and offer her blood transfusion if needed    (I50.22) Chronic systolic congestive heart failure (H)  Patient symptoms has been stable.  The patient has been followed by cardiology.  Plan: carvedilol (COREG) 12.5 MG tablet    The patient is ready to learn, no apparent learning barriers were identified.  Diagnosis and treatment plans were explained to the patient. The patient expressed understanding of the content. The patient asked appropriate questions. The patient questions were answered to her satisfaction.    Time spent 40 minutes more than 50% of the time in counseling and coordination of care including discussion of management plan, symptom management, follow-up and prognosis    Chart documentation with Dragon Voice recognition Software. Although reviewed after completion, some words and grammatical errors may remain.

## 2018-02-06 NOTE — NURSING NOTE
"Oncology Rooming Note    February 6, 2018 8:43 AM   Charlene I Misael is a 67 year old female who presents for:    Chief Complaint   Patient presents with     Oncology Clinic Visit     1 month follow up for Metastatic colon cancer to liver     Chemotherapy     C8D1 today with labs prior     Initial Vitals: /79 (BP Location: Right arm, Patient Position: Chair, Cuff Size: Adult Regular)  Pulse 104  Temp 97.3  F (36.3  C) (Temporal)  Resp 16  Ht 1.689 m (5' 6.5\")  Wt 56.8 kg (125 lb 3.2 oz)  LMP 06/07/2007  SpO2 100%  BMI 19.91 kg/m2 Estimated body mass index is 19.91 kg/(m^2) as calculated from the following:    Height as of this encounter: 1.689 m (5' 6.5\").    Weight as of this encounter: 56.8 kg (125 lb 3.2 oz). Body surface area is 1.63 meters squared.  No Pain (0) Comment: Data Unavailable   Patient's last menstrual period was 06/07/2007.  Allergies reviewed: Yes  Medications reviewed: Yes    Medications: MEDICATION REFILLS NEEDED TODAY. Provider was notified.  Pharmacy name entered into [facility]:    [facility] - [id number]  [facility]    Clinical concerns: None. Dr. Dove was notified.    Elsa Butts MA              "

## 2018-02-06 NOTE — LETTER
2/6/2018         RE: Charlene Douglas  [address] [address] [place] [id number]        Dear Colleague,    Thank you for referring your patient, Charlene Douglas, to the [facility]. Please see a copy of my visit note below.    Hematology/ Oncology Follow-up Visit:  Feb 6, 2018    Reason for Visit:   Chief Complaint   Patient presents with     Oncology Clinic Visit     1 month follow up for Metastatic colon cancer to liver     Chemotherapy     C8D1 today with labs prior       Oncologic History:  Metastatic colon cancer to liver (H)  Charlene Douglas presented with 2 months history of cough, shortness of breath, decreased appetite and abdominal pain. Patient has black stool and occult blood which was positive. Subsequently the patient went on to have a CT angiogram of the chest because of shortness of breath. Lungs were clear. Multiple liver lesions were seen. Subsequently the patient went on to have an ultrasound of the liver done on August 18, 2017 which confirmed the presence of multiple liver metastases. Abdominal CT was done on August 18, 2017 showing large ascending colon mass with associated mesenteric/peritoneal drop metastasis and lymphadenopathy. There are multiple liver metastasis seen. KRAS mutated. She was started on systemic chemotherapy with FOLFOX with Avastin. However had severe cardiogenic toxicity from 5-FU and ended up in the hospital with acute respiratory failure requiring intubation. Initial echo showed LVEF and 10-15% which did improve in 3 days time to 35- 40%.  Subsequently, chemotherapy was switched to single agent irinotecan.  Increasing fatigue    Interval History:  Patient is here today for follow-up.  She has been having nausea and vomiting usually that lasted for 2 days following chemotherapy.  She has been also having diarrhea despite the use of Imodium.  She has been having increasing fatigue also.  Her appetite has been poor.  She lost about 2 pounds since last visit.   She denies any abdominal pain.  She continues to have symptoms related to anemia.  She had a transfusion about 3 weeks ago.  Her numbness and tingling in hands and feet has been stable.  Patient currently on chemotherapy with Avastin, oxaliplatin and irinotecan.  She generally has been tolerating treatment well.  Because of diarrhea the patient suggested to reduce her dose of irinotecan.    Review Of Systems:  Constitutional: Negative for fever, chills, and night sweats.  Increasing fatigue, weight loss and poor appetite  Skin: negative.  Eyes: negative.  Ears/Nose/Throat: negative.  Respiratory: No shortness of breath, dyspnea on exertion, cough, or hemoptysis.  Cardiovascular: negative.  Gastrointestinal: Nausea, vomiting, diarrhea as mentioned above  Genitourinary: negative.  Musculoskeletal: negative.  Neurologic: negative.  Psychiatric: negative.  Hematologic/Lymphatic/Immunologic: negative.  Endocrine: negative.    All other ROS negative unless mentioned in interval history.    Past medical, social, surgical, and family histories reviewed.    Allergies:  Allergies as of 02/06/2018 - Miguel as Reviewed 02/06/2018   Allergen Reaction Noted     Lisinopril Cough 09/19/2017     No known allergies  08/11/2003       Current Medications:  Current Outpatient Prescriptions   Medication Sig Dispense Refill     loperamide (IMODIUM) 2 MG capsule 2 caps at 1st sign of diarrhea & 1 cap every 2hrs until 12hrs diarrhea free. During night, 2 caps at bedtime & 2 caps every 4hrs until AM 30 capsule 0     carvedilol (COREG) 12.5 MG tablet Take 1.5 tablets (18.75 mg) by mouth 2 times daily (with meals) 90 tablet 1     Potassium Chloride ER 20 MEQ TBCR Take 2 tablets (40 mEq) by mouth daily 60 tablet 0     ferrous sulfate (IRON) 325 (65 FE) MG tablet Take 1 tablet (325 mg) by mouth 2 times daily 100 tablet 1     ranitidine (ZANTAC) 300 MG tablet Take 1 tablet (300 mg) by mouth At Bedtime 30 tablet 1     oxyCODONE IR (ROXICODONE) 5 MG  "tablet Take 1 tablet (5 mg) by mouth every 8 hours as needed for pain 20 tablet 0     benzonatate (TESSALON PERLES) 100 MG capsule Take 2 capsules (200 mg) by mouth 3 times daily as needed for cough 42 capsule 1     dexamethasone (DECADRON) 4 MG tablet Take 2 tablets (8 mg) by mouth daily (with breakfast) Days 2, 3, and 4 of each cycle. 6 tablet 11     lidocaine (LIDODERM) 5 % Patch Place 1-3 patches onto the skin every 24 hours To RUQ 30 patch 1     guaiFENesin-codeine (ROBITUSSIN AC) 100-10 MG/5ML SOLN solution Take 5 mLs by mouth every 4 hours as needed for cough 120 mL 0     LORazepam (ATIVAN) 0.5 MG tablet Take 1 tablet (0.5 mg) by mouth every 4 hours as needed (Anxiety, Nausea/Vomiting or Sleep) 30 tablet 5     prochlorperazine (COMPAZINE) 10 MG tablet Take 1 tablet (10 mg) by mouth every 6 hours as needed (Nausea/Vomiting) 30 tablet 5     losartan (COZAAR) 25 MG tablet Take 1 tablet (25 mg) by mouth daily 30 tablet 11     diclofenac (VOLTAREN) 1 % GEL topical gel Place 2 g onto the skin 4 times daily as needed for moderate pain (for RUQ pain)       sennosides (SENOKOT) 8.6 MG tablet Take 1-2 tablets by mouth 2 times daily as needed for constipation 120 each      ACETAMINOPHEN PO Take 650 mg by mouth every 4 hours as needed for pain or fever        [DISCONTINUED] carvedilol (COREG) 12.5 MG tablet Take 1.5 tablets (18.75 mg) by mouth 2 times daily (with meals) 90 tablet 1        Physical Exam:  /79 (BP Location: Right arm, Patient Position: Chair, Cuff Size: Adult Regular)  Pulse 104  Temp 97.3  F (36.3  C) (Temporal)  Resp 16  Ht 1.689 m (5' 6.5\")  Wt 56.8 kg (125 lb 3.2 oz)  LMP 06/07/2007  SpO2 100%  BMI 19.91 kg/m2  Wt Readings from Last 12 Encounters:   02/06/18 56.8 kg (125 lb 3.2 oz)   02/06/18 56.8 kg (125 lb 3.2 oz)   02/01/18 57.2 kg (126 lb)   01/23/18 57.2 kg (126 lb 1.6 oz)   01/09/18 58.7 kg (129 lb 8 oz)   01/09/18 58.7 kg (129 lb 8 oz)   12/12/17 58 kg (127 lb 14.4 oz)   12/12/17 " 58 kg (127 lb 14.4 oz)   11/28/17 57.9 kg (127 lb 11.2 oz)   11/14/17 58.6 kg (129 lb 3 oz)   11/14/17 58.6 kg (129 lb 1.6 oz)   11/07/17 60.1 kg (132 lb 6.4 oz)     ECOG performance status: 1  GENERAL APPEARANCE: Healthy, alert and in no acute distress..  Pallor  HEENT: Sclerae anicteric. PERRLA. Oropharynx without ulcers, lesions, or thrush.  NECK: Supple. No asymmetry or masses.  LYMPHATICS: No palpable cervical, supraclavicular, axillary, or inguinal lymphadenopathy.  RESP: Lungs clear to auscultation bilaterally without rales, rhonchi or wheezes.  CARDIOVASCULAR: Regular rate and rhythm. Normal S1, S2; no S3 or S4. No murmur, gallop, or rub.  ABDOMEN: Liver is enlarged but nontender.  MUSCULOSKELETAL: Extremities without gross deformities noted. No edema of bilateral lower extremities.  SKIN: No suspicious lesions or rashes.  NEURO: Alert and oriented x 3. Cranial nerves II-XII grossly intact.  PSYCHIATRIC: Mentation and affect appear normal.    Laboratory/Imaging Studies:  Infusion Therapy Visit on 02/06/2018   Component Date Value Ref Range Status     Protein Albumin Urine 02/06/2018 Negative  NEG^Negative mg/dL Final            Assessment and plan:    (C18.9,  C78.7) Metastatic colon cancer to liver (H)  (primary encounter diagnosis)  (C78.7) Liver metastasis (H)  I reviewed with the patient most recent laboratory tests.  I would recommend to continue on the current regimen including Avastin, oxaliplatin and irinotecan.  The dose of irinotecan would be reduced to 150 mg/m  because of GI symptoms.  I will arrange for imaging studies to assess response to treatment.    (G89.3) Cancer associated pain  Patient pain is currently well controlled.    (R11.2,  T45.1X5A) Chemotherapy induced nausea and vomiting  I reviewed with the patient antinausea medications in details.    (K52.1,  T45.1X5A) Chemotherapy induced diarrhea  Patient will continue on Imodium.  I will add Lomotil as well    (E87.6) Hypokalemia  Plan:  Potassium Chloride ER 20 MEQ TBCR    (I10) Benign essential hypertension  Patient blood pressure has been controlled.  She is currently on Cozaar 25 mg orally daily.    (D64.9) Anemia, unspecified type  Patient continues to be symptomatic from anemia.  We will continue to monitor her hemoglobin and offer her blood transfusion if needed    (I50.22) Chronic systolic congestive heart failure (H)  Patient symptoms has been stable.  The patient has been followed by cardiology.  Plan: carvedilol (COREG) 12.5 MG tablet    The patient is ready to learn, no apparent learning barriers were identified.  Diagnosis and treatment plans were explained to the patient. The patient expressed understanding of the content. The patient asked appropriate questions. The patient questions were answered to her satisfaction.    Time spent 40 minutes more than 50% of the time in counseling and coordination of care including discussion of management plan, symptom management, follow-up and prognosis    Chart documentation with Dragon Voice recognition Software. Although reviewed after completion, some words and grammatical errors may remain.    Again, thank you for allowing me to participate in the care of your patient.        Sincerely,        Duy Dove MD

## 2018-02-06 NOTE — NURSING NOTE
DISCHARGE PLAN:  Next appointments: See patient instruction section  Departure Mode: Ambulatory  Accompanied by:   8 minutes for nursing discharge (face to face time)     Charlene Douglas is here today for Oncology follow up with treatment.  Writing nurse seen patient after Medical Oncology appointment to address questions/concerns/coordinate care. Patient to continue with treatment plan.  Follow up in 2 weeks with scans prior.  Patient to lab for repeat labs today. Appointments scheduled. See patient instructions and Oncologist's Progress note for further details. Questions and concerns addressed to patient's satisfaction. Patient verbalized and demonstrated understanding of plan.  Contact information provided and patient is encouraged to call with any that arise in the interim of care.    Stan Balderas, RN, BSN, OCN   Oncology Care Coordinator RN  [facility]  [phone]  2/6/2018, 10:05 AM

## 2018-02-06 NOTE — MR AVS SNAPSHOT
After Visit Summary   2/6/2018    Charlene Douglas    MRN: [id number]           Patient Information     Date Of Birth          1950        Visit Information        Provider Department      2/6/2018 8:30 AM Duy Dove MD [facility]        Today's Diagnoses     Metastatic colon cancer to liver (H)    -  1    Liver metastasis (H)        Elevated blood pressure reading without diagnosis of hypertension        Cancer associated pain        Chemotherapy induced nausea and vomiting        Chemotherapy induced diarrhea        Hypokalemia        Benign essential hypertension        Anemia, unspecified type        Chronic systolic congestive heart failure (H)          Care Instructions      Please follow up with Dr. Dove in 2 weeks with CT results.      CT Date/Time:  2/16/18 at 10:45 - Arrive 10:00 for port access in infusion.  Nothing to eat or drink for 2 hours prior to scan except oral prep.  Please  oral prep from Radiology Department prior to day of scan.    Follow Up Date/Time: 2/20/18 at 8:30    If you have any questions or concerns please feel free to call.    If you need to reschedule please call:  Clinic or Lab Appointment - [phone]  Infusion - [phone]  Imaging - [phone]    Stan Balderas, RN, BSN, OCN   Oncology Care Coordinator RN  [facility]  [phone]              Follow-ups after your visit        Follow-up notes from your care team     Return in about 2 weeks (around 2/20/2018) for Imaging ordered before next appointment, Schedule for chemotherapy as per treatment plan, lab ordered today.      Your next 10 appointments already scheduled     Feb 16, 2018 10:00 AM CST   Level 1 with NL INFUSION CHAIR 4   [facility] ([facility])    [address] [place] [address]   [phone]            Feb 16, 2018 10:45 AM CST   (Arrive by 10:30 AM)   CT CHEST/ABDOMEN/PELVIS W CONTRAST with PHCT1    [facility] CT Scan ([facility])    [address] [address] [phone]           Please bring any scans or X-rays taken at other hospitals, if similar tests were done. Also bring a list of your medicines, including vitamins, minerals and over-the-counter drugs. It is safest to leave personal items at home.  Be sure to tell your doctor:   If you have any allergies.   If there s any chance you are pregnant.   If you are breastfeeding.   If you have any special needs.  You may have contrast for this exam. To prepare:   Do not eat or drink for 2 hours before your exam. If you need to take medicine, you may take it with small sips of water. (We may ask you to take liquid medicine as well.)   The day before your exam, drink extra fluids at least six 8-ounce glasses (unless your doctor tells you to restrict your fluids).  Patients over 70 or patients with diabetes or kidney problems:   If you haven t had a blood test (creatinine test) within the last 30 days, go to your clinic or Diagnostic Imaging Department for this test.  If you have diabetes:   If your kidney function is normal, continue taking your metformin (Avandamet, Glucophage, Glucovance, Metaglip) on the day of your exam.   If your kidney function is abnormal, wait 48 hours before restarting this medicine.  You will have oral contrast for this exam:   You will drink the contrast at home. Get this from your clinic or Diagnostic Imaging Department. Please follow the directions given.  Please wear loose clothing, such as a sweat suit or jogging clothes. Avoid snaps, zippers and other metal. We may ask you to undress and put on a hospital gown.  If you have any questions, please call the Imaging Department where you will have your exam.            Feb 20, 2018  8:30 AM CST   [facility]    [address]  "[id number] [phone]            Feb 20, 2018  9:00 AM CST   Level 7 with NL INFUSION CHAIR 2   [facility] ([facility])    [address] [place] [id number] [phone]              Future tests that were ordered for you today     Open Future Orders        Priority Expected Expires Ordered    CT Chest/Abdomen/Pelvis w Contrast Routine 2/13/2018 2/7/2019 2/6/2018            Who to contact     If you have questions or need follow up information about today's clinic visit or your schedule please contact [facility] directly at [phone].  Normal or non-critical lab and imaging results will be communicated to you by [employer], letter or phone within 4 business days after the clinic has received the results. If you do not hear from us within 7 days, please contact the clinic through [employer] or phone. If you have a critical or abnormal lab result, we will notify you by phone as soon as possible.  Submit refill requests through "[employer] or call your pharmacy and they will forward the refill request to us. Please allow 3 business days for your refill to be completed.          Additional Information About Your Visit        [employer]     "[employer] gives you secure access to your electronic health record. If you see a primary care provider, you can also send messages to your care team and make appointments. If you have questions, please call your primary care clinic.  If you do not have a primary care provider, please call [phone] and they will assist you.        Care EveryWhere ID     This is your Care EveryWhere ID. This could be used by other organizations to access your [place] medical records  [id number]        Your Vitals Were     Pulse Temperature Respirations Height Last Period Pulse Oximetry    104 97.3  F (36.3  C) (Temporal) 16 1.689 m (5' 6.5\") 06/07/2007 100%    BMI (Body Mass Index)                   19.91 kg/m2            " Blood Pressure from Last 3 Encounters:   02/06/18 149/79   02/01/18 136/68   01/23/18 155/73    Weight from Last 3 Encounters:   02/06/18 56.8 kg (125 lb 3.2 oz)   02/01/18 57.2 kg (126 lb)   01/23/18 57.2 kg (126 lb 1.6 oz)              We Performed the Following     Ferritin          Where to get your medicines      These medications were sent to [facility] [place] [place] [address]   [address] [place] [address]     Phone:  [phone]     carvedilol 12.5 MG tablet    ferrous sulfate 325 (65 FE) MG tablet    loperamide 2 MG capsule    Potassium Chloride ER 20 MEQ Tbcr          Primary Care Provider Office Phone # Fax #    Donna Shirley PA-C [phone] [address] [place] [address]        Equal Access to Services     [facility]: Hadii nat kimo Socara, waaxda luqadaha, qaybta kaalmada adeegyada, debra rowe . So [facility] [phone].    ATENCIÓN: Si lincolnla español, tiene a hunt disposición servicios gratuitos de asistencia lingüística. Llame al [phone].    We comply with applicable federal civil rights laws and [place] laws. We do not discriminate on the basis of race, color, national origin, age, disability, sex, sexual orientation, or gender identity.            Thank you!     Thank you for choosing [facility]  for your care. Our goal is always to provide you with excellent care. Hearing back from our patients is one way we can continue to improve our services. Please take a few minutes to complete the written survey that you may receive in the mail after your visit with us. Thank you!             Your Updated Medication List - Protect others around you: Learn how to safely use, store and throw away your medicines at www.disposemymeds.org.          This list is accurate as of 2/6/18  9:32 AM.  Always use your most recent med list.                   Brand Name Dispense Instructions for use  Diagnosis    ACETAMINOPHEN PO      Take 650 mg by mouth every 4 hours as needed for pain or fever        benzonatate 100 MG capsule    TESSALON PERLES    42 capsule    Take 2 capsules (200 mg) by mouth 3 times daily as needed for cough    Metastatic colon cancer to liver (H)       carvedilol 12.5 MG tablet    COREG    90 tablet    Take 1.5 tablets (18.75 mg) by mouth 2 times daily (with meals)    Chronic systolic congestive heart failure (H)       dexamethasone 4 MG tablet    DECADRON    6 tablet    Take 2 tablets (8 mg) by mouth daily (with breakfast) Days 2, 3, and 4 of each cycle.    Metastatic colon cancer to liver (H)       diclofenac 1 % Gel topical gel    VOLTAREN     Place 2 g onto the skin 4 times daily as needed for moderate pain (for RUQ pain)    Metastatic colon cancer to liver (H)       ferrous sulfate 325 (65 FE) MG tablet    IRON    100 tablet    Take 1 tablet (325 mg) by mouth 2 times daily    Metastatic colon cancer to liver (H)       guaiFENesin-codeine 100-10 MG/5ML Soln solution    ROBITUSSIN AC    120 mL    Take 5 mLs by mouth every 4 hours as needed for cough    Liver metastasis (H)       lidocaine 5 % Patch    LIDODERM    30 patch    Place 1-3 patches onto the skin every 24 hours To RUQ    Metastatic colon cancer to liver (H)       loperamide 2 MG capsule    IMODIUM    30 capsule    2 caps at 1st sign of diarrhea & 1 cap every 2hrs until 12hrs diarrhea free. During night, 2 caps at bedtime & 2 caps every 4hrs until AM    Metastatic colon cancer to liver (H)       LORazepam 0.5 MG tablet    ATIVAN    30 tablet    Take 1 tablet (0.5 mg) by mouth every 4 hours as needed (Anxiety, Nausea/Vomiting or Sleep)    Metastatic colon cancer to liver (H)       losartan 25 MG tablet    COZAAR    30 tablet    Take 1 tablet (25 mg) by mouth daily    Secondary cardiomyopathy (H)       oxyCODONE IR 5 MG tablet    ROXICODONE    20 tablet    Take 1 tablet (5 mg) by mouth every 8 hours as needed for pain     Metastatic colon cancer to liver (H)       Potassium Chloride ER 20 MEQ Tbcr     60 tablet    Take 2 tablets (40 mEq) by mouth daily    Hypokalemia       prochlorperazine 10 MG tablet    COMPAZINE    30 tablet    Take 1 tablet (10 mg) by mouth every 6 hours as needed (Nausea/Vomiting)    Metastatic colon cancer to liver (H)       ranitidine 300 MG tablet    ZANTAC    30 tablet    Take 1 tablet (300 mg) by mouth At Bedtime    Nausea and vomiting, intractability of vomiting not specified, unspecified vomiting type       sennosides 8.6 MG tablet    SENOKOT    120 each    Take 1-2 tablets by mouth 2 times daily as needed for constipation    Slow transit constipation

## 2018-02-07 NOTE — PROGRESS NOTES
Patients Hgb-7.7 yesterday. Arrived in w/c with daughter today for transfusion.  Tolerated 1 unit of blood. Lung sounds clear  pre/post unit of blood.  Patient has no complaints. B/P noted to be elevated. Plans to take meds at 4pm when home.  Discharged to home via w/c with daughter.

## 2018-02-07 NOTE — MR AVS SNAPSHOT
After Visit Summary   2/7/2018    Charlene Douglas    MRN: [id number]           Patient Information     Date Of Birth          1950        Visit Information        Provider Department      2/7/2018 11:30 AM NL INFUSION CHAIR 3 [facility]        Today's Diagnoses     Metastatic colon cancer to liver (H)    -  1    Anemia, unspecified type        Neutropenic fever (H)          Care Instructions    Pt to return on 2/16/18 for Power port access. Copies of medication list and upcoming appointments given prior to discharge.           Follow-ups after your visit        Your next 10 appointments already scheduled     Feb 16, 2018 10:00 AM CST   Level 1 with NL INFUSION CHAIR 4   [facility] ([facility])    [address] [place] [address]  [place] [address]   [phone]            Feb 16, 2018 10:45 AM CST   (Arrive by 10:30 AM)   CT CHEST/ABDOMEN/PELVIS W CONTRAST with PHCT1   [facility] CT Scan ([facility])    [address] [facility]  [place] [address]   [phone]           Please bring any scans or X-rays taken at other hospitals, if similar tests were done. Also bring a list of your medicines, including vitamins, minerals and over-the-counter drugs. It is safest to leave personal items at home.  Be sure to tell your doctor:   If you have any allergies.   If there s any chance you are pregnant.   If you are breastfeeding.   If you have any special needs.  You may have contrast for this exam. To prepare:   Do not eat or drink for 2 hours before your exam. If you need to take medicine, you may take it with small sips of water. (We may ask you to take liquid medicine as well.)   The day before your exam, drink extra fluids at least six 8-ounce glasses (unless your doctor tells you to restrict your fluids).  Patients over 70 or patients with diabetes or kidney problems:   If you haven t had a blood test (creatinine  test) within the last 30 days, go to your clinic or Diagnostic Imaging Department for this test.  If you have diabetes:   If your kidney function is normal, continue taking your metformin (Avandamet, Glucophage, Glucovance, Metaglip) on the day of your exam.   If your kidney function is abnormal, wait 48 hours before restarting this medicine.  You will have oral contrast for this exam:   You will drink the contrast at home. Get this from your clinic or Diagnostic Imaging Department. Please follow the directions given.  Please wear loose clothing, such as a sweat suit or jogging clothes. Avoid snaps, zippers and other metal. We may ask you to undress and put on a hospital gown.  If you have any questions, please call the Imaging Department where you will have your exam.            Feb 20, 2018  8:30 AM CST   Return Visit with Duy Dove MD   [facility] ([facility])    [address] [place] [id number] [phone]            Feb 20, 2018  9:00 AM CST   Level 7 with NL INFUSION CHAIR 1   [facility] ([facility])    [address] [place] [address]  [place] [id number] [phone]              Future tests that were ordered for you today     Open Future Orders        Priority Expected Expires Ordered    CT Chest/Abdomen/Pelvis w Contrast Routine 2/13/2018 2/7/2019 2/6/2018            Who to contact     If you have questions or need follow up information about today's clinic visit or your schedule please contact [facility] directly at [phone].  Normal or non-critical lab and imaging results will be communicated to you by MyChart, letter or phone within 4 business days after the clinic has received the results. If you do not hear from us within 7 days, please contact the clinic through MyChart or phone. If you have a critical or abnormal lab result, we will notify you by phone as soon as possible.  Submit  refill requests through [employer]ge or call your pharmacy and they will forward the refill request to us. Please allow 3 business days for your refill to be completed.          Additional Information About Your Visit        kaufDAhart Information     [employer]iange gives you secure access to your electronic health record. If you see a primary care provider, you can also send messages to your care team and make appointments. If you have questions, please call your primary care clinic.  If you do not have a primary care provider, please call [phone] and they will assist you.        Care EveryWhere ID     This is your Care EveryWhere ID. This could be used by other organizations to access your [facility] medical records  [id number]        Your Vitals Were     Pulse Temperature Respirations Last Period Pulse Oximetry       67 97.8  F (36.6  C) (Temporal) 18 06/07/2007 100%        Blood Pressure from Last 3 Encounters:   02/07/18 183/83   02/06/18 149/79   02/06/18 153/81    Weight from Last 3 Encounters:   02/06/18 56.8 kg (125 lb 3.2 oz)   02/06/18 56.8 kg (125 lb 3.2 oz)   02/01/18 57.2 kg (126 lb)              We Performed the Following     Blood component     Red blood cell prepare order unit     Transfuse red blood cell unit        Primary Care Provider Office Phone # Fax #    Donna Shirley PA-C [phone] [address] [place] [address]        Equal Access to Services     [facility]: Hadii aad ku hadasho Soomaali, waaxda luqadaha, qaybta kaalmada adeegyada, debra rowe . So [facility] [phone].    ATENCIÓN: Si habla español, tiene a hunt disposición servicios gratuitos de asistencia lingüística. Llame al [phone].    We comply with applicable federal civil rights laws and [place] laws. We do not discriminate on the basis of race, color, national origin, age, disability, sex, sexual orientation, or gender identity.            Thank you!     Thank you for choosing  [facility]  for your care. Our goal is always to provide you with excellent care. Hearing back from our patients is one way we can continue to improve our services. Please take a few minutes to complete the written survey that you may receive in the mail after your visit with us. Thank you!             Your Updated Medication List - Protect others around you: Learn how to safely use, store and throw away your medicines at www.disposemymeds.org.          This list is accurate as of 2/7/18  3:30 PM.  Always use your most recent med list.                   Brand Name Dispense Instructions for use Diagnosis    ACETAMINOPHEN PO      Take 650 mg by mouth every 4 hours as needed for pain or fever        benzonatate 100 MG capsule    TESSALON PERLES    42 capsule    Take 2 capsules (200 mg) by mouth 3 times daily as needed for cough    Metastatic colon cancer to liver (H)       carvedilol 12.5 MG tablet    COREG    90 tablet    Take 1.5 tablets (18.75 mg) by mouth 2 times daily (with meals)    Chronic systolic congestive heart failure (H)       dexamethasone 4 MG tablet    DECADRON    6 tablet    Take 2 tablets (8 mg) by mouth daily (with breakfast) Days 2, 3, and 4 of each cycle.    Metastatic colon cancer to liver (H)       diclofenac 1 % Gel topical gel    VOLTAREN     Place 2 g onto the skin 4 times daily as needed for moderate pain (for RUQ pain)    Metastatic colon cancer to liver (H)       diphenoxylate-atropine 2.5-0.025 MG per tablet    LOMOTIL    50 tablet    Take 2 tablets by mouth 4 times daily as needed for diarrhea    Metastatic colon cancer to liver (H), Chemotherapy induced diarrhea       ferrous sulfate 325 (65 FE) MG tablet    IRON    100 tablet    Take 1 tablet (325 mg) by mouth 2 times daily    Metastatic colon cancer to liver (H)       guaiFENesin-codeine 100-10 MG/5ML Soln solution    ROBITUSSIN AC    120 mL    Take 5 mLs by mouth every 4 hours as needed for cough    Liver  metastasis (H)       lidocaine 5 % Patch    LIDODERM    30 patch    Place 1-3 patches onto the skin every 24 hours To RUQ    Metastatic colon cancer to liver (H)       loperamide 2 MG capsule    IMODIUM    30 capsule    2 caps at 1st sign of diarrhea & 1 cap every 2hrs until 12hrs diarrhea free. During night, 2 caps at bedtime & 2 caps every 4hrs until AM    Metastatic colon cancer to liver (H)       LORazepam 0.5 MG tablet    ATIVAN    30 tablet    Take 1 tablet (0.5 mg) by mouth every 4 hours as needed (Anxiety, Nausea/Vomiting or Sleep)    Metastatic colon cancer to liver (H)       losartan 25 MG tablet    COZAAR    30 tablet    Take 1 tablet (25 mg) by mouth daily    Secondary cardiomyopathy (H)       oxyCODONE IR 5 MG tablet    ROXICODONE    20 tablet    Take 1 tablet (5 mg) by mouth every 8 hours as needed for pain    Metastatic colon cancer to liver (H)       Potassium Chloride ER 20 MEQ Tbcr     60 tablet    Take 2 tablets (40 mEq) by mouth daily    Hypokalemia       prochlorperazine 10 MG tablet    COMPAZINE    30 tablet    Take 1 tablet (10 mg) by mouth every 6 hours as needed (Nausea/Vomiting)    Metastatic colon cancer to liver (H)       ranitidine 300 MG tablet    ZANTAC    30 tablet    Take 1 tablet (300 mg) by mouth At Bedtime    Nausea and vomiting, intractability of vomiting not specified, unspecified vomiting type       sennosides 8.6 MG tablet    SENOKOT    120 each    Take 1-2 tablets by mouth 2 times daily as needed for constipation    Slow transit constipation

## 2018-02-16 NOTE — MR AVS SNAPSHOT
After Visit Summary   2/16/2018    Charlene Douglas    MRN: [id number]           Patient Information     Date Of Birth          1950        Visit Information        Provider Department      2/16/2018 10:00 AM NL INFUSION CHAIR 4 [facility]        Today's Diagnoses     Neutropenic fever (H)    -  1    Metastatic colon cancer to liver (H)          Care Instructions    Pt to return on 02/20/18 for chemo. Copies of medication list and upcoming appointments given prior to discharge.             Follow-ups after your visit        Follow-up notes from your care team     Return in about 4 days (around 2/20/2018).      Your next 10 appointments already scheduled     Feb 20, 2018  8:30 AM CST   Return Visit with Duy Dove MD   [facility] ([facility])    [address] [place] [id number] [phone]            Feb 20, 2018  9:00 AM CST   Level 7 with NL INFUSION CHAIR 1   [facility] ([facility])    [address] [place] [address] [phone]              Who to contact     If you have questions or need follow up information about today's clinic visit or your schedule please contact [facility] directly at [phone].  Normal or non-critical lab and imaging results will be communicated to you by BABADUhart, letter or phone within 4 business days after the clinic has received the results. If you do not hear from us within 7 days, please contact the clinic through BABADUhart or phone. If you have a critical or abnormal lab result, we will notify you by phone as soon as possible.  Submit refill requests through [employer] or call your pharmacy and they will forward the refill request to us. Please allow 3 business days for your refill to be completed.          Additional Information About Your Visit        BABADUharBioSignia Information     [employer] gives you secure access  to your electronic health record. If you see a primary care provider, you can also send messages to your care team and make appointments. If you have questions, please call your primary care clinic.  If you do not have a primary care provider, please call [phone] and they will assist you.        Care EveryWhere ID     This is your Care EveryWhere ID. This could be used by other organizations to access your [place] medical records  [id number]        Your Vitals Were     Last Period                   06/07/2007            Blood Pressure from Last 3 Encounters:   02/07/18 183/83   02/06/18 149/79   02/06/18 153/81    Weight from Last 3 Encounters:   02/06/18 56.8 kg (125 lb 3.2 oz)   02/06/18 56.8 kg (125 lb 3.2 oz)   02/01/18 57.2 kg (126 lb)              Today, you had the following     No orders found for display       Primary Care Provider Office Phone # Fax #    Donna Shirley PA-C [phone] [address] [place] [address]        Equal Access to Services     [facility]: Hadii aad ku hadasho Soomaali, waaxda luqadaha, qaybta kaalmada adelelia, debra rowe . So [facility] [phone].    ATENCIÓN: Si habla español, tiene a hunt disposición servicios gratuitos de asistencia lingüística. [facility] [phone].    We comply with applicable federal civil rights laws and [place] laws. We do not discriminate on the basis of race, color, national origin, age, disability, sex, sexual orientation, or gender identity.            Thank you!     Thank you for choosing [facility]  for your care. Our goal is always to provide you with excellent care. Hearing back from our patients is one way we can continue to improve our services. Please take a few minutes to complete the written survey that you may receive in the mail after your visit with us. Thank you!             Your Updated Medication List - Protect others around you: Learn how to  safely use, store and throw away your medicines at www.disposemymeds.org.          This list is accurate as of 2/16/18 11:25 AM.  Always use your most recent med list.                   Brand Name Dispense Instructions for use Diagnosis    ACETAMINOPHEN PO      Take 650 mg by mouth every 4 hours as needed for pain or fever        benzonatate 100 MG capsule    TESSALON PERLES    42 capsule    Take 2 capsules (200 mg) by mouth 3 times daily as needed for cough    Metastatic colon cancer to liver (H)       carvedilol 12.5 MG tablet    COREG    90 tablet    Take 1.5 tablets (18.75 mg) by mouth 2 times daily (with meals)    Chronic systolic congestive heart failure (H)       dexamethasone 4 MG tablet    DECADRON    6 tablet    Take 2 tablets (8 mg) by mouth daily (with breakfast) Days 2, 3, and 4 of each cycle.    Metastatic colon cancer to liver (H)       diclofenac 1 % Gel topical gel    VOLTAREN     Place 2 g onto the skin 4 times daily as needed for moderate pain (for RUQ pain)    Metastatic colon cancer to liver (H)       diphenoxylate-atropine 2.5-0.025 MG per tablet    LOMOTIL    50 tablet    Take 2 tablets by mouth 4 times daily as needed for diarrhea    Metastatic colon cancer to liver (H), Chemotherapy induced diarrhea       ferrous sulfate 325 (65 FE) MG tablet    IRON    100 tablet    Take 1 tablet (325 mg) by mouth 2 times daily    Metastatic colon cancer to liver (H)       guaiFENesin-codeine 100-10 MG/5ML Soln solution    ROBITUSSIN AC    120 mL    Take 5 mLs by mouth every 4 hours as needed for cough    Liver metastasis (H)       lidocaine 5 % Patch    LIDODERM    30 patch    Place 1-3 patches onto the skin every 24 hours To RUQ    Metastatic colon cancer to liver (H)       loperamide 2 MG capsule    IMODIUM    30 capsule    2 caps at 1st sign of diarrhea & 1 cap every 2hrs until 12hrs diarrhea free. During night, 2 caps at bedtime & 2 caps every 4hrs until AM    Metastatic colon cancer to liver (H)        LORazepam 0.5 MG tablet    ATIVAN    30 tablet    Take 1 tablet (0.5 mg) by mouth every 4 hours as needed (Anxiety, Nausea/Vomiting or Sleep)    Metastatic colon cancer to liver (H)       losartan 25 MG tablet    COZAAR    30 tablet    Take 1 tablet (25 mg) by mouth daily    Secondary cardiomyopathy (H)       oxyCODONE IR 5 MG tablet    ROXICODONE    20 tablet    Take 1 tablet (5 mg) by mouth every 8 hours as needed for pain    Metastatic colon cancer to liver (H)       Potassium Chloride ER 20 MEQ Tbcr     60 tablet    Take 2 tablets (40 mEq) by mouth daily    Hypokalemia       prochlorperazine 10 MG tablet    COMPAZINE    30 tablet    Take 1 tablet (10 mg) by mouth every 6 hours as needed (Nausea/Vomiting)    Metastatic colon cancer to liver (H)       ranitidine 300 MG tablet    ZANTAC    30 tablet    Take 1 tablet (300 mg) by mouth At Bedtime    Nausea and vomiting, intractability of vomiting not specified, unspecified vomiting type       sennosides 8.6 MG tablet    SENOKOT    120 each    Take 1-2 tablets by mouth 2 times daily as needed for constipation    Slow transit constipation

## 2018-02-16 NOTE — PATIENT INSTRUCTIONS
Pt to return on 02/20/18 for chemo. Copies of medication list and upcoming appointments given prior to discharge.

## 2018-02-16 NOTE — PROGRESS NOTES
Patient here for Power Port access for scan. Accessed without difficulty, blood return noted pre and post scan. De-accessed and heparin flush per protocol. Discharged to home in stable condition.

## 2018-02-20 NOTE — PATIENT INSTRUCTIONS
Pt to return on 03/23/18 for port flush. Copies of medication list and upcoming appointments given prior to discharge.

## 2018-02-20 NOTE — PATIENT INSTRUCTIONS
Please follow up with Dr. Dove 1 week after starting new oral treatment, Stivarga.  Prescription has been sent to a specialty pharmacy and they will call you for questions, concerns, and delivery.  Stan in Oncology will coordinate follow up when known start date.      Follow Up Date/Time:     If you have any questions or concerns please feel free to call.    If you need to reschedule please call:  Clinic or Lab Appointment - [phone]  Infusion - [phone]  Imaging - [phone]    Stan Balderas, RN, BSN, OCN   Oncology Care Coordinator RN  [facility]  [phone]

## 2018-02-20 NOTE — PROGRESS NOTES
Patient here for port access and labs. Chemo discontinued today, patient and  teary eyed. Consoled and listened to patient. De-accessed port and heparin flush per protocol. Explained regarding port flushes every month and scheduled future appt. Patient discharged to home in stable condition with .

## 2018-02-20 NOTE — NURSING NOTE
"Oncology Rooming Note    February 20, 2018 8:42 AM   Charlene LUCAS Misael is a 67 year old female who presents for:    Chief Complaint   Patient presents with     Oncology Clinic Visit     2 week follow up for Metastatic colon cancer to liver     Chemotherapy     C9D1 today with labs prior     Results     CT CAP 2/16/2018     Initial Vitals: BP (!) 163/93 (BP Location: Right arm, Patient Position: Chair, Cuff Size: Adult Regular)  Pulse 99  Temp 97.2  F (36.2  C) (Temporal)  Resp 16  Ht 1.689 m (5' 6.5\")  Wt 57.2 kg (126 lb 1.6 oz)  LMP 06/07/2007  SpO2 100%  BMI 20.05 kg/m2 Estimated body mass index is 20.05 kg/(m^2) as calculated from the following:    Height as of this encounter: 1.689 m (5' 6.5\").    Weight as of this encounter: 57.2 kg (126 lb 1.6 oz). Body surface area is 1.64 meters squared.  No Pain (0) Comment: Data Unavailable   Patient's last menstrual period was 06/07/2007.  Allergies reviewed: Yes  Medications reviewed: Yes    Medications: Medication refills not needed today.  Pharmacy name entered into [facility]:    [facility]  [place] - [id number] [facility]    Clinical concerns:   Nausea or Vomiting:  Yes - Nausea  Mouth sores:  No  SOB:  Yes  Fever or chills:  No -   Hard or loose stools:  Yes   If yes, loose, \"Soft\"  Skin issues:  Yes   If yes,tingling in feet  Fatigue:  Yes  Light headed or Dizzy:  No  Weakness:  Yes  Difficulty sleeping:  No -   Memory loss:  No  6-8 glasses of water a day:  Yes  Appetite:  Moderate   Dr. Dove was notified.    Elsa Butts MA              "

## 2018-02-20 NOTE — MR AVS SNAPSHOT
After Visit Summary   2/20/2018    Charlene Douglas    MRN: [id number]           Patient Information     Date Of Birth          1950        Visit Information        Provider Department      2/20/2018 8:30 AM Duy Dove MD [facility]        Care Instructions      Please follow up with Dr. Dove 1 week after starting new oral treatment, Stivarga.  Prescription has been sent to a specialty pharmacy and they will call you for questions, concerns, and delivery.  Stan in Oncology will coordinate follow up when known start date.      Follow Up Date/Time:     If you have any questions or concerns please feel free to call.    If you need to reschedule please call:  Clinic or Lab Appointment - [phone]  Infusion - [phone]  Imaging - [phone]    Stan Balderas RN, BSN, OCN   Oncology Care Coordinator RN  [facility]  [phone]              Follow-ups after your visit        Who to contact     If you have questions or need follow up information about today's clinic visit or your schedule please contact [facility] directly at [phone].  Normal or non-critical lab and imaging results will be communicated to you by [employer], letter or phone within 4 business days after the clinic has received the results. If you do not hear from us within 7 days, please contact the clinic through [employer] or phone. If you have a critical or abnormal lab result, we will notify you by phone as soon as possible.  Submit refill requests through [employer] or call your pharmacy and they will forward the refill request to us. Please allow 3 business days for your refill to be completed.          Additional Information About Your Visit        [employer] Information     [employer] gives you secure access to your electronic health record. If you see a primary care provider, you can also send messages to your care team and make appointments. If you have questions, please call your  "primary care clinic.  If you do not have a primary care provider, please call [phone] and they will assist you.        Care EveryWhere ID     This is your Care EveryWhere ID. This could be used by other organizations to access your [place] medical records  [id number]        Your Vitals Were     Pulse Temperature Respirations Height Last Period Pulse Oximetry    99 97.2  F (36.2  C) (Temporal) 16 1.689 m (5' 6.5\") 06/07/2007 100%    BMI (Body Mass Index)                   20.05 kg/m2            Blood Pressure from Last 3 Encounters:   02/20/18 (!) 163/93   02/07/18 183/83   02/06/18 149/79    Weight from Last 3 Encounters:   02/20/18 57.2 kg (126 lb 1.6 oz)   02/06/18 56.8 kg (125 lb 3.2 oz)   02/06/18 56.8 kg (125 lb 3.2 oz)              Today, you had the following     No orders found for display       Primary Care Provider Office Phone # Fax #    Donna Shirley PA-C [phone] [address] [place] [address]        Equal Access to Services     [facility]: Hadii nat kimo Socara, waaxda luqadaha, qaybta kaalmada adelisandrayada, debra reese. So [facility] [phone].    ATENCIÓN: Si habla español, tiene a hunt disposición servicios gratuitos de asistencia lingüística. [facility] [phone].    We comply with applicable federal civil rights laws and [place] laws. We do not discriminate on the basis of race, color, national origin, age, disability, sex, sexual orientation, or gender identity.            Thank you!     Thank you for choosing [facility]  for your care. Our goal is always to provide you with excellent care. Hearing back from our patients is one way we can continue to improve our services. Please take a few minutes to complete the written survey that you may receive in the mail after your visit with us. Thank you!             Your Updated Medication List - Protect others around you: Learn how to safely use, store and throw " away your medicines at www.disposemymeds.org.          This list is accurate as of 2/20/18  9:21 AM.  Always use your most recent med list.                   Brand Name Dispense Instructions for use Diagnosis    ACETAMINOPHEN PO      Take 650 mg by mouth every 4 hours as needed for pain or fever        benzonatate 100 MG capsule    TESSALON PERLES    42 capsule    Take 2 capsules (200 mg) by mouth 3 times daily as needed for cough    Metastatic colon cancer to liver (H)       carvedilol 12.5 MG tablet    COREG    90 tablet    Take 1.5 tablets (18.75 mg) by mouth 2 times daily (with meals)    Chronic systolic congestive heart failure (H)       dexamethasone 4 MG tablet    DECADRON    6 tablet    Take 2 tablets (8 mg) by mouth daily (with breakfast) Days 2, 3, and 4 of each cycle.    Metastatic colon cancer to liver (H)       diclofenac 1 % Gel topical gel    VOLTAREN     Place 2 g onto the skin 4 times daily as needed for moderate pain (for RUQ pain)    Metastatic colon cancer to liver (H)       diphenoxylate-atropine 2.5-0.025 MG per tablet    LOMOTIL    50 tablet    Take 2 tablets by mouth 4 times daily as needed for diarrhea    Metastatic colon cancer to liver (H), Chemotherapy induced diarrhea       ferrous sulfate 325 (65 FE) MG tablet    IRON    100 tablet    Take 1 tablet (325 mg) by mouth 2 times daily    Metastatic colon cancer to liver (H)       guaiFENesin-codeine 100-10 MG/5ML Soln solution    ROBITUSSIN AC    120 mL    Take 5 mLs by mouth every 4 hours as needed for cough    Liver metastasis (H)       lidocaine 5 % Patch    LIDODERM    30 patch    Place 1-3 patches onto the skin every 24 hours To RUQ    Metastatic colon cancer to liver (H)       loperamide 2 MG capsule    IMODIUM    30 capsule    2 caps at 1st sign of diarrhea & 1 cap every 2hrs until 12hrs diarrhea free. During night, 2 caps at bedtime & 2 caps every 4hrs until AM    Metastatic colon cancer to liver (H)       LORazepam 0.5 MG tablet     ATIVAN    30 tablet    Take 1 tablet (0.5 mg) by mouth every 4 hours as needed (Anxiety, Nausea/Vomiting or Sleep)    Metastatic colon cancer to liver (H)       losartan 25 MG tablet    COZAAR    30 tablet    Take 1 tablet (25 mg) by mouth daily    Secondary cardiomyopathy (H)       ondansetron 8 MG tablet    ZOFRAN    10 tablet    Take 1 tablet (8 mg) by mouth every 8 hours as needed (Nausea/Vomiting)    Metastatic colon cancer to liver (H)       oxyCODONE IR 5 MG tablet    ROXICODONE    20 tablet    Take 1 tablet (5 mg) by mouth every 8 hours as needed for pain    Metastatic colon cancer to liver (H)       Potassium Chloride ER 20 MEQ Tbcr     60 tablet    Take 2 tablets (40 mEq) by mouth daily    Hypokalemia       prochlorperazine 10 MG tablet    COMPAZINE    30 tablet    Take 1 tablet (10 mg) by mouth every 6 hours as needed (Nausea/Vomiting)    Metastatic colon cancer to liver (H)       ranitidine 300 MG tablet    ZANTAC    30 tablet    Take 1 tablet (300 mg) by mouth At Bedtime    Nausea and vomiting, intractability of vomiting not specified, unspecified vomiting type       sennosides 8.6 MG tablet    SENOKOT    120 each    Take 1-2 tablets by mouth 2 times daily as needed for constipation    Slow transit constipation

## 2018-02-20 NOTE — MR AVS SNAPSHOT
After Visit Summary   2/20/2018    Charlene Douglas    MRN: [id number]           Patient Information     Date Of Birth          1950        Visit Information        Provider Department      2/20/2018 9:00 AM NL INFUSION CHAIR 1 [facility]        Today's Diagnoses     Neutropenic fever (H)    -  1    Metastatic colon cancer to liver (H)          Care Instructions    Pt to return on 03/23/18 for port flush. Copies of medication list and upcoming appointments given prior to discharge.             Follow-ups after your visit        Follow-up notes from your care team     Return in about 4 weeks (around 3/23/2018).      Your next 10 appointments already scheduled     Mar 23, 2018  8:00 AM CDT   Level 1 with NL INFUSION CHAIR 5   [facility] ([facility])    [address] [place] [id number] [phone]              Who to contact     If you have questions or need follow up information about today's clinic visit or your schedule please contact [facility] directly at [phone].  Normal or non-critical lab and imaging results will be communicated to you by [employer], letter or phone within 4 business days after the clinic has received the results. If you do not hear from us within 7 days, please contact the clinic through [employer] or phone. If you have a critical or abnormal lab result, we will notify you by phone as soon as possible.  Submit refill requests through [employer] or call your pharmacy and they will forward the refill request to us. Please allow 3 business days for your refill to be completed.          Additional Information About Your Visit        MyChart Information     [employer] gives you secure access to your electronic health record. If you see a primary care provider, you can also send messages to your care team and make appointments. If you have questions, please call your primary care  clinic.  If you do not have a primary care provider, please call [phone] and they will assist you.        Care EveryWhere ID     This is your Care EveryWhere ID. This could be used by other organizations to access your [place] medical records  [id number]        Your Vitals Were     Last Period                   06/07/2007            Blood Pressure from Last 3 Encounters:   02/20/18 (!) 163/93   02/07/18 183/83   02/06/18 149/79    Weight from Last 3 Encounters:   02/20/18 57.2 kg (126 lb 1.6 oz)   02/06/18 56.8 kg (125 lb 3.2 oz)   02/06/18 56.8 kg (125 lb 3.2 oz)              We Performed the Following     CBC with platelets differential     Comprehensive metabolic panel     Protein qualitative urine     Protein qualitative urine        Primary Care Provider Office Phone # Fax #    Donna Shirley PA-C [phone] [address] [place] [address]        Equal Access to Services     [facility]: Hadii aad ku hadasho Soomaali, waaxda luqadaha, qaybta kaalmada adeegyada, waxay idiin hayaan beau leyvaaradalton rowe . So [facility] [phone].    ATENCIÓN: Si habla español, tiene a hunt disposición servicios gratuitos de asistencia lingüística. Llame al [phone].    We comply with applicable federal civil rights laws and [place] laws. We do not discriminate on the basis of race, color, national origin, age, disability, sex, sexual orientation, or gender identity.            Thank you!     Thank you for choosing [facility]  for your care. Our goal is always to provide you with excellent care. Hearing back from our patients is one way we can continue to improve our services. Please take a few minutes to complete the written survey that you may receive in the mail after your visit with us. Thank you!             Your Updated Medication List - Protect others around you: Learn how to safely use, store and throw away your medicines at www.disposemymeds.org.           This list is accurate as of 2/20/18 10:26 AM.  Always use your most recent med list.                   Brand Name Dispense Instructions for use Diagnosis    ACETAMINOPHEN PO      Take 650 mg by mouth every 4 hours as needed for pain or fever        benzonatate 100 MG capsule    TESSALON PERLES    42 capsule    Take 2 capsules (200 mg) by mouth 3 times daily as needed for cough    Metastatic colon cancer to liver (H)       carvedilol 12.5 MG tablet    COREG    90 tablet    Take 1.5 tablets (18.75 mg) by mouth 2 times daily (with meals)    Chronic systolic congestive heart failure (H)       dexamethasone 4 MG tablet    DECADRON    6 tablet    Take 2 tablets (8 mg) by mouth daily (with breakfast) Days 2, 3, and 4 of each cycle.    Metastatic colon cancer to liver (H)       diclofenac 1 % Gel topical gel    VOLTAREN     Place 2 g onto the skin 4 times daily as needed for moderate pain (for RUQ pain)    Metastatic colon cancer to liver (H)       diphenoxylate-atropine 2.5-0.025 MG per tablet    LOMOTIL    50 tablet    Take 2 tablets by mouth 4 times daily as needed for diarrhea    Metastatic colon cancer to liver (H), Chemotherapy induced diarrhea       ferrous sulfate 325 (65 FE) MG tablet    IRON    100 tablet    Take 1 tablet (325 mg) by mouth 2 times daily    Metastatic colon cancer to liver (H)       guaiFENesin-codeine 100-10 MG/5ML Soln solution    ROBITUSSIN AC    120 mL    Take 5 mLs by mouth every 4 hours as needed for cough    Liver metastasis (H)       lidocaine 5 % Patch    LIDODERM    30 patch    Place 1-3 patches onto the skin every 24 hours To RUQ    Metastatic colon cancer to liver (H)       loperamide 2 MG capsule    IMODIUM    30 capsule    2 caps at 1st sign of diarrhea & 1 cap every 2hrs until 12hrs diarrhea free. During night, 2 caps at bedtime & 2 caps every 4hrs until AM    Metastatic colon cancer to liver (H)       LORazepam 0.5 MG tablet    ATIVAN    30 tablet    Take 1 tablet (0.5 mg) by  mouth every 4 hours as needed (Anxiety, Nausea/Vomiting or Sleep)    Metastatic colon cancer to liver (H)       losartan 25 MG tablet    COZAAR    30 tablet    Take 1 tablet (25 mg) by mouth daily    Secondary cardiomyopathy (H)       ondansetron 8 MG tablet    ZOFRAN    10 tablet    Take 1 tablet (8 mg) by mouth every 8 hours as needed (Nausea/Vomiting)    Metastatic colon cancer to liver (H)       oxyCODONE IR 5 MG tablet    ROXICODONE    20 tablet    Take 1 tablet (5 mg) by mouth every 8 hours as needed for pain    Metastatic colon cancer to liver (H)       Potassium Chloride ER 20 MEQ Tbcr     60 tablet    Take 2 tablets (40 mEq) by mouth daily    Hypokalemia       prochlorperazine 10 MG tablet    COMPAZINE    30 tablet    Take 1 tablet (10 mg) by mouth every 6 hours as needed (Nausea/Vomiting)    Metastatic colon cancer to liver (H)       ranitidine 300 MG tablet    ZANTAC    30 tablet    Take 1 tablet (300 mg) by mouth At Bedtime    Nausea and vomiting, intractability of vomiting not specified, unspecified vomiting type       sennosides 8.6 MG tablet    SENOKOT    120 each    Take 1-2 tablets by mouth 2 times daily as needed for constipation    Slow transit constipation

## 2018-02-20 NOTE — LETTER
2/20/2018         RE: Charlene Douglas  [id number] [address] [place] [id number]        Dear Colleague,    Thank you for referring your patient, Charlene Douglas, to the [facility]. Please see a copy of my visit note below.    hematology/ Oncology Follow-up Visit:  Feb 20, 2018    Reason for Visit:   Chief Complaint   Patient presents with     Oncology Clinic Visit     2 week follow up for Metastatic colon cancer to liver     Chemotherapy     C9D1 today with labs prior     Results     CT CAP 2/16/2018       Oncologic History:  Metastatic colon cancer to liver (H)  Charlene Douglas presented with 2 months history of cough, shortness of breath, decreased appetite and abdominal pain. Patient has black stool and occult blood which was positive. Subsequently the patient went on to have a CT angiogram of the chest because of shortness of breath. Lungs were clear. Multiple liver lesions were seen. Subsequently the patient went on to have an ultrasound of the liver done on August 18, 2017 which confirmed the presence of multiple liver metastases. Abdominal CT was done on August 18, 2017 showing large ascending colon mass with associated mesenteric/peritoneal drop metastasis and lymphadenopathy. There are multiple liver metastasis seen. KRAS mutated. She was started on systemic chemotherapy with FOLFOX with Avastin. However had severe cardiogenic toxicity from 5-FU and ended up in the hospital with acute respiratory failure requiring intubation. Initial echo showed LVEF and 10-15% which did improve in 3 days time to 35- 40%.  Subsequently, chemotherapy was switched to single agent irinotecan.      Interval History:  Weight except for fatigue.  She denies any recent diarrhea.  She denies any abdominal pain.  She denies any weight loss.  Her appetite has improved.  She denies any patient is here today for follow-up.  She has been feeling shortness of breath or cough or wheezing.  She is currently on  chemotherapy including oxaliplatin, irinotecan and Avastin.  Chemotherapy dosage were reduced because of side effects including neuropathy and diarrhea    Review Of Systems:  Constitutional: Negative for fever, chills, and night sweats.  Fatigue  Skin: negative.  Eyes: negative.  Ears/Nose/Throat: negative.  Respiratory: No shortness of breath, dyspnea on exertion, cough, or hemoptysis.  Cardiovascular: negative.  Gastrointestinal: negative.  Genitourinary: negative.  Musculoskeletal: negative.  Neurologic: negative.  Psychiatric: negative.  Hematologic/Lymphatic/Immunologic: negative.  Endocrine: negative.    All other ROS negative unless mentioned in interval history.    Past medical, social, surgical, and family histories reviewed.    Allergies:  Allergies as of 02/20/2018 - Miguel as Reviewed 02/20/2018   Allergen Reaction Noted     Lisinopril Cough 09/19/2017     No known allergies  08/11/2003       Current Medications:  Current Outpatient Prescriptions   Medication Sig Dispense Refill     ondansetron (ZOFRAN) 8 MG tablet Take 1 tablet (8 mg) by mouth every 8 hours as needed (Nausea/Vomiting) 10 tablet 2     loperamide (IMODIUM) 2 MG capsule 2 caps at 1st sign of diarrhea & 1 cap every 2hrs until 12hrs diarrhea free. During night, 2 caps at bedtime & 2 caps every 4hrs until AM 30 capsule 0     carvedilol (COREG) 12.5 MG tablet Take 1.5 tablets (18.75 mg) by mouth 2 times daily (with meals) 90 tablet 1     Potassium Chloride ER 20 MEQ TBCR Take 2 tablets (40 mEq) by mouth daily 60 tablet 0     ferrous sulfate (IRON) 325 (65 FE) MG tablet Take 1 tablet (325 mg) by mouth 2 times daily 100 tablet 1     diphenoxylate-atropine (LOMOTIL) 2.5-0.025 MG per tablet Take 2 tablets by mouth 4 times daily as needed for diarrhea 50 tablet 0     ranitidine (ZANTAC) 300 MG tablet Take 1 tablet (300 mg) by mouth At Bedtime 30 tablet 1     oxyCODONE IR (ROXICODONE) 5 MG tablet Take 1 tablet (5 mg) by mouth every 8 hours as needed  "for pain 20 tablet 0     benzonatate (TESSALON PERLES) 100 MG capsule Take 2 capsules (200 mg) by mouth 3 times daily as needed for cough 42 capsule 1     dexamethasone (DECADRON) 4 MG tablet Take 2 tablets (8 mg) by mouth daily (with breakfast) Days 2, 3, and 4 of each cycle. 6 tablet 11     lidocaine (LIDODERM) 5 % Patch Place 1-3 patches onto the skin every 24 hours To RUQ 30 patch 1     guaiFENesin-codeine (ROBITUSSIN AC) 100-10 MG/5ML SOLN solution Take 5 mLs by mouth every 4 hours as needed for cough 120 mL 0     losartan (COZAAR) 25 MG tablet Take 1 tablet (25 mg) by mouth daily 30 tablet 11     diclofenac (VOLTAREN) 1 % GEL topical gel Place 2 g onto the skin 4 times daily as needed for moderate pain (for RUQ pain)       sennosides (SENOKOT) 8.6 MG tablet Take 1-2 tablets by mouth 2 times daily as needed for constipation 120 each      ACETAMINOPHEN PO Take 650 mg by mouth every 4 hours as needed for pain or fever        regorafenib (STIVARGA) 40 MG tablet CHEMO Take 3 tablets (120 mg) by mouth daily Take on Days 1 thru 21 w/ low-fat bkfst. Store in orig bottle. Discard 28 days after opening 84 tablet 0        Physical Exam:  BP (!) 163/93 (BP Location: Right arm, Patient Position: Chair, Cuff Size: Adult Regular)  Pulse 99  Temp 97.2  F (36.2  C) (Temporal)  Resp 16  Ht 1.689 m (5' 6.5\")  Wt 57.2 kg (126 lb 1.6 oz)  LMP 06/07/2007  SpO2 100%  BMI 20.05 kg/m2  Wt Readings from Last 12 Encounters:   02/20/18 57.2 kg (126 lb 1.6 oz)   02/06/18 56.8 kg (125 lb 3.2 oz)   02/06/18 56.8 kg (125 lb 3.2 oz)   02/01/18 57.2 kg (126 lb)   01/23/18 57.2 kg (126 lb 1.6 oz)   01/09/18 58.7 kg (129 lb 8 oz)   01/09/18 58.7 kg (129 lb 8 oz)   12/12/17 58 kg (127 lb 14.4 oz)   12/12/17 58 kg (127 lb 14.4 oz)   11/28/17 57.9 kg (127 lb 11.2 oz)   11/14/17 58.6 kg (129 lb 3 oz)   11/14/17 58.6 kg (129 lb 1.6 oz)     ECOG performance status: 1  GENERAL APPEARANCE: Healthy, alert and in no acute distress.  HEENT: Sclerae " anicteric. PERRLA. Oropharynx without ulcers, lesions, or thrush.  NECK: Supple. No asymmetry or masses.  LYMPHATICS: No palpable cervical, supraclavicular, axillary, or inguinal lymphadenopathy.  RESP: Lungs clear to auscultation bilaterally without rales, rhonchi or wheezes.  CARDIOVASCULAR: Regular rate and rhythm. Normal S1, S2; no S3 or S4. No murmur, gallop, or rub.  ABDOMEN: Soft, nontender. Bowel sounds normal. No palpable organomegaly or masses.  MUSCULOSKELETAL: Extremities without gross deformities noted. No edema of bilateral lower extremities.  SKIN: No suspicious lesions or rashes.  NEURO: Alert and oriented x 3. Cranial nerves II-XII grossly intact.  PSYCHIATRIC: Mentation and affect appear normal.    Laboratory/Imaging Studies:  Infusion Therapy Visit on 2018   Component Date Value Ref Range Status     Unit Number 2018 [id number]   Final     Blood Component Type 2018 Red Blood Cells Leukocyte Reduced   Final     Division Number 2018 00   Final     Status of Unit 2018 Released to care unit   Final     Blood Product Code 2018 [id number]   Final     Unit Status 2018 ISS   Final        Recent Results (from the past 744 hour(s))   CT Chest/Abdomen/Pelvis w Contrast    Narrative    CT CHEST/ABDOMEN/PELVIS W CONTRAST  2018 11:18 AM    HISTORY:  Follow up metastatic colon cancer to liver (H). History of   and appendectomy. Prior Port-A-Cath placement.    TECHNIQUE: Scans obtained of the chest, abdomen, and pelvis with oral  and IV contrast. Isovue-370, 65 mL injected. Radiation dose for this  scan was reduced using automated exposure control, adjustment of the  mA and/or kV according to patient size, or iterative reconstruction  technique.    COMPARISON: CT CAP dated 2017.    FINDINGS:   Chest: Multiple noncalcified nodules are seen in the bilateral lungs  (images 16, 17, 18, 20, 26, 30, and 41 of series 3). These measure up  to a maximum of 0.6  cm and are not significantly changed since the  prior study dated 12/7/2017. The lungs are otherwise clear. No  infiltrate, effusion, or pneumothorax.    Right chest wall internal jugular [id number] is stable in  appearance and position. The heart is normal in size. No mediastinal,  hilar, or axillary lymphadenopathy is identified. Visualized portions  of the thyroid are normal in appearance. There is mild thickening of  the distal esophageal wall which could represent mild reflux.    No aggressive osseous lesions are seen. There are mild degenerative  changes in the spine. Severe degenerative changes of the left hip  status post probable prior fracture with healing versus acetabular  dysplasia are noted.    Abdomen and pelvis:  Innumerable hepatic metastases are again noted.  These nearly replace the right lobe of the liver and appear slightly  larger than on the prior CT dated 12/7/2017. For example, two  metastases in segment VIII of the liver (image 54 series 2) now  measure approximately 2.4 cm in diameter each. The same lesions  measured up to 2.1 cm on the prior study. Confluent lesions in the  inferior pole of the right lobe of the liver demonstrate less normal  intervening liver. One of these confluent lesions now measures up to  13.1 cm (image 63 series 2). Large lesions in the segment II of the  liver measure up to 7.4 cm on the current study. This previously  measured 2 or 3 separate lesions with the largest lesion measuring 3.4  cm. Normal intervening liver has since been replaced by tumor. No  intrahepatic biliary ductal dilatation is identified. Other abnormally  enlarged lymph node or exophytic hepatic metastasis in the caudate  lobe is again seen in the hepatic hilum.    Large probable gallstone in the gallbladder is again noted. The  gallbladder is mostly decompressed.    The pancreas, spleen, bilateral adrenal glands and bilateral kidneys  enhance normally. No hydronephrosis, nephrolithiasis,  hydroureter or  ureteral calculus is identified. Urinary bladder is unremarkable.    The uterus and ovaries are grossly unremarkable.    No definite adenopathy, free fluid or free air is seen in the  peritoneal cavity. There is nonaneurysmal atherosclerosis.    Mild asymmetric wall thickening is seen in the rectum and sigmoid  colon likely representing incomplete distention/contraction. Focal  narrowing of the sigmoid colon same location of the wall thickening is  also noted. There is thickening of the wall of the cecum which likely  represents previously noted malignancy. There is also nodularity in  the adipose tissue adjacent to the cecum which corresponds with local  metastasis. These are not significantly changed since the prior study.  Colon is otherwise of normal caliber.    Small bowel is of normal caliber. The stomach has a thickened  hypoattenuating wall which could represent inflammatory change.  Recommend clinical correlation. Stomach is otherwise unremarkable.      Impression    IMPRESSION:  1. Significant worsening of the innumerable hepatic metastases.  2. No significant change of the multiple pulmonary nodules which could  represent pulmonary metastasis.  3. Mild thickening of the wall of the stomach with decreased  attenuation could represent inflammatory change. Neoplastic  infiltration is considered less likely.  4. Probable cecal malignancy is again seen. There are soft tissue  metastases adjacent to the cecum which do not appear appreciably  changed since the prior study.  5. No other significant changes.    GENA CAMPBELL MD       Assessment and plan:  (C18.9,  C78.7) Metastatic colon cancer to liver (H)  (primary encounter diagnosis)  (C78.7) Liver metastasis (H)   I reviewed with patient most recent imaging studies.  There is a clear evidence of disease progression.  We talked about different treatment options.  I would recommend patient to start on Regorafenib.  Reviewed the patient potential  side effects of the drug.  Patient will be starting at lower dose at 120 mg orally daily.  We will monitor side effects.  I will see the patient again 2 weeks following the starting of the medication to assist side effects..    (G89.3) Cancer associated pain  The patient pain is well controlled.  She is using oxycodone as needed for pain every 6 hours.    (K52.1,  T45.1X5A) Chemotherapy induced diarrhea  Patient use Imodium as needed for diarrhea.  He is the diarrhea has been controlled.    (R11.2,  T45.1X5A) Chemotherapy induced nausea and vomiting  Nausea has been controlled currently.    (E87.6) Hypokalemia  Patient currently on potassium supplements.    (I10) Benign essential hypertension  Patient blood pressure is currently well controlled.  Patient currently on Cozaar 25 mg orally daily.    (I50.22) Chronic systolic congestive heart failure (H)  Ejection fraction has improved.  Patient continues on Coreg 12.5 mg orally twice daily.    (D64.9) Anemia, unspecified type  We will continue to monitor the patient hemoglobin and offer blood transfusion if required.    The patient is ready to learn, no apparent learning barriers were identified.  Diagnosis and treatment plans were explained to the patient. The patient expressed understanding of the content. The patient asked appropriate questions. The patient questions were answered to her satisfaction.    Time spent 40 minutes more than 50% of the time in counseling coordination of care including discussion of treatment options, management of metastatic colorectal cancer, potential side effects of medications, follow-up and prognosis    Chart documentation with Dragon Voice recognition Software. Although reviewed after completion, some words and grammatical errors may remain.    Again, thank you for allowing me to participate in the care of your patient.        Sincerely,        Duy Dove MD

## 2018-02-22 NOTE — TELEPHONE ENCOUNTER
Reason for Call:  Other call back    Detailed comments: Dr. Dove patient, would like Stan to call back. Please call  Bill at [phone] they have some updates for you regarding ins and medication.     Phone Number Patient can be reached at: Other phone number:  Tony at [phone] t    Best Time: asap     Can we leave a detailed message on this number? YES    Call taken on 2/22/2018 at 9:00 AM by Lidya Terrell

## 2018-02-22 NOTE — PROGRESS NOTES
hematology/ Oncology Follow-up Visit:  Feb 20, 2018    Reason for Visit:   Chief Complaint   Patient presents with     Oncology Clinic Visit     2 week follow up for Metastatic colon cancer to liver     Chemotherapy     C9D1 today with labs prior     Results     CT CAP 2/16/2018       Oncologic History:  Metastatic colon cancer to liver (H)  Charlene Douglas presented with 2 months history of cough, shortness of breath, decreased appetite and abdominal pain. Patient has black stool and occult blood which was positive. Subsequently the patient went on to have a CT angiogram of the chest because of shortness of breath. Lungs were clear. Multiple liver lesions were seen. Subsequently the patient went on to have an ultrasound of the liver done on August 18, 2017 which confirmed the presence of multiple liver metastases. Abdominal CT was done on August 18, 2017 showing large ascending colon mass with associated mesenteric/peritoneal drop metastasis and lymphadenopathy. There are multiple liver metastasis seen. KRAS mutated. She was started on systemic chemotherapy with FOLFOX with Avastin. However had severe cardiogenic toxicity from 5-FU and ended up in the hospital with acute respiratory failure requiring intubation. Initial echo showed LVEF and 10-15% which did improve in 3 days time to 35- 40%.  Subsequently, chemotherapy was switched to single agent irinotecan.      Interval History:  Weight except for fatigue.  She denies any recent diarrhea.  She denies any abdominal pain.  She denies any weight loss.  Her appetite has improved.  She denies any patient is here today for follow-up.  She has been feeling shortness of breath or cough or wheezing.  She is currently on chemotherapy including oxaliplatin, irinotecan and Avastin.  Chemotherapy dosage were reduced because of side effects including neuropathy and diarrhea    Review Of Systems:  Constitutional: Negative for fever, chills, and night sweats.  Fatigue  Skin:  negative.  Eyes: negative.  Ears/Nose/Throat: negative.  Respiratory: No shortness of breath, dyspnea on exertion, cough, or hemoptysis.  Cardiovascular: negative.  Gastrointestinal: negative.  Genitourinary: negative.  Musculoskeletal: negative.  Neurologic: negative.  Psychiatric: negative.  Hematologic/Lymphatic/Immunologic: negative.  Endocrine: negative.    All other ROS negative unless mentioned in interval history.    Past medical, social, surgical, and family histories reviewed.    Allergies:  Allergies as of 02/20/2018 - Miguel as Reviewed 02/20/2018   Allergen Reaction Noted     Lisinopril Cough 09/19/2017     No known allergies  08/11/2003       Current Medications:  Current Outpatient Prescriptions   Medication Sig Dispense Refill     ondansetron (ZOFRAN) 8 MG tablet Take 1 tablet (8 mg) by mouth every 8 hours as needed (Nausea/Vomiting) 10 tablet 2     loperamide (IMODIUM) 2 MG capsule 2 caps at 1st sign of diarrhea & 1 cap every 2hrs until 12hrs diarrhea free. During night, 2 caps at bedtime & 2 caps every 4hrs until AM 30 capsule 0     carvedilol (COREG) 12.5 MG tablet Take 1.5 tablets (18.75 mg) by mouth 2 times daily (with meals) 90 tablet 1     Potassium Chloride ER 20 MEQ TBCR Take 2 tablets (40 mEq) by mouth daily 60 tablet 0     ferrous sulfate (IRON) 325 (65 FE) MG tablet Take 1 tablet (325 mg) by mouth 2 times daily 100 tablet 1     diphenoxylate-atropine (LOMOTIL) 2.5-0.025 MG per tablet Take 2 tablets by mouth 4 times daily as needed for diarrhea 50 tablet 0     ranitidine (ZANTAC) 300 MG tablet Take 1 tablet (300 mg) by mouth At Bedtime 30 tablet 1     oxyCODONE IR (ROXICODONE) 5 MG tablet Take 1 tablet (5 mg) by mouth every 8 hours as needed for pain 20 tablet 0     benzonatate (TESSALON PERLES) 100 MG capsule Take 2 capsules (200 mg) by mouth 3 times daily as needed for cough 42 capsule 1     dexamethasone (DECADRON) 4 MG tablet Take 2 tablets (8 mg) by mouth daily (with breakfast) Days 2,  "3, and 4 of each cycle. 6 tablet 11     lidocaine (LIDODERM) 5 % Patch Place 1-3 patches onto the skin every 24 hours To RUQ 30 patch 1     guaiFENesin-codeine (ROBITUSSIN AC) 100-10 MG/5ML SOLN solution Take 5 mLs by mouth every 4 hours as needed for cough 120 mL 0     losartan (COZAAR) 25 MG tablet Take 1 tablet (25 mg) by mouth daily 30 tablet 11     diclofenac (VOLTAREN) 1 % GEL topical gel Place 2 g onto the skin 4 times daily as needed for moderate pain (for RUQ pain)       sennosides (SENOKOT) 8.6 MG tablet Take 1-2 tablets by mouth 2 times daily as needed for constipation 120 each      ACETAMINOPHEN PO Take 650 mg by mouth every 4 hours as needed for pain or fever        regorafenib (STIVARGA) 40 MG tablet CHEMO Take 3 tablets (120 mg) by mouth daily Take on Days 1 thru 21 w/ low-fat bkfst. Store in orig bottle. Discard 28 days after opening 84 tablet 0        Physical Exam:  BP (!) 163/93 (BP Location: Right arm, Patient Position: Chair, Cuff Size: Adult Regular)  Pulse 99  Temp 97.2  F (36.2  C) (Temporal)  Resp 16  Ht 1.689 m (5' 6.5\")  Wt 57.2 kg (126 lb 1.6 oz)  LMP 06/07/2007  SpO2 100%  BMI 20.05 kg/m2  Wt Readings from Last 12 Encounters:   02/20/18 57.2 kg (126 lb 1.6 oz)   02/06/18 56.8 kg (125 lb 3.2 oz)   02/06/18 56.8 kg (125 lb 3.2 oz)   02/01/18 57.2 kg (126 lb)   01/23/18 57.2 kg (126 lb 1.6 oz)   01/09/18 58.7 kg (129 lb 8 oz)   01/09/18 58.7 kg (129 lb 8 oz)   12/12/17 58 kg (127 lb 14.4 oz)   12/12/17 58 kg (127 lb 14.4 oz)   11/28/17 57.9 kg (127 lb 11.2 oz)   11/14/17 58.6 kg (129 lb 3 oz)   11/14/17 58.6 kg (129 lb 1.6 oz)     ECOG performance status: 1  GENERAL APPEARANCE: Healthy, alert and in no acute distress.  HEENT: Sclerae anicteric. PERRLA. Oropharynx without ulcers, lesions, or thrush.  NECK: Supple. No asymmetry or masses.  LYMPHATICS: No palpable cervical, supraclavicular, axillary, or inguinal lymphadenopathy.  RESP: Lungs clear to auscultation bilaterally without " rales, rhonchi or wheezes.  CARDIOVASCULAR: Regular rate and rhythm. Normal S1, S2; no S3 or S4. No murmur, gallop, or rub.  ABDOMEN: Soft, nontender. Bowel sounds normal. No palpable organomegaly or masses.  MUSCULOSKELETAL: Extremities without gross deformities noted. No edema of bilateral lower extremities.  SKIN: No suspicious lesions or rashes.  NEURO: Alert and oriented x 3. Cranial nerves II-XII grossly intact.  PSYCHIATRIC: Mentation and affect appear normal.    Laboratory/Imaging Studies:  Infusion Therapy Visit on 2018   Component Date Value Ref Range Status     Unit Number 2018 [id number]   Final     Blood Component Type 2018 Red Blood Cells Leukocyte Reduced   Final     Division Number 2018 00   Final     Status of Unit 2018 Released to care unit   Final     Blood Product Code 2018 [id number]   Final     Unit Status 2018 ISS   Final        Recent Results (from the past 744 hour(s))   CT Chest/Abdomen/Pelvis w Contrast    Narrative    CT CHEST/ABDOMEN/PELVIS W CONTRAST  2018 11:18 AM    HISTORY:  Follow up metastatic colon cancer to liver (H). History of   and appendectomy. Prior Port-A-Cath placement.    TECHNIQUE: Scans obtained of the chest, abdomen, and pelvis with oral  and IV contrast. Isovue-370, 65 mL injected. Radiation dose for this  scan was reduced using automated exposure control, adjustment of the  mA and/or kV according to patient size, or iterative reconstruction  technique.    COMPARISON: CT CAP dated 2017.    FINDINGS:   Chest: Multiple noncalcified nodules are seen in the bilateral lungs  (images 16, 17, 18, 20, 26, 30, and 41 of series 3). These measure up  to a maximum of 0.6 cm and are not significantly changed since the  prior study dated 2017. The lungs are otherwise clear. No  infiltrate, effusion, or pneumothorax.    Right chest wall internal jugular Eeabbw-x-Vnlu is stable in  appearance and position. The heart  is normal in size. No mediastinal,  hilar, or axillary lymphadenopathy is identified. Visualized portions  of the thyroid are normal in appearance. There is mild thickening of  the distal esophageal wall which could represent mild reflux.    No aggressive osseous lesions are seen. There are mild degenerative  changes in the spine. Severe degenerative changes of the left hip  status post probable prior fracture with healing versus acetabular  dysplasia are noted.    Abdomen and pelvis:  Innumerable hepatic metastases are again noted.  These nearly replace the right lobe of the liver and appear slightly  larger than on the prior CT dated 12/7/2017. For example, two  metastases in segment VIII of the liver (image 54 series 2) now  measure approximately 2.4 cm in diameter each. The same lesions  measured up to 2.1 cm on the prior study. Confluent lesions in the  inferior pole of the right lobe of the liver demonstrate less normal  intervening liver. One of these confluent lesions now measures up to  13.1 cm (image 63 series 2). Large lesions in the segment II of the  liver measure up to 7.4 cm on the current study. This previously  measured 2 or 3 separate lesions with the largest lesion measuring 3.4  cm. Normal intervening liver has since been replaced by tumor. No  intrahepatic biliary ductal dilatation is identified. Other abnormally  enlarged lymph node or exophytic hepatic metastasis in the caudate  lobe is again seen in the hepatic hilum.    Large probable gallstone in the gallbladder is again noted. The  gallbladder is mostly decompressed.    The pancreas, spleen, bilateral adrenal glands and bilateral kidneys  enhance normally. No hydronephrosis, nephrolithiasis, hydroureter or  ureteral calculus is identified. Urinary bladder is unremarkable.    The uterus and ovaries are grossly unremarkable.    No definite adenopathy, free fluid or free air is seen in the  peritoneal cavity. There is nonaneurysmal  atherosclerosis.    Mild asymmetric wall thickening is seen in the rectum and sigmoid  colon likely representing incomplete distention/contraction. Focal  narrowing of the sigmoid colon same location of the wall thickening is  also noted. There is thickening of the wall of the cecum which likely  represents previously noted malignancy. There is also nodularity in  the adipose tissue adjacent to the cecum which corresponds with local  metastasis. These are not significantly changed since the prior study.  Colon is otherwise of normal caliber.    Small bowel is of normal caliber. The stomach has a thickened  hypoattenuating wall which could represent inflammatory change.  Recommend clinical correlation. Stomach is otherwise unremarkable.      Impression    IMPRESSION:  1. Significant worsening of the innumerable hepatic metastases.  2. No significant change of the multiple pulmonary nodules which could  represent pulmonary metastasis.  3. Mild thickening of the wall of the stomach with decreased  attenuation could represent inflammatory change. Neoplastic  infiltration is considered less likely.  4. Probable cecal malignancy is again seen. There are soft tissue  metastases adjacent to the cecum which do not appear appreciably  changed since the prior study.  5. No other significant changes.    GENA CAMPBELL MD       Assessment and plan:  (C18.9,  C78.7) Metastatic colon cancer to liver (H)  (primary encounter diagnosis)  (C78.7) Liver metastasis (H)   I reviewed with patient most recent imaging studies.  There is a clear evidence of disease progression.  We talked about different treatment options.  I would recommend patient to start on Regorafenib.  Reviewed the patient potential side effects of the drug.  Patient will be starting at lower dose at 120 mg orally daily.  We will monitor side effects.  I will see the patient again 2 weeks following the starting of the medication to assist side effects..    (G89.3)  Cancer associated pain  The patient pain is well controlled.  She is using oxycodone as needed for pain every 6 hours.    (K52.1,  T45.1X5A) Chemotherapy induced diarrhea  Patient use Imodium as needed for diarrhea.  He is the diarrhea has been controlled.    (R11.2,  T45.1X5A) Chemotherapy induced nausea and vomiting  Nausea has been controlled currently.    (E87.6) Hypokalemia  Patient currently on potassium supplements.    (I10) Benign essential hypertension  Patient blood pressure is currently well controlled.  Patient currently on Cozaar 25 mg orally daily.    (I50.22) Chronic systolic congestive heart failure (H)  Ejection fraction has improved.  Patient continues on Coreg 12.5 mg orally twice daily.    (D64.9) Anemia, unspecified type  We will continue to monitor the patient hemoglobin and offer blood transfusion if required.    The patient is ready to learn, no apparent learning barriers were identified.  Diagnosis and treatment plans were explained to the patient. The patient expressed understanding of the content. The patient asked appropriate questions. The patient questions were answered to her satisfaction.    Time spent 40 minutes more than 50% of the time in counseling coordination of care including discussion of treatment options, management of metastatic colorectal cancer, potential side effects of medications, follow-up and prognosis    Chart documentation with Dragon Voice recognition Software. Although reviewed after completion, some words and grammatical errors may remain.

## 2018-02-23 NOTE — TELEPHONE ENCOUNTER
Call from Praveen, spouse saying that they received a call that the RX for Stivarga was transferred to [facility] specialty pharmacy. [facility] is requesting demographics on pt and RX.  Phone: [phone]  Fax: [phone]  ARIANA Reyes

## 2018-02-23 NOTE — TELEPHONE ENCOUNTER
was wanting to let Oncology RN know that they did speak with insurance about going to the [facility].  They will fax information to writing nurse.    Stan Balderas RN, BSN, OCN  2/22/2018, 7:40 PM

## 2018-02-23 NOTE — TELEPHONE ENCOUNTER
Prior Authorization Approval    Authorization Effective Date: 2/22/2018  Authorization Expiration Date: 2/22/2019  Medication: STIVARGA 40MG - PA INITIATED  Approved Dose/Quantity:   Reference #: [id number]   Insurance Company: MEDICA - Phone [phone] Fax [phone]  Expected CoPay:       CoPay Card Available:      Foundation Assistance Needed:    Which Pharmacy is filling the prescription (Not needed for infusion/clinic administered): [facility] - MAIL ORDER ONLY - [place], [place]  Pharmacy Notified:    Patient Notified:

## 2018-02-23 NOTE — TELEPHONE ENCOUNTER
[facility] Prior Authorization Team   Phone: [phone]  Fax: [phone]    PA Initiation    Medication: STIVARGA 40MG - PA INITIATED  Insurance Company: MEDICA - Phone [phone] Fax [phone]  Pharmacy Filling the Rx: [facility] - MAIL ORDER ONLY - [place], [place]  Filling Pharmacy Phone:    Filling Pharmacy Fax:    Start Date: 2/22/2018

## 2018-02-27 NOTE — TELEPHONE ENCOUNTER
Reason for Call:  Other call back    Detailed comments: Charlene's  Bill called and wanted you to return his call regarding Charlene's treatment. Charlene wants to quit her chemo treatments    Phone Number Patient can be reached at: Home number on file [phone] (home)    Best Time: any    Can we leave a detailed message on this number? YES    Call taken on 2/27/2018 at 10:02 AM by Daphne Spaulding

## 2018-02-27 NOTE — TELEPHONE ENCOUNTER
Returned call and spoke with Pharmacist, Deyvi.  Updated them with plan that patient has decided not to continue with treatment.    Stan Balderas RN, BSN, OCN  2/27/2018, 3:53 PM

## 2018-02-27 NOTE — TELEPHONE ENCOUNTER
----- Message from Stan Balderas RN sent at 2/27/2018  1:34 PM CST -----  Regarding: Palliative Care and Hospice  Rojelio Thompson,    I am the Oncology RN working with Dr. Dove and Charlene.  I just talked to Charlene and she has decided to stop treatment for her cancer and is interested in meeting with Palliative care and Hospice.  Dr. Dove is out this week and wondering if you would be willing to place orders and I can follow up to make sure she has appointments.  Please let me know if you are willing to do this or if there is anything you would need from me.    Thank You!    Stan Balderas, RN, BSN, OCN  Oncology Care Coordinator RN  [facility]  [phone]  2/27/2018, 1:37 PM

## 2018-02-27 NOTE — TELEPHONE ENCOUNTER
Writing nurse returned call and spoke directly with patient.  Patient reports discussing with family over the weekend and they have decided not to continue with treatment due to quality of life.  They did review going to the [facility] for a second opinion and they will call back if they decide they want assistance to get in.  She did report that she discussed palliative care with her insurance and they will send her a list of MD's.  Discussed palliative care through [facility] in [place].  Patient is ok with going there.  Also discussed meeting with Hospice and she is agreeable.  Reassured her that we support whatever her decision is and we are available if she needs anything from Oncology.  Patient is ok with Writing nurse calling and checking in with her as well.  All questions and concerns addressed to her satisfaction.  She will have her  call if he has any others.  Will connect with patient's PCP for orders since Dr. Dove is out of the clinic this week.    Stan Balderas RN, BSN, OCN  2/27/2018, 1:34 PM

## 2018-02-27 NOTE — TELEPHONE ENCOUNTER
[facility]/ Ref# [id number]/ Stivarga/ They would like clarification of an RX that got faxed over. Please call Debbie Back at [phone]

## 2018-03-05 NOTE — PROGRESS NOTES
NP patient, will cosign her orders as appropriate.  Will forward to let her know of admit.  Angie Sims MD

## 2018-03-05 NOTE — PROGRESS NOTES
[facility] now requests orders and shares plan of care/discharge summaries for some patients through [employer].  Please REPLY TO THIS MESSAGE in order to give authorization for orders when needed.  This is considered a verbal order, you will still receive a faxed copy of orders for signature.  Thank you for your assistance in improving collaboration for our patients.    ORDER  [facility] admitted to [facility] effective 3/5/18 for DX Metastatic Colon Cancer.  If questions or concerns please contact admission clinician listed below.  Thanks    Donna Cordova RN, BSN, PHN  Hospice Admission Clinician/PACCT RN [facility]  [address] [place] [id number]  [email or web address]  [email or web address]   Office: [phone]   Cell: [phone]

## 2018-03-06 NOTE — TELEPHONE ENCOUNTER
Reason for call:  Form  Reason for Call:  Form, our goal is to have forms completed with 72 hours, however, some forms may require a visit or additional information.    Type of letter, form or note:  medical    Who is the form from?: Home care    Where did the form come from: form was faxed in    What clinic location was the form placed at?: [facility] - [phone]    Where the form was placed: Dr's Box    What number is listed as a contact on the form?: [phone]       Additional comments: all orders must be signed and dated by ordering physician    Call taken on 3/6/2018 at 12:29 PM by Parul Blair

## 2018-03-07 NOTE — TELEPHONE ENCOUNTER
Form faxed to ER and call routed to pool.    Violeta Kramer CMA ([facility])

## 2018-03-09 NOTE — TELEPHONE ENCOUNTER
Reason for Call:  Form, our goal is to have forms completed with 72 hours, however, some forms may require a visit or additional information.    Type of letter, form or note:  [facility]    Who is the form from?: Home care    Where did the form come from: form was faxed in    What clinic location was the form placed at?: [facility] - [phone]    Where the form was placed: 's Box    What number is listed as a contact on the form?: [phone]       Additional comments: fax to     Call taken on 3/9/2018 at 2:08 PM by Yumiko Ulloa

## 2018-03-12 NOTE — TELEPHONE ENCOUNTER
Reason for Call:  Form, our goal is to have forms completed with 72 hours, however, some forms may require a visit or additional information.    Type of letter, form or note:  medical    Who is the form from?: Home care    Where did the form come from: form was faxed in    What clinic location was the form placed at?: [facility] - [phone]    Where the form was placed: 's Box    What number is listed as a contact on the form?: [phone]       Additional comments: please complete and fax Fax [phone]    Call taken on 3/12/2018 at 9:29 AM by Zohra Nettles

## 2018-03-12 NOTE — TELEPHONE ENCOUNTER
Form faxed to [facility] and call routed to [facility] for Dr Sims to sign.    Violeta Kramer CMA ([facility])

## 2018-03-16 NOTE — TELEPHONE ENCOUNTER
Reason for Call:  Form, our goal is to have forms completed with 72 hours, however, some forms may require a visit or additional information.    Type of letter, form or note:  medical    Who is the form from?: Home care    Where did the form come from: form was faxed in    What clinic location was the form placed at?: [facility] - [phone]    Where the form was placed: 's Box    What number is listed as a contact on the form?: [phone]       Additional comments: none    Call taken on 3/16/2018 at 3:42 PM by Mamta Márquez

## 2018-03-19 NOTE — TELEPHONE ENCOUNTER
This is a duplicate form that was done and faxed on 3/12/18. See telephone encounter on 3/9/18.    Violeta Kramer CMA ([facility])

## 2018-04-03 NOTE — TELEPHONE ENCOUNTER
Forms signed, please cosign Dr. Sims I will forward the encounter to the ER see float pool.  Donna Shirley PA-C

## 2018-04-13 NOTE — PROGRESS NOTES
This is a recent snapshot of the patient's [facility] medical record.  For current drug dose and complete information and questions, call [phone] or In Basket pool, [facility] ([id number])  CSN Number:  [id number]

## 2018-04-20 NOTE — TELEPHONE ENCOUNTER
Reason for Call:  Form, our goal is to have forms completed with 72 hours, however, some forms may require a visit or additional information.    Type of letter, form or note:  [facility]    Who is the form from?: [facility] (if other please explain)    Where did the form come from: form was faxed in    What clinic location was the form placed at?: [facility] - [phone]    Where the form was placed: 's Box    What number is listed as a contact on the form?: [phone]       Additional comments: none    Call taken on 4/20/2018 at 2:48 PM by Remedios Cheung

## 2018-04-20 NOTE — TELEPHONE ENCOUNTER
Form faxed to [facility] for NP to review/signature. Please have MD sign if available, as AE out.   Miya Chun, CMA

## 2018-04-23 NOTE — TELEPHONE ENCOUNTER
Form cosigned by Dr Merino as Dr Sims is out today. Form faxed and sent to scanning. Copy in provider's faxed file.    Violeta Kramer CMA ([facility])

## 2018-05-11 NOTE — TELEPHONE ENCOUNTER
Death worksheet- placed in the out of office bin to fill out for Dr. ZARCO  Form placed in the out of office bin.

## 2018-10-16 NOTE — PROGRESS NOTES
[facility]  [id number] [id number] [phone]  Dept: [phone]    PRE-OP EVALUATION:  Today's date: 2017    Charlene Douglas (: 1950) presents for pre-operative evaluation assessment as requested by Radiologist, Dr. Dove order procedure .  She requires evaluation and anesthesia risk assessment prior to undergoing surgery/procedure for treatment of Liver .  Proposed procedure: Needle biopsy    Date of Surgery/ Procedure: 17 colonoscopy with tissue biopsy, 2017needle biopsy of liver with possible port a cath placement  Time of Surgery/ Procedure: 8:15am  Hospital/Surgical Facility: [facility]  Primary Physician: Donna Shirley  Type of Anesthesia Anticipated: MAC she thinks.      Patient has a Health Care Directive or Living Will:  YES but not scanned in through [facility].    Preop Questions 2017   1.  Do you have a history of heart attack, stroke, stent, bypass or surgery on an artery in the head, neck, heart or legs? No   2.  Do you ever have any pain or discomfort in your chest? No   3.  Do you have a history of  Heart Failure? No   4.   Are you troubled by shortness of breath when:  walking on a level surface, or up a slight hill, or at night? YES - has had shortness of breath that has been increasing recently with lower recently.  Fatigue has been gradual over last month.  If climbs stairs to go to bedroom has to sit down on bed to catch breath before she can do what she went upstairs to do.  She was last seen  with Hgb 8.1 she does not feel it is worse since then.     5.  Do you currently have a cold, bronchitis or other respiratory infection? No   6.  Do you have a cough, shortness of breath, or wheezing? YES - see above   7.  Do you sometimes get pains in the calves of your legs when you walk? No   8. Do you or anyone in your family have previous history of blood clots? No   9.  Do you or does anyone in your family have a serious  bleeding problem such as prolonged bleeding following surgeries or cuts? No   10. Have you ever had problems with anemia or been told to take iron pills? YES - early in July it was 10.   11. Have you had any abnormal blood loss such as black, tarry or bloody stools, or abnormal vaginal bleeding? YES - did not see.  Did have unusual in past. Was on iron for awhile.    12. Have you ever had a blood transfusion? YES - was 14 and had surgery for CHD   13. Have you or any of your relatives ever had problems with anesthesia? With appendectomy at age 22's had laryngeal stridor.  At cesarian section age 31 no issues.  No surgeries after.   14. Do you have sleep apnea, excessive snoring or daytime drowsiness? No   15. Do you have any prosthetic heart valves? No   16. Do you have prosthetic joints? No   17. Is there any chance that you may be pregnant? No           HPI:                                                      Brief HPI related to upcoming procedure: Patient seen 8/7/17 for follow up of cough and positive FIT and found to have large ascending colon mass with a large number of lesions to liver. She is anemic with some tachycardia and hypertension, shortness of breath.   Oncology and Surgical consults were completed.  Will have tissue diagnosis tomorrow along with liver biopsy to confirm staging on 8/28/17.      Elevated bp without previous diagnosis of HTN- has been taking 25mg metoprolol daily- no side effects.  bp is now 150.  HR 72 today-  She reports HR seems to mostly be around 100- she checks at home.      MEDICAL HISTORY:                                                    Patient Active Problem List    Diagnosis Date Noted     Liver metastasis (H) 08/22/2017     Priority: Medium     Metastatic colon cancer to liver (H) 08/22/2017     Priority: Medium     Fever, unspecified 08/07/2017     Priority: Medium     Anemia, unspecified type 08/07/2017     Priority: Medium     Elevated blood pressure reading without  diagnosis of hypertension 2017     Priority: Medium     Lung nodule 2017     Priority: Medium     Advanced directives, counseling/discussion 2013     Priority: Medium     Congenital hip dysplasia 2013     Priority: Medium     CARDIOVASCULAR SCREENING; LDL GOAL LESS THAN 160 2013     Priority: Medium     Family history of other cardiovascular diseases 10/17/2005     Priority: Medium     Problem list name updated by automated process. Provider to review and confirm  Problem list name updated by automated process. Provider to review       UTI (urinary tract infection) 2004     Priority: Medium     never had them until twice in   Problem list name updated by automated process. Provider to review       Keloid scar 2004     Priority: Medium     dorsum right wrist        Past Medical History:   Diagnosis Date     Hypertension      NO ACTIVE PROBLEMS      Past Surgical History:   Procedure Laterality Date     C APPENDECTOMY       C  DELIVERY ONLY       C NONSPECIFIC PROCEDURE  1964    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     HIP SURGERY      14 years old     Current Outpatient Prescriptions   Medication Sig Dispense Refill     benzonatate (TESSALON) 200 MG capsule Take 1 capsule (200 mg) by mouth 3 times daily as needed for cough 60 capsule 0     metoprolol (TOPROL-XL) 25 MG 24 hr tablet Take 1 tablet (25 mg) by mouth daily (Patient not taking: Reported on 2017) 30 tablet 0     ferrous sulfate (IRON) 325 (65 FE) MG tablet Take 1 tablet (325 mg) by mouth 3 times daily (with meals) (Patient not taking: Reported on 2017) 90 tablet 1     NEW MED Phytomega takes 2 soft gel caps daily       Multiple Vitamin (ANTI-OXIDANT PO) Take  by mouth. Take one tablet by mouth daily       CALCIUM + D OR 1 TABLET DAILY       ADVIL 200 MG OR CAPS 1 CAPSULE EVERY 4 TO 6 HOURS AS NEEDED (Patient not taking: Reported on 2017) 120 0     OTC products:  "None, except as noted above    Allergies   Allergen Reactions     No Known Allergies       Latex Allergy: NO    Social History   Substance Use Topics     Smoking status: Never Smoker     Smokeless tobacco: Never Used     Alcohol use 1.0 oz/week      Comment: 1 per week     History   Drug Use No       REVIEW OF SYSTEMS:                                                    C: NEGATIVE for fever, chills, change in weight  I: NEGATIVE for worrisome rashes, moles or lesions  E: NEGATIVE for vision changes or irritation  E/M: NEGATIVE for ear, mouth and throat problems  RESP:as above  B: NEGATIVE for masses, tenderness or discharge  CV: NEGATIVE for chest pain, palpitations or peripheral edema  GI: hematochezia  : NEGATIVE for frequency, dysuria, or hematuria  M: NEGATIVE for significant arthralgias or myalgia  N: NEGATIVE for weakness, dizziness or paresthesias  E: NEGATIVE for temperature intolerance, skin/hair changes  H: NEGATIVE for bleeding problems  P: NEGATIVE for changes in mood or affect    EXAM:                                                    /72  Pulse 72  Temp 99.7  F (37.6  C) (Temporal)  Resp 16  Ht 5' 6.53\" (1.69 m)  Wt 150 lb 4.8 oz (68.2 kg)  LMP 06/07/2007  Breastfeeding? No  BMI 23.87 kg/m2    GENERAL APPEARANCE: alert and pale     EYES: EOMI, PERRL     HENT: ear canals and TM's normal and nose and mouth without ulcers or lesions     NECK: no adenopathy, no asymmetry, masses, or scars and thyroid normal to palpation     RESP: lungs clear to auscultation - no rales, rhonchi or wheezes     CV: regular rates and rhythm, normal S1 S2, no S3 or S4 and no murmur, click or rub     ABDOMEN: soft, mild tenderness to RLQ, good bowel sounds     MS: extremities normal- no gross deformities noted, no evidence of inflammation in joints, FROM in all extremities.     SKIN: no suspicious lesions or rashes     NEURO: Normal strength and tone, sensory exam grossly normal, mentation intact and speech " normal     PSYCH: affect flat     LYMPHATICS: No axillary, cervical, or supraclavicular nodes    DIAGNOSTICS:                                                    EKG: done 8/7/17 with some sinus tachycardia.  Her Hgb has been stable.  Will recheck before liver biopsy on 8/25/17.      Recent Labs   Lab Test  08/18/17   1517  08/15/17   0907  08/07/17   1442  07/14/17   1428   HGB  8.1*  8.3*  8.9*  10.2*   PLT  498*  491*  479*  424   INR   --    --    --   1.04   NA   --    --   135  139   POTASSIUM   --    --   4.4  3.9   CR   --    --   0.90  0.86        IMPRESSION:                                                    Reason for surgery/procedure: formal diagnosis and staging of suspected colon cancer    The proposed surgical procedure is considered INTERMEDIATE risk.    REVISED CARDIAC RISK INDEX  The patient has the following serious cardiovascular risks for perioperative complications such as (MI, PE, VFib and 3  AV Block):  No serious cardiac risks  INTERPRETATION: 1 risks: Class II (low risk - 0.9% complication rate)    The patient has the following additional risks for perioperative complications:  History of laryngeal stridor post op with appendectomy in her 20's  Anemia- will recheck 2 days preop for liver biopsy        ICD-10-CM    1. Preop general physical exam Z01.818 CBC with platelets   2. Malignant neoplasm of ascending colon (H) C18.2 Oncology following   3. Liver metastases (H) C78.7 Surgery following   4. Anemia, unspecified type D64.9 Recheck prior to liver biopsy or sooner if has gross bleeding with bowel prep   5. Tachycardia R00.0 Improved with metoprolol- tolerating well   6. Cough R05 Suspect related to increased intra-abdominal pressure   7. Shortness of breath R06.02 Due to anemia- continue to monitor       RECOMMENDATIONS:                                                      --Consult hospital rounder / IM to assist post-op medical management    Anemia  Anemia and does not require treatment  prior to surgery.  Monitor Hemoglobin postoperatively.    Had laryngeal stridor post op with appendectomy- no intubations since.  No current plan for intubation with this procedure but will note this.      --Patient is to take all scheduled medications on the day of surgery EXCEPT for modifications listed below.    APPROVAL GIVEN to proceed with proposed procedure, without further diagnostic evaluation- did discuss patient's care in detail with Dr. Angie Sims who has been following.         Signed Electronically by: Lesly Edwards NP    Copy of this evaluation report is provided to requesting physician.    [place] Preop Guidelines   Arnoldo Light (male)

## 2018-11-12 NOTE — PROGRESS NOTES
Cardiology Progress Note    Overnight/subj:   No events  Switch to dobutamine yesterday  Started vanco and cefepime    VS reviewed: Notable for temp: 101.9, -140, MAP 68-95, oxygenating on 100% on 12/450/6/40  Tele: Sinus tach, rare pvcas  [place]: CVP 10, PA 32/18 (PWP 14), CO 7.4 (4.2), , SVO2 73    Wt: on admit 67.4,   I/O: 1.4 in / 2.2 out yday. Since  in / 175 out    Drips:   Dobu 4  Fentanyl 100  Versed 8    PO Cardiac Meds: None  Other meds: Cefepime, vanco, SQH, ranitidine    Radiology Imaging    Cardiology Studies:  TTE  Interpretation Summary  Technically difficult study. Patient intubated and sedated. PA catheter in  place.     The Ejection Fraction is estimated at 15-20%. Severely (EF <30%) reduced left  ventricular function is present. Diastolic function not assessed due to  tachycardia. Severe diffuse hypokinesis is present. Septal wall akinesis is  present. Posterior wall akinesis is present.  Global right ventricular function is mildly reduced.  Trace to mild tricuspid insufficiency is present.  The inferior vena cava is normal.  No pericardial effusion is present.     Labs: Reviewed in epic    Phys exam:  GEN: NAD  Pulm: ctab  Cardiac: , rrr no  murmurs  Vascular: - MARIO and +2 pulses  GI: soft, non distended    ASSESSMENT/PLAN:  Charlene Douglas is a 68yo F with stage 4 CRC undergoing FOLFOX + bevacizumab chemotherapy who developed acute SOB while receiving 5-FU infusion leading to resp failure and intubation found to have low EF on TTE with subsequent HD collapse concerning for cardiogenic shock. Hemodynamics improved with inotropic support confirming suspicion for cardiogenic shock. Over night patients SVo2 dramatically increase in the setting of new fever which is concerning for vaso-dilatory component or sepsis.     # Cardiogenic Shock   # Acute Cardiomyopathy (ddx stress induce, tachycardia, vasospasm 2/2 5FU, ischemic also possible)  # Low SVR- c/f sepsis   # CRC on chemo  #  Anemia   # ELKIN     Recommendations:  -- Wean Dobutamine to 2  -- repeat TTE tomorrow  -- Defer sepsis/ distributive shock management to ICU team  -- If patient is deem to have good prognosis (>1yr) would pursue outpatient angiography to evaluate for obstructive CAD and possible revascularization.     Please contact if any questions *[phone]    Patient discussed with Dr. Mccormack.     Harinder Watkins MD  Cardiology Fellow   *[phone]         Type 2 diabetes mellitus with complication, with long-term current use of insulin Essential hypertension Acute gout of right ankle, unspecified cause Severe mitral valve regurgitation Stage 3 chronic kidney disease

## 2020-09-29 NOTE — NURSING NOTE
"Chief Complaint   Patient presents with     Recheck Medication     Panel Management     Dexa scan       Initial /70 (BP Location: Right arm, Patient Position: Chair, Cuff Size: Adult Regular)  Pulse 120  Temp 98.6  F (37  C) (Oral)  Wt 151 lb 9.6 oz (68.8 kg)  LMP 06/07/2007  BMI 24.32 kg/m2 Estimated body mass index is 24.32 kg/(m^2) as calculated from the following:    Height as of 8/17/17: 5' 6.2\" (1.681 m).    Weight as of this encounter: 151 lb 9.6 oz (68.8 kg).  Medication Reconciliation: complete  "
no

## 2021-04-05 NOTE — PROGRESS NOTES
Patient here for C7D1 chemotherapy today. Labs/BSA/Dose verified by 2 RN'S. Hgb-8.7, AST-45, ALT-39, K+-3.3, ANC-1.7 and Plt-181,000. UA -Negative for protein. Discussed low potassium with DR. Dove, po replacement protocol.   Premeds given and tolerated chemotherapy well. C/O cold sensitivity when up to bathroom before being discharged.  Positive blood return pre/post infusion. Discharged in stable condition via w/c with .     
show

## 2022-02-04 NOTE — PROGRESS NOTES
" 09/15/17 1614   Quick Adds   Type of Visit Initial Occupational Therapy Evaluation   Living Environment   Lives With spouse   Living Arrangements house   Home Accessibility stairs within home;stairs to enter home   Number of Stairs to Enter Home 2   Number of Stairs Within Home 12   Stair Railings at Home inside, present at both sides   Transportation Available car;family or friend will provide   Living Environment Comment Bedroom and bathroom on upper floor of home; all other needs on main floor. Pt has walk-in shower with shower chair. Pt is retired; spouse continues to work.   Self-Care   Dominant Hand right   Usual Activity Tolerance good   Current Activity Tolerance fair   Regular Exercise no   Equipment Currently Used at Home cane, straight   Activity/Exercise/Self-Care Comment Pt and family own FWW, SPC, and shower chair. Pt had not been using any equipment until last 5 days becoming increasingly weak likely 2/2 chemotherapy treatment.    Functional Level Prior   Ambulation 0-->independent   Transferring 0-->independent   Toileting 0-->independent   Bathing 0-->independent   Dressing 0-->independent   Eating 0-->independent   Communication 0-->understands/communicates without difficulty   Swallowing 0-->swallows foods/liquids without difficulty   Cognition 0 - no cognition issues reported   Fall history within last six months no   Which of the above functional risks had a recent onset or change? ambulation;transferring;toileting;bathing;dressing;cognition;swallowing;eating   Prior Functional Level Comment Pt previously independent in all I/ADLs, sharing IADL responsibilities with spouse. Pt recently requiring increased assist for ADLs 2/2 weaknes.    General Information   Onset of Illness/Injury or Date of Surgery - Date 09/09/17   Referring Physician Julianne Kraus MD   Patient/Family Goals Statement none stated   Additional Occupational Profile Info/Pertinent History of Current Problem \"67 year old " "female with PMHx significant for stage IV colon cancer (mets to the liver) s/p cycle 1 FOLFOX who presented to the ED with shortness of breath and found to have new onset pulmonary edema on CXR, a BNP of 5000, hypoxic respiratory failure 2/2 pulmonary edema and cardiogenic shock, and acute heart failure requiring inotropic support (EF 10-20%). Admitted to the ICU where she was intubated and diuresed. Extubated 9/12 and has been stable off of mechanical support. Transferred to the Heme/Onc service 9/13. \"   Precautions/Limitations fall precautions   Weight-Bearing Status - LUE full weight-bearing   Weight-Bearing Status - RUE full weight-bearing   Weight-Bearing Status - LLE full weight-bearing   Weight-Bearing Status - RLE full weight-bearing   General Info Comments Activity: up with assist   Cognitive Status Examination   Level of Consciousness lethargic/somnolent   Able to Follow Commands success, 1-step commands   Personal Safety (Cognitive) mild impairment   Attention No deficits were identified   Organization/Problem Solving Sequencing impaired;Problem solving impaired   Executive Function Initiation impaired   Cognitive Comment Pt with delayed processing but spouse endorses improved command following and level of alertness this date.   Visual Perception   Visual Perception Wears glasses   Visual Perception Comments Pt reports increased blurriness with distant vision; able to read time on clock across the room   Sensory Examination   Sensory Quick Adds No deficits were identified   Integumentary/Edema   Integumentary/Edema no deficits were identifed   Posture   Posture forward head position;protracted shoulders   Range of Motion (ROM)   ROM Comment BUE ROM WFL   Strength   Strength Comments MMT: BUE grossly 3/5   Hand Strength   Hand Strength Comments Weak bilateral gross grasp   Coordination   Coordination Comments delayed FMC; requiring increased time for manipulating small objects   Mobility   Bed Mobility " Bed mobility skill: Sit to supine;Bed mobility skill: Supine to sit   Bed Mobility Skill: Sit to Supine   Level of [place]: Sit/Supine moderate assist (50% patients effort)  (BLEs)   Physical Assist/Nonphysical Assist: Sit/Supine verbal cues   Bed Mobility Skill: Supine to Sit   Level of [place]: Supine/Sit contact guard   Physical Assist/Nonphysical Assist: Supine/Sit verbal cues   Assistive Device: Supine/Sit bedrail   Transfer Skill: Sit to Stand   Level of [place]: Sit/Stand contact guard   Physical Assist/Nonphysical Assist: Sit/Stand verbal cues   Assistive Device for Transfer: Sit/Stand standard walker   Transfer Skill: Toilet Transfer   Level of [place]: Toilet contact guard   Physical Assist/Nonphysical Assist: Toilet verbal cues   Assistive Device standard walker  (BSC)   Balance   Balance Quick Add Sitting balance: static;Sitting balance: dynamic;Standing balance: static;Standing balance: dynamic   Sitting Balance: Static fair balance   Sitting Balance: Dynamic fair balance   Standing Balance: Static poor balance   Standing Balance: Dynamic poor balance   Lower Body Dressing   Level of [place]: Dress Lower Body dependent (less than 25% patients effort)   Toileting   Level of [place]: Toilet dependent (less than 25% patients effort)   Grooming   Level of [place]: Grooming minimum assist (75% patients effort)   Instrumental Activities of Daily Living (IADL)   Previous Responsibilities meal prep;housekeeping;laundry;shopping;medication management;driving   IADL Comments Spouse to assist PRN   Activities of Daily Living Analysis   Impairments Contributing to Impaired Activities of Daily Living balance impaired;cognition impaired;coordination impaired;strength decreased   General Therapy Interventions   Planned Therapy Interventions ADL retraining;IADL retraining;cognition;fine motor coordination training;strengthening;transfer training;home program guidelines;progressive  "activity/exercise;risk factor education   Clinical Impression   Criteria for Skilled Therapeutic Interventions Met yes, treatment indicated   OT Diagnosis decreased independence in ADLs   Influenced by the following impairments strength, endurance, cognition, coordination, balance   Assessment of Occupational Performance 5 or more Performance Deficits   Identified Performance Deficits dressing, bathing, toileting, functional mobility and transfers, home management   Clinical Decision Making (Complexity) Low complexity   Therapy Frequency daily   Predicted Duration of Therapy Intervention (days/wks) 2 weeks   Anticipated Equipment Needs at Discharge (TBD)   Anticipated Discharge Disposition Acute Rehabilitation Facility;Transitional Care Facility   Risks and Benefits of Treatment have been explained. Yes   Patient, Family & other staff in agreement with plan of care Yes   [facility] TM \"6 Clicks\"   2016, Trustees of [facility], under license to [employer].  All rights reserved.   6 Clicks Short Forms Daily Activity Inpatient Short Form   [facility]  \"6 Clicks\" Daily Activity Inpatient Short Form   1. Putting on and taking off regular lower body clothing? 2 - A Lot   2. Bathing (including washing, rinsing, drying)? 2 - A Lot   3. Toileting, which includes using toilet, bedpan or urinal? 2 - A Lot   4. Putting on and taking off regular upper body clothing? 2 - A Lot   5. Taking care of personal grooming such as brushing teeth? 3 - A Little   6. Eating meals? 3 - A Little   Daily Activity Raw Score (Score out of 24.Lower scores equate to lower levels of function) 14   Total Evaluation Time   Total Evaluation Time (Minutes) 10     " As above   AM rounds   s/p lore and STSG right foot  No complaints- awake alert with appropriate verbal responses    EXAM  as above - healing skin graft and donor sites    dressings changed     A/P   excellent SG take left leg and foot   Pt may ambulate with PT   Discharge to SNF/ rehab planned   Continuing care, discharge today and outpt f/u discussed with pt   Concerns addressed As above   AM rounds   s/p lore and STSG right foot  No complaints- awake alert with appropriate verbal responses    EXAM  as above - dry adherent healthy  skin graft and pink donor sites    dressings changed     A/P   excellent SG take left leg and foot   Pt may ambulate with PT   Discharge to SNF/ rehab planned   Continuing care, discharge today and outpt f/u discussed with pt   Concerns addressed

## 2022-11-02 NOTE — TELEPHONE ENCOUNTER
Pt  Tony Douglas calling states pts symptoms not getting better. Pt coughing bad and BP problems. Pt is scheduled with Johnnie today but prefers to be seen by [facility] today. Please advise and contact pt in regards if can be worked in today with [facility]. [phone]   1 pair

## 2023-04-03 PROBLEM — C78.7 MALIGNANT NEOPLASM METASTATIC TO LIVER (H): Status: ACTIVE | Noted: 2017-01-01

## (undated) DEVICE — GLOVE PROTEXIS BLUE W/NEU-THERA 8.0  2D73EB80

## (undated) DEVICE — SU VICRYL 2-0 SH 27" UND J417H

## (undated) DEVICE — SU VICRYL 3-0 SH 27" UND J416H

## (undated) DEVICE — SYR 10ML LL W/O NDL

## (undated) DEVICE — DRAPE LAP TRANSVERSE 29421

## (undated) DEVICE — DRSG STERI STRIP 1/2X4" R1547

## (undated) DEVICE — PACK MINOR PROCEDURE CUSTOM

## (undated) DEVICE — PREP CHLORAPREP 26ML TINTED ORANGE  260815

## (undated) DEVICE — SOL ADH LIQUID BENZOIN SWAB 0.6ML C1544

## (undated) DEVICE — NDL ECLIPSE 22GA 1.5"

## (undated) DEVICE — NDL ECLIPSE 18GA 1.5"

## (undated) DEVICE — SOL WATER IRRIG 1000ML BOTTLE 07139-09

## (undated) DEVICE — DRAPE C-ARM 60X42" 1013

## (undated) DEVICE — SU DERMABOND PROPEN .5ML DPP6

## (undated) DEVICE — SU MONOCRYL 4-0 PS-2 18" UND Y496G

## (undated) DEVICE — GLOVE PROTEXIS W/NEU-THERA 7.5  2D73TE75

## (undated) DEVICE — STOCKING SLEEVE COMPRESSION CALF MED

## (undated) DEVICE — DRSG PRIMAPORE 02X3" 7133

## (undated) DEVICE — SYR 03ML 22GA 1.5" ECLIPSE

## (undated) DEVICE — DECANTER VIAL 2006S

## (undated) DEVICE — SYR 10ML PREFILLED 0.9% NACL INJ NOT STERILE 306500

## (undated) DEVICE — SOL SODIUM CHLORIDE 250ML

## (undated) RX ORDER — HEPARIN SODIUM 1000 [USP'U]/ML
INJECTION, SOLUTION INTRAVENOUS; SUBCUTANEOUS
Status: DISPENSED
Start: 2017-01-01

## (undated) RX ORDER — PROPOFOL 10 MG/ML
INJECTION, EMULSION INTRAVENOUS
Status: DISPENSED
Start: 2017-01-01

## (undated) RX ORDER — HEPARIN SODIUM (PORCINE) LOCK FLUSH IV SOLN 100 UNIT/ML 100 UNIT/ML
SOLUTION INTRAVENOUS
Status: DISPENSED
Start: 2017-01-01

## (undated) RX ORDER — METOPROLOL TARTRATE 1 MG/ML
INJECTION, SOLUTION INTRAVENOUS
Status: DISPENSED
Start: 2017-01-01

## (undated) RX ORDER — ONDANSETRON 2 MG/ML
INJECTION INTRAMUSCULAR; INTRAVENOUS
Status: DISPENSED
Start: 2017-01-01

## (undated) RX ORDER — LIDOCAINE HYDROCHLORIDE 10 MG/ML
INJECTION, SOLUTION EPIDURAL; INFILTRATION; INTRACAUDAL; PERINEURAL
Status: DISPENSED
Start: 2017-01-01